# Patient Record
Sex: FEMALE | Race: WHITE | NOT HISPANIC OR LATINO | URBAN - METROPOLITAN AREA
[De-identification: names, ages, dates, MRNs, and addresses within clinical notes are randomized per-mention and may not be internally consistent; named-entity substitution may affect disease eponyms.]

---

## 2017-03-13 ENCOUNTER — OUTPATIENT (OUTPATIENT)
Dept: OUTPATIENT SERVICES | Facility: HOSPITAL | Age: 77
LOS: 1 days | Discharge: HOME | End: 2017-03-13

## 2017-03-13 ENCOUNTER — APPOINTMENT (OUTPATIENT)
Dept: HEMATOLOGY ONCOLOGY | Facility: CLINIC | Age: 77
End: 2017-03-13

## 2017-03-13 VITALS
DIASTOLIC BLOOD PRESSURE: 85 MMHG | HEIGHT: 68 IN | BODY MASS INDEX: 27.13 KG/M2 | HEART RATE: 70 BPM | TEMPERATURE: 98 F | SYSTOLIC BLOOD PRESSURE: 169 MMHG | WEIGHT: 179 LBS | RESPIRATION RATE: 14 BRPM

## 2017-07-06 ENCOUNTER — APPOINTMENT (OUTPATIENT)
Dept: BREAST CENTER | Facility: CLINIC | Age: 77
End: 2017-07-06

## 2017-07-06 VITALS
DIASTOLIC BLOOD PRESSURE: 80 MMHG | BODY MASS INDEX: 27.13 KG/M2 | SYSTOLIC BLOOD PRESSURE: 140 MMHG | HEIGHT: 68 IN | WEIGHT: 179 LBS

## 2017-07-07 ENCOUNTER — OUTPATIENT (OUTPATIENT)
Dept: OUTPATIENT SERVICES | Facility: HOSPITAL | Age: 77
LOS: 1 days | Discharge: HOME | End: 2017-07-07

## 2017-07-07 DIAGNOSIS — M19.90 UNSPECIFIED OSTEOARTHRITIS, UNSPECIFIED SITE: ICD-10-CM

## 2017-07-07 DIAGNOSIS — Z96.642 PRESENCE OF LEFT ARTIFICIAL HIP JOINT: ICD-10-CM

## 2017-08-07 ENCOUNTER — RX RENEWAL (OUTPATIENT)
Age: 77
End: 2017-08-07

## 2017-09-22 ENCOUNTER — OUTPATIENT (OUTPATIENT)
Dept: OUTPATIENT SERVICES | Facility: HOSPITAL | Age: 77
LOS: 1 days | Discharge: HOME | End: 2017-09-22

## 2017-09-22 DIAGNOSIS — Z01.818 ENCOUNTER FOR OTHER PREPROCEDURAL EXAMINATION: ICD-10-CM

## 2017-09-22 DIAGNOSIS — M17.12 UNILATERAL PRIMARY OSTEOARTHRITIS, LEFT KNEE: ICD-10-CM

## 2017-09-28 ENCOUNTER — OUTPATIENT (OUTPATIENT)
Dept: OUTPATIENT SERVICES | Facility: HOSPITAL | Age: 77
LOS: 1 days | Discharge: HOME | End: 2017-09-28

## 2017-09-28 ENCOUNTER — APPOINTMENT (OUTPATIENT)
Dept: HEMATOLOGY ONCOLOGY | Facility: CLINIC | Age: 77
End: 2017-09-28

## 2017-09-28 VITALS
BODY MASS INDEX: 26.37 KG/M2 | RESPIRATION RATE: 14 BRPM | HEIGHT: 68 IN | SYSTOLIC BLOOD PRESSURE: 136 MMHG | TEMPERATURE: 98.4 F | HEART RATE: 73 BPM | DIASTOLIC BLOOD PRESSURE: 66 MMHG | WEIGHT: 174 LBS

## 2017-09-28 DIAGNOSIS — Z86.39 PERSONAL HISTORY OF OTHER ENDOCRINE, NUTRITIONAL AND METABOLIC DISEASE: ICD-10-CM

## 2017-09-28 DIAGNOSIS — I10 ESSENTIAL (PRIMARY) HYPERTENSION: ICD-10-CM

## 2017-09-28 DIAGNOSIS — Z87.39 PERSONAL HISTORY OF OTHER DISEASES OF THE MUSCULOSKELETAL SYSTEM AND CONNECTIVE TISSUE: ICD-10-CM

## 2017-09-28 DIAGNOSIS — Z87.891 PERSONAL HISTORY OF NICOTINE DEPENDENCE: ICD-10-CM

## 2017-10-02 DIAGNOSIS — C50.411 MALIGNANT NEOPLASM OF UPPER-OUTER QUADRANT OF RIGHT FEMALE BREAST: ICD-10-CM

## 2017-10-03 ENCOUNTER — INPATIENT (INPATIENT)
Facility: HOSPITAL | Age: 77
LOS: 1 days | Discharge: NURSING, REHAB OR LONG-TERM CARE | End: 2017-10-05
Attending: ORTHOPAEDIC SURGERY

## 2017-10-05 ENCOUNTER — INPATIENT (INPATIENT)
Facility: HOSPITAL | Age: 77
LOS: 11 days | Discharge: HOME | End: 2017-10-17
Attending: PHYSICAL MEDICINE & REHABILITATION

## 2017-10-10 DIAGNOSIS — I10 ESSENTIAL (PRIMARY) HYPERTENSION: ICD-10-CM

## 2017-10-10 DIAGNOSIS — Z90.49 ACQUIRED ABSENCE OF OTHER SPECIFIED PARTS OF DIGESTIVE TRACT: ICD-10-CM

## 2017-10-10 DIAGNOSIS — Z92.3 PERSONAL HISTORY OF IRRADIATION: ICD-10-CM

## 2017-10-10 DIAGNOSIS — Z79.82 LONG TERM (CURRENT) USE OF ASPIRIN: ICD-10-CM

## 2017-10-10 DIAGNOSIS — Z96.643 PRESENCE OF ARTIFICIAL HIP JOINT, BILATERAL: ICD-10-CM

## 2017-10-10 DIAGNOSIS — Z51.89 ENCOUNTER FOR OTHER SPECIFIED AFTERCARE: ICD-10-CM

## 2017-10-10 DIAGNOSIS — Z90.710 ACQUIRED ABSENCE OF BOTH CERVIX AND UTERUS: ICD-10-CM

## 2017-10-10 DIAGNOSIS — E78.5 HYPERLIPIDEMIA, UNSPECIFIED: ICD-10-CM

## 2017-10-10 DIAGNOSIS — M17.12 UNILATERAL PRIMARY OSTEOARTHRITIS, LEFT KNEE: ICD-10-CM

## 2017-10-10 DIAGNOSIS — Z85.3 PERSONAL HISTORY OF MALIGNANT NEOPLASM OF BREAST: ICD-10-CM

## 2017-10-10 DIAGNOSIS — K21.9 GASTRO-ESOPHAGEAL REFLUX DISEASE WITHOUT ESOPHAGITIS: ICD-10-CM

## 2017-10-10 DIAGNOSIS — M06.9 RHEUMATOID ARTHRITIS, UNSPECIFIED: ICD-10-CM

## 2017-10-10 DIAGNOSIS — Z82.49 FAMILY HISTORY OF ISCHEMIC HEART DISEASE AND OTHER DISEASES OF THE CIRCULATORY SYSTEM: ICD-10-CM

## 2017-10-19 ENCOUNTER — OUTPATIENT (OUTPATIENT)
Dept: OUTPATIENT SERVICES | Facility: HOSPITAL | Age: 77
LOS: 1 days | Discharge: HOME | End: 2017-10-19

## 2017-10-19 DIAGNOSIS — Z79.01 LONG TERM (CURRENT) USE OF ANTICOAGULANTS: ICD-10-CM

## 2017-10-19 DIAGNOSIS — Z47.1 AFTERCARE FOLLOWING JOINT REPLACEMENT SURGERY: ICD-10-CM

## 2017-10-19 DIAGNOSIS — Z92.3 PERSONAL HISTORY OF IRRADIATION: ICD-10-CM

## 2017-10-19 DIAGNOSIS — E78.5 HYPERLIPIDEMIA, UNSPECIFIED: ICD-10-CM

## 2017-10-19 DIAGNOSIS — M19.90 UNSPECIFIED OSTEOARTHRITIS, UNSPECIFIED SITE: ICD-10-CM

## 2017-10-19 DIAGNOSIS — Z96.643 PRESENCE OF ARTIFICIAL HIP JOINT, BILATERAL: ICD-10-CM

## 2017-10-19 DIAGNOSIS — D64.9 ANEMIA, UNSPECIFIED: ICD-10-CM

## 2017-10-19 DIAGNOSIS — Z96.652 PRESENCE OF LEFT ARTIFICIAL KNEE JOINT: ICD-10-CM

## 2017-10-19 DIAGNOSIS — K21.9 GASTRO-ESOPHAGEAL REFLUX DISEASE WITHOUT ESOPHAGITIS: ICD-10-CM

## 2017-10-19 DIAGNOSIS — I10 ESSENTIAL (PRIMARY) HYPERTENSION: ICD-10-CM

## 2017-10-19 DIAGNOSIS — Z96.649 PRESENCE OF UNSPECIFIED ARTIFICIAL HIP JOINT: ICD-10-CM

## 2017-10-19 DIAGNOSIS — E78.00 PURE HYPERCHOLESTEROLEMIA, UNSPECIFIED: ICD-10-CM

## 2017-10-19 DIAGNOSIS — Z85.3 PERSONAL HISTORY OF MALIGNANT NEOPLASM OF BREAST: ICD-10-CM

## 2017-10-19 DIAGNOSIS — M06.9 RHEUMATOID ARTHRITIS, UNSPECIFIED: ICD-10-CM

## 2017-10-19 DIAGNOSIS — R25.1 TREMOR, UNSPECIFIED: ICD-10-CM

## 2017-10-19 DIAGNOSIS — M85.80 OTHER SPECIFIED DISORDERS OF BONE DENSITY AND STRUCTURE, UNSPECIFIED SITE: ICD-10-CM

## 2017-10-23 ENCOUNTER — OUTPATIENT (OUTPATIENT)
Dept: OUTPATIENT SERVICES | Facility: HOSPITAL | Age: 77
LOS: 1 days | Discharge: HOME | End: 2017-10-23

## 2017-10-23 DIAGNOSIS — Z96.649 PRESENCE OF UNSPECIFIED ARTIFICIAL HIP JOINT: ICD-10-CM

## 2017-10-23 DIAGNOSIS — Z79.01 LONG TERM (CURRENT) USE OF ANTICOAGULANTS: ICD-10-CM

## 2017-10-30 ENCOUNTER — OUTPATIENT (OUTPATIENT)
Dept: OUTPATIENT SERVICES | Facility: HOSPITAL | Age: 77
LOS: 1 days | Discharge: HOME | End: 2017-10-30

## 2017-10-30 DIAGNOSIS — Z96.649 PRESENCE OF UNSPECIFIED ARTIFICIAL HIP JOINT: ICD-10-CM

## 2017-10-30 DIAGNOSIS — Z79.01 LONG TERM (CURRENT) USE OF ANTICOAGULANTS: ICD-10-CM

## 2017-11-06 ENCOUNTER — OUTPATIENT (OUTPATIENT)
Dept: OUTPATIENT SERVICES | Facility: HOSPITAL | Age: 77
LOS: 1 days | Discharge: HOME | End: 2017-11-06

## 2017-11-06 DIAGNOSIS — Z79.01 LONG TERM (CURRENT) USE OF ANTICOAGULANTS: ICD-10-CM

## 2017-11-06 DIAGNOSIS — Z96.649 PRESENCE OF UNSPECIFIED ARTIFICIAL HIP JOINT: ICD-10-CM

## 2017-12-11 ENCOUNTER — OUTPATIENT (OUTPATIENT)
Dept: OUTPATIENT SERVICES | Facility: HOSPITAL | Age: 77
LOS: 1 days | Discharge: HOME | End: 2017-12-11

## 2017-12-11 DIAGNOSIS — R92.8 OTHER ABNORMAL AND INCONCLUSIVE FINDINGS ON DIAGNOSTIC IMAGING OF BREAST: ICD-10-CM

## 2017-12-13 ENCOUNTER — OUTPATIENT (OUTPATIENT)
Dept: OUTPATIENT SERVICES | Facility: HOSPITAL | Age: 77
LOS: 1 days | Discharge: HOME | End: 2017-12-13

## 2017-12-13 DIAGNOSIS — M19.90 UNSPECIFIED OSTEOARTHRITIS, UNSPECIFIED SITE: ICD-10-CM

## 2017-12-13 DIAGNOSIS — Z96.652 PRESENCE OF LEFT ARTIFICIAL KNEE JOINT: ICD-10-CM

## 2018-02-07 ENCOUNTER — RX RENEWAL (OUTPATIENT)
Age: 78
End: 2018-02-07

## 2018-02-27 ENCOUNTER — APPOINTMENT (OUTPATIENT)
Dept: BREAST CENTER | Facility: CLINIC | Age: 78
End: 2018-02-27
Payer: MEDICARE

## 2018-02-27 VITALS
OXYGEN SATURATION: 96 % | WEIGHT: 174 LBS | BODY MASS INDEX: 26.37 KG/M2 | HEART RATE: 79 BPM | HEIGHT: 68 IN | SYSTOLIC BLOOD PRESSURE: 138 MMHG | DIASTOLIC BLOOD PRESSURE: 84 MMHG

## 2018-02-27 PROCEDURE — 99213 OFFICE O/P EST LOW 20 MIN: CPT

## 2018-03-22 ENCOUNTER — APPOINTMENT (OUTPATIENT)
Dept: HEMATOLOGY ONCOLOGY | Facility: CLINIC | Age: 78
End: 2018-03-22

## 2018-04-09 ENCOUNTER — APPOINTMENT (OUTPATIENT)
Dept: HEMATOLOGY ONCOLOGY | Facility: CLINIC | Age: 78
End: 2018-04-09

## 2018-04-09 ENCOUNTER — OUTPATIENT (OUTPATIENT)
Dept: OUTPATIENT SERVICES | Facility: HOSPITAL | Age: 78
LOS: 1 days | Discharge: HOME | End: 2018-04-09

## 2018-04-09 VITALS
SYSTOLIC BLOOD PRESSURE: 160 MMHG | DIASTOLIC BLOOD PRESSURE: 70 MMHG | HEIGHT: 68 IN | HEART RATE: 68 BPM | WEIGHT: 178 LBS | BODY MASS INDEX: 26.98 KG/M2 | TEMPERATURE: 97.2 F | RESPIRATION RATE: 16 BRPM

## 2018-04-09 PROBLEM — Z87.39 HISTORY OF RHEUMATOID ARTHRITIS: Status: RESOLVED | Noted: 2018-04-09 | Resolved: 2018-04-09

## 2018-04-09 PROBLEM — Z86.39 HISTORY OF HYPERLIPIDEMIA: Status: RESOLVED | Noted: 2018-04-09 | Resolved: 2018-04-09

## 2018-04-09 RX ORDER — CHLORHEXIDINE GLUCONATE 4 %
325 (65 FE) LIQUID (ML) TOPICAL
Qty: 60 | Refills: 0 | Status: COMPLETED | COMMUNITY
Start: 2017-10-17

## 2018-04-09 RX ORDER — RANITIDINE 150 MG/1
150 TABLET ORAL
Qty: 180 | Refills: 0 | Status: COMPLETED | COMMUNITY
Start: 2017-09-18

## 2018-04-09 RX ORDER — OXYCODONE 5 MG/1
5 TABLET ORAL
Qty: 42 | Refills: 0 | Status: COMPLETED | COMMUNITY
Start: 2017-10-17

## 2018-04-09 RX ORDER — WARFARIN 1 MG/1
1 TABLET ORAL
Qty: 30 | Refills: 0 | Status: COMPLETED | COMMUNITY
Start: 2017-10-17

## 2018-04-09 RX ORDER — CHLORHEXIDINE GLUCONATE, 0.12% ORAL RINSE 1.2 MG/ML
0.12 SOLUTION DENTAL
Qty: 473 | Refills: 0 | Status: COMPLETED | COMMUNITY
Start: 2017-08-29

## 2018-04-09 RX ORDER — PREDNISONE 10 MG/1
10 TABLET ORAL
Qty: 30 | Refills: 0 | Status: COMPLETED | COMMUNITY
Start: 2017-11-07

## 2018-04-09 RX ORDER — WARFARIN 2.5 MG/1
2.5 TABLET ORAL
Qty: 30 | Refills: 0 | Status: COMPLETED | COMMUNITY
Start: 2017-10-17

## 2018-04-16 DIAGNOSIS — C50.411 MALIGNANT NEOPLASM OF UPPER-OUTER QUADRANT OF RIGHT FEMALE BREAST: ICD-10-CM

## 2018-06-11 ENCOUNTER — OUTPATIENT (OUTPATIENT)
Dept: OUTPATIENT SERVICES | Facility: HOSPITAL | Age: 78
LOS: 1 days | Discharge: HOME | End: 2018-06-11

## 2018-06-11 DIAGNOSIS — R92.8 OTHER ABNORMAL AND INCONCLUSIVE FINDINGS ON DIAGNOSTIC IMAGING OF BREAST: ICD-10-CM

## 2018-07-19 ENCOUNTER — APPOINTMENT (OUTPATIENT)
Dept: BREAST CENTER | Facility: CLINIC | Age: 78
End: 2018-07-19
Payer: MEDICARE

## 2018-07-19 VITALS
HEART RATE: 67 BPM | HEIGHT: 68 IN | OXYGEN SATURATION: 96 % | WEIGHT: 178 LBS | SYSTOLIC BLOOD PRESSURE: 122 MMHG | DIASTOLIC BLOOD PRESSURE: 78 MMHG | BODY MASS INDEX: 26.98 KG/M2

## 2018-07-19 PROCEDURE — 99213 OFFICE O/P EST LOW 20 MIN: CPT

## 2018-07-25 ENCOUNTER — OUTPATIENT (OUTPATIENT)
Dept: OUTPATIENT SERVICES | Facility: HOSPITAL | Age: 78
LOS: 1 days | Discharge: HOME | End: 2018-07-25

## 2018-07-26 DIAGNOSIS — M89.9 DISORDER OF BONE, UNSPECIFIED: ICD-10-CM

## 2018-07-26 DIAGNOSIS — Z13.820 ENCOUNTER FOR SCREENING FOR OSTEOPOROSIS: ICD-10-CM

## 2018-07-26 DIAGNOSIS — Z78.0 ASYMPTOMATIC MENOPAUSAL STATE: ICD-10-CM

## 2018-10-03 ENCOUNTER — OUTPATIENT (OUTPATIENT)
Dept: OUTPATIENT SERVICES | Facility: HOSPITAL | Age: 78
LOS: 1 days | Discharge: HOME | End: 2018-10-03

## 2018-10-03 DIAGNOSIS — R07.9 CHEST PAIN, UNSPECIFIED: ICD-10-CM

## 2018-10-08 ENCOUNTER — APPOINTMENT (OUTPATIENT)
Dept: HEMATOLOGY ONCOLOGY | Facility: CLINIC | Age: 78
End: 2018-10-08

## 2018-10-15 ENCOUNTER — RX RENEWAL (OUTPATIENT)
Age: 78
End: 2018-10-15

## 2018-10-29 ENCOUNTER — OUTPATIENT (OUTPATIENT)
Dept: OUTPATIENT SERVICES | Facility: HOSPITAL | Age: 78
LOS: 1 days | Discharge: HOME | End: 2018-10-29

## 2018-10-29 ENCOUNTER — APPOINTMENT (OUTPATIENT)
Dept: HEMATOLOGY ONCOLOGY | Facility: CLINIC | Age: 78
End: 2018-10-29

## 2018-10-29 VITALS
SYSTOLIC BLOOD PRESSURE: 181 MMHG | RESPIRATION RATE: 16 BRPM | TEMPERATURE: 97.4 F | WEIGHT: 178 LBS | HEART RATE: 74 BPM | DIASTOLIC BLOOD PRESSURE: 72 MMHG | HEIGHT: 68 IN | BODY MASS INDEX: 26.98 KG/M2

## 2018-10-30 DIAGNOSIS — C50.411 MALIGNANT NEOPLASM OF UPPER-OUTER QUADRANT OF RIGHT FEMALE BREAST: ICD-10-CM

## 2018-10-30 DIAGNOSIS — Z79.811 LONG TERM (CURRENT) USE OF AROMATASE INHIBITORS: ICD-10-CM

## 2018-12-10 ENCOUNTER — FORM ENCOUNTER (OUTPATIENT)
Age: 78
End: 2018-12-10

## 2018-12-11 ENCOUNTER — OUTPATIENT (OUTPATIENT)
Dept: OUTPATIENT SERVICES | Facility: HOSPITAL | Age: 78
LOS: 1 days | Discharge: HOME | End: 2018-12-11

## 2018-12-11 DIAGNOSIS — R92.8 OTHER ABNORMAL AND INCONCLUSIVE FINDINGS ON DIAGNOSTIC IMAGING OF BREAST: ICD-10-CM

## 2019-01-03 ENCOUNTER — RX RENEWAL (OUTPATIENT)
Age: 79
End: 2019-01-03

## 2019-01-17 ENCOUNTER — APPOINTMENT (OUTPATIENT)
Dept: BREAST CENTER | Facility: CLINIC | Age: 79
End: 2019-01-17
Payer: MEDICARE

## 2019-01-17 VITALS
WEIGHT: 178 LBS | DIASTOLIC BLOOD PRESSURE: 80 MMHG | SYSTOLIC BLOOD PRESSURE: 140 MMHG | HEART RATE: 72 BPM | HEIGHT: 68 IN | BODY MASS INDEX: 26.98 KG/M2 | OXYGEN SATURATION: 98 %

## 2019-01-17 PROCEDURE — 99212 OFFICE O/P EST SF 10 MIN: CPT

## 2019-01-17 NOTE — ASSESSMENT
[FreeTextEntry1] : Elenita has a h/o right breast lumpectomy for breast cancer in 2015. She has a benign CBE. There are no palpable findings, nipple discharge or inversion. I reviewed her recent imaging which appears benign.\par \par She will f/up in 6 months for a CBE.\par \par I spent a total of 10 minutes of face to face time with this patient, greater than 50% of which was spent in counseling and/or coordination of care.  All of her questions were appropriately answered.\par

## 2019-01-17 NOTE — HISTORY OF PRESENT ILLNESS
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  \par ER/[place] (+), HER2 (-).\par h/o Left breast benign Ca++ on NLOC (Ferzli/[employer]).\par No FHx breast/ovarian cancer.  Sister had Hodgkin's lymphoma.\par s/p Right NLOC/SNB/MP 12/18/15 - 0/1 (-); 18 mm well diff IDC with lobular features, non ext DCIS low grade; (+) margins.  No LVI or perineural invasion.  Widespread LCIS classical, ALH, papilloma, ADH.  \par s/p Right re-exc 2/1/16 - widespread LCIS classical, no invasion identified; (-) margins.  \par s/p SKIP insertion - 02/18/16. completed PBI on 2/26/16.\par / Autumn - Anastrozole. \par

## 2019-01-17 NOTE — PAST MEDICAL HISTORY
[Menarche Age ____] : age at menarche was [unfilled] [Menopause Age____] : age at menopause was [unfilled] [Total Preg ___] : G[unfilled] [Live Births ___] : P[unfilled]  [Age At Live Birth ___] : Age at live birth: [unfilled] [History of Hormone Replacement Treatment] : has no history of hormone replacement treatment [FreeTextEntry5] : TAYLOR [FreeTextEntry6] : none [FreeTextEntry7] : none [FreeTextEntry8] : none

## 2019-01-17 NOTE — DATA REVIEWED
[FreeTextEntry1] : B/L Dx Mammo & Right Sono - 12/11/18:\par Breast composition:There are scattered areas of fibroglandular density. \par \par The patient is status post a right lumpectomy with stable architectural \par distortion most consistent with postsurgical change. There is a large \par seroma at the lumpectomy site which does not appear significantly changed. \par See the sonogram report below. No suspicious masses or abnormal clusters of \par microcalcifications are seen. \par \par Whole Right Breast Sonogram: \par \par At the lumpectomy site which is at the 10:00 location 6 - 7cm from the \par nipple, there is a seroma measuring 3.2 cm x 2.9 cm x 1.9 cm which is \par without significant change. \par \par Impression: Status post a right lumpectomy with stable architectural \par distortion most consistent with postsurgical change. Stable associated \par seroma at the lumpectomy site as above. \par \par No evidence of malignancy. \par \par Recommendation: Unless otherwise indicated by clinical findings, annual \par screening mammography recommended. \par \par BI-RADS Category 2: Benign

## 2019-01-17 NOTE — PHYSICAL EXAM
[No Cervical Adenopathy] : no cervical adenopathy [Examined in the supine and seated position] : examined in the supine and seated position [No dominant masses] : no dominant masses in right breast  [No dominant masses] : no dominant masses left breast [No Nipple Retraction] : no left nipple retraction [No Nipple Discharge] : no left nipple discharge [No Axillary Lymphadenopathy] : no left axillary lymphadenopathy [No Edema] : no edema [No Swelling] : no swelling [Full ROM] : full range of motion [No Rashes] : no rashes [No Ulceration] : no ulceration

## 2019-01-17 NOTE — REVIEW OF SYSTEMS
[Arthralgias] : arthralgias [Joint Pain] : joint pain [Negative] : Constitutional [Breast Pain] : no breast pain [Breast Lump] : no breast lump [Nipple Discharge] : no nipple discharge [Nipple Inverted] : no inversion of the nipple

## 2019-01-17 NOTE — REASON FOR VISIT
[Follow-Up: _____] : a [unfilled] follow-up visit [FreeTextEntry1] : h/o right breast cancer; imaging review.

## 2019-01-30 ENCOUNTER — APPOINTMENT (OUTPATIENT)
Dept: ORTHOPEDIC SURGERY | Facility: CLINIC | Age: 79
End: 2019-01-30
Payer: MEDICARE

## 2019-01-30 DIAGNOSIS — Z80.7 FAMILY HISTORY OF OTHER MALIGNANT NEOPLASMS OF LYMPHOID, HEMATOPOIETIC AND RELATED TISSUES: ICD-10-CM

## 2019-01-30 PROCEDURE — 99214 OFFICE O/P EST MOD 30 MIN: CPT

## 2019-01-30 RX ORDER — AMOXICILLIN 500 MG/1
500 CAPSULE ORAL
Qty: 12 | Refills: 0 | Status: DISCONTINUED | COMMUNITY
Start: 2018-03-22 | End: 2019-01-30

## 2019-01-30 RX ORDER — ZOLPIDEM TARTRATE 5 MG/1
5 TABLET ORAL
Qty: 30 | Refills: 0 | Status: DISCONTINUED | COMMUNITY
Start: 2017-12-28 | End: 2019-01-30

## 2019-01-30 RX ORDER — SERTRALINE 25 MG/1
25 TABLET, FILM COATED ORAL
Qty: 60 | Refills: 0 | Status: DISCONTINUED | COMMUNITY
Start: 2017-09-18 | End: 2019-01-30

## 2019-01-30 NOTE — PHYSICAL EXAM
[Cane] : ambulates with cane [] : Motor: [Motor Strength Lower Extremities] : left (5/5) [2+] : left 2+ [Normal] : Alert and in no acute distress [de-identified] : Decreased ROM with flexion about the right knee, without pain. Full ROM with extension, without pain. [de-identified] : Sensation grossly intact about bilateral lower extremities.  [de-identified] : Incision well healed about the left knee. Tenderness to palpation about the right knee, swelling.

## 2019-01-30 NOTE — HISTORY OF PRESENT ILLNESS
[Pain Location] : pain [] : right knee [___ yrs] : [unfilled] year(s) ago [6] : a maximum pain level of 6/10 [Walking] : worsened by walking [Acetaminophen] : relieved by acetaminophen [Ice] : relieved by ice [NSAIDs] : relieved by nonsteroidal anti-inflammatory drugs [Rest] : relieved by rest [de-identified] : 78 year old female s/p left total knee replacement x1 year ago presents to the office today complaining of right knee pain. Pt reports difficulty with ambulation that she contributes to the knee pain. Pt states her pain is worse at night. Pt is looking to do elective right total knee replacement as she had great results with the left. Pt suffers from Rheumatoid Arthritis.

## 2019-01-30 NOTE — REASON FOR VISIT
[Follow-Up Visit] : a follow-up visit for [Artificial Knee Joint] : artificial knee joint [Knee Pain] : knee pain [FreeTextEntry2] : Right knee pain

## 2019-03-19 ENCOUNTER — OUTPATIENT (OUTPATIENT)
Dept: OUTPATIENT SERVICES | Facility: HOSPITAL | Age: 79
LOS: 1 days | Discharge: HOME | End: 2019-03-19
Payer: MEDICARE

## 2019-03-19 VITALS
SYSTOLIC BLOOD PRESSURE: 170 MMHG | TEMPERATURE: 98 F | HEART RATE: 57 BPM | RESPIRATION RATE: 18 BRPM | DIASTOLIC BLOOD PRESSURE: 70 MMHG | HEIGHT: 68 IN | OXYGEN SATURATION: 100 %

## 2019-03-19 DIAGNOSIS — M25.561 PAIN IN RIGHT KNEE: ICD-10-CM

## 2019-03-19 DIAGNOSIS — Z01.818 ENCOUNTER FOR OTHER PREPROCEDURAL EXAMINATION: ICD-10-CM

## 2019-03-19 DIAGNOSIS — Z96.652 PRESENCE OF LEFT ARTIFICIAL KNEE JOINT: Chronic | ICD-10-CM

## 2019-03-19 DIAGNOSIS — Z90.49 ACQUIRED ABSENCE OF OTHER SPECIFIED PARTS OF DIGESTIVE TRACT: Chronic | ICD-10-CM

## 2019-03-19 DIAGNOSIS — I10 ESSENTIAL (PRIMARY) HYPERTENSION: ICD-10-CM

## 2019-03-19 DIAGNOSIS — Z96.642 PRESENCE OF LEFT ARTIFICIAL HIP JOINT: Chronic | ICD-10-CM

## 2019-03-19 DIAGNOSIS — Z96.641 PRESENCE OF RIGHT ARTIFICIAL HIP JOINT: Chronic | ICD-10-CM

## 2019-03-19 DIAGNOSIS — Z90.710 ACQUIRED ABSENCE OF BOTH CERVIX AND UTERUS: Chronic | ICD-10-CM

## 2019-03-19 LAB
ALBUMIN SERPL ELPH-MCNC: 4.3 G/DL — SIGNIFICANT CHANGE UP (ref 3.5–5.2)
ALP SERPL-CCNC: 87 U/L — SIGNIFICANT CHANGE UP (ref 30–115)
ALT FLD-CCNC: 18 U/L — SIGNIFICANT CHANGE UP (ref 0–41)
ANION GAP SERPL CALC-SCNC: 14 MMOL/L — SIGNIFICANT CHANGE UP (ref 7–14)
APPEARANCE UR: ABNORMAL
APTT BLD: 26.4 SEC — LOW (ref 27–39.2)
AST SERPL-CCNC: 21 U/L — SIGNIFICANT CHANGE UP (ref 0–41)
BACTERIA # UR AUTO: ABNORMAL /HPF
BASOPHILS # BLD AUTO: 0.03 K/UL — SIGNIFICANT CHANGE UP (ref 0–0.2)
BASOPHILS NFR BLD AUTO: 0.6 % — SIGNIFICANT CHANGE UP (ref 0–1)
BILIRUB SERPL-MCNC: 0.3 MG/DL — SIGNIFICANT CHANGE UP (ref 0.2–1.2)
BILIRUB UR-MCNC: NEGATIVE — SIGNIFICANT CHANGE UP
BLD GP AB SCN SERPL QL: SIGNIFICANT CHANGE UP
BUN SERPL-MCNC: 21 MG/DL — HIGH (ref 10–20)
CALCIUM SERPL-MCNC: 10 MG/DL — SIGNIFICANT CHANGE UP (ref 8.5–10.1)
CHLORIDE SERPL-SCNC: 99 MMOL/L — SIGNIFICANT CHANGE UP (ref 98–110)
CO2 SERPL-SCNC: 28 MMOL/L — SIGNIFICANT CHANGE UP (ref 17–32)
COLOR SPEC: YELLOW — SIGNIFICANT CHANGE UP
CREAT SERPL-MCNC: 0.8 MG/DL — SIGNIFICANT CHANGE UP (ref 0.7–1.5)
DIFF PNL FLD: ABNORMAL
EOSINOPHIL # BLD AUTO: 0.07 K/UL — SIGNIFICANT CHANGE UP (ref 0–0.7)
EOSINOPHIL NFR BLD AUTO: 1.4 % — SIGNIFICANT CHANGE UP (ref 0–8)
GLUCOSE SERPL-MCNC: 80 MG/DL — SIGNIFICANT CHANGE UP (ref 70–99)
GLUCOSE UR QL: NEGATIVE MG/DL — SIGNIFICANT CHANGE UP
HCT VFR BLD CALC: 36.3 % — LOW (ref 37–47)
HGB BLD-MCNC: 12.4 G/DL — SIGNIFICANT CHANGE UP (ref 12–16)
IMM GRANULOCYTES NFR BLD AUTO: 0.2 % — SIGNIFICANT CHANGE UP (ref 0.1–0.3)
INR BLD: 0.95 RATIO — SIGNIFICANT CHANGE UP (ref 0.65–1.3)
KETONES UR-MCNC: NEGATIVE — SIGNIFICANT CHANGE UP
LEUKOCYTE ESTERASE UR-ACNC: ABNORMAL
LYMPHOCYTES # BLD AUTO: 1.23 K/UL — SIGNIFICANT CHANGE UP (ref 1.2–3.4)
LYMPHOCYTES # BLD AUTO: 25.4 % — SIGNIFICANT CHANGE UP (ref 20.5–51.1)
MCHC RBC-ENTMCNC: 30.8 PG — SIGNIFICANT CHANGE UP (ref 27–31)
MCHC RBC-ENTMCNC: 34.2 G/DL — SIGNIFICANT CHANGE UP (ref 32–37)
MCV RBC AUTO: 90.1 FL — SIGNIFICANT CHANGE UP (ref 81–99)
MONOCYTES # BLD AUTO: 0.54 K/UL — SIGNIFICANT CHANGE UP (ref 0.1–0.6)
MONOCYTES NFR BLD AUTO: 11.2 % — HIGH (ref 1.7–9.3)
MRSA PCR RESULT.: NEGATIVE — SIGNIFICANT CHANGE UP
NEUTROPHILS # BLD AUTO: 2.96 K/UL — SIGNIFICANT CHANGE UP (ref 1.4–6.5)
NEUTROPHILS NFR BLD AUTO: 61.2 % — SIGNIFICANT CHANGE UP (ref 42.2–75.2)
NITRITE UR-MCNC: POSITIVE
NRBC # BLD: 0 /100 WBCS — SIGNIFICANT CHANGE UP (ref 0–0)
PH UR: 7 — SIGNIFICANT CHANGE UP (ref 5–8)
PLATELET # BLD AUTO: 205 K/UL — SIGNIFICANT CHANGE UP (ref 130–400)
POTASSIUM SERPL-MCNC: 4 MMOL/L — SIGNIFICANT CHANGE UP (ref 3.5–5)
POTASSIUM SERPL-SCNC: 4 MMOL/L — SIGNIFICANT CHANGE UP (ref 3.5–5)
PROT SERPL-MCNC: 6.7 G/DL — SIGNIFICANT CHANGE UP (ref 6–8)
PROT UR-MCNC: NEGATIVE MG/DL — SIGNIFICANT CHANGE UP
PROTHROM AB SERPL-ACNC: 10.9 SEC — SIGNIFICANT CHANGE UP (ref 9.95–12.87)
RBC # BLD: 4.03 M/UL — LOW (ref 4.2–5.4)
RBC # FLD: 14.4 % — SIGNIFICANT CHANGE UP (ref 11.5–14.5)
SODIUM SERPL-SCNC: 141 MMOL/L — SIGNIFICANT CHANGE UP (ref 135–146)
SP GR SPEC: 1.01 — SIGNIFICANT CHANGE UP (ref 1.01–1.03)
TYPE + AB SCN PNL BLD: SIGNIFICANT CHANGE UP
UROBILINOGEN FLD QL: 0.2 MG/DL — SIGNIFICANT CHANGE UP (ref 0.2–0.2)
WBC # BLD: 4.84 K/UL — SIGNIFICANT CHANGE UP (ref 4.8–10.8)
WBC # FLD AUTO: 4.84 K/UL — SIGNIFICANT CHANGE UP (ref 4.8–10.8)
WBC UR QL: >50 /HPF

## 2019-03-19 PROCEDURE — 93010 ELECTROCARDIOGRAM REPORT: CPT

## 2019-03-19 NOTE — H&P PST ADULT - VISION (WITH CORRECTIVE LENSES IF THE PATIENT USUALLY WEARS THEM):
implants both eyes/Normal vision: sees adequately in most situations; can see medication labels, newsprint

## 2019-03-19 NOTE — H&P PST ADULT - REASON FOR ADMISSION
77 yo f presents to PAST for right total knee replacement under regional anesthesia on 04/9/2019 [facility]

## 2019-03-19 NOTE — H&P PST ADULT - NSICDXPASTSURGICALHX_GEN_ALL_CORE_FT
PAST SURGICAL HISTORY:  H/O vaginal hysterectomy     History of right hip replacement     S/P total knee replacement, left     Status post left hip replacement PAST SURGICAL HISTORY:  H/O vaginal hysterectomy     History of right hip replacement     S/P total knee replacement, left     Status post cholecystectomy     Status post left hip replacement

## 2019-03-19 NOTE — H&P PST ADULT - NSICDXPASTMEDICALHX_GEN_ALL_CORE_FT
PAST MEDICAL HISTORY:  Breast cancer     Chronic GERD     Depression     HTN (hypertension)     OA (osteoarthritis)     Rheumatoid arthritis PAST MEDICAL HISTORY:  Breast cancer last radiation 2015    Chronic GERD     Depression     HTN (hypertension)     OA (osteoarthritis)     Rheumatoid arthritis

## 2019-03-19 NOTE — H&P PST ADULT - NSANTHOSAYNRD_GEN_A_CORE
No. MACRINA screening performed.  STOP BANG Legend: 0-2 = LOW Risk; 3-4 = INTERMEDIATE Risk; 5-8 = HIGH Risk

## 2019-03-19 NOTE — H&P PST ADULT - HISTORY OF PRESENT ILLNESS
Patient c/o progressively worsening Right knee pain x 1 yr.   Denies any c/o cp, sob, palpitation, fever, cough or dysuria.   Ex tolerance very limited and ambulate with cane and able to walk 1 block with out SOB.   No MACRINA Patient c/o progressively worsening Right knee pain x 1 yr.   Denies any c/o cp, sob, palpitation, fever, cough or dysuria.   Ex tolerance very limited and ambulate with cane and able to walk 1 block with out SOB.   No MACRINA.  Case reviewed with Dr. Taylor

## 2019-03-20 ENCOUNTER — OUTPATIENT (OUTPATIENT)
Dept: OUTPATIENT SERVICES | Facility: HOSPITAL | Age: 79
LOS: 1 days | Discharge: HOME | End: 2019-03-20

## 2019-03-20 ENCOUNTER — APPOINTMENT (OUTPATIENT)
Dept: HEMATOLOGY ONCOLOGY | Facility: CLINIC | Age: 79
End: 2019-03-20

## 2019-03-20 VITALS
DIASTOLIC BLOOD PRESSURE: 81 MMHG | TEMPERATURE: 96.4 F | WEIGHT: 179 LBS | RESPIRATION RATE: 14 BRPM | HEART RATE: 60 BPM | BODY MASS INDEX: 27.13 KG/M2 | SYSTOLIC BLOOD PRESSURE: 181 MMHG | HEIGHT: 68 IN

## 2019-03-20 VITALS — DIASTOLIC BLOOD PRESSURE: 70 MMHG | SYSTOLIC BLOOD PRESSURE: 158 MMHG

## 2019-03-20 DIAGNOSIS — Z96.652 PRESENCE OF LEFT ARTIFICIAL KNEE JOINT: Chronic | ICD-10-CM

## 2019-03-20 DIAGNOSIS — Z90.710 ACQUIRED ABSENCE OF BOTH CERVIX AND UTERUS: Chronic | ICD-10-CM

## 2019-03-20 DIAGNOSIS — Z96.642 PRESENCE OF LEFT ARTIFICIAL HIP JOINT: Chronic | ICD-10-CM

## 2019-03-20 DIAGNOSIS — Z96.641 PRESENCE OF RIGHT ARTIFICIAL HIP JOINT: Chronic | ICD-10-CM

## 2019-03-20 DIAGNOSIS — Z90.49 ACQUIRED ABSENCE OF OTHER SPECIFIED PARTS OF DIGESTIVE TRACT: Chronic | ICD-10-CM

## 2019-03-20 PROBLEM — C50.919 MALIGNANT NEOPLASM OF UNSPECIFIED SITE OF UNSPECIFIED FEMALE BREAST: Chronic | Status: ACTIVE | Noted: 2019-03-19

## 2019-03-20 LAB
ESTIMATED AVERAGE GLUCOSE: 105 MG/DL — SIGNIFICANT CHANGE UP (ref 68–114)
HBA1C BLD-MCNC: 5.3 % — SIGNIFICANT CHANGE UP (ref 4–5.6)

## 2019-03-20 NOTE — CONSULT LETTER
[Dear  ___] : Dear  [unfilled], [Courtesy Letter:] : I had the pleasure of seeing your patient, [unfilled], in my office today. [Please see my note below.] : Please see my note below. [Sincerely,] : Sincerely, [FreeTextEntry3] : Rivka Leyva MD

## 2019-03-20 NOTE — ASSESSMENT
[FreeTextEntry1] : 1. 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  There is no evidence of disease.\par 2. Rheumatoid arthritis.\par \par RECOMMENDATION: \par -- Continue anastrozole, calcium and vitamin D supplements. \par -- Bilateral screening mammo in 12/2019.\par -- Followup with orthopedic for left knee replacement. Hold Anastrozole for 3 days prior to surgery and resume it after surgery. \par -- Followup with PCP for health maintenance. \par -- Followup with Dr. Wiley for rheumatoid arthritis.\par -- RTO for followup in 6 months.\par \par \par \par \par

## 2019-03-20 NOTE — HISTORY OF PRESENT ILLNESS
[de-identified] : In 6/2017, right breast dx mammo did not showed abnormal finding.\par \par On 5/4/2016, Bone  Density showed\par ----------------------------------------------------------------- \par Region                                      BMD        T-score    Z-score      Classification \par ----------------------------------------------------------------- \par AP  Spine  (L1,  L2,  L3)           1.059        0.4            2.8          Normal \par Femoral  Neck  (Left)                0.787      -0.6            1.6           Normal \par Total  Hip  (Left)                        0.740      -1.7            0.2          Osteopenia \par 1/3  Radius  (Left)                0.510      -3.1          -0.3          \par ----------------------------------------------------------------- \par \par She is scheduled for right knee replacement. She reports body aching. \par \par On 1029/18:\par The patient is here today for followup visit. She has been taking anastrozole daily and tolerated. She has stable chronic joint aching. In 7/2018, she had bone density which showed normal bone density in lumbar spine. Due to b/l hp replacement, bone density study was not able performed in the hip and femoral neck.\par She had right breast dx mammo and sonogram on 6/2018. There was no abnormal finding.\par \par 3/20/19:\par The patient is here for followup visit. She has been taking anastrozole daily and tolerated. She has stable chronic joint aching. In 7/2018, she had bone density which showed normal bone density in lumbar spine. Due to b/l hp replacement, bone density study was not able performed in the hip and femoral neck. In 12/2018, she had b/l dx mammo and there was no suspicious finding. She has left knee pain and will have replacement surgery in 4/2019. [de-identified] : This is a  77-year-old white female is here today for a followup visit.  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  After surgery, she received partial breast radiotherapy.  Since 03/2016, she has been on adjuvant endocrine therapy initially with anastrozole, and then switched to exemestane.  But she switched back to anastrozole.  She still has body and joint aching.  She also has RA and has been on MTX.  She is no longer on Prednisone. She follows with Dr. Wiley regularly.  Last visit was in March 2018.\par \par On 05/04/2016, she had a bone density which showed osteopenia in the hip.  Her bone density in the spine and femoral neck are normal.  Compared to the report from previous year, her bone density has been stable.  Her mammogram was done 10/24/16 and showed post surgical change including a 3.8 cm seroma in the right breast, stable.  There was no evidence of malignancy.  Latest mammogram was done 12/11/17 which showed no mammographic evidence of malignancy. Stable postsurgical changes  of the right breast. \par

## 2019-03-20 NOTE — PHYSICAL EXAM
[Restricted in physically strenuous activity but ambulatory and able to carry out work of a light or sedentary nature] : Status 1- Restricted in physically strenuous activity but ambulatory and able to carry out work of a light or sedentary nature, e.g., light house work, office work [Normal] : affect appropriate [de-identified] : Right side status post lumpectomy.  There is a lump of induration at the surgical scar- unchanged.  There is no palpable mass and no palpable right axillary lymphadenopathy.  Left breast and left axilla are normal.

## 2019-03-21 ENCOUNTER — RX RENEWAL (OUTPATIENT)
Age: 79
End: 2019-03-21

## 2019-03-21 PROBLEM — I10 ESSENTIAL (PRIMARY) HYPERTENSION: Chronic | Status: ACTIVE | Noted: 2019-03-19

## 2019-03-21 PROBLEM — M19.90 UNSPECIFIED OSTEOARTHRITIS, UNSPECIFIED SITE: Chronic | Status: ACTIVE | Noted: 2019-03-19

## 2019-03-21 PROBLEM — K21.9 GASTRO-ESOPHAGEAL REFLUX DISEASE WITHOUT ESOPHAGITIS: Chronic | Status: ACTIVE | Noted: 2019-03-19

## 2019-03-21 PROBLEM — M06.9 RHEUMATOID ARTHRITIS, UNSPECIFIED: Chronic | Status: ACTIVE | Noted: 2019-03-19

## 2019-03-21 PROBLEM — F32.9 MAJOR DEPRESSIVE DISORDER, SINGLE EPISODE, UNSPECIFIED: Chronic | Status: ACTIVE | Noted: 2019-03-19

## 2019-03-22 ENCOUNTER — RX RENEWAL (OUTPATIENT)
Age: 79
End: 2019-03-22

## 2019-04-01 DIAGNOSIS — C50.411 MALIGNANT NEOPLASM OF UPPER-OUTER QUADRANT OF RIGHT FEMALE BREAST: ICD-10-CM

## 2019-04-01 DIAGNOSIS — Z79.811 LONG TERM (CURRENT) USE OF AROMATASE INHIBITORS: ICD-10-CM

## 2019-04-09 ENCOUNTER — APPOINTMENT (OUTPATIENT)
Dept: ORTHOPEDIC SURGERY | Facility: HOSPITAL | Age: 79
End: 2019-04-09

## 2019-04-11 ENCOUNTER — OUTPATIENT (OUTPATIENT)
Dept: OUTPATIENT SERVICES | Facility: HOSPITAL | Age: 79
LOS: 1 days | Discharge: HOME | End: 2019-04-11
Payer: MEDICARE

## 2019-04-11 DIAGNOSIS — Z96.642 PRESENCE OF LEFT ARTIFICIAL HIP JOINT: Chronic | ICD-10-CM

## 2019-04-11 DIAGNOSIS — Z96.641 PRESENCE OF RIGHT ARTIFICIAL HIP JOINT: Chronic | ICD-10-CM

## 2019-04-11 DIAGNOSIS — Z90.49 ACQUIRED ABSENCE OF OTHER SPECIFIED PARTS OF DIGESTIVE TRACT: Chronic | ICD-10-CM

## 2019-04-11 DIAGNOSIS — R06.02 SHORTNESS OF BREATH: ICD-10-CM

## 2019-04-11 DIAGNOSIS — R53.83 OTHER FATIGUE: ICD-10-CM

## 2019-04-11 DIAGNOSIS — Z90.710 ACQUIRED ABSENCE OF BOTH CERVIX AND UTERUS: Chronic | ICD-10-CM

## 2019-04-11 DIAGNOSIS — Z96.652 PRESENCE OF LEFT ARTIFICIAL KNEE JOINT: Chronic | ICD-10-CM

## 2019-04-11 PROCEDURE — 78452 HT MUSCLE IMAGE SPECT MULT: CPT | Mod: 26

## 2019-04-29 ENCOUNTER — APPOINTMENT (OUTPATIENT)
Dept: HEMATOLOGY ONCOLOGY | Facility: CLINIC | Age: 79
End: 2019-04-29

## 2019-06-04 ENCOUNTER — OUTPATIENT (OUTPATIENT)
Dept: OUTPATIENT SERVICES | Facility: HOSPITAL | Age: 79
LOS: 1 days | Discharge: HOME | End: 2019-06-04
Payer: MEDICARE

## 2019-06-04 VITALS
HEART RATE: 77 BPM | RESPIRATION RATE: 17 BRPM | SYSTOLIC BLOOD PRESSURE: 136 MMHG | DIASTOLIC BLOOD PRESSURE: 78 MMHG | OXYGEN SATURATION: 99 % | TEMPERATURE: 98 F | WEIGHT: 178.79 LBS | HEIGHT: 68 IN

## 2019-06-04 DIAGNOSIS — M17.11 UNILATERAL PRIMARY OSTEOARTHRITIS, RIGHT KNEE: ICD-10-CM

## 2019-06-04 DIAGNOSIS — Z01.818 ENCOUNTER FOR OTHER PREPROCEDURAL EXAMINATION: ICD-10-CM

## 2019-06-04 DIAGNOSIS — Z96.652 PRESENCE OF LEFT ARTIFICIAL KNEE JOINT: Chronic | ICD-10-CM

## 2019-06-04 DIAGNOSIS — Z90.710 ACQUIRED ABSENCE OF BOTH CERVIX AND UTERUS: Chronic | ICD-10-CM

## 2019-06-04 DIAGNOSIS — Z96.642 PRESENCE OF LEFT ARTIFICIAL HIP JOINT: Chronic | ICD-10-CM

## 2019-06-04 DIAGNOSIS — Z96.641 PRESENCE OF RIGHT ARTIFICIAL HIP JOINT: Chronic | ICD-10-CM

## 2019-06-04 DIAGNOSIS — Z90.49 ACQUIRED ABSENCE OF OTHER SPECIFIED PARTS OF DIGESTIVE TRACT: Chronic | ICD-10-CM

## 2019-06-04 LAB
ALBUMIN SERPL ELPH-MCNC: 4.3 G/DL — SIGNIFICANT CHANGE UP (ref 3.5–5.2)
ALP SERPL-CCNC: 81 U/L — SIGNIFICANT CHANGE UP (ref 30–115)
ALT FLD-CCNC: 19 U/L — SIGNIFICANT CHANGE UP (ref 0–41)
ANION GAP SERPL CALC-SCNC: 12 MMOL/L — SIGNIFICANT CHANGE UP (ref 7–14)
APPEARANCE UR: ABNORMAL
APTT BLD: 28 SEC — SIGNIFICANT CHANGE UP (ref 27–39.2)
AST SERPL-CCNC: 22 U/L — SIGNIFICANT CHANGE UP (ref 0–41)
BACTERIA # UR AUTO: ABNORMAL /HPF
BASOPHILS # BLD AUTO: 0.03 K/UL — SIGNIFICANT CHANGE UP (ref 0–0.2)
BASOPHILS NFR BLD AUTO: 0.6 % — SIGNIFICANT CHANGE UP (ref 0–1)
BILIRUB SERPL-MCNC: 0.4 MG/DL — SIGNIFICANT CHANGE UP (ref 0.2–1.2)
BILIRUB UR-MCNC: NEGATIVE — SIGNIFICANT CHANGE UP
BLD GP AB SCN SERPL QL: SIGNIFICANT CHANGE UP
BUN SERPL-MCNC: 24 MG/DL — HIGH (ref 10–20)
CALCIUM SERPL-MCNC: 10.3 MG/DL — HIGH (ref 8.5–10.1)
CHLORIDE SERPL-SCNC: 99 MMOL/L — SIGNIFICANT CHANGE UP (ref 98–110)
CO2 SERPL-SCNC: 30 MMOL/L — SIGNIFICANT CHANGE UP (ref 17–32)
COLOR SPEC: YELLOW — SIGNIFICANT CHANGE UP
CREAT SERPL-MCNC: 0.8 MG/DL — SIGNIFICANT CHANGE UP (ref 0.7–1.5)
DIFF PNL FLD: ABNORMAL
EOSINOPHIL # BLD AUTO: 0.08 K/UL — SIGNIFICANT CHANGE UP (ref 0–0.7)
EOSINOPHIL NFR BLD AUTO: 1.6 % — SIGNIFICANT CHANGE UP (ref 0–8)
ESTIMATED AVERAGE GLUCOSE: 100 MG/DL — SIGNIFICANT CHANGE UP (ref 68–114)
GLUCOSE SERPL-MCNC: 79 MG/DL — SIGNIFICANT CHANGE UP (ref 70–99)
GLUCOSE UR QL: NEGATIVE MG/DL — SIGNIFICANT CHANGE UP
HBA1C BLD-MCNC: 5.1 % — SIGNIFICANT CHANGE UP (ref 4–5.6)
HCT VFR BLD CALC: 36.6 % — LOW (ref 37–47)
HGB BLD-MCNC: 12.4 G/DL — SIGNIFICANT CHANGE UP (ref 12–16)
IMM GRANULOCYTES NFR BLD AUTO: 0.4 % — HIGH (ref 0.1–0.3)
INR BLD: 0.96 RATIO — SIGNIFICANT CHANGE UP (ref 0.65–1.3)
KETONES UR-MCNC: NEGATIVE — SIGNIFICANT CHANGE UP
LEUKOCYTE ESTERASE UR-ACNC: ABNORMAL
LYMPHOCYTES # BLD AUTO: 1.64 K/UL — SIGNIFICANT CHANGE UP (ref 1.2–3.4)
LYMPHOCYTES # BLD AUTO: 32 % — SIGNIFICANT CHANGE UP (ref 20.5–51.1)
MCHC RBC-ENTMCNC: 31.2 PG — HIGH (ref 27–31)
MCHC RBC-ENTMCNC: 33.9 G/DL — SIGNIFICANT CHANGE UP (ref 32–37)
MCV RBC AUTO: 92 FL — SIGNIFICANT CHANGE UP (ref 81–99)
MONOCYTES # BLD AUTO: 0.57 K/UL — SIGNIFICANT CHANGE UP (ref 0.1–0.6)
MONOCYTES NFR BLD AUTO: 11.1 % — HIGH (ref 1.7–9.3)
MRSA PCR RESULT.: NEGATIVE — SIGNIFICANT CHANGE UP
NEUTROPHILS # BLD AUTO: 2.79 K/UL — SIGNIFICANT CHANGE UP (ref 1.4–6.5)
NEUTROPHILS NFR BLD AUTO: 54.3 % — SIGNIFICANT CHANGE UP (ref 42.2–75.2)
NITRITE UR-MCNC: NEGATIVE — SIGNIFICANT CHANGE UP
NRBC # BLD: 0 /100 WBCS — SIGNIFICANT CHANGE UP (ref 0–0)
PH UR: 6 — SIGNIFICANT CHANGE UP (ref 5–8)
PLATELET # BLD AUTO: 216 K/UL — SIGNIFICANT CHANGE UP (ref 130–400)
POTASSIUM SERPL-MCNC: 4.4 MMOL/L — SIGNIFICANT CHANGE UP (ref 3.5–5)
POTASSIUM SERPL-SCNC: 4.4 MMOL/L — SIGNIFICANT CHANGE UP (ref 3.5–5)
PROT SERPL-MCNC: 6.9 G/DL — SIGNIFICANT CHANGE UP (ref 6–8)
PROT UR-MCNC: ABNORMAL MG/DL
PROTHROM AB SERPL-ACNC: 11.1 SEC — SIGNIFICANT CHANGE UP (ref 9.95–12.87)
RBC # BLD: 3.98 M/UL — LOW (ref 4.2–5.4)
RBC # FLD: 13.7 % — SIGNIFICANT CHANGE UP (ref 11.5–14.5)
RBC CASTS # UR COMP ASSIST: ABNORMAL /HPF
SODIUM SERPL-SCNC: 141 MMOL/L — SIGNIFICANT CHANGE UP (ref 135–146)
SP GR SPEC: 1.02 — SIGNIFICANT CHANGE UP (ref 1.01–1.03)
UROBILINOGEN FLD QL: 0.2 MG/DL — SIGNIFICANT CHANGE UP (ref 0.2–0.2)
WBC # BLD: 5.13 K/UL — SIGNIFICANT CHANGE UP (ref 4.8–10.8)
WBC # FLD AUTO: 5.13 K/UL — SIGNIFICANT CHANGE UP (ref 4.8–10.8)
WBC UR QL: ABNORMAL /HPF

## 2019-06-04 PROCEDURE — 71046 X-RAY EXAM CHEST 2 VIEWS: CPT | Mod: 26

## 2019-06-04 PROCEDURE — 93010 ELECTROCARDIOGRAM REPORT: CPT

## 2019-06-04 NOTE — H&P PST ADULT - REASON FOR ADMISSION
PT PRESENTS TO PAST WITH NO SOB, CP, PALPITATIONS, DYSURIA, UTI OR URI AT PRESENT.   PT ABLE TO WALK UP 1--FLIGHTS OF STEPS WITH NO SOB. PT PRESENTS TO PAST USING A CANE.   AS PER THE PT, THIS IS HIS/HER COMPLETE MEDICAL AND SURGICAL HX, INCLUDING MEDICATIONS PRESCRIBED AND OVER THE COUNTER  PT PRESENTS TO PAST WITH H/O- RIGHT KNEE OA.

## 2019-06-04 NOTE — H&P PST ADULT - RS GEN PE MLT RESP DETAILS PC
no chest wall tenderness/respirations non-labored/airway patent/normal/breath sounds equal/clear to auscultation bilaterally/good air movement

## 2019-06-04 NOTE — H&P PST ADULT - NSICDXPASTMEDICALHX_GEN_ALL_CORE_FT
PAST MEDICAL HISTORY:  Breast cancer last radiation 2015    Chronic GERD     Depression     HTN (hypertension)     OA (osteoarthritis)     Rheumatoid arthritis     Rheumatoid arthritis

## 2019-06-04 NOTE — H&P PST ADULT - NSICDXPASTSURGICALHX_GEN_ALL_CORE_FT
PAST SURGICAL HISTORY:  H/O vaginal hysterectomy     History of right hip replacement     S/P total knee replacement, left     Status post cholecystectomy     Status post left hip replacement

## 2019-06-12 PROBLEM — M06.9 RHEUMATOID ARTHRITIS, UNSPECIFIED: Chronic | Status: ACTIVE | Noted: 2019-06-04

## 2019-06-17 NOTE — PROGRESS NOTE ADULT - SUBJECTIVE AND OBJECTIVE BOX
PAST documents reviewed - MED. DIR. PAST - ANESTHESIA - as of this review for patient scheduled for Right TKR under Spinal / Regional Anesthesia with  : I called the patient to discuss the Anesthesia Plan and Medications. She attended the Joint class and is aware of the ERAS / Gatorade protocol and her understanding was confirmed with our discussion . She knows that she is to receive Spinal / Regional Anesthesia which I explained to her preliminarily and informed her that the assigned Anesthesiologist would discuss it all in full pre op . She presently takes ASA 81mg qd up until today and is not taking it DOS . She , also , no longer takes Methotrexate . She was told she can take her antihypertensives in the AM , as usual with a sip of H2O  - but not too take her HCTZ as it is a diuretic . She does take Tylenol for pain PRN and told that if she needs it this evening pre op to take it before MN with a sip of H2O and inform the admitting RN of the dosage and time . She is presently on an Abx for UTI and I informed her to bring it with her in the AM so that the Surgeon could decide if he wished to have it dispensed here , or replace the dose with an IV Abx ( patient , on the OR schedule is on Contact Isolation ) . She takes no AC / Antiplatelets / Rx or OTC NSAID 's / nor ASA containing meds ( all explained in full ) . Consultations and lab work reviewed and appropriate pre op meds are ordered .

## 2019-06-18 ENCOUNTER — APPOINTMENT (OUTPATIENT)
Dept: ORTHOPEDIC SURGERY | Facility: HOSPITAL | Age: 79
End: 2019-06-18
Payer: MEDICARE

## 2019-06-18 ENCOUNTER — RESULT REVIEW (OUTPATIENT)
Age: 79
End: 2019-06-18

## 2019-06-18 ENCOUNTER — INPATIENT (INPATIENT)
Facility: HOSPITAL | Age: 79
LOS: 1 days | Discharge: HOME HEALTH | End: 2019-06-20
Attending: ORTHOPAEDIC SURGERY | Admitting: ORTHOPAEDIC SURGERY
Payer: MEDICARE

## 2019-06-18 VITALS
HEART RATE: 68 BPM | RESPIRATION RATE: 18 BRPM | SYSTOLIC BLOOD PRESSURE: 206 MMHG | HEIGHT: 68 IN | DIASTOLIC BLOOD PRESSURE: 93 MMHG | TEMPERATURE: 98 F | WEIGHT: 175.05 LBS

## 2019-06-18 DIAGNOSIS — M17.11 UNILATERAL PRIMARY OSTEOARTHRITIS, RIGHT KNEE: ICD-10-CM

## 2019-06-18 DIAGNOSIS — Z90.49 ACQUIRED ABSENCE OF OTHER SPECIFIED PARTS OF DIGESTIVE TRACT: Chronic | ICD-10-CM

## 2019-06-18 DIAGNOSIS — Z90.710 ACQUIRED ABSENCE OF BOTH CERVIX AND UTERUS: Chronic | ICD-10-CM

## 2019-06-18 DIAGNOSIS — Z96.652 PRESENCE OF LEFT ARTIFICIAL KNEE JOINT: Chronic | ICD-10-CM

## 2019-06-18 DIAGNOSIS — Z96.641 PRESENCE OF RIGHT ARTIFICIAL HIP JOINT: Chronic | ICD-10-CM

## 2019-06-18 DIAGNOSIS — Z96.642 PRESENCE OF LEFT ARTIFICIAL HIP JOINT: Chronic | ICD-10-CM

## 2019-06-18 LAB
ANION GAP SERPL CALC-SCNC: 15 MMOL/L — HIGH (ref 7–14)
BUN SERPL-MCNC: 15 MG/DL — SIGNIFICANT CHANGE UP (ref 10–20)
CALCIUM SERPL-MCNC: 8.9 MG/DL — SIGNIFICANT CHANGE UP (ref 8.5–10.1)
CHLORIDE SERPL-SCNC: 101 MMOL/L — SIGNIFICANT CHANGE UP (ref 98–110)
CO2 SERPL-SCNC: 24 MMOL/L — SIGNIFICANT CHANGE UP (ref 17–32)
CREAT SERPL-MCNC: 0.8 MG/DL — SIGNIFICANT CHANGE UP (ref 0.7–1.5)
GLUCOSE BLDC GLUCOMTR-MCNC: 118 MG/DL — HIGH (ref 70–99)
GLUCOSE BLDC GLUCOMTR-MCNC: 94 MG/DL — SIGNIFICANT CHANGE UP (ref 70–99)
GLUCOSE SERPL-MCNC: 116 MG/DL — HIGH (ref 70–99)
HCT VFR BLD CALC: 36.3 % — LOW (ref 37–47)
HGB BLD-MCNC: 12 G/DL — SIGNIFICANT CHANGE UP (ref 12–16)
MCHC RBC-ENTMCNC: 30.5 PG — SIGNIFICANT CHANGE UP (ref 27–31)
MCHC RBC-ENTMCNC: 33.1 G/DL — SIGNIFICANT CHANGE UP (ref 32–37)
MCV RBC AUTO: 92.1 FL — SIGNIFICANT CHANGE UP (ref 81–99)
NRBC # BLD: 0 /100 WBCS — SIGNIFICANT CHANGE UP (ref 0–0)
PLATELET # BLD AUTO: 180 K/UL — SIGNIFICANT CHANGE UP (ref 130–400)
POTASSIUM SERPL-MCNC: 3.5 MMOL/L — SIGNIFICANT CHANGE UP (ref 3.5–5)
POTASSIUM SERPL-SCNC: 3.5 MMOL/L — SIGNIFICANT CHANGE UP (ref 3.5–5)
RBC # BLD: 3.94 M/UL — LOW (ref 4.2–5.4)
RBC # FLD: 13.8 % — SIGNIFICANT CHANGE UP (ref 11.5–14.5)
SODIUM SERPL-SCNC: 140 MMOL/L — SIGNIFICANT CHANGE UP (ref 135–146)
WBC # BLD: 4.95 K/UL — SIGNIFICANT CHANGE UP (ref 4.8–10.8)
WBC # FLD AUTO: 4.95 K/UL — SIGNIFICANT CHANGE UP (ref 4.8–10.8)

## 2019-06-18 PROCEDURE — 88304 TISSUE EXAM BY PATHOLOGIST: CPT | Mod: 26

## 2019-06-18 PROCEDURE — 27447 TOTAL KNEE ARTHROPLASTY: CPT | Mod: RT

## 2019-06-18 PROCEDURE — 73560 X-RAY EXAM OF KNEE 1 OR 2: CPT | Mod: 26,RT

## 2019-06-18 PROCEDURE — 88311 DECALCIFY TISSUE: CPT | Mod: 26

## 2019-06-18 RX ORDER — SENNA PLUS 8.6 MG/1
2 TABLET ORAL AT BEDTIME
Refills: 0 | Status: DISCONTINUED | OUTPATIENT
Start: 2019-06-18 | End: 2019-06-20

## 2019-06-18 RX ORDER — DEXAMETHASONE 0.5 MG/5ML
2 ELIXIR ORAL ONCE
Refills: 0 | Status: COMPLETED | OUTPATIENT
Start: 2019-06-19 | End: 2019-06-19

## 2019-06-18 RX ORDER — CHLORHEXIDINE GLUCONATE 213 G/1000ML
1 SOLUTION TOPICAL DAILY
Refills: 0 | Status: DISCONTINUED | OUTPATIENT
Start: 2019-06-18 | End: 2019-06-20

## 2019-06-18 RX ORDER — POLYETHYLENE GLYCOL 3350 17 G/17G
17 POWDER, FOR SOLUTION ORAL DAILY
Refills: 0 | Status: DISCONTINUED | OUTPATIENT
Start: 2019-06-18 | End: 2019-06-20

## 2019-06-18 RX ORDER — ONDANSETRON 8 MG/1
4 TABLET, FILM COATED ORAL ONCE
Refills: 0 | Status: DISCONTINUED | OUTPATIENT
Start: 2019-06-18 | End: 2019-06-18

## 2019-06-18 RX ORDER — ONDANSETRON 8 MG/1
4 TABLET, FILM COATED ORAL EVERY 6 HOURS
Refills: 0 | Status: DISCONTINUED | OUTPATIENT
Start: 2019-06-18 | End: 2019-06-20

## 2019-06-18 RX ORDER — ACETAMINOPHEN 500 MG
650 TABLET ORAL EVERY 6 HOURS
Refills: 0 | Status: DISCONTINUED | OUTPATIENT
Start: 2019-06-18 | End: 2019-06-20

## 2019-06-18 RX ORDER — FERROUS SULFATE 325(65) MG
325 TABLET ORAL
Refills: 0 | Status: DISCONTINUED | OUTPATIENT
Start: 2019-06-18 | End: 2019-06-20

## 2019-06-18 RX ORDER — HYDROCHLOROTHIAZIDE 25 MG
50 TABLET ORAL DAILY
Refills: 0 | Status: DISCONTINUED | OUTPATIENT
Start: 2019-06-19 | End: 2019-06-20

## 2019-06-18 RX ORDER — SERTRALINE 25 MG/1
25 TABLET, FILM COATED ORAL DAILY
Refills: 0 | Status: DISCONTINUED | OUTPATIENT
Start: 2019-06-19 | End: 2019-06-20

## 2019-06-18 RX ORDER — CEFAZOLIN SODIUM 1 G
2000 VIAL (EA) INJECTION EVERY 8 HOURS
Refills: 0 | Status: DISCONTINUED | OUTPATIENT
Start: 2019-06-18 | End: 2019-06-18

## 2019-06-18 RX ORDER — MEPERIDINE HYDROCHLORIDE 50 MG/ML
12.5 INJECTION INTRAMUSCULAR; INTRAVENOUS; SUBCUTANEOUS
Refills: 0 | Status: DISCONTINUED | OUTPATIENT
Start: 2019-06-18 | End: 2019-06-18

## 2019-06-18 RX ORDER — GABAPENTIN 400 MG/1
100 CAPSULE ORAL EVERY 8 HOURS
Refills: 0 | Status: DISCONTINUED | OUTPATIENT
Start: 2019-06-18 | End: 2019-06-18

## 2019-06-18 RX ORDER — PANTOPRAZOLE SODIUM 20 MG/1
40 TABLET, DELAYED RELEASE ORAL
Refills: 0 | Status: DISCONTINUED | OUTPATIENT
Start: 2019-06-18 | End: 2019-06-20

## 2019-06-18 RX ORDER — SODIUM CHLORIDE 9 MG/ML
1000 INJECTION INTRAMUSCULAR; INTRAVENOUS; SUBCUTANEOUS
Refills: 0 | Status: COMPLETED | OUTPATIENT
Start: 2019-06-18 | End: 2019-06-18

## 2019-06-18 RX ORDER — APIXABAN 2.5 MG/1
2.5 TABLET, FILM COATED ORAL EVERY 12 HOURS
Refills: 0 | Status: DISCONTINUED | OUTPATIENT
Start: 2019-06-19 | End: 2019-06-20

## 2019-06-18 RX ORDER — MAGNESIUM HYDROXIDE 400 MG/1
30 TABLET, CHEWABLE ORAL DAILY
Refills: 0 | Status: DISCONTINUED | OUTPATIENT
Start: 2019-06-18 | End: 2019-06-20

## 2019-06-18 RX ORDER — MORPHINE SULFATE 50 MG/1
2 CAPSULE, EXTENDED RELEASE ORAL
Refills: 0 | Status: DISCONTINUED | OUTPATIENT
Start: 2019-06-18 | End: 2019-06-18

## 2019-06-18 RX ORDER — TRAMADOL HYDROCHLORIDE 50 MG/1
50 TABLET ORAL EVERY 6 HOURS
Refills: 0 | Status: DISCONTINUED | OUTPATIENT
Start: 2019-06-18 | End: 2019-06-20

## 2019-06-18 RX ORDER — ZOLPIDEM TARTRATE 10 MG/1
5 TABLET ORAL AT BEDTIME
Refills: 0 | Status: DISCONTINUED | OUTPATIENT
Start: 2019-06-18 | End: 2019-06-20

## 2019-06-18 RX ORDER — CEFAZOLIN SODIUM 1 G
2000 VIAL (EA) INJECTION EVERY 8 HOURS
Refills: 0 | Status: COMPLETED | OUTPATIENT
Start: 2019-06-18 | End: 2019-06-18

## 2019-06-18 RX ORDER — KETOROLAC TROMETHAMINE 30 MG/ML
15 SYRINGE (ML) INJECTION EVERY 6 HOURS
Refills: 0 | Status: DISCONTINUED | OUTPATIENT
Start: 2019-06-18 | End: 2019-06-19

## 2019-06-18 RX ORDER — CELECOXIB 200 MG/1
200 CAPSULE ORAL DAILY
Refills: 0 | Status: DISCONTINUED | OUTPATIENT
Start: 2019-06-20 | End: 2019-06-20

## 2019-06-18 RX ORDER — CEFPODOXIME PROXETIL 100 MG
200 TABLET ORAL EVERY 12 HOURS
Refills: 0 | Status: DISCONTINUED | OUTPATIENT
Start: 2019-06-18 | End: 2019-06-20

## 2019-06-18 RX ORDER — METOPROLOL TARTRATE 50 MG
25 TABLET ORAL DAILY
Refills: 0 | Status: DISCONTINUED | OUTPATIENT
Start: 2019-06-19 | End: 2019-06-20

## 2019-06-18 RX ORDER — FOLIC ACID 0.8 MG
1 TABLET ORAL DAILY
Refills: 0 | Status: DISCONTINUED | OUTPATIENT
Start: 2019-06-18 | End: 2019-06-20

## 2019-06-18 RX ORDER — CELECOXIB 200 MG/1
200 CAPSULE ORAL ONCE
Refills: 0 | Status: COMPLETED | OUTPATIENT
Start: 2019-06-18 | End: 2019-06-18

## 2019-06-18 RX ORDER — CHOLECALCIFEROL (VITAMIN D3) 125 MCG
1000 CAPSULE ORAL DAILY
Refills: 0 | Status: DISCONTINUED | OUTPATIENT
Start: 2019-06-19 | End: 2019-06-20

## 2019-06-18 RX ORDER — GABAPENTIN 400 MG/1
300 CAPSULE ORAL ONCE
Refills: 0 | Status: COMPLETED | OUTPATIENT
Start: 2019-06-18 | End: 2019-06-18

## 2019-06-18 RX ORDER — GABAPENTIN 400 MG/1
100 CAPSULE ORAL EVERY 8 HOURS
Refills: 0 | Status: DISCONTINUED | OUTPATIENT
Start: 2019-06-18 | End: 2019-06-20

## 2019-06-18 RX ORDER — OXYCODONE HYDROCHLORIDE 5 MG/1
5 TABLET ORAL EVERY 4 HOURS
Refills: 0 | Status: DISCONTINUED | OUTPATIENT
Start: 2019-06-18 | End: 2019-06-20

## 2019-06-18 RX ORDER — ANASTROZOLE 1 MG/1
1 TABLET ORAL DAILY
Refills: 0 | Status: DISCONTINUED | OUTPATIENT
Start: 2019-06-19 | End: 2019-06-20

## 2019-06-18 RX ORDER — DOCUSATE SODIUM 100 MG
100 CAPSULE ORAL THREE TIMES A DAY
Refills: 0 | Status: DISCONTINUED | OUTPATIENT
Start: 2019-06-18 | End: 2019-06-20

## 2019-06-18 RX ORDER — ACETAMINOPHEN 500 MG
1000 TABLET ORAL ONCE
Refills: 0 | Status: COMPLETED | OUTPATIENT
Start: 2019-06-18 | End: 2019-06-18

## 2019-06-18 RX ORDER — MORPHINE SULFATE 50 MG/1
1 CAPSULE, EXTENDED RELEASE ORAL
Refills: 0 | Status: DISCONTINUED | OUTPATIENT
Start: 2019-06-18 | End: 2019-06-18

## 2019-06-18 RX ORDER — SODIUM CHLORIDE 9 MG/ML
1000 INJECTION, SOLUTION INTRAVENOUS
Refills: 0 | Status: DISCONTINUED | OUTPATIENT
Start: 2019-06-18 | End: 2019-06-18

## 2019-06-18 RX ADMIN — POLYETHYLENE GLYCOL 3350 17 GRAM(S): 17 POWDER, FOR SOLUTION ORAL at 14:31

## 2019-06-18 RX ADMIN — CELECOXIB 200 MILLIGRAM(S): 200 CAPSULE ORAL at 06:18

## 2019-06-18 RX ADMIN — Medication 100 MILLIGRAM(S): at 21:17

## 2019-06-18 RX ADMIN — Medication 200 MILLIGRAM(S): at 17:12

## 2019-06-18 RX ADMIN — OXYCODONE HYDROCHLORIDE 5 MILLIGRAM(S): 5 TABLET ORAL at 14:32

## 2019-06-18 RX ADMIN — OXYCODONE HYDROCHLORIDE 5 MILLIGRAM(S): 5 TABLET ORAL at 21:16

## 2019-06-18 RX ADMIN — GABAPENTIN 100 MILLIGRAM(S): 400 CAPSULE ORAL at 14:29

## 2019-06-18 RX ADMIN — Medication 15 MILLIGRAM(S): at 12:55

## 2019-06-18 RX ADMIN — Medication 15 MILLIGRAM(S): at 18:35

## 2019-06-18 RX ADMIN — Medication 100 MILLIGRAM(S): at 14:30

## 2019-06-18 RX ADMIN — Medication 650 MILLIGRAM(S): at 17:12

## 2019-06-18 RX ADMIN — PANTOPRAZOLE SODIUM 40 MILLIGRAM(S): 20 TABLET, DELAYED RELEASE ORAL at 14:29

## 2019-06-18 RX ADMIN — Medication 325 MILLIGRAM(S): at 17:12

## 2019-06-18 RX ADMIN — GABAPENTIN 100 MILLIGRAM(S): 400 CAPSULE ORAL at 21:17

## 2019-06-18 RX ADMIN — Medication 15 MILLIGRAM(S): at 17:12

## 2019-06-18 RX ADMIN — SODIUM CHLORIDE 75 MILLILITER(S): 9 INJECTION INTRAMUSCULAR; INTRAVENOUS; SUBCUTANEOUS at 12:30

## 2019-06-18 RX ADMIN — Medication 15 MILLIGRAM(S): at 23:22

## 2019-06-18 RX ADMIN — Medication 100 MILLIGRAM(S): at 14:29

## 2019-06-18 RX ADMIN — Medication 650 MILLIGRAM(S): at 12:54

## 2019-06-18 RX ADMIN — Medication 650 MILLIGRAM(S): at 18:35

## 2019-06-18 RX ADMIN — SODIUM CHLORIDE 120 MILLILITER(S): 9 INJECTION, SOLUTION INTRAVENOUS at 10:38

## 2019-06-18 RX ADMIN — GABAPENTIN 300 MILLIGRAM(S): 400 CAPSULE ORAL at 06:18

## 2019-06-18 RX ADMIN — OXYCODONE HYDROCHLORIDE 5 MILLIGRAM(S): 5 TABLET ORAL at 20:46

## 2019-06-18 RX ADMIN — SODIUM CHLORIDE 120 MILLILITER(S): 9 INJECTION, SOLUTION INTRAVENOUS at 14:32

## 2019-06-18 RX ADMIN — Medication 1000 MILLIGRAM(S): at 06:17

## 2019-06-18 RX ADMIN — Medication 650 MILLIGRAM(S): at 23:21

## 2019-06-18 NOTE — CHART NOTE - NSCHARTNOTEFT_GEN_A_CORE
PACU ANESTHESIA ADMISSION NOTE      Procedure: Right total knee arthroplasty    Post op diagnosis:  Osteoarthritis of right knee      ____  Intubated  TV:______       Rate: ______      FiO2: ______    _x___  Patent Airway    _x___  Full return of protective reflexes    _x___  Full recovery from anesthesia / back to baseline status    Vitals:  T(F): 97.4   HR: 71  BP: 162/62  RR: 18  SpO2: 100%    Mental Status:  _x___ Awake   __x___ Alert   _____ Drowsy   _____ Sedated    Nausea/Vomiting:  _x___  NO       ______Yes,   See Post - Op Orders         Pain Scale (0-10):  __0___    Treatment: ____ None    x____ See Post - Op/PCA Orders    Post - Operative Fluids:   ____ Oral   __x__ See Post - Op Orders    Plan: Discharge:   ____Home       _x____Floor     _____Critical Care    _____  Other:_________________    Comments:  No anesthesia issues or complications noted.  Discharge when criteria met.

## 2019-06-18 NOTE — PHYSICAL THERAPY INITIAL EVALUATION ADULT - GAIT DISTANCE, PT EVAL
25 feet/@ room amb limited 2/2 paitient on contact precautions and  demo urinary incontinence
Stable

## 2019-06-18 NOTE — ASU PATIENT PROFILE, ADULT - PMH
Breast cancer  last radiation 2015  Chronic GERD    Depression    HTN (hypertension)    OA (osteoarthritis)    Rheumatoid arthritis    Rheumatoid arthritis

## 2019-06-18 NOTE — PHYSICAL THERAPY INITIAL EVALUATION ADULT - GENERAL OBSERVATIONS, REHAB EVAL
Patient encountered semi fraser in bed + IV lock, + R knee ace wrap, NAD + pedi puklses , NAD receptive to PT

## 2019-06-18 NOTE — PHYSICAL THERAPY INITIAL EVALUATION ADULT - RANGE OF MOTION EXAMINATION, REHAB EVAL
bilateral upper extremity ROM was WFL (within functional limits)/R knee ~ 5-80* arom in seated position/Left LE ROM was WFL (within functional limits)

## 2019-06-18 NOTE — PROGRESS NOTE ADULT - SUBJECTIVE AND OBJECTIVE BOX
ORTHOPEDIC POST OP NOTE    POST OPERATIVE DAY #:  0  STATUS POST: Right TKA                  SUBJECTIVE: Patient seen and examined at bedside. Pain controlled. Denies SOB/CP/F/C/N/V.    OBJECTIVE:     Vital Signs Last 24 Hrs  T(C): 35.7 (18 Jun 2019 18:09), Max: 36.9 (18 Jun 2019 06:10)  T(F): 96.3 (18 Jun 2019 18:09), Max: 98.4 (18 Jun 2019 06:10)  HR: 85 (18 Jun 2019 18:09) (61 - 85)  BP: 135/70 (18 Jun 2019 18:09) (135/70 - 206/93)  BP(mean): --  RR: 20 (18 Jun 2019 18:09) (13 - 21)  SpO2: 100% (18 Jun 2019 12:38) (99% - 100%)    NAD  Awake and Alert  Non labored resp on RA  RLE:          Dressing: clean/dry/intact          SILT SP/DP/T         Motor exam: EHL/FHL/TA/GS MOTORS INTACT          Foot warm well perfused; capillary refill <2 seconds     LABS:                        12.0   4.95  )-----------( 180      ( 18 Jun 2019 12:33 )             36.3     06-18    140  |  101  |  15  ----------------------------<  116<H>  3.5   |  24  |  0.8    Ca    8.9      18 Jun 2019 12:33      A/P : 78y F s/p Right TKA POD # 0. Doing well   -    Pain control  -    DVT ppx: Eliquis   -    Periop abx:  Ancef x24hrs  -    Follow AM labs  -    Weight bearing status: WBAT  -    Resume home meds  -    Physical Therapy/ Rehab  -    Dispo:   To be determined

## 2019-06-18 NOTE — ASU PATIENT PROFILE, ADULT - PSH
H/O vaginal hysterectomy    History of right hip replacement    S/P total knee replacement, left    Status post cholecystectomy    Status post left hip replacement

## 2019-06-19 ENCOUNTER — TRANSCRIPTION ENCOUNTER (OUTPATIENT)
Age: 79
End: 2019-06-19

## 2019-06-19 LAB
ANION GAP SERPL CALC-SCNC: 16 MMOL/L — HIGH (ref 7–14)
BUN SERPL-MCNC: 25 MG/DL — HIGH (ref 10–20)
CALCIUM SERPL-MCNC: 9.2 MG/DL — SIGNIFICANT CHANGE UP (ref 8.5–10.1)
CHLORIDE SERPL-SCNC: 97 MMOL/L — LOW (ref 98–110)
CO2 SERPL-SCNC: 24 MMOL/L — SIGNIFICANT CHANGE UP (ref 17–32)
CREAT SERPL-MCNC: 0.9 MG/DL — SIGNIFICANT CHANGE UP (ref 0.7–1.5)
GLUCOSE BLDC GLUCOMTR-MCNC: 90 MG/DL — SIGNIFICANT CHANGE UP (ref 70–99)
GLUCOSE SERPL-MCNC: 108 MG/DL — HIGH (ref 70–99)
HCT VFR BLD CALC: 33.4 % — LOW (ref 37–47)
HGB BLD-MCNC: 11.1 G/DL — LOW (ref 12–16)
MCHC RBC-ENTMCNC: 30.5 PG — SIGNIFICANT CHANGE UP (ref 27–31)
MCHC RBC-ENTMCNC: 33.2 G/DL — SIGNIFICANT CHANGE UP (ref 32–37)
MCV RBC AUTO: 91.8 FL — SIGNIFICANT CHANGE UP (ref 81–99)
NRBC # BLD: 0 /100 WBCS — SIGNIFICANT CHANGE UP (ref 0–0)
PLATELET # BLD AUTO: 175 K/UL — SIGNIFICANT CHANGE UP (ref 130–400)
POTASSIUM SERPL-MCNC: 4.2 MMOL/L — SIGNIFICANT CHANGE UP (ref 3.5–5)
POTASSIUM SERPL-SCNC: 4.2 MMOL/L — SIGNIFICANT CHANGE UP (ref 3.5–5)
RBC # BLD: 3.64 M/UL — LOW (ref 4.2–5.4)
RBC # FLD: 13.9 % — SIGNIFICANT CHANGE UP (ref 11.5–14.5)
SODIUM SERPL-SCNC: 137 MMOL/L — SIGNIFICANT CHANGE UP (ref 135–146)
WBC # BLD: 10.89 K/UL — HIGH (ref 4.8–10.8)
WBC # FLD AUTO: 10.89 K/UL — HIGH (ref 4.8–10.8)

## 2019-06-19 RX ORDER — APIXABAN 2.5 MG/1
1 TABLET, FILM COATED ORAL
Qty: 70 | Refills: 0
Start: 2019-06-19 | End: 2019-07-23

## 2019-06-19 RX ORDER — MORPHINE SULFATE 50 MG/1
2 CAPSULE, EXTENDED RELEASE ORAL
Refills: 0 | Status: DISCONTINUED | OUTPATIENT
Start: 2019-06-19 | End: 2019-06-20

## 2019-06-19 RX ADMIN — Medication 2 MILLIGRAM(S): at 09:10

## 2019-06-19 RX ADMIN — GABAPENTIN 100 MILLIGRAM(S): 400 CAPSULE ORAL at 05:27

## 2019-06-19 RX ADMIN — Medication 650 MILLIGRAM(S): at 05:27

## 2019-06-19 RX ADMIN — SERTRALINE 25 MILLIGRAM(S): 25 TABLET, FILM COATED ORAL at 13:12

## 2019-06-19 RX ADMIN — OXYCODONE HYDROCHLORIDE 5 MILLIGRAM(S): 5 TABLET ORAL at 09:08

## 2019-06-19 RX ADMIN — Medication 650 MILLIGRAM(S): at 17:35

## 2019-06-19 RX ADMIN — Medication 325 MILLIGRAM(S): at 13:13

## 2019-06-19 RX ADMIN — Medication 200 MILLIGRAM(S): at 17:36

## 2019-06-19 RX ADMIN — POLYETHYLENE GLYCOL 3350 17 GRAM(S): 17 POWDER, FOR SOLUTION ORAL at 13:12

## 2019-06-19 RX ADMIN — OXYCODONE HYDROCHLORIDE 5 MILLIGRAM(S): 5 TABLET ORAL at 16:23

## 2019-06-19 RX ADMIN — APIXABAN 2.5 MILLIGRAM(S): 2.5 TABLET, FILM COATED ORAL at 17:36

## 2019-06-19 RX ADMIN — Medication 1 MILLIGRAM(S): at 13:13

## 2019-06-19 RX ADMIN — Medication 325 MILLIGRAM(S): at 09:08

## 2019-06-19 RX ADMIN — OXYCODONE HYDROCHLORIDE 5 MILLIGRAM(S): 5 TABLET ORAL at 02:48

## 2019-06-19 RX ADMIN — Medication 100 MILLIGRAM(S): at 13:13

## 2019-06-19 RX ADMIN — APIXABAN 2.5 MILLIGRAM(S): 2.5 TABLET, FILM COATED ORAL at 05:27

## 2019-06-19 RX ADMIN — Medication 1000 UNIT(S): at 13:13

## 2019-06-19 RX ADMIN — Medication 200 MILLIGRAM(S): at 05:27

## 2019-06-19 RX ADMIN — Medication 25 MILLIGRAM(S): at 05:27

## 2019-06-19 RX ADMIN — OXYCODONE HYDROCHLORIDE 5 MILLIGRAM(S): 5 TABLET ORAL at 21:43

## 2019-06-19 RX ADMIN — OXYCODONE HYDROCHLORIDE 5 MILLIGRAM(S): 5 TABLET ORAL at 22:13

## 2019-06-19 RX ADMIN — MORPHINE SULFATE 2 MILLIGRAM(S): 50 CAPSULE, EXTENDED RELEASE ORAL at 22:49

## 2019-06-19 RX ADMIN — Medication 650 MILLIGRAM(S): at 13:16

## 2019-06-19 RX ADMIN — Medication 650 MILLIGRAM(S): at 17:36

## 2019-06-19 RX ADMIN — GABAPENTIN 100 MILLIGRAM(S): 400 CAPSULE ORAL at 13:13

## 2019-06-19 RX ADMIN — Medication 100 MILLIGRAM(S): at 21:42

## 2019-06-19 RX ADMIN — ANASTROZOLE 1 MILLIGRAM(S): 1 TABLET ORAL at 13:13

## 2019-06-19 RX ADMIN — PANTOPRAZOLE SODIUM 40 MILLIGRAM(S): 20 TABLET, DELAYED RELEASE ORAL at 05:27

## 2019-06-19 RX ADMIN — Medication 1 TABLET(S): at 13:12

## 2019-06-19 RX ADMIN — Medication 325 MILLIGRAM(S): at 17:36

## 2019-06-19 RX ADMIN — Medication 650 MILLIGRAM(S): at 13:13

## 2019-06-19 RX ADMIN — MORPHINE SULFATE 2 MILLIGRAM(S): 50 CAPSULE, EXTENDED RELEASE ORAL at 23:04

## 2019-06-19 RX ADMIN — Medication 100 MILLIGRAM(S): at 05:27

## 2019-06-19 RX ADMIN — OXYCODONE HYDROCHLORIDE 5 MILLIGRAM(S): 5 TABLET ORAL at 03:18

## 2019-06-19 RX ADMIN — Medication 15 MILLIGRAM(S): at 05:27

## 2019-06-19 RX ADMIN — GABAPENTIN 100 MILLIGRAM(S): 400 CAPSULE ORAL at 21:42

## 2019-06-19 RX ADMIN — OXYCODONE HYDROCHLORIDE 5 MILLIGRAM(S): 5 TABLET ORAL at 16:53

## 2019-06-19 RX ADMIN — Medication 50 MILLIGRAM(S): at 05:27

## 2019-06-19 NOTE — DISCHARGE NOTE NURSING/CASE MANAGEMENT/SOCIAL WORK - NSDCDPATPORTLINK_GEN_ALL_CORE
You can access the [facility] Patient Portal, offered by [facility], by registering with the following website: [email or web address]

## 2019-06-19 NOTE — PROGRESS NOTE ADULT - SUBJECTIVE AND OBJECTIVE BOX
POD#1S/P TKA  T(C): 35.6 (06-19-19 @ 07:30), Max: 37.1 (06-19-19 @ 00:00)  HR: 55 (06-19-19 @ 07:30) (55 - 85)  BP: 138/66 (06-19-19 @ 07:30) (130/61 - 169/67)  RR: 18 (06-19-19 @ 07:30) (13 - 21)  SpO2: 100% (06-18-19 @ 12:38) (99% - 100%)                        11.1   10.89 )-----------( 175      ( 19 Jun 2019 06:20 )             33.4     06-19    137  |  97<L>  |  25<H>  ----------------------------<  108<H>  4.2   |  24  |  0.9    Ca    9.2      19 Jun 2019 06:20        PTS PAIN IS CONTROLLED   WOUND IS CDI DRESSING CHANGED  N/V/I  ABX COMPLETE  DVT PROPHYLAXIS IN PLACE kyle  REHAB IN PROGRESS  D/C PLAN PENDING

## 2019-06-19 NOTE — OCCUPATIONAL THERAPY INITIAL EVALUATION ADULT - LIVES WITH, PROFILE
spouse/pt lives in  with spouse however, pt states spouse is disabled and unable to assist with functional tasks, + ~3 WARREN, +1 flight of stairs to bedroom and bathroom, + bathtub, + walk-in shower in basement level

## 2019-06-19 NOTE — DISCHARGE NOTE NURSING/CASE MANAGEMENT/SOCIAL WORK - NSDCPNPNATDISSUGG_GEN_ALL_CORE
Brief Postoperative Note  ______________________________________________________________    Patient: Lizbeth Mcintyre  YOB: 1983  MRN: [id number]  Date of Procedure: 11/19/2018    Pre-Op Diagnosis: MORBID OBESITY, DM-2, GERD     Post-Op Diagnosis: Same       Procedure(s):  XI ROBOTIC GASTRECTOMY SLEEVE LAPAROSCOPIC, EGD, HIATAL HERNIA REPAIR    Anesthesia: General    Surgeon(s):  Teo Kaur DO    Assistant: Surjit Russo    Estimated Blood Loss (mL): less than 50     Complications: None    Specimens:   ID Type Source Tests Collected by Time Destination   A : PORTION OF STOMACH Tissue Stomach SURGICAL PATHOLOGY Teo Kaur DO 11/19/2018 1102        Implants:  * No implants in log *      Drains:   Closed/Suction Drain Right Abdomen Bulb 19 Tamazight (Active)   Site Description Unable to view 11/19/2018  1:15 PM   Dressing Status Clean;Dry; Intact 11/19/2018  1:15 PM   Drainage Appearance Bloody 11/19/2018  1:15 PM   Status To bulb suction 11/19/2018  1:15 PM       [REMOVED] Urethral Catheter Double-lumen 16 fr (Removed)       Findings: hiatal hernia    Teo Kaur DO  Date: 11/19/2018  Time: 1:53 PM
No

## 2019-06-20 ENCOUNTER — TRANSCRIPTION ENCOUNTER (OUTPATIENT)
Age: 79
End: 2019-06-20

## 2019-06-20 VITALS
HEART RATE: 138 BPM | TEMPERATURE: 99 F | DIASTOLIC BLOOD PRESSURE: 73 MMHG | RESPIRATION RATE: 18 BRPM | SYSTOLIC BLOOD PRESSURE: 138 MMHG

## 2019-06-20 LAB
HCT VFR BLD CALC: 32.4 % — LOW (ref 37–47)
HGB BLD-MCNC: 10.8 G/DL — LOW (ref 12–16)
MCHC RBC-ENTMCNC: 30.3 PG — SIGNIFICANT CHANGE UP (ref 27–31)
MCHC RBC-ENTMCNC: 33.3 G/DL — SIGNIFICANT CHANGE UP (ref 32–37)
MCV RBC AUTO: 91 FL — SIGNIFICANT CHANGE UP (ref 81–99)
NRBC # BLD: 0 /100 WBCS — SIGNIFICANT CHANGE UP (ref 0–0)
PLATELET # BLD AUTO: 187 K/UL — SIGNIFICANT CHANGE UP (ref 130–400)
RBC # BLD: 3.56 M/UL — LOW (ref 4.2–5.4)
RBC # FLD: 14.1 % — SIGNIFICANT CHANGE UP (ref 11.5–14.5)
SURGICAL PATHOLOGY STUDY: SIGNIFICANT CHANGE UP
WBC # BLD: 8.79 K/UL — SIGNIFICANT CHANGE UP (ref 4.8–10.8)
WBC # FLD AUTO: 8.79 K/UL — SIGNIFICANT CHANGE UP (ref 4.8–10.8)

## 2019-06-20 RX ORDER — GABAPENTIN 400 MG/1
1 CAPSULE ORAL
Qty: 21 | Refills: 0
Start: 2019-06-20 | End: 2019-06-26

## 2019-06-20 RX ORDER — OXYCODONE HYDROCHLORIDE 5 MG/1
1 TABLET ORAL
Qty: 16 | Refills: 0
Start: 2019-06-20 | End: 2019-06-23

## 2019-06-20 RX ORDER — TRAMADOL HYDROCHLORIDE 50 MG/1
1 TABLET ORAL
Qty: 12 | Refills: 0
Start: 2019-06-20 | End: 2019-06-22

## 2019-06-20 RX ADMIN — Medication 325 MILLIGRAM(S): at 12:40

## 2019-06-20 RX ADMIN — Medication 325 MILLIGRAM(S): at 09:14

## 2019-06-20 RX ADMIN — Medication 200 MILLIGRAM(S): at 06:06

## 2019-06-20 RX ADMIN — TRAMADOL HYDROCHLORIDE 50 MILLIGRAM(S): 50 TABLET ORAL at 09:42

## 2019-06-20 RX ADMIN — Medication 650 MILLIGRAM(S): at 01:00

## 2019-06-20 RX ADMIN — TRAMADOL HYDROCHLORIDE 50 MILLIGRAM(S): 50 TABLET ORAL at 09:12

## 2019-06-20 RX ADMIN — Medication 650 MILLIGRAM(S): at 07:38

## 2019-06-20 RX ADMIN — PANTOPRAZOLE SODIUM 40 MILLIGRAM(S): 20 TABLET, DELAYED RELEASE ORAL at 06:06

## 2019-06-20 RX ADMIN — Medication 650 MILLIGRAM(S): at 12:39

## 2019-06-20 RX ADMIN — Medication 650 MILLIGRAM(S): at 00:29

## 2019-06-20 RX ADMIN — Medication 25 MILLIGRAM(S): at 06:05

## 2019-06-20 RX ADMIN — Medication 1 TABLET(S): at 12:40

## 2019-06-20 RX ADMIN — Medication 1 MILLIGRAM(S): at 12:40

## 2019-06-20 RX ADMIN — TRAMADOL HYDROCHLORIDE 50 MILLIGRAM(S): 50 TABLET ORAL at 01:00

## 2019-06-20 RX ADMIN — Medication 650 MILLIGRAM(S): at 06:05

## 2019-06-20 RX ADMIN — CELECOXIB 200 MILLIGRAM(S): 200 CAPSULE ORAL at 12:40

## 2019-06-20 RX ADMIN — CHLORHEXIDINE GLUCONATE 1 APPLICATION(S): 213 SOLUTION TOPICAL at 12:30

## 2019-06-20 RX ADMIN — Medication 650 MILLIGRAM(S): at 13:09

## 2019-06-20 RX ADMIN — OXYCODONE HYDROCHLORIDE 5 MILLIGRAM(S): 5 TABLET ORAL at 06:09

## 2019-06-20 RX ADMIN — Medication 1000 UNIT(S): at 12:40

## 2019-06-20 RX ADMIN — OXYCODONE HYDROCHLORIDE 5 MILLIGRAM(S): 5 TABLET ORAL at 06:30

## 2019-06-20 RX ADMIN — GABAPENTIN 100 MILLIGRAM(S): 400 CAPSULE ORAL at 06:06

## 2019-06-20 RX ADMIN — APIXABAN 2.5 MILLIGRAM(S): 2.5 TABLET, FILM COATED ORAL at 06:05

## 2019-06-20 RX ADMIN — SERTRALINE 25 MILLIGRAM(S): 25 TABLET, FILM COATED ORAL at 12:40

## 2019-06-20 RX ADMIN — Medication 100 MILLIGRAM(S): at 06:06

## 2019-06-20 RX ADMIN — TRAMADOL HYDROCHLORIDE 50 MILLIGRAM(S): 50 TABLET ORAL at 00:34

## 2019-06-20 RX ADMIN — Medication 50 MILLIGRAM(S): at 06:06

## 2019-06-20 RX ADMIN — ANASTROZOLE 1 MILLIGRAM(S): 1 TABLET ORAL at 12:40

## 2019-06-20 NOTE — DISCHARGE NOTE PROVIDER - NSDCCPCAREPLAN_GEN_ALL_CORE_FT
PRINCIPAL DISCHARGE DIAGNOSIS  Diagnosis: S/P total knee replacement, right  Assessment and Plan of Treatment:

## 2019-06-20 NOTE — DISCHARGE NOTE PROVIDER - CARE PROVIDER_API CALL
Tello Wilder)  Orthopaedic Surgery  [address] [address], [address]  [place] [address]  Phone: [phone]  Fax: [phone]  Follow Up Time:

## 2019-06-20 NOTE — DISCHARGE NOTE PROVIDER - NSDCCPGOAL_GEN_ALL_CORE_FT
To get better and follow your care plan as instructed, physical therapy, WBAT, continue eliquis for dvt ppx until robby with dr Wilder, hold methotrexate until robby with dr Wilder

## 2019-06-20 NOTE — PROGRESS NOTE ADULT - SUBJECTIVE AND OBJECTIVE BOX
78 F POD#2 S/P right TKA  pt s/e, no CP, SOB, N/V, pain is controlled    Vital Signs Last 24 Hrs  T(C): 37.2 (20 Jun 2019 07:30), Max: 37.2 (20 Jun 2019 07:30)  T(F): 98.9 (20 Jun 2019 07:30), Max: 98.9 (20 Jun 2019 07:30)  HR: 138 (20 Jun 2019 07:30) (61 - 138)  BP: 138/73 (20 Jun 2019 07:30) (138/73 - 189/81)  BP(mean): --  RR: 18 (20 Jun 2019 07:30) (18 - 18)  SpO2: --    PE:     RLE: wound d/c/i, dressing changed  compartments soft  motor function intact, SILT  foot wwp                          10.8   8.79  )-----------( 187      ( 20 Jun 2019 05:35 )             32.4     06-19    137  |  97<L>  |  25<H>  ----------------------------<  108<H>  4.2   |  24  |  0.9    Ca    9.2      19 Jun 2019 06:20          - PT, WBAT  -DVT ppx  - pain control  -d/c planning

## 2019-06-25 ENCOUNTER — APPOINTMENT (OUTPATIENT)
Age: 79
End: 2019-06-25
Payer: MEDICARE

## 2019-06-25 VITALS
RESPIRATION RATE: 16 BRPM | SYSTOLIC BLOOD PRESSURE: 130 MMHG | DIASTOLIC BLOOD PRESSURE: 70 MMHG | TEMPERATURE: 98 F | OXYGEN SATURATION: 98 % | HEART RATE: 70 BPM

## 2019-06-25 PROCEDURE — 99024 POSTOP FOLLOW-UP VISIT: CPT

## 2019-06-25 RX ORDER — HYDROXYCHLOROQUINE SULFATE 200 MG/1
200 TABLET, FILM COATED ORAL
Refills: 0 | Status: DISCONTINUED | COMMUNITY
Start: 2019-01-30 | End: 2019-06-25

## 2019-06-25 RX ORDER — POTASSIUM CHLORIDE 1500 MG/1
20 TABLET, FILM COATED, EXTENDED RELEASE ORAL
Qty: 30 | Refills: 0 | Status: DISCONTINUED | COMMUNITY
Start: 2017-10-17 | End: 2019-06-25

## 2019-06-25 RX ORDER — SULFAMETHOXAZOLE AND TRIMETHOPRIM 400; 80 MG/1; MG/1
400-80 TABLET ORAL TWICE DAILY
Qty: 10 | Refills: 0 | Status: DISCONTINUED | COMMUNITY
Start: 2019-03-22 | End: 2019-06-25

## 2019-06-25 RX ORDER — METOPROLOL TARTRATE 50 MG/1
50 TABLET, FILM COATED ORAL
Qty: 60 | Refills: 0 | Status: DISCONTINUED | COMMUNITY
Start: 2017-10-17 | End: 2019-06-25

## 2019-06-25 RX ORDER — METOPROLOL TARTRATE 25 MG/1
25 TABLET, FILM COATED ORAL
Qty: 60 | Refills: 0 | Status: DISCONTINUED | COMMUNITY
Start: 2018-01-17 | End: 2019-06-25

## 2019-06-25 RX ORDER — SULFASALAZINE 500 MG/1
500 TABLET ORAL
Refills: 0 | Status: DISCONTINUED | COMMUNITY
Start: 2019-01-30 | End: 2019-06-25

## 2019-06-25 RX ORDER — PREDNISONE 10 MG/1
10 TABLET ORAL
Refills: 0 | Status: DISCONTINUED | COMMUNITY
End: 2019-06-25

## 2019-06-25 NOTE — COUNSELING
[de-identified] : Pt was informed about CN’s role and overview of recovery period. In addition, yellow contact card was provided. Pt educated on using incentive spirometer [id number] while awake, advised to continue using ice therapy. Pt educated on topics of high protein diet, adequate hydration, safety ambulation with assistive device, taking pain meds prior to PT and bedtime. Post-op teaching given; daily shower/sponge bath, cleansing wound with soap and water, no rubbing, no bathing for 4 wks. No lotion/cream/powder over incision, no driving for 4-6wks or otherwise instructed by surgeon. Pt encouraged calling CN with any issues, concerns or questions, also educated to notify CN if experiencing CP, SOB, cough, fever chills, dizziness, lightheadedness, n/v/d/c, redness, warmth, pain at the incision and/or any signs of bleeding as reviewed. Reassurance provided. Will continue to monitor.\par \par

## 2019-06-25 NOTE — PLAN
[FreeTextEntry1] : s/p Right TKA- c/w current medication regimen, Incentive Spirometry , ice and elevation  follow up with Dr Wilder 7/10

## 2019-06-25 NOTE — PHYSICAL EXAM
[No Acute Distress] : no acute distress [Well Developed] : well developed [Normal Sclera/Conjunctiva] : normal sclera/conjunctiva [No Respiratory Distress] : no respiratory distress  [Clear to Auscultation] : lungs were clear to auscultation bilaterally [No Accessory Muscle Use] : no accessory muscle use [Normal Rate] : normal rate  [Regular Rhythm] : with a regular rhythm [Normal S1, S2] : normal S1 and S2 [Pedal Pulses Present] : the pedal pulses are present [No Extremity Clubbing/Cyanosis] : no extremity clubbing/cyanosis [No Murmur] : no murmur heard [Soft] : abdomen soft [Non-distended] : non-distended [Non Tender] : non-tender [Normal Affect] : the affect was normal [No Focal Deficits] : no focal deficits [No Rash] : no rash [de-identified] : 1+R LLE  [Normal Insight/Judgement] : insight and judgment were intact [Alert and Oriented x3] : oriented to person, place, and time [de-identified] : use walker [de-identified] : surgical site: CDI

## 2019-06-25 NOTE — HISTORY OF PRESENT ILLNESS
[FreeTextEntry1] : Patient seen at home s/p right TKA , temporarily homebound r/t surgical procedure. [de-identified] : Patient is a 68 y/o female s/p a Right TKA with an uncomplicated post op course , found stable for dc by the orthopedic team with NWS. On arrival patient is aox3 denies , c/p, SOB, cough , fever , calf pain, NVDC , last BM this am. States pain is well controlled with current medication regimen.

## 2019-06-27 ENCOUNTER — RX RENEWAL (OUTPATIENT)
Age: 79
End: 2019-06-27

## 2019-07-09 ENCOUNTER — APPOINTMENT (OUTPATIENT)
Dept: ORTHOPEDIC SURGERY | Facility: CLINIC | Age: 79
End: 2019-07-09
Payer: MEDICARE

## 2019-07-09 PROCEDURE — 99024 POSTOP FOLLOW-UP VISIT: CPT

## 2019-07-09 RX ORDER — GABAPENTIN 100 MG/1
100 CAPSULE ORAL 3 TIMES DAILY
Refills: 0 | Status: DISCONTINUED | COMMUNITY
End: 2019-07-09

## 2019-07-09 RX ORDER — TRAMADOL HYDROCHLORIDE 50 MG/1
50 TABLET, COATED ORAL 4 TIMES DAILY
Refills: 0 | Status: DISCONTINUED | COMMUNITY
End: 2019-07-09

## 2019-07-09 RX ORDER — METHOTREXATE 2.5 MG/1
2.5 TABLET ORAL
Refills: 0 | Status: DISCONTINUED | COMMUNITY
End: 2019-07-09

## 2019-07-09 RX ORDER — TRAMADOL HYDROCHLORIDE 50 MG/1
50 TABLET, COATED ORAL
Qty: 56 | Refills: 0 | Status: DISCONTINUED | COMMUNITY
Start: 2019-06-27 | End: 2019-07-09

## 2019-07-09 RX ORDER — OXYCODONE 5 MG/1
5 TABLET ORAL
Refills: 0 | Status: DISCONTINUED | COMMUNITY
End: 2019-07-09

## 2019-07-09 RX ORDER — GABAPENTIN 100 MG/1
100 CAPSULE ORAL 3 TIMES DAILY
Qty: 42 | Refills: 0 | Status: DISCONTINUED | COMMUNITY
Start: 2019-06-27 | End: 2019-07-09

## 2019-07-09 NOTE — ASSESSMENT
[FreeTextEntry1] : 78 year old femlae approximately two weeks status post right total knee replacement. Staples were removed from the incision site and steri-strips were applied. Pt was instructed he may shower, allowing the soap and water to run over the incision site, but not to directly scrub over the wound. Pt is to continue Eliquis for DVT prophylaxis. Pt is to follow up in four weeks for reassessment. In the interim, if she develops any fevers, erythema, drainage from the incision site, or any concerning symptoms, we will see her prior to her scheduled appointment.\par

## 2019-07-09 NOTE — PHYSICAL EXAM
[Cane] : ambulates with cane [2+] : right 2+ [Normal] : Alert and in no acute distress [de-identified] : Right Knee\par Inspection: no effusion\par Wounds: Incision is clean and dry, staples are intact\par Alignment: normal.\par Palpation: no specific tenderness on palpation.\par Muscle Test: good quad strength.\par Leg examination: calf is soft and non-tender. [de-identified] : Sensation grossly intact about bilateral lower extremities.\par

## 2019-07-22 ENCOUNTER — RX RENEWAL (OUTPATIENT)
Age: 79
End: 2019-07-22

## 2019-07-31 ENCOUNTER — APPOINTMENT (OUTPATIENT)
Dept: ORTHOPEDIC SURGERY | Facility: CLINIC | Age: 79
End: 2019-07-31
Payer: MEDICARE

## 2019-07-31 PROCEDURE — 73562 X-RAY EXAM OF KNEE 3: CPT | Mod: RT

## 2019-07-31 PROCEDURE — 99024 POSTOP FOLLOW-UP VISIT: CPT

## 2019-07-31 PROCEDURE — 73502 X-RAY EXAM HIP UNI 2-3 VIEWS: CPT | Mod: RT

## 2019-07-31 RX ORDER — APIXABAN 2.5 MG/1
2.5 TABLET, FILM COATED ORAL
Qty: 180 | Refills: 3 | Status: DISCONTINUED | COMMUNITY
End: 2019-07-31

## 2019-07-31 NOTE — PHYSICAL EXAM
[Cane] : ambulates with cane [2+] : right 2+ [Normal] : Alert and in no acute distress [de-identified] : Sensation grossly intact about bilateral lower extremities.\par  [de-identified] : Right Knee\par Inspection: no effusion\par Wounds: healed midline incision\par Alignment: normal.\par Palpation: no specific tenderness on palpation.\par ROM: Full active and passive ROM, 0-110 degrees\par Muscle Test: good quad strength.\par Leg examination: calf is soft and non-tender. [de-identified] : Radiographs on 7/31/19 AP lateral and skyline of the right knee and AP pelvis lateral of the right hip. Knee radiographs shows a well aligned cemented PS TKA. The right hip shows a well aligned cemented hip replacement, no interval change from previous films.

## 2019-07-31 NOTE — REVIEW OF SYSTEMS
[Negative] : Heme/Lymph [Joint Pain] : joint pain [Joint Stiffness] : no joint stiffness [Joint Swelling] : no joint swelling

## 2019-07-31 NOTE — HISTORY OF PRESENT ILLNESS
[de-identified] : 78 year old female with bilateral total hip replacements and a left knee replacement is s/p right total knee replacement approximately 6 weeks, presents to the office today for a follow up. She is ambulating with a cane today, but denies using it in the house at all. She continues to do physical therapy 3x per week with good mobility. Pt reports a stabbing pain that occurs daily or the last 2-3 weeks above the knee. She states the pain lasts for 1-2 hours. When she does not have that pain, patient states she notices much improvement. Overall, patient reports doing well, but is concerned about this pain that radiates from her knee to her right hip.

## 2019-07-31 NOTE — ASSESSMENT
[FreeTextEntry1] : S/p RTK, 6 weeks,  no complaints of knee pain, but has pain over lateral aspect proximal right thigh. Pain coincides with location of the tourniquet used during surgery. We will have this treated while she is in PT for the knee. Tramadol for pain at night and f/u in 6-8 weeks.

## 2019-08-07 ENCOUNTER — RX RENEWAL (OUTPATIENT)
Age: 79
End: 2019-08-07

## 2019-08-08 ENCOUNTER — APPOINTMENT (OUTPATIENT)
Dept: BREAST CENTER | Facility: CLINIC | Age: 79
End: 2019-08-08
Payer: MEDICARE

## 2019-08-08 VITALS
BODY MASS INDEX: 26.52 KG/M2 | WEIGHT: 175 LBS | TEMPERATURE: 98.3 F | DIASTOLIC BLOOD PRESSURE: 72 MMHG | SYSTOLIC BLOOD PRESSURE: 130 MMHG | HEIGHT: 68 IN

## 2019-08-08 PROCEDURE — 99213 OFFICE O/P EST LOW 20 MIN: CPT

## 2019-08-08 NOTE — PHYSICAL EXAM
[Normocephalic] : normocephalic [No dominant masses] : no dominant masses left breast [Atraumatic] : atraumatic [No Nipple Discharge] : no right nipple discharge [No Axillary Lymphadenopathy] : no right axillary lymphadenopathy [No Ulceration] : no ulceration [No Rashes] : no rashes [Breast Nipple Inversion] : nipples not inverted [Breast Nipple Retraction] : nipples not retracted [de-identified] : well healed surgical scars.  [de-identified] : palpable dense scar tissue / seroma.

## 2019-08-08 NOTE — REVIEW OF SYSTEMS
[Negative] : Constitutional [Joint Pain] : joint pain [Breast Pain] : no breast pain [Breast Lump] : no breast lump [Nipple Discharge] : no nipple discharge [Nipple Inverted] : no inversion of the nipple [FreeTextEntry9] : s/p recent knee surgery

## 2019-08-08 NOTE — HISTORY OF PRESENT ILLNESS
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  \par ER/MO (+), HER2 (-).\par h/o Left breast benign Ca++ on NLOC (Ferzli/[facility]).\par No FHx breast/ovarian cancer.  Sister had Hodgkin's lymphoma.\par s/p Right NLOC/SNB/MP 12/18/15 - 0/1 (-); 18 mm well diff IDC with lobular features, non ext DCIS low grade; (+) margins.  No LVI or perineural invasion.  Widespread LCIS classical, ALH, papilloma, ADH.  \par s/p Right re-exc 2/1/16 - widespread LCIS classical, no invasion identified; (-) margins.  \par s/p SKIP insertion - 02/18/16. completed PBI on 2/26/16.\par / Autumn - Anastrozole. \par \par GIAN MELISSA is a 78 year old female patient who presents today in follow up for history of right breast cancer.\par Since her last visit, she has no new breast related complaints. \par Imaging was done in December 2018, which revealed no evidence of malignancy.\par She is approximately seven weeks status post right total knee replacement.\par \par She presents today for evaluation.

## 2019-08-08 NOTE — ASSESSMENT
[FreeTextEntry1] : GIAN is a sue 78 year old patient who presented today in follow up for a history of right breast cancer.  She has been doing well with no new breast related complaints. \par She is status post recent right total knee replacement.\par Imaging was done in December 2018 which revealed evidence of malignancy, as detailed above. \par Physical exam was unrevealing today.\par \par Imaging with a bilateral screening mammogram will be due in December 2019, and that will be scheduled today. \par All questions answered. She knows to call with any concerns. \par She will return for follow-up and clinical breast exam in six months. \par She will continue follow-up with medical oncology as well.

## 2019-09-09 ENCOUNTER — OUTPATIENT (OUTPATIENT)
Dept: OUTPATIENT SERVICES | Facility: HOSPITAL | Age: 79
LOS: 1 days | Discharge: HOME | End: 2019-09-09

## 2019-09-09 DIAGNOSIS — Z96.652 PRESENCE OF LEFT ARTIFICIAL KNEE JOINT: Chronic | ICD-10-CM

## 2019-09-09 DIAGNOSIS — Z96.642 PRESENCE OF LEFT ARTIFICIAL HIP JOINT: Chronic | ICD-10-CM

## 2019-09-09 DIAGNOSIS — Z90.49 ACQUIRED ABSENCE OF OTHER SPECIFIED PARTS OF DIGESTIVE TRACT: Chronic | ICD-10-CM

## 2019-09-09 DIAGNOSIS — Z96.651 PRESENCE OF RIGHT ARTIFICIAL KNEE JOINT: ICD-10-CM

## 2019-09-09 DIAGNOSIS — Z90.710 ACQUIRED ABSENCE OF BOTH CERVIX AND UTERUS: Chronic | ICD-10-CM

## 2019-09-09 DIAGNOSIS — Z96.641 PRESENCE OF RIGHT ARTIFICIAL HIP JOINT: Chronic | ICD-10-CM

## 2019-09-18 ENCOUNTER — APPOINTMENT (OUTPATIENT)
Dept: HEMATOLOGY ONCOLOGY | Facility: CLINIC | Age: 79
End: 2019-09-18
Payer: MEDICARE

## 2019-09-18 ENCOUNTER — OUTPATIENT (OUTPATIENT)
Dept: OUTPATIENT SERVICES | Facility: HOSPITAL | Age: 79
LOS: 1 days | Discharge: HOME | End: 2019-09-18

## 2019-09-18 VITALS
DIASTOLIC BLOOD PRESSURE: 62 MMHG | HEIGHT: 68 IN | WEIGHT: 180 LBS | RESPIRATION RATE: 16 BRPM | BODY MASS INDEX: 27.28 KG/M2 | HEART RATE: 61 BPM | TEMPERATURE: 97.9 F | SYSTOLIC BLOOD PRESSURE: 127 MMHG

## 2019-09-18 DIAGNOSIS — Z96.652 PRESENCE OF LEFT ARTIFICIAL KNEE JOINT: Chronic | ICD-10-CM

## 2019-09-18 DIAGNOSIS — Z96.641 PRESENCE OF RIGHT ARTIFICIAL HIP JOINT: Chronic | ICD-10-CM

## 2019-09-18 DIAGNOSIS — Z90.49 ACQUIRED ABSENCE OF OTHER SPECIFIED PARTS OF DIGESTIVE TRACT: Chronic | ICD-10-CM

## 2019-09-18 DIAGNOSIS — Z96.642 PRESENCE OF LEFT ARTIFICIAL HIP JOINT: Chronic | ICD-10-CM

## 2019-09-18 DIAGNOSIS — Z90.710 ACQUIRED ABSENCE OF BOTH CERVIX AND UTERUS: Chronic | ICD-10-CM

## 2019-09-18 PROCEDURE — 99214 OFFICE O/P EST MOD 30 MIN: CPT

## 2019-09-18 NOTE — ASSESSMENT
[FreeTextEntry1] : 1. 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  There is no evidence of disease.\par 2. Rheumatoid arthritis.\par \par RECOMMENDATION: \par -- Continue anastrozole, calcium and vitamin D supplements. \par -- Bilateral screening mammo in 12/2019.\par -- Followup with PCP for health maintenance. \par -- Followup with Dr. Wiley for rheumatoid arthritis.\par -- RTO for followup in 6 months.\par \par \par \par \par

## 2019-09-18 NOTE — PHYSICAL EXAM
[Restricted in physically strenuous activity but ambulatory and able to carry out work of a light or sedentary nature] : Status 1- Restricted in physically strenuous activity but ambulatory and able to carry out work of a light or sedentary nature, e.g., light house work, office work [Normal] : affect appropriate [de-identified] : Right side status post lumpectomy.  There is a lump of induration at the surgical scar- unchanged.  There is no palpable mass and no palpable right axillary lymphadenopathy.  Left breast and left axilla are normal.

## 2019-09-18 NOTE — HISTORY OF PRESENT ILLNESS
[de-identified] : This is a  77-year-old white female is here today for a followup visit.  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  After surgery, she received partial breast radiotherapy.  Since 03/2016, she has been on adjuvant endocrine therapy initially with anastrozole, and then switched to exemestane.  But she switched back to anastrozole.  She still has body and joint aching.  She also has RA and has been on MTX.  She is no longer on Prednisone. She follows with Dr. Wiley regularly.  Last visit was in March 2018.\par \par On 05/04/2016, she had a bone density which showed osteopenia in the hip.  Her bone density in the spine and femoral neck are normal.  Compared to the report from previous year, her bone density has been stable.  Her mammogram was done 10/24/16 and showed post surgical change including a 3.8 cm seroma in the right breast, stable.  There was no evidence of malignancy.  Latest mammogram was done 12/11/17 which showed no mammographic evidence of malignancy. Stable postsurgical changes  of the right breast. \par  [de-identified] : In 6/2017, right breast dx mammo did not showed abnormal finding.\par \par On 5/4/2016, Bone  Density showed\par ----------------------------------------------------------------- \par Region                                      BMD        T-score    Z-score      Classification \par ----------------------------------------------------------------- \par AP  Spine  (L1,  L2,  L3)           1.059        0.4            2.8          Normal \par Femoral  Neck  (Left)                0.787      -0.6            1.6           Normal \par Total  Hip  (Left)                        0.740      -1.7            0.2          Osteopenia \par 1/3  Radius  (Left)                0.510      -3.1          -0.3          \par ----------------------------------------------------------------- \par \par She is scheduled for right knee replacement. She reports body aching. \par \par On 1029/18:\par The patient is here today for followup visit. She has been taking anastrozole daily and tolerated. She has stable chronic joint aching. In 7/2018, she had bone density which showed normal bone density in lumbar spine. Due to b/l hp replacement, bone density study was not able performed in the hip and femoral neck.\par She had right breast dx mammo and sonogram on 6/2018. There was no abnormal finding.\par \par 3/20/19:\par The patient is here for followup visit. She has been taking anastrozole daily and tolerated. She has stable chronic joint aching. In 7/2018, she had bone density which showed normal bone density in lumbar spine. Due to b/l hp replacement, bone density study was not able performed in the hip and femoral neck. In 12/2018, she had b/l dx mammo and there was no suspicious finding. She has left knee pain and will have replacement surgery in 4/2019.\par \par 9/18/19:\par The patient is here for followup visit. She has been taking anastrozole daily and tolerated. She has stable chronic joint aching. In 7/2018, she had bone density which showed normal bone density in lumbar spine. Due to b/l hp replacement, bone density study was not able performed in the hip and femoral neck. In 12/2018, she had b/l dx mammo and there was no suspicious finding. She had left knee replacement surgery in 4/2019.\par

## 2019-09-23 DIAGNOSIS — Z79.811 LONG TERM (CURRENT) USE OF AROMATASE INHIBITORS: ICD-10-CM

## 2019-09-23 DIAGNOSIS — C50.411 MALIGNANT NEOPLASM OF UPPER-OUTER QUADRANT OF RIGHT FEMALE BREAST: ICD-10-CM

## 2019-09-25 ENCOUNTER — APPOINTMENT (OUTPATIENT)
Dept: ORTHOPEDIC SURGERY | Facility: CLINIC | Age: 79
End: 2019-09-25
Payer: MEDICARE

## 2019-09-25 PROCEDURE — 73562 X-RAY EXAM OF KNEE 3: CPT | Mod: RT

## 2019-09-25 PROCEDURE — 99213 OFFICE O/P EST LOW 20 MIN: CPT

## 2019-09-25 RX ORDER — TRAMADOL HYDROCHLORIDE 50 MG/1
50 TABLET, COATED ORAL
Qty: 21 | Refills: 0 | Status: DISCONTINUED | COMMUNITY
Start: 2019-07-31 | End: 2019-09-25

## 2019-09-25 RX ORDER — GABAPENTIN 100 MG/1
100 CAPSULE ORAL 3 TIMES DAILY
Qty: 21 | Refills: 0 | Status: DISCONTINUED | COMMUNITY
Start: 2019-08-07 | End: 2019-09-25

## 2019-09-25 NOTE — PHYSICAL EXAM
[de-identified] : Right Knee\par Inspection: no effusion\par Wounds: healed midline incision\par Alignment: normal.\par Palpation: no specific tenderness on palpation.\par ROM: 0-115 degrees\par Muscle Test: good quad strength.\par Leg examination: calf is soft and non-tender. [de-identified] : Radiographs done today AP lateral and skyline of the right knee shows well aligned cemented PS TKA

## 2019-09-25 NOTE — HISTORY OF PRESENT ILLNESS
[de-identified] : 79 year old female s/p right total knee replacement presents to the office today for her 3 month follow up. She is ambulating without a walker, cane, or crutch. She states occasionally she uses the cane but that is mostly for extra support. Pt has completed physical therapy at this time with good mobility. Pt does report some swelling, but denies any locking, clicking, or loss of motion. She is able to ambulate about two blocks on her own. Pt also reports continuing to work on negotiating stairs but does report noticing improvement. Overall, patient reports doing very well.

## 2019-10-19 ENCOUNTER — TRANSCRIPTION ENCOUNTER (OUTPATIENT)
Age: 79
End: 2019-10-19

## 2019-11-03 ENCOUNTER — OUTPATIENT (OUTPATIENT)
Dept: OUTPATIENT SERVICES | Facility: HOSPITAL | Age: 79
LOS: 1 days | Discharge: HOME | End: 2019-11-03
Payer: MEDICARE

## 2019-11-03 DIAGNOSIS — Z96.652 PRESENCE OF LEFT ARTIFICIAL KNEE JOINT: Chronic | ICD-10-CM

## 2019-11-03 DIAGNOSIS — Z96.641 PRESENCE OF RIGHT ARTIFICIAL HIP JOINT: Chronic | ICD-10-CM

## 2019-11-03 DIAGNOSIS — R51 HEADACHE: ICD-10-CM

## 2019-11-03 DIAGNOSIS — Z90.710 ACQUIRED ABSENCE OF BOTH CERVIX AND UTERUS: Chronic | ICD-10-CM

## 2019-11-03 DIAGNOSIS — Z90.49 ACQUIRED ABSENCE OF OTHER SPECIFIED PARTS OF DIGESTIVE TRACT: Chronic | ICD-10-CM

## 2019-11-03 DIAGNOSIS — Z96.642 PRESENCE OF LEFT ARTIFICIAL HIP JOINT: Chronic | ICD-10-CM

## 2019-11-03 PROCEDURE — 70450 CT HEAD/BRAIN W/O DYE: CPT | Mod: 26

## 2019-12-08 ENCOUNTER — FORM ENCOUNTER (OUTPATIENT)
Age: 79
End: 2019-12-08

## 2019-12-09 ENCOUNTER — OUTPATIENT (OUTPATIENT)
Dept: OUTPATIENT SERVICES | Facility: HOSPITAL | Age: 79
LOS: 1 days | Discharge: HOME | End: 2019-12-09
Payer: MEDICARE

## 2019-12-09 DIAGNOSIS — Z90.710 ACQUIRED ABSENCE OF BOTH CERVIX AND UTERUS: Chronic | ICD-10-CM

## 2019-12-09 DIAGNOSIS — Z96.652 PRESENCE OF LEFT ARTIFICIAL KNEE JOINT: Chronic | ICD-10-CM

## 2019-12-09 DIAGNOSIS — Z90.49 ACQUIRED ABSENCE OF OTHER SPECIFIED PARTS OF DIGESTIVE TRACT: Chronic | ICD-10-CM

## 2019-12-09 DIAGNOSIS — Z12.31 ENCOUNTER FOR SCREENING MAMMOGRAM FOR MALIGNANT NEOPLASM OF BREAST: ICD-10-CM

## 2019-12-09 DIAGNOSIS — Z96.641 PRESENCE OF RIGHT ARTIFICIAL HIP JOINT: Chronic | ICD-10-CM

## 2019-12-09 DIAGNOSIS — Z96.642 PRESENCE OF LEFT ARTIFICIAL HIP JOINT: Chronic | ICD-10-CM

## 2019-12-09 PROCEDURE — 77063 BREAST TOMOSYNTHESIS BI: CPT | Mod: 26

## 2019-12-09 PROCEDURE — 77067 SCR MAMMO BI INCL CAD: CPT | Mod: 26

## 2020-02-04 ENCOUNTER — APPOINTMENT (OUTPATIENT)
Dept: BREAST CENTER | Facility: CLINIC | Age: 80
End: 2020-02-04
Payer: MEDICARE

## 2020-02-04 VITALS
TEMPERATURE: 98.6 F | SYSTOLIC BLOOD PRESSURE: 132 MMHG | HEIGHT: 68 IN | DIASTOLIC BLOOD PRESSURE: 86 MMHG | BODY MASS INDEX: 27.28 KG/M2 | WEIGHT: 180 LBS

## 2020-02-04 PROCEDURE — 99213 OFFICE O/P EST LOW 20 MIN: CPT

## 2020-02-04 NOTE — HISTORY OF PRESENT ILLNESS
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  \par ER/NH (+), HER2 (-).\par h/o Left breast benign Ca++ on NLOC (Ferzli/[facility]).\par No FHx breast/ovarian cancer.  Sister had Hodgkin's lymphoma.\par s/p Right NLOC/SNB/MP 12/18/15 - 0/1 (-); 18 mm well diff IDC with lobular features, non ext DCIS low grade; (+) margins.  No LVI or perineural invasion.  Widespread LCIS classical, ALH, papilloma, ADH.  \par s/p Right re-exc 2/1/16 - widespread LCIS classical, no invasion identified; (-) margins.  \par s/p SKIP insertion - 02/18/16. completed PBI on 2/26/16.\par / Autumn - Anastrozole. \par \par GIAN MELISSA is a 79 year old female patient who presents today in follow up for history of right breast cancer.\par Since her last visit, she has no new breast related complaints. \par Imaging was done on 12/09/19, which revealed no mammographic evidence of malignancy.\par \par She presents today for evaluation and imaging review.

## 2020-02-04 NOTE — ASSESSMENT
[FreeTextEntry1] : GIAN is a sue 79 year old patient who presented today in follow up for a history of right breast cancer.  She has been doing well with no new breast related complaints. \par Imaging was done recently which revealed no mammographic evidence of malignancy, as detailed above. \par Physical exam was unrevealing today.\par \par Imaging with a bilateral screening mammogram will be due in December 2020, and that will be scheduled today.  \par She will return for follow-up and clinical breast exam in December 2020. \par She will continue follow-up with medical oncology as well. \par \par I spent a total of 15 minutes of face to face time with this patient, greater than 50% of which was spent in counseling and/or coordination of care.\par All of her questions were appropriately answered.\par She knows to call with any concerns.

## 2020-02-04 NOTE — PHYSICAL EXAM
[Atraumatic] : atraumatic [Normocephalic] : normocephalic [No Supraclavicular Adenopathy] : no supraclavicular adenopathy [No Cervical Adenopathy] : no cervical adenopathy [No dominant masses] : no dominant masses left breast [No Nipple Discharge] : no left nipple discharge [No Rashes] : no rashes [No Ulceration] : no ulceration [Breast Nipple Inversion] : nipples not inverted [Breast Nipple Retraction] : nipples not retracted [de-identified] : well healed surgical scars.  [de-identified] : palpable dense scar tissue / seroma.  [de-identified] : No axillary lymphadenopathy appreciated. [de-identified] : No axillary lymphadenopathy appreciated.

## 2020-02-04 NOTE — PAST MEDICAL HISTORY
[Menopause Age____] : age at menopause was [unfilled] [Menarche Age ____] : age at menarche was [unfilled] [Live Births ___] : P[unfilled]  [Total Preg ___] : G[unfilled] [Age At Live Birth ___] : Age at live birth: [unfilled] [History of Hormone Replacement Treatment] : has no history of hormone replacement treatment [FreeTextEntry5] : TAYLOR [FreeTextEntry6] : none [FreeTextEntry7] : none [FreeTextEntry8] : none

## 2020-02-04 NOTE — REVIEW OF SYSTEMS
[Joint Pain] : joint pain [Negative] : Constitutional [Breast Pain] : no breast pain [Breast Lump] : no breast lump [Nipple Discharge] : no nipple discharge [Nipple Inverted] : no inversion of the nipple

## 2020-02-04 NOTE — DATA REVIEWED
[FreeTextEntry1] : B/L Screening Mammo - 12/10/19:\par MAMMOGRAM FINDINGS: \par Mammography was performed including the following views: bilateral craniocaudal with tomosynthesis, bilateral mediolateral oblique with tomosynthesis, and bilateral craniocaudal exaggerated laterally with tomosynthesis. The examination includes digital synthetic 2D and digital tomosynthesis 3D images. Additional imaging analysis was performed using CAD (computer-aided detection) software. \par There are scattered areas of fibroglandular density. \par There is an area of architectural distortion at the site of lumpectomy seen in the right breast. \par No suspicious mass, grouping of calcifications, or other abnormality is identified. \par IMPRESSION: \par There is no mammographic evidence of malignancy. \par RECOMMENDATION: \par Unless otherwise indicated by clinical findings, annual screening mammography recommended. \par ASSESSMENT: \par BI-RADS Category 2: Benign

## 2020-06-21 ENCOUNTER — TRANSCRIPTION ENCOUNTER (OUTPATIENT)
Age: 80
End: 2020-06-21

## 2020-06-29 ENCOUNTER — APPOINTMENT (OUTPATIENT)
Dept: HEMATOLOGY ONCOLOGY | Facility: CLINIC | Age: 80
End: 2020-06-29
Payer: MEDICARE

## 2020-06-29 ENCOUNTER — OUTPATIENT (OUTPATIENT)
Dept: OUTPATIENT SERVICES | Facility: HOSPITAL | Age: 80
LOS: 1 days | Discharge: HOME | End: 2020-06-29

## 2020-06-29 VITALS
TEMPERATURE: 98.6 F | SYSTOLIC BLOOD PRESSURE: 149 MMHG | HEIGHT: 68 IN | WEIGHT: 185 LBS | HEART RATE: 79 BPM | DIASTOLIC BLOOD PRESSURE: 69 MMHG | BODY MASS INDEX: 28.04 KG/M2

## 2020-06-29 DIAGNOSIS — Z90.49 ACQUIRED ABSENCE OF OTHER SPECIFIED PARTS OF DIGESTIVE TRACT: Chronic | ICD-10-CM

## 2020-06-29 DIAGNOSIS — Z96.642 PRESENCE OF LEFT ARTIFICIAL HIP JOINT: Chronic | ICD-10-CM

## 2020-06-29 DIAGNOSIS — Z90.710 ACQUIRED ABSENCE OF BOTH CERVIX AND UTERUS: Chronic | ICD-10-CM

## 2020-06-29 DIAGNOSIS — Z96.652 PRESENCE OF LEFT ARTIFICIAL KNEE JOINT: Chronic | ICD-10-CM

## 2020-06-29 DIAGNOSIS — Z96.641 PRESENCE OF RIGHT ARTIFICIAL HIP JOINT: Chronic | ICD-10-CM

## 2020-06-29 PROCEDURE — 99213 OFFICE O/P EST LOW 20 MIN: CPT

## 2020-07-05 NOTE — PHYSICAL EXAM
[Restricted in physically strenuous activity but ambulatory and able to carry out work of a light or sedentary nature] : Status 1- Restricted in physically strenuous activity but ambulatory and able to carry out work of a light or sedentary nature, e.g., light house work, office work [Normal] : affect appropriate [de-identified] : Right side status post lumpectomy.  There is a lump of induration at the surgical scar- unchanged.  There is no palpable mass and no palpable right axillary lymphadenopathy.  Left breast and left axilla are normal.

## 2020-07-05 NOTE — ASSESSMENT
[FreeTextEntry1] : 1. 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  There is no evidence of disease.\par 2. Rheumatoid arthritis.\par \par RECOMMENDATION: \par -- Continue anastrozole, calcium and vitamin D supplements. Anastrozole was e-refilled.\par -- Bilateral screening mammo in 12/2020.\par -- Followup with PCP for health maintenance. \par -- Followup with Dr. Wiley for rheumatoid arthritis.\par -- RTO for followup in 6 months.\par \par Case was seen and discussed with Dr. Leyva who agreed with the assessment and plan.\par \par \par \par

## 2020-07-05 NOTE — CONSULT LETTER
[Dear  ___] : Dear  [unfilled], [Sincerely,] : Sincerely, [Please see my note below.] : Please see my note below. [Courtesy Letter:] : I had the pleasure of seeing your patient, [unfilled], in my office today. [FreeTextEntry3] : Rivka Leyva MD

## 2020-07-07 DIAGNOSIS — Z79.811 LONG TERM (CURRENT) USE OF AROMATASE INHIBITORS: ICD-10-CM

## 2020-07-07 DIAGNOSIS — C50.411 MALIGNANT NEOPLASM OF UPPER-OUTER QUADRANT OF RIGHT FEMALE BREAST: ICD-10-CM

## 2020-08-24 ENCOUNTER — APPOINTMENT (OUTPATIENT)
Dept: ORTHOPEDIC SURGERY | Facility: CLINIC | Age: 80
End: 2020-08-24
Payer: MEDICARE

## 2020-08-24 PROCEDURE — 73502 X-RAY EXAM HIP UNI 2-3 VIEWS: CPT | Mod: RT

## 2020-08-24 PROCEDURE — 99214 OFFICE O/P EST MOD 30 MIN: CPT

## 2020-08-26 ENCOUNTER — TRANSCRIPTION ENCOUNTER (OUTPATIENT)
Age: 80
End: 2020-08-26

## 2020-08-26 NOTE — HISTORY OF PRESENT ILLNESS
[de-identified] : 79 year old female s/p right hip replacement and bilateral knee replacements presents to the office today complaining of some right hip pain. Patient reports pain about the anterior lateral thigh. Patient reports having this pain for about one month. She states that the pain is excruciating when it comes on. Patient denies any pain with ambulation. She reports having taken Tylenol for pain with some relief along with ice. Patient is here today to discuss her options for pain relief.

## 2020-08-26 NOTE — ASSESSMENT
[FreeTextEntry1] : 79 year old female s/p right hip replacement with right hip pain. Radiographs are unremarkable, given her excruciating pain, we will start a work up by obtaining a 3 phase bone scan, ESR,  and CRP. Patient will follow up once these are completed.

## 2020-08-26 NOTE — PHYSICAL EXAM
[de-identified] : Right Hip\par Inspection: No effusion\par Wounds: Healed incision about the left hip, no drainage or erythema\par Alignment: Normal\par Stability: No instability\par Palpation: Tender to palpation at the mid thigh \par ROM: Flexion to 75 degrees, 20 degrees of external rotation, 30 degrees of abduction\par Muscle Test: 5/5 All motor groups\par Leg examination: Calf is soft and non-tender.\par  [de-identified] : Radiographs done today AP pelvis and lateral of the right hip shows a right total hip replacement with a Bassam restoration femoral component and a press fit cup. The cup and stem appear to be well fixed with no evidence of loosening or wear. There is no evidence of fractures.

## 2020-08-31 ENCOUNTER — OUTPATIENT (OUTPATIENT)
Dept: OUTPATIENT SERVICES | Facility: HOSPITAL | Age: 80
LOS: 1 days | Discharge: HOME | End: 2020-08-31
Payer: MEDICARE

## 2020-08-31 DIAGNOSIS — Z96.652 PRESENCE OF LEFT ARTIFICIAL KNEE JOINT: Chronic | ICD-10-CM

## 2020-08-31 DIAGNOSIS — Z90.710 ACQUIRED ABSENCE OF BOTH CERVIX AND UTERUS: Chronic | ICD-10-CM

## 2020-08-31 DIAGNOSIS — Z90.49 ACQUIRED ABSENCE OF OTHER SPECIFIED PARTS OF DIGESTIVE TRACT: Chronic | ICD-10-CM

## 2020-08-31 DIAGNOSIS — Z96.641 PRESENCE OF RIGHT ARTIFICIAL HIP JOINT: Chronic | ICD-10-CM

## 2020-08-31 DIAGNOSIS — Z96.642 PRESENCE OF LEFT ARTIFICIAL HIP JOINT: Chronic | ICD-10-CM

## 2020-08-31 DIAGNOSIS — R35.0 FREQUENCY OF MICTURITION: ICD-10-CM

## 2020-08-31 PROCEDURE — 76857 US EXAM PELVIC LIMITED: CPT | Mod: 26

## 2020-09-01 ENCOUNTER — RESULT REVIEW (OUTPATIENT)
Age: 80
End: 2020-09-01

## 2020-09-01 ENCOUNTER — OUTPATIENT (OUTPATIENT)
Dept: OUTPATIENT SERVICES | Facility: HOSPITAL | Age: 80
LOS: 1 days | Discharge: HOME | End: 2020-09-01

## 2020-09-01 DIAGNOSIS — Z90.49 ACQUIRED ABSENCE OF OTHER SPECIFIED PARTS OF DIGESTIVE TRACT: Chronic | ICD-10-CM

## 2020-09-01 DIAGNOSIS — Z96.641 PRESENCE OF RIGHT ARTIFICIAL HIP JOINT: Chronic | ICD-10-CM

## 2020-09-01 DIAGNOSIS — Z96.652 PRESENCE OF LEFT ARTIFICIAL KNEE JOINT: Chronic | ICD-10-CM

## 2020-09-01 DIAGNOSIS — Z90.710 ACQUIRED ABSENCE OF BOTH CERVIX AND UTERUS: Chronic | ICD-10-CM

## 2020-09-01 DIAGNOSIS — Z96.642 PRESENCE OF LEFT ARTIFICIAL HIP JOINT: Chronic | ICD-10-CM

## 2020-09-11 DIAGNOSIS — Z13.820 ENCOUNTER FOR SCREENING FOR OSTEOPOROSIS: ICD-10-CM

## 2020-09-11 DIAGNOSIS — M89.9 DISORDER OF BONE, UNSPECIFIED: ICD-10-CM

## 2020-09-11 DIAGNOSIS — Z78.0 ASYMPTOMATIC MENOPAUSAL STATE: ICD-10-CM

## 2020-09-14 ENCOUNTER — RESULT REVIEW (OUTPATIENT)
Age: 80
End: 2020-09-14

## 2020-09-14 ENCOUNTER — OUTPATIENT (OUTPATIENT)
Dept: OUTPATIENT SERVICES | Facility: HOSPITAL | Age: 80
LOS: 1 days | Discharge: HOME | End: 2020-09-14
Payer: MEDICARE

## 2020-09-14 DIAGNOSIS — Z90.49 ACQUIRED ABSENCE OF OTHER SPECIFIED PARTS OF DIGESTIVE TRACT: Chronic | ICD-10-CM

## 2020-09-14 DIAGNOSIS — Z96.641 PRESENCE OF RIGHT ARTIFICIAL HIP JOINT: ICD-10-CM

## 2020-09-14 DIAGNOSIS — Z90.710 ACQUIRED ABSENCE OF BOTH CERVIX AND UTERUS: Chronic | ICD-10-CM

## 2020-09-14 DIAGNOSIS — Z96.641 PRESENCE OF RIGHT ARTIFICIAL HIP JOINT: Chronic | ICD-10-CM

## 2020-09-14 DIAGNOSIS — M79.659 PAIN IN UNSPECIFIED THIGH: ICD-10-CM

## 2020-09-14 DIAGNOSIS — Z96.642 PRESENCE OF LEFT ARTIFICIAL HIP JOINT: Chronic | ICD-10-CM

## 2020-09-14 DIAGNOSIS — Z96.652 PRESENCE OF LEFT ARTIFICIAL KNEE JOINT: Chronic | ICD-10-CM

## 2020-09-14 PROCEDURE — 78306 BONE IMAGING WHOLE BODY: CPT | Mod: 26

## 2020-09-14 PROCEDURE — 78803 RP LOCLZJ TUM SPECT 1 AREA: CPT | Mod: 26

## 2020-09-30 ENCOUNTER — APPOINTMENT (OUTPATIENT)
Dept: ORTHOPEDIC SURGERY | Facility: CLINIC | Age: 80
End: 2020-09-30
Payer: MEDICARE

## 2020-09-30 VITALS — TEMPERATURE: 98.5 F

## 2020-09-30 DIAGNOSIS — T84.84XA PAIN DUE TO INTERNAL ORTHOPEDIC PROSTHETIC DEVICES, IMPLANTS AND GRAFTS, INITIAL ENCOUNTER: ICD-10-CM

## 2020-09-30 DIAGNOSIS — M70.71 OTHER BURSITIS OF HIP, RIGHT HIP: ICD-10-CM

## 2020-09-30 DIAGNOSIS — T56.891A TOXIC EFFECT OF OTHER METALS, ACCIDENTAL (UNINTENTIONAL), INITIAL ENCOUNTER: ICD-10-CM

## 2020-09-30 DIAGNOSIS — T56.2X1A TOXIC EFFECT OF CHROMIUM AND ITS COMPOUNDS, ACCIDENTAL (UNINTENTIONAL), INITIAL ENCOUNTER: ICD-10-CM

## 2020-09-30 PROCEDURE — 99214 OFFICE O/P EST MOD 30 MIN: CPT

## 2020-09-30 NOTE — ASSESSMENT
[FreeTextEntry1] : Pt is here to follow up after having a bone scan and ESR CRP. Everything is unremarkable. We will order a cobalt and chromium, if those are elevated we will consider doing an MRI. If they are not we will treat this as a soft tissue problem, bursitis of the hip. We will send her for PT. And prescribe Voltaren gel.

## 2020-09-30 NOTE — HISTORY OF PRESENT ILLNESS
[de-identified] : 8/24/2020 - 79 year old female s/p right hip replacement and bilateral knee replacements presents to the office today complaining of some right hip pain. Patient reports pain about the anterior lateral thigh. Patient reports having this pain for about one month. She states that the pain is excruciating when it comes on. Patient denies any pain with ambulation. She reports having taken Tylenol for pain with some relief along with ice. Patient is here today to discuss her options for pain relief. \par \par 9/30/2020 - 79 year old female presents to the office today to review her lab results and bone scan.

## 2020-09-30 NOTE — PHYSICAL EXAM
[de-identified] : 8/24/20- Radiographs done today AP pelvis and lateral of the right hip shows a right total hip replacement with a Bassam restoration femoral component and a press fit cup. The cup and stem appear to be well fixed with no evidence of loosening or wear. There is no evidence of fractures.  [de-identified] : Right Hip\par Inspection: No effusion\par Wounds: Healed incision about the left hip, no drainage or erythema\par Alignment: Normal\par Stability: No instability\par Palpation: Tender over the greater trochanter \par ROM: Flexion to 75 degrees, 20 degrees of external rotation, 30 degrees of abduction\par Muscle Test: 5/5 All motor groups\par Leg examination: Calf is soft and non-tender.\par

## 2020-10-20 LAB
COBALT: <1 UG/L
CR BLD-MCNC: 1.1 UG/L

## 2020-12-09 ENCOUNTER — APPOINTMENT (OUTPATIENT)
Dept: UROGYNECOLOGY | Facility: CLINIC | Age: 80
End: 2020-12-09
Payer: MEDICARE

## 2020-12-09 ENCOUNTER — OUTPATIENT (OUTPATIENT)
Dept: OUTPATIENT SERVICES | Facility: HOSPITAL | Age: 80
LOS: 1 days | Discharge: HOME | End: 2020-12-09

## 2020-12-09 VITALS — BODY MASS INDEX: 27.52 KG/M2 | SYSTOLIC BLOOD PRESSURE: 120 MMHG | WEIGHT: 181 LBS | DIASTOLIC BLOOD PRESSURE: 70 MMHG

## 2020-12-09 DIAGNOSIS — Z96.652 PRESENCE OF LEFT ARTIFICIAL KNEE JOINT: Chronic | ICD-10-CM

## 2020-12-09 DIAGNOSIS — M25.561 PAIN IN RIGHT KNEE: ICD-10-CM

## 2020-12-09 DIAGNOSIS — M25.551 PAIN IN RIGHT HIP: ICD-10-CM

## 2020-12-09 DIAGNOSIS — M79.659 PAIN IN UNSPECIFIED THIGH: ICD-10-CM

## 2020-12-09 DIAGNOSIS — Z86.000 PERSONAL HISTORY OF IN-SITU NEOPLASM OF BREAST: ICD-10-CM

## 2020-12-09 DIAGNOSIS — Z96.641 PRESENCE OF RIGHT ARTIFICIAL HIP JOINT: ICD-10-CM

## 2020-12-09 DIAGNOSIS — Z92.89 PERSONAL HISTORY OF OTHER MEDICAL TREATMENT: ICD-10-CM

## 2020-12-09 DIAGNOSIS — Z85.3 PERSONAL HISTORY OF MALIGNANT NEOPLASM OF BREAST: ICD-10-CM

## 2020-12-09 DIAGNOSIS — Z90.49 ACQUIRED ABSENCE OF OTHER SPECIFIED PARTS OF DIGESTIVE TRACT: Chronic | ICD-10-CM

## 2020-12-09 DIAGNOSIS — Z90.710 ACQUIRED ABSENCE OF BOTH CERVIX AND UTERUS: Chronic | ICD-10-CM

## 2020-12-09 DIAGNOSIS — Z87.440 PERSONAL HISTORY OF URINARY (TRACT) INFECTIONS: ICD-10-CM

## 2020-12-09 DIAGNOSIS — Z96.651 PRESENCE OF RIGHT ARTIFICIAL KNEE JOINT: ICD-10-CM

## 2020-12-09 DIAGNOSIS — R35.1 NOCTURIA: ICD-10-CM

## 2020-12-09 DIAGNOSIS — Z96.642 PRESENCE OF LEFT ARTIFICIAL HIP JOINT: Chronic | ICD-10-CM

## 2020-12-09 DIAGNOSIS — Z96.641 PRESENCE OF RIGHT ARTIFICIAL HIP JOINT: Chronic | ICD-10-CM

## 2020-12-09 DIAGNOSIS — Z96.652 PRESENCE OF LEFT ARTIFICIAL KNEE JOINT: ICD-10-CM

## 2020-12-09 PROCEDURE — 51701 INSERT BLADDER CATHETER: CPT

## 2020-12-09 PROCEDURE — 99204 OFFICE O/P NEW MOD 45 MIN: CPT

## 2020-12-09 RX ORDER — SERTRALINE HYDROCHLORIDE 25 MG/1
TABLET, FILM COATED ORAL DAILY
Refills: 0 | Status: ACTIVE | COMMUNITY

## 2020-12-09 RX ORDER — METOPROLOL SUCCINATE 25 MG/1
25 TABLET, EXTENDED RELEASE ORAL
Refills: 1 | Status: ACTIVE | COMMUNITY

## 2020-12-09 RX ORDER — GLUCOSAMINE/CHONDR SU A SOD 750-600 MG
600-200 TABLET ORAL DAILY
Refills: 0 | Status: ACTIVE | COMMUNITY

## 2020-12-09 RX ORDER — ACETAMINOPHEN 500 MG/1
TABLET, COATED ORAL
Refills: 0 | Status: ACTIVE | COMMUNITY

## 2020-12-09 RX ORDER — RANITIDINE HYDROCHLORIDE 150 MG/1
150 CAPSULE ORAL
Refills: 0 | Status: ACTIVE | COMMUNITY

## 2020-12-09 RX ORDER — CELECOXIB 50 MG/1
CAPSULE ORAL TWICE DAILY
Refills: 0 | Status: ACTIVE | COMMUNITY

## 2020-12-09 RX ORDER — POTASSIUM CHLORIDE 750 MG/1
10 CAPSULE, EXTENDED RELEASE ORAL
Refills: 0 | Status: ACTIVE | COMMUNITY

## 2020-12-09 RX ORDER — CEFPODOXIME PROXETIL 200 MG/1
200 TABLET, FILM COATED ORAL
Refills: 0 | Status: DISCONTINUED | COMMUNITY
End: 2020-12-09

## 2020-12-09 RX ORDER — FOLIC ACID 1 MG/1
1 TABLET ORAL
Refills: 0 | Status: ACTIVE | COMMUNITY

## 2020-12-09 RX ORDER — ZOLPIDEM TARTRATE 5 MG/1
5 TABLET, FILM COATED ORAL
Refills: 0 | Status: ACTIVE | COMMUNITY

## 2020-12-09 RX ORDER — DICLOFENAC SODIUM 10 MG/G
1 GEL TOPICAL DAILY
Qty: 1 | Refills: 0 | Status: DISCONTINUED | COMMUNITY
Start: 2020-09-30 | End: 2020-12-09

## 2020-12-09 RX ORDER — HYDROCHLOROTHIAZIDE 50 MG/1
50 TABLET ORAL
Qty: 30 | Refills: 0 | Status: ACTIVE | COMMUNITY
Start: 2017-08-07

## 2020-12-09 NOTE — DISCUSSION/SUMMARY
[FreeTextEntry1] : \par Nocturia-\par The pathophysiology of the above condition was discussed with the patient. The patient was given information and education on pelvic floor muscle exercises/rehabilitation, avoidance of dietary bladder irritants, and other strategies to improve bladder control, such as scheduled voiding. She was counseled regarding further management strategies for overactive bladder and urge incontinence including pelvic floor physical therapy, medications or surgical management. The patient voiced understanding and agrees with diet changes and behavioral modification for nocturia. We will follow up on her urine tests.\par \par

## 2020-12-09 NOTE — HISTORY OF PRESENT ILLNESS
[FreeTextEntry1] : \par Pt with pelvic floor dysfunction here for urogynecologic evaluation. She describes: \par \par Chief PFD: nocturia\par \par Pelvic organ prolapse: s/p lydia, no bulge, denies splinting \par Stress urinary incontinence: denies\par Overactive bladder syndrome: voids 3 times per day (no sensation to void at other times), 6 voids per night, no urge incontinence, no eneuresis, for a year, not worsening, no prior treatment, no glaucoma\par Voiding dysfunction: no Incomplete bladder emptying, no hesitancy \par Lower urinary tract/vaginal symptoms: no recurrent UTIs per year, no hematuria, no dysuria, no bladder pain \par Fecal incontinence: denies\par Defecatory dysfunction: sausage\par Sexual dysfunction: not active\par Pelvic pain: denies\par Vaginal dryness: hx of breast cancer, denies dryness\par \par Her pelvic floor symptoms are significantly bothersome and negatively impacting her quality of life. \par \par

## 2020-12-09 NOTE — PHYSICAL EXAM
[Chaperone Present] : A chaperone was present in the examining room during all aspects of the physical examination [FreeTextEntry1] : Void: 20 cc\par PVR: 5 cc\par Urethra was prepped in sterile fashion and then a sterile catheter was used by me to drain the bladder.\par \par  Aa: -1 Ba: -1\par  \par Well healed incision: vertical\par normal perineal sensation\par normal perineal reflexes\par negative cough stress test\par positive atrophy\par positive urethral caruncle\par positive urethral hypermobility\par bilateral levator ani spasm, positive tenderness\par no urethral tenderness\par no bladder tenderness\par no cervical tenderness\par no uterine tenderness\par 0/5 Kegel\par

## 2020-12-09 NOTE — COUNSELING
[FreeTextEntry1] : \par We will notify you of the urine results if they are abnormal\par \par Please increase your water intake to at least 3 bottles of water per day\par \par For 4-5 days, cut one item from the list out of your diet.\par \par After 4-5 days, it it makes a difference, you have to decide if it is worth keeping it out of your diet to help with the urinary issues.\par \par If it does not make a difference, you should add it back into your diet and remove another item for another 4-5 days.\par \par Avoid liquid intake 2 hours before bedtime. It is recommended that you take sips of water to take your medications before bedtime. \par \par Elevate your legs throughout the day and 2 hours before bedtime. \par \par We recommend wearing compression stockings during the day to help decrease the night time urination.\par \par Please call the office if you decide you would like to try a bladder medication\par \par Follow up as needed

## 2020-12-13 LAB — BACTERIA UR CULT: NORMAL

## 2020-12-14 ENCOUNTER — RESULT REVIEW (OUTPATIENT)
Age: 80
End: 2020-12-14

## 2020-12-14 ENCOUNTER — OUTPATIENT (OUTPATIENT)
Dept: OUTPATIENT SERVICES | Facility: HOSPITAL | Age: 80
LOS: 1 days | Discharge: HOME | End: 2020-12-14
Payer: MEDICARE

## 2020-12-14 DIAGNOSIS — Z90.49 ACQUIRED ABSENCE OF OTHER SPECIFIED PARTS OF DIGESTIVE TRACT: Chronic | ICD-10-CM

## 2020-12-14 DIAGNOSIS — Z96.642 PRESENCE OF LEFT ARTIFICIAL HIP JOINT: Chronic | ICD-10-CM

## 2020-12-14 DIAGNOSIS — Z90.710 ACQUIRED ABSENCE OF BOTH CERVIX AND UTERUS: Chronic | ICD-10-CM

## 2020-12-14 DIAGNOSIS — Z96.652 PRESENCE OF LEFT ARTIFICIAL KNEE JOINT: Chronic | ICD-10-CM

## 2020-12-14 DIAGNOSIS — Z12.31 ENCOUNTER FOR SCREENING MAMMOGRAM FOR MALIGNANT NEOPLASM OF BREAST: ICD-10-CM

## 2020-12-14 DIAGNOSIS — Z96.641 PRESENCE OF RIGHT ARTIFICIAL HIP JOINT: Chronic | ICD-10-CM

## 2020-12-14 PROCEDURE — 77063 BREAST TOMOSYNTHESIS BI: CPT | Mod: 26

## 2020-12-14 PROCEDURE — 77067 SCR MAMMO BI INCL CAD: CPT | Mod: 26

## 2020-12-16 ENCOUNTER — APPOINTMENT (OUTPATIENT)
Dept: BREAST CENTER | Facility: CLINIC | Age: 80
End: 2020-12-16
Payer: MEDICARE

## 2020-12-16 VITALS
TEMPERATURE: 98.4 F | WEIGHT: 181 LBS | HEIGHT: 68 IN | BODY MASS INDEX: 27.43 KG/M2 | SYSTOLIC BLOOD PRESSURE: 130 MMHG | DIASTOLIC BLOOD PRESSURE: 74 MMHG

## 2020-12-16 PROCEDURE — 99213 OFFICE O/P EST LOW 20 MIN: CPT

## 2020-12-16 NOTE — HISTORY OF PRESENT ILLNESS
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  \par ER/AK (+), HER2 (-).\par h/o Left breast benign Ca++ on NLOC (Ferzli/[facility]).\par No FHx breast/ovarian cancer.  Sister had Hodgkin's lymphoma.\par s/p Right NLOC/SNB/MP 12/18/15 - 0/1 (-); 18 mm well diff IDC with lobular features, non ext DCIS low grade; (+) margins.  No LVI or perineural invasion.  Widespread LCIS classical, ALH, papilloma, ADH.  \par s/p Right re-exc 2/1/16 - widespread LCIS classical, no invasion identified; (-) margins.  \par s/p SIKP insertion - 02/18/16. completed PBI on 2/26/16.\par / Autumn - Anastrozole. \par \par GIAN MELISSA is a 80 year old female patient who presents today in follow up for history of right breast cancer.\par Since her last visit, she has no new breast related complaints. \par Imaging was done on 12/14/2020, which revealed no mammographic evidence of malignancy.\par \par She presents today for evaluation and imaging review.

## 2020-12-16 NOTE — ASSESSMENT
[FreeTextEntry1] : GIAN is a sue 80 year old patient who presented today in follow up for a history of right breast cancer.  She has been doing well with no new breast related complaints. \par Imaging was done recently which revealed no mammographic evidence of malignancy, as detailed above. \par Physical exam was unrevealing today.\par \par Imaging with a bilateral screening mammogram will be due in December 2021, and that will be scheduled today.  \par She will return for follow-up and clinical breast exam in December 2021. \par She will continue follow-up with medical oncology as well. \par \par I spent a total of 15 minutes of face to face time with this patient, greater than 50% of which was spent in counseling and/or coordination of care.\par All of her questions were appropriately answered.\par She knows to call with any concerns.

## 2020-12-16 NOTE — DATA REVIEWED
[FreeTextEntry1] : B/L Screening Mammo - 12/14/2020:\par MAMMOGRAM FINDINGS:\par Mammography was performed including the following views: bilateral craniocaudal with tomosynthesis, bilateral mediolateral oblique with tomosynthesis.  The examination includes digital synthetic 2D and digital tomosynthesis 3D images. Additional imaging analysis was performed using CAD (computer-aided detection) software.\par \par There are scattered areas of fibroglandular density.\par \par Finding 1:  There is an area of benign architectural distortion corresponding to the site of surgery seen in the right breast.  There is an associated stable seroma.\par \par Finding 2:  There is a stable area of benign architectural distortion corresponding to the site of surgery seen in the left breast.\par \par No suspicious mass, grouping of calcifications, or other abnormality is identified.\par \par There has been no significant interval change from prior studies.\par \par IMPRESSION:\par There is no mammographic evidence of malignancy.\par \par RECOMMENDATION:\par Unless otherwise indicated by clinical findings, annual screening mammography recommended.\par \par ASSESSMENT:\par BI-RADS Category 2:  Benign\par

## 2020-12-16 NOTE — PHYSICAL EXAM
[Normocephalic] : normocephalic [Atraumatic] : atraumatic [No Supraclavicular Adenopathy] : no supraclavicular adenopathy [No dominant masses] : no dominant masses left breast [No Nipple Discharge] : no left nipple discharge [No Rashes] : no rashes [No Ulceration] : no ulceration [Breast Nipple Inversion] : nipples not inverted [Breast Nipple Retraction] : nipples not retracted [de-identified] : well healed surgical scars.  [de-identified] : palpable dense scar tissue / seroma.  [de-identified] : No axillary lymphadenopathy appreciated. [de-identified] : No axillary lymphadenopathy appreciated.

## 2020-12-28 ENCOUNTER — APPOINTMENT (OUTPATIENT)
Dept: HEMATOLOGY ONCOLOGY | Facility: CLINIC | Age: 80
End: 2020-12-28
Payer: MEDICARE

## 2020-12-28 ENCOUNTER — OUTPATIENT (OUTPATIENT)
Dept: OUTPATIENT SERVICES | Facility: HOSPITAL | Age: 80
LOS: 1 days | Discharge: HOME | End: 2020-12-28

## 2020-12-28 VITALS
WEIGHT: 186 LBS | TEMPERATURE: 98.7 F | HEIGHT: 68 IN | BODY MASS INDEX: 28.19 KG/M2 | DIASTOLIC BLOOD PRESSURE: 83 MMHG | HEART RATE: 62 BPM | SYSTOLIC BLOOD PRESSURE: 148 MMHG

## 2020-12-28 DIAGNOSIS — Z96.642 PRESENCE OF LEFT ARTIFICIAL HIP JOINT: Chronic | ICD-10-CM

## 2020-12-28 DIAGNOSIS — Z96.652 PRESENCE OF LEFT ARTIFICIAL KNEE JOINT: Chronic | ICD-10-CM

## 2020-12-28 DIAGNOSIS — Z90.49 ACQUIRED ABSENCE OF OTHER SPECIFIED PARTS OF DIGESTIVE TRACT: Chronic | ICD-10-CM

## 2020-12-28 DIAGNOSIS — Z90.710 ACQUIRED ABSENCE OF BOTH CERVIX AND UTERUS: Chronic | ICD-10-CM

## 2020-12-28 DIAGNOSIS — Z96.641 PRESENCE OF RIGHT ARTIFICIAL HIP JOINT: Chronic | ICD-10-CM

## 2020-12-28 PROCEDURE — 99214 OFFICE O/P EST MOD 30 MIN: CPT

## 2021-01-02 NOTE — PHYSICAL EXAM
[Restricted in physically strenuous activity but ambulatory and able to carry out work of a light or sedentary nature] : Status 1- Restricted in physically strenuous activity but ambulatory and able to carry out work of a light or sedentary nature, e.g., light house work, office work [Normal] : affect appropriate [de-identified] : Right side status post lumpectomy.  There is a lump of induration at the surgical scar- unchanged.  There is no palpable mass and no palpable right axillary lymphadenopathy.  Left breast and left axilla are normal.

## 2021-01-02 NOTE — HISTORY OF PRESENT ILLNESS
[de-identified] : This is a  77-year-old white female is here today for a followup visit.  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  After surgery, she received partial breast radiotherapy.  Since 03/2016, she has been on adjuvant endocrine therapy initially with anastrozole, and then switched to exemestane.  But she switched back to anastrozole.  She still has body and joint aching.  She also has RA and has been on MTX.  She is no longer on Prednisone. She follows with Dr. Wiley regularly.  Last visit was in March 2018.\par \par On 05/04/2016, she had a bone density which showed osteopenia in the hip.  Her bone density in the spine and femoral neck are normal.  Compared to the report from previous year, her bone density has been stable.  Her mammogram was done 10/24/16 and showed post surgical change including a 3.8 cm seroma in the right breast, stable.  There was no evidence of malignancy.  Latest mammogram was done 12/11/17 which showed no mammographic evidence of malignancy. Stable postsurgical changes  of the right breast. \par  [de-identified] : In 6/2017, right breast dx mammo did not showed abnormal finding.\par \par On 5/4/2016, Bone  Density showed\par ----------------------------------------------------------------- \par Region                                      BMD        T-score    Z-score      Classification \par ----------------------------------------------------------------- \par AP  Spine  (L1,  L2,  L3)           1.059        0.4            2.8          Normal \par Femoral  Neck  (Left)                0.787      -0.6            1.6           Normal \par Total  Hip  (Left)                        0.740      -1.7            0.2          Osteopenia \par 1/3  Radius  (Left)                0.510      -3.1          -0.3          \par ----------------------------------------------------------------- \par \par She is scheduled for right knee replacement. She reports body aching. \par \par On 1029/18:\par The patient is here today for followup visit. She has been taking anastrozole daily and tolerated. She has stable chronic joint aching. In 7/2018, she had bone density which showed normal bone density in lumbar spine. Due to b/l hp replacement, bone density study was not able performed in the hip and femoral neck.\par She had right breast dx mammo and sonogram on 6/2018. There was no abnormal finding.\par \par 3/20/19:\par The patient is here for followup visit. She has been taking anastrozole daily and tolerated. She has stable chronic joint aching. In 7/2018, she had bone density which showed normal bone density in lumbar spine. Due to b/l hp replacement, bone density study was not able performed in the hip and femoral neck. In 12/2018, she had b/l dx mammo and there was no suspicious finding. She has left knee pain and will have replacement surgery in 4/2019.\par \par 9/18/19:\par The patient is here for followup visit. She has been taking anastrozole daily and tolerated. She has stable chronic joint aching. In 7/2018, she had bone density which showed normal bone density in lumbar spine. Due to b/l hp replacement, bone density study was not able performed in the hip and femoral neck. In 12/2018, she had b/l dx mammo and there was no suspicious finding. 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  She offers no new complaints. She has chronic joint pains.  She takes Anastrozole daily since 3/3016.  Last mammogram was done on 12/2019 which showed no evidence of malignancy.  Last bone density was normal on 7/2018.\par

## 2021-01-02 NOTE — ASSESSMENT
[FreeTextEntry1] : 1. 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  There is no evidence of disease.\par 2. Rheumatoid arthritis.\par \par RECOMMENDATION: \par -- Continue anastrozole, calcium and vitamin D supplements. Anastrozole was e-refilled.\par -- Bilateral screening mammo in 12/2020.\par -- Followup with PCP for health maintenance. \par -- Followup with Dr. Wiley for rheumatoid arthritis.\par -- RTO for followup in 6 months.\par \par Case was seen and discussed with Dr. Leyva who agreed with the assessment and plan.\par \par \par \par

## 2021-01-02 NOTE — HISTORY OF PRESENT ILLNESS
[de-identified] : This is a  77-year-old white female is here today for a followup visit.  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  After surgery, she received partial breast radiotherapy.  Since 03/2016, she has been on adjuvant endocrine therapy initially with anastrozole, and then switched to exemestane.  But she switched back to anastrozole.  She still has body and joint aching.  She also has RA and has been on MTX.  She is no longer on Prednisone. She follows with Dr. Wiley regularly.  Last visit was in March 2018.\par \par On 05/04/2016, she had a bone density which showed osteopenia in the hip.  Her bone density in the spine and femoral neck are normal.  Compared to the report from previous year, her bone density has been stable.  Her mammogram was done 10/24/16 and showed post surgical change including a 3.8 cm seroma in the right breast, stable.  There was no evidence of malignancy.  Latest mammogram was done 12/11/17 which showed no mammographic evidence of malignancy. Stable postsurgical changes  of the right breast. \par  [de-identified] : In 6/2017, right breast dx mammo did not showed abnormal finding.\par \par On 5/4/2016, Bone  Density showed\par ----------------------------------------------------------------- \par Region                                      BMD        T-score    Z-score      Classification \par ----------------------------------------------------------------- \par AP  Spine  (L1,  L2,  L3)           1.059        0.4            2.8          Normal \par Femoral  Neck  (Left)                0.787      -0.6            1.6           Normal \par Total  Hip  (Left)                        0.740      -1.7            0.2          Osteopenia \par 1/3  Radius  (Left)                0.510      -3.1          -0.3          \par ----------------------------------------------------------------- \par \par She is scheduled for right knee replacement. She reports body aching. \par \par On 1029/18:\par The patient is here today for followup visit. She has been taking anastrozole daily and tolerated. She has stable chronic joint aching. In 7/2018, she had bone density which showed normal bone density in lumbar spine. Due to b/l hp replacement, bone density study was not able performed in the hip and femoral neck.\par She had right breast dx mammo and sonogram on 6/2018. There was no abnormal finding.\par \par 3/20/19:\par The patient is here for followup visit. She has been taking anastrozole daily and tolerated. She has stable chronic joint aching. In 7/2018, she had bone density which showed normal bone density in lumbar spine. Due to b/l hp replacement, bone density study was not able performed in the hip and femoral neck. In 12/2018, she had b/l dx mammo and there was no suspicious finding. She has left knee pain and will have replacement surgery in 4/2019.\par \par 9/18/19:\par The patient is here for followup visit. She has been taking anastrozole daily and tolerated. She has stable chronic joint aching. In 7/2018, she had bone density which showed normal bone density in lumbar spine. Due to b/l hp replacement, bone density study was not able performed in the hip and femoral neck. In 12/2018, she had b/l dx mammo and there was no suspicious finding. 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  She offers no new complaints. She has chronic joint pains.  She takes Anastrozole daily since 3/3016.  Last mammogram was done on 12/2019 which showed no evidence of malignancy.  Last bone density was normal on 7/2018.\par

## 2021-01-04 DIAGNOSIS — C50.411 MALIGNANT NEOPLASM OF UPPER-OUTER QUADRANT OF RIGHT FEMALE BREAST: ICD-10-CM

## 2021-01-04 DIAGNOSIS — Z79.811 LONG TERM (CURRENT) USE OF AROMATASE INHIBITORS: ICD-10-CM

## 2021-11-01 ENCOUNTER — TRANSCRIPTION ENCOUNTER (OUTPATIENT)
Age: 81
End: 2021-11-01

## 2021-12-15 ENCOUNTER — RESULT REVIEW (OUTPATIENT)
Age: 81
End: 2021-12-15

## 2021-12-15 ENCOUNTER — OUTPATIENT (OUTPATIENT)
Dept: OUTPATIENT SERVICES | Facility: HOSPITAL | Age: 81
LOS: 1 days | Discharge: HOME | End: 2021-12-15
Payer: MEDICARE

## 2021-12-15 DIAGNOSIS — Z90.710 ACQUIRED ABSENCE OF BOTH CERVIX AND UTERUS: Chronic | ICD-10-CM

## 2021-12-15 DIAGNOSIS — Z12.31 ENCOUNTER FOR SCREENING MAMMOGRAM FOR MALIGNANT NEOPLASM OF BREAST: ICD-10-CM

## 2021-12-15 DIAGNOSIS — Z90.49 ACQUIRED ABSENCE OF OTHER SPECIFIED PARTS OF DIGESTIVE TRACT: Chronic | ICD-10-CM

## 2021-12-15 DIAGNOSIS — Z96.652 PRESENCE OF LEFT ARTIFICIAL KNEE JOINT: Chronic | ICD-10-CM

## 2021-12-15 DIAGNOSIS — Z96.641 PRESENCE OF RIGHT ARTIFICIAL HIP JOINT: Chronic | ICD-10-CM

## 2021-12-15 DIAGNOSIS — Z96.642 PRESENCE OF LEFT ARTIFICIAL HIP JOINT: Chronic | ICD-10-CM

## 2021-12-15 PROCEDURE — 77063 BREAST TOMOSYNTHESIS BI: CPT | Mod: 26

## 2021-12-15 PROCEDURE — 77067 SCR MAMMO BI INCL CAD: CPT | Mod: 26

## 2021-12-21 ENCOUNTER — APPOINTMENT (OUTPATIENT)
Dept: BREAST CENTER | Facility: CLINIC | Age: 81
End: 2021-12-21
Payer: MEDICARE

## 2021-12-21 VITALS
SYSTOLIC BLOOD PRESSURE: 165 MMHG | DIASTOLIC BLOOD PRESSURE: 73 MMHG | BODY MASS INDEX: 26.52 KG/M2 | TEMPERATURE: 97.5 F | HEIGHT: 68 IN | WEIGHT: 175 LBS

## 2021-12-21 PROCEDURE — 99213 OFFICE O/P EST LOW 20 MIN: CPT

## 2021-12-21 NOTE — DATA REVIEWED
[FreeTextEntry1] : EXAM:  MG MAMMO SCREEN W DOUGLAS BI#\par \par \par PROCEDURE DATE:  12/15/2021\par \par \par \par INTERPRETATION:  HISTORY:\par Bilateral MG MAMMO SCREEN W DOUGLAS BI# was performed. Patient is 81 years old and is seen for screening.  The patient has a history of right lumpectomy in December, 2015 - malignant and left excisional biopsy more than 10 years ago - benign. The patient has no family history of breast cancer.\par \par RISK ASSESSMENT:\par Tyrer-Leonardozick Lifetime Risk: 0.4\par \par CLINICAL BREAST EXAM:\par The patient reports her last clinical breast exam was performed over one year ago.\par \par COMPARISON STUDIES:\par The present examination has been compared to prior imaging studies performed at [facility] on 12/11/2018, 12/09/2019 and 12/14/2020.\par \par MAMMOGRAM FINDINGS:\par Mammography was performed including the following views: bilateral craniocaudal with tomosynthesis, bilateral mediolateral oblique with tomosynthesis, and bilateral craniocaudal exaggerated laterally.  The examination includes digital synthetic 2D and digital tomosynthesis 3D images. Additional imaging analysis was performed using CAD (computer-aided detection) software.\par \par There are scattered areas of fibroglandular density.\par \par Finding 1:  There is an area of architectural distortion at the site of lumpectomy seen in the right breast.\par \par Finding 2:  There is an area of benign architectural distortion corresponding to the site of surgery seen in the left breast.\par \par No suspicious mass, grouping of calcifications, or other abnormality is identified.\par \par IMPRESSION:\par There is no mammographic evidence of malignancy.\par \par RECOMMENDATION:\par Unless otherwise indicated by clinical findings, annual screening mammography recommended.\par \par ASSESSMENT:\par BI-RADS Category 2:  Benign\par \par

## 2021-12-21 NOTE — REASON FOR VISIT
[Follow-Up: _____] : a [unfilled] follow-up visit [FreeTextEntry1] : h/o Stage IA right breast ca; imaging review.

## 2021-12-21 NOTE — HISTORY OF PRESENT ILLNESS
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  \par ER/TN (+), HER2 (-).\par h/o Left breast benign Ca++ on NLOC (Ferzli/[facility]).\par No FHx breast/ovarian cancer.  Sister had Hodgkin's lymphoma.\par s/p Right NLOC/SNB/MP 12/18/15 - 0/1 (-); 18 mm well diff IDC with lobular features, non ext DCIS low grade; (+) margins.  No LVI or perineural invasion.  Widespread LCIS classical, ALH, papilloma, ADH.  \par s/p Right re-exc 2/1/16 - widespread LCIS classical, no invasion identified; (-) margins.  \par s/p SKIP insertion - 02/18/16. completed PBI on 2/26/16.\par / Autumn - Anastrozole - completed March 2021.\par \par GIAN MELISSA is a 81 year old female patient who presents today in follow up for stage IA right breast cancer.\par Since her last visit, she has no new breast related complaints. \par Imaging with a bilateral screening mammogram was done on 12/15/2021, which revealed there are scattered areas of fibroglandular density. There is an area of architectural distortion at the site of lumpectomy seen in the right breast. There is an area of benign architectural distortion corresponding to the site of surgery seen in the left breast. There is no mammographic evidence of malignancy.\par BI-RADS Category 2:  Benign\par \par She presents today for evaluation and imaging review.\par \par

## 2021-12-21 NOTE — ASSESSMENT
[FreeTextEntry1] : GIAN is a sue 81 year old patient who presented today in follow up for a history of Stage IA right breast cancer.  \par She has been doing well with no new breast related complaints. \par Imaging with a bilateral screening mammogram was done on 12/15/2021, which revealed there are scattered areas of fibroglandular density. There is an area of architectural distortion at the site of lumpectomy seen in the right breast. There is an area of benign architectural distortion corresponding to the site of surgery seen in the left breast. There is no mammographic evidence of malignancy.\par BI-RADS Category 2:  Benign, as detailed above. \par Physical exam was unrevealing today.\par \par Imaging with a bilateral screening mammogram will be due in December 2022, and that will be scheduled today.  \par She will return for follow-up and clinical breast exam in December 2022. \par She is appropriate for the survivorship program. \par She will continue follow-up with medical oncology as well. \par \par I spent a total of 20 minutes of face to face time with this patient, greater than 50% of which was spent in counseling and/or coordination of care.\par All of her questions were appropriately answered.\par She knows to call with any concerns.

## 2021-12-21 NOTE — PHYSICAL EXAM
[Normocephalic] : normocephalic [Atraumatic] : atraumatic [No Supraclavicular Adenopathy] : no supraclavicular adenopathy [No dominant masses] : no dominant masses left breast [No Nipple Discharge] : no left nipple discharge [No Rashes] : no rashes [No Ulceration] : no ulceration [Breast Nipple Inversion] : nipples not inverted [Breast Nipple Retraction] : nipples not retracted [de-identified] : well healed surgical scars.  [de-identified] : palpable dense scar tissue / seroma.  [de-identified] : No axillary lymphadenopathy appreciated. [de-identified] : No axillary lymphadenopathy appreciated.

## 2021-12-30 ENCOUNTER — APPOINTMENT (OUTPATIENT)
Dept: HEMATOLOGY ONCOLOGY | Facility: CLINIC | Age: 81
End: 2021-12-30
Payer: MEDICARE

## 2021-12-30 ENCOUNTER — OUTPATIENT (OUTPATIENT)
Dept: OUTPATIENT SERVICES | Facility: HOSPITAL | Age: 81
LOS: 1 days | Discharge: HOME | End: 2021-12-30

## 2021-12-30 VITALS
HEIGHT: 68 IN | TEMPERATURE: 98.6 F | DIASTOLIC BLOOD PRESSURE: 78 MMHG | RESPIRATION RATE: 16 BRPM | WEIGHT: 175 LBS | SYSTOLIC BLOOD PRESSURE: 152 MMHG | BODY MASS INDEX: 26.52 KG/M2 | HEART RATE: 70 BPM

## 2021-12-30 DIAGNOSIS — Z96.642 PRESENCE OF LEFT ARTIFICIAL HIP JOINT: Chronic | ICD-10-CM

## 2021-12-30 DIAGNOSIS — Z96.641 PRESENCE OF RIGHT ARTIFICIAL HIP JOINT: Chronic | ICD-10-CM

## 2021-12-30 DIAGNOSIS — Z90.710 ACQUIRED ABSENCE OF BOTH CERVIX AND UTERUS: Chronic | ICD-10-CM

## 2021-12-30 DIAGNOSIS — Z96.652 PRESENCE OF LEFT ARTIFICIAL KNEE JOINT: Chronic | ICD-10-CM

## 2021-12-30 DIAGNOSIS — Z90.49 ACQUIRED ABSENCE OF OTHER SPECIFIED PARTS OF DIGESTIVE TRACT: Chronic | ICD-10-CM

## 2021-12-30 PROCEDURE — 99213 OFFICE O/P EST LOW 20 MIN: CPT

## 2021-12-30 RX ORDER — AMOXICILLIN AND CLAVULANATE POTASSIUM 500; 125 MG/1; MG/1
500-125 TABLET, FILM COATED ORAL
Refills: 0 | Status: DISCONTINUED | COMMUNITY
End: 2021-12-30

## 2021-12-30 RX ORDER — ANASTROZOLE TABLETS 1 MG/1
1 TABLET ORAL
Qty: 90 | Refills: 1 | Status: DISCONTINUED | COMMUNITY
Start: 2017-03-13 | End: 2021-12-30

## 2021-12-31 NOTE — ASSESSMENT
[FreeTextEntry1] : 1. 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 There is no evidence of disease.\par 2. Rheumatoid arthritis.\par \par RECOMMENDATION: \par -- Breast exam today did not reveal palpable abnormality.  Bilateral screening mammo in 12/2021 reviewed, next due 12/2022, script provided.\par -- Continue calcium and vitamin D supplements. Bone density due 9/2022, script provided. \par -- Followup with PCP for health maintenance. \par -- Followup with Dr. Wiley for rheumatoid arthritis.\par -- RTO for followup in 1 year\par \par Case was seen and discussed with Dr. Leyva who agreed with the assessment and plan.\par \par \par \par

## 2021-12-31 NOTE — HISTORY OF PRESENT ILLNESS
[de-identified] : This is a  77-year-old white female is here today for a followup visit.  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  After surgery, she received partial breast radiotherapy.  Since 03/2016, she has been on adjuvant endocrine therapy initially with anastrozole, and then switched to exemestane.  But she switched back to anastrozole.  She still has body and joint aching.  She also has RA and has been on MTX.  She is no longer on Prednisone. She follows with Dr. Wiley regularly.  Last visit was in March 2018.\par \par On 05/04/2016, she had a bone density which showed osteopenia in the hip.  Her bone density in the spine and femoral neck are normal.  Compared to the report from previous year, her bone density has been stable.  Her mammogram was done 10/24/16 and showed post surgical change including a 3.8 cm seroma in the right breast, stable.  There was no evidence of malignancy.  Latest mammogram was done 12/11/17 which showed no mammographic evidence of malignancy. Stable postsurgical changes  of the right breast. \par  [de-identified] : In 6/2017, right breast dx mammo did not showed abnormal finding.\par \par On 5/4/2016, Bone  Density showed\par ----------------------------------------------------------------- \par Region                                      BMD        T-score    Z-score      Classification \par ----------------------------------------------------------------- \par AP  Spine  (L1,  L2,  L3)           1.059        0.4            2.8          Normal \par Femoral  Neck  (Left)                0.787      -0.6            1.6           Normal \par Total  Hip  (Left)                        0.740      -1.7            0.2          Osteopenia \par 1/3  Radius  (Left)                0.510      -3.1          -0.3          \par ----------------------------------------------------------------- \par \par She is scheduled for right knee replacement. She reports body aching. \par \par On 1029/18:\par The patient is here today for followup visit. She has been taking anastrozole daily and tolerated. She has stable chronic joint aching. In 7/2018, she had bone density which showed normal bone density in lumbar spine. Due to b/l hp replacement, bone density study was not able performed in the hip and femoral neck.\par She had right breast dx mammo and sonogram on 6/2018. There was no abnormal finding.\par \par 3/20/19:\par The patient is here for followup visit. She has been taking anastrozole daily and tolerated. She has stable chronic joint aching. In 7/2018, she had bone density which showed normal bone density in lumbar spine. Due to b/l hp replacement, bone density study was not able performed in the hip and femoral neck. In 12/2018, she had b/l dx mammo and there was no suspicious finding. She has left knee pain and will have replacement surgery in 4/2019.\par \par 9/18/19:\par The patient is here for followup visit. She has been taking anastrozole daily and tolerated. She has stable chronic joint aching. In 7/2018, she had bone density which showed normal bone density in lumbar spine. Due to b/l hp replacement, bone density study was not able performed in the hip and femoral neck. In 12/2018, she had b/l dx mammo and there was no suspicious finding. 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  She offers no new complaints. She has chronic joint pains.  She takes Anastrozole daily since 3/3016.  Last mammogram was done on 12/2019 which showed no evidence of malignancy.  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  Patient denies any new palpable breast lumps or pain, denies skin changes, denies nipple discharge. She offers no complaints.  She completed 5 years of  Anastrozole in 3/2021.  Last mammogram was done on 12/2021 which showed no evidence of malignancy.  Last bone density was normal on 9/2022.\par \par \par \par

## 2021-12-31 NOTE — PHYSICAL EXAM
[Restricted in physically strenuous activity but ambulatory and able to carry out work of a light or sedentary nature] : Status 1- Restricted in physically strenuous activity but ambulatory and able to carry out work of a light or sedentary nature, e.g., light house work, office work [Normal] : affect appropriate [de-identified] : Right side status post lumpectomy.  There is a lump of induration at the surgical scar- unchanged.  There is no palpable mass and no palpable right axillary lymphadenopathy.  Left breast and left axilla are normal.

## 2022-01-11 NOTE — REVIEW OF SYSTEMS
[Arthralgia] : arthralgia [Joint Pain] : joint pain [Joint Stiffness] : joint stiffness [Negative] : Heme/Lymph [FreeTextEntry9] : Right knee Infliximab Pregnancy And Lactation Text: This medication is Pregnancy Category B and is considered safe during pregnancy. It is unknown if this medication is excreted in breast milk.

## 2022-02-28 DIAGNOSIS — C50.411 MALIGNANT NEOPLASM OF UPPER-OUTER QUADRANT OF RIGHT FEMALE BREAST: ICD-10-CM

## 2022-05-10 NOTE — OCCUPATIONAL THERAPY INITIAL EVALUATION ADULT - DIAGNOSIS, OT EVAL
Head, normocephalic, atraumatic, Face, Face within normal limits, Ears, External ears within normal limits, Nose/Nasopharynx, External nose  normal appearance, nares patent, no nasal discharge, Mouth and Throat, Oral cavity appearance normal, Breath odor normal, Lips, Appearance normal
COLTON FARMER

## 2022-06-23 ENCOUNTER — OUTPATIENT (OUTPATIENT)
Dept: OUTPATIENT SERVICES | Facility: HOSPITAL | Age: 82
LOS: 1 days | Discharge: HOME | End: 2022-06-23

## 2022-06-23 ENCOUNTER — APPOINTMENT (OUTPATIENT)
Dept: PODIATRY | Facility: CLINIC | Age: 82
End: 2022-06-23
Payer: MEDICARE

## 2022-06-23 VITALS
HEART RATE: 71 BPM | WEIGHT: 175 LBS | HEIGHT: 68 IN | DIASTOLIC BLOOD PRESSURE: 80 MMHG | BODY MASS INDEX: 26.52 KG/M2 | SYSTOLIC BLOOD PRESSURE: 173 MMHG

## 2022-06-23 DIAGNOSIS — Z96.642 PRESENCE OF LEFT ARTIFICIAL HIP JOINT: Chronic | ICD-10-CM

## 2022-06-23 DIAGNOSIS — M77.51 OTHER ENTHESOPATHY OF RT FOOT AND ANKLE: ICD-10-CM

## 2022-06-23 DIAGNOSIS — Z96.641 PRESENCE OF RIGHT ARTIFICIAL HIP JOINT: Chronic | ICD-10-CM

## 2022-06-23 DIAGNOSIS — L84 CORNS AND CALLOSITIES: ICD-10-CM

## 2022-06-23 DIAGNOSIS — G89.29 PAIN IN RIGHT FOOT: ICD-10-CM

## 2022-06-23 DIAGNOSIS — Z90.710 ACQUIRED ABSENCE OF BOTH CERVIX AND UTERUS: Chronic | ICD-10-CM

## 2022-06-23 DIAGNOSIS — M77.41 METATARSALGIA, RIGHT FOOT: ICD-10-CM

## 2022-06-23 DIAGNOSIS — Z90.49 ACQUIRED ABSENCE OF OTHER SPECIFIED PARTS OF DIGESTIVE TRACT: Chronic | ICD-10-CM

## 2022-06-23 DIAGNOSIS — M79.671 PAIN IN RIGHT FOOT: ICD-10-CM

## 2022-06-23 DIAGNOSIS — Z96.652 PRESENCE OF LEFT ARTIFICIAL KNEE JOINT: Chronic | ICD-10-CM

## 2022-06-23 PROCEDURE — 99203 OFFICE O/P NEW LOW 30 MIN: CPT

## 2022-06-24 PROBLEM — M79.671 CHRONIC FOOT PAIN, RIGHT: Status: ACTIVE | Noted: 2022-06-24

## 2022-06-24 PROBLEM — M77.51 CAPSULITIS OF METATARSOPHALANGEAL (MTP) JOINT OF RIGHT FOOT: Status: ACTIVE | Noted: 2022-06-24

## 2022-06-24 PROBLEM — L84 CALLUS: Status: ACTIVE | Noted: 2022-06-24

## 2022-06-24 PROBLEM — M77.41 METATARSALGIA OF RIGHT FOOT: Status: ACTIVE | Noted: 2022-06-24

## 2022-06-24 NOTE — ASSESSMENT
[FreeTextEntry1] : -recommended rocker bottom shoes and metatarsal pads\par -recommended OTC urea 40%\par -extensive/eduction discussion with the use of foot model regarding etiology of presenting problem and surgical options. \par -pt wishes to hold off on surgery for now. Wishes to hold off on xrays\par -will call for appointment if she decided to have surgery

## 2022-06-24 NOTE — HISTORY OF PRESENT ILLNESS
[FreeTextEntry1] : 80 y/o female with significant h/o of RA here today for evaluation of painful callus with underlying wound on the plantar forefoot. Patient has gone to other podiatrist who have been treating her with debridement, however callus reforms

## 2022-06-24 NOTE — PHYSICAL EXAM
[General Appearance - Alert] : alert [General Appearance - In No Acute Distress] : in no acute distress [de-identified] : Right 2nd hammer toe deformity with extension contraction deformity at the 2nd MPJ hammer toe deformity on the remaining lesser digits, along with a right bunion  [FreeTextEntry1] : superficial ulceration submetatarsal head 2, left foot w/ gross hyperkeratotic boarders. no acute signs of infection\par \par

## 2022-06-29 DIAGNOSIS — M77.51 OTHER ENTHESOPATHY OF RIGHT FOOT AND ANKLE: ICD-10-CM

## 2022-06-29 DIAGNOSIS — L84 CORNS AND CALLOSITIES: ICD-10-CM

## 2022-06-29 DIAGNOSIS — M77.41 METATARSALGIA, RIGHT FOOT: ICD-10-CM

## 2022-07-27 ENCOUNTER — OUTPATIENT (OUTPATIENT)
Dept: OUTPATIENT SERVICES | Facility: HOSPITAL | Age: 82
LOS: 1 days | Discharge: HOME | End: 2022-07-27

## 2022-07-27 VITALS
TEMPERATURE: 98 F | OXYGEN SATURATION: 97 % | SYSTOLIC BLOOD PRESSURE: 110 MMHG | HEIGHT: 68 IN | HEART RATE: 68 BPM | RESPIRATION RATE: 16 BRPM | DIASTOLIC BLOOD PRESSURE: 70 MMHG | WEIGHT: 181 LBS

## 2022-07-27 DIAGNOSIS — M20.41 OTHER HAMMER TOE(S) (ACQUIRED), RIGHT FOOT: ICD-10-CM

## 2022-07-27 DIAGNOSIS — Z98.890 OTHER SPECIFIED POSTPROCEDURAL STATES: Chronic | ICD-10-CM

## 2022-07-27 DIAGNOSIS — Z96.642 PRESENCE OF LEFT ARTIFICIAL HIP JOINT: Chronic | ICD-10-CM

## 2022-07-27 DIAGNOSIS — Z96.652 PRESENCE OF LEFT ARTIFICIAL KNEE JOINT: Chronic | ICD-10-CM

## 2022-07-27 DIAGNOSIS — Z90.710 ACQUIRED ABSENCE OF BOTH CERVIX AND UTERUS: Chronic | ICD-10-CM

## 2022-07-27 DIAGNOSIS — Z01.818 ENCOUNTER FOR OTHER PREPROCEDURAL EXAMINATION: ICD-10-CM

## 2022-07-27 DIAGNOSIS — Z96.641 PRESENCE OF RIGHT ARTIFICIAL HIP JOINT: Chronic | ICD-10-CM

## 2022-07-27 DIAGNOSIS — Z90.49 ACQUIRED ABSENCE OF OTHER SPECIFIED PARTS OF DIGESTIVE TRACT: Chronic | ICD-10-CM

## 2022-07-27 LAB
ALBUMIN SERPL ELPH-MCNC: 4.4 G/DL — SIGNIFICANT CHANGE UP (ref 3.5–5.2)
ALP SERPL-CCNC: 88 U/L — SIGNIFICANT CHANGE UP (ref 30–115)
ALT FLD-CCNC: 34 U/L — SIGNIFICANT CHANGE UP (ref 0–41)
ANION GAP SERPL CALC-SCNC: 12 MMOL/L — SIGNIFICANT CHANGE UP (ref 7–14)
APTT BLD: 27.9 SEC — SIGNIFICANT CHANGE UP (ref 27–39.2)
AST SERPL-CCNC: 41 U/L — SIGNIFICANT CHANGE UP (ref 0–41)
BASOPHILS # BLD AUTO: 0.03 K/UL — SIGNIFICANT CHANGE UP (ref 0–0.2)
BASOPHILS NFR BLD AUTO: 0.6 % — SIGNIFICANT CHANGE UP (ref 0–1)
BILIRUB SERPL-MCNC: 0.6 MG/DL — SIGNIFICANT CHANGE UP (ref 0.2–1.2)
BUN SERPL-MCNC: 25 MG/DL — HIGH (ref 10–20)
CALCIUM SERPL-MCNC: 9.6 MG/DL — SIGNIFICANT CHANGE UP (ref 8.5–10.1)
CHLORIDE SERPL-SCNC: 100 MMOL/L — SIGNIFICANT CHANGE UP (ref 98–110)
CO2 SERPL-SCNC: 28 MMOL/L — SIGNIFICANT CHANGE UP (ref 17–32)
CREAT SERPL-MCNC: 0.9 MG/DL — SIGNIFICANT CHANGE UP (ref 0.7–1.5)
EGFR: 64 ML/MIN/1.73M2 — SIGNIFICANT CHANGE UP
EOSINOPHIL # BLD AUTO: 0.16 K/UL — SIGNIFICANT CHANGE UP (ref 0–0.7)
EOSINOPHIL NFR BLD AUTO: 3.2 % — SIGNIFICANT CHANGE UP (ref 0–8)
GLUCOSE SERPL-MCNC: 108 MG/DL — HIGH (ref 70–99)
HCT VFR BLD CALC: 38 % — SIGNIFICANT CHANGE UP (ref 37–47)
HGB BLD-MCNC: 12.7 G/DL — SIGNIFICANT CHANGE UP (ref 12–16)
IMM GRANULOCYTES NFR BLD AUTO: 0.2 % — SIGNIFICANT CHANGE UP (ref 0.1–0.3)
INR BLD: 1.04 RATIO — SIGNIFICANT CHANGE UP (ref 0.65–1.3)
LYMPHOCYTES # BLD AUTO: 1.41 K/UL — SIGNIFICANT CHANGE UP (ref 1.2–3.4)
LYMPHOCYTES # BLD AUTO: 28.1 % — SIGNIFICANT CHANGE UP (ref 20.5–51.1)
MCHC RBC-ENTMCNC: 31 PG — SIGNIFICANT CHANGE UP (ref 27–31)
MCHC RBC-ENTMCNC: 33.4 G/DL — SIGNIFICANT CHANGE UP (ref 32–37)
MCV RBC AUTO: 92.7 FL — SIGNIFICANT CHANGE UP (ref 81–99)
MONOCYTES # BLD AUTO: 0.59 K/UL — SIGNIFICANT CHANGE UP (ref 0.1–0.6)
MONOCYTES NFR BLD AUTO: 11.8 % — HIGH (ref 1.7–9.3)
NEUTROPHILS # BLD AUTO: 2.82 K/UL — SIGNIFICANT CHANGE UP (ref 1.4–6.5)
NEUTROPHILS NFR BLD AUTO: 56.1 % — SIGNIFICANT CHANGE UP (ref 42.2–75.2)
NRBC # BLD: 0 /100 WBCS — SIGNIFICANT CHANGE UP (ref 0–0)
PLATELET # BLD AUTO: 196 K/UL — SIGNIFICANT CHANGE UP (ref 130–400)
POTASSIUM SERPL-MCNC: 3.7 MMOL/L — SIGNIFICANT CHANGE UP (ref 3.5–5)
POTASSIUM SERPL-SCNC: 3.7 MMOL/L — SIGNIFICANT CHANGE UP (ref 3.5–5)
PROT SERPL-MCNC: 6.8 G/DL — SIGNIFICANT CHANGE UP (ref 6–8)
PROTHROM AB SERPL-ACNC: 12 SEC — SIGNIFICANT CHANGE UP (ref 9.95–12.87)
RBC # BLD: 4.1 M/UL — LOW (ref 4.2–5.4)
RBC # FLD: 14.7 % — HIGH (ref 11.5–14.5)
SODIUM SERPL-SCNC: 140 MMOL/L — SIGNIFICANT CHANGE UP (ref 135–146)
WBC # BLD: 5.02 K/UL — SIGNIFICANT CHANGE UP (ref 4.8–10.8)
WBC # FLD AUTO: 5.02 K/UL — SIGNIFICANT CHANGE UP (ref 4.8–10.8)

## 2022-07-27 PROCEDURE — 93010 ELECTROCARDIOGRAM REPORT: CPT

## 2022-07-27 RX ORDER — SERTRALINE 25 MG/1
1 TABLET, FILM COATED ORAL
Qty: 0 | Refills: 0 | DISCHARGE

## 2022-07-27 RX ORDER — METOPROLOL TARTRATE 50 MG
1 TABLET ORAL
Qty: 0 | Refills: 0 | DISCHARGE

## 2022-07-27 RX ORDER — CELECOXIB 200 MG/1
1 CAPSULE ORAL
Qty: 0 | Refills: 0 | DISCHARGE

## 2022-07-27 RX ORDER — RANITIDINE HYDROCHLORIDE 150 MG/1
1 TABLET, FILM COATED ORAL
Qty: 0 | Refills: 0 | DISCHARGE

## 2022-07-27 RX ORDER — CEFPODOXIME PROXETIL 100 MG
1 TABLET ORAL
Qty: 0 | Refills: 0 | DISCHARGE

## 2022-07-27 NOTE — H&P PST ADULT - NSICDXPASTSURGICALHX_GEN_ALL_CORE_FT
PAST SURGICAL HISTORY:  H/O vaginal hysterectomy     History of right hip replacement     S/P total knee replacement, left     Status post cholecystectomy     Status post left hip replacement      PAST SURGICAL HISTORY:  H/O vaginal hysterectomy     History of right hip replacement     S/P lumpectomy of breast 2015    S/P total knee replacement, left     Status post cholecystectomy     Status post left hip replacement

## 2022-07-27 NOTE — H&P PST ADULT - HISTORY OF PRESENT ILLNESS
82 Y/O FEMALE PRESENTS  TO PAST WITH C/O HAMMER TOES 2ND AND 3RT DIGITS RIGHT FOOT. PT C/O PAIN, SHARP AND DEFORMITY FOR 2 YRS, INCREASING IN SEVERITY.               PT NOW FOR SCHEDULED PROCEDURE ( ARTHROPLASTY 2ND AND 3RD DIGITS, RIGHT FOOT) . PT DENIES ANY CP SOB PALP COUGH DYSURIA FEVER URI. PT ABLE TO ANU 1 BLOCK W/O SOB, LIMITED SECONDARY TO PAIN  pt denies any covid s/s, or tested positive in the past  pt advised self quarantine till day of procedure  Anesthesia Alert  NO--Difficult Airway  NO--History of neck surgery or radiation  NO--Limited ROM of neck  NO--History of Malignant hyperthermia  NO--Personal or family history of Pseudocholinesterase deficiency.  NO--Prior Anesthesia Complication  NO--Latex Allergy  NO--Loose teeth  NO--History of Rheumatoid Arthritis  NO--MACRINA  NO--Bleeding risk  NO--Other_____     82 Y/O FEMALE PRESENTS  TO PAST WITH C/O HAMMER TOES 2ND AND 3RT DIGITS RIGHT FOOT. PT C/O PAIN, SHARP AND DEFORMITY FOR 2 YRS, INCREASING IN SEVERITY.               PT NOW FOR SCHEDULED PROCEDURE ( ARTHROPLASTY 2ND AND 3RD DIGITS, RIGHT FOOT) . PT DENIES ANY CP SOB PALP COUGH DYSURIA FEVER URI. PT ABLE TO ANU 1-2 BLOCKS, 1-2 FOS W/O SOB, LIMITED SECONDARY TO PAIN  pt denies any covid s/s, or tested positive in the past  pt advised self quarantine till day of procedure  Anesthesia Alert  + RA- FULL ROM NECK  NO--Difficult Airway  NO--History of neck surgery or radiation  NO--Limited ROM of neck  NO--History of Malignant hyperthermia  NO--Personal or family history of Pseudocholinesterase deficiency.  NO--Prior Anesthesia Complication  NO--Latex Allergy  NO--Loose teeth  NO--MACRINA  NO--Bleeding risk  NO--Other_____

## 2022-07-27 NOTE — H&P PST ADULT - GASTROINTESTINAL
Date of Service: 01/06/2022    PREOPERATIVE DIAGNOSIS:  14 mm right ureteropelvic junction calculus.    POSTOPERATIVE DIAGNOSIS:  14 mm right ureteropelvic junction calculus.    PROCEDURE PERFORMED:  Cystoscopy with right ureteral stent placement.    SURGEON:  Phillip Kimbrough MD.    ASSISTANT(S):   None.    COMPLICATIONS:  None.    SPECIMENS:  None.    DRAINS:  Right ureteral stent with no pull string.    INTRAVENOUS FLUIDS:  Crystalloid.    ANESTHESIA:  MAC.    CONDITION OF PATIENT FOLLOWING PROCEDURE:  Awake and in no distress on transfer to the PACU.    ESTIMATED BLOOD LOSS:  Minimal.    PROCEDURE NOTE:  After obtaining informed consent, the patient was brought to the operative suite, where he was prepped and draped in the usual sterile fashion in the dorsal lithotomy position on the operating table after successful induction of anesthesia and periprocedural antibiotics.    The 22-[place] rigid cystoscope was inserted into the patient's bladder atraumatically under direct vision.  The bladder was entered and systematically, circumferentially surveyed with no evidence of urothelial lesion or stone.  The right ureteral orifice was identified and cannulated with a 0.035 flexible tip wire, which was advanced proximally under fluoroscopic guidance and delivered into the right renal collecting system.  The stone could be identified clearly on fluoroscopy in the proximal ureter.  With the wire in place, a 6x26 ureteral stent was advanced in a coaxial fashion over the wire through the cystoscope and into the right ureteral orifice.  The pusher was used to drive the stent proximally and when the lower portion of the stent was visible at the bladder neck, the wire was pulled, and the lower curl snapped into position within the bladder.  The upper curl was confirmed in the right renal collecting system.  The patient's bladder was drained through the cystoscope and the cystoscope was removed to conclude the procedure.  The  patient was then allowed to awaken from anesthesia, transferred to a cart, and transferred to the PACU in no acute or obvious distress.  He tolerated the entire procedure as described above with no obvious intraprocedure or postprocedure complications.      The patient received periprocedural antibiotics.        Dictated By: Phillip Kimbrough MD  Signing Provider: Phillip Kimbrough MD MM/armen ([id number])  DD: 01/06/2022 17:44:00 TD: 01/06/2022 17:52:33    Copy Sent To:    normal/soft/nontender/nondistended/normal active bowel sounds

## 2022-08-10 NOTE — ASU PATIENT PROFILE, ADULT - FALL HARM RISK - UNIVERSAL INTERVENTIONS
Bed in lowest position, wheels locked, appropriate side rails in place/Call bell, personal items and telephone in reach/Instruct patient to call for assistance before getting out of bed or chair/Non-slip footwear when patient is out of bed/[place] to call system/Physically safe environment - no spills, clutter or unnecessary equipment/Purposeful Proactive Rounding/Room/bathroom lighting operational, light cord in reach

## 2022-08-10 NOTE — ASU PATIENT PROFILE, ADULT - NSICDXPASTSURGICALHX_GEN_ALL_CORE_FT
PAST SURGICAL HISTORY:  H/O vaginal hysterectomy     History of right hip replacement     S/P lumpectomy of breast 2015    S/P total knee replacement, left     Status post cholecystectomy     Status post left hip replacement

## 2022-08-11 ENCOUNTER — OUTPATIENT (OUTPATIENT)
Dept: OUTPATIENT SERVICES | Facility: HOSPITAL | Age: 82
LOS: 1 days | Discharge: HOME | End: 2022-08-11

## 2022-08-11 ENCOUNTER — TRANSCRIPTION ENCOUNTER (OUTPATIENT)
Age: 82
End: 2022-08-11

## 2022-08-11 ENCOUNTER — RESULT REVIEW (OUTPATIENT)
Age: 82
End: 2022-08-11

## 2022-08-11 VITALS
HEIGHT: 68 IN | TEMPERATURE: 99 F | SYSTOLIC BLOOD PRESSURE: 179 MMHG | HEART RATE: 72 BPM | DIASTOLIC BLOOD PRESSURE: 78 MMHG | WEIGHT: 181 LBS | OXYGEN SATURATION: 98 % | RESPIRATION RATE: 18 BRPM

## 2022-08-11 VITALS
RESPIRATION RATE: 18 BRPM | SYSTOLIC BLOOD PRESSURE: 165 MMHG | DIASTOLIC BLOOD PRESSURE: 75 MMHG | OXYGEN SATURATION: 98 % | HEART RATE: 64 BPM

## 2022-08-11 DIAGNOSIS — Z96.652 PRESENCE OF LEFT ARTIFICIAL KNEE JOINT: Chronic | ICD-10-CM

## 2022-08-11 DIAGNOSIS — Z96.642 PRESENCE OF LEFT ARTIFICIAL HIP JOINT: Chronic | ICD-10-CM

## 2022-08-11 DIAGNOSIS — Z96.641 PRESENCE OF RIGHT ARTIFICIAL HIP JOINT: Chronic | ICD-10-CM

## 2022-08-11 DIAGNOSIS — Z98.890 OTHER SPECIFIED POSTPROCEDURAL STATES: Chronic | ICD-10-CM

## 2022-08-11 DIAGNOSIS — Z90.49 ACQUIRED ABSENCE OF OTHER SPECIFIED PARTS OF DIGESTIVE TRACT: Chronic | ICD-10-CM

## 2022-08-11 DIAGNOSIS — Z90.710 ACQUIRED ABSENCE OF BOTH CERVIX AND UTERUS: Chronic | ICD-10-CM

## 2022-08-11 PROCEDURE — 88304 TISSUE EXAM BY PATHOLOGIST: CPT | Mod: 26

## 2022-08-11 PROCEDURE — 88311 DECALCIFY TISSUE: CPT | Mod: 26

## 2022-08-11 RX ORDER — ONDANSETRON 8 MG/1
4 TABLET, FILM COATED ORAL ONCE
Refills: 0 | Status: DISCONTINUED | OUTPATIENT
Start: 2022-08-11 | End: 2022-08-25

## 2022-08-11 RX ORDER — SODIUM CHLORIDE 9 MG/ML
1000 INJECTION, SOLUTION INTRAVENOUS
Refills: 0 | Status: DISCONTINUED | OUTPATIENT
Start: 2022-08-11 | End: 2022-08-25

## 2022-08-11 RX ORDER — HYDROMORPHONE HYDROCHLORIDE 2 MG/ML
0.5 INJECTION INTRAMUSCULAR; INTRAVENOUS; SUBCUTANEOUS
Refills: 0 | Status: DISCONTINUED | OUTPATIENT
Start: 2022-08-11 | End: 2022-08-11

## 2022-08-11 RX ADMIN — SODIUM CHLORIDE 100 MILLILITER(S): 9 INJECTION, SOLUTION INTRAVENOUS at 15:07

## 2022-08-11 NOTE — ASU PREOP CHECKLIST - TEMPERATURE IN FAHRENHEIT (DEGREES F)
HPI:    67yo male pt with PMHx of HLD, Kidney stonex2 (last one was 7years ago) presents to ED with right flank pain and diagnosed right mid ureter stone (5mm) at an outside hospital 2 days ago.  He was sent home on Percocet and Ibuprofen but his pain was not controlled. He spoked to his urologist Dr. Hoenig and recommended to go to ED. He noticed radiating pain to lower abdomen, nausea and chills with severe pain, pain was 5/10 on arrival to the ER and he feels it is worsening.  Denies fever, cough, congestion or sore throat. Denies CP/SOB. Denies hematuria, dysuria, urgency or frequency.          PAST MEDICAL & SURGICAL HISTORY:  Renal stone    Back pain    Dyslipidemia    S/P foot surgery    S/P spinal fusion      MEDICATIONS  (STANDING):  atorvastatin 40 milliGRAM(s) Oral at bedtime  ketorolac   Injectable 15 milliGRAM(s) IV Push every 8 hours  ondansetron Injectable 4 milliGRAM(s) IV Push once  polyethylene glycol 3350 17 Gram(s) Oral daily  senna 2 Tablet(s) Oral at bedtime  sodium chloride 0.9% lock flush 3 milliLiter(s) IV Push every 8 hours  sodium chloride 0.9%. 1000 milliLiter(s) (125 mL/Hr) IV Continuous <Continuous>  tamsulosin 0.4 milliGRAM(s) Oral at bedtime    MEDICATIONS  (PRN):  acetaminophen   Tablet .. 975 milliGRAM(s) Oral every 6 hours PRN Mild Pain (1 - 3)  HYDROmorphone  Injectable 1 milliGRAM(s) IV Push every 8 hours PRN break through pain  morphine  - Injectable 4 milliGRAM(s) IV Push every 6 hours PRN Severe Pain (7 - 10)  ondansetron Injectable 4 milliGRAM(s) IV Push every 6 hours PRN Nausea and/or Vomiting  oxyCODONE    IR 5 milliGRAM(s) Oral every 6 hours PRN Moderate Pain (4 - 6)    FAMILY HISTORY: non contributory     Allergies    penicillin (Unknown)    Intolerances      SOCIAL HISTORY:   Tobacco hx: None     REVIEW OF SYSTEMS: Pertinent positives and negatives as stated in HPI, otherwise negative    Vital signs  T(C): 37.3 (02-26-21 @ 11:49), Max: 37.3 (02-26-21 @ 11:49)  HR: 82 (02-26-21 @ 13:04)  BP: 156/80 (02-26-21 @ 13:04)  SpO2: 99% (02-26-21 @ 13:04)  Wt(kg): --      Physical Exam  Gen: NAD  Pulm: No respiratory distress, no subcostal retractions  CV: RRR, no JVD  Abd: Soft, NT, ND  Back: No CVAT   MSK: No edema present    LABS:          02-26 @ 13:32    WBC 9.98  / Hct 43.2  / SCr 1.26     02-26    140  |  103  |  20  ----------------------------<  108<H>  4.3   |  25  |  1.26    Ca    9.0      26 Feb 2021 13:32    TPro  7.0  /  Alb  4.2  /  TBili  1.0  /  DBili  x   /  AST  24  /  ALT  27  /  AlkPhos  37<L>  02-26          Urine Cx: p  Blood Cx:    RADIOLOGY:  Outside CT: R hydro due to a 5mm mid ureteral stone    98.7

## 2022-08-11 NOTE — PRE-ANESTHESIA EVALUATION ADULT - NSATTENDATTESTRD_GEN_ALL_CORE
Fall with Harm Risk
The patient has been re-examined and I agree with the above assessment or I updated with my findings.

## 2022-08-11 NOTE — ASU DISCHARGE PLAN (ADULT/PEDIATRIC) - CALL YOUR DOCTOR IF YOU HAVE ANY OF THE FOLLOWING:
Bleeding that does not stop/Swelling that gets worse/Wound/Surgical Site with redness, or foul smelling discharge or pus/Nausea and vomiting that does not stop/Unable to urinate/Excessive diarrhea/Inability to tolerate liquids or foods

## 2022-08-11 NOTE — ASU DISCHARGE PLAN (ADULT/PEDIATRIC) - NS MD DC FALL RISK RISK
For information on Fall & Injury Prevention, visit: [email or web address] OR  [email or web address] OR  https://www.cdc.gov/steadi/patient.html

## 2022-08-11 NOTE — CHART NOTE - NSCHARTNOTEFT_GEN_A_CORE
PACU ANESTHESIA ADMISSION NOTE      Procedure: Right foot 2nd and 3rd digit arthoplasty and 2nd digit osteotomy  Post op diagnosis:      ____  Intubated  TV:______       Rate: ______      FiO2: ______    _X___  Patent Airway    __X__  Full return of protective reflexes    ____  Full recovery from anesthesia / back to baseline status    Vitals:  T: 97.8F  HR: 74  BP: 179/79  RR: 19  SpO2: 98%    Mental Status:  __X__ Awake   _____ Alert   _____ Drowsy   _____ Sedated    Nausea/Vomiting:  __X__ NO  ______Yes,   See Post - Op Orders          Pain Scale (0-10):  _____    Treatment: ____ None    _X___ See Post - Op/PCA Orders    Post - Operative Fluids:   ____ Oral   __X__ See Post - Op Orders    Plan: Discharge:   ___X_Home       _____Floor     _____Critical Care    _____  Other:_________________    Comments: NO anesthetic related complications noted. pt. transported to PACU, report endorsed to RN

## 2022-08-15 LAB — SURGICAL PATHOLOGY STUDY: SIGNIFICANT CHANGE UP

## 2022-08-16 DIAGNOSIS — K21.9 GASTRO-ESOPHAGEAL REFLUX DISEASE WITHOUT ESOPHAGITIS: ICD-10-CM

## 2022-08-16 DIAGNOSIS — Z90.49 ACQUIRED ABSENCE OF OTHER SPECIFIED PARTS OF DIGESTIVE TRACT: ICD-10-CM

## 2022-08-16 DIAGNOSIS — M19.90 UNSPECIFIED OSTEOARTHRITIS, UNSPECIFIED SITE: ICD-10-CM

## 2022-08-16 DIAGNOSIS — Z96.643 PRESENCE OF ARTIFICIAL HIP JOINT, BILATERAL: ICD-10-CM

## 2022-08-16 DIAGNOSIS — I10 ESSENTIAL (PRIMARY) HYPERTENSION: ICD-10-CM

## 2022-08-16 DIAGNOSIS — M06.9 RHEUMATOID ARTHRITIS, UNSPECIFIED: ICD-10-CM

## 2022-08-16 DIAGNOSIS — F32.A DEPRESSION, UNSPECIFIED: ICD-10-CM

## 2022-08-16 DIAGNOSIS — Z96.652 PRESENCE OF LEFT ARTIFICIAL KNEE JOINT: ICD-10-CM

## 2022-08-16 DIAGNOSIS — Z90.710 ACQUIRED ABSENCE OF BOTH CERVIX AND UTERUS: ICD-10-CM

## 2022-08-16 DIAGNOSIS — M20.41 OTHER HAMMER TOE(S) (ACQUIRED), RIGHT FOOT: ICD-10-CM

## 2022-08-16 DIAGNOSIS — Z92.3 PERSONAL HISTORY OF IRRADIATION: ICD-10-CM

## 2022-08-16 DIAGNOSIS — Z85.3 PERSONAL HISTORY OF MALIGNANT NEOPLASM OF BREAST: ICD-10-CM

## 2022-08-26 NOTE — BRIEF OPERATIVE NOTE - NSICDXBRIEFPOSTOP_GEN_ALL_CORE_FT
POST-OP DIAGNOSIS:  Osteoarthritis of right knee 18-Jun-2019 07:12:28  Mark Wheeler
Unknown if ever smoked

## 2022-10-09 ENCOUNTER — NON-APPOINTMENT (OUTPATIENT)
Age: 82
End: 2022-10-09

## 2022-12-04 NOTE — ASU DISCHARGE PLAN (ADULT/PEDIATRIC) - HAVE YOU HAD A SECOND COVID-19 BOOSTER?
Gastroenterology Progress Note    12/4/2022    Admit Date: 11/29/2022    Subjective: Follow up for: Anemia, vomiting, esophagitis        Seen sleeping diagonally on the bed with legs dangling off the bed. He denies GI symptoms.   EGD done 12.2.22 by Dr Robert Coulter 12/1/22 11:43 12/2/22 04:16 12/3/22 03:26 12/4/22 03:41   HGB 12.1 - 17.0 g/dL 8.9 (L) 8.2 (L) 7.9 (L) 7.8 (L)   HCT 36.6 - 50.3 % 26.6 (L) 24.7 (L) 24.4 (L) 24.5 (L)   (L): Data is abnormally low    Current Facility-Administered Medications   Medication Dose Route Frequency    losartan (COZAAR) tablet 50 mg  50 mg Oral DAILY    cloNIDine HCL (CATAPRES) tablet 0.2 mg  0.2 mg Oral QID PRN    sodium chloride (OCEAN) 0.65 % nasal squeeze bottle 2 Spray  2 Spray Both Nostrils Q2H PRN    heparin (porcine) 1,000 unit/mL injection 1,800 Units  1,800 Units Hemodialysis DIALYSIS PRN    heparin (porcine) 1,000 unit/mL injection 1,800 Units  1,800 Units Hemodialysis DIALYSIS PRN    oxyCODONE IR (ROXICODONE) tablet 5 mg  5 mg Oral Q6H PRN    fluconazole (DIFLUCAN) tablet 100 mg  100 mg Oral DAILY    0.9% sodium chloride infusion 250 mL  250 mL IntraVENous PRN    sucralfate (CARAFATE) tablet 1 g  1 g Oral AC&HS    labetaloL (NORMODYNE;TRANDATE) injection 20 mg  20 mg IntraVENous Q4H PRN    furosemide (LASIX) tablet 80 mg  80 mg Oral DAILY    epoetin rell-epbx (RETACRIT) injection 20,000 Units  20,000 Units SubCUTAneous Q MON, WED & FRI    insulin glargine (LANTUS) injection 8 Units  8 Units SubCUTAneous DAILY    insulin lispro (HUMALOG) injection   SubCUTAneous AC&HS    glucose chewable tablet 16 g  4 Tablet Oral PRN    glucagon (GLUCAGEN) injection 1 mg  1 mg IntraMUSCular PRN    dextrose 10 % infusion 0-250 mL  0-250 mL IntraVENous PRN    pantoprazole (PROTONIX) 40 mg in 0.9% sodium chloride 10 mL injection  40 mg IntraVENous Q12H    tamsulosin (FLOMAX) capsule 0.4 mg  0.4 mg Oral DAILY    carvediloL (COREG) tablet 12.5 mg  12.5 mg Oral BID WITH MEALS    amLODIPine (NORVASC) tablet 10 mg  10 mg Oral DAILY    finasteride (PROSCAR) tablet 5 mg  5 mg Oral DAILY    DULoxetine (CYMBALTA) capsule 60 mg  60 mg Oral DAILY    [Held by provider] aspirin chewable tablet 81 mg  81 mg Oral DAILY    hydrALAZINE (APRESOLINE) tablet 100 mg  100 mg Oral TID    pregabalin (LYRICA) capsule 75 mg  75 mg Oral QHS    ondansetron (ZOFRAN) injection 4 mg  4 mg IntraVENous Q4H PRN        Objective:     Blood pressure (!) 160/80, pulse 81, temperature 97.6 °F (36.4 °C), resp. rate 20, height 5' 8\" (1.727 m), weight 83.9 kg (185 lb), SpO2 99 %. No intake/output data recorded.     12/02 1901 - 12/04 0700  In: -   Out: 1005 [Urine:1005]        Physical Examination:       General:AAO x 3,     Data Review    Recent Results (from the past 24 hour(s))   MAGNESIUM    Collection Time: 12/03/22  4:04 PM   Result Value Ref Range    Magnesium 1.7 1.6 - 2.4 mg/dL   PROCALCITONIN    Collection Time: 12/03/22  4:04 PM   Result Value Ref Range    Procalcitonin 3.44 ng/mL   GLUCOSE, POC    Collection Time: 12/03/22  4:48 PM   Result Value Ref Range    Glucose (POC) 195 (H) 65 - 117 mg/dL    Performed by Cecily Peña PCT    GLUCOSE, POC    Collection Time: 12/03/22  9:03 PM   Result Value Ref Range    Glucose (POC) 172 (H) 65 - 117 mg/dL    Performed by Prakash Marquis    CBC WITH AUTOMATED DIFF    Collection Time: 12/04/22  3:41 AM   Result Value Ref Range    WBC 9.5 4.1 - 11.1 K/uL    RBC 2.96 (L) 4.10 - 5.70 M/uL    HGB 7.8 (L) 12.1 - 17.0 g/dL    HCT 24.5 (L) 36.6 - 50.3 %    MCV 82.8 80.0 - 99.0 FL    MCH 26.4 26.0 - 34.0 PG    MCHC 31.8 30.0 - 36.5 g/dL    RDW 19.1 (H) 11.5 - 14.5 %    PLATELET 341 985 - 537 K/uL    MPV 9.8 8.9 - 12.9 FL    NRBC 0.2 (H) 0  WBC    ABSOLUTE NRBC 0.02 (H) 0.00 - 0.01 K/uL    NEUTROPHILS 82 (H) 32 - 75 %    LYMPHOCYTES 10 (L) 12 - 49 %    MONOCYTES 5 5 - 13 %    EOSINOPHILS 3 0 - 7 %    BASOPHILS 0 0 - 1 %    IMMATURE GRANULOCYTES 0 0.0 - 0.5 %    ABS. NEUTROPHILS 7.7 1.8 - 8.0 K/UL    ABS. LYMPHOCYTES 1.0 0.8 - 3.5 K/UL    ABS. MONOCYTES 0.5 0.0 - 1.0 K/UL    ABS. EOSINOPHILS 0.3 0.0 - 0.4 K/UL    ABS. BASOPHILS 0.0 0.0 - 0.1 K/UL    ABS. IMM. GRANS. 0.0 0.00 - 0.04 K/UL    DF MANUAL      RBC COMMENTS ANISOCYTOSIS  1+        RBC COMMENTS HYPOCHROMIA  1+       METABOLIC PANEL, BASIC    Collection Time: 12/04/22  3:41 AM   Result Value Ref Range    Sodium 130 (L) 136 - 145 mmol/L    Potassium 4.1 3.5 - 5.1 mmol/L    Chloride 96 (L) 97 - 108 mmol/L    CO2 27 21 - 32 mmol/L    Anion gap 7 5 - 15 mmol/L    Glucose 174 (H) 65 - 100 mg/dL    BUN 24 (H) 6 - 20 MG/DL    Creatinine 3.73 (H) 0.70 - 1.30 MG/DL    BUN/Creatinine ratio 6 (L) 12 - 20      eGFR 20 (L) >60 ml/min/1.73m2    Calcium 7.9 (L) 8.5 - 10.1 MG/DL   MAGNESIUM    Collection Time: 12/04/22  3:41 AM   Result Value Ref Range    Magnesium 1.8 1.6 - 2.4 mg/dL   SAMPLES BEING HELD    Collection Time: 12/04/22  3:41 AM   Result Value Ref Range    SAMPLES BEING HELD PST     COMMENT        Add-on orders for these samples will be processed based on acceptable specimen integrity and analyte stability, which may vary by analyte.    GLUCOSE, POC    Collection Time: 12/04/22  6:58 AM   Result Value Ref Range    Glucose (POC) 198 (H) 65 - 117 mg/dL    Performed by Monroe Jones    GLUCOSE, POC    Collection Time: 12/04/22 10:48 AM   Result Value Ref Range    Glucose (POC) 242 (H) 65 - 117 mg/dL    Performed by Elicia Easley PCT      Recent Labs     12/04/22  0341 12/03/22  0326   WBC 9.5 8.9   HGB 7.8* 7.9*   HCT 24.5* 24.4*    329       Recent Labs     12/04/22  0341 12/03/22  1604 12/03/22  0326 12/02/22  0416   *  --  132* 137   K 4.1  --  3.8 3.6   CL 96*  --  97 105   CO2 27  --  31 24   BUN 24*  --  17 29*   CREA 3.73*  --  3.01* 3.45*   *  --  220* 136*   CA 7.9*  --  7.5* 6.3*   MG 1.8 1.7 1.8 1.6       Recent Labs     12/02/22  0416   ALB 1.2*       No results for input(s): INR, PTP, APTT, INREXT, INREXT in the last 72 hours. No results for input(s): FE, TIBC, PSAT, FERR in the last 72 hours. No results found for: FOL, RBCF   No results for input(s): PH, PCO2, PO2 in the last 72 hours. No results for input(s): CPK, CKNDX, TROIQ in the last 72 hours. No lab exists for component: CPKMB  Lab Results   Component Value Date/Time    Cholesterol, total 192 11/15/2021 12:01 PM    HDL Cholesterol 61 11/15/2021 12:01 PM    LDL, calculated 89.4 11/15/2021 12:01 PM    Triglyceride 208 (H) 11/15/2021 12:01 PM    CHOL/HDL Ratio 3.1 11/15/2021 12:01 PM     No components found for: [place]  Lab Results   Component Value Date/Time    Color YELLOW/STRAW 11/29/2022 07:56 PM    Appearance CLEAR 11/29/2022 07:56 PM    Specific gravity 1.022 11/29/2022 07:56 PM    Specific gravity 1.020 05/08/2022 09:06 PM    pH (UA) 6.5 11/29/2022 07:56 PM    Protein 300 (A) 11/29/2022 07:56 PM    Glucose >1,000 (A) 11/29/2022 07:56 PM    Ketone 15 (A) 11/29/2022 07:56 PM    Bilirubin Negative 11/29/2022 07:56 PM    Urobilinogen 0.2 11/29/2022 07:56 PM    Nitrites Negative 11/29/2022 07:56 PM    Leukocyte Esterase Negative 11/29/2022 07:56 PM    Epithelial cells FEW 11/29/2022 07:56 PM    Bacteria Negative 11/29/2022 07:56 PM    WBC 0-4 11/29/2022 07:56 PM    RBC 0-5 11/29/2022 07:56 PM        ROS: -CP, SOB, Dysuria, palpitations, cough. Assessment:  Esophagitis  Anemia  DM  Vomiting     Plan/Discussion:     His hemoglobin is stable and he is on  pantoprazole 40mg twice daily  EGD 12/2/22/[facility]  with severe LA grade D erosive esophagitis with candida plaques from  26 cm to EGJ seen  at 41cm. We await bx. He is on fluconazole for total of 14 days and on PO sucralfate. We would repeat his EGD in ~8 weeks as OP. I will arrange it. This was confirmed with the pt who agrees with plan. Signed By: Andrew Pablo MD    12/4/2022  11 40 am No

## 2022-12-08 NOTE — ASU PREOP CHECKLIST - RESPIRATORY RATE (BREATHS/MIN)
18 Scc Desmoplastic Subtype Histology Text: There were aggregates of squamous cells in a desk-plastic pattern.

## 2022-12-11 ENCOUNTER — NON-APPOINTMENT (OUTPATIENT)
Age: 82
End: 2022-12-11

## 2022-12-15 ENCOUNTER — NON-APPOINTMENT (OUTPATIENT)
Age: 82
End: 2022-12-15

## 2023-02-23 ENCOUNTER — EMERGENCY (EMERGENCY)
Facility: HOSPITAL | Age: 83
LOS: 0 days | Discharge: HOME | End: 2023-02-24
Attending: EMERGENCY MEDICINE
Payer: MEDICARE

## 2023-02-23 VITALS
HEIGHT: 68 IN | RESPIRATION RATE: 18 BRPM | HEART RATE: 80 BPM | OXYGEN SATURATION: 98 % | DIASTOLIC BLOOD PRESSURE: 70 MMHG | TEMPERATURE: 100 F | SYSTOLIC BLOOD PRESSURE: 144 MMHG | WEIGHT: 179.9 LBS

## 2023-02-23 DIAGNOSIS — Z20.822 CONTACT WITH AND (SUSPECTED) EXPOSURE TO COVID-19: ICD-10-CM

## 2023-02-23 DIAGNOSIS — R07.81 PLEURODYNIA: ICD-10-CM

## 2023-02-23 DIAGNOSIS — Z96.641 PRESENCE OF RIGHT ARTIFICIAL HIP JOINT: Chronic | ICD-10-CM

## 2023-02-23 DIAGNOSIS — Z96.642 PRESENCE OF LEFT ARTIFICIAL HIP JOINT: Chronic | ICD-10-CM

## 2023-02-23 DIAGNOSIS — Z96.652 PRESENCE OF LEFT ARTIFICIAL KNEE JOINT: Chronic | ICD-10-CM

## 2023-02-23 DIAGNOSIS — I10 ESSENTIAL (PRIMARY) HYPERTENSION: ICD-10-CM

## 2023-02-23 DIAGNOSIS — E78.5 HYPERLIPIDEMIA, UNSPECIFIED: ICD-10-CM

## 2023-02-23 DIAGNOSIS — Z98.890 OTHER SPECIFIED POSTPROCEDURAL STATES: Chronic | ICD-10-CM

## 2023-02-23 DIAGNOSIS — Z90.49 ACQUIRED ABSENCE OF OTHER SPECIFIED PARTS OF DIGESTIVE TRACT: Chronic | ICD-10-CM

## 2023-02-23 DIAGNOSIS — Z85.3 PERSONAL HISTORY OF MALIGNANT NEOPLASM OF BREAST: ICD-10-CM

## 2023-02-23 DIAGNOSIS — R79.89 OTHER SPECIFIED ABNORMAL FINDINGS OF BLOOD CHEMISTRY: ICD-10-CM

## 2023-02-23 DIAGNOSIS — Z90.710 ACQUIRED ABSENCE OF BOTH CERVIX AND UTERUS: Chronic | ICD-10-CM

## 2023-02-23 PROCEDURE — 99285 EMERGENCY DEPT VISIT HI MDM: CPT | Mod: 25

## 2023-02-23 PROCEDURE — 96374 THER/PROPH/DIAG INJ IV PUSH: CPT

## 2023-02-23 PROCEDURE — 87635 SARS-COV-2 COVID-19 AMP PRB: CPT

## 2023-02-23 PROCEDURE — 96375 TX/PRO/DX INJ NEW DRUG ADDON: CPT

## 2023-02-23 PROCEDURE — 85730 THROMBOPLASTIN TIME PARTIAL: CPT

## 2023-02-23 PROCEDURE — 84484 ASSAY OF TROPONIN QUANT: CPT

## 2023-02-23 PROCEDURE — 99285 EMERGENCY DEPT VISIT HI MDM: CPT | Mod: FS,CS

## 2023-02-23 PROCEDURE — 85379 FIBRIN DEGRADATION QUANT: CPT

## 2023-02-23 PROCEDURE — 83880 ASSAY OF NATRIURETIC PEPTIDE: CPT

## 2023-02-23 PROCEDURE — 85025 COMPLETE CBC W/AUTO DIFF WBC: CPT

## 2023-02-23 PROCEDURE — 71045 X-RAY EXAM CHEST 1 VIEW: CPT

## 2023-02-23 PROCEDURE — 85610 PROTHROMBIN TIME: CPT

## 2023-02-23 PROCEDURE — 71275 CT ANGIOGRAPHY CHEST: CPT | Mod: MA

## 2023-02-23 PROCEDURE — 93005 ELECTROCARDIOGRAM TRACING: CPT

## 2023-02-23 PROCEDURE — 36415 COLL VENOUS BLD VENIPUNCTURE: CPT

## 2023-02-23 PROCEDURE — 80053 COMPREHEN METABOLIC PANEL: CPT

## 2023-02-24 LAB
ALBUMIN SERPL ELPH-MCNC: 3.9 G/DL — SIGNIFICANT CHANGE UP (ref 3.5–5.2)
ALP SERPL-CCNC: 102 U/L — SIGNIFICANT CHANGE UP (ref 30–115)
ALT FLD-CCNC: 26 U/L — SIGNIFICANT CHANGE UP (ref 0–41)
ANION GAP SERPL CALC-SCNC: 14 MMOL/L — SIGNIFICANT CHANGE UP (ref 7–14)
APTT BLD: 36.3 SEC — SIGNIFICANT CHANGE UP (ref 27–39.2)
AST SERPL-CCNC: 43 U/L — HIGH (ref 0–41)
BASOPHILS # BLD AUTO: 0.01 K/UL — SIGNIFICANT CHANGE UP (ref 0–0.2)
BASOPHILS NFR BLD AUTO: 0.2 % — SIGNIFICANT CHANGE UP (ref 0–1)
BILIRUB SERPL-MCNC: 0.7 MG/DL — SIGNIFICANT CHANGE UP (ref 0.2–1.2)
BUN SERPL-MCNC: 28 MG/DL — HIGH (ref 10–20)
CALCIUM SERPL-MCNC: 9.6 MG/DL — SIGNIFICANT CHANGE UP (ref 8.4–10.5)
CHLORIDE SERPL-SCNC: 95 MMOL/L — LOW (ref 98–110)
CO2 SERPL-SCNC: 23 MMOL/L — SIGNIFICANT CHANGE UP (ref 17–32)
CREAT SERPL-MCNC: 0.9 MG/DL — SIGNIFICANT CHANGE UP (ref 0.7–1.5)
D DIMER BLD IA.RAPID-MCNC: 545 NG/ML DDU — HIGH
EGFR: 63 ML/MIN/1.73M2 — SIGNIFICANT CHANGE UP
EOSINOPHIL # BLD AUTO: 0.08 K/UL — SIGNIFICANT CHANGE UP (ref 0–0.7)
EOSINOPHIL NFR BLD AUTO: 1.4 % — SIGNIFICANT CHANGE UP (ref 0–8)
GLUCOSE SERPL-MCNC: 100 MG/DL — HIGH (ref 70–99)
HCT VFR BLD CALC: 37.9 % — SIGNIFICANT CHANGE UP (ref 37–47)
HGB BLD-MCNC: 12.7 G/DL — SIGNIFICANT CHANGE UP (ref 12–16)
IMM GRANULOCYTES NFR BLD AUTO: 0.2 % — SIGNIFICANT CHANGE UP (ref 0.1–0.3)
INR BLD: 1 RATIO — SIGNIFICANT CHANGE UP (ref 0.65–1.3)
LYMPHOCYTES # BLD AUTO: 2.07 K/UL — SIGNIFICANT CHANGE UP (ref 1.2–3.4)
LYMPHOCYTES # BLD AUTO: 35.6 % — SIGNIFICANT CHANGE UP (ref 20.5–51.1)
MCHC RBC-ENTMCNC: 30.5 PG — SIGNIFICANT CHANGE UP (ref 27–31)
MCHC RBC-ENTMCNC: 33.5 G/DL — SIGNIFICANT CHANGE UP (ref 32–37)
MCV RBC AUTO: 91.1 FL — SIGNIFICANT CHANGE UP (ref 81–99)
MONOCYTES # BLD AUTO: 0.43 K/UL — SIGNIFICANT CHANGE UP (ref 0.1–0.6)
MONOCYTES NFR BLD AUTO: 7.4 % — SIGNIFICANT CHANGE UP (ref 1.7–9.3)
NEUTROPHILS # BLD AUTO: 3.21 K/UL — SIGNIFICANT CHANGE UP (ref 1.4–6.5)
NEUTROPHILS NFR BLD AUTO: 55.2 % — SIGNIFICANT CHANGE UP (ref 42.2–75.2)
NRBC # BLD: 0 /100 WBCS — SIGNIFICANT CHANGE UP (ref 0–0)
NT-PROBNP SERPL-SCNC: 376 PG/ML — HIGH (ref 0–300)
PLATELET # BLD AUTO: 191 K/UL — SIGNIFICANT CHANGE UP (ref 130–400)
POTASSIUM SERPL-MCNC: 4.3 MMOL/L — SIGNIFICANT CHANGE UP (ref 3.5–5)
POTASSIUM SERPL-SCNC: 4.3 MMOL/L — SIGNIFICANT CHANGE UP (ref 3.5–5)
PROT SERPL-MCNC: 7.4 G/DL — SIGNIFICANT CHANGE UP (ref 6–8)
PROTHROM AB SERPL-ACNC: 11.4 SEC — SIGNIFICANT CHANGE UP (ref 9.95–12.87)
RBC # BLD: 4.16 M/UL — LOW (ref 4.2–5.4)
RBC # FLD: 14.6 % — HIGH (ref 11.5–14.5)
SARS-COV-2 RNA SPEC QL NAA+PROBE: SIGNIFICANT CHANGE UP
SODIUM SERPL-SCNC: 132 MMOL/L — LOW (ref 135–146)
TROPONIN T SERPL-MCNC: <0.01 NG/ML — SIGNIFICANT CHANGE UP
WBC # BLD: 5.81 K/UL — SIGNIFICANT CHANGE UP (ref 4.8–10.8)
WBC # FLD AUTO: 5.81 K/UL — SIGNIFICANT CHANGE UP (ref 4.8–10.8)

## 2023-02-24 PROCEDURE — 93010 ELECTROCARDIOGRAM REPORT: CPT

## 2023-02-24 PROCEDURE — 71045 X-RAY EXAM CHEST 1 VIEW: CPT | Mod: 26

## 2023-02-24 PROCEDURE — 71275 CT ANGIOGRAPHY CHEST: CPT | Mod: 26,MA

## 2023-02-24 RX ORDER — MAGNESIUM SULFATE 500 MG/ML
1 VIAL (ML) INJECTION ONCE
Refills: 0 | Status: COMPLETED | OUTPATIENT
Start: 2023-02-24 | End: 2023-02-24

## 2023-02-24 RX ORDER — KETOROLAC TROMETHAMINE 30 MG/ML
15 SYRINGE (ML) INJECTION ONCE
Refills: 0 | Status: DISCONTINUED | OUTPATIENT
Start: 2023-02-24 | End: 2023-02-24

## 2023-02-24 RX ADMIN — Medication 15 MILLIGRAM(S): at 00:00

## 2023-02-24 RX ADMIN — Medication 15 MILLIGRAM(S): at 00:15

## 2023-02-24 RX ADMIN — Medication 100 GRAM(S): at 04:00

## 2023-02-24 NOTE — ED ADULT NURSE NOTE - CHIEF COMPLAINT QUOTE
pt was seen at the urgent care this morning and had blood work done and was told her D dimer is high and she needs to come to the ER for a CT scan

## 2023-02-24 NOTE — ED PROVIDER NOTE - PHYSICAL EXAMINATION
CONSTITUTIONAL: non-toxic appearing female, no acute distress   SKIN: skin exam is warm and dry  ENT: MMM   CARD: S1, S2 normal, no murmur  RESP: Good air movement bilaterally  ABD: soft; non-distended; non-tender   EXT: Normal ROM. +L sided rib TTP, no skin tenting, no skin changes. no midline tenderness   NEURO: awake, alert, following commands, oriented, grossly unremarkable. No Focal deficits. GCS 15.   PSYCH: Cooperative, appropriate.

## 2023-02-24 NOTE — ED PROVIDER NOTE - OBJECTIVE STATEMENT
82 year old female, past medical history htn, hld, breast ca in remission, RA, who presents for eval. patient with recent travel from [facility] to [place] yesterday, now with L sided rib pain. patient with pain exacerbated with ROM and inspiration. patient evaluated at urgent care for sx s/p lab work, contacted this evening for elevated dimer, sent to ED for eval. denies f/c, shortness of breath, urinary symptoms, bowel changes, skin changes. no falls/trauma. non-smoker. patient follows with Dr Pool.

## 2023-02-24 NOTE — ED PROVIDER NOTE - PATIENT PORTAL LINK FT
with patient
You can access the FollowMyHealth Patient Portal offered by [facility] by registering at the following website: [email or web address]. By joining [facility]’s FollowMyHealth portal, you will also be able to view your health information using other applications (apps) compatible with our system.

## 2023-02-24 NOTE — ED PROVIDER NOTE - CLINICAL SUMMARY MEDICAL DECISION MAKING FREE TEXT BOX
82-year-old female presented to the emergency department for elevated D-dimer.  Patient D-dimer in the emergency department which was 545 and had subsequently a CT PE to rule out PE.  CT scan not demonstrate PE Labs otherwise unremarkable.  Daughter and strict return precautions.

## 2023-02-24 NOTE — ED PROVIDER NOTE - CARE PROVIDER_API CALL
Thor Cruz (MD)  Hematology; Internal Medicine; Medical Oncology  [address] [place] [address]  Phone: [phone]  Fax: [phone]  Follow Up Time: 1-3 Days

## 2023-02-24 NOTE — ED ADULT NURSE NOTE - OBJECTIVE STATEMENT
Pt was seen at the urgent care this morning and had blood work done and was told her D dimer is high and she needs to come to the ER for a CT scan.

## 2023-02-28 ENCOUNTER — APPOINTMENT (OUTPATIENT)
Dept: BREAST CENTER | Facility: CLINIC | Age: 83
End: 2023-02-28

## 2023-03-29 ENCOUNTER — RESULT REVIEW (OUTPATIENT)
Age: 83
End: 2023-03-29

## 2023-03-29 ENCOUNTER — OUTPATIENT (OUTPATIENT)
Dept: OUTPATIENT SERVICES | Facility: HOSPITAL | Age: 83
LOS: 1 days | End: 2023-03-29
Payer: MEDICARE

## 2023-03-29 DIAGNOSIS — Z90.49 ACQUIRED ABSENCE OF OTHER SPECIFIED PARTS OF DIGESTIVE TRACT: Chronic | ICD-10-CM

## 2023-03-29 DIAGNOSIS — Z96.641 PRESENCE OF RIGHT ARTIFICIAL HIP JOINT: Chronic | ICD-10-CM

## 2023-03-29 DIAGNOSIS — Z98.890 OTHER SPECIFIED POSTPROCEDURAL STATES: Chronic | ICD-10-CM

## 2023-03-29 DIAGNOSIS — Z96.652 PRESENCE OF LEFT ARTIFICIAL KNEE JOINT: Chronic | ICD-10-CM

## 2023-03-29 DIAGNOSIS — Z12.31 ENCOUNTER FOR SCREENING MAMMOGRAM FOR MALIGNANT NEOPLASM OF BREAST: ICD-10-CM

## 2023-03-29 DIAGNOSIS — Z96.642 PRESENCE OF LEFT ARTIFICIAL HIP JOINT: Chronic | ICD-10-CM

## 2023-03-29 DIAGNOSIS — Z90.710 ACQUIRED ABSENCE OF BOTH CERVIX AND UTERUS: Chronic | ICD-10-CM

## 2023-03-29 PROCEDURE — 77063 BREAST TOMOSYNTHESIS BI: CPT | Mod: 26

## 2023-03-29 PROCEDURE — 77067 SCR MAMMO BI INCL CAD: CPT

## 2023-03-29 PROCEDURE — 77067 SCR MAMMO BI INCL CAD: CPT | Mod: 26

## 2023-03-29 PROCEDURE — 77063 BREAST TOMOSYNTHESIS BI: CPT

## 2023-03-30 DIAGNOSIS — Z12.31 ENCOUNTER FOR SCREENING MAMMOGRAM FOR MALIGNANT NEOPLASM OF BREAST: ICD-10-CM

## 2023-04-06 ENCOUNTER — APPOINTMENT (OUTPATIENT)
Dept: PAIN MANAGEMENT | Facility: CLINIC | Age: 83
End: 2023-04-06
Payer: MEDICARE

## 2023-04-06 VITALS
WEIGHT: 180 LBS | HEIGHT: 68 IN | HEART RATE: 81 BPM | BODY MASS INDEX: 27.28 KG/M2 | DIASTOLIC BLOOD PRESSURE: 93 MMHG | SYSTOLIC BLOOD PRESSURE: 180 MMHG

## 2023-04-06 DIAGNOSIS — M54.2 CERVICALGIA: ICD-10-CM

## 2023-04-06 DIAGNOSIS — M54.12 RADICULOPATHY, CERVICAL REGION: ICD-10-CM

## 2023-04-06 PROCEDURE — 99204 OFFICE O/P NEW MOD 45 MIN: CPT

## 2023-04-06 RX ORDER — TIZANIDINE HYDROCHLORIDE 2 MG/1
2 CAPSULE ORAL
Qty: 90 | Refills: 1 | Status: ACTIVE | COMMUNITY
Start: 2023-04-06 | End: 1900-01-01

## 2023-04-06 NOTE — DISCUSSION/SUMMARY
[de-identified] : Patient is an 83 y/o woman presenting for a NPV for a history of chronic neck pain with radicular features. \par \par Plan:\par 1) Initiate physical therapy\par 2) CT cervical spine w/o contrast (patient cannot get an MRI due to severe claustrophobia)\par 3) Trial tizanidine 2-4mg prn\par 4) Continue acetaminophen prn\par 5) Continue prednisone 20mg per rheumatologist; avoid NSAIDs\par 6) RTC 4 weeks

## 2023-04-06 NOTE — HISTORY OF PRESENT ILLNESS
[FreeTextEntry1] : Patient is an 83 y/o woman presenting for a NPV for a history of chronic neck pain with radicular features. The patient states that she started to have low level pain in the posterior neck approximately 6 months ago but that it has gotten progressively worse and has been very severe over the past month. No history of trauma or focal injury. She states that she has had difficulty laying down and sleeping because the pain has been so severe. She takes chronic prednisone 20mg daily for her rheumatoid arthritis and has been taking acetaminophen prn for her pain. At this point, the patient endorses an aching, stabbing pain in the mid to low cervical spine with radiation to the right scapula and the right posterior arm and dorsal forearm into the hand. She has intermittent numbness over this distribution but no focal weakness. No bowel or bladder incontinence, saddle anesthesia, or ataxia.

## 2023-04-06 NOTE — PHYSICAL EXAM
[de-identified] : Gen: NAD\par Neck: very limited extension, rotation, and side flexion bilaterally; +spurling's on the right, +TTP over the mid to low cervical facet region on the right, +TTP over the trapezius and rhomboid muscles on the right\par Head: NC/AT\par Eyes: no glasses, no scleral icterus\par ENT: patient wearing a mask\par CV: RRR, S1 S2, no mrg\par Lungs: CTAB, nonlabored breathing\par Abd: soft, NT/ND\par Ext: full ROM in all extremities, no peripheral edema\par Neuro: CN intact\par UEs\par +5 L +5 R shoulder abduction\par +5 L +5 R arm abduction\par +5 L +5 R forearm flexion\par +5 L +5 R forearm extension\par +5 L +5 R finger flexion\par +5 L +5 R  strength\par LEs\par +5 L +5 R hip flexion\par +5 L +5 R leg extension\par +5 L +5 R leg flexion\par +5 L +5 R foot dorsiflexion\par +5 L +5 R foot plantarflexion\par +5 L +5 R EHL extension\par Psych: normal affect\par Skin: no visible lesions\par

## 2023-04-06 NOTE — REVIEW OF SYSTEMS
[Arthralgia] : arthralgia [Joint Pain] : joint pain [Joint Stiffness] : joint stiffness [Joint Swelling] : joint swelling [Sleep Disturbances] : ~T sleep disturbances [Negative] : Endocrine

## 2023-04-25 ENCOUNTER — NON-APPOINTMENT (OUTPATIENT)
Age: 83
End: 2023-04-25

## 2023-05-04 ENCOUNTER — APPOINTMENT (OUTPATIENT)
Dept: PAIN MANAGEMENT | Facility: CLINIC | Age: 83
End: 2023-05-04

## 2023-05-05 ENCOUNTER — NON-APPOINTMENT (OUTPATIENT)
Age: 83
End: 2023-05-05

## 2023-05-22 ENCOUNTER — OUTPATIENT (OUTPATIENT)
Dept: OUTPATIENT SERVICES | Facility: HOSPITAL | Age: 83
LOS: 1 days | End: 2023-05-22
Payer: MEDICARE

## 2023-05-22 ENCOUNTER — APPOINTMENT (OUTPATIENT)
Dept: HEMATOLOGY ONCOLOGY | Facility: CLINIC | Age: 83
End: 2023-05-22
Payer: MEDICARE

## 2023-05-22 ENCOUNTER — APPOINTMENT (OUTPATIENT)
Dept: HEMATOLOGY ONCOLOGY | Facility: CLINIC | Age: 83
End: 2023-05-22

## 2023-05-22 VITALS
HEIGHT: 68 IN | WEIGHT: 180 LBS | RESPIRATION RATE: 18 BRPM | TEMPERATURE: 98.6 F | DIASTOLIC BLOOD PRESSURE: 94 MMHG | OXYGEN SATURATION: 100 % | SYSTOLIC BLOOD PRESSURE: 182 MMHG | HEART RATE: 64 BPM | BODY MASS INDEX: 27.28 KG/M2

## 2023-05-22 DIAGNOSIS — Z90.49 ACQUIRED ABSENCE OF OTHER SPECIFIED PARTS OF DIGESTIVE TRACT: Chronic | ICD-10-CM

## 2023-05-22 DIAGNOSIS — Z96.642 PRESENCE OF LEFT ARTIFICIAL HIP JOINT: Chronic | ICD-10-CM

## 2023-05-22 DIAGNOSIS — Z96.641 PRESENCE OF RIGHT ARTIFICIAL HIP JOINT: Chronic | ICD-10-CM

## 2023-05-22 DIAGNOSIS — Z90.710 ACQUIRED ABSENCE OF BOTH CERVIX AND UTERUS: Chronic | ICD-10-CM

## 2023-05-22 DIAGNOSIS — Z96.652 PRESENCE OF LEFT ARTIFICIAL KNEE JOINT: Chronic | ICD-10-CM

## 2023-05-22 DIAGNOSIS — C50.411 MALIGNANT NEOPLASM OF UPPER-OUTER QUADRANT OF RIGHT FEMALE BREAST: ICD-10-CM

## 2023-05-22 DIAGNOSIS — Z79.811 LONG TERM (CURRENT) USE OF AROMATASE INHIBITORS: ICD-10-CM

## 2023-05-22 DIAGNOSIS — Z98.890 OTHER SPECIFIED POSTPROCEDURAL STATES: Chronic | ICD-10-CM

## 2023-05-22 PROCEDURE — 99213 OFFICE O/P EST LOW 20 MIN: CPT

## 2023-05-23 DIAGNOSIS — C50.411 MALIGNANT NEOPLASM OF UPPER-OUTER QUADRANT OF RIGHT FEMALE BREAST: ICD-10-CM

## 2023-05-23 NOTE — ASSESSMENT
[FreeTextEntry1] : 1. 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 There is no evidence of disease.\par 2. Rheumatoid arthritis.\par \par RECOMMENDATION: \par -- Breast exam today did not reveal palpable abnormality.  Bilateral screening mammo in 3/29/23 reviewed, There is no mammographic evidence of malignancy.\par \par  next due 03/2024 script provided.\par  She is Past due for DEXA Scan, RX provided on 5/22/23.\par -- Continue calcium and vitamin D supplements. \par -- Followup with PCP for health maintenance. \par -- Followup with Dr. Wiley for rheumatoid arthritis.\par -- RTO for followup in 1 year\par \par \par \par

## 2023-05-23 NOTE — PHYSICAL EXAM
[Restricted in physically strenuous activity but ambulatory and able to carry out work of a light or sedentary nature] : Status 1- Restricted in physically strenuous activity but ambulatory and able to carry out work of a light or sedentary nature, e.g., light house work, office work [Normal] : affect appropriate [de-identified] : Right side status post lumpectomy.  There is a lump of induration at the surgical scar- unchanged.  There is no palpable mass and no palpable right axillary l ymphadenopathy.  Left breast and left axilla are normal.

## 2023-05-23 NOTE — HISTORY OF PRESENT ILLNESS
[de-identified] : This is a  77-year-old white female is here today for a followup visit.  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  After surgery, she received partial breast radiotherapy.  Since 03/2016, she has been on adjuvant endocrine therapy initially with anastrozole, and then switched to exemestane.  But she switched back to anastrozole.  She still has body and joint aching.  She also has RA and has been on MTX.  She is no longer on Prednisone. She follows with Dr. Wiley regularly.  Last visit was in March 2018.\par \par On 05/04/2016, she had a bone density which showed osteopenia in the hip.  Her bone density in the spine and femoral neck are normal.  Compared to the report from previous year, her bone density has been stable.  Her mammogram was done 10/24/16 and showed post surgical change including a 3.8 cm seroma in the right breast, stable.  There was no evidence of malignancy.  Latest mammogram was done 12/11/17 which showed no mammographic evidence of malignancy. Stable postsurgical changes  of the right breast. \par  [de-identified] : In 6/2017, right breast dx mammo did not showed abnormal finding.\par \par On 5/4/2016, Bone  Density showed\par ----------------------------------------------------------------- \par Region                                      BMD        T-score    Z-score      Classification \par ----------------------------------------------------------------- \par AP  Spine  (L1,  L2,  L3)           1.059        0.4            2.8          Normal \par Femoral  Neck  (Left)                0.787      -0.6            1.6           Normal \par Total  Hip  (Left)                        0.740      -1.7            0.2          Osteopenia \par 1/3  Radius  (Left)                0.510      -3.1          -0.3          \par ----------------------------------------------------------------- \par \par She is scheduled for right knee replacement. She reports body aching. \par \par On 1029/18:\par The patient is here today for followup visit. She has been taking anastrozole daily and tolerated. She has stable chronic joint aching. In 7/2018, she had bone density which showed normal bone density in lumbar spine. Due to b/l hp replacement, bone density study was not able performed in the hip and femoral neck.\par She had right breast dx mammo and sonogram on 6/2018. There was no abnormal finding.\par \par 3/20/19:\par The patient is here for followup visit. She has been taking anastrozole daily and tolerated. She has stable chronic joint aching. In 7/2018, she had bone density which showed normal bone density in lumbar spine. Due to b/l hp replacement, bone density study was not able performed in the hip and femoral neck. In 12/2018, she had b/l dx mammo and there was no suspicious finding. She has left knee pain and will have replacement surgery in 4/2019.\par \par 9/18/19:\par The patient is here for followup visit. She has been taking anastrozole daily and tolerated. She has stable chronic joint aching. In 7/2018, she had bone density which showed normal bone density in lumbar spine. Due to b/l hp replacement, bone density study was not able performed in the hip and femoral neck. In 12/2018, she had b/l dx mammo and there was no suspicious finding. 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  She offers no new complaints. She has chronic joint pains.  She takes Anastrozole daily since 3/3016.  Last mammogram was done on 12/2019 which showed no evidence of malignancy.  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  Patient denies any new palpable breast lumps or pain, denies skin changes, denies nipple discharge. She offers no complaints.  She completed 5 years of  Anastrozole in 3/2021.  Last mammogram was done on 12/2021 which showed no evidence of malignancy.  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  Patient denies any new palpable breast lumps or pain, denies skin changes, denies nipple discharge. She offers no complaints.  She completed 5 years of  Anastrozole in 3/2021.  Last mammogram was done on 03/31/23  which showed There is no mammographic evidence of malignancy.\par   Last bone density was normal on 9/2022.\par

## 2023-07-16 NOTE — PHYSICAL EXAM
[Restricted in physically strenuous activity but ambulatory and able to carry out work of a light or sedentary nature] : Status 1- Restricted in physically strenuous activity but ambulatory and able to carry out work of a light or sedentary nature, e.g., light house work, office work [Normal] : affect appropriate [de-identified] : Right side status post lumpectomy.  There is a lump of induration at the surgical scar- unchanged.  There is no palpable mass and no palpable right axillary lymphadenopathy.  Left breast and left axilla are normal.   DISCHARGE

## 2023-08-03 ENCOUNTER — OUTPATIENT (OUTPATIENT)
Dept: OUTPATIENT SERVICES | Facility: HOSPITAL | Age: 83
LOS: 1 days | End: 2023-08-03
Payer: MEDICARE

## 2023-08-03 ENCOUNTER — RESULT REVIEW (OUTPATIENT)
Age: 83
End: 2023-08-03

## 2023-08-03 DIAGNOSIS — Z96.641 PRESENCE OF RIGHT ARTIFICIAL HIP JOINT: Chronic | ICD-10-CM

## 2023-08-03 DIAGNOSIS — Z90.49 ACQUIRED ABSENCE OF OTHER SPECIFIED PARTS OF DIGESTIVE TRACT: Chronic | ICD-10-CM

## 2023-08-03 DIAGNOSIS — Z96.642 PRESENCE OF LEFT ARTIFICIAL HIP JOINT: Chronic | ICD-10-CM

## 2023-08-03 DIAGNOSIS — Z98.890 OTHER SPECIFIED POSTPROCEDURAL STATES: Chronic | ICD-10-CM

## 2023-08-03 DIAGNOSIS — Z96.652 PRESENCE OF LEFT ARTIFICIAL KNEE JOINT: Chronic | ICD-10-CM

## 2023-08-03 DIAGNOSIS — Z90.710 ACQUIRED ABSENCE OF BOTH CERVIX AND UTERUS: Chronic | ICD-10-CM

## 2023-08-03 DIAGNOSIS — Z13.820 ENCOUNTER FOR SCREENING FOR OSTEOPOROSIS: ICD-10-CM

## 2023-08-03 DIAGNOSIS — Z00.8 ENCOUNTER FOR OTHER GENERAL EXAMINATION: ICD-10-CM

## 2023-08-03 PROCEDURE — 77080 DXA BONE DENSITY AXIAL: CPT

## 2023-08-04 DIAGNOSIS — Z13.820 ENCOUNTER FOR SCREENING FOR OSTEOPOROSIS: ICD-10-CM

## 2023-09-18 ENCOUNTER — OUTPATIENT (OUTPATIENT)
Dept: OUTPATIENT SERVICES | Facility: HOSPITAL | Age: 83
LOS: 1 days | End: 2023-09-18
Payer: MEDICARE

## 2023-09-18 DIAGNOSIS — Z96.641 PRESENCE OF RIGHT ARTIFICIAL HIP JOINT: Chronic | ICD-10-CM

## 2023-09-18 DIAGNOSIS — Z98.890 OTHER SPECIFIED POSTPROCEDURAL STATES: Chronic | ICD-10-CM

## 2023-09-18 DIAGNOSIS — Z96.642 PRESENCE OF LEFT ARTIFICIAL HIP JOINT: Chronic | ICD-10-CM

## 2023-09-18 DIAGNOSIS — Z00.8 ENCOUNTER FOR OTHER GENERAL EXAMINATION: ICD-10-CM

## 2023-09-18 DIAGNOSIS — Z90.49 ACQUIRED ABSENCE OF OTHER SPECIFIED PARTS OF DIGESTIVE TRACT: Chronic | ICD-10-CM

## 2023-09-18 DIAGNOSIS — Z90.710 ACQUIRED ABSENCE OF BOTH CERVIX AND UTERUS: Chronic | ICD-10-CM

## 2023-09-18 DIAGNOSIS — Z96.652 PRESENCE OF LEFT ARTIFICIAL KNEE JOINT: Chronic | ICD-10-CM

## 2023-09-18 DIAGNOSIS — M54.50 LOW BACK PAIN, UNSPECIFIED: ICD-10-CM

## 2023-09-18 PROCEDURE — 72131 CT LUMBAR SPINE W/O DYE: CPT | Mod: 26,MH

## 2023-09-18 PROCEDURE — 72131 CT LUMBAR SPINE W/O DYE: CPT

## 2023-09-19 DIAGNOSIS — M54.50 LOW BACK PAIN, UNSPECIFIED: ICD-10-CM

## 2023-12-12 ENCOUNTER — INPATIENT (INPATIENT)
Facility: HOSPITAL | Age: 83
LOS: 1 days | Discharge: HOME HEALTH | DRG: 603 | End: 2023-12-14
Attending: INTERNAL MEDICINE | Admitting: INTERNAL MEDICINE
Payer: MEDICARE

## 2023-12-12 VITALS
OXYGEN SATURATION: 95 % | DIASTOLIC BLOOD PRESSURE: 81 MMHG | TEMPERATURE: 98 F | HEART RATE: 100 BPM | SYSTOLIC BLOOD PRESSURE: 187 MMHG | RESPIRATION RATE: 18 BRPM | HEIGHT: 68 IN | WEIGHT: 177.91 LBS

## 2023-12-12 DIAGNOSIS — Z96.642 PRESENCE OF LEFT ARTIFICIAL HIP JOINT: Chronic | ICD-10-CM

## 2023-12-12 DIAGNOSIS — Z96.641 PRESENCE OF RIGHT ARTIFICIAL HIP JOINT: Chronic | ICD-10-CM

## 2023-12-12 DIAGNOSIS — Z90.49 ACQUIRED ABSENCE OF OTHER SPECIFIED PARTS OF DIGESTIVE TRACT: Chronic | ICD-10-CM

## 2023-12-12 DIAGNOSIS — L08.9 LOCAL INFECTION OF THE SKIN AND SUBCUTANEOUS TISSUE, UNSPECIFIED: ICD-10-CM

## 2023-12-12 DIAGNOSIS — Z90.710 ACQUIRED ABSENCE OF BOTH CERVIX AND UTERUS: Chronic | ICD-10-CM

## 2023-12-12 DIAGNOSIS — Z98.890 OTHER SPECIFIED POSTPROCEDURAL STATES: Chronic | ICD-10-CM

## 2023-12-12 DIAGNOSIS — Z96.652 PRESENCE OF LEFT ARTIFICIAL KNEE JOINT: Chronic | ICD-10-CM

## 2023-12-12 LAB
ALBUMIN SERPL ELPH-MCNC: 4 G/DL — SIGNIFICANT CHANGE UP (ref 3.5–5.2)
ALBUMIN SERPL ELPH-MCNC: 4 G/DL — SIGNIFICANT CHANGE UP (ref 3.5–5.2)
ALP SERPL-CCNC: 92 U/L — SIGNIFICANT CHANGE UP (ref 30–115)
ALP SERPL-CCNC: 92 U/L — SIGNIFICANT CHANGE UP (ref 30–115)
ALT FLD-CCNC: 28 U/L — SIGNIFICANT CHANGE UP (ref 0–41)
ALT FLD-CCNC: 28 U/L — SIGNIFICANT CHANGE UP (ref 0–41)
ANION GAP SERPL CALC-SCNC: 9 MMOL/L — SIGNIFICANT CHANGE UP (ref 7–14)
ANION GAP SERPL CALC-SCNC: 9 MMOL/L — SIGNIFICANT CHANGE UP (ref 7–14)
APTT BLD: 30.9 SEC — SIGNIFICANT CHANGE UP (ref 27–39.2)
APTT BLD: 30.9 SEC — SIGNIFICANT CHANGE UP (ref 27–39.2)
AST SERPL-CCNC: 39 U/L — SIGNIFICANT CHANGE UP (ref 0–41)
AST SERPL-CCNC: 39 U/L — SIGNIFICANT CHANGE UP (ref 0–41)
BASOPHILS # BLD AUTO: 0.03 K/UL — SIGNIFICANT CHANGE UP (ref 0–0.2)
BASOPHILS # BLD AUTO: 0.03 K/UL — SIGNIFICANT CHANGE UP (ref 0–0.2)
BASOPHILS NFR BLD AUTO: 0.5 % — SIGNIFICANT CHANGE UP (ref 0–1)
BASOPHILS NFR BLD AUTO: 0.5 % — SIGNIFICANT CHANGE UP (ref 0–1)
BILIRUB SERPL-MCNC: 0.3 MG/DL — SIGNIFICANT CHANGE UP (ref 0.2–1.2)
BILIRUB SERPL-MCNC: 0.3 MG/DL — SIGNIFICANT CHANGE UP (ref 0.2–1.2)
BUN SERPL-MCNC: 24 MG/DL — HIGH (ref 10–20)
BUN SERPL-MCNC: 24 MG/DL — HIGH (ref 10–20)
CALCIUM SERPL-MCNC: 9.5 MG/DL — SIGNIFICANT CHANGE UP (ref 8.4–10.4)
CALCIUM SERPL-MCNC: 9.5 MG/DL — SIGNIFICANT CHANGE UP (ref 8.4–10.4)
CHLORIDE SERPL-SCNC: 101 MMOL/L — SIGNIFICANT CHANGE UP (ref 98–110)
CHLORIDE SERPL-SCNC: 101 MMOL/L — SIGNIFICANT CHANGE UP (ref 98–110)
CO2 SERPL-SCNC: 27 MMOL/L — SIGNIFICANT CHANGE UP (ref 17–32)
CO2 SERPL-SCNC: 27 MMOL/L — SIGNIFICANT CHANGE UP (ref 17–32)
CREAT SERPL-MCNC: 1 MG/DL — SIGNIFICANT CHANGE UP (ref 0.7–1.5)
CREAT SERPL-MCNC: 1 MG/DL — SIGNIFICANT CHANGE UP (ref 0.7–1.5)
CRP SERPL-MCNC: <3 MG/L — SIGNIFICANT CHANGE UP
CRP SERPL-MCNC: <3 MG/L — SIGNIFICANT CHANGE UP
EGFR: 56 ML/MIN/1.73M2 — LOW
EGFR: 56 ML/MIN/1.73M2 — LOW
EOSINOPHIL # BLD AUTO: 0.09 K/UL — SIGNIFICANT CHANGE UP (ref 0–0.7)
EOSINOPHIL # BLD AUTO: 0.09 K/UL — SIGNIFICANT CHANGE UP (ref 0–0.7)
EOSINOPHIL NFR BLD AUTO: 1.5 % — SIGNIFICANT CHANGE UP (ref 0–8)
EOSINOPHIL NFR BLD AUTO: 1.5 % — SIGNIFICANT CHANGE UP (ref 0–8)
ERYTHROCYTE [SEDIMENTATION RATE] IN BLOOD: 40 MM/HR — HIGH (ref 0–20)
ERYTHROCYTE [SEDIMENTATION RATE] IN BLOOD: 40 MM/HR — HIGH (ref 0–20)
GLUCOSE SERPL-MCNC: 106 MG/DL — HIGH (ref 70–99)
GLUCOSE SERPL-MCNC: 106 MG/DL — HIGH (ref 70–99)
HCT VFR BLD CALC: 34.9 % — LOW (ref 37–47)
HCT VFR BLD CALC: 34.9 % — LOW (ref 37–47)
HGB BLD-MCNC: 11.7 G/DL — LOW (ref 12–16)
HGB BLD-MCNC: 11.7 G/DL — LOW (ref 12–16)
IMM GRANULOCYTES NFR BLD AUTO: 0.2 % — SIGNIFICANT CHANGE UP (ref 0.1–0.3)
IMM GRANULOCYTES NFR BLD AUTO: 0.2 % — SIGNIFICANT CHANGE UP (ref 0.1–0.3)
INR BLD: 1.03 RATIO — SIGNIFICANT CHANGE UP (ref 0.65–1.3)
INR BLD: 1.03 RATIO — SIGNIFICANT CHANGE UP (ref 0.65–1.3)
LACTATE SERPL-SCNC: 1.1 MMOL/L — SIGNIFICANT CHANGE UP (ref 0.7–2)
LACTATE SERPL-SCNC: 1.1 MMOL/L — SIGNIFICANT CHANGE UP (ref 0.7–2)
LYMPHOCYTES # BLD AUTO: 1.71 K/UL — SIGNIFICANT CHANGE UP (ref 1.2–3.4)
LYMPHOCYTES # BLD AUTO: 1.71 K/UL — SIGNIFICANT CHANGE UP (ref 1.2–3.4)
LYMPHOCYTES # BLD AUTO: 27.9 % — SIGNIFICANT CHANGE UP (ref 20.5–51.1)
LYMPHOCYTES # BLD AUTO: 27.9 % — SIGNIFICANT CHANGE UP (ref 20.5–51.1)
MCHC RBC-ENTMCNC: 31.3 PG — HIGH (ref 27–31)
MCHC RBC-ENTMCNC: 31.3 PG — HIGH (ref 27–31)
MCHC RBC-ENTMCNC: 33.5 G/DL — SIGNIFICANT CHANGE UP (ref 32–37)
MCHC RBC-ENTMCNC: 33.5 G/DL — SIGNIFICANT CHANGE UP (ref 32–37)
MCV RBC AUTO: 93.3 FL — SIGNIFICANT CHANGE UP (ref 81–99)
MCV RBC AUTO: 93.3 FL — SIGNIFICANT CHANGE UP (ref 81–99)
MONOCYTES # BLD AUTO: 0.48 K/UL — SIGNIFICANT CHANGE UP (ref 0.1–0.6)
MONOCYTES # BLD AUTO: 0.48 K/UL — SIGNIFICANT CHANGE UP (ref 0.1–0.6)
MONOCYTES NFR BLD AUTO: 7.8 % — SIGNIFICANT CHANGE UP (ref 1.7–9.3)
MONOCYTES NFR BLD AUTO: 7.8 % — SIGNIFICANT CHANGE UP (ref 1.7–9.3)
NEUTROPHILS # BLD AUTO: 3.82 K/UL — SIGNIFICANT CHANGE UP (ref 1.4–6.5)
NEUTROPHILS # BLD AUTO: 3.82 K/UL — SIGNIFICANT CHANGE UP (ref 1.4–6.5)
NEUTROPHILS NFR BLD AUTO: 62.1 % — SIGNIFICANT CHANGE UP (ref 42.2–75.2)
NEUTROPHILS NFR BLD AUTO: 62.1 % — SIGNIFICANT CHANGE UP (ref 42.2–75.2)
NRBC # BLD: 0 /100 WBCS — SIGNIFICANT CHANGE UP (ref 0–0)
NRBC # BLD: 0 /100 WBCS — SIGNIFICANT CHANGE UP (ref 0–0)
PLATELET # BLD AUTO: 226 K/UL — SIGNIFICANT CHANGE UP (ref 130–400)
PLATELET # BLD AUTO: 226 K/UL — SIGNIFICANT CHANGE UP (ref 130–400)
PMV BLD: 9.6 FL — SIGNIFICANT CHANGE UP (ref 7.4–10.4)
PMV BLD: 9.6 FL — SIGNIFICANT CHANGE UP (ref 7.4–10.4)
POTASSIUM SERPL-MCNC: 3.7 MMOL/L — SIGNIFICANT CHANGE UP (ref 3.5–5)
POTASSIUM SERPL-MCNC: 3.7 MMOL/L — SIGNIFICANT CHANGE UP (ref 3.5–5)
POTASSIUM SERPL-SCNC: 3.7 MMOL/L — SIGNIFICANT CHANGE UP (ref 3.5–5)
POTASSIUM SERPL-SCNC: 3.7 MMOL/L — SIGNIFICANT CHANGE UP (ref 3.5–5)
PROT SERPL-MCNC: 7.1 G/DL — SIGNIFICANT CHANGE UP (ref 6–8)
PROT SERPL-MCNC: 7.1 G/DL — SIGNIFICANT CHANGE UP (ref 6–8)
PROTHROM AB SERPL-ACNC: 11.7 SEC — SIGNIFICANT CHANGE UP (ref 9.95–12.87)
PROTHROM AB SERPL-ACNC: 11.7 SEC — SIGNIFICANT CHANGE UP (ref 9.95–12.87)
RBC # BLD: 3.74 M/UL — LOW (ref 4.2–5.4)
RBC # BLD: 3.74 M/UL — LOW (ref 4.2–5.4)
RBC # FLD: 15.4 % — HIGH (ref 11.5–14.5)
RBC # FLD: 15.4 % — HIGH (ref 11.5–14.5)
SODIUM SERPL-SCNC: 137 MMOL/L — SIGNIFICANT CHANGE UP (ref 135–146)
SODIUM SERPL-SCNC: 137 MMOL/L — SIGNIFICANT CHANGE UP (ref 135–146)
WBC # BLD: 6.14 K/UL — SIGNIFICANT CHANGE UP (ref 4.8–10.8)
WBC # BLD: 6.14 K/UL — SIGNIFICANT CHANGE UP (ref 4.8–10.8)
WBC # FLD AUTO: 6.14 K/UL — SIGNIFICANT CHANGE UP (ref 4.8–10.8)
WBC # FLD AUTO: 6.14 K/UL — SIGNIFICANT CHANGE UP (ref 4.8–10.8)

## 2023-12-12 PROCEDURE — 93926 LOWER EXTREMITY STUDY: CPT | Mod: 26,LT

## 2023-12-12 PROCEDURE — 83735 ASSAY OF MAGNESIUM: CPT

## 2023-12-12 PROCEDURE — 86901 BLOOD TYPING SEROLOGIC RH(D): CPT

## 2023-12-12 PROCEDURE — 86900 BLOOD TYPING SEROLOGIC ABO: CPT

## 2023-12-12 PROCEDURE — 73630 X-RAY EXAM OF FOOT: CPT | Mod: 26,LT

## 2023-12-12 PROCEDURE — 85610 PROTHROMBIN TIME: CPT

## 2023-12-12 PROCEDURE — 36415 COLL VENOUS BLD VENIPUNCTURE: CPT

## 2023-12-12 PROCEDURE — 85025 COMPLETE CBC W/AUTO DIFF WBC: CPT

## 2023-12-12 PROCEDURE — 80053 COMPREHEN METABOLIC PANEL: CPT

## 2023-12-12 PROCEDURE — 86850 RBC ANTIBODY SCREEN: CPT

## 2023-12-12 PROCEDURE — 99285 EMERGENCY DEPT VISIT HI MDM: CPT

## 2023-12-12 PROCEDURE — 93010 ELECTROCARDIOGRAM REPORT: CPT

## 2023-12-12 PROCEDURE — 93971 EXTREMITY STUDY: CPT | Mod: 26,LT

## 2023-12-12 PROCEDURE — 85730 THROMBOPLASTIN TIME PARTIAL: CPT

## 2023-12-12 RX ORDER — VANCOMYCIN HCL 1 G
VIAL (EA) INTRAVENOUS
Refills: 0 | Status: DISCONTINUED | OUTPATIENT
Start: 2023-12-12 | End: 2023-12-12

## 2023-12-12 RX ORDER — INFLIXIMAB-DYYB 120 MG/ML
0 INJECTION SUBCUTANEOUS
Qty: 0 | Refills: 0 | DISCHARGE

## 2023-12-12 RX ORDER — METHOTREXATE 2.5 MG/1
15 TABLET ORAL
Refills: 0 | Status: CANCELLED | OUTPATIENT
Start: 2023-12-17 | End: 2023-12-14

## 2023-12-12 RX ORDER — ATORVASTATIN CALCIUM 80 MG/1
40 TABLET, FILM COATED ORAL AT BEDTIME
Refills: 0 | Status: DISCONTINUED | OUTPATIENT
Start: 2023-12-12 | End: 2023-12-14

## 2023-12-12 RX ORDER — ASCORBIC ACID 60 MG
1000 TABLET,CHEWABLE ORAL
Qty: 0 | Refills: 0 | DISCHARGE

## 2023-12-12 RX ORDER — CHOLECALCIFEROL (VITAMIN D3) 125 MCG
2 CAPSULE ORAL
Refills: 0 | DISCHARGE

## 2023-12-12 RX ORDER — CHOLECALCIFEROL (VITAMIN D3) 125 MCG
2000 CAPSULE ORAL DAILY
Refills: 0 | Status: DISCONTINUED | OUTPATIENT
Start: 2023-12-12 | End: 2023-12-14

## 2023-12-12 RX ORDER — HYDROCHLOROTHIAZIDE 25 MG
1 TABLET ORAL
Qty: 0 | Refills: 0 | DISCHARGE

## 2023-12-12 RX ORDER — ASPIRIN/CALCIUM CARB/MAGNESIUM 324 MG
81 TABLET ORAL DAILY
Refills: 0 | Status: DISCONTINUED | OUTPATIENT
Start: 2023-12-12 | End: 2023-12-14

## 2023-12-12 RX ORDER — ROSUVASTATIN CALCIUM 5 MG/1
1 TABLET ORAL
Refills: 0 | DISCHARGE

## 2023-12-12 RX ORDER — ACETAMINOPHEN 500 MG
975 TABLET ORAL ONCE
Refills: 0 | Status: COMPLETED | OUTPATIENT
Start: 2023-12-12 | End: 2023-12-12

## 2023-12-12 RX ORDER — ANASTROZOLE 1 MG/1
1 TABLET ORAL
Qty: 0 | Refills: 0 | DISCHARGE

## 2023-12-12 RX ORDER — VANCOMYCIN HCL 1 G
1250 VIAL (EA) INTRAVENOUS EVERY 12 HOURS
Refills: 0 | Status: DISCONTINUED | OUTPATIENT
Start: 2023-12-12 | End: 2023-12-13

## 2023-12-12 RX ORDER — METOPROLOL TARTRATE 50 MG
25 TABLET ORAL DAILY
Refills: 0 | Status: DISCONTINUED | OUTPATIENT
Start: 2023-12-12 | End: 2023-12-14

## 2023-12-12 RX ORDER — MULTIVIT-MIN/FERROUS GLUCONATE 9 MG/15 ML
1 LIQUID (ML) ORAL
Qty: 0 | Refills: 0 | DISCHARGE

## 2023-12-12 RX ORDER — LANOLIN ALCOHOL/MO/W.PET/CERES
5 CREAM (GRAM) TOPICAL AT BEDTIME
Refills: 0 | Status: DISCONTINUED | OUTPATIENT
Start: 2023-12-12 | End: 2023-12-14

## 2023-12-12 RX ORDER — CELECOXIB 200 MG/1
1 CAPSULE ORAL
Qty: 0 | Refills: 0 | DISCHARGE

## 2023-12-12 RX ORDER — ZOLPIDEM TARTRATE 10 MG/1
1 TABLET ORAL
Qty: 0 | Refills: 0 | DISCHARGE

## 2023-12-12 RX ORDER — FOLIC ACID 0.8 MG
1 TABLET ORAL
Refills: 0 | DISCHARGE

## 2023-12-12 RX ORDER — ENOXAPARIN SODIUM 100 MG/ML
40 INJECTION SUBCUTANEOUS EVERY 24 HOURS
Refills: 0 | Status: DISCONTINUED | OUTPATIENT
Start: 2023-12-12 | End: 2023-12-14

## 2023-12-12 RX ORDER — METOPROLOL TARTRATE 50 MG
1 TABLET ORAL
Qty: 0 | Refills: 0 | DISCHARGE

## 2023-12-12 RX ORDER — SERTRALINE 25 MG/1
0 TABLET, FILM COATED ORAL
Qty: 0 | Refills: 0 | DISCHARGE

## 2023-12-12 RX ORDER — ASPIRIN/CALCIUM CARB/MAGNESIUM 324 MG
1 TABLET ORAL
Qty: 0 | Refills: 0 | DISCHARGE

## 2023-12-12 RX ORDER — PIPERACILLIN AND TAZOBACTAM 4; .5 G/20ML; G/20ML
3.38 INJECTION, POWDER, LYOPHILIZED, FOR SOLUTION INTRAVENOUS ONCE
Refills: 0 | Status: COMPLETED | OUTPATIENT
Start: 2023-12-12 | End: 2023-12-12

## 2023-12-12 RX ORDER — NIFEDIPINE 30 MG
30 TABLET, EXTENDED RELEASE 24 HR ORAL ONCE
Refills: 0 | Status: COMPLETED | OUTPATIENT
Start: 2023-12-12 | End: 2023-12-12

## 2023-12-12 RX ORDER — FOLIC ACID 0.8 MG
1 TABLET ORAL
Refills: 0 | Status: DISCONTINUED | OUTPATIENT
Start: 2023-12-12 | End: 2023-12-14

## 2023-12-12 RX ORDER — VANCOMYCIN HCL 1 G
1250 VIAL (EA) INTRAVENOUS ONCE
Refills: 0 | Status: DISCONTINUED | OUTPATIENT
Start: 2023-12-12 | End: 2023-12-12

## 2023-12-12 RX ORDER — PIPERACILLIN AND TAZOBACTAM 4; .5 G/20ML; G/20ML
3.38 INJECTION, POWDER, LYOPHILIZED, FOR SOLUTION INTRAVENOUS ONCE
Refills: 0 | Status: COMPLETED | OUTPATIENT
Start: 2023-12-12 | End: 2023-12-13

## 2023-12-12 RX ORDER — SERTRALINE 25 MG/1
50 TABLET, FILM COATED ORAL DAILY
Refills: 0 | Status: DISCONTINUED | OUTPATIENT
Start: 2023-12-12 | End: 2023-12-14

## 2023-12-12 RX ORDER — FAMOTIDINE 10 MG/ML
1 INJECTION INTRAVENOUS
Qty: 0 | Refills: 0 | DISCHARGE

## 2023-12-12 RX ADMIN — Medication 975 MILLIGRAM(S): at 15:09

## 2023-12-12 RX ADMIN — Medication 30 MILLIGRAM(S): at 18:08

## 2023-12-12 RX ADMIN — PIPERACILLIN AND TAZOBACTAM 200 GRAM(S): 4; .5 INJECTION, POWDER, LYOPHILIZED, FOR SOLUTION INTRAVENOUS at 16:06

## 2023-12-12 NOTE — H&P ADULT - ASSESSMENT
83y F w/ hx of RA on methotrexate, Depression, GERD, HTN, HLD breast ca s/p lumpectomy in 2015 is sent in by Podiatrist, Dr. Stahl, for persistent left foot infection.     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 Plantar arch is intact. No acute fracture or malalignment. If concern with osteomyelitis, consider correlation with MR scanning.    #Elevated BP   #HTN  - BP remained 180s-190s despite taking home doses of HCTZ and metoprolol   - no headache / chest pain / sob  - give one dose of nifedipine now, monitor BP   - continue HCTZ and metoprolol     #RA  - cont MTX on sunday + folic acid     #HDL  - continue rosuvastatin     DIET: DASH  DVT prophylaxis: lovenox   CODE: full  DISPO: acute

## 2023-12-12 NOTE — ED PROVIDER NOTE - CLINICAL SUMMARY MEDICAL DECISION MAKING FREE TEXT BOX
Patient with cellulitis and infected ulcer to left foot.  Seen by podiatry.  Recommend admission to medicine for IV antibiotics.  Venous duplex negative.  Arterial duplex read pending.  Does have good perfusion on my exam.  Hypertensive but no chest pain, shortness of breath or headache and asymptomatic from that standpoint so clinically not concerned about hypertensive emergency.  Admitted to medicine.  Discussed with PMD.

## 2023-12-12 NOTE — ED PROVIDER NOTE - PHYSICAL EXAMINATION
CONSTITUTIONAL: well-appearing, well nourished, non-toxic, NAD  SKIN: Warm dry, normal skin turgor  HEAD: NCAT  EYES: EOMI, no scleral icterus  NECK: Supple; Full ROM.   CARD: RRR, no murmurs, rubs or gallops  RESP: clear to ausculation b/l.  No rales, rhonchi, or wheezing.  EXT: Full ROM, no bony tenderness, mild pretibial edema, left foot open wound with exposed subcutaneous tissue and surrounding erythema overlying bunion.  NEURO: normal motor. normal sensory.   PSYCH: Cooperative, appropriate. CONSTITUTIONAL: well-appearing, well nourished, non-toxic, NAD  SKIN: Warm dry, normal skin turgor  HEAD: NCAT  EYES: EOMI, no scleral icterus  NECK: Supple; Full ROM.   CARD: RRR, no murmurs, rubs or gallops  RESP: clear to ausculation b/l.  No rales, rhonchi, or wheezing.  EXT: Full ROM, no bony tenderness, mild pretibial edema, left foot open wound with exposed subcutaneous tissue and surrounding erythema overlying bunion. Right foot erythematous bunion  NEURO: normal motor. normal sensory.   PSYCH: Cooperative, appropriate.

## 2023-12-12 NOTE — H&P ADULT - NSHPPHYSICALEXAM_GEN_ALL_CORE
VITALS:   T(C): 35.8 (12-12-23 @ 16:43), Max: 36.8 (12-12-23 @ 13:34)  HR: 77 (12-12-23 @ 16:43) (77 - 100)  BP: 164/76 (12-12-23 @ 16:43) (164/76 - 187/81)  RR: 18 (12-12-23 @ 16:43) (18 - 18)  SpO2: 98% (12-12-23 @ 16:43) (95% - 98%)    GENERAL: NAD, lying comfortably in bed  LUNG: Clear to auscultation bilaterally, No  rhonchi, No wheezing  HEART: Regular rate and rhythm, No murmurs  ABDOMEN: Soft, nontender, nondistended  EXTREMITIES:  slight pitting edema, left foot open wound with exposed subcutaneous tissue and surrounding erythema overlying bunion. Right foot erythematous bunion  NERVOUS SYSTEM:  A&Ox3  SKIN: No rashes or lesions

## 2023-12-12 NOTE — ED PROVIDER NOTE - OBJECTIVE STATEMENT
83y Female is sent in by Podiatrist, Dr. Stahl, for persistent left foot infection. Pt took Augmentin for 2 weeks with no improvement. 83y F w/ hx of RA on methotrexate, Depression, GERD, HTN, breast ca s/p lumpectomy in 2015 is sent in by Podiatrist, Dr. Stahl, for persistent left foot infection. Pt took Augmentin for 2 weeks with no improvement. Pt denies discharge from wound, fever, chills, sweats, shortness of breath, URI symptoms, urinary symptoms or other rash.

## 2023-12-12 NOTE — ED ADULT NURSE NOTE - NSFALLUNIVINTERV_ED_ALL_ED
Bed/Stretcher in lowest position, wheels locked, appropriate side rails in place/Call bell, personal items and telephone in reach/Instruct patient to call for assistance before getting out of bed/chair/stretcher/Non-slip footwear applied when patient is off stretcher/[place] to call system/Physically safe environment - no spills, clutter or unnecessary equipment/Purposeful proactive rounding/Room/bathroom lighting operational, light cord in reach Bed/Stretcher in lowest position, wheels locked, appropriate side rails in place/Call bell, personal items and telephone in reach/Instruct patient to call for assistance before getting out of bed/chair/stretcher/Non-slip footwear applied when patient is off stretcher/[place] to call system/Physically safe environment - no spills, clutter or unnecessary equipment/Purposeful proactive rounding/Room/bathroom lighting operational, light cord in reach

## 2023-12-12 NOTE — PATIENT PROFILE ADULT - FALL HARM RISK - RISK INTERVENTIONS
Monitor for mental status changes/Monitor gait and stability/Reorient to person, place and time as needed/Review medications for side effects contributing to fall risk/Sit up slowly, dangle for a short time, stand at bedside before walking/Toileting schedule using arm’s reach rule for commode and bathroom/Use of alarms - bed, chair and/or voice tab/Bed in lowest position, wheels locked, appropriate side rails in place/Call bell, personal items and telephone in reach/Instruct patient to call for assistance before getting out of bed or chair/Non-slip footwear when patient is out of bed/[place] to call system/Physically safe environment - no spills, clutter or unnecessary equipment/Purposeful Proactive Rounding/Room/bathroom lighting operational, light cord in reach Monitor for mental status changes/Monitor gait and stability/Reorient to person, place and time as needed/Review medications for side effects contributing to fall risk/Sit up slowly, dangle for a short time, stand at bedside before walking/Toileting schedule using arm’s reach rule for commode and bathroom/Use of alarms - bed, chair and/or voice tab/Bed in lowest position, wheels locked, appropriate side rails in place/Call bell, personal items and telephone in reach/Instruct patient to call for assistance before getting out of bed or chair/Non-slip footwear when patient is out of bed/[place] to call system/Physically safe environment - no spills, clutter or unnecessary equipment/Purposeful Proactive Rounding/Room/bathroom lighting operational, light cord in reach

## 2023-12-12 NOTE — ED PROVIDER NOTE - NS ED MD TWO NIGHTS YN
Labs ordered per last AVS.  
Patient is scheduled for yearly follow up this Wed 1/6.   Per last ov note, patient to have CBC with diff, CMP, and Immunoelectrophoresis 5 days prior.   Per the note below, request to have labs ordered as there were no orders in system, still no pending orders.   Text message sent to patient to remind him to have labs drawn today.   
Patient returning call stating he is not comfortable leaving his home to have labs drawn and does not wish to have an in person visit at this time  Offered to change visit to a video visit but patient states he prefers to postpone appointment and reassess at that time  Patient rescheduled for 1/6/21  FYI to MR pool  
Patient scheduled for yearly follow up with MR [facility]ays Wed 10/28.  Per last ov note, patient to have labs drawn 5 days prior.   No pending labs in system.   Please order. Text message sent to patient to remind him to have drawn now.   
noted  
Yes

## 2023-12-12 NOTE — H&P ADULT - HISTORY OF PRESENT ILLNESS
83y F w/ hx of RA on methotrexate, Depression, GERD, HTN, HLD breast ca s/p lumpectomy in 2015 is sent in by Podiatrist, Dr. Stahl, for persistent left foot infection. She says that she's had arthritis for many years and developed bunions on her feet because of that. Recently, the bunions are being infected due to the shoes that she's wearing. She took Amoxicillin fo 2 weeks without improvement. She went to see her podiatrist today who told her to go to the hospital for IV antibiotics. No fever / chills. No chest pain / sob. ROS otherwise negative.     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  Admitted to medicine for IV antibiotics  83y F w/ hx of RA on methotrexate, Depression, GERD, HTN, HLD breast ca s/p lumpectomy in 2015 is sent in by Podiatrist, Dr. Stahl, for persistent left foot infection. She says that she's had arthritis for many years and developed bunions on her feet because of that. Recently, the bunions are being infected due to the shoes that she's wearing. She took Amoxicillin fo 2 weeks without improvement. She went to see her podiatrist today who told her to go to the hospital for IV antibiotics, after min response to the Antibiotics. No fever / chills. No chest pain / sob. ROS otherwise negative.     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 Plantar arch is intact. No acute fracture or malalignment. If concern with osteomyelitis, consider correlation with MR scanning.  Admitted to medicine for IV antibiotics

## 2023-12-12 NOTE — H&P ADULT - ATTENDING COMMENTS
Chart reviewed, patient examined. Pertinent results reviewed.  Case discussed with HO and specialists- ID, POD  HD#2    See progress note.   Agree w A&P.   ID suggests Prednisone as anti-inflammatory Rx; and no further Dx scans at this time.   Will follow ID recommendations, change meds, and anticipate for DC tomorrow.

## 2023-12-12 NOTE — ED PROVIDER NOTE - ATTENDING CONTRIBUTION TO CARE
83-year-old female with a past medical history of breast cancer intervention, rheumatoid arthritis, hypertension, hyperlipidemia presents with left foot infection. Had a "lesion "per the patient on her foot that got irritated and infected. Has been on Augmentin for 2 weeks and is getting worse and more red and having purulent discharge. Seen by podiatry today and told to come in for evaluation. On exam nontoxic, vital signs noted, left foot with dorsal and medial side 1 x 1 cm ulceration with granulation tissue in the [place], no active drainage, surrounding erythema with tenderness to palpation and warmth but no crepitus. Does not streak up the leg. 2+ DP pulse and full range of motion of all joints and capillary refill less than 2 seconds. Suspect cellulitis, consider osteomyelitis. Will consult podiatry, get labs, give IV antibiotics, anticipate admission.

## 2023-12-13 LAB
ALBUMIN SERPL ELPH-MCNC: 3.5 G/DL — SIGNIFICANT CHANGE UP (ref 3.5–5.2)
ALBUMIN SERPL ELPH-MCNC: 3.5 G/DL — SIGNIFICANT CHANGE UP (ref 3.5–5.2)
ALP SERPL-CCNC: 91 U/L — SIGNIFICANT CHANGE UP (ref 30–115)
ALP SERPL-CCNC: 91 U/L — SIGNIFICANT CHANGE UP (ref 30–115)
ALT FLD-CCNC: 25 U/L — SIGNIFICANT CHANGE UP (ref 0–41)
ALT FLD-CCNC: 25 U/L — SIGNIFICANT CHANGE UP (ref 0–41)
ANION GAP SERPL CALC-SCNC: 11 MMOL/L — SIGNIFICANT CHANGE UP (ref 7–14)
ANION GAP SERPL CALC-SCNC: 11 MMOL/L — SIGNIFICANT CHANGE UP (ref 7–14)
APTT BLD: 29 SEC — SIGNIFICANT CHANGE UP (ref 27–39.2)
APTT BLD: 29 SEC — SIGNIFICANT CHANGE UP (ref 27–39.2)
AST SERPL-CCNC: 33 U/L — SIGNIFICANT CHANGE UP (ref 0–41)
AST SERPL-CCNC: 33 U/L — SIGNIFICANT CHANGE UP (ref 0–41)
BASOPHILS # BLD AUTO: 0.03 K/UL — SIGNIFICANT CHANGE UP (ref 0–0.2)
BASOPHILS # BLD AUTO: 0.03 K/UL — SIGNIFICANT CHANGE UP (ref 0–0.2)
BASOPHILS NFR BLD AUTO: 0.6 % — SIGNIFICANT CHANGE UP (ref 0–1)
BASOPHILS NFR BLD AUTO: 0.6 % — SIGNIFICANT CHANGE UP (ref 0–1)
BILIRUB SERPL-MCNC: 0.4 MG/DL — SIGNIFICANT CHANGE UP (ref 0.2–1.2)
BILIRUB SERPL-MCNC: 0.4 MG/DL — SIGNIFICANT CHANGE UP (ref 0.2–1.2)
BLD GP AB SCN SERPL QL: SIGNIFICANT CHANGE UP
BLD GP AB SCN SERPL QL: SIGNIFICANT CHANGE UP
BUN SERPL-MCNC: 21 MG/DL — HIGH (ref 10–20)
BUN SERPL-MCNC: 21 MG/DL — HIGH (ref 10–20)
CALCIUM SERPL-MCNC: 9.3 MG/DL — SIGNIFICANT CHANGE UP (ref 8.4–10.5)
CALCIUM SERPL-MCNC: 9.3 MG/DL — SIGNIFICANT CHANGE UP (ref 8.4–10.5)
CHLORIDE SERPL-SCNC: 101 MMOL/L — SIGNIFICANT CHANGE UP (ref 98–110)
CHLORIDE SERPL-SCNC: 101 MMOL/L — SIGNIFICANT CHANGE UP (ref 98–110)
CO2 SERPL-SCNC: 26 MMOL/L — SIGNIFICANT CHANGE UP (ref 17–32)
CO2 SERPL-SCNC: 26 MMOL/L — SIGNIFICANT CHANGE UP (ref 17–32)
CREAT SERPL-MCNC: 1 MG/DL — SIGNIFICANT CHANGE UP (ref 0.7–1.5)
CREAT SERPL-MCNC: 1 MG/DL — SIGNIFICANT CHANGE UP (ref 0.7–1.5)
EGFR: 56 ML/MIN/1.73M2 — LOW
EGFR: 56 ML/MIN/1.73M2 — LOW
EOSINOPHIL # BLD AUTO: 0.16 K/UL — SIGNIFICANT CHANGE UP (ref 0–0.7)
EOSINOPHIL # BLD AUTO: 0.16 K/UL — SIGNIFICANT CHANGE UP (ref 0–0.7)
EOSINOPHIL NFR BLD AUTO: 3.1 % — SIGNIFICANT CHANGE UP (ref 0–8)
EOSINOPHIL NFR BLD AUTO: 3.1 % — SIGNIFICANT CHANGE UP (ref 0–8)
GLUCOSE SERPL-MCNC: 94 MG/DL — SIGNIFICANT CHANGE UP (ref 70–99)
GLUCOSE SERPL-MCNC: 94 MG/DL — SIGNIFICANT CHANGE UP (ref 70–99)
GRAM STN FLD: ABNORMAL
GRAM STN FLD: ABNORMAL
GRAM STN FLD: SIGNIFICANT CHANGE UP
HCT VFR BLD CALC: 34.8 % — LOW (ref 37–47)
HCT VFR BLD CALC: 34.8 % — LOW (ref 37–47)
HGB BLD-MCNC: 11.7 G/DL — LOW (ref 12–16)
HGB BLD-MCNC: 11.7 G/DL — LOW (ref 12–16)
IMM GRANULOCYTES NFR BLD AUTO: 0.4 % — HIGH (ref 0.1–0.3)
IMM GRANULOCYTES NFR BLD AUTO: 0.4 % — HIGH (ref 0.1–0.3)
INR BLD: 1.02 RATIO — SIGNIFICANT CHANGE UP (ref 0.65–1.3)
INR BLD: 1.02 RATIO — SIGNIFICANT CHANGE UP (ref 0.65–1.3)
LYMPHOCYTES # BLD AUTO: 1.65 K/UL — SIGNIFICANT CHANGE UP (ref 1.2–3.4)
LYMPHOCYTES # BLD AUTO: 1.65 K/UL — SIGNIFICANT CHANGE UP (ref 1.2–3.4)
LYMPHOCYTES # BLD AUTO: 31.8 % — SIGNIFICANT CHANGE UP (ref 20.5–51.1)
LYMPHOCYTES # BLD AUTO: 31.8 % — SIGNIFICANT CHANGE UP (ref 20.5–51.1)
MAGNESIUM SERPL-MCNC: 1.7 MG/DL — LOW (ref 1.8–2.4)
MAGNESIUM SERPL-MCNC: 1.7 MG/DL — LOW (ref 1.8–2.4)
MCHC RBC-ENTMCNC: 31.7 PG — HIGH (ref 27–31)
MCHC RBC-ENTMCNC: 31.7 PG — HIGH (ref 27–31)
MCHC RBC-ENTMCNC: 33.6 G/DL — SIGNIFICANT CHANGE UP (ref 32–37)
MCHC RBC-ENTMCNC: 33.6 G/DL — SIGNIFICANT CHANGE UP (ref 32–37)
MCV RBC AUTO: 94.3 FL — SIGNIFICANT CHANGE UP (ref 81–99)
MCV RBC AUTO: 94.3 FL — SIGNIFICANT CHANGE UP (ref 81–99)
MONOCYTES # BLD AUTO: 0.62 K/UL — HIGH (ref 0.1–0.6)
MONOCYTES # BLD AUTO: 0.62 K/UL — HIGH (ref 0.1–0.6)
MONOCYTES NFR BLD AUTO: 11.9 % — HIGH (ref 1.7–9.3)
MONOCYTES NFR BLD AUTO: 11.9 % — HIGH (ref 1.7–9.3)
NEUTROPHILS # BLD AUTO: 2.71 K/UL — SIGNIFICANT CHANGE UP (ref 1.4–6.5)
NEUTROPHILS # BLD AUTO: 2.71 K/UL — SIGNIFICANT CHANGE UP (ref 1.4–6.5)
NEUTROPHILS NFR BLD AUTO: 52.2 % — SIGNIFICANT CHANGE UP (ref 42.2–75.2)
NEUTROPHILS NFR BLD AUTO: 52.2 % — SIGNIFICANT CHANGE UP (ref 42.2–75.2)
NRBC # BLD: 0 /100 WBCS — SIGNIFICANT CHANGE UP (ref 0–0)
NRBC # BLD: 0 /100 WBCS — SIGNIFICANT CHANGE UP (ref 0–0)
PLATELET # BLD AUTO: 239 K/UL — SIGNIFICANT CHANGE UP (ref 130–400)
PLATELET # BLD AUTO: 239 K/UL — SIGNIFICANT CHANGE UP (ref 130–400)
PMV BLD: 9.9 FL — SIGNIFICANT CHANGE UP (ref 7.4–10.4)
PMV BLD: 9.9 FL — SIGNIFICANT CHANGE UP (ref 7.4–10.4)
POTASSIUM SERPL-MCNC: 3.8 MMOL/L — SIGNIFICANT CHANGE UP (ref 3.5–5)
POTASSIUM SERPL-MCNC: 3.8 MMOL/L — SIGNIFICANT CHANGE UP (ref 3.5–5)
POTASSIUM SERPL-SCNC: 3.8 MMOL/L — SIGNIFICANT CHANGE UP (ref 3.5–5)
POTASSIUM SERPL-SCNC: 3.8 MMOL/L — SIGNIFICANT CHANGE UP (ref 3.5–5)
PROT SERPL-MCNC: 7 G/DL — SIGNIFICANT CHANGE UP (ref 6–8)
PROT SERPL-MCNC: 7 G/DL — SIGNIFICANT CHANGE UP (ref 6–8)
PROTHROM AB SERPL-ACNC: 11.6 SEC — SIGNIFICANT CHANGE UP (ref 9.95–12.87)
PROTHROM AB SERPL-ACNC: 11.6 SEC — SIGNIFICANT CHANGE UP (ref 9.95–12.87)
RBC # BLD: 3.69 M/UL — LOW (ref 4.2–5.4)
RBC # BLD: 3.69 M/UL — LOW (ref 4.2–5.4)
RBC # FLD: 15.3 % — HIGH (ref 11.5–14.5)
RBC # FLD: 15.3 % — HIGH (ref 11.5–14.5)
SODIUM SERPL-SCNC: 138 MMOL/L — SIGNIFICANT CHANGE UP (ref 135–146)
SODIUM SERPL-SCNC: 138 MMOL/L — SIGNIFICANT CHANGE UP (ref 135–146)
SPECIMEN SOURCE: SIGNIFICANT CHANGE UP
WBC # BLD: 5.19 K/UL — SIGNIFICANT CHANGE UP (ref 4.8–10.8)
WBC # BLD: 5.19 K/UL — SIGNIFICANT CHANGE UP (ref 4.8–10.8)
WBC # FLD AUTO: 5.19 K/UL — SIGNIFICANT CHANGE UP (ref 4.8–10.8)
WBC # FLD AUTO: 5.19 K/UL — SIGNIFICANT CHANGE UP (ref 4.8–10.8)

## 2023-12-13 RX ORDER — ACETAMINOPHEN 500 MG
650 TABLET ORAL ONCE
Refills: 0 | Status: COMPLETED | OUTPATIENT
Start: 2023-12-13 | End: 2023-12-13

## 2023-12-13 RX ORDER — INFLUENZA VIRUS VACCINE 15; 15; 15; 15 UG/.5ML; UG/.5ML; UG/.5ML; UG/.5ML
0.7 SUSPENSION INTRAMUSCULAR ONCE
Refills: 0 | Status: DISCONTINUED | OUTPATIENT
Start: 2023-12-13 | End: 2023-12-14

## 2023-12-13 RX ORDER — MAGNESIUM SULFATE 500 MG/ML
2 VIAL (ML) INJECTION ONCE
Refills: 0 | Status: COMPLETED | OUTPATIENT
Start: 2023-12-13 | End: 2023-12-13

## 2023-12-13 RX ADMIN — PIPERACILLIN AND TAZOBACTAM 25 GRAM(S): 4; .5 INJECTION, POWDER, LYOPHILIZED, FOR SOLUTION INTRAVENOUS at 00:05

## 2023-12-13 RX ADMIN — Medication 166.67 MILLIGRAM(S): at 00:03

## 2023-12-13 RX ADMIN — Medication 81 MILLIGRAM(S): at 11:58

## 2023-12-13 RX ADMIN — ATORVASTATIN CALCIUM 40 MILLIGRAM(S): 80 TABLET, FILM COATED ORAL at 00:06

## 2023-12-13 RX ADMIN — Medication 5 MILLIGRAM(S): at 21:35

## 2023-12-13 RX ADMIN — Medication 25 GRAM(S): at 15:36

## 2023-12-13 RX ADMIN — SERTRALINE 50 MILLIGRAM(S): 25 TABLET, FILM COATED ORAL at 11:58

## 2023-12-13 RX ADMIN — Medication 5 MILLIGRAM(S): at 00:05

## 2023-12-13 RX ADMIN — ATORVASTATIN CALCIUM 40 MILLIGRAM(S): 80 TABLET, FILM COATED ORAL at 21:33

## 2023-12-13 RX ADMIN — Medication 30 MILLIGRAM(S): at 00:01

## 2023-12-13 RX ADMIN — Medication 2000 UNIT(S): at 11:58

## 2023-12-13 RX ADMIN — Medication 25 MILLIGRAM(S): at 06:36

## 2023-12-13 RX ADMIN — Medication 100 MILLIGRAM(S): at 17:20

## 2023-12-13 RX ADMIN — Medication 650 MILLIGRAM(S): at 19:01

## 2023-12-13 RX ADMIN — Medication 1 MILLIGRAM(S): at 06:36

## 2023-12-13 RX ADMIN — Medication 1 MILLIGRAM(S): at 17:20

## 2023-12-13 NOTE — CONSULT NOTE ADULT - ASSESSMENT
83y F w/ hx of RA on methotrexate, Depression, GERD, HTN, HLD breast ca s/p lumpectomy in 2015 is sent in by Podiatrist, Dr. Stahl, for persistent left foot infection. She says that she's had arthritis for many years and developed bunions on her feet because of that. Recently, the bunions are being infected due to the shoes that she's wearing. She took Amoxicillin fo 2 weeks without improvement.    IMPRESSION/RECOMMENDATIONS  Pressure ( shoe/sandals ) related ulcer left foot dorsum with cellulitis. No abscess  Mostly inflammation  12/12 local cultures : no PMNs, no org  -po Prednisone 40 mg q24h for 3-4 days if no contraindication  -Doxycycline 100 mg q12h iv

## 2023-12-13 NOTE — CONSULT NOTE ADULT - SUBJECTIVE AND OBJECTIVE BOX
Podiatry Consult Note    Subjective:  GIAN MELISSA is 83 yr/o female who was seen bedside in the ED for complaint of worsening redness, pain and drainage from the dorsal wound. Patient's wound care has been treated by podiatrist Dr. Stahl who saw her in clinic today and advised the patient to go to ED for admission for IV antibiotics.   Patient has been doing daily dressing with bacitracin and lesvia.   Finished course of antibiotics with no improvement.       PAST MEDICAL & SURGICAL HISTORY:  Rheumatoid arthritis  HTN (hypertension)  Breast cancer  last radiation 2015  Depression  Chronic GERD  OA (osteoarthritis)  Rheumatoid arthritis  History of right hip replacement  Status post left hip replacement  S/P total knee replacement, left  H/O vaginal hysterectomy  Status post cholecystectomy  S/P lumpectomy of breast  2015          Review of Systems:  [X] Ten point review of systems is otherwise negative except as noted     Objective:  Vital Signs Last 24 Hrs  T(C): 36.8 (12 Dec 2023 13:34), Max: 36.8 (12 Dec 2023 13:34)  T(F): 98.3 (12 Dec 2023 13:34), Max: 98.3 (12 Dec 2023 13:34)  HR: 100 (12 Dec 2023 13:34) (100 - 100)  BP: 187/81 (12 Dec 2023 13:34) (187/81 - 187/81)  BP(mean): --  RR: 18 (12 Dec 2023 13:34) (18 - 18)  SpO2: 95% (12 Dec 2023 13:34) (95% - 95%)    Parameters below as of 12 Dec 2023 13:34  Patient On (Oxygen Delivery Method): room air             Physical Exam - Lower Extremity Focused: Left Foot  Derm: mikal enlargement with dorsal wound on the dorsal aspect of the medial midfoot with mainly fibrotic wound base and surrounding erythema extending up to the forefoot and ankle joint. No maldodor. No active drainage.   Vascular: DP and PT Pulses Diminished; Foot is Warm to Warm to the touch; Capillary Refill Time < 3 Seconds;    Neuro: Protective Sensation Intact  MSK: Pain On Palpation at Wound Site     Assessment:  Left Dorsal Ulcer   Cellulitis     Plan:  Chart reviewed and Patient evaluated. All Questions and Concerns Addressed and Answered  XR Imaging  Foot; Pending Results.  Arterial and Venous Duplexes; Pending   Wound Culture Obtained; Sent to Microbiology; Pending Results   Local Wound Care; Wound Flushed w/ NS; Wound Packed w/ Betadine, Adaptic, Gauze and lesvia secured with tape.   Weight Bearing Status; WBAT in surgical shoe   Admit to medicine for IV antibiotics  Request ID Consult;  / Follow Up w/ Wound Culture  Will round on patient tomorrow for further evaluation.   Discussed Plan w/ Dr. Stahl     Podiatry 
  GIAN MELISSA  83y, Female  Allergy: No Known Allergies      All historical available data reviewed.    HPI:  83y F w/ hx of RA on methotrexate, Depression, GERD, HTN, HLD breast ca s/p lumpectomy in 2015 is sent in by Podiatrist, Dr. Stahl, for persistent left foot infection. She says that she's had arthritis for many years and developed bunions on her feet because of that. Recently, the bunions are being infected due to the shoes that she's wearing. She took Amoxicillin fo 2 weeks without improvement. She went to see her podiatrist today who told her to go to the hospital for IV antibiotics. No fever / chills. No chest pain / sob. ROS otherwise negative.     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  Admitted to medicine for IV antibiotics  (12 Dec 2023 21:50)    FAMILY HISTORY:  FH: cancer  Nonhodgkins : sister      PAST MEDICAL & SURGICAL HISTORY:  Rheumatoid arthritis      HTN (hypertension)      Breast cancer  last radiation 2015      Depression      Chronic GERD      OA (osteoarthritis)      Rheumatoid arthritis      History of right hip replacement      Status post left hip replacement      S/P total knee replacement, left      H/O vaginal hysterectomy      Status post cholecystectomy      S/P lumpectomy of breast  2015            VITALS:  T(F): 97.8, Max: 98.3 (12-12-23 @ 13:34)  HR: 80  BP: 136/63  RR: 18Vital Signs Last 24 Hrs  T(C): 36.6 (13 Dec 2023 05:08), Max: 36.8 (12 Dec 2023 13:34)  T(F): 97.8 (13 Dec 2023 05:08), Max: 98.3 (12 Dec 2023 13:34)  HR: 80 (13 Dec 2023 05:08) (77 - 100)  BP: 136/63 (13 Dec 2023 05:08) (136/63 - 190/89)  BP(mean): --  RR: 18 (13 Dec 2023 05:08) (18 - 18)  SpO2: 99% (13 Dec 2023 05:08) (95% - 100%)    Parameters below as of 12 Dec 2023 20:30  Patient On (Oxygen Delivery Method): room air        TESTS & MEASUREMENTS:                        11.7   5.19  )-----------( 239      ( 13 Dec 2023 06:22 )             34.8     12-13    138  |  101  |  21<H>  ----------------------------<  94  3.8   |  26  |  1.0    Ca    9.3      13 Dec 2023 06:22  Mg     1.7     12-13    TPro  7.0  /  Alb  3.5  /  TBili  0.4  /  DBili  x   /  AST  33  /  ALT  25  /  AlkPhos  91  12-13    LIVER FUNCTIONS - ( 13 Dec 2023 06:22 )  Alb: 3.5 g/dL / Pro: 7.0 g/dL / ALK PHOS: 91 U/L / ALT: 25 U/L / AST: 33 U/L / GGT: x             Culture - Abscess with Gram Stain (collected 12-12-23 @ 15:08)  Source: .Abscess Left Foot  Gram Stain (12-13-23 @ 05:57):    No polymorphonuclear cells seen per low power field    No organisms seen per oil power field      Urinalysis Basic - ( 13 Dec 2023 06:22 )    Color: x / Appearance: x / SG: x / pH: x  Gluc: 94 mg/dL / Ketone: x  / Bili: x / Urobili: x   Blood: x / Protein: x / Nitrite: x   Leuk Esterase: x / RBC: x / WBC x   Sq Epi: x / Non Sq Epi: x / Bacteria: x          RADIOLOGY & ADDITIONAL TESTS:  Personal review of radiological diagnostics performed  Echo and EKG results noted when applicable.     MEDICATIONS:  aspirin enteric coated 81 milliGRAM(s) Oral daily  atorvastatin 40 milliGRAM(s) Oral at bedtime  cholecalciferol 2000 Unit(s) Oral daily  enoxaparin Injectable 40 milliGRAM(s) SubCutaneous every 24 hours  folic acid 1 milliGRAM(s) Oral <User Schedule>  hydrochlorothiazide 50 milliGRAM(s) Oral daily  influenza  Vaccine (HIGH DOSE) 0.7 milliLiter(s) IntraMuscular once  melatonin 5 milliGRAM(s) Oral at bedtime  metoprolol succinate ER 25 milliGRAM(s) Oral daily  sertraline 50 milliGRAM(s) Oral daily  vancomycin  IVPB 1250 milliGRAM(s) IV Intermittent every 12 hours      ANTIBIOTICS:  vancomycin  IVPB 1250 milliGRAM(s) IV Intermittent every 12 hours

## 2023-12-13 NOTE — PROGRESS NOTE ADULT - ASSESSMENT
83y F w/ hx of RA on methotrexate, Depression, GERD, HTN, HLD breast ca s/p lumpectomy in 2015 is sent in by Podiatrist, Dr. Stahl, for persistent left foot infection.     #Left foot ulcer w cellulitis  - s/p Vanc & Zosyn in ED  - ID following - dc vanc  - f/u cultures taken from wound. No organisms on gram stain  - podiatry recs appreciated  - wound care local  - Xray foot: Significant midfoot arthrosis is recognized with proliferative change. Plantar arch is intact. No acute fracture or malalignment. If concern with osteomyelitis, consider correlation with MR scanning.  --po Prednisone 40 mg q24h for 3-4 days if no contraindication  -Doxycycline 100 mg q12h iv    #Elevated BP   #HTN  - BP remained 180s-190s despite taking home doses of HCTZ and metoprolol   - no headache / chest pain / sob  - give one dose of nifedipine now, monitor BP   - continue HCTZ and metoprolol     #RA  - cont MTX on sunday + folic acid     #HDL  - continue rosuvastatin     DIET: DASH  DVT prophylaxis: lovenox   CODE: full  DISPO: acute

## 2023-12-13 NOTE — PROGRESS NOTE ADULT - SUBJECTIVE AND OBJECTIVE BOX
Chart reviewed, patient examined. Pertinent results reviewed.  Case discussed with HO; specialist f/u reviewed  HD#2; patient well known to me for many years.   GIAN MELISSA    Allergy: No Known Allergies    CC: progresssive l foot infection  All historical available data reviewed.    HPI:  83y F w/ hx of RA on methotrexate, Depression, GERD, HTN, HLD breast ca s/p lumpectomy in 2015 is sent in by Podiatrist, Dr. Stahl, for persistent left foot infection. She says that she's had arthritis for many years and developed bunions on her feet because of that. Recently, the bunions are being infected due to the shoes that she's wearing. She took Amoxicillin fo 2 weeks without improvement. She went to see her podiatrist today who told her to go to the hospital for IV antibiotics. No fever / chills. No chest pain / sob. ROS otherwise negative.     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 Plantar arch is intact. No acute fracture or malalignment. If concern with osteomyelitis, consider correlation with MR scanning.  Admitted to medicine for IV antibiotics  (12 Dec 2023 21:50)    FAMILY HISTORY:  FH: cancer  Nonhodgkins : sister    SH: retired [employer];  x a       PAST MEDICAL & SURGICAL HISTORY:  Rheumatoid arthritis      HTN (hypertension)      Breast cancer  last radiation 2015      Depression      Chronic GERD      OA (osteoarthritis)      Rheumatoid arthritis      History of right hip replacement      Status post left hip replacement      S/P total knee replacement, left      H/O vaginal hysterectomy      Status post cholecystectomy      S/P lumpectomy of breast  2015            VITALS:  T(F): 97.8, Max: 98.3 (12-12-23 @ 13:34)  HR: 80  BP: 136/63  RR: 18Vital Signs Last 24 Hrs  T(C): 36.6 (13 Dec 2023 05:08), Max: 36.8 (12 Dec 2023 13:34)  T(F): 97.8 (13 Dec 2023 05:08), Max: 98.3 (12 Dec 2023 13:34)  HR: 80 (13 Dec 2023 05:08) (77 - 100)  BP: 136/63 (13 Dec 2023 05:08) (136/63 - 190/89)  BP(mean): --  RR: 18 (13 Dec 2023 05:08) (18 - 18)  SpO2: 99% (13 Dec 2023 05:08) (95% - 100%)    Parameters below as of 12 Dec 2023 20:30  Patient On (Oxygen Delivery Method): room air\    GENERAL: NAD, lying comfortably in bed  LUNG: Clear bilaterally, No  rhonchi, No wheezing  HEART: RRR, No MRG  ABDOMEN: Soft, nontender, nondistended; + obese; no HSM, M    EXTREMITIES:  slight pitting edema, left foot open wound with exposed subcutaneous tissue and surrounding erythema overlying bunion.-  / about 1.4cmx0.8 cm . Right foot erythematous bunion            B/L hip scars w DEC ROM.  NERVOUS SYSTEM:  A&Ox4  SKIN: No rashes or lesions                  TESTS & MEASUREMENTS:                        11.7   5.19  )-----------( 239      ( 13 Dec 2023 06:22 )             34.8     12-13    138  |  101  |  21<H>  ----------------------------<  94  3.8   |  26  |  1.0    Ca    9.3      13 Dec 2023 06:22  Mg     1.7     12-13    TPro  7.0  /  Alb  3.5  /  TBili  0.4  /  DBili  x   /  AST  33  /  ALT  25  /  AlkPhos  91  12-13    LIVER FUNCTIONS - ( 13 Dec 2023 06:22 )  Alb: 3.5 g/dL / Pro: 7.0 g/dL / ALK PHOS: 91 U/L / ALT: 25 U/L / AST: 33 U/L / GGT: x             Culture - Abscess with Gram Stain (collected 12-12-23 @ 15:08)  Source: .Abscess Left Foot  Gram Stain (12-13-23 @ 05:57):    No polymorphonuclear cells seen per low power field    No organisms seen per oil power field      Urinalysis Basic - ( 13 Dec 2023 06:22 )    Color: x / Appearance: x / SG: x / pH: x  Gluc: 94 mg/dL / Ketone: x  / Bili: x / Urobili: x   Blood: x / Protein: x / Nitrite: x   Leuk Esterase: x / RBC: x / WBC x   Sq Epi: x / Non Sq Epi: x / Bacteria: x          RADIOLOGY & ADDITIONAL TESTS:  Personal review of radiological diagnostics performed  Echo and EKG results noted when applicable.     MEDICATIONS:  aspirin enteric coated 81 milliGRAM(s) Oral daily  atorvastatin 40 milliGRAM(s) Oral at bedtime  cholecalciferol 2000 Unit(s) Oral daily  enoxaparin Injectable 40 milliGRAM(s) SubCutaneous every 24 hours  folic acid 1 milliGRAM(s) Oral <User Schedule>  hydrochlorothiazide 50 milliGRAM(s) Oral daily  influenza  Vaccine (HIGH DOSE) 0.7 milliLiter(s) IntraMuscular once  melatonin 5 milliGRAM(s) Oral at bedtime  metoprolol succinate ER 25 milliGRAM(s) Oral daily  sertraline 50 milliGRAM(s) Oral daily  vancomycin  IVPB 1250 milliGRAM(s) IV Intermittent every 12 hours      ANTIBIOTICS:  vancomycin  IVPB 1250 milliGRAM(s) IV Intermittent every 12 hours     Chart reviewed, patient examined. Pertinent results reviewed.  Case discussed with HO; specialist f/u reviewed  HD#2; patient well known to me for many years.   GIAN MELISSA    Allergy: No Known Allergies    CC: progressive l foot infection    HPI:  83y F w/ hx of RA on methotrexate, Depression, GERD, HTN, HLD breast ca s/p lumpectomy in 2015 is sent in by Podiatrist, Dr. Stahl, for persistent left foot infection. She says that she's had arthritis for many years and developed bunions on her feet because of that. Recently, the bunions are being infected due to the shoes that she's wearing. She took Amoxicillin fo 2 weeks without improvement. She went to see her podiatrist today who told her to go to the hospital for IV antibiotics. No fever / chills. No chest pain / sob. ROS otherwise negative.     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 Plantar arch is intact. No acute fracture or malalignment. If concern with osteomyelitis, consider correlation with MR scanning.  Admitted to medicine for IV antibiotics  (12 Dec 2023 21:50)    FAMILY HISTORY:  FH: cancer  Nonhodgkins : sister    SH: retired [employer];  x a       PAST MEDICAL & SURGICAL HISTORY:  Rheumatoid arthritis      HTN (hypertension)      Breast cancer  last radiation 2015      Depression      Chronic GERD      OA (osteoarthritis)    MEDICATIONS  (STANDING):  aspirin enteric coated 81 milliGRAM(s) Oral daily  atorvastatin 40 milliGRAM(s) Oral at bedtime  cholecalciferol 2000 Unit(s) Oral daily  doxycycline IVPB 100 milliGRAM(s) IV Intermittent every 12 hours  enoxaparin Injectable 40 milliGRAM(s) SubCutaneous every 24 hours  folic acid 1 milliGRAM(s) Oral <User Schedule>  hydrochlorothiazide 50 milliGRAM(s) Oral daily  influenza  Vaccine (HIGH DOSE) 0.7 milliLiter(s) IntraMuscular once  melatonin 5 milliGRAM(s) Oral at bedtime  metoprolol succinate ER 25 milliGRAM(s) Oral daily  predniSONE   Tablet 40 milliGRAM(s) Oral daily  sertraline 50 milliGRAM(s) Oral daily    MEDICATIONS  (PRN):    Rheumatoid arthritis      History of right hip replacement      Status post left hip replacement      S/P total knee replacement, left      H/O vaginal hysterectomy      Status post cholecystectomy      S/P lumpectomy of breast  2015    MEDICATIONS  (STANDING):  aspirin enteric coated 81 milliGRAM(s) Oral daily  atorvastatin 40 milliGRAM(s) Oral at bedtime  cholecalciferol 2000 Unit(s) Oral daily  doxycycline IVPB 100 milliGRAM(s) IV Intermittent every 12 hours  enoxaparin Injectable 40 milliGRAM(s) SubCutaneous every 24 hours  folic acid 1 milliGRAM(s) Oral <User Schedule>  hydrochlorothiazide 50 milliGRAM(s) Oral daily  influenza  Vaccine (HIGH DOSE) 0.7 milliLiter(s) IntraMuscular once  melatonin 5 milliGRAM(s) Oral at bedtime  metoprolol succinate ER 25 milliGRAM(s) Oral daily  predniSONE   Tablet 40 milliGRAM(s) Oral daily  sertraline 50 milliGRAM(s) Oral daily    MEDICATIONS  (PRN):    VITALS:  T(F): 97.8, Max: 98.3 (12-12-23 @ 13:34)  HR: 80  BP: 136/63  RR: 18Vital Signs Last 24 Hrs  T(C): 36.6 (13 Dec 2023 05:08), Max: 36.8 (12 Dec 2023 13:34)  T(F): 97.8 (13 Dec 2023 05:08), Max: 98.3 (12 Dec 2023 13:34)  HR: 80 (13 Dec 2023 05:08) (77 - 100)  BP: 136/63 (13 Dec 2023 05:08) (136/63 - 190/89)  BP(mean): --  RR: 18 (13 Dec 2023 05:08) (18 - 18)  SpO2: 99% (13 Dec 2023 05:08) (95% - 100%)    Parameters below as of 12 Dec 2023 20:30  Patient On (Oxygen Delivery Method): room air\    GENERAL: NAD, lying comfortably in bed  LUNG: Clear bilaterally, No  rhonchi, No wheezing  HEART: RRR, No MRG  ABDOMEN: Soft, nontender, nondistended; + obese; no HSM, M    EXTREMITIES:  slight pitting edema, left foot open wound with exposed subcutaneous tissue and surrounding erythema overlying bunion.-  / about 1.4cmx0.8 cm . Right foot erythematous bunion            B/L hip scars w DEC ROM.  NERVOUS SYSTEM:  A&Ox4  SKIN: No rashes or lesions                  TESTS & MEASUREMENTS:                        11.7   5.19  )-----------( 239      ( 13 Dec 2023 06:22 )             34.8     12-13    138  |  101  |  21<H>  ----------------------------<  94  3.8   |  26  |  1.0    Ca    9.3      13 Dec 2023 06:22  Mg     1.7     12-13    TPro  7.0  /  Alb  3.5  /  TBili  0.4  /  DBili  x   /  AST  33  /  ALT  25  /  AlkPhos  91  12-13    LIVER FUNCTIONS - ( 13 Dec 2023 06:22 )  Alb: 3.5 g/dL / Pro: 7.0 g/dL / ALK PHOS: 91 U/L / ALT: 25 U/L / AST: 33 U/L / GGT: x             Culture - Abscess with Gram Stain (collected 12-12-23 @ 15:08)  Source: .Abscess Left Foot  Gram Stain (12-13-23 @ 05:57):    No polymorphonuclear cells seen per low power field    No organisms seen per oil power field      Urinalysis Basic - ( 13 Dec 2023 06:22 )    Color: x / Appearance: x / SG: x / pH: x  Gluc: 94 mg/dL / Ketone: x  / Bili: x / Urobili: x   Blood: x / Protein: x / Nitrite: x   Leuk Esterase: x / RBC: x / WBC x   Sq Epi: x / Non Sq Epi: x / Bacteria: x          RADIOLOGY & ADDITIONAL TESTS:  Personal review of radiological diagnostics performed  Echo and EKG results noted when applicable.     MEDICATIONS:  aspirin enteric coated 81 milliGRAM(s) Oral daily  atorvastatin 40 milliGRAM(s) Oral at bedtime  cholecalciferol 2000 Unit(s) Oral daily  enoxaparin Injectable 40 milliGRAM(s) SubCutaneous every 24 hours  folic acid 1 milliGRAM(s) Oral <User Schedule>  hydrochlorothiazide 50 milliGRAM(s) Oral daily  influenza  Vaccine (HIGH DOSE) 0.7 milliLiter(s) IntraMuscular once  melatonin 5 milliGRAM(s) Oral at bedtime  metoprolol succinate ER 25 milliGRAM(s) Oral daily  sertraline 50 milliGRAM(s) Oral daily  vancomycin  IVPB 1250 milliGRAM(s) IV Intermittent every 12 hours      ANTIBIOTICS:  vancomycin  IVPB 1250 milliGRAM(s) IV Intermittent every 12 hours

## 2023-12-13 NOTE — PROGRESS NOTE ADULT - SUBJECTIVE AND OBJECTIVE BOX
Podiatry Progress Note    Subjective:  GIAN MELISSA is a  83y Female who was seen bedside this morning for left infection (13 Dec 2023 10:53).   Patient notes improvement of pain and redness to the area.         PAST MEDICAL & SURGICAL HISTORY:  Rheumatoid arthritis  HTN (hypertension)  Breast cancer  last radiation 2015  Depression  Chronic GERD  OA (osteoarthritis)  Rheumatoid arthritis  History of right hip replacement  Status post left hip replacement  S/P total knee replacement, left  H/O vaginal hysterectomy  Status post cholecystectomy  S/P lumpectomy of breast  2015           Objective:  Vital Signs Last 24 Hrs  T(C): 36.9 (13 Dec 2023 12:59), Max: 36.9 (13 Dec 2023 12:59)  T(F): 98.4 (13 Dec 2023 12:59), Max: 98.4 (13 Dec 2023 12:59)  HR: 86 (13 Dec 2023 12:14) (77 - 86)  BP: 135/61 (13 Dec 2023 12:14) (135/61 - 190/89)  BP(mean): --  RR: 18 (13 Dec 2023 12:14) (18 - 18)  SpO2: 96% (13 Dec 2023 12:14) (96% - 100%)    Parameters below as of 12 Dec 2023 20:30  Patient On (Oxygen Delivery Method): room air                            11.7   5.19  )-----------( 239      ( 13 Dec 2023 06:22 )             34.8                 12-13    138  |  101  |  21<H>  ----------------------------<  94  3.8   |  26  |  1.0    Ca    9.3      13 Dec 2023 06:22  Mg     1.7     12-13    TPro  7.0  /  Alb  3.5  /  TBili  0.4  /  DBili  x   /  AST  33  /  ALT  25  /  AlkPhos  91  12-13      Physical Exam - Lower Extremity Focused: Left Foot  Derm: mikal enlargement with dorsal wound on the dorsal aspect of the medial midfoot with mainly fibrotic wound base and surrounding erythema extending up to the forefoot and ankle joint - improved  No maldodor. No active drainage.   Vascular: DP and PT Pulses Diminished; Foot is Warm to Warm to the touch; Capillary Refill Time < 3 Seconds;    Neuro: Protective Sensation Intact  MSK: Pain On Palpation at Wound Site - improved    Assessment:  Left Dorsal Ulcer - improving   Cellulitis/Inflammation - improving    Plan:  Chart reviewed and Patient evaluated. All Questions and Concerns Addressed and Answered  XR Imaging  Foot; Reviewed,   Arterial and Venous Duplexes; Reviewed.  Wound Culture; Reviewed - negative  Local Wound Care; Wound Flushed w/ NS; Wound Packed w/ xeroform, gauze, lesvia secured with tape.   Weight Bearing Status; WBAT in surgical shoe   Agree with ID plan; Doxy and Prednisone   Attending to round on patient in the morning 12/14  Discussed Plan w/ Dr. Stahl

## 2023-12-13 NOTE — PROGRESS NOTE ADULT - SUBJECTIVE AND OBJECTIVE BOX
24H events:    Patient is a 83y old Female who presents with a chief complaint of left foot infection (13 Dec 2023 09:41)    Primary diagnosis of Left foot infection      Day 1:  Day 2:  Day 3:     Today is hospital day 1d. This morning patient was seen and examined at bedside, resting comfortably in bed.    No acute or major events overnight.    Code Status:    Family communication:  Contact date:  Name of person contacted:  Relationship to patient:  Communication details:  What matters most:    PAST MEDICAL & SURGICAL HISTORY  Rheumatoid arthritis    HTN (hypertension)    Breast cancer  last radiation 2015    Depression    Chronic GERD    OA (osteoarthritis)    Rheumatoid arthritis    History of right hip replacement    Status post left hip replacement    S/P total knee replacement, left    H/O vaginal hysterectomy    Status post cholecystectomy    S/P lumpectomy of breast  2015      SOCIAL HISTORY:  Social History:      ALLERGIES:  No Known Allergies    MEDICATIONS:  STANDING MEDICATIONS  aspirin enteric coated 81 milliGRAM(s) Oral daily  atorvastatin 40 milliGRAM(s) Oral at bedtime  cholecalciferol 2000 Unit(s) Oral daily  doxycycline IVPB 100 milliGRAM(s) IV Intermittent every 12 hours  enoxaparin Injectable 40 milliGRAM(s) SubCutaneous every 24 hours  folic acid 1 milliGRAM(s) Oral <User Schedule>  hydrochlorothiazide 50 milliGRAM(s) Oral daily  influenza  Vaccine (HIGH DOSE) 0.7 milliLiter(s) IntraMuscular once  melatonin 5 milliGRAM(s) Oral at bedtime  metoprolol succinate ER 25 milliGRAM(s) Oral daily  sertraline 50 milliGRAM(s) Oral daily    PRN MEDICATIONS    VITALS:   T(F): 97.8  HR: 80  BP: 136/63  RR: 18  SpO2: 99%    PHYSICAL EXAM:  GENERAL: NAD, lying comfortably in bed  LUNG: Clear to auscultation bilaterally, No  rhonchi, No wheezing  HEART: Regular rate and rhythm, No murmurs  ABDOMEN: Soft, nontender, nondistended  EXTREMITIES:  slight pitting edema, left foot open wound with exposed subcutaneous tissue and surrounding erythema overlying bunion. Right foot erythematous bunion  NERVOUS SYSTEM:  A&Ox3  SKIN: No rashes or lesions    LABS:                        11.7   5.19  )-----------( 239      ( 13 Dec 2023 06:22 )             34.8     12-13    138  |  101  |  21<H>  ----------------------------<  94  3.8   |  26  |  1.0    Ca    9.3      13 Dec 2023 06:22  Mg     1.7     12-13    TPro  7.0  /  Alb  3.5  /  TBili  0.4  /  DBili  x   /  AST  33  /  ALT  25  /  AlkPhos  91  12-13    PT/INR - ( 13 Dec 2023 06:22 )   PT: 11.60 sec;   INR: 1.02 ratio         PTT - ( 13 Dec 2023 06:22 )  PTT:29.0 sec  Urinalysis Basic - ( 13 Dec 2023 06:22 )    Color: x / Appearance: x / SG: x / pH: x  Gluc: 94 mg/dL / Ketone: x  / Bili: x / Urobili: x   Blood: x / Protein: x / Nitrite: x   Leuk Esterase: x / RBC: x / WBC x   Sq Epi: x / Non Sq Epi: x / Bacteria: x        Sedimentation Rate, Erythrocyte: 40 mm/Hr *H* (12-12-23 @ 16:16)  Lactate, Blood: 1.1 mmol/L (12-12-23 @ 15:30)      Culture - Abscess with Gram Stain (collected 12 Dec 2023 15:08)  Source: .Abscess Left Foot  Gram Stain (13 Dec 2023 05:57):    No polymorphonuclear cells seen per low power field    No organisms seen per oil power field          RADIOLOGY:

## 2023-12-13 NOTE — PROGRESS NOTE ADULT - ASSESSMENT
83y F w/ hx of RA on methotrexate, Depression, GERD, HTN, HLD breast ca s/p lumpectomy in 2015 is sent in by Podiatrist, Dr. Stahl, for persistent left foot infection. She says that she's had arthritis for many years and developed bunions on her feet because of that. Recently, the bunions are being infected due to the shoes that she's wearing. She took Amoxicillin fo 2 weeks without improvement.    IMPRESSION/RECOMMENDATIONS  PER IDL:  Pressure ( shoe/sandals ) related ulcer left foot dorsum with cellulitis. No abscess  Mostly inflammation  12/12 local cultures : no PMNs, no org  -po Prednisone 40 mg q24h for 3-4 days if no contraindication  -Doxycycline 100 mg q12h iv      #Elevated BP   #HTN  - BP remained 180s-190s despite taking home doses of HCTZ and metoprolol   - no headache / chest pain / sob  - give one dose of nifedipine now, monitor BP   - continue HCTZ and metoprolol     #RA  - cont MTX on sunday + folic acid     #HDL  - continue rosuvastatin     DIET: DASH  DVT prophylaxis: lovenox   CODE: full  DISPO: acute   83y F w/ hx of RA on methotrexate, Depression, GERD, HTN, HLD breast ca s/p lumpectomy in 2015 is sent in by Podiatrist, Dr. Stahl, for persistent left foot infection. She says that she's had arthritis for many years and developed bunions on her feet because of that. Recently, the bunions are being infected due to the shoes that she's wearing. She took Amoxicillin fo 2 weeks without improvement.    IMPRESSION/RECOMMENDATIONS  PER IDL:  Pressure ( shoe/sandals ) related ulcer left foot dorsum with cellulitis. No abscess  Mostly inflammation  12/12 local cultures : no PMNs, no org  -po Prednisone 40 mg q24h for 3-4 days if no contraindication  -Doxycycline 100 mg q12h iv      #Elevated BP   #HTN  - BP was 180s-190s despite taking home doses of HCTZ and metoprolol          better today.   - no headache / chest pain / sob  - give one dose of nifedipine now, monitor BP   - continue HCTZ and metoprolol   - had rxn to some anti-hypertensives in the past- will check OP record.    #RA  - cont MTX on sunday + folic acid   - OK per ID    #HDL  - continue rosuvastatin     DIET: DASH  DVT prophylaxis: lovenox   CODE: full  DISPO: acute    possible DC in AM or 12/15.

## 2023-12-14 ENCOUNTER — TRANSCRIPTION ENCOUNTER (OUTPATIENT)
Age: 83
End: 2023-12-14

## 2023-12-14 VITALS
TEMPERATURE: 97 F | SYSTOLIC BLOOD PRESSURE: 139 MMHG | OXYGEN SATURATION: 98 % | HEART RATE: 95 BPM | DIASTOLIC BLOOD PRESSURE: 77 MMHG

## 2023-12-14 LAB
-  AMOXICILLIN/CLAVULANIC ACID: SIGNIFICANT CHANGE UP
-  AMPICILLIN/SULBACTAM: SIGNIFICANT CHANGE UP
-  AMPICILLIN: SIGNIFICANT CHANGE UP
-  AZTREONAM: SIGNIFICANT CHANGE UP
-  CEFAZOLIN: SIGNIFICANT CHANGE UP
-  CEFEPIME: SIGNIFICANT CHANGE UP
-  CEFOXITIN: SIGNIFICANT CHANGE UP
-  CEFTRIAXONE: SIGNIFICANT CHANGE UP
-  CIPROFLOXACIN: SIGNIFICANT CHANGE UP
-  ERTAPENEM: SIGNIFICANT CHANGE UP
-  GENTAMICIN: SIGNIFICANT CHANGE UP
-  LEVOFLOXACIN: SIGNIFICANT CHANGE UP
-  MEROPENEM: SIGNIFICANT CHANGE UP
-  PIPERACILLIN/TAZOBACTAM: SIGNIFICANT CHANGE UP
-  TOBRAMYCIN: SIGNIFICANT CHANGE UP
-  TRIMETHOPRIM/SULFAMETHOXAZOLE: SIGNIFICANT CHANGE UP
ALBUMIN SERPL ELPH-MCNC: 3.9 G/DL — SIGNIFICANT CHANGE UP (ref 3.5–5.2)
ALBUMIN SERPL ELPH-MCNC: 3.9 G/DL — SIGNIFICANT CHANGE UP (ref 3.5–5.2)
ALP SERPL-CCNC: 100 U/L — SIGNIFICANT CHANGE UP (ref 30–115)
ALP SERPL-CCNC: 100 U/L — SIGNIFICANT CHANGE UP (ref 30–115)
ALT FLD-CCNC: 24 U/L — SIGNIFICANT CHANGE UP (ref 0–41)
ALT FLD-CCNC: 24 U/L — SIGNIFICANT CHANGE UP (ref 0–41)
ANION GAP SERPL CALC-SCNC: 14 MMOL/L — SIGNIFICANT CHANGE UP (ref 7–14)
ANION GAP SERPL CALC-SCNC: 14 MMOL/L — SIGNIFICANT CHANGE UP (ref 7–14)
AST SERPL-CCNC: 29 U/L — SIGNIFICANT CHANGE UP (ref 0–41)
AST SERPL-CCNC: 29 U/L — SIGNIFICANT CHANGE UP (ref 0–41)
BASOPHILS # BLD AUTO: 0.03 K/UL — SIGNIFICANT CHANGE UP (ref 0–0.2)
BASOPHILS # BLD AUTO: 0.03 K/UL — SIGNIFICANT CHANGE UP (ref 0–0.2)
BASOPHILS NFR BLD AUTO: 0.5 % — SIGNIFICANT CHANGE UP (ref 0–1)
BASOPHILS NFR BLD AUTO: 0.5 % — SIGNIFICANT CHANGE UP (ref 0–1)
BILIRUB SERPL-MCNC: 0.4 MG/DL — SIGNIFICANT CHANGE UP (ref 0.2–1.2)
BILIRUB SERPL-MCNC: 0.4 MG/DL — SIGNIFICANT CHANGE UP (ref 0.2–1.2)
BUN SERPL-MCNC: 19 MG/DL — SIGNIFICANT CHANGE UP (ref 10–20)
BUN SERPL-MCNC: 19 MG/DL — SIGNIFICANT CHANGE UP (ref 10–20)
CALCIUM SERPL-MCNC: 9.6 MG/DL — SIGNIFICANT CHANGE UP (ref 8.4–10.5)
CALCIUM SERPL-MCNC: 9.6 MG/DL — SIGNIFICANT CHANGE UP (ref 8.4–10.5)
CHLORIDE SERPL-SCNC: 99 MMOL/L — SIGNIFICANT CHANGE UP (ref 98–110)
CHLORIDE SERPL-SCNC: 99 MMOL/L — SIGNIFICANT CHANGE UP (ref 98–110)
CO2 SERPL-SCNC: 23 MMOL/L — SIGNIFICANT CHANGE UP (ref 17–32)
CO2 SERPL-SCNC: 23 MMOL/L — SIGNIFICANT CHANGE UP (ref 17–32)
CREAT SERPL-MCNC: 0.8 MG/DL — SIGNIFICANT CHANGE UP (ref 0.7–1.5)
CREAT SERPL-MCNC: 0.8 MG/DL — SIGNIFICANT CHANGE UP (ref 0.7–1.5)
EGFR: 73 ML/MIN/1.73M2 — SIGNIFICANT CHANGE UP
EGFR: 73 ML/MIN/1.73M2 — SIGNIFICANT CHANGE UP
EOSINOPHIL # BLD AUTO: 0.24 K/UL — SIGNIFICANT CHANGE UP (ref 0–0.7)
EOSINOPHIL # BLD AUTO: 0.24 K/UL — SIGNIFICANT CHANGE UP (ref 0–0.7)
EOSINOPHIL NFR BLD AUTO: 4 % — SIGNIFICANT CHANGE UP (ref 0–8)
EOSINOPHIL NFR BLD AUTO: 4 % — SIGNIFICANT CHANGE UP (ref 0–8)
GLUCOSE SERPL-MCNC: 105 MG/DL — HIGH (ref 70–99)
GLUCOSE SERPL-MCNC: 105 MG/DL — HIGH (ref 70–99)
HCT VFR BLD CALC: 38.1 % — SIGNIFICANT CHANGE UP (ref 37–47)
HCT VFR BLD CALC: 38.1 % — SIGNIFICANT CHANGE UP (ref 37–47)
HGB BLD-MCNC: 12.4 G/DL — SIGNIFICANT CHANGE UP (ref 12–16)
HGB BLD-MCNC: 12.4 G/DL — SIGNIFICANT CHANGE UP (ref 12–16)
IMM GRANULOCYTES NFR BLD AUTO: 0.3 % — SIGNIFICANT CHANGE UP (ref 0.1–0.3)
IMM GRANULOCYTES NFR BLD AUTO: 0.3 % — SIGNIFICANT CHANGE UP (ref 0.1–0.3)
LYMPHOCYTES # BLD AUTO: 1.53 K/UL — SIGNIFICANT CHANGE UP (ref 1.2–3.4)
LYMPHOCYTES # BLD AUTO: 1.53 K/UL — SIGNIFICANT CHANGE UP (ref 1.2–3.4)
LYMPHOCYTES # BLD AUTO: 25.2 % — SIGNIFICANT CHANGE UP (ref 20.5–51.1)
LYMPHOCYTES # BLD AUTO: 25.2 % — SIGNIFICANT CHANGE UP (ref 20.5–51.1)
MAGNESIUM SERPL-MCNC: 1.9 MG/DL — SIGNIFICANT CHANGE UP (ref 1.8–2.4)
MAGNESIUM SERPL-MCNC: 1.9 MG/DL — SIGNIFICANT CHANGE UP (ref 1.8–2.4)
MCHC RBC-ENTMCNC: 30.7 PG — SIGNIFICANT CHANGE UP (ref 27–31)
MCHC RBC-ENTMCNC: 30.7 PG — SIGNIFICANT CHANGE UP (ref 27–31)
MCHC RBC-ENTMCNC: 32.5 G/DL — SIGNIFICANT CHANGE UP (ref 32–37)
MCHC RBC-ENTMCNC: 32.5 G/DL — SIGNIFICANT CHANGE UP (ref 32–37)
MCV RBC AUTO: 94.3 FL — SIGNIFICANT CHANGE UP (ref 81–99)
MCV RBC AUTO: 94.3 FL — SIGNIFICANT CHANGE UP (ref 81–99)
METHOD TYPE: SIGNIFICANT CHANGE UP
MONOCYTES # BLD AUTO: 0.41 K/UL — SIGNIFICANT CHANGE UP (ref 0.1–0.6)
MONOCYTES # BLD AUTO: 0.41 K/UL — SIGNIFICANT CHANGE UP (ref 0.1–0.6)
MONOCYTES NFR BLD AUTO: 6.8 % — SIGNIFICANT CHANGE UP (ref 1.7–9.3)
MONOCYTES NFR BLD AUTO: 6.8 % — SIGNIFICANT CHANGE UP (ref 1.7–9.3)
NEUTROPHILS # BLD AUTO: 3.84 K/UL — SIGNIFICANT CHANGE UP (ref 1.4–6.5)
NEUTROPHILS # BLD AUTO: 3.84 K/UL — SIGNIFICANT CHANGE UP (ref 1.4–6.5)
NEUTROPHILS NFR BLD AUTO: 63.2 % — SIGNIFICANT CHANGE UP (ref 42.2–75.2)
NEUTROPHILS NFR BLD AUTO: 63.2 % — SIGNIFICANT CHANGE UP (ref 42.2–75.2)
NRBC # BLD: 0 /100 WBCS — SIGNIFICANT CHANGE UP (ref 0–0)
NRBC # BLD: 0 /100 WBCS — SIGNIFICANT CHANGE UP (ref 0–0)
PLATELET # BLD AUTO: 233 K/UL — SIGNIFICANT CHANGE UP (ref 130–400)
PLATELET # BLD AUTO: 233 K/UL — SIGNIFICANT CHANGE UP (ref 130–400)
PMV BLD: 11.2 FL — HIGH (ref 7.4–10.4)
PMV BLD: 11.2 FL — HIGH (ref 7.4–10.4)
POTASSIUM SERPL-MCNC: 3.6 MMOL/L — SIGNIFICANT CHANGE UP (ref 3.5–5)
POTASSIUM SERPL-MCNC: 3.6 MMOL/L — SIGNIFICANT CHANGE UP (ref 3.5–5)
POTASSIUM SERPL-SCNC: 3.6 MMOL/L — SIGNIFICANT CHANGE UP (ref 3.5–5)
POTASSIUM SERPL-SCNC: 3.6 MMOL/L — SIGNIFICANT CHANGE UP (ref 3.5–5)
PROT SERPL-MCNC: 7.3 G/DL — SIGNIFICANT CHANGE UP (ref 6–8)
PROT SERPL-MCNC: 7.3 G/DL — SIGNIFICANT CHANGE UP (ref 6–8)
RBC # BLD: 4.04 M/UL — LOW (ref 4.2–5.4)
RBC # BLD: 4.04 M/UL — LOW (ref 4.2–5.4)
RBC # FLD: 15.5 % — HIGH (ref 11.5–14.5)
RBC # FLD: 15.5 % — HIGH (ref 11.5–14.5)
SODIUM SERPL-SCNC: 136 MMOL/L — SIGNIFICANT CHANGE UP (ref 135–146)
SODIUM SERPL-SCNC: 136 MMOL/L — SIGNIFICANT CHANGE UP (ref 135–146)
WBC # BLD: 6.07 K/UL — SIGNIFICANT CHANGE UP (ref 4.8–10.8)
WBC # BLD: 6.07 K/UL — SIGNIFICANT CHANGE UP (ref 4.8–10.8)
WBC # FLD AUTO: 6.07 K/UL — SIGNIFICANT CHANGE UP (ref 4.8–10.8)
WBC # FLD AUTO: 6.07 K/UL — SIGNIFICANT CHANGE UP (ref 4.8–10.8)

## 2023-12-14 RX ORDER — ACETAMINOPHEN 500 MG
650 TABLET ORAL ONCE
Refills: 0 | Status: COMPLETED | OUTPATIENT
Start: 2023-12-14 | End: 2023-12-14

## 2023-12-14 RX ADMIN — Medication 100 MILLIGRAM(S): at 11:16

## 2023-12-14 RX ADMIN — ENOXAPARIN SODIUM 40 MILLIGRAM(S): 100 INJECTION SUBCUTANEOUS at 06:15

## 2023-12-14 RX ADMIN — Medication 81 MILLIGRAM(S): at 11:17

## 2023-12-14 RX ADMIN — SERTRALINE 50 MILLIGRAM(S): 25 TABLET, FILM COATED ORAL at 11:15

## 2023-12-14 RX ADMIN — Medication 650 MILLIGRAM(S): at 16:01

## 2023-12-14 RX ADMIN — Medication 40 MILLIGRAM(S): at 06:11

## 2023-12-14 RX ADMIN — Medication 25 MILLIGRAM(S): at 06:12

## 2023-12-14 RX ADMIN — Medication 650 MILLIGRAM(S): at 17:21

## 2023-12-14 RX ADMIN — Medication 1 MILLIGRAM(S): at 06:12

## 2023-12-14 RX ADMIN — Medication 2000 UNIT(S): at 11:16

## 2023-12-14 NOTE — PROGRESS NOTE ADULT - SUBJECTIVE AND OBJECTIVE BOX
GIAN MELISSA  83y, Female    All available historical data reviewed    OVERNIGHT EVENTS:  feels well and has no new complaints  No fevers     ROS:  General: Denies rigors, nightsweats  HEENT: Denies headache, rhinorrhea, sore throat, eye pain  CV: Denies CP, palpitations  PULM: Denies wheezing, hemoptysis  GI: Denies hematemesis, hematochezia, melena  : Denies discharge, hematuria  MSK: Denies arthralgias, myalgias  SKIN: Denies rash, lesions  NEURO: Denies paresthesias, weakness  PSYCH: Denies depression, anxiety    VITALS:  T(F): 97.1, Max: 98.4 (12-13-23 @ 12:59)  HR: 81  BP: 160/76  RR: 18Vital Signs Last 24 Hrs  T(C): 36.2 (14 Dec 2023 05:00), Max: 36.9 (13 Dec 2023 12:59)  T(F): 97.1 (14 Dec 2023 05:00), Max: 98.4 (13 Dec 2023 12:59)  HR: 81 (14 Dec 2023 05:00) (79 - 86)  BP: 160/76 (14 Dec 2023 05:00) (135/61 - 160/76)  BP(mean): --  RR: 18 (14 Dec 2023 05:00) (18 - 18)  SpO2: 97% (14 Dec 2023 05:00) (96% - 97%)    Parameters below as of 13 Dec 2023 21:00  Patient On (Oxygen Delivery Method): room air        TESTS & MEASUREMENTS:                        12.4   6.07  )-----------( 233      ( 14 Dec 2023 07:45 )             38.1     12-14    136  |  99  |  19  ----------------------------<  105<H>  3.6   |  23  |  0.8    Ca    9.6      14 Dec 2023 07:45  Mg     1.9     12-14    TPro  7.3  /  Alb  3.9  /  TBili  0.4  /  DBili  x   /  AST  29  /  ALT  24  /  AlkPhos  100  12-14    LIVER FUNCTIONS - ( 14 Dec 2023 07:45 )  Alb: 3.9 g/dL / Pro: 7.3 g/dL / ALK PHOS: 100 U/L / ALT: 24 U/L / AST: 29 U/L / GGT: x             Culture - Blood (collected 12-12-23 @ 15:30)  Source: .Blood Blood-Peripheral  Preliminary Report (12-14-23 @ 01:02):    No growth at 24 hours    Culture - Blood (collected 12-12-23 @ 15:30)  Source: .Blood Blood-Peripheral  Preliminary Report (12-14-23 @ 01:02):    No growth at 24 hours    Culture - Abscess with Gram Stain (collected 12-12-23 @ 15:08)  Source: .Abscess Left Foot  Gram Stain (12-13-23 @ 21:34):    No polymorphonuclear cells seen per low power field    No organisms seen per oil power field  Preliminary Report (12-13-23 @ 21:34):    Rare Morganella morganii    Rare Proteus mirabilis      Urinalysis Basic - ( 14 Dec 2023 07:45 )    Color: x / Appearance: x / SG: x / pH: x  Gluc: 105 mg/dL / Ketone: x  / Bili: x / Urobili: x   Blood: x / Protein: x / Nitrite: x   Leuk Esterase: x / RBC: x / WBC x   Sq Epi: x / Non Sq Epi: x / Bacteria: x          RADIOLOGY & ADDITIONAL TESTS:  Personal review of radiological diagnostics performed  Echo and EKG results noted when applicable.     MEDICATIONS:  aspirin enteric coated 81 milliGRAM(s) Oral daily  atorvastatin 40 milliGRAM(s) Oral at bedtime  cholecalciferol 2000 Unit(s) Oral daily  doxycycline IVPB 100 milliGRAM(s) IV Intermittent every 12 hours  doxycycline monohydrate Capsule 100 milliGRAM(s) Oral once  enoxaparin Injectable 40 milliGRAM(s) SubCutaneous every 24 hours  folic acid 1 milliGRAM(s) Oral <User Schedule>  hydrochlorothiazide 50 milliGRAM(s) Oral daily  influenza  Vaccine (HIGH DOSE) 0.7 milliLiter(s) IntraMuscular once  melatonin 5 milliGRAM(s) Oral at bedtime  metoprolol succinate ER 25 milliGRAM(s) Oral daily  predniSONE   Tablet 40 milliGRAM(s) Oral daily  sertraline 50 milliGRAM(s) Oral daily      ANTIBIOTICS:  doxycycline IVPB 100 milliGRAM(s) IV Intermittent every 12 hours  doxycycline monohydrate Capsule 100 milliGRAM(s) Oral once

## 2023-12-14 NOTE — DISCHARGE NOTE NURSING/CASE MANAGEMENT/SOCIAL WORK - PATIENT PORTAL LINK FT
You can access the FollowMyHealth Patient Portal offered by [facility] by registering at the following website: [email or web address]. By joining [facility]’s FollowMyHealth portal, you will also be able to view your health information using other applications (apps) compatible with our system. You can access the FollowMyHealth Patient Portal offered by [facility] by registering at the following website: [email or web address]. By joining [facility]’s FollowMyHealth portal, you will also be able to view your health information using other applications (apps) compatible with our system.

## 2023-12-14 NOTE — PROGRESS NOTE ADULT - REASON FOR ADMISSION
left foot infection

## 2023-12-14 NOTE — DISCHARGE NOTE PROVIDER - CARE PROVIDERS DIRECT ADDRESSES
[email or web address]

## 2023-12-14 NOTE — DISCHARGE NOTE NURSING/CASE MANAGEMENT/SOCIAL WORK - NSDCPEFALRISK_GEN_ALL_CORE
For information on Fall & Injury Prevention, visit: [email or web address] OR  [email or web address] OR  https://www.cdc.gov/steadi/patient.html For information on Fall & Injury Prevention, visit: [email or web address] OR  [email or web address] OR  https://www.cdc.gov/steadi/patient.html

## 2023-12-14 NOTE — DISCHARGE NOTE PROVIDER - CARE PROVIDER_API CALL
Thor Cruz  Internal Medicine  [address] [place] [address]  Phone: [phone]  Fax: [phone]  Follow Up Time: 1-3 days   Thor Cruz  Internal Medicine  [address] [place] [address]  Phone: [phone]  Fax: [phone]  Follow Up Time: 1-3 days

## 2023-12-14 NOTE — DISCHARGE NOTE PROVIDER - NSDCMRMEDTOKEN_GEN_ALL_CORE_FT
Calcium 600+D 600 mg-200 intl units oral tablet: 1 tab(s) orally 2 times a day  doxycycline monohydrate 50 mg oral capsule: 2 cap(s) orally 2 times a day take until prescription finished starting 12/14/23 PM  folic acid 1 mg oral tablet: 1 tab(s) orally 2 times a day  hydroCHLOROthiazide 50 mg oral tablet: 1 tab(s) orally once a day  Low Dose ASA 81 mg oral tablet: 1 tab(s) orally once a day  methotrexate 2.5 mg oral tablet: 6 tab(s) orally once a week  metoprolol succinate 25 mg oral tablet, extended release: 1 tab(s) orally once a day (at bedtime)  predniSONE 20 mg oral tablet: 2 tab(s) orally once a day START 12/15/23, LAST DOSES 12/17/23  rosuvastatin 10 mg oral tablet: 1 tab(s) orally once a day  Vitamin C: 1000 milligram(s) orally once a day  Vitamin D3 1000 intl units oral tablet: 2 tab(s) orally once a day  Zoloft 50 mg oral tablet: orally once a day (at bedtime)

## 2023-12-14 NOTE — DISCHARGE NOTE PROVIDER - PROVIDER TOKENS
PROVIDER:[TOKEN:[[id number]],FOLLOWUP:[1-3 days]] PROVIDER:[TOKEN:[[id number]],FOLLOWUP:[1-3 days]]

## 2023-12-14 NOTE — DISCHARGE NOTE PROVIDER - HOSPITAL COURSE
83y F w/ hx of RA on methotrexate, Depression, GERD, HTN, HLD breast ca s/p lumpectomy in 2015 is sent in by Podiatrist, Dr. Stahl, for persistent left foot infection. She says that she's had arthritis for many years and developed bunions on her feet because of that. Recently, the bunions are being infected due to the shoes that she's wearing. She took Amoxicillin fo 2 weeks without improvement. She went to see her podiatrist today who told her to go to the hospital for IV antibiotics, after min response to the Antibiotics. No fever / chills. No chest pain / sob. ROS otherwise negative.     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 Plantar arch is intact. No acute fracture or malalignment. If concern with osteomyelitis, consider correlation with MR scanning.  Admitted to medicine for IV antibiotics     Hospital Course:    Pt evaluated in house for LE ulceration with cellulitis. Pt evaluated by podiatry without inpatient surgical plan. Pt evaluated by infectious disease with recommendation for doxycycline as well as prednisone for inflammatory changes.

## 2023-12-14 NOTE — PROGRESS NOTE ADULT - ASSESSMENT
83y F w/ hx of RA on methotrexate, Depression, GERD, HTN, HLD breast ca s/p lumpectomy in 2015 is sent in by Podiatrist, Dr. Stahl, for persistent left foot infection. She says that she's had arthritis for many years and developed bunions on her feet because of that. Recently, the bunions are being infected due to the shoes that she's wearing. She took Amoxicillin fo 2 weeks without improvement.    IMPRESSION/RECOMMENDATIONS  Pressure ( shoe/sandals ) related ulcer left foot dorsum with resolving cellulitis. No abscess  12/12 BCx NG  12/12 Local cultures: Morganella, P mirabilis> colonizers and do not need coverage  Mostly inflammation  12/12 local cultures : no PMNs, no org  -po Prednisone 40 mg q24h till 12/17  -Doxycycline 100 mg q12h po till 12/20    Please do not hesitate to recall ID if any questions arise either through [employer] or through [employer]  83y F w/ hx of RA on methotrexate, Depression, GERD, HTN, HLD breast ca s/p lumpectomy in 2015 is sent in by Podiatrist, Dr. Stahl, for persistent left foot infection. She says that she's had arthritis for many years and developed bunions on her feet because of that. Recently, the bunions are being infected due to the shoes that she's wearing. She took Amoxicillin fo 2 weeks without improvement.    IMPRESSION/RECOMMENDATIONS  Pressure ( shoe/sandals ) related ulcer left foot dorsum with resolving cellulitis. No abscess  12/12 BCx NG  12/12 Local cultures: Morganella, P mirabilis> colonizers and do not need coverage  Mostly inflammation  12/12 local cultures : no PMNs, no org  -po Prednisone 40 mg q24h till 12/17  -Doxycycline 100 mg q12h po till 12/20    Please do not hesitate to recall ID if any questions arise either through [employer] or through [employer]

## 2023-12-14 NOTE — DISCHARGE NOTE PROVIDER - NSDCCPCAREPLAN_GEN_ALL_CORE_FT
PRINCIPAL DISCHARGE DIAGNOSIS  Diagnosis: Left foot infection  Assessment and Plan of Treatment: -please continue with your antibiotics that should finish with the 12/20/23 PM dose  -Please continue with the prednisone for 3 more days to help reduce the inflammation of the area  -Please return to the emergency department if your feel fevers/chills, worsening pain, redness, or swelling

## 2023-12-14 NOTE — PROGRESS NOTE ADULT - SUBJECTIVE AND OBJECTIVE BOX
Podiatry Progress Note    Subjective:  GIAN MELISSA is a  83y Female who was seen bedside this morning for left infection.   Patient notes improvement of pain and redness to the area. (14 Dec 2023 10:50)      PAST MEDICAL & SURGICAL HISTORY:  Rheumatoid arthritis  HTN (hypertension)  Breast cancer  last radiation 2015  Depression  Chronic GERD  OA (osteoarthritis)  Rheumatoid arthritis  History of right hip replacement  Status post left hip replacement  S/P total knee replacement, left  H/O vaginal hysterectomy  Status post cholecystectomy  S/P lumpectomy of breast  2015           Objective:  Vital Signs Last 24 Hrs  T(C): 36.2 (14 Dec 2023 05:00), Max: 36.9 (13 Dec 2023 12:59)  T(F): 97.1 (14 Dec 2023 05:00), Max: 98.4 (13 Dec 2023 12:59)  HR: 81 (14 Dec 2023 05:00) (79 - 86)  BP: 160/76 (14 Dec 2023 05:00) (135/61 - 160/76)  BP(mean): --  RR: 18 (14 Dec 2023 05:00) (18 - 18)  SpO2: 97% (14 Dec 2023 05:00) (96% - 97%)    Parameters below as of 13 Dec 2023 21:00  Patient On (Oxygen Delivery Method): room air                            12.4   6.07  )-----------( 233      ( 14 Dec 2023 07:45 )             38.1                 12-14    136  |  99  |  19  ----------------------------<  105<H>  3.6   |  23  |  0.8    Ca    9.6      14 Dec 2023 07:45  Mg     1.9     12-14    TPro  7.3  /  Alb  3.9  /  TBili  0.4  /  DBili  x   /  AST  29  /  ALT  24  /  AlkPhos  100  12-14      Physical Exam - Lower Extremity Focused: Left Foot  Derm: mikal enlargement with dorsal wound on the dorsal aspect of the medial midfoot with mainly fibrotic wound base and surrounding erythema extending up to the forefoot and ankle joint - improved  No maldodor. No active drainage.   Vascular: DP and PT Pulses Diminished; Foot is Warm to Warm to the touch; Capillary Refill Time < 3 Seconds;    Neuro: Protective Sensation Intact  MSK: Pain On Palpation at Wound Site - improved    Assessment:  Left Dorsal Ulcer - improving   Cellulitis/Inflammation - improving    Plan:  Chart reviewed and Patient evaluated. All Questions and Concerns Addressed and Answered  XR Imaging  Foot; Reviewed,   Arterial and Venous Duplexes; Reviewed.  Wound Culture; Reviewed - negative  Local Wound Care; Wound Flushed w/ NS; Wound Packed w/ xeroform, gauze, lesvia secured with tape.   Weight Bearing Status; WBAT in surgical shoe   Agree with ID plan; Doxy and Prednisone   Patient stable from podiatry standpoint. Patient to follow up in Dr. Stahl outpatient clinic.   Discussed Plan w/ Dr. Stahl

## 2023-12-14 NOTE — PROGRESS NOTE ADULT - ASSESSMENT
83y F w/ hx of RA on methotrexate, Depression, GERD, HTN, HLD breast ca s/p lumpectomy in 2015 is sent in by Podiatrist, Dr. Stahl, for persistent left foot infection. She says that she's had arthritis for many years and developed bunions on her feet because of that. Recently, the bunions are being infected due to the shoes that she's wearing. She took Amoxicillin fo 2 weeks without improvement.      Left Foot Ulcer  Mostly inflammation  - Continue PO Prednisone 40 mg q24h for 3-4 days if no contraindication  - Doxycycline 100 mg PO BID for 7 more days      HTN  - continue HCTZ and Metoprolol    RA  - continue MTX on Sunday + folic acid     Hyperlipidemia  - continue Rosuvastatin     DIET: DASH  DVT prophylaxis: Lovenox   CODE: full  DISPO:  D/C home today, Home Care ordered for wound care. Case discussed with

## 2023-12-17 LAB
CULTURE RESULTS: ABNORMAL
CULTURE RESULTS: ABNORMAL
ORGANISM # SPEC MICROSCOPIC CNT: ABNORMAL
ORGANISM # SPEC MICROSCOPIC CNT: SIGNIFICANT CHANGE UP
ORGANISM # SPEC MICROSCOPIC CNT: SIGNIFICANT CHANGE UP
SPECIMEN SOURCE: SIGNIFICANT CHANGE UP
SPECIMEN SOURCE: SIGNIFICANT CHANGE UP

## 2023-12-18 LAB
CULTURE RESULTS: SIGNIFICANT CHANGE UP
SPECIMEN SOURCE: SIGNIFICANT CHANGE UP

## 2023-12-19 DIAGNOSIS — L03.116 CELLULITIS OF LEFT LOWER LIMB: ICD-10-CM

## 2023-12-19 DIAGNOSIS — K21.9 GASTRO-ESOPHAGEAL REFLUX DISEASE WITHOUT ESOPHAGITIS: ICD-10-CM

## 2023-12-19 DIAGNOSIS — Z90.710 ACQUIRED ABSENCE OF BOTH CERVIX AND UTERUS: ICD-10-CM

## 2023-12-19 DIAGNOSIS — M21.612 BUNION OF LEFT FOOT: ICD-10-CM

## 2023-12-19 DIAGNOSIS — Z98.890 OTHER SPECIFIED POSTPROCEDURAL STATES: ICD-10-CM

## 2023-12-19 DIAGNOSIS — Z92.3 PERSONAL HISTORY OF IRRADIATION: ICD-10-CM

## 2023-12-19 DIAGNOSIS — Z90.49 ACQUIRED ABSENCE OF OTHER SPECIFIED PARTS OF DIGESTIVE TRACT: ICD-10-CM

## 2023-12-19 DIAGNOSIS — I10 ESSENTIAL (PRIMARY) HYPERTENSION: ICD-10-CM

## 2023-12-19 DIAGNOSIS — Z85.3 PERSONAL HISTORY OF MALIGNANT NEOPLASM OF BREAST: ICD-10-CM

## 2023-12-19 DIAGNOSIS — E78.5 HYPERLIPIDEMIA, UNSPECIFIED: ICD-10-CM

## 2023-12-19 DIAGNOSIS — Z96.643 PRESENCE OF ARTIFICIAL HIP JOINT, BILATERAL: ICD-10-CM

## 2023-12-19 DIAGNOSIS — I44.7 LEFT BUNDLE-BRANCH BLOCK, UNSPECIFIED: ICD-10-CM

## 2023-12-19 DIAGNOSIS — Z96.652 PRESENCE OF LEFT ARTIFICIAL KNEE JOINT: ICD-10-CM

## 2023-12-19 DIAGNOSIS — L89.899 PRESSURE ULCER OF OTHER SITE, UNSPECIFIED STAGE: ICD-10-CM

## 2023-12-19 DIAGNOSIS — M06.9 RHEUMATOID ARTHRITIS, UNSPECIFIED: ICD-10-CM

## 2023-12-19 DIAGNOSIS — M19.072 PRIMARY OSTEOARTHRITIS, LEFT ANKLE AND FOOT: ICD-10-CM

## 2023-12-22 ENCOUNTER — INPATIENT (INPATIENT)
Facility: HOSPITAL | Age: 83
LOS: 3 days | Discharge: HOME | DRG: 602 | End: 2023-12-26
Attending: PLASTIC SURGERY | Admitting: PLASTIC SURGERY
Payer: MEDICARE

## 2023-12-22 VITALS
HEIGHT: 68 IN | DIASTOLIC BLOOD PRESSURE: 77 MMHG | HEART RATE: 92 BPM | OXYGEN SATURATION: 95 % | WEIGHT: 182.98 LBS | SYSTOLIC BLOOD PRESSURE: 125 MMHG | RESPIRATION RATE: 18 BRPM | TEMPERATURE: 98 F

## 2023-12-22 DIAGNOSIS — Z96.652 PRESENCE OF LEFT ARTIFICIAL KNEE JOINT: Chronic | ICD-10-CM

## 2023-12-22 DIAGNOSIS — Z96.642 PRESENCE OF LEFT ARTIFICIAL HIP JOINT: Chronic | ICD-10-CM

## 2023-12-22 DIAGNOSIS — L03.90 CELLULITIS, UNSPECIFIED: ICD-10-CM

## 2023-12-22 DIAGNOSIS — Z96.641 PRESENCE OF RIGHT ARTIFICIAL HIP JOINT: Chronic | ICD-10-CM

## 2023-12-22 DIAGNOSIS — Z90.49 ACQUIRED ABSENCE OF OTHER SPECIFIED PARTS OF DIGESTIVE TRACT: Chronic | ICD-10-CM

## 2023-12-22 DIAGNOSIS — Z90.710 ACQUIRED ABSENCE OF BOTH CERVIX AND UTERUS: Chronic | ICD-10-CM

## 2023-12-22 DIAGNOSIS — Z98.890 OTHER SPECIFIED POSTPROCEDURAL STATES: Chronic | ICD-10-CM

## 2023-12-22 LAB
ALBUMIN SERPL ELPH-MCNC: 3.9 G/DL — SIGNIFICANT CHANGE UP (ref 3.5–5.2)
ALBUMIN SERPL ELPH-MCNC: 3.9 G/DL — SIGNIFICANT CHANGE UP (ref 3.5–5.2)
ALP SERPL-CCNC: 89 U/L — SIGNIFICANT CHANGE UP (ref 30–115)
ALP SERPL-CCNC: 89 U/L — SIGNIFICANT CHANGE UP (ref 30–115)
ALT FLD-CCNC: 35 U/L — SIGNIFICANT CHANGE UP (ref 0–41)
ALT FLD-CCNC: 35 U/L — SIGNIFICANT CHANGE UP (ref 0–41)
ANION GAP SERPL CALC-SCNC: 14 MMOL/L — SIGNIFICANT CHANGE UP (ref 7–14)
ANION GAP SERPL CALC-SCNC: 14 MMOL/L — SIGNIFICANT CHANGE UP (ref 7–14)
AST SERPL-CCNC: 49 U/L — HIGH (ref 0–41)
AST SERPL-CCNC: 49 U/L — HIGH (ref 0–41)
BASOPHILS # BLD AUTO: 0.02 K/UL — SIGNIFICANT CHANGE UP (ref 0–0.2)
BASOPHILS # BLD AUTO: 0.02 K/UL — SIGNIFICANT CHANGE UP (ref 0–0.2)
BASOPHILS NFR BLD AUTO: 0.3 % — SIGNIFICANT CHANGE UP (ref 0–1)
BASOPHILS NFR BLD AUTO: 0.3 % — SIGNIFICANT CHANGE UP (ref 0–1)
BILIRUB SERPL-MCNC: 0.4 MG/DL — SIGNIFICANT CHANGE UP (ref 0.2–1.2)
BILIRUB SERPL-MCNC: 0.4 MG/DL — SIGNIFICANT CHANGE UP (ref 0.2–1.2)
BUN SERPL-MCNC: 35 MG/DL — HIGH (ref 10–20)
BUN SERPL-MCNC: 35 MG/DL — HIGH (ref 10–20)
CALCIUM SERPL-MCNC: 9.2 MG/DL — SIGNIFICANT CHANGE UP (ref 8.4–10.5)
CALCIUM SERPL-MCNC: 9.2 MG/DL — SIGNIFICANT CHANGE UP (ref 8.4–10.5)
CHLORIDE SERPL-SCNC: 91 MMOL/L — LOW (ref 98–110)
CHLORIDE SERPL-SCNC: 91 MMOL/L — LOW (ref 98–110)
CO2 SERPL-SCNC: 29 MMOL/L — SIGNIFICANT CHANGE UP (ref 17–32)
CO2 SERPL-SCNC: 29 MMOL/L — SIGNIFICANT CHANGE UP (ref 17–32)
CREAT SERPL-MCNC: 1.1 MG/DL — SIGNIFICANT CHANGE UP (ref 0.7–1.5)
CREAT SERPL-MCNC: 1.1 MG/DL — SIGNIFICANT CHANGE UP (ref 0.7–1.5)
EGFR: 50 ML/MIN/1.73M2 — LOW
EGFR: 50 ML/MIN/1.73M2 — LOW
EOSINOPHIL # BLD AUTO: 0.02 K/UL — SIGNIFICANT CHANGE UP (ref 0–0.7)
EOSINOPHIL # BLD AUTO: 0.02 K/UL — SIGNIFICANT CHANGE UP (ref 0–0.7)
EOSINOPHIL NFR BLD AUTO: 0.3 % — SIGNIFICANT CHANGE UP (ref 0–8)
EOSINOPHIL NFR BLD AUTO: 0.3 % — SIGNIFICANT CHANGE UP (ref 0–8)
GLUCOSE SERPL-MCNC: 87 MG/DL — SIGNIFICANT CHANGE UP (ref 70–99)
GLUCOSE SERPL-MCNC: 87 MG/DL — SIGNIFICANT CHANGE UP (ref 70–99)
HCT VFR BLD CALC: 41.1 % — SIGNIFICANT CHANGE UP (ref 37–47)
HCT VFR BLD CALC: 41.1 % — SIGNIFICANT CHANGE UP (ref 37–47)
HGB BLD-MCNC: 13.9 G/DL — SIGNIFICANT CHANGE UP (ref 12–16)
HGB BLD-MCNC: 13.9 G/DL — SIGNIFICANT CHANGE UP (ref 12–16)
IMM GRANULOCYTES NFR BLD AUTO: 0.8 % — HIGH (ref 0.1–0.3)
IMM GRANULOCYTES NFR BLD AUTO: 0.8 % — HIGH (ref 0.1–0.3)
LYMPHOCYTES # BLD AUTO: 1.72 K/UL — SIGNIFICANT CHANGE UP (ref 1.2–3.4)
LYMPHOCYTES # BLD AUTO: 1.72 K/UL — SIGNIFICANT CHANGE UP (ref 1.2–3.4)
LYMPHOCYTES # BLD AUTO: 26.2 % — SIGNIFICANT CHANGE UP (ref 20.5–51.1)
LYMPHOCYTES # BLD AUTO: 26.2 % — SIGNIFICANT CHANGE UP (ref 20.5–51.1)
MCHC RBC-ENTMCNC: 31.5 PG — HIGH (ref 27–31)
MCHC RBC-ENTMCNC: 31.5 PG — HIGH (ref 27–31)
MCHC RBC-ENTMCNC: 33.8 G/DL — SIGNIFICANT CHANGE UP (ref 32–37)
MCHC RBC-ENTMCNC: 33.8 G/DL — SIGNIFICANT CHANGE UP (ref 32–37)
MCV RBC AUTO: 93.2 FL — SIGNIFICANT CHANGE UP (ref 81–99)
MCV RBC AUTO: 93.2 FL — SIGNIFICANT CHANGE UP (ref 81–99)
MONOCYTES # BLD AUTO: 0.79 K/UL — HIGH (ref 0.1–0.6)
MONOCYTES # BLD AUTO: 0.79 K/UL — HIGH (ref 0.1–0.6)
MONOCYTES NFR BLD AUTO: 12 % — HIGH (ref 1.7–9.3)
MONOCYTES NFR BLD AUTO: 12 % — HIGH (ref 1.7–9.3)
NEUTROPHILS # BLD AUTO: 3.96 K/UL — SIGNIFICANT CHANGE UP (ref 1.4–6.5)
NEUTROPHILS # BLD AUTO: 3.96 K/UL — SIGNIFICANT CHANGE UP (ref 1.4–6.5)
NEUTROPHILS NFR BLD AUTO: 60.4 % — SIGNIFICANT CHANGE UP (ref 42.2–75.2)
NEUTROPHILS NFR BLD AUTO: 60.4 % — SIGNIFICANT CHANGE UP (ref 42.2–75.2)
NRBC # BLD: 0 /100 WBCS — SIGNIFICANT CHANGE UP (ref 0–0)
NRBC # BLD: 0 /100 WBCS — SIGNIFICANT CHANGE UP (ref 0–0)
PLATELET # BLD AUTO: 227 K/UL — SIGNIFICANT CHANGE UP (ref 130–400)
PLATELET # BLD AUTO: 227 K/UL — SIGNIFICANT CHANGE UP (ref 130–400)
PMV BLD: 9.9 FL — SIGNIFICANT CHANGE UP (ref 7.4–10.4)
PMV BLD: 9.9 FL — SIGNIFICANT CHANGE UP (ref 7.4–10.4)
POTASSIUM SERPL-MCNC: 3.3 MMOL/L — LOW (ref 3.5–5)
POTASSIUM SERPL-MCNC: 3.3 MMOL/L — LOW (ref 3.5–5)
POTASSIUM SERPL-SCNC: 3.3 MMOL/L — LOW (ref 3.5–5)
POTASSIUM SERPL-SCNC: 3.3 MMOL/L — LOW (ref 3.5–5)
PROT SERPL-MCNC: 7.4 G/DL — SIGNIFICANT CHANGE UP (ref 6–8)
PROT SERPL-MCNC: 7.4 G/DL — SIGNIFICANT CHANGE UP (ref 6–8)
RBC # BLD: 4.41 M/UL — SIGNIFICANT CHANGE UP (ref 4.2–5.4)
RBC # BLD: 4.41 M/UL — SIGNIFICANT CHANGE UP (ref 4.2–5.4)
RBC # FLD: 15.5 % — HIGH (ref 11.5–14.5)
RBC # FLD: 15.5 % — HIGH (ref 11.5–14.5)
SODIUM SERPL-SCNC: 134 MMOL/L — LOW (ref 135–146)
SODIUM SERPL-SCNC: 134 MMOL/L — LOW (ref 135–146)
WBC # BLD: 6.56 K/UL — SIGNIFICANT CHANGE UP (ref 4.8–10.8)
WBC # BLD: 6.56 K/UL — SIGNIFICANT CHANGE UP (ref 4.8–10.8)
WBC # FLD AUTO: 6.56 K/UL — SIGNIFICANT CHANGE UP (ref 4.8–10.8)
WBC # FLD AUTO: 6.56 K/UL — SIGNIFICANT CHANGE UP (ref 4.8–10.8)

## 2023-12-22 PROCEDURE — 85025 COMPLETE CBC W/AUTO DIFF WBC: CPT

## 2023-12-22 PROCEDURE — 73630 X-RAY EXAM OF FOOT: CPT | Mod: 26,LT

## 2023-12-22 PROCEDURE — 80048 BASIC METABOLIC PNL TOTAL CA: CPT

## 2023-12-22 PROCEDURE — 99285 EMERGENCY DEPT VISIT HI MDM: CPT | Mod: FS

## 2023-12-22 PROCEDURE — 85027 COMPLETE CBC AUTOMATED: CPT

## 2023-12-22 PROCEDURE — 84100 ASSAY OF PHOSPHORUS: CPT

## 2023-12-22 PROCEDURE — 83735 ASSAY OF MAGNESIUM: CPT

## 2023-12-22 PROCEDURE — 36415 COLL VENOUS BLD VENIPUNCTURE: CPT

## 2023-12-22 PROCEDURE — 87635 SARS-COV-2 COVID-19 AMP PRB: CPT

## 2023-12-22 PROCEDURE — 99222 1ST HOSP IP/OBS MODERATE 55: CPT | Mod: FS

## 2023-12-22 RX ORDER — METHOTREXATE 2.5 MG/1
15 TABLET ORAL
Refills: 0 | Status: DISCONTINUED | OUTPATIENT
Start: 2023-12-22 | End: 2023-12-26

## 2023-12-22 RX ORDER — CHOLECALCIFEROL (VITAMIN D3) 125 MCG
2000 CAPSULE ORAL DAILY
Refills: 0 | Status: DISCONTINUED | OUTPATIENT
Start: 2023-12-22 | End: 2023-12-26

## 2023-12-22 RX ORDER — SACCHAROMYCES BOULARDII 250 MG
250 POWDER IN PACKET (EA) ORAL
Refills: 0 | Status: DISCONTINUED | OUTPATIENT
Start: 2023-12-22 | End: 2023-12-26

## 2023-12-22 RX ORDER — CHLORHEXIDINE GLUCONATE 213 G/1000ML
1 SOLUTION TOPICAL
Refills: 0 | Status: DISCONTINUED | OUTPATIENT
Start: 2023-12-22 | End: 2023-12-26

## 2023-12-22 RX ORDER — CIPROFLOXACIN LACTATE 400MG/40ML
400 VIAL (ML) INTRAVENOUS EVERY 12 HOURS
Refills: 0 | Status: DISCONTINUED | OUTPATIENT
Start: 2023-12-22 | End: 2023-12-23

## 2023-12-22 RX ORDER — ATORVASTATIN CALCIUM 80 MG/1
40 TABLET, FILM COATED ORAL AT BEDTIME
Refills: 0 | Status: DISCONTINUED | OUTPATIENT
Start: 2023-12-22 | End: 2023-12-26

## 2023-12-22 RX ORDER — POLYETHYLENE GLYCOL 3350 17 G/17G
17 POWDER, FOR SOLUTION ORAL DAILY
Refills: 0 | Status: DISCONTINUED | OUTPATIENT
Start: 2023-12-22 | End: 2023-12-26

## 2023-12-22 RX ORDER — SERTRALINE 25 MG/1
50 TABLET, FILM COATED ORAL AT BEDTIME
Refills: 0 | Status: DISCONTINUED | OUTPATIENT
Start: 2023-12-22 | End: 2023-12-26

## 2023-12-22 RX ORDER — POTASSIUM CHLORIDE 20 MEQ
40 PACKET (EA) ORAL ONCE
Refills: 0 | Status: DISCONTINUED | OUTPATIENT
Start: 2023-12-22 | End: 2023-12-23

## 2023-12-22 RX ORDER — HEPARIN SODIUM 5000 [USP'U]/ML
5000 INJECTION INTRAVENOUS; SUBCUTANEOUS EVERY 8 HOURS
Refills: 0 | Status: DISCONTINUED | OUTPATIENT
Start: 2023-12-22 | End: 2023-12-26

## 2023-12-22 RX ORDER — METOPROLOL TARTRATE 50 MG
25 TABLET ORAL AT BEDTIME
Refills: 0 | Status: DISCONTINUED | OUTPATIENT
Start: 2023-12-22 | End: 2023-12-26

## 2023-12-22 RX ORDER — ACETAMINOPHEN 500 MG
650 TABLET ORAL EVERY 6 HOURS
Refills: 0 | Status: DISCONTINUED | OUTPATIENT
Start: 2023-12-22 | End: 2023-12-26

## 2023-12-22 RX ORDER — ASPIRIN/CALCIUM CARB/MAGNESIUM 324 MG
81 TABLET ORAL DAILY
Refills: 0 | Status: DISCONTINUED | OUTPATIENT
Start: 2023-12-22 | End: 2023-12-26

## 2023-12-22 RX ORDER — ZINC SULFATE TAB 220 MG (50 MG ZINC EQUIVALENT) 220 (50 ZN) MG
220 TAB ORAL DAILY
Refills: 0 | Status: DISCONTINUED | OUTPATIENT
Start: 2023-12-22 | End: 2023-12-26

## 2023-12-22 RX ORDER — ASCORBIC ACID 60 MG
500 TABLET,CHEWABLE ORAL DAILY
Refills: 0 | Status: DISCONTINUED | OUTPATIENT
Start: 2023-12-22 | End: 2023-12-26

## 2023-12-22 RX ORDER — FAMOTIDINE 10 MG/ML
20 INJECTION INTRAVENOUS DAILY
Refills: 0 | Status: DISCONTINUED | OUTPATIENT
Start: 2023-12-22 | End: 2023-12-26

## 2023-12-22 RX ORDER — FOLIC ACID 0.8 MG
1 TABLET ORAL DAILY
Refills: 0 | Status: DISCONTINUED | OUTPATIENT
Start: 2023-12-22 | End: 2023-12-26

## 2023-12-22 RX ORDER — SENNA PLUS 8.6 MG/1
2 TABLET ORAL AT BEDTIME
Refills: 0 | Status: DISCONTINUED | OUTPATIENT
Start: 2023-12-22 | End: 2023-12-26

## 2023-12-22 RX ORDER — SODIUM CHLORIDE 9 MG/ML
1000 INJECTION INTRAMUSCULAR; INTRAVENOUS; SUBCUTANEOUS ONCE
Refills: 0 | Status: COMPLETED | OUTPATIENT
Start: 2023-12-22 | End: 2023-12-22

## 2023-12-22 RX ADMIN — Medication 200 MILLIGRAM(S): at 20:15

## 2023-12-22 RX ADMIN — SODIUM CHLORIDE 2000 MILLILITER(S): 9 INJECTION INTRAMUSCULAR; INTRAVENOUS; SUBCUTANEOUS at 13:16

## 2023-12-22 RX ADMIN — Medication 650 MILLIGRAM(S): at 16:42

## 2023-12-22 NOTE — ED PROVIDER NOTE - CONSIDERATION OF ADMISSION OBSERVATION
Patient with severe ulcer of foot requiring IV abx and admission Consideration of Admission/Observation

## 2023-12-22 NOTE — ED PROVIDER NOTE - PHYSICAL EXAMINATION
VITAL SIGNS: I have reviewed nursing notes and confirm.  CONSTITUTIONAL: in no acute distress.  SKIN: Skin exam is warm and dry, Quarter sized ulceration to left great toe region.  No  active discharge but surrounding erythema and swelling.  No visible erythema or swelling to lower leg  HEAD: Normocephalic; atraumatic.  EYES:  EOM intact; conjunctiva and sclera clear.  ENT: No nasal discharge; airway clear.   NECK: Supple; non tender.  CARD: S1, S2 normal; no murmurs, gallops, or rubs. Regular rate and rhythm.  RESP: No wheezes, rales or rhonchi. Speaking in full sentences.   ABD: soft; non-distended; non-tender; No rebound or guarding.   EXT: Normal ROM. No clubbing, cyanosis or edema.  NEURO: Alert, oriented. Grossly unremarkable. No focal deficits.

## 2023-12-22 NOTE — H&P ADULT - NSHPPHYSICALEXAM_GEN_ALL_CORE
General: Pt sitting in stretcher, no acute distress  Neuro: A&O x4, calm and cooperative  Chest: on room air, speaking full sentences, no acute cardiopulmonary distress  Abdomen: soft, nontender, nondistended  Skin: L foot- approx 4x2 cm wound to foot dorsum with some devitalized tissue at base, + surrounding erythema and edema, no active bleeding, purulence or drainage noted

## 2023-12-22 NOTE — CONSULT NOTE ADULT - SUBJECTIVE AND OBJECTIVE BOX
Podiatry Consult Note    Subjective:  GIAN MELISSA  Seen Bedside 83y Female  .   Patient is a 83y old  Female who presents with a chief complaint of L foot wound (22 Dec 2023 12:52)    HPI:  83y F w/ hx of RA on methotrexate, Depression, GERD, HTN, HLD breast ca s/p lumpectomy in 2015 is presents to ED for persistent left foot infection. She says that she's had arthritis for many years and developed bunions on her feet because of that. Recently, the bunions are being infected due to the shoes that she's wearing. She took Amoxicillin fo 2 weeks without improvement. She was recently admitted for IV antibiotics from 12/12-12/14, discharged on PO antibiotics and returns today as there as been minimal improvement. Of note, patient reports to feeling weak on Monday 12/18 and tested positive for COVID. Denies any other symptoms, no fever/chills. No cough/congestion, chest pain/sob. ROS otherwise negative.  (22 Dec 2023 12:52)      Past Medical History and Surgical History  PAST MEDICAL & SURGICAL HISTORY:  Rheumatoid arthritis      HTN (hypertension)      Breast cancer  last radiation 2015      Depression      Chronic GERD      OA (osteoarthritis)      Rheumatoid arthritis      History of right hip replacement      Status post left hip replacement      S/P total knee replacement, left      H/O vaginal hysterectomy      Status post cholecystectomy      S/P lumpectomy of breast  2015           Review of Systems:  [X] Ten point review of systems is otherwise negative except as noted     Objective:  Vital Signs Last 24 Hrs  T(C): 36.8 (22 Dec 2023 10:38), Max: 36.8 (22 Dec 2023 10:38)  T(F): 98.3 (22 Dec 2023 10:38), Max: 98.3 (22 Dec 2023 10:38)  HR: 92 (22 Dec 2023 10:38) (92 - 92)  BP: 125/77 (22 Dec 2023 10:38) (125/77 - 125/77)  BP(mean): --  RR: 18 (22 Dec 2023 10:38) (18 - 18)  SpO2: 95% (22 Dec 2023 10:38) (95% - 95%)    Parameters below as of 22 Dec 2023 10:38  Patient On (Oxygen Delivery Method): room air                            13.9   6.56  )-----------( 227      ( 22 Dec 2023 12:52 )             41.1                 12-22    134<L>  |  91<L>  |  35<H>  ----------------------------<  87  3.3<L>   |  29  |  1.1    Ca    9.2      22 Dec 2023 12:52    TPro  7.4  /  Alb  3.9  /  TBili  0.4  /  DBili  x   /  AST  49<H>  /  ALT  35  /  AlkPhos  89  12-22        Physical Exam - Lower Extremity Focused:   Derm: Left foot dorsum shows a partial thickness wound with fibrotic wound base. surrounding skin is red and erythematous with edema and the brandon wound skin is raised with fluctuance and increased soft tissue mass underlying the wound.   Vascular: DP and PT Pulses palpable; Foot is Warm to Warm to the touch; Capillary Refill Time < 3 Seconds;    Neuro: Protective Sensation intact   MSK: Pain On Palpation at Wound Site     Assessment:  Left foot infected wound with surrounding cellulitis    Plan:  Chart reviewed and Patient evaluated. All Questions and Concerns Addressed and Answered  XR Imaging  Foot; Pending Results  Weight Bearing Status; WBAT w/ Surgical Shoe;   Request ID Consult;  request PICC line for outpatient iv abx  No surgical intervention indicated at this time; patient would benefit from picc line and iv abx as she failed previous oral abx and as per her own admission was forgetting to take her pills for over 1 week.   The left foot wound is something she says has developed over time possibly due to tight shoe gear and she has been dealing with it since; states it is very painful.   Discussed Plan w/ Dr Stahl    Podiatry  Podiatry Consult Note    Subjective:  GIAN MELISSA  Seen Bedside 83y Female  .   Patient is a 83y old  Female who presents with a chief complaint of L foot wound (22 Dec 2023 12:52)    HPI:  83y F w/ hx of RA on methotrexate, Depression, GERD, HTN, HLD breast ca s/p lumpectomy in 2015 is presents to ED for persistent left foot infection. She says that she's had arthritis for many years and developed bunions on her feet because of that. Recently, the bunions are being infected due to the shoes that she's wearing. She took Amoxicillin fo 2 weeks without improvement. She was recently admitted for IV antibiotics from 12/12-12/14, discharged on PO antibiotics and returns today as there as been minimal improvement. Of note, patient reports to feeling weak on Monday 12/18 and tested positive for COVID. Denies any other symptoms, no fever/chills. No cough/congestion, chest pain/sob. ROS otherwise negative.  (22 Dec 2023 12:52)      Past Medical History and Surgical History  PAST MEDICAL & SURGICAL HISTORY:  Rheumatoid arthritis      HTN (hypertension)      Breast cancer  last radiation 2015      Depression      Chronic GERD      OA (osteoarthritis)      Rheumatoid arthritis      History of right hip replacement      Status post left hip replacement      S/P total knee replacement, left      H/O vaginal hysterectomy      Status post cholecystectomy      S/P lumpectomy of breast  2015           Review of Systems:  [X] Ten point review of systems is otherwise negative except as noted     Objective:  Vital Signs Last 24 Hrs  T(C): 36.8 (22 Dec 2023 10:38), Max: 36.8 (22 Dec 2023 10:38)  T(F): 98.3 (22 Dec 2023 10:38), Max: 98.3 (22 Dec 2023 10:38)  HR: 92 (22 Dec 2023 10:38) (92 - 92)  BP: 125/77 (22 Dec 2023 10:38) (125/77 - 125/77)  BP(mean): --  RR: 18 (22 Dec 2023 10:38) (18 - 18)  SpO2: 95% (22 Dec 2023 10:38) (95% - 95%)    Parameters below as of 22 Dec 2023 10:38  Patient On (Oxygen Delivery Method): room air                            13.9   6.56  )-----------( 227      ( 22 Dec 2023 12:52 )             41.1                 12-22    134<L>  |  91<L>  |  35<H>  ----------------------------<  87  3.3<L>   |  29  |  1.1    Ca    9.2      22 Dec 2023 12:52    TPro  7.4  /  Alb  3.9  /  TBili  0.4  /  DBili  x   /  AST  49<H>  /  ALT  35  /  AlkPhos  89  12-22        Physical Exam - Lower Extremity Focused:   Derm: Left foot dorsum shows a partial thickness wound with fibrotic wound base. surrounding skin is red and erythematous with edema and the brandon wound skin is raised with fluctuance and increased soft tissue mass underlying the wound.   Vascular: DP and PT Pulses palpable; Foot is Warm to Warm to the touch; Capillary Refill Time < 3 Seconds;    Neuro: Protective Sensation intact   MSK: Pain On Palpation at Wound Site     Assessment:  Left foot infected wound with surrounding cellulitis    Plan:  Chart reviewed and Patient evaluated. All Questions and Concerns Addressed and Answered  XR Imaging  Foot; Pending Results  Weight Bearing Status; WBAT w/ Surgical Shoe;   Request ID Consult;  request PICC line for outpatient iv abx  No surgical intervention indicated at this time; patient would benefit from picc line and iv abx as she failed previous oral abx and as per her own admission was forgetting to take her pills for over 1 week.   The left foot wound is something she says has developed over time possibly due to tight shoe gear and she has been dealing with it since; states it is very painful.   Discussed Plan w/ Dr Stahl    Podiatry [phone]

## 2023-12-22 NOTE — ED ADULT TRIAGE NOTE - CHIEF COMPLAINT QUOTE
Pt was sent in by Dr. Azul for IV abx for a left foot infection. Was dc last week from hospital for same issue. Pt also Covid +

## 2023-12-22 NOTE — ED PROVIDER NOTE - CLINICAL SUMMARY MEDICAL DECISION MAKING FREE TEXT BOX
Patient presented for refractory diabetic foot ulcer as documented, sent in by podiatry for likely admission.  On arrival, patient otherwise hemodynamically stable, neurovascularly intact, blood ulceration to the foot grossly infected with concern for possible osteo-.  Obtained labs which were grossly unremarkable including no significant leukocytosis, anemia, signs of dehydration/ADINA, transaminitis or significant electrolyte abnormalities.  No concern for DKA at this time based on labs.  X-ray without definitive evidence of osteomyelitis.  Consulted podiatry who evaluated the patient in the ED, but recommended that burn also evaluated the patient.  Burn evaluated patient in the ED and recommending admission to their service for further management.  Patient agreeable with plan.  Hemodynamically stable at time of admission.

## 2023-12-22 NOTE — H&P ADULT - NSHPLABSRESULTS_GEN_ALL_CORE
ED  Vital Signs Last 24 Hrs  T(C): 36.8 (22 Dec 2023 10:38), Max: 36.8 (22 Dec 2023 10:38)  T(F): 98.3 (22 Dec 2023 10:38), Max: 98.3 (22 Dec 2023 10:38)  HR: 92 (22 Dec 2023 10:38) (92 - 92)  BP: 125/77 (22 Dec 2023 10:38) (125/77 - 125/77)  RR: 18 (22 Dec 2023 10:38) (18 - 18)  SpO2: 95% (22 Dec 2023 10:38) (95% - 95%)    O2 Parameters below as of 22 Dec 2023 10:38  Patient On (Oxygen Delivery Method): room air    labs pending

## 2023-12-22 NOTE — H&P ADULT - ASSESSMENT
83y F w/ hx of RA on methotrexate, Depression, GERD, HTN, HLD breast ca s/p lumpectomy in 2015 is presents to ED for persistent left foot infection.     #L foot infection  - admit to Burn service (Dr. Barone) for IV antibiotics, patient may need IV antibiotics on discharge  - ID consult placed 12/22  - local wound care- wash with soap and water, apply silvadene, adaptic, wrap with kerlix and ACE twice daily  - ensure adequate nutrition, DASH/TLC diet  - vitamin C/zinc for wound healing  - activity, ambulate as tolerated  - DVT/GI prophylaxis    #HTN  - vitals per routine  - continue home metoprolol  - DASH diet    #HLD  - on rosuvastatin at home, switched to atorvastatin on admission    #RA  - continue weekly methotrexate    #GERD   - on ranitidine at home, switched to famotidine on admission

## 2023-12-22 NOTE — ED PROVIDER NOTE - OBJECTIVE STATEMENT
83 female history of HTN, HLD presenting to ED for evaluation of left foot ulcer for the last week.  Patient states that she went to see the podiatrist today Dr. Azul who evaluated ulceration states patient needs to come in for IV antibiotics.  Denies fever, chills, N, V, leg pain, CP, SOB, HA, blurry vision. Patient currently COVID-positive states she was on antibiotics but due to recent infection and stopped taking them for the last few days

## 2023-12-22 NOTE — ED PROVIDER NOTE - DIFFERENTIAL DIAGNOSIS
Differential Diagnosis Foot ulcer r/o osteomyelitis vs concurrent DKA vs other metabolic derangement

## 2023-12-22 NOTE — H&P ADULT - HISTORY OF PRESENT ILLNESS
83y F w/ hx of RA on methotrexate, Depression, GERD, HTN, HLD breast ca s/p lumpectomy in 2015 is presents to ED for persistent left foot infection. She says that she's had arthritis for many years and developed bunions on her feet because of that. Recently, the bunions are being infected due to the shoes that she's wearing. She took Amoxicillin fo 2 weeks without improvement. She was recently admitted for IV antibiotics from 12/12-12/14, discharged on PO antibiotics and returns today as there as been minimal improvement. Of note, patient reports to feeling weak on Monday 12/18 and tested positive for COVID. Denies any other symptoms, no fever/chills. No cough/congestion, chest pain/sob. ROS otherwise negative.

## 2023-12-23 RX ORDER — POTASSIUM CHLORIDE 20 MEQ
40 PACKET (EA) ORAL ONCE
Refills: 0 | Status: COMPLETED | OUTPATIENT
Start: 2023-12-23 | End: 2023-12-23

## 2023-12-23 RX ORDER — INFLUENZA VIRUS VACCINE 15; 15; 15; 15 UG/.5ML; UG/.5ML; UG/.5ML; UG/.5ML
0.7 SUSPENSION INTRAMUSCULAR ONCE
Refills: 0 | Status: DISCONTINUED | OUTPATIENT
Start: 2023-12-23 | End: 2023-12-26

## 2023-12-23 RX ORDER — CEFTRIAXONE 500 MG/1
1000 INJECTION, POWDER, FOR SOLUTION INTRAMUSCULAR; INTRAVENOUS EVERY 24 HOURS
Refills: 0 | Status: DISCONTINUED | OUTPATIENT
Start: 2023-12-23 | End: 2023-12-26

## 2023-12-23 RX ADMIN — Medication 2000 UNIT(S): at 13:01

## 2023-12-23 RX ADMIN — ATORVASTATIN CALCIUM 40 MILLIGRAM(S): 80 TABLET, FILM COATED ORAL at 22:53

## 2023-12-23 RX ADMIN — Medication 1 MILLIGRAM(S): at 13:03

## 2023-12-23 RX ADMIN — Medication 500 MILLIGRAM(S): at 13:03

## 2023-12-23 RX ADMIN — Medication 1 TABLET(S): at 13:02

## 2023-12-23 RX ADMIN — SERTRALINE 50 MILLIGRAM(S): 25 TABLET, FILM COATED ORAL at 22:52

## 2023-12-23 RX ADMIN — METHOTREXATE 15 MILLIGRAM(S): 2.5 TABLET ORAL at 16:26

## 2023-12-23 RX ADMIN — ATORVASTATIN CALCIUM 40 MILLIGRAM(S): 80 TABLET, FILM COATED ORAL at 01:20

## 2023-12-23 RX ADMIN — HEPARIN SODIUM 5000 UNIT(S): 5000 INJECTION INTRAVENOUS; SUBCUTANEOUS at 01:20

## 2023-12-23 RX ADMIN — HEPARIN SODIUM 5000 UNIT(S): 5000 INJECTION INTRAVENOUS; SUBCUTANEOUS at 22:53

## 2023-12-23 RX ADMIN — Medication 200 MILLIGRAM(S): at 06:28

## 2023-12-23 RX ADMIN — Medication 250 MILLIGRAM(S): at 18:00

## 2023-12-23 RX ADMIN — FAMOTIDINE 20 MILLIGRAM(S): 10 INJECTION INTRAVENOUS at 13:03

## 2023-12-23 RX ADMIN — Medication 250 MILLIGRAM(S): at 06:29

## 2023-12-23 RX ADMIN — SENNA PLUS 2 TABLET(S): 8.6 TABLET ORAL at 22:52

## 2023-12-23 RX ADMIN — SENNA PLUS 2 TABLET(S): 8.6 TABLET ORAL at 01:20

## 2023-12-23 RX ADMIN — HEPARIN SODIUM 5000 UNIT(S): 5000 INJECTION INTRAVENOUS; SUBCUTANEOUS at 13:00

## 2023-12-23 RX ADMIN — Medication 25 MILLIGRAM(S): at 22:52

## 2023-12-23 RX ADMIN — ZINC SULFATE TAB 220 MG (50 MG ZINC EQUIVALENT) 220 MILLIGRAM(S): 220 (50 ZN) TAB at 13:02

## 2023-12-23 RX ADMIN — CEFTRIAXONE 100 MILLIGRAM(S): 500 INJECTION, POWDER, FOR SOLUTION INTRAMUSCULAR; INTRAVENOUS at 18:00

## 2023-12-23 RX ADMIN — SERTRALINE 50 MILLIGRAM(S): 25 TABLET, FILM COATED ORAL at 01:21

## 2023-12-23 RX ADMIN — HEPARIN SODIUM 5000 UNIT(S): 5000 INJECTION INTRAVENOUS; SUBCUTANEOUS at 06:29

## 2023-12-23 RX ADMIN — Medication 81 MILLIGRAM(S): at 13:02

## 2023-12-23 RX ADMIN — Medication 25 MILLIGRAM(S): at 01:20

## 2023-12-23 RX ADMIN — Medication 40 MILLIEQUIVALENT(S): at 01:20

## 2023-12-23 NOTE — CONSULT NOTE ADULT - TIME BILLING
I have personally seen and examined this patient.    I have reviewed all pertinent clinical information and reviewed all relevant imaging and diagnostic studies personally.   I discussed recommendations with the primary team.

## 2023-12-23 NOTE — CONSULT NOTE ADULT - ASSESSMENT
83y F w/ hx of RA on methotrexate, Depression, GERD, HTN, HLD breast ca s/p lumpectomy in 2015 is presents to ED for persistent left foot infection.     IMPRESSION:  Left foot ulcer  Mild cellulitis  Mild COVID    RECOMMENDATIONS:  - Mild COVID, Not warrant for Remdesivir at this time  - Continue IV ceftriaxone 1g q24hrs  - Follow up repeat wound Cx  - If the isolates are susceptible to Bactrim, can switch to PO Bactrim 1DS q12hrs to finish a course of 10 days  - Routine wound care is important for wound healing and to prevent reinfection  - Podiatry follow up  - Offloading and frequent position changes, aspiration precaution  - Trend WBC, fever curve, transaminases, creatinine daily      nEriqueta Bell D.O.  Attending Physician  Division of Infectious Diseases  [facility]  Please contact me via [employer] Teams   83y F w/ hx of RA on methotrexate, Depression, GERD, HTN, HLD breast ca s/p lumpectomy in 2015 is presents to ED for persistent left foot infection.     IMPRESSION:  Left foot ulcer  Mild cellulitis  Mild COVID    RECOMMENDATIONS:  - Mild COVID, Not warrant for Remdesivir at this time  - Continue IV ceftriaxone 1g q24hrs  - Follow up repeat wound Cx  - If the isolates are susceptible to Bactrim, can switch to PO Bactrim 1DS q12hrs to finish a course of 10 days  - Routine wound care is important for wound healing and to prevent reinfection  - Podiatry follow up  - Offloading and frequent position changes, aspiration precaution  - Trend WBC, fever curve, transaminases, creatinine daily      Enriqueta Bell D.O.  Attending Physician  Division of Infectious Diseases  [facility]  Please contact me via [employer] Teams

## 2023-12-23 NOTE — PATIENT PROFILE ADULT - CHOOSE INDICATION TO IMMUNIZE (AN ORDER WILL BE GENERATED WHEN THIS NOTE IS SAVED):
----- Message from Selena Hamilton sent at 8/15/2020  6:47 PM CDT -----  Regarding: Lab Test or Test Related Question  Contact: [phone]  I have a question about CBC NO DIFFERENTIAL (PERFORMABLE ONLY) resulted on 8/14/20, 11:22 PM.     What does this mean ? Did I fail or is something wrong ?   Patient is not pregnant (male or female)

## 2023-12-23 NOTE — PATIENT PROFILE ADULT - FUNCTIONAL ASSESSMENT - BASIC MOBILITY 6.
4-calculated by average/Not able to assess (calculate score using [facility] averaging method) 4-calculated by average/Not able to assess (calculate score using [facility] averaging method)

## 2023-12-23 NOTE — CONSULT NOTE ADULT - SUBJECTIVE AND OBJECTIVE BOX
INFECTIOUS DISEASE CONSULT NOTE    Patient is a 83y old  Female who presents with a chief complaint of L foot wound (22 Dec 2023 15:33)    HPI:  83y F w/ hx of RA on methotrexate, Depression, GERD, HTN, HLD breast ca s/p lumpectomy in 2015 is presents to ED for persistent left foot infection. She says that she's had arthritis for many years and developed bunions on her feet because of that. Recently, the bunions are being infected due to the shoes that she's wearing. She took Amoxicillin fo 2 weeks without improvement. She was recently admitted for IV antibiotics from 12/12-12/14, discharged on PO antibiotics and returns today as there as been minimal improvement. Of note, patient reports to feeling weak on Monday 12/18 and tested positive for COVID. Denies any other symptoms, no fever/chills. No cough/congestion, chest pain/sob. ROS otherwise negative.  (22 Dec 2023 12:52)         Prior hospital charts reviewed [Yes]  Primary team notes reviewed [Yes]  Other consultant notes reviewed [Yes]    REVIEW OF SYSTEMS:      PAST MEDICAL & SURGICAL HISTORY:  Rheumatoid arthritis      HTN (hypertension)      Breast cancer  last radiation 2015      Depression      Chronic GERD      OA (osteoarthritis)      Rheumatoid arthritis      History of right hip replacement      Status post left hip replacement      S/P total knee replacement, left      H/O vaginal hysterectomy      Status post cholecystectomy      S/P lumpectomy of breast  2015          SOCIAL HISTORY:  - Born in _____, migrated [place] in 19XX  - Currently working as / Retired  - Lives with _____; no pets  - No recent travel  - Denies tobacco use  - Denies alcohol use  - Denies illicit drug use  - Currently sexually active, has one male/female sexual partner    FAMILY HISTORY:  FH: cancer  Nonhodgkins : sister        Allergies:  No Known Allergies      ANTIMICROBIALS:  ciprofloxacin   IVPB 400 every 12 hours      ANTIMICROBIALS (past 90 days):  MEDICATIONS  (STANDING):  ciprofloxacin   IVPB   200 mL/Hr IV Intermittent (12-23-23 @ 06:28)   200 mL/Hr IV Intermittent (12-22-23 @ 20:15)        OTHER MEDS:   MEDICATIONS  (STANDING):  acetaminophen     Tablet .. 650 every 6 hours PRN  aspirin  chewable 81 daily  atorvastatin 40 at bedtime  famotidine    Tablet 20 daily  heparin   Injectable 5000 every 8 hours  hydrochlorothiazide 50 daily  methotrexate 15 every week  metoprolol succinate ER 25 at bedtime  polyethylene glycol 3350 17 daily PRN  senna 2 at bedtime  sertraline 50 at bedtime      VITALS:  Vital Signs Last 24 Hrs  T(F): 98 (12-23-23 @ 08:30), Max: 98.3 (12-22-23 @ 10:38)    Vital Signs Last 24 Hrs  HR: 70 (12-23-23 @ 08:30) (68 - 92)  BP: 168/77 (12-23-23 @ 08:30) (125/77 - 184/85)  RR: 17 (12-23-23 @ 08:30)  SpO2: 97% (12-23-23 @ 08:30) (95% - 100%)  Wt(kg): --    EXAM:    Labs:                        13.9   6.56  )-----------( 227      ( 22 Dec 2023 12:52 )             41.1     12-22    134<L>  |  91<L>  |  35<H>  ----------------------------<  87  3.3<L>   |  29  |  1.1    Ca    9.2      22 Dec 2023 12:52    TPro  7.4  /  Alb  3.9  /  TBili  0.4  /  DBili  x   /  AST  49<H>  /  ALT  35  /  AlkPhos  89  12-22      WBC Trend:  WBC Count: 6.56 (12-22-23 @ 12:52)      Auto Neutrophil #: 3.96 K/uL (12-22-23 @ 12:52)  Auto Neutrophil #: 3.84 K/uL (12-14-23 @ 07:45)  Auto Neutrophil #: 2.71 K/uL (12-13-23 @ 06:22)  Auto Neutrophil #: 3.82 K/uL (12-12-23 @ 15:30)  Auto Neutrophil #: 3.21 K/uL (02-24-23 @ 01:00)      Creatine Trend:  Creatinine: 1.1 (12-22)      Liver Biochemical Testing Trend:  Alanine Aminotransferase (ALT/SGPT): 35 (12-22)  Alanine Aminotransferase (ALT/SGPT): 24 (12-14)  Alanine Aminotransferase (ALT/SGPT): 25 (12-13)  Alanine Aminotransferase (ALT/SGPT): 28 (12-12)  Alanine Aminotransferase (ALT/SGPT): 26 (02-24)  Aspartate Aminotransferase (AST/SGOT): 49 (12-22-23 @ 12:52)  Aspartate Aminotransferase (AST/SGOT): 29 (12-14-23 @ 07:45)  Aspartate Aminotransferase (AST/SGOT): 33 (12-13-23 @ 06:22)  Aspartate Aminotransferase (AST/SGOT): 39 (12-12-23 @ 15:30)  Aspartate Aminotransferase (AST/SGOT): 43 (02-24-23 @ 01:00)  Bilirubin Total: 0.4 (12-22)  Bilirubin Total: 0.4 (12-14)  Bilirubin Total: 0.4 (12-13)  Bilirubin Total: 0.3 (12-12)  Bilirubin Total, Serum: 0.7 (02-24)      Trend LDH      Auto Eosinophil %: 0.3 % (12-22-23 @ 12:52)      Urinalysis Basic - ( 22 Dec 2023 12:52 )    Color: x / Appearance: x / SG: x / pH: x  Gluc: 87 mg/dL / Ketone: x  / Bili: x / Urobili: x   Blood: x / Protein: x / Nitrite: x   Leuk Esterase: x / RBC: x / WBC x   Sq Epi: x / Non Sq Epi: x / Bacteria: x        MICROBIOLOGY:      RADIOLOGY:  imaging below personally reviewed     INFECTIOUS DISEASE CONSULT NOTE    Patient is a 83y old  Female who presents with a chief complaint of L foot wound (22 Dec 2023 15:33)    HPI:  83y F w/ hx of RA on methotrexate, Depression, GERD, HTN, HLD breast ca s/p lumpectomy in 2015 is presents to ED for persistent left foot infection. She says that she's had arthritis for many years and developed bunions on her feet because of that. Recently, the bunions are being infected due to the shoes that she's wearing. She took Amoxicillin fo 2 weeks without improvement. She was recently admitted for IV antibiotics from 12/12-12/14, discharged on PO antibiotics and returns today as there as been minimal improvement. Of note, patient reports to feeling weak on Monday 12/18 and tested positive for COVID. Denies any other symptoms, no fever/chills. No cough/congestion, chest pain/sob. ROS otherwise negative.  (22 Dec 2023 12:52)         Prior hospital charts reviewed [Yes]  Primary team notes reviewed [Yes]  Other consultant notes reviewed [Yes]    REVIEW OF SYSTEMS:  CONSTITUTIONAL: No fever or chills  HEAD: No lesion on scalp  EYES: No visual disturbance  ENT: No sore throat  RESPIRATORY: + cough, no shortness of breath  CARDIOVASCULAR: No chest pain or palpitations  GASTROINTESTINAL: No abdominal or epigastric pain  GENITOURINARY: No dysuria  NEUROLOGICAL: No headache/dizziness  MUSCULOSKELETAL: No joint pain, erythema, or swelling; no back pain  SKIN: Left foot wound  All other ROS negative except noted above      PAST MEDICAL & SURGICAL HISTORY:  Rheumatoid arthritis      HTN (hypertension)      Breast cancer  last radiation 2015      Depression      Chronic GERD      OA (osteoarthritis)      Rheumatoid arthritis      History of right hip replacement      Status post left hip replacement      S/P total knee replacement, left      H/O vaginal hysterectomy      Status post cholecystectomy      S/P lumpectomy of breast  2015          SOCIAL HISTORY:  - No recent travel  - Denies tobacco use  - Denies alcohol use  - Denies illicit drug use    FAMILY HISTORY:  FH: cancer  Nonhodgkins : sister        Allergies:  No Known Allergies      ANTIMICROBIALS:  ciprofloxacin   IVPB 400 every 12 hours      ANTIMICROBIALS (past 90 days):  MEDICATIONS  (STANDING):  ciprofloxacin   IVPB   200 mL/Hr IV Intermittent (12-23-23 @ 06:28)   200 mL/Hr IV Intermittent (12-22-23 @ 20:15)        OTHER MEDS:   MEDICATIONS  (STANDING):  acetaminophen     Tablet .. 650 every 6 hours PRN  aspirin  chewable 81 daily  atorvastatin 40 at bedtime  famotidine    Tablet 20 daily  heparin   Injectable 5000 every 8 hours  hydrochlorothiazide 50 daily  methotrexate 15 every week  metoprolol succinate ER 25 at bedtime  polyethylene glycol 3350 17 daily PRN  senna 2 at bedtime  sertraline 50 at bedtime      VITALS:  Vital Signs Last 24 Hrs  T(F): 98 (12-23-23 @ 08:30), Max: 98.3 (12-22-23 @ 10:38)    Vital Signs Last 24 Hrs  HR: 70 (12-23-23 @ 08:30) (68 - 92)  BP: 168/77 (12-23-23 @ 08:30) (125/77 - 184/85)  RR: 17 (12-23-23 @ 08:30)  SpO2: 97% (12-23-23 @ 08:30) (95% - 100%)  Wt(kg): --    EXAM:  GENERAL: NAD, lying in bed  HEAD: No head lesions  EYES: Conjunctiva pink and cornea white  EAR, NOSE, MOUTH, THROAT: Normal external ears and nose, no discharges; moist mucous membranes  NECK: Supple, nontender to palpation; no JVD  RESPIRATORY: Clear to auscultation bilaterally  CARDIOVASCULAR: S1 S2  GASTROINTESTINAL: Soft, nontender, nondistended; normoactive bowel sounds  EXTREMITIES: No clubbing, cyanosis, or petal edema  NERVOUS SYSTEM: Alert and oriented to person, time, place and situation, speech clear. No focal deficits   MUSCULOSKELETAL: No joint erythema, swelling or pain  SKIN: Open wound on dorsal left foot with surrounding erythema, no purulence, no superficial thrombophlebitis  PSYCH: Normal affect      Labs:                        13.9   6.56  )-----------( 227      ( 22 Dec 2023 12:52 )             41.1     12-22    134<L>  |  91<L>  |  35<H>  ----------------------------<  87  3.3<L>   |  29  |  1.1    Ca    9.2      22 Dec 2023 12:52    TPro  7.4  /  Alb  3.9  /  TBili  0.4  /  DBili  x   /  AST  49<H>  /  ALT  35  /  AlkPhos  89  12-22      WBC Trend:  WBC Count: 6.56 (12-22-23 @ 12:52)      Auto Neutrophil #: 3.96 K/uL (12-22-23 @ 12:52)  Auto Neutrophil #: 3.84 K/uL (12-14-23 @ 07:45)  Auto Neutrophil #: 2.71 K/uL (12-13-23 @ 06:22)  Auto Neutrophil #: 3.82 K/uL (12-12-23 @ 15:30)  Auto Neutrophil #: 3.21 K/uL (02-24-23 @ 01:00)      Creatine Trend:  Creatinine: 1.1 (12-22)      Liver Biochemical Testing Trend:  Alanine Aminotransferase (ALT/SGPT): 35 (12-22)  Alanine Aminotransferase (ALT/SGPT): 24 (12-14)  Alanine Aminotransferase (ALT/SGPT): 25 (12-13)  Alanine Aminotransferase (ALT/SGPT): 28 (12-12)  Alanine Aminotransferase (ALT/SGPT): 26 (02-24)  Aspartate Aminotransferase (AST/SGOT): 49 (12-22-23 @ 12:52)  Aspartate Aminotransferase (AST/SGOT): 29 (12-14-23 @ 07:45)  Aspartate Aminotransferase (AST/SGOT): 33 (12-13-23 @ 06:22)  Aspartate Aminotransferase (AST/SGOT): 39 (12-12-23 @ 15:30)  Aspartate Aminotransferase (AST/SGOT): 43 (02-24-23 @ 01:00)  Bilirubin Total: 0.4 (12-22)  Bilirubin Total: 0.4 (12-14)  Bilirubin Total: 0.4 (12-13)  Bilirubin Total: 0.3 (12-12)  Bilirubin Total, Serum: 0.7 (02-24)      Trend LDH      Auto Eosinophil %: 0.3 % (12-22-23 @ 12:52)      Urinalysis Basic - ( 22 Dec 2023 12:52 )    Color: x / Appearance: x / SG: x / pH: x  Gluc: 87 mg/dL / Ketone: x  / Bili: x / Urobili: x   Blood: x / Protein: x / Nitrite: x   Leuk Esterase: x / RBC: x / WBC x   Sq Epi: x / Non Sq Epi: x / Bacteria: x    MICROBIOLOGY:      RADIOLOGY:  imaging below personally reviewed      < from: Xray Foot AP + Lateral + Oblique, Left (12.22.23 @ 13:06) >    FINDINGS/  IMPRESSION:    Since 12/12/2023;    Unchanged prominent midfoot  arthrosis with productive changes. No   definite new erosion. If there is continued clinical concern for   osteomyelitis consider follow-up MR of the foot.    No new fracture or dislocation.    Stable calcaneal spurring, degenerative changes of the forefoot,   hindfoot, ankle.      < end of copied text >

## 2023-12-23 NOTE — PATIENT PROFILE ADULT - FALL HARM RISK - RISK INTERVENTIONS
Monitor for mental status changes/Monitor gait and stability/Reorient to person, place and time as needed/Review medications for side effects contributing to fall risk/Sit up slowly, dangle for a short time, stand at bedside before walking/Toileting schedule using arm’s reach rule for commode and bathroom/Use of alarms - bed, chair and/or voice tab/Bed in lowest position, wheels locked, appropriate side rails in place/Call bell, personal items and telephone in reach/Instruct patient to call for assistance before getting out of bed or chair/Non-slip footwear when patient is out of bed/[place] to call system/Physically safe environment - no spills, clutter or unnecessary equipment/Purposeful Proactive Rounding/Room/bathroom lighting operational, light cord in reach Monitor for mental status changes/Monitor gait and stability/Reorient to person, place and time as needed/Review medications for side effects contributing to fall risk/Sit up slowly, dangle for a short time, stand at bedside before walking/Toileting schedule using arm’s reach rule for commode and bathroom/Use of alarms - bed, chair and/or voice tab/Bed in lowest position, wheels locked, appropriate side rails in place/Call bell, personal items and telephone in reach/Instruct patient to call for assistance before getting out of bed or chair/Non-slip footwear when patient is out of bed/[place] to call system/Physically safe environment - no spills, clutter or unnecessary equipment/Purposeful Proactive Rounding/Room/bathroom lighting operational, light cord in reach

## 2023-12-23 NOTE — ED ADULT NURSE NOTE - OBJECTIVE STATEMENT
Pt w/ recent (+) result to Covid-19, c/o non-healing ulcer to left dorsal foot x 2 weeks, worsened in the past 3-4 days. Denies fever/chills.

## 2023-12-24 LAB
-  AMOXICILLIN/CLAVULANIC ACID: SIGNIFICANT CHANGE UP
-  AMOXICILLIN/CLAVULANIC ACID: SIGNIFICANT CHANGE UP
-  AMPICILLIN/SULBACTAM: SIGNIFICANT CHANGE UP
-  AMPICILLIN/SULBACTAM: SIGNIFICANT CHANGE UP
-  AMPICILLIN: SIGNIFICANT CHANGE UP
-  AMPICILLIN: SIGNIFICANT CHANGE UP
-  AZTREONAM: SIGNIFICANT CHANGE UP
-  AZTREONAM: SIGNIFICANT CHANGE UP
-  CEFAZOLIN: SIGNIFICANT CHANGE UP
-  CEFAZOLIN: SIGNIFICANT CHANGE UP
-  CEFEPIME: SIGNIFICANT CHANGE UP
-  CEFEPIME: SIGNIFICANT CHANGE UP
-  CEFOXITIN: SIGNIFICANT CHANGE UP
-  CEFOXITIN: SIGNIFICANT CHANGE UP
-  CEFTRIAXONE: SIGNIFICANT CHANGE UP
-  CEFTRIAXONE: SIGNIFICANT CHANGE UP
-  CIPROFLOXACIN: SIGNIFICANT CHANGE UP
-  CIPROFLOXACIN: SIGNIFICANT CHANGE UP
-  ERTAPENEM: SIGNIFICANT CHANGE UP
-  ERTAPENEM: SIGNIFICANT CHANGE UP
-  GENTAMICIN: SIGNIFICANT CHANGE UP
-  GENTAMICIN: SIGNIFICANT CHANGE UP
-  LEVOFLOXACIN: SIGNIFICANT CHANGE UP
-  LEVOFLOXACIN: SIGNIFICANT CHANGE UP
-  MEROPENEM: SIGNIFICANT CHANGE UP
-  MEROPENEM: SIGNIFICANT CHANGE UP
-  PIPERACILLIN/TAZOBACTAM: SIGNIFICANT CHANGE UP
-  PIPERACILLIN/TAZOBACTAM: SIGNIFICANT CHANGE UP
-  TOBRAMYCIN: SIGNIFICANT CHANGE UP
-  TOBRAMYCIN: SIGNIFICANT CHANGE UP
-  TRIMETHOPRIM/SULFAMETHOXAZOLE: SIGNIFICANT CHANGE UP
-  TRIMETHOPRIM/SULFAMETHOXAZOLE: SIGNIFICANT CHANGE UP
ANION GAP SERPL CALC-SCNC: 11 MMOL/L — SIGNIFICANT CHANGE UP (ref 7–14)
ANION GAP SERPL CALC-SCNC: 11 MMOL/L — SIGNIFICANT CHANGE UP (ref 7–14)
ANION GAP SERPL CALC-SCNC: 9 MMOL/L — SIGNIFICANT CHANGE UP (ref 7–14)
ANION GAP SERPL CALC-SCNC: 9 MMOL/L — SIGNIFICANT CHANGE UP (ref 7–14)
BASOPHILS # BLD AUTO: 0.01 K/UL — SIGNIFICANT CHANGE UP (ref 0–0.2)
BASOPHILS # BLD AUTO: 0.01 K/UL — SIGNIFICANT CHANGE UP (ref 0–0.2)
BASOPHILS NFR BLD AUTO: 0.2 % — SIGNIFICANT CHANGE UP (ref 0–1)
BASOPHILS NFR BLD AUTO: 0.2 % — SIGNIFICANT CHANGE UP (ref 0–1)
BUN SERPL-MCNC: 23 MG/DL — HIGH (ref 10–20)
BUN SERPL-MCNC: 23 MG/DL — HIGH (ref 10–20)
BUN SERPL-MCNC: 28 MG/DL — HIGH (ref 10–20)
BUN SERPL-MCNC: 28 MG/DL — HIGH (ref 10–20)
CALCIUM SERPL-MCNC: 9 MG/DL — SIGNIFICANT CHANGE UP (ref 8.4–10.5)
CALCIUM SERPL-MCNC: 9 MG/DL — SIGNIFICANT CHANGE UP (ref 8.4–10.5)
CALCIUM SERPL-MCNC: 9.2 MG/DL — SIGNIFICANT CHANGE UP (ref 8.4–10.5)
CALCIUM SERPL-MCNC: 9.2 MG/DL — SIGNIFICANT CHANGE UP (ref 8.4–10.5)
CHLORIDE SERPL-SCNC: 95 MMOL/L — LOW (ref 98–110)
CHLORIDE SERPL-SCNC: 95 MMOL/L — LOW (ref 98–110)
CHLORIDE SERPL-SCNC: 98 MMOL/L — SIGNIFICANT CHANGE UP (ref 98–110)
CHLORIDE SERPL-SCNC: 98 MMOL/L — SIGNIFICANT CHANGE UP (ref 98–110)
CO2 SERPL-SCNC: 27 MMOL/L — SIGNIFICANT CHANGE UP (ref 17–32)
CO2 SERPL-SCNC: 27 MMOL/L — SIGNIFICANT CHANGE UP (ref 17–32)
CO2 SERPL-SCNC: 29 MMOL/L — SIGNIFICANT CHANGE UP (ref 17–32)
CO2 SERPL-SCNC: 29 MMOL/L — SIGNIFICANT CHANGE UP (ref 17–32)
CREAT SERPL-MCNC: 0.8 MG/DL — SIGNIFICANT CHANGE UP (ref 0.7–1.5)
CREAT SERPL-MCNC: 0.8 MG/DL — SIGNIFICANT CHANGE UP (ref 0.7–1.5)
CREAT SERPL-MCNC: 1.1 MG/DL — SIGNIFICANT CHANGE UP (ref 0.7–1.5)
CREAT SERPL-MCNC: 1.1 MG/DL — SIGNIFICANT CHANGE UP (ref 0.7–1.5)
CULTURE RESULTS: ABNORMAL
CULTURE RESULTS: ABNORMAL
EGFR: 50 ML/MIN/1.73M2 — LOW
EGFR: 50 ML/MIN/1.73M2 — LOW
EGFR: 73 ML/MIN/1.73M2 — SIGNIFICANT CHANGE UP
EGFR: 73 ML/MIN/1.73M2 — SIGNIFICANT CHANGE UP
EOSINOPHIL # BLD AUTO: 0.03 K/UL — SIGNIFICANT CHANGE UP (ref 0–0.7)
EOSINOPHIL # BLD AUTO: 0.03 K/UL — SIGNIFICANT CHANGE UP (ref 0–0.7)
EOSINOPHIL NFR BLD AUTO: 0.7 % — SIGNIFICANT CHANGE UP (ref 0–8)
EOSINOPHIL NFR BLD AUTO: 0.7 % — SIGNIFICANT CHANGE UP (ref 0–8)
GLUCOSE SERPL-MCNC: 126 MG/DL — HIGH (ref 70–99)
GLUCOSE SERPL-MCNC: 126 MG/DL — HIGH (ref 70–99)
GLUCOSE SERPL-MCNC: 91 MG/DL — SIGNIFICANT CHANGE UP (ref 70–99)
GLUCOSE SERPL-MCNC: 91 MG/DL — SIGNIFICANT CHANGE UP (ref 70–99)
HCT VFR BLD CALC: 35.2 % — LOW (ref 37–47)
HCT VFR BLD CALC: 35.2 % — LOW (ref 37–47)
HCT VFR BLD CALC: 35.9 % — LOW (ref 37–47)
HCT VFR BLD CALC: 35.9 % — LOW (ref 37–47)
HGB BLD-MCNC: 11.6 G/DL — LOW (ref 12–16)
HGB BLD-MCNC: 11.6 G/DL — LOW (ref 12–16)
HGB BLD-MCNC: 12 G/DL — SIGNIFICANT CHANGE UP (ref 12–16)
HGB BLD-MCNC: 12 G/DL — SIGNIFICANT CHANGE UP (ref 12–16)
IMM GRANULOCYTES NFR BLD AUTO: 1.1 % — HIGH (ref 0.1–0.3)
IMM GRANULOCYTES NFR BLD AUTO: 1.1 % — HIGH (ref 0.1–0.3)
LYMPHOCYTES # BLD AUTO: 1.65 K/UL — SIGNIFICANT CHANGE UP (ref 1.2–3.4)
LYMPHOCYTES # BLD AUTO: 1.65 K/UL — SIGNIFICANT CHANGE UP (ref 1.2–3.4)
LYMPHOCYTES # BLD AUTO: 35.9 % — SIGNIFICANT CHANGE UP (ref 20.5–51.1)
LYMPHOCYTES # BLD AUTO: 35.9 % — SIGNIFICANT CHANGE UP (ref 20.5–51.1)
MAGNESIUM SERPL-MCNC: 1.4 MG/DL — LOW (ref 1.8–2.4)
MAGNESIUM SERPL-MCNC: 1.4 MG/DL — LOW (ref 1.8–2.4)
MAGNESIUM SERPL-MCNC: 2.2 MG/DL — SIGNIFICANT CHANGE UP (ref 1.8–2.4)
MAGNESIUM SERPL-MCNC: 2.2 MG/DL — SIGNIFICANT CHANGE UP (ref 1.8–2.4)
MCHC RBC-ENTMCNC: 31 PG — SIGNIFICANT CHANGE UP (ref 27–31)
MCHC RBC-ENTMCNC: 31 PG — SIGNIFICANT CHANGE UP (ref 27–31)
MCHC RBC-ENTMCNC: 31.3 PG — HIGH (ref 27–31)
MCHC RBC-ENTMCNC: 31.3 PG — HIGH (ref 27–31)
MCHC RBC-ENTMCNC: 33 G/DL — SIGNIFICANT CHANGE UP (ref 32–37)
MCHC RBC-ENTMCNC: 33 G/DL — SIGNIFICANT CHANGE UP (ref 32–37)
MCHC RBC-ENTMCNC: 33.4 G/DL — SIGNIFICANT CHANGE UP (ref 32–37)
MCHC RBC-ENTMCNC: 33.4 G/DL — SIGNIFICANT CHANGE UP (ref 32–37)
MCV RBC AUTO: 93.5 FL — SIGNIFICANT CHANGE UP (ref 81–99)
MCV RBC AUTO: 93.5 FL — SIGNIFICANT CHANGE UP (ref 81–99)
MCV RBC AUTO: 94.1 FL — SIGNIFICANT CHANGE UP (ref 81–99)
MCV RBC AUTO: 94.1 FL — SIGNIFICANT CHANGE UP (ref 81–99)
METHOD TYPE: SIGNIFICANT CHANGE UP
METHOD TYPE: SIGNIFICANT CHANGE UP
MONOCYTES # BLD AUTO: 0.6 K/UL — SIGNIFICANT CHANGE UP (ref 0.1–0.6)
MONOCYTES # BLD AUTO: 0.6 K/UL — SIGNIFICANT CHANGE UP (ref 0.1–0.6)
MONOCYTES NFR BLD AUTO: 13.1 % — HIGH (ref 1.7–9.3)
MONOCYTES NFR BLD AUTO: 13.1 % — HIGH (ref 1.7–9.3)
NEUTROPHILS # BLD AUTO: 2.25 K/UL — SIGNIFICANT CHANGE UP (ref 1.4–6.5)
NEUTROPHILS # BLD AUTO: 2.25 K/UL — SIGNIFICANT CHANGE UP (ref 1.4–6.5)
NEUTROPHILS NFR BLD AUTO: 49 % — SIGNIFICANT CHANGE UP (ref 42.2–75.2)
NEUTROPHILS NFR BLD AUTO: 49 % — SIGNIFICANT CHANGE UP (ref 42.2–75.2)
NRBC # BLD: 0 /100 WBCS — SIGNIFICANT CHANGE UP (ref 0–0)
ORGANISM # SPEC MICROSCOPIC CNT: ABNORMAL
ORGANISM # SPEC MICROSCOPIC CNT: ABNORMAL
ORGANISM # SPEC MICROSCOPIC CNT: SIGNIFICANT CHANGE UP
ORGANISM # SPEC MICROSCOPIC CNT: SIGNIFICANT CHANGE UP
PHOSPHATE SERPL-MCNC: 2.7 MG/DL — SIGNIFICANT CHANGE UP (ref 2.1–4.9)
PHOSPHATE SERPL-MCNC: 2.7 MG/DL — SIGNIFICANT CHANGE UP (ref 2.1–4.9)
PHOSPHATE SERPL-MCNC: 3.1 MG/DL — SIGNIFICANT CHANGE UP (ref 2.1–4.9)
PHOSPHATE SERPL-MCNC: 3.1 MG/DL — SIGNIFICANT CHANGE UP (ref 2.1–4.9)
PLATELET # BLD AUTO: 162 K/UL — SIGNIFICANT CHANGE UP (ref 130–400)
PLATELET # BLD AUTO: 162 K/UL — SIGNIFICANT CHANGE UP (ref 130–400)
PLATELET # BLD AUTO: 177 K/UL — SIGNIFICANT CHANGE UP (ref 130–400)
PLATELET # BLD AUTO: 177 K/UL — SIGNIFICANT CHANGE UP (ref 130–400)
PMV BLD: 10.1 FL — SIGNIFICANT CHANGE UP (ref 7.4–10.4)
PMV BLD: 10.1 FL — SIGNIFICANT CHANGE UP (ref 7.4–10.4)
PMV BLD: 9.8 FL — SIGNIFICANT CHANGE UP (ref 7.4–10.4)
PMV BLD: 9.8 FL — SIGNIFICANT CHANGE UP (ref 7.4–10.4)
POTASSIUM SERPL-MCNC: 3.5 MMOL/L — SIGNIFICANT CHANGE UP (ref 3.5–5)
POTASSIUM SERPL-MCNC: 3.5 MMOL/L — SIGNIFICANT CHANGE UP (ref 3.5–5)
POTASSIUM SERPL-MCNC: 3.6 MMOL/L — SIGNIFICANT CHANGE UP (ref 3.5–5)
POTASSIUM SERPL-MCNC: 3.6 MMOL/L — SIGNIFICANT CHANGE UP (ref 3.5–5)
POTASSIUM SERPL-SCNC: 3.5 MMOL/L — SIGNIFICANT CHANGE UP (ref 3.5–5)
POTASSIUM SERPL-SCNC: 3.5 MMOL/L — SIGNIFICANT CHANGE UP (ref 3.5–5)
POTASSIUM SERPL-SCNC: 3.6 MMOL/L — SIGNIFICANT CHANGE UP (ref 3.5–5)
POTASSIUM SERPL-SCNC: 3.6 MMOL/L — SIGNIFICANT CHANGE UP (ref 3.5–5)
RBC # BLD: 3.74 M/UL — LOW (ref 4.2–5.4)
RBC # BLD: 3.74 M/UL — LOW (ref 4.2–5.4)
RBC # BLD: 3.84 M/UL — LOW (ref 4.2–5.4)
RBC # BLD: 3.84 M/UL — LOW (ref 4.2–5.4)
RBC # FLD: 15.6 % — HIGH (ref 11.5–14.5)
RBC # FLD: 15.6 % — HIGH (ref 11.5–14.5)
RBC # FLD: 15.7 % — HIGH (ref 11.5–14.5)
RBC # FLD: 15.7 % — HIGH (ref 11.5–14.5)
SODIUM SERPL-SCNC: 134 MMOL/L — LOW (ref 135–146)
SODIUM SERPL-SCNC: 134 MMOL/L — LOW (ref 135–146)
SODIUM SERPL-SCNC: 135 MMOL/L — SIGNIFICANT CHANGE UP (ref 135–146)
SODIUM SERPL-SCNC: 135 MMOL/L — SIGNIFICANT CHANGE UP (ref 135–146)
SPECIMEN SOURCE: SIGNIFICANT CHANGE UP
SPECIMEN SOURCE: SIGNIFICANT CHANGE UP
WBC # BLD: 4.59 K/UL — LOW (ref 4.8–10.8)
WBC # BLD: 4.59 K/UL — LOW (ref 4.8–10.8)
WBC # BLD: 5.77 K/UL — SIGNIFICANT CHANGE UP (ref 4.8–10.8)
WBC # BLD: 5.77 K/UL — SIGNIFICANT CHANGE UP (ref 4.8–10.8)
WBC # FLD AUTO: 4.59 K/UL — LOW (ref 4.8–10.8)
WBC # FLD AUTO: 4.59 K/UL — LOW (ref 4.8–10.8)
WBC # FLD AUTO: 5.77 K/UL — SIGNIFICANT CHANGE UP (ref 4.8–10.8)
WBC # FLD AUTO: 5.77 K/UL — SIGNIFICANT CHANGE UP (ref 4.8–10.8)

## 2023-12-24 PROCEDURE — 99231 SBSQ HOSP IP/OBS SF/LOW 25: CPT

## 2023-12-24 RX ORDER — NIFEDIPINE 30 MG
30 TABLET, EXTENDED RELEASE 24 HR ORAL ONCE
Refills: 0 | Status: COMPLETED | OUTPATIENT
Start: 2023-12-24 | End: 2023-12-24

## 2023-12-24 RX ORDER — POTASSIUM CHLORIDE 20 MEQ
40 PACKET (EA) ORAL ONCE
Refills: 0 | Status: COMPLETED | OUTPATIENT
Start: 2023-12-24 | End: 2023-12-24

## 2023-12-24 RX ORDER — POTASSIUM CHLORIDE 20 MEQ
40 PACKET (EA) ORAL ONCE
Refills: 0 | Status: COMPLETED | OUTPATIENT
Start: 2023-12-24 | End: 2023-12-25

## 2023-12-24 RX ORDER — MAGNESIUM SULFATE 500 MG/ML
2 VIAL (ML) INJECTION
Refills: 0 | Status: COMPLETED | OUTPATIENT
Start: 2023-12-24 | End: 2023-12-24

## 2023-12-24 RX ORDER — LANOLIN ALCOHOL/MO/W.PET/CERES
5 CREAM (GRAM) TOPICAL AT BEDTIME
Refills: 0 | Status: DISCONTINUED | OUTPATIENT
Start: 2023-12-24 | End: 2023-12-26

## 2023-12-24 RX ADMIN — HEPARIN SODIUM 5000 UNIT(S): 5000 INJECTION INTRAVENOUS; SUBCUTANEOUS at 06:33

## 2023-12-24 RX ADMIN — Medication 25 MILLIGRAM(S): at 21:13

## 2023-12-24 RX ADMIN — Medication 30 MILLIGRAM(S): at 06:33

## 2023-12-24 RX ADMIN — Medication 40 MILLIEQUIVALENT(S): at 15:14

## 2023-12-24 RX ADMIN — Medication 250 MILLIGRAM(S): at 18:17

## 2023-12-24 RX ADMIN — SERTRALINE 50 MILLIGRAM(S): 25 TABLET, FILM COATED ORAL at 21:13

## 2023-12-24 RX ADMIN — FAMOTIDINE 20 MILLIGRAM(S): 10 INJECTION INTRAVENOUS at 13:47

## 2023-12-24 RX ADMIN — ATORVASTATIN CALCIUM 40 MILLIGRAM(S): 80 TABLET, FILM COATED ORAL at 21:12

## 2023-12-24 RX ADMIN — Medication 2000 UNIT(S): at 14:47

## 2023-12-24 RX ADMIN — Medication 1 TABLET(S): at 13:47

## 2023-12-24 RX ADMIN — HEPARIN SODIUM 5000 UNIT(S): 5000 INJECTION INTRAVENOUS; SUBCUTANEOUS at 13:47

## 2023-12-24 RX ADMIN — Medication 81 MILLIGRAM(S): at 13:47

## 2023-12-24 RX ADMIN — SENNA PLUS 2 TABLET(S): 8.6 TABLET ORAL at 21:13

## 2023-12-24 RX ADMIN — Medication 250 MILLIGRAM(S): at 06:33

## 2023-12-24 RX ADMIN — HEPARIN SODIUM 5000 UNIT(S): 5000 INJECTION INTRAVENOUS; SUBCUTANEOUS at 21:14

## 2023-12-24 RX ADMIN — Medication 500 MILLIGRAM(S): at 13:48

## 2023-12-24 RX ADMIN — CHLORHEXIDINE GLUCONATE 1 APPLICATION(S): 213 SOLUTION TOPICAL at 06:34

## 2023-12-24 RX ADMIN — Medication 1 MILLIGRAM(S): at 13:48

## 2023-12-24 RX ADMIN — Medication 25 GRAM(S): at 06:34

## 2023-12-24 RX ADMIN — ZINC SULFATE TAB 220 MG (50 MG ZINC EQUIVALENT) 220 MILLIGRAM(S): 220 (50 ZN) TAB at 14:48

## 2023-12-24 RX ADMIN — CEFTRIAXONE 100 MILLIGRAM(S): 500 INJECTION, POWDER, FOR SOLUTION INTRAMUSCULAR; INTRAVENOUS at 18:16

## 2023-12-24 RX ADMIN — Medication 25 GRAM(S): at 04:46

## 2023-12-25 ENCOUNTER — TRANSCRIPTION ENCOUNTER (OUTPATIENT)
Age: 83
End: 2023-12-25

## 2023-12-25 LAB
ANION GAP SERPL CALC-SCNC: 15 MMOL/L — HIGH (ref 7–14)
ANION GAP SERPL CALC-SCNC: 15 MMOL/L — HIGH (ref 7–14)
BUN SERPL-MCNC: 23 MG/DL — HIGH (ref 10–20)
BUN SERPL-MCNC: 23 MG/DL — HIGH (ref 10–20)
CALCIUM SERPL-MCNC: 9.1 MG/DL — SIGNIFICANT CHANGE UP (ref 8.4–10.5)
CALCIUM SERPL-MCNC: 9.1 MG/DL — SIGNIFICANT CHANGE UP (ref 8.4–10.5)
CHLORIDE SERPL-SCNC: 97 MMOL/L — LOW (ref 98–110)
CHLORIDE SERPL-SCNC: 97 MMOL/L — LOW (ref 98–110)
CO2 SERPL-SCNC: 23 MMOL/L — SIGNIFICANT CHANGE UP (ref 17–32)
CO2 SERPL-SCNC: 23 MMOL/L — SIGNIFICANT CHANGE UP (ref 17–32)
CREAT SERPL-MCNC: 0.8 MG/DL — SIGNIFICANT CHANGE UP (ref 0.7–1.5)
CREAT SERPL-MCNC: 0.8 MG/DL — SIGNIFICANT CHANGE UP (ref 0.7–1.5)
EGFR: 73 ML/MIN/1.73M2 — SIGNIFICANT CHANGE UP
EGFR: 73 ML/MIN/1.73M2 — SIGNIFICANT CHANGE UP
GLUCOSE SERPL-MCNC: 99 MG/DL — SIGNIFICANT CHANGE UP (ref 70–99)
GLUCOSE SERPL-MCNC: 99 MG/DL — SIGNIFICANT CHANGE UP (ref 70–99)
HCT VFR BLD CALC: 38.3 % — SIGNIFICANT CHANGE UP (ref 37–47)
HCT VFR BLD CALC: 38.3 % — SIGNIFICANT CHANGE UP (ref 37–47)
HGB BLD-MCNC: 12.8 G/DL — SIGNIFICANT CHANGE UP (ref 12–16)
HGB BLD-MCNC: 12.8 G/DL — SIGNIFICANT CHANGE UP (ref 12–16)
MAGNESIUM SERPL-MCNC: 1.6 MG/DL — LOW (ref 1.8–2.4)
MAGNESIUM SERPL-MCNC: 1.6 MG/DL — LOW (ref 1.8–2.4)
MCHC RBC-ENTMCNC: 31.1 PG — HIGH (ref 27–31)
MCHC RBC-ENTMCNC: 31.1 PG — HIGH (ref 27–31)
MCHC RBC-ENTMCNC: 33.4 G/DL — SIGNIFICANT CHANGE UP (ref 32–37)
MCHC RBC-ENTMCNC: 33.4 G/DL — SIGNIFICANT CHANGE UP (ref 32–37)
MCV RBC AUTO: 93 FL — SIGNIFICANT CHANGE UP (ref 81–99)
MCV RBC AUTO: 93 FL — SIGNIFICANT CHANGE UP (ref 81–99)
NRBC # BLD: 0 /100 WBCS — SIGNIFICANT CHANGE UP (ref 0–0)
NRBC # BLD: 0 /100 WBCS — SIGNIFICANT CHANGE UP (ref 0–0)
PHOSPHATE SERPL-MCNC: 3.1 MG/DL — SIGNIFICANT CHANGE UP (ref 2.1–4.9)
PHOSPHATE SERPL-MCNC: 3.1 MG/DL — SIGNIFICANT CHANGE UP (ref 2.1–4.9)
PLATELET # BLD AUTO: 195 K/UL — SIGNIFICANT CHANGE UP (ref 130–400)
PLATELET # BLD AUTO: 195 K/UL — SIGNIFICANT CHANGE UP (ref 130–400)
PMV BLD: 9.9 FL — SIGNIFICANT CHANGE UP (ref 7.4–10.4)
PMV BLD: 9.9 FL — SIGNIFICANT CHANGE UP (ref 7.4–10.4)
POTASSIUM SERPL-MCNC: 3.6 MMOL/L — SIGNIFICANT CHANGE UP (ref 3.5–5)
POTASSIUM SERPL-MCNC: 3.6 MMOL/L — SIGNIFICANT CHANGE UP (ref 3.5–5)
POTASSIUM SERPL-SCNC: 3.6 MMOL/L — SIGNIFICANT CHANGE UP (ref 3.5–5)
POTASSIUM SERPL-SCNC: 3.6 MMOL/L — SIGNIFICANT CHANGE UP (ref 3.5–5)
RBC # BLD: 4.12 M/UL — LOW (ref 4.2–5.4)
RBC # BLD: 4.12 M/UL — LOW (ref 4.2–5.4)
RBC # FLD: 15.8 % — HIGH (ref 11.5–14.5)
RBC # FLD: 15.8 % — HIGH (ref 11.5–14.5)
SODIUM SERPL-SCNC: 135 MMOL/L — SIGNIFICANT CHANGE UP (ref 135–146)
SODIUM SERPL-SCNC: 135 MMOL/L — SIGNIFICANT CHANGE UP (ref 135–146)
WBC # BLD: 10.99 K/UL — HIGH (ref 4.8–10.8)
WBC # BLD: 10.99 K/UL — HIGH (ref 4.8–10.8)
WBC # FLD AUTO: 10.99 K/UL — HIGH (ref 4.8–10.8)
WBC # FLD AUTO: 10.99 K/UL — HIGH (ref 4.8–10.8)

## 2023-12-25 RX ORDER — POTASSIUM CHLORIDE 20 MEQ
40 PACKET (EA) ORAL ONCE
Refills: 0 | Status: DISCONTINUED | OUTPATIENT
Start: 2023-12-25 | End: 2023-12-26

## 2023-12-25 RX ORDER — MAGNESIUM SULFATE 500 MG/ML
2 VIAL (ML) INJECTION
Refills: 0 | Status: COMPLETED | OUTPATIENT
Start: 2023-12-25 | End: 2023-12-26

## 2023-12-25 RX ORDER — MAGNESIUM SULFATE 500 MG/ML
2 VIAL (ML) INJECTION
Refills: 0 | Status: COMPLETED | OUTPATIENT
Start: 2023-12-25 | End: 2023-12-25

## 2023-12-25 RX ADMIN — CEFTRIAXONE 100 MILLIGRAM(S): 500 INJECTION, POWDER, FOR SOLUTION INTRAMUSCULAR; INTRAVENOUS at 17:04

## 2023-12-25 RX ADMIN — Medication 650 MILLIGRAM(S): at 11:46

## 2023-12-25 RX ADMIN — Medication 650 MILLIGRAM(S): at 17:58

## 2023-12-25 RX ADMIN — Medication 40 MILLIEQUIVALENT(S): at 17:04

## 2023-12-25 RX ADMIN — Medication 25 MILLIGRAM(S): at 21:45

## 2023-12-25 RX ADMIN — Medication 250 MILLIGRAM(S): at 17:05

## 2023-12-25 RX ADMIN — SENNA PLUS 2 TABLET(S): 8.6 TABLET ORAL at 21:45

## 2023-12-25 RX ADMIN — Medication 1 TABLET(S): at 11:17

## 2023-12-25 RX ADMIN — Medication 1 MILLIGRAM(S): at 11:26

## 2023-12-25 RX ADMIN — HEPARIN SODIUM 5000 UNIT(S): 5000 INJECTION INTRAVENOUS; SUBCUTANEOUS at 05:14

## 2023-12-25 RX ADMIN — Medication 81 MILLIGRAM(S): at 11:17

## 2023-12-25 RX ADMIN — Medication 25 GRAM(S): at 17:59

## 2023-12-25 RX ADMIN — Medication 650 MILLIGRAM(S): at 11:16

## 2023-12-25 RX ADMIN — Medication 500 MILLIGRAM(S): at 11:25

## 2023-12-25 RX ADMIN — SERTRALINE 50 MILLIGRAM(S): 25 TABLET, FILM COATED ORAL at 21:45

## 2023-12-25 RX ADMIN — HEPARIN SODIUM 5000 UNIT(S): 5000 INJECTION INTRAVENOUS; SUBCUTANEOUS at 21:45

## 2023-12-25 RX ADMIN — CHLORHEXIDINE GLUCONATE 1 APPLICATION(S): 213 SOLUTION TOPICAL at 05:14

## 2023-12-25 RX ADMIN — ATORVASTATIN CALCIUM 40 MILLIGRAM(S): 80 TABLET, FILM COATED ORAL at 21:45

## 2023-12-25 RX ADMIN — Medication 650 MILLIGRAM(S): at 18:28

## 2023-12-25 RX ADMIN — HEPARIN SODIUM 5000 UNIT(S): 5000 INJECTION INTRAVENOUS; SUBCUTANEOUS at 13:29

## 2023-12-25 RX ADMIN — FAMOTIDINE 20 MILLIGRAM(S): 10 INJECTION INTRAVENOUS at 11:16

## 2023-12-25 RX ADMIN — Medication 25 GRAM(S): at 22:41

## 2023-12-25 RX ADMIN — Medication 2000 UNIT(S): at 11:16

## 2023-12-25 RX ADMIN — Medication 25 GRAM(S): at 20:08

## 2023-12-25 RX ADMIN — ZINC SULFATE TAB 220 MG (50 MG ZINC EQUIVALENT) 220 MILLIGRAM(S): 220 (50 ZN) TAB at 11:16

## 2023-12-25 RX ADMIN — Medication 250 MILLIGRAM(S): at 05:13

## 2023-12-25 NOTE — DISCHARGE NOTE NURSING/CASE MANAGEMENT/SOCIAL WORK - PATIENT PORTAL LINK FT
You can access the FollowMyHealth Patient Portal offered by [facility] by registering at the following website: [email or web address]. By joining [employer]’s FollowMyHealth portal, you will also be able to view your health information using other applications (apps) compatible with our system. You can access the FollowMyHealth Patient Portal offered by [facility] by registering at the following website: [email or web address]. By joining [employer]’s FollowMyHealth portal, you will also be able to view your health information using other applications (apps) compatible with our system.

## 2023-12-25 NOTE — DISCHARGE NOTE NURSING/CASE MANAGEMENT/SOCIAL WORK - NSDCPEFALRISK_GEN_ALL_CORE
For information on Fall & Injury Prevention, visit: [email or web address] OR  [email or web address] OR  https://www.cdc.gov/steadi/patient.html For information on Fall & Injury Prevention, visit: [email or web address] OR  [email or web address] OR  https://www.cdc.gov/steadi/patient.html

## 2023-12-26 ENCOUNTER — TRANSCRIPTION ENCOUNTER (OUTPATIENT)
Age: 83
End: 2023-12-26

## 2023-12-26 ENCOUNTER — NON-APPOINTMENT (OUTPATIENT)
Age: 83
End: 2023-12-26

## 2023-12-26 VITALS
TEMPERATURE: 97 F | HEART RATE: 71 BPM | DIASTOLIC BLOOD PRESSURE: 69 MMHG | SYSTOLIC BLOOD PRESSURE: 155 MMHG | RESPIRATION RATE: 18 BRPM

## 2023-12-26 LAB
ANION GAP SERPL CALC-SCNC: 10 MMOL/L — SIGNIFICANT CHANGE UP (ref 7–14)
ANION GAP SERPL CALC-SCNC: 10 MMOL/L — SIGNIFICANT CHANGE UP (ref 7–14)
BUN SERPL-MCNC: 23 MG/DL — HIGH (ref 10–20)
BUN SERPL-MCNC: 23 MG/DL — HIGH (ref 10–20)
CALCIUM SERPL-MCNC: 9 MG/DL — SIGNIFICANT CHANGE UP (ref 8.4–10.5)
CALCIUM SERPL-MCNC: 9 MG/DL — SIGNIFICANT CHANGE UP (ref 8.4–10.5)
CHLORIDE SERPL-SCNC: 99 MMOL/L — SIGNIFICANT CHANGE UP (ref 98–110)
CHLORIDE SERPL-SCNC: 99 MMOL/L — SIGNIFICANT CHANGE UP (ref 98–110)
CO2 SERPL-SCNC: 28 MMOL/L — SIGNIFICANT CHANGE UP (ref 17–32)
CO2 SERPL-SCNC: 28 MMOL/L — SIGNIFICANT CHANGE UP (ref 17–32)
CREAT SERPL-MCNC: 0.8 MG/DL — SIGNIFICANT CHANGE UP (ref 0.7–1.5)
CREAT SERPL-MCNC: 0.8 MG/DL — SIGNIFICANT CHANGE UP (ref 0.7–1.5)
EGFR: 73 ML/MIN/1.73M2 — SIGNIFICANT CHANGE UP
EGFR: 73 ML/MIN/1.73M2 — SIGNIFICANT CHANGE UP
GLUCOSE SERPL-MCNC: 94 MG/DL — SIGNIFICANT CHANGE UP (ref 70–99)
GLUCOSE SERPL-MCNC: 94 MG/DL — SIGNIFICANT CHANGE UP (ref 70–99)
HCT VFR BLD CALC: 37 % — SIGNIFICANT CHANGE UP (ref 37–47)
HCT VFR BLD CALC: 37 % — SIGNIFICANT CHANGE UP (ref 37–47)
HGB BLD-MCNC: 12.1 G/DL — SIGNIFICANT CHANGE UP (ref 12–16)
HGB BLD-MCNC: 12.1 G/DL — SIGNIFICANT CHANGE UP (ref 12–16)
MAGNESIUM SERPL-MCNC: 2.5 MG/DL — HIGH (ref 1.8–2.4)
MAGNESIUM SERPL-MCNC: 2.5 MG/DL — HIGH (ref 1.8–2.4)
MCHC RBC-ENTMCNC: 30.9 PG — SIGNIFICANT CHANGE UP (ref 27–31)
MCHC RBC-ENTMCNC: 30.9 PG — SIGNIFICANT CHANGE UP (ref 27–31)
MCHC RBC-ENTMCNC: 32.7 G/DL — SIGNIFICANT CHANGE UP (ref 32–37)
MCHC RBC-ENTMCNC: 32.7 G/DL — SIGNIFICANT CHANGE UP (ref 32–37)
MCV RBC AUTO: 94.4 FL — SIGNIFICANT CHANGE UP (ref 81–99)
MCV RBC AUTO: 94.4 FL — SIGNIFICANT CHANGE UP (ref 81–99)
NRBC # BLD: 0 /100 WBCS — SIGNIFICANT CHANGE UP (ref 0–0)
NRBC # BLD: 0 /100 WBCS — SIGNIFICANT CHANGE UP (ref 0–0)
PHOSPHATE SERPL-MCNC: 3.2 MG/DL — SIGNIFICANT CHANGE UP (ref 2.1–4.9)
PHOSPHATE SERPL-MCNC: 3.2 MG/DL — SIGNIFICANT CHANGE UP (ref 2.1–4.9)
PLATELET # BLD AUTO: 208 K/UL — SIGNIFICANT CHANGE UP (ref 130–400)
PLATELET # BLD AUTO: 208 K/UL — SIGNIFICANT CHANGE UP (ref 130–400)
PMV BLD: 10.1 FL — SIGNIFICANT CHANGE UP (ref 7.4–10.4)
PMV BLD: 10.1 FL — SIGNIFICANT CHANGE UP (ref 7.4–10.4)
POTASSIUM SERPL-MCNC: 3.9 MMOL/L — SIGNIFICANT CHANGE UP (ref 3.5–5)
POTASSIUM SERPL-MCNC: 3.9 MMOL/L — SIGNIFICANT CHANGE UP (ref 3.5–5)
POTASSIUM SERPL-SCNC: 3.9 MMOL/L — SIGNIFICANT CHANGE UP (ref 3.5–5)
POTASSIUM SERPL-SCNC: 3.9 MMOL/L — SIGNIFICANT CHANGE UP (ref 3.5–5)
RBC # BLD: 3.92 M/UL — LOW (ref 4.2–5.4)
RBC # BLD: 3.92 M/UL — LOW (ref 4.2–5.4)
RBC # FLD: 15.6 % — HIGH (ref 11.5–14.5)
RBC # FLD: 15.6 % — HIGH (ref 11.5–14.5)
SARS-COV-2 RNA SPEC QL NAA+PROBE: DETECTED
SARS-COV-2 RNA SPEC QL NAA+PROBE: DETECTED
SODIUM SERPL-SCNC: 137 MMOL/L — SIGNIFICANT CHANGE UP (ref 135–146)
SODIUM SERPL-SCNC: 137 MMOL/L — SIGNIFICANT CHANGE UP (ref 135–146)
WBC # BLD: 7.26 K/UL — SIGNIFICANT CHANGE UP (ref 4.8–10.8)
WBC # BLD: 7.26 K/UL — SIGNIFICANT CHANGE UP (ref 4.8–10.8)
WBC # FLD AUTO: 7.26 K/UL — SIGNIFICANT CHANGE UP (ref 4.8–10.8)
WBC # FLD AUTO: 7.26 K/UL — SIGNIFICANT CHANGE UP (ref 4.8–10.8)

## 2023-12-26 PROCEDURE — 99238 HOSP IP/OBS DSCHRG MGMT 30/<: CPT

## 2023-12-26 RX ADMIN — Medication 500 MILLIGRAM(S): at 11:59

## 2023-12-26 RX ADMIN — Medication 25 GRAM(S): at 00:53

## 2023-12-26 RX ADMIN — ZINC SULFATE TAB 220 MG (50 MG ZINC EQUIVALENT) 220 MILLIGRAM(S): 220 (50 ZN) TAB at 11:59

## 2023-12-26 RX ADMIN — FAMOTIDINE 20 MILLIGRAM(S): 10 INJECTION INTRAVENOUS at 11:59

## 2023-12-26 RX ADMIN — Medication 2000 UNIT(S): at 12:00

## 2023-12-26 RX ADMIN — Medication 1 TABLET(S): at 11:59

## 2023-12-26 RX ADMIN — Medication 1 MILLIGRAM(S): at 11:59

## 2023-12-26 RX ADMIN — Medication 81 MILLIGRAM(S): at 11:59

## 2023-12-26 RX ADMIN — HEPARIN SODIUM 5000 UNIT(S): 5000 INJECTION INTRAVENOUS; SUBCUTANEOUS at 05:29

## 2023-12-26 RX ADMIN — Medication 250 MILLIGRAM(S): at 05:29

## 2023-12-26 RX ADMIN — CHLORHEXIDINE GLUCONATE 1 APPLICATION(S): 213 SOLUTION TOPICAL at 05:30

## 2023-12-26 NOTE — DISCHARGE NOTE PROVIDER - NSDCFUADDAPPT_GEN_ALL_CORE_FT
Please call [phone] to make a follow up appointment within 1 week with Dr. Barone or Dr. Danielson. Clinic is located at [address] [place] [address] on Tuesdays (2-4pm) or Thursdays (9am-1pm).  Please call [phone] to make a follow up appointment within 1 week with Dr. Barone or Dr. Danielson. Clinic is located at [address] [place] [address] on Tuesdays (2-4pm) or Thursdays (9am-1pm).  Please call [phone] to make a follow up appointment within 1 week with Dr. Barone or Dr. Danielson. Clinic is located at [address] [place] [address] on Tuesdays (2-4pm) or Thursdays (9am-1pm).     Follow up with podiatrist Dr Stahl in 1-2 weeks. Please call to make an appointment [phone].   Please call [phone] to make a follow up appointment within 1 week with Dr. Barone or Dr. Danielson. Clinic is located at [address] [place] [address] on Tuesdays (2-4pm) or Thursdays (9am-1pm).     Follow up with podiatrist Dr Stahl in 1-2 weeks. Please call to make an appointment [phone].

## 2023-12-26 NOTE — DISCHARGE NOTE PROVIDER - NSDCMRMEDTOKEN_GEN_ALL_CORE_FT
Calcium 600+D 600 mg-200 intl units oral tablet: 1 tab(s) orally 2 times a day  doxycycline monohydrate 50 mg oral capsule: 2 cap(s) orally 2 times a day take until prescription finished starting 12/14/23 PM  folic acid 1 mg oral tablet: 1 tab(s) orally 2 times a day  hydroCHLOROthiazide 50 mg oral tablet: 1 tab(s) orally once a day  Low Dose ASA 81 mg oral tablet: 1 tab(s) orally once a day  methotrexate 2.5 mg oral tablet: 6 tab(s) orally once a week  metoprolol succinate 25 mg oral tablet, extended release: 1 tab(s) orally once a day (at bedtime)  predniSONE 20 mg oral tablet: 2 tab(s) orally once a day START 12/15/23, LAST DOSES 12/17/23  rosuvastatin 10 mg oral tablet: 1 tab(s) orally once a day  Vitamin C: 1000 milligram(s) orally once a day  Vitamin D3 1000 intl units oral tablet: 2 tab(s) orally once a day  Zoloft 50 mg oral tablet: orally once a day (at bedtime)   Bactrim  mg-160 mg oral tablet: 1 tab(s) orally every 12 hours  Calcium 600+D 600 mg-200 intl units oral tablet: 1 tab(s) orally 2 times a day  folic acid 1 mg oral tablet: 1 tab(s) orally 2 times a day  hydroCHLOROthiazide 50 mg oral tablet: 1 tab(s) orally once a day  Low Dose ASA 81 mg oral tablet: 1 tab(s) orally once a day  methotrexate 2.5 mg oral tablet: 6 tab(s) orally once a week  metoprolol succinate 25 mg oral tablet, extended release: 1 tab(s) orally once a day (at bedtime)  predniSONE 20 mg oral tablet: 2 tab(s) orally once a day START 12/15/23, LAST DOSES 12/17/23  rosuvastatin 10 mg oral tablet: 1 tab(s) orally once a day  Vitamin C: 1000 milligram(s) orally once a day  Vitamin D3 1000 intl units oral tablet: 2 tab(s) orally once a day  Zoloft 50 mg oral tablet: orally once a day (at bedtime)

## 2023-12-26 NOTE — DISCHARGE NOTE PROVIDER - PROVIDER TOKENS
PROVIDER:[TOKEN:[[id number]],FOLLOWUP:[1 week]],PROVIDER:[TOKEN:[[id number]],FOLLOWUP:[1 week]] PROVIDER:[TOKEN:[[id number]],FOLLOWUP:[1 week]],PROVIDER:[TOKEN:[[id number]],FOLLOWUP:[1 week]] PROVIDER:[TOKEN:[[id number]],FOLLOWUP:[1 week]],PROVIDER:[TOKEN:[[id number]],FOLLOWUP:[1 week]],PROVIDER:[TOKEN:[[id number]],FOLLOWUP:[1 week]] PROVIDER:[TOKEN:[[id number]],FOLLOWUP:[1 week]],PROVIDER:[TOKEN:[[id number]],FOLLOWUP:[1 week]],PROVIDER:[TOKEN:[[id number]],FOLLOWUP:[1 week]]

## 2023-12-26 NOTE — PROGRESS NOTE ADULT - NS ATTEND AMEND GEN_ALL_CORE FT
Pt stable overnight   Dressing change by Podiatry   Plan discharge on po Bactrim today per ID   and f/u with Podiatrist outpt
pt in no distress   dressing change done by Podiatry service   Abx being discussed with ID

## 2023-12-26 NOTE — DISCHARGE NOTE PROVIDER - HOSPITAL COURSE
While inpatient, patient was treated with IVF, IV antibitotics, GI/dvt ppx, pain management and wound care daily. Wcx should Moderate Proteus mirabilis susceptible to bactrim. ID and podiatry consulted - agreed on PO Bactrim 1DS every 12 hours for 10 days. Patient will need visiting nurse for wound care. Patient stable and medically cleared for discharge. Follow up in clinic in one week. Take at home antibiotics Bactrim for 10 days.     #L foot infection  - Per podiatry foot wound has not improved despite PO antibiotics , was sent by Dr Stahl for IV antibiotics   - ID consult - Continue IV ceftriaxone 1g q24hrs while inpatient. If the isolates are susceptible to Bactrim, can switch to PO Bactrim 1DS q12hrs to finish a course of 10 days  - Podiatry following for wound care rec's - 12/24 Dr. Stahl -Weight Bearing Status; WBAT w/ Surgical Shoe; Continue IV abx No surgical intervention indicated at this time; patient would benefit from picc line and iv abx.   - 12/25 Wcx sensitive to Bactrim - ID still recommending PO bactrim on DC , per Dr Bell : Dr Stahl is okay with PO abx on DC but to continue IV abx while inpatient   - DC planning when cleared by podiatry   - Local wound care per podiatry -->  xeroform, gauze, lesvia and ace bandage. Attempted Precious removed due to burning.   - ensure adequate nutrition, DASH/TLC diet  - vitamin C/zinc for wound healing  - activity, ambulate as tolerated  - DVT/GI prophylaxis    #HTN  - vitals per routine  - continue home metoprolol  - DASH diet    #HLD  - on rosuvastatin at home, switched to atorvastatin on admission    #RA  - continue weekly methotrexate    #GERD   - on ranitidine at home, switched to famotidine on admission     #ID  - Mild COVID, Not warrant for Remdesivir at this time  - Continue IV ceftriaxone 1g q24hrs  - WCx susceptible to Bactrim, can switch to PO Bactrim 1DS q12hrs to finish a course of 10 days  - Routine wound care is important for wound healing and to prevent reinfection  - Podiatry follow up  - Offloading and frequent position changes, aspiration precaution  - Trend WBC, fever curve, transaminases, creatinine daily

## 2023-12-26 NOTE — DISCHARGE NOTE PROVIDER - CARE PROVIDERS DIRECT ADDRESSES
,[email or web address]'s [email or web address] of [email or web address] ,[email or web address] ,[email or web address],[email or web address] ,[email or web address],DirectAddress_Unknown

## 2023-12-26 NOTE — DISCHARGE NOTE PROVIDER - NPI NUMBER (FOR SYSADMIN USE ONLY) :
[[id number]],[[id number] [[id number]],[[id number] [[id number]],[[id number]],[[id number]] [[id number]],[[id number]],[[id number]]

## 2023-12-26 NOTE — DISCHARGE NOTE PROVIDER - NSFOLLOWUPCLINICS_GEN_ALL_ED_FT
[facility]  Burn  [address], [address]  [place] [address]  Phone: [phone]  Fax:   Follow Up Time: 1 week     [facility]  Burn  [address], [address]  [place] [address]  Phone: [phone]  Fax:   Follow Up Time: 1 week

## 2023-12-26 NOTE — DISCHARGE NOTE PROVIDER - NSDCCPCAREPLAN_GEN_ALL_CORE_FT
PRINCIPAL DISCHARGE DIAGNOSIS  Diagnosis: Cellulitis of foot  Assessment and Plan of Treatment: You were admitted to the hospital for IV fluids, IV antibiotics, and wound care. Upon discharge please continue wound care daily by washing with soap and water, apply xeroform, kerlix and ACE wrap daily.   Antibiotics Bactrim were sent to your pharmacy, please take them until completed. Please call [phone] to make a follow up appointment within 1 week with Dr. Barone or Dr. Danielson. Watch for signs of infection including fevers, worsening pain, swelling, redness, bleeding or puruletn drainage.   Follow up with podiatrist Dr Stahl in 1-2 weeks. Please call to make an appointment [phone].  Continue to weight bear as tolerated with surgical shoe.      SECONDARY DISCHARGE DIAGNOSES  Diagnosis: HTN (hypertension)  Assessment and Plan of Treatment: Continue with your medications and follow up out patient with your PCP in 1-2 weeks after discharge.    Diagnosis: Rheumatoid arthritis  Assessment and Plan of Treatment: Continue with your medications and follow up out patient with your PCP in 1-2 weeks after discharge.     PRINCIPAL DISCHARGE DIAGNOSIS  Diagnosis: Cellulitis of foot  Assessment and Plan of Treatment: You were admitted to the hospital for IV fluids, IV antibiotics, and wound care. Upon discharge please continue wound care daily by washing with soap and water, apply xeroform, kerlix and ACE wrap daily.   Antibiotics Bactrim were sent to your pharmacy, please take them until completed. Please call [phone] to make a follow up appointment within 1 week with Dr. Barone or Dr. Danielson. Watch for signs of infection including fevers, worsening pain, swelling, redness, bleeding or puruletn drainage.   Follow up with podiatrist Dr Stahl in 1-2 weeks. Please call to make an appointment [phone].  Continue to weight bear as tolerated with surgical shoe.      SECONDARY DISCHARGE DIAGNOSES  Diagnosis: HTN (hypertension)  Assessment and Plan of Treatment: Continue with your medications and follow up out patient with your PCP in 1-2 weeks after discharge.    Diagnosis: Rheumatoid arthritis  Assessment and Plan of Treatment: Continue with your medications and follow up out patient with your PCP in 1-2 weeks after discharge.

## 2023-12-26 NOTE — PROGRESS NOTE ADULT - ASSESSMENT
83y F w/ hx of RA on methotrexate, Depression, GERD, HTN, HLD breast ca s/p lumpectomy in 2015 is presents to ED for persistent left foot infection.     #L foot infection  - Per podiatry foot wound has not improved despite PO antibiotics , was sent by Dr Stahl for IV antibiotics   - ID consult - Continue IV ceftriaxone 1g q24hrs while inpatient. If the isolates are susceptible to Bactrim, can switch to PO Bactrim 1DS q12hrs to finish a course of 10 days  - Podiatry following for wound care rec's - 12/24 Dr. Stahl -Weight Bearing Status; WBAT w/ Surgical Shoe; Continue IV abx No surgical intervention indicated at this time; patient would benefit from picc line and iv abx.   - 12/25 Wcx sensitive to Bactrim - ID still recommending PO bactrim on DC , per Dr Bell : Dr Stahl is okay with PO abx on DC but to continue IV abx while inpatient   - DC planning when cleared by podiatry   - Local wound care per podiatry -->  xeroform, gauze, lesvia and ace bandage. Attempted Precious removed due to burning.   - ensure adequate nutrition, DASH/TLC diet  - vitamin C/zinc for wound healing  - activity, ambulate as tolerated  - DVT/GI prophylaxis    #HTN  - vitals per routine  - continue home metoprolol  - DASH diet    #HLD  - on rosuvastatin at home, switched to atorvastatin on admission    #RA  - continue weekly methotrexate    #GERD   - on ranitidine at home, switched to famotidine on admission     #ID  - Mild COVID, Not warrant for Remdesivir at this time  - Continue IV ceftriaxone 1g q24hrs  - WCx susceptible to Bactrim, can switch to PO Bactrim 1DS q12hrs to finish a course of 10 days  - Routine wound care is important for wound healing and to prevent reinfection  - Podiatry follow up  - Offloading and frequent position changes, aspiration precaution  - Trend WBC, fever curve, transaminases, creatinine daily  
83y F w/ hx of RA on methotrexate, Depression, GERD, HTN, HLD breast ca s/p lumpectomy in 2015 is presents to ED for persistent left foot infection.     #L foot infection  - Per podiatry foot wound has not improved despite PO antibiotics , was sent by Dr Stahl for IV antibiotics   - ID consult - Continue IV ceftriaxone 1g q24hrs while inpatient. If the isolates are susceptible to Bactrim, can switch to PO Bactrim 1DS q12hrs to finish a course of 10 days  - Podiatry following for wound care rec's - 12/24 Dr. Stahl -Weight Bearing Status; WBAT w/ Surgical Shoe; Continue IV abx No surgical intervention indicated at this time; patient would benefit from picc line and iv abx.   - 12/25 Wcx sensitive to Bactrim - ID still recommending PO bactrim on DC , per Dr Bell : Dr Stahl is okay with PO abx on DC but to continue IV abx while inpatient   - DC planning when cleared by podiatry   - Local wound care per podiatry --> possible roque placement today    - ensure adequate nutrition, DASH/TLC diet  - vitamin C/zinc for wound healing  - activity, ambulate as tolerated  - DVT/GI prophylaxis    #HTN  - vitals per routine  - continue home metoprolol  - DASH diet    #HLD  - on rosuvastatin at home, switched to atorvastatin on admission    #RA  - continue weekly methotrexate    #GERD   - on ranitidine at home, switched to famotidine on admission     #ID  - Mild COVID, Not warrant for Remdesivir at this time  - Continue IV ceftriaxone 1g q24hrs  - If the isolates are susceptible to Bactrim, can switch to PO Bactrim 1DS q12hrs to finish a course of 10 days  - Routine wound care is important for wound healing and to prevent reinfection  - Podiatry follow up  - Offloading and frequent position changes, aspiration precaution  - Trend WBC, fever curve, transaminases, creatinine daily    
A/P: Full thickness wound to Rt Foot dorsum  cont wound care  Cont IV antibx  DVT GI Prophylaxis  Pain control  OT/PT      
A/P: Full thickness wound to Rt Foot dorsum  cont wound care  Cont IV antibx  DVT GI Prophylaxis  Pain control  OT/PT

## 2023-12-26 NOTE — DISCHARGE NOTE PROVIDER - CARE PROVIDER_API CALL
Moe Barone  Plastic Surgery  [address] [place] [address]  Phone: [phone]  Fax: [phone]  Follow Up Time: 1 week    Chichi Danielson)  Surgery  [address] [place] [address]  Phone: [phone]  Fax: [phone]  Follow Up Time: 1 week   Moe Barone  Plastic Surgery  [address] [place] [address]  Phone: [phone]  Fax: [phone]  Follow Up Time: 1 week    Chichi Danielson)  Surgery  [address] [place] [address]  Phone: [phone]  Fax: [phone]  Follow Up Time: 1 week   Moe Barone  Plastic Surgery  [address] [place] [address]  Phone: [phone]  Fax: [phone]  Follow Up Time: 1 week    Chichi Danielson)  Surgery  [address] [place] [address]  Phone: [phone]  Fax: [phone]  Follow Up Time: 1 week    Lonnie Stahl  Podiatric Medicine and Surgery  [address] [place] [address]  Phone: [phone]  Fax: [phone]  Follow Up Time: 1 week   Moe Barone  Plastic Surgery  [address] [place] [address]  Phone: [phone]  Fax: [phone]  Follow Up Time: 1 week    Chichi Danielson)  Surgery  [address] [place] [address]  Phone: [phone]  Fax: [phone]  Follow Up Time: 1 week    Lonnie Stahl  Podiatric Medicine and Surgery  [address] [place] [address]  Phone: [phone]  Fax: [phone]  Follow Up Time: 1 week

## 2023-12-26 NOTE — PROGRESS NOTE ADULT - SUBJECTIVE AND OBJECTIVE BOX
Patient is a 83y old  Female who presents with a chief complaint of R foot wound (25 Dec 2023 14:53)    INTERVAL HPI/OVERNIGHT EVENTS:  - No acute events overnight  - Afebrile no complaints     Vital Signs Last 24 Hrs  T(C): 36.1 (26 Dec 2023 04:54), Max: 36.5 (25 Dec 2023 19:16)  T(F): 96.9 (26 Dec 2023 04:54), Max: 97.7 (25 Dec 2023 19:16)  HR: 71 (26 Dec 2023 04:54) (71 - 82)  BP: 155/69 (26 Dec 2023 04:54) (124/60 - 155/69)  RR: 18 (26 Dec 2023 04:54) (18 - 18)  SpO2: 97% (25 Dec 2023 19:16) (97% - 97%)    LABS:                     12.8   10.99 )-----------( 195      ( 25 Dec 2023 10:55 )             38.3     12-25    135  |  97<L>  |  23<H>  ----------------------------<  99  3.6   |  23  |  0.8    Ca    9.1      25 Dec 2023 10:55  Phos  3.1     12-25  Mg     1.6     12-25    MEDICATIONS  (STANDING):  ascorbic acid 500 milliGRAM(s) Oral daily  aspirin  chewable 81 milliGRAM(s) Oral daily  atorvastatin 40 milliGRAM(s) Oral at bedtime  calcium carbonate 1250 mG  + Vitamin D (OsCal 500 + D) 1 Tablet(s) Oral daily  cefTRIAXone   IVPB 1000 milliGRAM(s) IV Intermittent every 24 hours  chlorhexidine 2% Cloths 1 Application(s) Topical <User Schedule>  cholecalciferol 2000 Unit(s) Oral daily  famotidine    Tablet 20 milliGRAM(s) Oral daily  folic acid 1 milliGRAM(s) Oral daily  heparin   Injectable 5000 Unit(s) SubCutaneous every 8 hours  hydrochlorothiazide 50 milliGRAM(s) Oral daily  influenza  Vaccine (HIGH DOSE) 0.7 milliLiter(s) IntraMuscular once  methotrexate 15 milliGRAM(s) Oral every week  metoprolol succinate ER 25 milliGRAM(s) Oral at bedtime  potassium chloride    Tablet ER 40 milliEquivalent(s) Oral once  saccharomyces boulardii 250 milliGRAM(s) Oral two times a day  senna 2 Tablet(s) Oral at bedtime  sertraline 50 milliGRAM(s) Oral at bedtime  zinc sulfate 220 milliGRAM(s) Oral daily    MEDICATIONS  (PRN):  acetaminophen     Tablet .. 650 milliGRAM(s) Oral every 6 hours PRN Mild Pain (1 - 3)  melatonin 5 milliGRAM(s) Oral at bedtime PRN Insomnia  polyethylene glycol 3350 17 Gram(s) Oral daily PRN Constipation  silver sulfADIAZINE 1% Cream 1 Application(s) Topical two times a day PRN Wound Care    Culture - Other (12.22.23 @ 14:46)    -  Levofloxacin: S <=0.5   -  Meropenem: S <=1   -  Piperacillin/Tazobactam: S <=8   -  Tobramycin: S <=2   -  Trimethoprim/Sulfamethoxazole: S <=0.5/9.5   -  Ampicillin: S <=8 These ampicillin results predict results for amoxicillin   -  Amoxicillin/Clavulanic Acid: S <=8/4   -  Ampicillin/Sulbactam: S <=4/2   -  Aztreonam: S <=4   -  Cefazolin: S <=2   -  Cefepime: S <=2   -  Cefoxitin: S <=8   -  Ceftriaxone: S <=1   -  Ertapenem: S <=0.5   -  Ciprofloxacin: S <=0.25   -  Gentamicin: S <=2   Specimen Source: Wound Wound   Culture Results:   Moderate Proteus mirabilis   Organism Identification: Proteus mirabilis   Organism: Proteus mirabilis   Method Type: GILBERT    PHYSICAL EXAM:  GENERAL: Patient lying in bed in well built, well nourished  HEAD:  Atraumatic, Normocephalic  EYES: EOMI, PERRLA  NERVOUS SYSTEM:  Alert & Oriented X3  CHEST/LUNG: Breathing comfortably on RA, b/l chest rise appreciated  HEART: In no cardiopulmonary distress  Wound:   LLE: Dressing in place clean, dry and intact. Dressing done by podiatry.
  Patient is a 83y old  Female who presents with a chief complaint of R foot wound (23 Dec 2023 10:09)    No acute events overnight    AVSS      Meds:  MEDICATIONS  (STANDING):  ascorbic acid 500 milliGRAM(s) Oral daily  aspirin  chewable 81 milliGRAM(s) Oral daily  atorvastatin 40 milliGRAM(s) Oral at bedtime  calcium carbonate 1250 mG  + Vitamin D (OsCal 500 + D) 1 Tablet(s) Oral daily  cefTRIAXone   IVPB 1000 milliGRAM(s) IV Intermittent every 24 hours  chlorhexidine 2% Cloths 1 Application(s) Topical <User Schedule>  cholecalciferol 2000 Unit(s) Oral daily  famotidine    Tablet 20 milliGRAM(s) Oral daily  folic acid 1 milliGRAM(s) Oral daily  heparin   Injectable 5000 Unit(s) SubCutaneous every 8 hours  hydrochlorothiazide 50 milliGRAM(s) Oral daily  influenza  Vaccine (HIGH DOSE) 0.7 milliLiter(s) IntraMuscular once  methotrexate 15 milliGRAM(s) Oral every week  metoprolol succinate ER 25 milliGRAM(s) Oral at bedtime  potassium chloride   Powder 40 milliEquivalent(s) Oral once  saccharomyces boulardii 250 milliGRAM(s) Oral two times a day  senna 2 Tablet(s) Oral at bedtime  sertraline 50 milliGRAM(s) Oral at bedtime  zinc sulfate 220 milliGRAM(s) Oral daily    MEDICATIONS  (PRN):  acetaminophen     Tablet .. 650 milliGRAM(s) Oral every 6 hours PRN Mild Pain (1 - 3)  polyethylene glycol 3350 17 Gram(s) Oral daily PRN Constipation  silver sulfADIAZINE 1% Cream 1 Application(s) Topical two times a day PRN Wound Care        Culture - Other (collected 22 Dec 2023 14:46)  Source: Wound Wound  Preliminary Report (23 Dec 2023 23:27):    Moderate Proteus mirabilis        PE: AAO x 3    Full thickness wound to Rt foot dorsum with granulation tissue, mild erythema+ and edema+          
Podiatry Progress Note    Subjective:  GIAN MELISSA is a  83y Female.   Seen bedside.   Patient is a 83y old  Female who presents with a chief complaint of R foot wound (26 Dec 2023 11:00)      Past Medical History and Surgical History  PAST MEDICAL & SURGICAL HISTORY:  Rheumatoid arthritis      HTN (hypertension)      Breast cancer  last radiation 2015      Depression      Chronic GERD      OA (osteoarthritis)      Rheumatoid arthritis      History of right hip replacement      Status post left hip replacement      S/P total knee replacement, left      H/O vaginal hysterectomy      Status post cholecystectomy      S/P lumpectomy of breast  2015           Objective:  Vital Signs Last 24 Hrs  T(C): 36.1 (26 Dec 2023 04:54), Max: 36.5 (25 Dec 2023 19:16)  T(F): 96.9 (26 Dec 2023 04:54), Max: 97.7 (25 Dec 2023 19:16)  HR: 71 (26 Dec 2023 04:54) (71 - 82)  BP: 155/69 (26 Dec 2023 04:54) (124/60 - 155/69)  BP(mean): --  RR: 18 (26 Dec 2023 04:54) (18 - 18)  SpO2: 97% (25 Dec 2023 19:16) (97% - 97%)                            12.1   7.26  )-----------( 208      ( 26 Dec 2023 11:21 )             37.0                 12-26    137  |  99  |  23<H>  ----------------------------<  94  3.9   |  28  |  0.8    Ca    9.0      26 Dec 2023 11:21  Phos  3.2     12-26  Mg     2.5     12-26          Physical Exam - Lower Extremity Focused:   Derm: Full thickness ulceration with mainly fibrotic wound base, bone exposed on the most distal aspect. Periwound erythema continuing to subside.  Vascular: DP and PT Pulses palpable; Foot is Warm to Warm to the touch; Capillary Refill Time < 3 Seconds;    Neuro: Protective Sensation intact   MSK: No pain On Palpation at Wound Site     Assessment:  Full Thickness Ulcer - Left Foot      Plan:  Chart reviewed and Patient evaluated. All Questions and Concerns Addressed and Answered  XR Imaging  Foot; Reviewed  Weight Bearing Status; WBAT w/ Surgical Shoe;   Wound dressed with xeroform, gauze, lesvia and ace bandage.  Continue IV abx while inpatient; PO abx for discharge- Bactrim BID  No surgical intervention indicated at this time;   Patient to follow up in 1 week in clinic w/Dr. Stahl post discharge  Discussed Plan w/ Dr Stahl          
Patient is a 83y old  Female who presents with a chief complaint of R foot wound (24 Dec 2023 13:04)      AM rounds     Pt: no complaints  No acute events o/n  Podiatry following patient     Vital Signs Last 24 Hrs  T(C): 36.7 (24 Dec 2023 20:00), Max: 36.7 (24 Dec 2023 20:00)  T(F): 98 (24 Dec 2023 20:00), Max: 98 (24 Dec 2023 20:00)  HR: 79 (25 Dec 2023 05:07) (79 - 82)  BP: 138/60 (25 Dec 2023 05:07) (128/60 - 138/60)  RR: 18 (24 Dec 2023 20:00) (18 - 18)  SpO2: 97% (24 Dec 2023 20:00) (97% - 97%)        12-24    135  |  95<L>  |  23<H>  ----------------------------<  91  3.5   |  29  |  0.8    Ca    9.2      24 Dec 2023 11:19  Phos  2.7     12-24  Mg     2.2     12-24                            12.0   5.77  )-----------( 177      ( 24 Dec 2023 11:19 )             35.9         Culture - Other (12.22.23 @ 14:46)    -  Levofloxacin: S <=0.5   -  Meropenem: S <=1   -  Piperacillin/Tazobactam: S <=8   -  Tobramycin: S <=2   -  Trimethoprim/Sulfamethoxazole: S <=0.5/9.5   -  Amoxicillin/Clavulanic Acid: S <=8/4   -  Ampicillin: S <=8 These ampicillin results predict results for amoxicillin   -  Ampicillin/Sulbactam: S <=4/2   -  Aztreonam: S <=4   -  Cefazolin: S <=2   -  Cefepime: S <=2   -  Cefoxitin: S <=8   -  Ceftriaxone: S <=1   -  Ciprofloxacin: S <=0.25   -  Ertapenem: S <=0.5   -  Gentamicin: S <=2   Specimen Source: Wound Wound   Culture Results:   Moderate Proteus mirabilis   Organism Identification: Proteus mirabilis   Organism: Proteus mirabilis   Method Type: GILBERT        EXAM:   Gen: NAD, A&O , on RA breathing comfortably  Wound :  R foot dressing clean and intact - dressing changed by podiatry       
Podiatry Progress Note    Subjective:  GIAN MELISSA is a  83y Female who was seen bedside today for R foot wound and erythema  (24 Dec 2023 13:04).  Patient notes improvement of the erythema.   No other pedal complaints at this time.   Denies any F/N/v/C/SOB.      Past Medical History and Surgical History  PAST MEDICAL & SURGICAL HISTORY:  Rheumatoid arthritis      HTN (hypertension)      Breast cancer  last radiation 2015      Depression      Chronic GERD      OA (osteoarthritis)      Rheumatoid arthritis      History of right hip replacement      Status post left hip replacement      S/P total knee replacement, left      H/O vaginal hysterectomy      Status post cholecystectomy      S/P lumpectomy of breast  2015           Objective:  Vital Signs Last 24 Hrs  T(C): 36.3 (24 Dec 2023 13:00), Max: 37 (24 Dec 2023 04:56)  T(F): 97.3 (24 Dec 2023 13:00), Max: 98.6 (24 Dec 2023 04:56)  HR: 82 (24 Dec 2023 13:00) (75 - 82)  BP: 128/60 (24 Dec 2023 13:00) (128/60 - 175/74)  BP(mean): --  RR: 18 (24 Dec 2023 13:00) (17 - 18)  SpO2: 98% (24 Dec 2023 04:56) (98% - 98%)    Parameters below as of 24 Dec 2023 04:56  Patient On (Oxygen Delivery Method): room air                            12.0   5.77  )-----------( 177      ( 24 Dec 2023 11:19 )             35.9                 12-24    135  |  95<L>  |  23<H>  ----------------------------<  91  3.5   |  29  |  0.8    Ca    9.2      24 Dec 2023 11:19  Phos  2.7     12-24  Mg     2.2     12-24          Physical Exam - Lower Extremity Focused:   Derm: Full thickness ulceration with mainly fibrotic wound base, bone exposed on the most distal aspect. Periwound erythema subsiding.   Vascular: DP and PT Pulses palpable; Foot is Warm to Warm to the touch; Capillary Refill Time < 3 Seconds;    Neuro: Protective Sensation intact   MSK: No pain On Palpation at Wound Site     Assessment:  Full Thickness Ulcer - Left Foot      Plan:  Chart reviewed and Patient evaluated. All Questions and Concerns Addressed and Answered  XR Imaging  Foot; Reviewed  Weight Bearing Status; WBAT w/ Surgical Shoe;   Continue IV abx   No surgical intervention indicated at this time; patient would benefit from picc line and iv abx.   Discussed Plan w/ Dr Stahl    Podiatry       
Podiatry Progress Note    Subjective:  GIAN MELISSA is a  83y Female.   Seen bedside.   Patient is a 83y old  Female who presents with a chief complaint of R foot wound (25 Dec 2023 08:45)      Past Medical History and Surgical History  PAST MEDICAL & SURGICAL HISTORY:  Rheumatoid arthritis      HTN (hypertension)      Breast cancer  last radiation 2015      Depression      Chronic GERD      OA (osteoarthritis)      Rheumatoid arthritis      History of right hip replacement      Status post left hip replacement      S/P total knee replacement, left      H/O vaginal hysterectomy      Status post cholecystectomy      S/P lumpectomy of breast  2015           Objective:  Vital Signs Last 24 Hrs  T(C): 36.4 (25 Dec 2023 14:00), Max: 36.7 (24 Dec 2023 20:00)  T(F): 97.5 (25 Dec 2023 14:00), Max: 98 (24 Dec 2023 20:00)  HR: 80 (25 Dec 2023 14:00) (79 - 80)  BP: 132/76 (25 Dec 2023 14:00) (132/76 - 138/60)  BP(mean): --  RR: 18 (25 Dec 2023 14:00) (18 - 18)  SpO2: 97% (24 Dec 2023 20:00) (97% - 97%)                            12.8   10.99 )-----------( 195      ( 25 Dec 2023 10:55 )             38.3                 12-25    135  |  97<L>  |  23<H>  ----------------------------<  99  3.6   |  23  |  0.8    Ca    9.1      25 Dec 2023 10:55  Phos  3.1     12-25  Mg     1.6     12-25        Physical Exam - Lower Extremity Focused:   Derm: Full thickness ulceration with mainly fibrotic wound base, bone exposed on the most distal aspect. Periwound erythema continuing to subside.  Vascular: DP and PT Pulses palpable; Foot is Warm to Warm to the touch; Capillary Refill Time < 3 Seconds;    Neuro: Protective Sensation intact   MSK: No pain On Palpation at Wound Site     Assessment:  Full Thickness Ulcer - Left Foot      Plan:  Chart reviewed and Patient evaluated. All Questions and Concerns Addressed and Answered  XR Imaging  Foot; Reviewed  Weight Bearing Status; WBAT w/ Surgical Shoe;   Wound dressed with xeroform, gauze, lesvia and ace bandage. Attempted Precious but patient states she experienced burning pain like "hydrogen peroxide" thus, removed and applied normal dressing.   Continue IV abx while inpatient.   No surgical intervention indicated at this time; Discussed treatment plan with ID and Burn team about IV abx while inpatient and transitioning to PO abx outpatient.   Dr. Stahl will be rounding in AM Tues 12/26.  Discussed Plan w/ Dr Stahl    
  Patient is a 83y old  Female who presents with a chief complaint of R foot wound (23 Dec 2023 10:09)    No acute events overnight    Vital Signs Last 24 Hrs  T(C): 37 (24 Dec 2023 04:56), Max: 37 (24 Dec 2023 04:56)  T(F): 98.6 (24 Dec 2023 04:56), Max: 98.6 (24 Dec 2023 04:56)  HR: 75 (24 Dec 2023 04:56) (75 - 75)  BP: 175/74 (24 Dec 2023 04:56) (175/74 - 175/74)  BP(mean): --  RR: 17 (24 Dec 2023 04:56) (17 - 17)  SpO2: 98% (24 Dec 2023 04:56) (98% - 98%)    Parameters below as of 24 Dec 2023 04:56  Patient On (Oxygen Delivery Method): room air      Meds:  MEDICATIONS  (STANDING):  ascorbic acid 500 milliGRAM(s) Oral daily  aspirin  chewable 81 milliGRAM(s) Oral daily  atorvastatin 40 milliGRAM(s) Oral at bedtime  calcium carbonate 1250 mG  + Vitamin D (OsCal 500 + D) 1 Tablet(s) Oral daily  cefTRIAXone   IVPB 1000 milliGRAM(s) IV Intermittent every 24 hours  chlorhexidine 2% Cloths 1 Application(s) Topical <User Schedule>  cholecalciferol 2000 Unit(s) Oral daily  famotidine    Tablet 20 milliGRAM(s) Oral daily  folic acid 1 milliGRAM(s) Oral daily  heparin   Injectable 5000 Unit(s) SubCutaneous every 8 hours  hydrochlorothiazide 50 milliGRAM(s) Oral daily  influenza  Vaccine (HIGH DOSE) 0.7 milliLiter(s) IntraMuscular once  methotrexate 15 milliGRAM(s) Oral every week  metoprolol succinate ER 25 milliGRAM(s) Oral at bedtime  potassium chloride   Powder 40 milliEquivalent(s) Oral once  saccharomyces boulardii 250 milliGRAM(s) Oral two times a day  senna 2 Tablet(s) Oral at bedtime  sertraline 50 milliGRAM(s) Oral at bedtime  zinc sulfate 220 milliGRAM(s) Oral daily    MEDICATIONS  (PRN):  acetaminophen     Tablet .. 650 milliGRAM(s) Oral every 6 hours PRN Mild Pain (1 - 3)  polyethylene glycol 3350 17 Gram(s) Oral daily PRN Constipation  silver sulfADIAZINE 1% Cream 1 Application(s) Topical two times a day PRN Wound Care    LABS:                        12.0   5.77  )-----------( 177      ( 24 Dec 2023 11:19 )             35.9     24 Dec 2023 01:09    134    |  98     |  28     ----------------------------<  126    3.6     |  27     |  1.1      Ca    9.0        24 Dec 2023 01:09  Phos  3.1       24 Dec 2023 01:09  Mg     1.4       24 Dec 2023 01:09      Culture - Other (collected 22 Dec 2023 14:46)  Source: Wound Wound  Preliminary Report (23 Dec 2023 23:27):    Moderate Proteus mirabilis        PE: AAO x 3    Full thickness wound to Rt foot dorsum with granulation tissue, mild erythema+ and edema+

## 2023-12-29 DIAGNOSIS — L03.116 CELLULITIS OF LEFT LOWER LIMB: ICD-10-CM

## 2023-12-29 DIAGNOSIS — K21.9 GASTRO-ESOPHAGEAL REFLUX DISEASE WITHOUT ESOPHAGITIS: ICD-10-CM

## 2023-12-29 DIAGNOSIS — Z96.643 PRESENCE OF ARTIFICIAL HIP JOINT, BILATERAL: ICD-10-CM

## 2023-12-29 DIAGNOSIS — E78.5 HYPERLIPIDEMIA, UNSPECIFIED: ICD-10-CM

## 2023-12-29 DIAGNOSIS — E83.42 HYPOMAGNESEMIA: ICD-10-CM

## 2023-12-29 DIAGNOSIS — M19.90 UNSPECIFIED OSTEOARTHRITIS, UNSPECIFIED SITE: ICD-10-CM

## 2023-12-29 DIAGNOSIS — Z85.3 PERSONAL HISTORY OF MALIGNANT NEOPLASM OF BREAST: ICD-10-CM

## 2023-12-29 DIAGNOSIS — Z96.652 PRESENCE OF LEFT ARTIFICIAL KNEE JOINT: ICD-10-CM

## 2023-12-29 DIAGNOSIS — U07.1 COVID-19: ICD-10-CM

## 2023-12-29 DIAGNOSIS — I10 ESSENTIAL (PRIMARY) HYPERTENSION: ICD-10-CM

## 2024-01-02 ENCOUNTER — OUTPATIENT (OUTPATIENT)
Dept: OUTPATIENT SERVICES | Facility: HOSPITAL | Age: 84
LOS: 1 days | End: 2024-01-02
Payer: MEDICARE

## 2024-01-02 ENCOUNTER — APPOINTMENT (OUTPATIENT)
Dept: BURN CARE | Facility: CLINIC | Age: 84
End: 2024-01-02
Payer: MEDICARE

## 2024-01-02 VITALS — TEMPERATURE: 97.8 F | DIASTOLIC BLOOD PRESSURE: 85 MMHG | HEART RATE: 86 BPM | SYSTOLIC BLOOD PRESSURE: 171 MMHG

## 2024-01-02 DIAGNOSIS — Z90.49 ACQUIRED ABSENCE OF OTHER SPECIFIED PARTS OF DIGESTIVE TRACT: Chronic | ICD-10-CM

## 2024-01-02 DIAGNOSIS — Z09 ENCOUNTER FOR FOLLOW-UP EXAMINATION AFTER COMPLETED TREATMENT FOR CONDITIONS OTHER THAN MALIGNANT NEOPLASM: ICD-10-CM

## 2024-01-02 DIAGNOSIS — Z90.710 ACQUIRED ABSENCE OF BOTH CERVIX AND UTERUS: Chronic | ICD-10-CM

## 2024-01-02 DIAGNOSIS — Z96.641 PRESENCE OF RIGHT ARTIFICIAL HIP JOINT: Chronic | ICD-10-CM

## 2024-01-02 DIAGNOSIS — Z96.652 PRESENCE OF LEFT ARTIFICIAL KNEE JOINT: Chronic | ICD-10-CM

## 2024-01-02 DIAGNOSIS — Z87.2 PERSONAL HISTORY OF DISEASES OF THE SKIN AND SUBCUTANEOUS TISSUE: ICD-10-CM

## 2024-01-02 DIAGNOSIS — Z00.8 ENCOUNTER FOR OTHER GENERAL EXAMINATION: ICD-10-CM

## 2024-01-02 DIAGNOSIS — S91.302A UNSPECIFIED OPEN WOUND, LEFT FOOT, INITIAL ENCOUNTER: ICD-10-CM

## 2024-01-02 DIAGNOSIS — Z96.642 PRESENCE OF LEFT ARTIFICIAL HIP JOINT: Chronic | ICD-10-CM

## 2024-01-02 DIAGNOSIS — Z98.890 OTHER SPECIFIED POSTPROCEDURAL STATES: Chronic | ICD-10-CM

## 2024-01-02 PROCEDURE — 99212 OFFICE O/P EST SF 10 MIN: CPT

## 2024-01-02 PROCEDURE — 87070 CULTURE OTHR SPECIMN AEROBIC: CPT

## 2024-01-04 LAB — BACTERIA SPEC CULT: NORMAL

## 2024-01-12 NOTE — ASU PATIENT PROFILE, ADULT - NS PRO AD PATIENT TYPE ON CHART
Patient is here for recheck of her ears.  She has undergone bilateral T-tube placement for retracted tympanic membranes.  I saw her last month and she had evidence of an inflammatory polyp in the right middle ear space up against the base of the T-tube.  She was having quite a bit of plugged ear symptoms.  I did treat her with Ciprodex drops which we had to provide for her as we could not get good coverage.  The patient reports initial improvement but now in the last week she is noticed some plugged feeling in the right ear and some squeaking noises when she blows her nose.  She use the drops for a total of 7 days.      Procedure note:  The patient was examined under the operative microscope.  The right side she does not show any drainage in the ear canal the T-tube was in place without any drainage around the tube or any buildup around the tube.  I was able to then tilt the tube to look down the lumen and I can see evidence of an inflammatory polyp in the middle ear space.  I did place a dose of Ciprodex drops and use tragal pumping to work him into the middle ear space.      Assessment and plan   Bilateral Eustachian tube dysfunction with previous T-tube placement   Persistent right middle ear inflammatory polyp     Patient was still shows signs of inflammatory polyp in the right middle ear space.  I would like to treat her with a 10 day taper of prednisone.  After she finishes the 10 days I recommended she use 4 days of the Ciprodex drops with tragal pumping 5 drops twice daily.  I would then like to see her back in 3 weeks to check and see if the middle ear space has cleared and settled down will look at the left T-tube at that visit as well.   Health Care Proxy (HCP)

## 2024-01-24 ENCOUNTER — APPOINTMENT (OUTPATIENT)
Dept: BREAST CENTER | Facility: CLINIC | Age: 84
End: 2024-01-24
Payer: MEDICARE

## 2024-01-24 VITALS
BODY MASS INDEX: 26.52 KG/M2 | SYSTOLIC BLOOD PRESSURE: 145 MMHG | WEIGHT: 175 LBS | HEIGHT: 68 IN | DIASTOLIC BLOOD PRESSURE: 80 MMHG

## 2024-01-24 DIAGNOSIS — C50.411 MALIGNANT NEOPLASM OF UPPER-OUTER QUADRANT OF RIGHT FEMALE BREAST: ICD-10-CM

## 2024-01-24 DIAGNOSIS — Z17.0 MALIGNANT NEOPLASM OF UPPER-OUTER QUADRANT OF RIGHT FEMALE BREAST: ICD-10-CM

## 2024-01-24 PROCEDURE — 99214 OFFICE O/P EST MOD 30 MIN: CPT

## 2024-01-24 NOTE — PHYSICAL EXAM
[Normocephalic] : normocephalic [Atraumatic] : atraumatic [No Supraclavicular Adenopathy] : no supraclavicular adenopathy [No dominant masses] : no dominant masses left breast [No Nipple Discharge] : no left nipple discharge [No Rashes] : no rashes [No Ulceration] : no ulceration [Breast Nipple Inversion] : nipples not inverted [Breast Nipple Retraction] : nipples not retracted [de-identified] : well healed surgical scars.  [de-identified] : palpable dense scar tissue / seroma.  [de-identified] : No axillary lymphadenopathy appreciated. [de-identified] : No axillary lymphadenopathy appreciated.

## 2024-01-24 NOTE — DATA REVIEWED
[FreeTextEntry1] : B/L Screening Mammo - 03/29/2023: MAMMOGRAM FINDINGS: Mammography was performed including the following views: bilateral craniocaudal with tomosynthesis, bilateral mediolateral oblique with tomosynthesis.  The examination includes digital synthetic 2D and digital tomosynthesis 3D images. Additional imaging analysis was performed using CAD (computer-aided detection) software.  There are scattered areas of fibroglandular density.  Finding 1:  There is an area of architectural distortion at the site of lumpectomy seen in the right breast.  Finding 2:  There is an area of benign architectural distortion corresponding to the site of surgery seen in the left breast.  No suspicious mass, grouping of calcifications, or other abnormality is identified.  IMPRESSION: There is no mammographic evidence of malignancy.  RECOMMENDATION: Unless otherwise indicated by clinical findings, annual screening mammography recommended.  ASSESSMENT: BI-RADS Category 2:  Benign

## 2024-01-24 NOTE — ASSESSMENT
[FreeTextEntry1] : GIAN is a sue 83 year old patient who presented today in follow up for a history of Stage IA right breast cancer.  She has been doing well with no new breast related complaints.  Most recent imaging: B/L Screening Mammo - 03/29/2023: -There are scattered areas of fibroglandular density. -There is an area of architectural distortion at the site of lumpectomy seen in the right breast. -There is an area of benign architectural distortion corresponding to the site of surgery seen in the left breast. -There is no mammographic evidence of malignancy. BI-RADS Category 2:  Benign, as detailed above.  Physical exam was unrevealing today; right breast - palpable dense scar tissue / seroma.     Imaging with a bilateral screening mammogram will be due at the end of March 2024, and that will be scheduled today. We will plan to discuss these results via telephone when they are available. If benign, she will return for follow-up and clinical breast exam in April 2025 following her mammographic imaging. She will continue follow-up with medical oncology as scheduled.    I spent a total of 30 minutes of face to face time with this patient, greater than 50% of which was spent in counseling and/or coordination of care. All of her questions were appropriately answered. She knows to call with any concerns.

## 2024-01-24 NOTE — HISTORY OF PRESENT ILLNESS
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   ER/KS (+), HER2 (-). h/o Left breast benign Ca++ on NLOC ([facility]). No FHx breast/ovarian cancer.  Sister had Hodgkin's lymphoma. s/p Right NLOC/SNB/MP 12/18/15 - 0/1 (-); 18 mm well diff IDC with lobular features, non ext DCIS low grade; (+) margins.  No LVI or perineural invasion.  Widespread LCIS classical, ALH, papilloma, ADH.   s/p Right re-exc 2/1/16 - widespread LCIS classical, no invasion identified; (-) margins.   s/p SKIP insertion - 02/18/16. completed PBI on 2/26/16. Ramiro Leyva - Anastrozole - completed March 2021.   GIAN MELISSA is a 83 year old female patient who presents today in follow up for stage IA right breast cancer. Since her last visit, she has no new breast related complaints.   Most recent imaging: B/L Screening Mammo - 03/29/2023: -There are scattered areas of fibroglandular density. -There is an area of architectural distortion at the site of lumpectomy seen in the right breast. -There is an area of benign architectural distortion corresponding to the site of surgery seen in the left breast. -There is no mammographic evidence of malignancy. BI-RADS Category 2:  Benign  She presents today for evaluation and imaging review.

## 2024-01-30 ENCOUNTER — APPOINTMENT (OUTPATIENT)
Dept: BURN CARE | Facility: CLINIC | Age: 84
End: 2024-01-30

## 2024-01-30 NOTE — PHYSICAL EXAM
[Closed] : closed [Size%: ______] : Size: [unfilled]% [Infected?] : Infected: No [3] : 3 out of 10 [Normal] : normal [Small] : small  [] : no [de-identified] : The left foot wound measures 1x1cm and is healed .  The wound is pink and dry.   The patient was instructed to clean  the wound with soap and water. Continue local wound care with moisturizer and sunscreen. Follow up prn.  [TWNoteComboBox1] : WEST

## 2024-01-30 NOTE — ASSESSMENT
[FreeTextEntry1] : The left foot wound measures 1x1cm and is healed .  The wound is pink and dry.   The patient was instructed to clean  the wound with soap and water. Continue local wound care with moisturizer and sunscreen. Follow up prn.  [Wound Care] : wound care

## 2024-01-30 NOTE — HISTORY OF PRESENT ILLNESS
[Did this injury occur on the job?] : Did this injury occur on the job? No [de-identified] : left foot wound dorsum [de-identified] : healed

## 2024-02-06 ENCOUNTER — APPOINTMENT (OUTPATIENT)
Dept: BURN CARE | Facility: CLINIC | Age: 84
End: 2024-02-06
Payer: MEDICARE

## 2024-02-06 ENCOUNTER — LABORATORY RESULT (OUTPATIENT)
Age: 84
End: 2024-02-06

## 2024-02-06 ENCOUNTER — OUTPATIENT (OUTPATIENT)
Dept: OUTPATIENT SERVICES | Facility: HOSPITAL | Age: 84
LOS: 1 days | End: 2024-02-06
Payer: MEDICARE

## 2024-02-06 VITALS — TEMPERATURE: 98.6 F | DIASTOLIC BLOOD PRESSURE: 84 MMHG | SYSTOLIC BLOOD PRESSURE: 135 MMHG | HEART RATE: 90 BPM

## 2024-02-06 DIAGNOSIS — Z90.49 ACQUIRED ABSENCE OF OTHER SPECIFIED PARTS OF DIGESTIVE TRACT: Chronic | ICD-10-CM

## 2024-02-06 DIAGNOSIS — Z00.8 ENCOUNTER FOR OTHER GENERAL EXAMINATION: ICD-10-CM

## 2024-02-06 DIAGNOSIS — Z96.642 PRESENCE OF LEFT ARTIFICIAL HIP JOINT: Chronic | ICD-10-CM

## 2024-02-06 DIAGNOSIS — Z90.710 ACQUIRED ABSENCE OF BOTH CERVIX AND UTERUS: Chronic | ICD-10-CM

## 2024-02-06 DIAGNOSIS — Z98.890 OTHER SPECIFIED POSTPROCEDURAL STATES: Chronic | ICD-10-CM

## 2024-02-06 DIAGNOSIS — Z96.641 PRESENCE OF RIGHT ARTIFICIAL HIP JOINT: Chronic | ICD-10-CM

## 2024-02-06 DIAGNOSIS — Z96.652 PRESENCE OF LEFT ARTIFICIAL KNEE JOINT: Chronic | ICD-10-CM

## 2024-02-06 PROCEDURE — 87077 CULTURE AEROBIC IDENTIFY: CPT

## 2024-02-06 PROCEDURE — 87070 CULTURE OTHR SPECIMN AEROBIC: CPT

## 2024-02-06 PROCEDURE — 87186 SC STD MICRODIL/AGAR DIL: CPT

## 2024-02-06 PROCEDURE — 99213 OFFICE O/P EST LOW 20 MIN: CPT

## 2024-02-06 RX ORDER — HYDROCORTISONE 25 MG/G
2.5 OINTMENT TOPICAL TWICE DAILY
Qty: 1 | Refills: 1 | Status: ACTIVE | COMMUNITY
Start: 2024-02-06 | End: 1900-01-01

## 2024-02-06 NOTE — ASSESSMENT
[FreeTextEntry1] : The left foot wound measures 1x1cm and is healing slowly,  A wound culture was obtained.   The wound is pink and moist.   The patient was instructed to clean  the wound with soap and water. Continue local wound care. Will debride and close.  Follow up 1 week.  [Wound Care] : wound care

## 2024-02-06 NOTE — PHYSICAL EXAM
[Healing] : healing [Size%: ______] : Size: [unfilled]% [Infected?] : Infected: No [3] : 3 out of 10 [Normal] : normal [Small] : small  [] : no [de-identified] : The left foot wound measures 1x1cm and is healing slowly,  A wound culture was obtained.   The wound is pink and moist.   The patient was instructed to clean  the wound with soap and water. Continue local wound care. Will debride and close.  Follow up 1 week.  [TWNoteComboBox1] : WEST

## 2024-02-06 NOTE — HISTORY OF PRESENT ILLNESS
[Did this injury occur on the job?] : Did this injury occur on the job? No [de-identified] : left foot wound dorsum [de-identified] : healing and granulating

## 2024-02-07 ENCOUNTER — INPATIENT (INPATIENT)
Facility: HOSPITAL | Age: 84
LOS: 11 days | Discharge: HOME | DRG: 477 | End: 2024-02-19
Attending: PLASTIC SURGERY | Admitting: HOSPITALIST
Payer: MEDICARE

## 2024-02-07 VITALS
HEART RATE: 82 BPM | SYSTOLIC BLOOD PRESSURE: 146 MMHG | OXYGEN SATURATION: 99 % | DIASTOLIC BLOOD PRESSURE: 86 MMHG | TEMPERATURE: 98 F | RESPIRATION RATE: 19 BRPM | HEIGHT: 68 IN

## 2024-02-07 DIAGNOSIS — Z98.890 OTHER SPECIFIED POSTPROCEDURAL STATES: Chronic | ICD-10-CM

## 2024-02-07 DIAGNOSIS — L98.8 OTHER SPECIFIED DISORDERS OF THE SKIN AND SUBCUTANEOUS TISSUE: ICD-10-CM

## 2024-02-07 DIAGNOSIS — Z90.49 ACQUIRED ABSENCE OF OTHER SPECIFIED PARTS OF DIGESTIVE TRACT: Chronic | ICD-10-CM

## 2024-02-07 DIAGNOSIS — L03.90 CELLULITIS, UNSPECIFIED: ICD-10-CM

## 2024-02-07 DIAGNOSIS — Z96.642 PRESENCE OF LEFT ARTIFICIAL HIP JOINT: Chronic | ICD-10-CM

## 2024-02-07 DIAGNOSIS — Z90.710 ACQUIRED ABSENCE OF BOTH CERVIX AND UTERUS: Chronic | ICD-10-CM

## 2024-02-07 DIAGNOSIS — Z96.652 PRESENCE OF LEFT ARTIFICIAL KNEE JOINT: Chronic | ICD-10-CM

## 2024-02-07 DIAGNOSIS — Z96.641 PRESENCE OF RIGHT ARTIFICIAL HIP JOINT: Chronic | ICD-10-CM

## 2024-02-07 LAB
ALBUMIN SERPL ELPH-MCNC: 4.2 G/DL — SIGNIFICANT CHANGE UP (ref 3.5–5.2)
ALP SERPL-CCNC: 103 U/L — SIGNIFICANT CHANGE UP (ref 30–115)
ALT FLD-CCNC: 24 U/L — SIGNIFICANT CHANGE UP (ref 0–41)
ANION GAP SERPL CALC-SCNC: 13 MMOL/L — SIGNIFICANT CHANGE UP (ref 7–14)
AST SERPL-CCNC: 34 U/L — SIGNIFICANT CHANGE UP (ref 0–41)
BASOPHILS # BLD AUTO: 0.01 K/UL — SIGNIFICANT CHANGE UP (ref 0–0.2)
BASOPHILS # BLD AUTO: 0.03 K/UL — SIGNIFICANT CHANGE UP (ref 0–0.2)
BASOPHILS NFR BLD AUTO: 0.1 % — SIGNIFICANT CHANGE UP (ref 0–1)
BASOPHILS NFR BLD AUTO: 0.2 % — SIGNIFICANT CHANGE UP (ref 0–1)
BILIRUB SERPL-MCNC: 0.7 MG/DL — SIGNIFICANT CHANGE UP (ref 0.2–1.2)
BUN SERPL-MCNC: 20 MG/DL — SIGNIFICANT CHANGE UP (ref 10–20)
CALCIUM SERPL-MCNC: 9.6 MG/DL — SIGNIFICANT CHANGE UP (ref 8.4–10.4)
CHLORIDE SERPL-SCNC: 95 MMOL/L — LOW (ref 98–110)
CO2 SERPL-SCNC: 25 MMOL/L — SIGNIFICANT CHANGE UP (ref 17–32)
CREAT SERPL-MCNC: 0.9 MG/DL — SIGNIFICANT CHANGE UP (ref 0.7–1.5)
CRP SERPL-MCNC: 50.1 MG/L — HIGH
EGFR: 63 ML/MIN/1.73M2 — SIGNIFICANT CHANGE UP
EOSINOPHIL # BLD AUTO: 0 K/UL — SIGNIFICANT CHANGE UP (ref 0–0.7)
EOSINOPHIL # BLD AUTO: 0.01 K/UL — SIGNIFICANT CHANGE UP (ref 0–0.7)
EOSINOPHIL NFR BLD AUTO: 0 % — SIGNIFICANT CHANGE UP (ref 0–8)
EOSINOPHIL NFR BLD AUTO: 0.1 % — SIGNIFICANT CHANGE UP (ref 0–8)
ERYTHROCYTE [SEDIMENTATION RATE] IN BLOOD: 55 MM/HR — HIGH (ref 0–20)
GAS PNL BLDV: SIGNIFICANT CHANGE UP
GLUCOSE SERPL-MCNC: 146 MG/DL — HIGH (ref 70–99)
HCT VFR BLD CALC: 31.4 % — LOW (ref 37–47)
HCT VFR BLD CALC: 36.6 % — LOW (ref 37–47)
HGB BLD-MCNC: 10.7 G/DL — LOW (ref 12–16)
HGB BLD-MCNC: 12.2 G/DL — SIGNIFICANT CHANGE UP (ref 12–16)
IMM GRANULOCYTES NFR BLD AUTO: 0.5 % — HIGH (ref 0.1–0.3)
IMM GRANULOCYTES NFR BLD AUTO: 0.6 % — HIGH (ref 0.1–0.3)
LACTATE SERPL-SCNC: 1.8 MMOL/L — SIGNIFICANT CHANGE UP (ref 0.7–2)
LYMPHOCYTES # BLD AUTO: 0.62 K/UL — LOW (ref 1.2–3.4)
LYMPHOCYTES # BLD AUTO: 0.8 K/UL — LOW (ref 1.2–3.4)
LYMPHOCYTES # BLD AUTO: 4.5 % — LOW (ref 20.5–51.1)
LYMPHOCYTES # BLD AUTO: 5.1 % — LOW (ref 20.5–51.1)
MAGNESIUM SERPL-MCNC: 1.2 MG/DL — LOW (ref 1.8–2.4)
MCHC RBC-ENTMCNC: 31.3 PG — HIGH (ref 27–31)
MCHC RBC-ENTMCNC: 31.4 PG — HIGH (ref 27–31)
MCHC RBC-ENTMCNC: 33.3 G/DL — SIGNIFICANT CHANGE UP (ref 32–37)
MCHC RBC-ENTMCNC: 34.1 G/DL — SIGNIFICANT CHANGE UP (ref 32–37)
MCV RBC AUTO: 91.8 FL — SIGNIFICANT CHANGE UP (ref 81–99)
MCV RBC AUTO: 94.3 FL — SIGNIFICANT CHANGE UP (ref 81–99)
MONOCYTES # BLD AUTO: 0.41 K/UL — SIGNIFICANT CHANGE UP (ref 0.1–0.6)
MONOCYTES # BLD AUTO: 1.13 K/UL — HIGH (ref 0.1–0.6)
MONOCYTES NFR BLD AUTO: 3.4 % — SIGNIFICANT CHANGE UP (ref 1.7–9.3)
MONOCYTES NFR BLD AUTO: 6.4 % — SIGNIFICANT CHANGE UP (ref 1.7–9.3)
NEUTROPHILS # BLD AUTO: 10.96 K/UL — HIGH (ref 1.4–6.5)
NEUTROPHILS # BLD AUTO: 15.57 K/UL — HIGH (ref 1.4–6.5)
NEUTROPHILS NFR BLD AUTO: 88.3 % — HIGH (ref 42.2–75.2)
NEUTROPHILS NFR BLD AUTO: 90.8 % — HIGH (ref 42.2–75.2)
NRBC # BLD: 0 /100 WBCS — SIGNIFICANT CHANGE UP (ref 0–0)
NRBC # BLD: 0 /100 WBCS — SIGNIFICANT CHANGE UP (ref 0–0)
PHOSPHATE SERPL-MCNC: 3.8 MG/DL — SIGNIFICANT CHANGE UP (ref 2.1–4.9)
PLATELET # BLD AUTO: 199 K/UL — SIGNIFICANT CHANGE UP (ref 130–400)
PLATELET # BLD AUTO: 203 K/UL — SIGNIFICANT CHANGE UP (ref 130–400)
PMV BLD: 9.4 FL — SIGNIFICANT CHANGE UP (ref 7.4–10.4)
PMV BLD: 9.5 FL — SIGNIFICANT CHANGE UP (ref 7.4–10.4)
POTASSIUM SERPL-MCNC: 4.2 MMOL/L — SIGNIFICANT CHANGE UP (ref 3.5–5)
POTASSIUM SERPL-SCNC: 4.2 MMOL/L — SIGNIFICANT CHANGE UP (ref 3.5–5)
PROT SERPL-MCNC: 7.3 G/DL — SIGNIFICANT CHANGE UP (ref 6–8)
RBC # BLD: 3.42 M/UL — LOW (ref 4.2–5.4)
RBC # BLD: 3.88 M/UL — LOW (ref 4.2–5.4)
RBC # FLD: 14.7 % — HIGH (ref 11.5–14.5)
RBC # FLD: 14.7 % — HIGH (ref 11.5–14.5)
SODIUM SERPL-SCNC: 133 MMOL/L — LOW (ref 135–146)
WBC # BLD: 12.07 K/UL — HIGH (ref 4.8–10.8)
WBC # BLD: 17.63 K/UL — HIGH (ref 4.8–10.8)
WBC # FLD AUTO: 12.07 K/UL — HIGH (ref 4.8–10.8)
WBC # FLD AUTO: 17.63 K/UL — HIGH (ref 4.8–10.8)

## 2024-02-07 PROCEDURE — 93306 TTE W/DOPPLER COMPLETE: CPT

## 2024-02-07 PROCEDURE — 36573 INSJ PICC RS&I 5 YR+: CPT

## 2024-02-07 PROCEDURE — 97162 PT EVAL MOD COMPLEX 30 MIN: CPT | Mod: GP

## 2024-02-07 PROCEDURE — 87150 DNA/RNA AMPLIFIED PROBE: CPT

## 2024-02-07 PROCEDURE — 81001 URINALYSIS AUTO W/SCOPE: CPT

## 2024-02-07 PROCEDURE — 87206 SMEAR FLUORESCENT/ACID STAI: CPT

## 2024-02-07 PROCEDURE — 82550 ASSAY OF CK (CPK): CPT

## 2024-02-07 PROCEDURE — 99285 EMERGENCY DEPT VISIT HI MDM: CPT | Mod: FS

## 2024-02-07 PROCEDURE — 97530 THERAPEUTIC ACTIVITIES: CPT | Mod: GP

## 2024-02-07 PROCEDURE — 82330 ASSAY OF CALCIUM: CPT

## 2024-02-07 PROCEDURE — 85014 HEMATOCRIT: CPT

## 2024-02-07 PROCEDURE — 86140 C-REACTIVE PROTEIN: CPT

## 2024-02-07 PROCEDURE — 83605 ASSAY OF LACTIC ACID: CPT

## 2024-02-07 PROCEDURE — 87015 SPECIMEN INFECT AGNT CONCNTJ: CPT

## 2024-02-07 PROCEDURE — 87077 CULTURE AEROBIC IDENTIFY: CPT

## 2024-02-07 PROCEDURE — 83735 ASSAY OF MAGNESIUM: CPT

## 2024-02-07 PROCEDURE — 87116 MYCOBACTERIA CULTURE: CPT

## 2024-02-07 PROCEDURE — 97535 SELF CARE MNGMENT TRAINING: CPT | Mod: GO

## 2024-02-07 PROCEDURE — 86900 BLOOD TYPING SEROLOGIC ABO: CPT

## 2024-02-07 PROCEDURE — 85018 HEMOGLOBIN: CPT

## 2024-02-07 PROCEDURE — 86850 RBC ANTIBODY SCREEN: CPT

## 2024-02-07 PROCEDURE — 80202 ASSAY OF VANCOMYCIN: CPT

## 2024-02-07 PROCEDURE — 97116 GAIT TRAINING THERAPY: CPT | Mod: GP

## 2024-02-07 PROCEDURE — C1751: CPT

## 2024-02-07 PROCEDURE — 87102 FUNGUS ISOLATION CULTURE: CPT

## 2024-02-07 PROCEDURE — 93005 ELECTROCARDIOGRAM TRACING: CPT

## 2024-02-07 PROCEDURE — 88311 DECALCIFY TISSUE: CPT

## 2024-02-07 PROCEDURE — 97166 OT EVAL MOD COMPLEX 45 MIN: CPT | Mod: GO

## 2024-02-07 PROCEDURE — 84484 ASSAY OF TROPONIN QUANT: CPT

## 2024-02-07 PROCEDURE — 87070 CULTURE OTHR SPECIMN AEROBIC: CPT

## 2024-02-07 PROCEDURE — 83880 ASSAY OF NATRIURETIC PEPTIDE: CPT

## 2024-02-07 PROCEDURE — 71045 X-RAY EXAM CHEST 1 VIEW: CPT

## 2024-02-07 PROCEDURE — 88304 TISSUE EXAM BY PATHOLOGIST: CPT

## 2024-02-07 PROCEDURE — 87186 SC STD MICRODIL/AGAR DIL: CPT

## 2024-02-07 PROCEDURE — 73120 X-RAY EXAM OF HAND: CPT | Mod: 50

## 2024-02-07 PROCEDURE — 80048 BASIC METABOLIC PNL TOTAL CA: CPT

## 2024-02-07 PROCEDURE — 71045 X-RAY EXAM CHEST 1 VIEW: CPT | Mod: 26

## 2024-02-07 PROCEDURE — 87040 BLOOD CULTURE FOR BACTERIA: CPT

## 2024-02-07 PROCEDURE — 93010 ELECTROCARDIOGRAM REPORT: CPT

## 2024-02-07 PROCEDURE — 99221 1ST HOSP IP/OBS SF/LOW 40: CPT | Mod: FS

## 2024-02-07 PROCEDURE — 73620 X-RAY EXAM OF FOOT: CPT | Mod: RT

## 2024-02-07 PROCEDURE — 84132 ASSAY OF SERUM POTASSIUM: CPT

## 2024-02-07 PROCEDURE — 73630 X-RAY EXAM OF FOOT: CPT | Mod: 26,LT

## 2024-02-07 PROCEDURE — 87086 URINE CULTURE/COLONY COUNT: CPT

## 2024-02-07 PROCEDURE — 88307 TISSUE EXAM BY PATHOLOGIST: CPT

## 2024-02-07 PROCEDURE — 36415 COLL VENOUS BLD VENIPUNCTURE: CPT

## 2024-02-07 PROCEDURE — 82803 BLOOD GASES ANY COMBINATION: CPT

## 2024-02-07 PROCEDURE — 85652 RBC SED RATE AUTOMATED: CPT

## 2024-02-07 PROCEDURE — 97110 THERAPEUTIC EXERCISES: CPT | Mod: GO

## 2024-02-07 PROCEDURE — 73701 CT LOWER EXTREMITY W/DYE: CPT | Mod: LT

## 2024-02-07 PROCEDURE — 86901 BLOOD TYPING SEROLOGIC RH(D): CPT

## 2024-02-07 PROCEDURE — 84295 ASSAY OF SERUM SODIUM: CPT

## 2024-02-07 PROCEDURE — 85025 COMPLETE CBC W/AUTO DIFF WBC: CPT

## 2024-02-07 PROCEDURE — 84100 ASSAY OF PHOSPHORUS: CPT

## 2024-02-07 PROCEDURE — 87075 CULTR BACTERIA EXCEPT BLOOD: CPT

## 2024-02-07 RX ORDER — CHLORHEXIDINE GLUCONATE 213 G/1000ML
1 SOLUTION TOPICAL
Refills: 0 | Status: DISCONTINUED | OUTPATIENT
Start: 2024-02-07 | End: 2024-02-08

## 2024-02-07 RX ORDER — SACCHAROMYCES BOULARDII 250 MG
250 POWDER IN PACKET (EA) ORAL
Refills: 0 | Status: DISCONTINUED | OUTPATIENT
Start: 2024-02-07 | End: 2024-02-08

## 2024-02-07 RX ORDER — CHOLECALCIFEROL (VITAMIN D3) 125 MCG
2000 CAPSULE ORAL DAILY
Refills: 0 | Status: DISCONTINUED | OUTPATIENT
Start: 2024-02-07 | End: 2024-02-08

## 2024-02-07 RX ORDER — ONDANSETRON 8 MG/1
4 TABLET, FILM COATED ORAL ONCE
Refills: 0 | Status: COMPLETED | OUTPATIENT
Start: 2024-02-07 | End: 2024-02-07

## 2024-02-07 RX ORDER — MAGNESIUM SULFATE 500 MG/ML
2 VIAL (ML) INJECTION ONCE
Refills: 0 | Status: COMPLETED | OUTPATIENT
Start: 2024-02-07 | End: 2024-02-07

## 2024-02-07 RX ORDER — METHOTREXATE 2.5 MG/1
15 TABLET ORAL
Refills: 0 | Status: DISCONTINUED | OUTPATIENT
Start: 2024-02-07 | End: 2024-02-07

## 2024-02-07 RX ORDER — FAMOTIDINE 10 MG/ML
1 INJECTION INTRAVENOUS
Refills: 0 | DISCHARGE

## 2024-02-07 RX ORDER — FOLIC ACID 0.8 MG
1 TABLET ORAL DAILY
Refills: 0 | Status: DISCONTINUED | OUTPATIENT
Start: 2024-02-07 | End: 2024-02-08

## 2024-02-07 RX ORDER — SERTRALINE 25 MG/1
50 TABLET, FILM COATED ORAL DAILY
Refills: 0 | Status: DISCONTINUED | OUTPATIENT
Start: 2024-02-07 | End: 2024-02-08

## 2024-02-07 RX ORDER — ASPIRIN/CALCIUM CARB/MAGNESIUM 324 MG
81 TABLET ORAL DAILY
Refills: 0 | Status: DISCONTINUED | OUTPATIENT
Start: 2024-02-07 | End: 2024-02-08

## 2024-02-07 RX ORDER — POLYETHYLENE GLYCOL 3350 17 G/17G
17 POWDER, FOR SOLUTION ORAL ONCE
Refills: 0 | Status: DISCONTINUED | OUTPATIENT
Start: 2024-02-07 | End: 2024-02-08

## 2024-02-07 RX ORDER — MAGNESIUM SULFATE 500 MG/ML
2 VIAL (ML) INJECTION ONCE
Refills: 0 | Status: COMPLETED | OUTPATIENT
Start: 2024-02-07 | End: 2024-02-08

## 2024-02-07 RX ORDER — METOPROLOL TARTRATE 50 MG
25 TABLET ORAL DAILY
Refills: 0 | Status: DISCONTINUED | OUTPATIENT
Start: 2024-02-07 | End: 2024-02-08

## 2024-02-07 RX ORDER — ENOXAPARIN SODIUM 100 MG/ML
40 INJECTION SUBCUTANEOUS EVERY 24 HOURS
Refills: 0 | Status: DISCONTINUED | OUTPATIENT
Start: 2024-02-07 | End: 2024-02-08

## 2024-02-07 RX ORDER — CEFEPIME 1 G/1
2000 INJECTION, POWDER, FOR SOLUTION INTRAMUSCULAR; INTRAVENOUS ONCE
Refills: 0 | Status: COMPLETED | OUTPATIENT
Start: 2024-02-07 | End: 2024-02-07

## 2024-02-07 RX ORDER — AZITHROMYCIN 500 MG/1
500 TABLET, FILM COATED ORAL ONCE
Refills: 0 | Status: COMPLETED | OUTPATIENT
Start: 2024-02-07 | End: 2024-02-07

## 2024-02-07 RX ORDER — SODIUM CHLORIDE 9 MG/ML
1000 INJECTION, SOLUTION INTRAVENOUS
Refills: 0 | Status: DISCONTINUED | OUTPATIENT
Start: 2024-02-07 | End: 2024-02-08

## 2024-02-07 RX ORDER — MORPHINE SULFATE 50 MG/1
2 CAPSULE, EXTENDED RELEASE ORAL ONCE
Refills: 0 | Status: DISCONTINUED | OUTPATIENT
Start: 2024-02-07 | End: 2024-02-07

## 2024-02-07 RX ORDER — ZOLPIDEM TARTRATE 10 MG/1
5 TABLET ORAL
Refills: 0 | DISCHARGE

## 2024-02-07 RX ORDER — FAMOTIDINE 10 MG/ML
20 INJECTION INTRAVENOUS
Refills: 0 | Status: DISCONTINUED | OUTPATIENT
Start: 2024-02-07 | End: 2024-02-08

## 2024-02-07 RX ORDER — SENNA PLUS 8.6 MG/1
2 TABLET ORAL AT BEDTIME
Refills: 0 | Status: DISCONTINUED | OUTPATIENT
Start: 2024-02-07 | End: 2024-02-08

## 2024-02-07 RX ORDER — ACETAMINOPHEN 500 MG
650 TABLET ORAL ONCE
Refills: 0 | Status: COMPLETED | OUTPATIENT
Start: 2024-02-07 | End: 2024-02-07

## 2024-02-07 RX ORDER — MORPHINE SULFATE 50 MG/1
2 CAPSULE, EXTENDED RELEASE ORAL EVERY 6 HOURS
Refills: 0 | Status: DISCONTINUED | OUTPATIENT
Start: 2024-02-07 | End: 2024-02-08

## 2024-02-07 RX ORDER — ACETAMINOPHEN 500 MG
650 TABLET ORAL EVERY 6 HOURS
Refills: 0 | Status: DISCONTINUED | OUTPATIENT
Start: 2024-02-07 | End: 2024-02-08

## 2024-02-07 RX ORDER — AZITHROMYCIN 500 MG/1
250 TABLET, FILM COATED ORAL DAILY
Refills: 0 | Status: DISCONTINUED | OUTPATIENT
Start: 2024-02-08 | End: 2024-02-08

## 2024-02-07 RX ORDER — MORPHINE SULFATE 50 MG/1
2 CAPSULE, EXTENDED RELEASE ORAL
Refills: 0 | Status: DISCONTINUED | OUTPATIENT
Start: 2024-02-07 | End: 2024-02-08

## 2024-02-07 RX ORDER — ATORVASTATIN CALCIUM 80 MG/1
40 TABLET, FILM COATED ORAL AT BEDTIME
Refills: 0 | Status: DISCONTINUED | OUTPATIENT
Start: 2024-02-07 | End: 2024-02-08

## 2024-02-07 RX ORDER — CEFEPIME 1 G/1
2000 INJECTION, POWDER, FOR SOLUTION INTRAMUSCULAR; INTRAVENOUS EVERY 8 HOURS
Refills: 0 | Status: DISCONTINUED | OUTPATIENT
Start: 2024-02-07 | End: 2024-02-08

## 2024-02-07 RX ADMIN — SENNA PLUS 2 TABLET(S): 8.6 TABLET ORAL at 21:46

## 2024-02-07 RX ADMIN — MORPHINE SULFATE 2 MILLIGRAM(S): 50 CAPSULE, EXTENDED RELEASE ORAL at 21:15

## 2024-02-07 RX ADMIN — Medication 650 MILLIGRAM(S): at 21:30

## 2024-02-07 RX ADMIN — ATORVASTATIN CALCIUM 40 MILLIGRAM(S): 80 TABLET, FILM COATED ORAL at 21:46

## 2024-02-07 RX ADMIN — AZITHROMYCIN 255 MILLIGRAM(S): 500 TABLET, FILM COATED ORAL at 15:54

## 2024-02-07 RX ADMIN — CEFEPIME 100 MILLIGRAM(S): 1 INJECTION, POWDER, FOR SOLUTION INTRAMUSCULAR; INTRAVENOUS at 15:53

## 2024-02-07 RX ADMIN — SODIUM CHLORIDE 75 MILLILITER(S): 9 INJECTION, SOLUTION INTRAVENOUS at 20:55

## 2024-02-07 RX ADMIN — Medication 650 MILLIGRAM(S): at 20:55

## 2024-02-07 RX ADMIN — MORPHINE SULFATE 2 MILLIGRAM(S): 50 CAPSULE, EXTENDED RELEASE ORAL at 21:00

## 2024-02-07 RX ADMIN — MORPHINE SULFATE 2 MILLIGRAM(S): 50 CAPSULE, EXTENDED RELEASE ORAL at 18:41

## 2024-02-07 RX ADMIN — CEFEPIME 100 MILLIGRAM(S): 1 INJECTION, POWDER, FOR SOLUTION INTRAMUSCULAR; INTRAVENOUS at 22:45

## 2024-02-07 RX ADMIN — Medication 650 MILLIGRAM(S): at 18:41

## 2024-02-07 NOTE — ED PROVIDER NOTE - ED STEMI HIDDEN
Hospitalist Progress Note    Patient: Mayuri West MRN: [id number]  CSN: [id number]    YOB: 1956  Age: 58 y.o. Sex: female    DOA: 5/19/2019 LOS:  LOS: 2 days          Chief Complaint:    Chest pain    Assessment/Plan     1. NSTEMI with chest pain  2. Headache  3. Hypertension  4. Uncontrolled DM2  5. Hypercholesterolemia    1. Patient is status post PCI with stent to LAD. Per cardiology, has RCA disease which will have planned intervention on 5/22. Patient is on brilinta and aspirin. No complaints of chest pain this morning. 2. Resolved, no complaints of headache at time of exam.   3. Blood pressure has improved, this morning 134/94mmHg. Continue Catapres, Cardizem, and Cozaar. Monitor with routine vitals. 4. Blood glucose levels are reasonably well controlled after procedure. Will continue sliding scale insulin and cardiac diet. Continue to monitor blood glucose levels with accuchecks. 5. Continue Lipitor 40mg daily. DVT prophylaxis with subcutaneous heparin. Disposition: Await further planned cardiac intervention on 5/22 for stenting of RCA per cardiology. Patient Active Problem List   Diagnosis Code    DM (diabetes mellitus) ([facility] [address] E11.9    HTN (hypertension) I10    Elevated cholesterol E78.00    History of angina Z86.79    Cervical spondylosis M47.812    Cervical spinal stenosis M48.02    Rotator cuff impingement syndrome of left shoulder M75.42    Diabetes ([facility] [address] E11.9    Acute chest pain R07.9    NSTEM[facility] (non-ST elevated [facility] infarction) [facility] I21.4       Subjective:    Doing well, no complaints this morning.     Review of systems:    Constitutional: denies fevers, chills, myalgias  Respiratory: denies SOB, cough  Cardiovascular: denies chest pain, palpitations  Gastrointestinal: denies nausea, vomiting, diarrhea      Vital signs/Intake and Output:  Visit Vitals  BP (!) 134/94   Pulse 82   Temp 98.5 °F (36.9 °C)   Resp 16   Ht 5' 4\" (1.626 m)   Wt 77.2 kg (170 lb 3.2 oz)   SpO2 100%   Breastfeeding? No   BMI 29.21 kg/m²     Current Shift:  No intake/output data recorded.   Last three shifts:  05/19 1901 - 05/21 0700  In: 240 [P.O.:240]  Out: -     Exam:    General: Elderly AA female, alert, NAD, OX3  Head/Neck: NCAT, supple, No masses, No lymphadenopathy  CVS:Regular rate and rhythm, no M/R/G, S1/S2 heard, no thrill  Lungs:Clear to auscultation bilaterally, no wheezes, rhonchi, or rales  Abdomen: Soft, Nontender, No distention, Normal Bowel sounds, No hepatomegaly  Extremities: No C/C/E, pulses palpable 2+  Skin:normal texture and turgor, no rashes, no lesions  Neuro:grossly normal , follows commands  Psych:appropriate                Labs: Results:       Chemistry Recent Labs     05/21/19  0423 05/20/19  0810 05/19/19  0208   GLU 94 119* 159*    143 143   K 3.5 3.5 3.4*    109* 109*   CO2 27 27 23   BUN 6* 10 12   CREA 0.76 0.74 1.02   CA 9.1 9.4 9.7   AGAP 9 7 11   BUCR 8* 14 12   AP  --  118* 128*   TP  --  6.6 7.0   ALB  --  3.3* 3.5   GLOB  --  3.3 3.5   AGRAT  --  1.0 1.0      CBC w/Diff Recent Labs     05/20/19  2150 05/20/19  0847 05/20/19  0810 05/19/19  0208   WBC 16.9*  --  11.0 15.0*   RBC 4.89  --  4.57 4.93   HGB 11.0* 9.8* 10.0* 11.0*   HCT 34.9* 32.3* 33.0* 35.4   *  --  429* 471*   GRANS  --   --  53 61   LYMPH  --   --  37 28   EOS  --   --  3 4      Cardiac Enzymes Recent Labs     05/19/19  1934 05/19/19  1351   CPK 68 66   CKND1 CALCULATION NOT PERFORMED WHEN RESULT IS BELOW LINEAR LIMIT CALCULATION NOT PERFORMED WHEN RESULT IS BELOW LINEAR LIMIT      Coagulation Recent Labs     05/20/19  0810   PTP 13.1   INR 1.0       Lipid Panel Lab Results   Component Value Date/Time    Cholesterol, total 199 05/21/2019 04:23 AM    HDL Cholesterol 35 (L) 05/21/2019 04:23 AM    LDL, calculated 142.8 (H) 05/21/2019 04:23 AM    VLDL, calculated 21.2 05/21/2019 04:23 AM    Triglyceride 106 05/21/2019 04:23 AM    CHOL/HDL Ratio 5.7 (H) 05/21/2019 04:23 AM BNP No results for input(s): BNPP in the last 72 hours.    Liver Enzymes Recent Labs     05/20/19  0810   TP 6.6   ALB 3.3*   *   SGOT 10*      Thyroid Studies No results found for: T4, T3U, TSH, TSHEXT     Procedures/imaging: see electronic medical records for all procedures/Xrays and details which were not copied into this note but were reviewed prior to creation of [address] Cand[address] Baltazar, PA-C hide

## 2024-02-07 NOTE — ED ADULT NURSE NOTE - NSFALLUNIVINTERV_ED_ALL_ED
Assistance OOB with selected safe patient handling equipment if applicable/Bed/Stretcher in lowest position, wheels locked, appropriate side rails in place/Call bell, personal items and telephone in reach/Instruct patient to call for assistance before getting out of bed/chair/stretcher/Non-slip footwear applied when patient is off stretcher/[place] to call system/Physically safe environment - no spills, clutter or unnecessary equipment/Purposeful proactive rounding/Room/bathroom lighting operational, light cord in reach

## 2024-02-07 NOTE — ED PROVIDER NOTE - ATTENDING APP SHARED VISIT CONTRIBUTION OF CARE
84 yo f with pmh of htn, breast ca 2015, gerd, arthritis, presents with L food wound.  [facility] sent her in because wound looked infected today.  no fever or chills.  pt is not forthcoming.  exam:  R foot with erythematous wound, mild drainage imp: pt with R foot infection, cellulitis, abx and admission

## 2024-02-07 NOTE — ED PROVIDER NOTE - OBJECTIVE STATEMENT
pt with pmhx Breast cancer 2015, GERD, HTN, arthritis presents to ED per [facility] for L foot wound looks infected today. Denies fever/chill/HA/dizziness/chest pain/palpitation/sob/abd pain/n/v/d/ black stool/bloody stool/urinary sxs

## 2024-02-07 NOTE — CONSULT NOTE ADULT - ASSESSMENT
Patient is a 83y old  Female who presents with a chief complaint of LLE wound.    Cellulitic LLE wound  - Patient c/o "being sick" - appears lethargic, with non productive cough and nausea. Further medical workup recommended   - Please place PIV and administer IV antibiotics   - Plan for OR friday for debridement of LLE  - LWC: Wash with soap and water. Apply xeroform, kerlix, ACE wrap.

## 2024-02-07 NOTE — H&P ADULT - NSHPPHYSICALEXAM_GEN_ALL_CORE
GENERAL: Lying in bed   HEAD:  Atraumatic, Normocephalic  CHEST/LUNG: Dry productive cough, b/l chest rise appreciated  HEART: In no cardiopulmonary distress  PSYCH: Lethargic - delayed responses   SKIN:   LLE: Dorsal aspect of left foot with ~2cm x 2cm wound, swelling noted with erythema. Warm to touch. No active bleeding, purulence or drainage noted.

## 2024-02-07 NOTE — ED PROVIDER NOTE - CLINICAL SUMMARY MEDICAL DECISION MAKING FREE TEXT BOX
Pt with infected R food wound, with cellulitis, started on abx and admit to med.  Any ordered labs and EKG were reviewed, Dr. Lady Cheney, attending physician.  Any imaging was ordered and reviewed by me, Dr. Lady Cheney, attending physician.  Appropriate medications for patient's presenting complaints were ordered and effects were reassessed.  Patient's records, if available,  (prior hospital, ED visit, and/or nursing home notes if available) were reviewed.  Additional history was obtained from EMS, family, and/or PCP (when available).  Escalation to admission/observation was considered.  Patient requires inpatient hospitalization - monitored setting.

## 2024-02-07 NOTE — H&P ADULT - NSHPLABSRESULTS_GEN_ALL_CORE
12.2   12.07 )-----------( 203      ( 07 Feb 2024 13:17 )             36.6     CBC Full  -  ( 07 Feb 2024 13:17 )  WBC Count : 12.07 K/uL  RBC Count : 3.88 M/uL  Hemoglobin : 12.2 g/dL  Hematocrit : 36.6 %  Platelet Count - Automated : 203 K/uL  Mean Cell Volume : 94.3 fL  Mean Cell Hemoglobin : 31.4 pg  Mean Cell Hemoglobin Concentration : 33.3 g/dL  Auto Neutrophil # : 10.96 K/uL  Auto Lymphocyte # : 0.62 K/uL  Auto Monocyte # : 0.41 K/uL  Auto Eosinophil # : 0.01 K/uL  Auto Basophil # : 0.01 K/uL  Auto Neutrophil % : 90.8 %  Auto Lymphocyte % : 5.1 %  Auto Monocyte % : 3.4 %  Auto Eosinophil % : 0.1 %  Auto Basophil % : 0.1 %

## 2024-02-07 NOTE — H&P ADULT - ASSESSMENT
Patient is a 83y old  Female who presents with a chief complaint of LLE wound.    Cellulitic LLE wound  - Admit to Burn  - GLENYS and IVF  - Pain management   - NPO for OR tomorrow    - LWC: Wash with soap and water. Apply xeroform, kerlix, ACE wrap.   - wcx taken    Cards:   - Continue with home medications  - Monitor bps  - Dash diet    RA  - Continue weekly methotrexate    GI: Hx of GERD   - Continue with home medication    Micellaneous  - Ambulate as tolerated  - DVT/gi ppx  - PT c/s       Patient is a 83y old  Female who presents with a chief complaint of LLE wound.    Cellulitic LLE wound  - Admit to Burn  - GLENYS - cefepime & azithromycin  - IVF: LR @ 75  - Pain management   - NPO for OR tomorrow    - LWC: Wash with soap and water. Apply xeroform, kerlix, ACE wrap.   - wcx taken  - ID c/s - f/u     Cards:   - Continue with home medications  - Monitor bps  - Dash diet    RA  - Methotrexate - holding for infection    GI: Hx of GERD   - Continue with home medication    Micellaneous  - Ambulate as tolerated  - DVT/gi ppx  - PT c/s

## 2024-02-07 NOTE — H&P ADULT - HISTORY OF PRESENT ILLNESS
Patient is a 83y F w/ hx of RA on methotrexate, Depression, GERD, HTN, HLD breast ca s/p lumpectomy in 2015 is presents to ED for left foot cellulitis. patient's daughter at beside states that visiting nurse saw her mother today and reported that wound of left foot looked cellulitic. Patient endorses chills, feeling feverish, and nausea. Patient had 1 episode of emesis in the ED. Denies chest pain, sob, numbness, tingling.

## 2024-02-07 NOTE — PATIENT PROFILE ADULT - FALL HARM RISK - HARM RISK INTERVENTIONS
Assistance with ambulation/Assistance OOB with selected safe patient handling equipment/Communicate Risk of Fall with Harm to all staff/Discuss with provider need for PT consult/Monitor gait and stability/Reinforce activity limits and safety measures with patient and family/Tailored Fall Risk Interventions/Visual Cue: Yellow wristband and red socks/Bed in lowest position, wheels locked, appropriate side rails in place/Call bell, personal items and telephone in reach/Instruct patient to call for assistance before getting out of bed or chair/Non-slip footwear when patient is out of bed/[place] to call system/Physically safe environment - no spills, clutter or unnecessary equipment/Purposeful Proactive Rounding/Room/bathroom lighting operational, light cord in reach

## 2024-02-07 NOTE — ED ADULT TRIAGE NOTE - CHIEF COMPLAINT QUOTE
Patient BIBEMS from home for infection to Left foot seen yesterday by Dr Barone advised to come to ER from home health aide due to Pt having difficulty ambulating.

## 2024-02-07 NOTE — ED PROVIDER NOTE - PHYSICAL EXAMINATION
CONSTITUTIONAL: Well-appearing; well-nourished; in no apparent distress. feels warm on exam  EYES: PERRL; EOM intact.   ENT: normal nose; no rhinorrhea; normal pharynx with no tonsillar hypertrophy.   NECK: Supple; non-tender; no cervical lymphadenopathy  CARDIOVASCULAR: Normal S1, S2; no murmurs, rubs, or gallops.   RESPIRATORY: coarse BSl; Normal chest excursion with respiration  GI/: Non-distended; non-tender; no palpable organomegaly.   MS: see below, otherwise no evidence of trauma or deformity. Normal ROM in all four extremities; non-tender to palpation; distal pulses are normal.   SKIN: R foot wound with some drainage, area is warm ttp and erythematous; otherwise warm; dry; good turgor  NEURO/PSYCH: A & O x 4; grossly unremarkable. mood and manner are appropriate. Grooming and personal hygiene are appropriate. No apparent thoughts of harm to self or others.

## 2024-02-07 NOTE — ED ADULT NURSE NOTE - NS ED NURSE RECORD ANOTHER VITAL SIGN
Patient: Cecy Mcknight    Procedure: Procedure(s):  ESOPHAGOGASTRODUODENOSCOPY WITH BIOPSIES and DILATION       Anesthesia Type:  MAC    Note:     Postop Pain Control: Uneventful            Sign Out: Well controlled pain   PONV: No   Neuro/Psych: Uneventful            Sign Out: Acceptable/Baseline neuro status   Airway/Respiratory: Uneventful            Sign Out: Acceptable/Baseline resp. status   CV/Hemodynamics: Uneventful            Sign Out: Acceptable CV status   Other NRE: NONE   DID A NON-ROUTINE EVENT OCCUR? No           Last vitals:  Vitals Value Taken Time   /73 08/11/22 0930   Temp 36  C (96.8  F) 08/11/22 0924   Pulse 63 08/11/22 0935   Resp 18 08/11/22 0930   SpO2 97 % 08/11/22 0935   Vitals shown include unvalidated device data.    Electronically Signed By: Aleksandr Bocanegra MD  August 11, 2022  11:21 AM  
Yes

## 2024-02-07 NOTE — ED ADULT NURSE NOTE - CAS TRG GEN SKIN CONDITION
Nutrition Care Plan    Nutrition Diagnosis:   Increased nutrient needs  related to bilateral leg cellulitis, underweight (BMI 17.8 currently), severe sepsis  as evidenced by TPN required to meet nutritional needs due to abd distention,  acute liver failure, poor intakes prior to adm & during adm,estimated nutrition needs to promote wound healing and wt gain      Intervention:  Parenteral formula/solution: MD desires custom, minimum volume TPN due to  diuresis, ascites (requires paracentesis today),   goal rate 83 ml/hr with 250 ml lipids daily  Parenteral Nutrition Order: plan to start TPN today  Access Site: CVC  Calories Provided by Parenteral Nutrition: 1420 dmitry @ goal rate  Protein Provided by Parenteral Nutrition: 100 gm protein @ goal rate  Dextrose Provided by Parenteral Nutrition: 300 gm @ goal rate  Other Provided by Parenteral Nutrition: add lipids daily to provide 500 additional calories/d  Currently no IV fluids running  TPN with lipids will provide 100% of estimated calorie needs & 100% of estimated pro needs    Other:  estimated nutrition needs:  9635-9645 calories/day,  grams protein/day    Monitoring and Evaluation:  Parenteral access:   goal to graciela TPN & provide >/=90% of estimated nutreint needs daily    Weight:   maintain/gain wt, admission wt 54 kg  Skin:   show physical signs of wound healing, wound care continues     Warm

## 2024-02-08 ENCOUNTER — RESULT REVIEW (OUTPATIENT)
Age: 84
End: 2024-02-08

## 2024-02-08 LAB
ANION GAP SERPL CALC-SCNC: 13 MMOL/L — SIGNIFICANT CHANGE UP (ref 7–14)
BASOPHILS # BLD AUTO: 0.03 K/UL — SIGNIFICANT CHANGE UP (ref 0–0.2)
BASOPHILS NFR BLD AUTO: 0.2 % — SIGNIFICANT CHANGE UP (ref 0–1)
BUN SERPL-MCNC: 22 MG/DL — HIGH (ref 10–20)
CALCIUM SERPL-MCNC: 9.1 MG/DL — SIGNIFICANT CHANGE UP (ref 8.4–10.5)
CHLORIDE SERPL-SCNC: 92 MMOL/L — LOW (ref 98–110)
CO2 SERPL-SCNC: 27 MMOL/L — SIGNIFICANT CHANGE UP (ref 17–32)
CREAT SERPL-MCNC: 0.8 MG/DL — SIGNIFICANT CHANGE UP (ref 0.7–1.5)
EGFR: 73 ML/MIN/1.73M2 — SIGNIFICANT CHANGE UP
EOSINOPHIL # BLD AUTO: 0.01 K/UL — SIGNIFICANT CHANGE UP (ref 0–0.7)
EOSINOPHIL NFR BLD AUTO: 0.1 % — SIGNIFICANT CHANGE UP (ref 0–8)
FLUAV AG NPH QL: SIGNIFICANT CHANGE UP
FLUBV AG NPH QL: SIGNIFICANT CHANGE UP
GLUCOSE SERPL-MCNC: 109 MG/DL — HIGH (ref 70–99)
GRAM STN FLD: ABNORMAL
HCT VFR BLD CALC: 35.1 % — LOW (ref 37–47)
HGB BLD-MCNC: 11.8 G/DL — LOW (ref 12–16)
IMM GRANULOCYTES NFR BLD AUTO: 0.6 % — HIGH (ref 0.1–0.3)
LYMPHOCYTES # BLD AUTO: 0.83 K/UL — LOW (ref 1.2–3.4)
LYMPHOCYTES # BLD AUTO: 6.6 % — LOW (ref 20.5–51.1)
MAGNESIUM SERPL-MCNC: 2.3 MG/DL — SIGNIFICANT CHANGE UP (ref 1.8–2.4)
MAGNESIUM SERPL-MCNC: 2.5 MG/DL — HIGH (ref 1.8–2.4)
MCHC RBC-ENTMCNC: 31.9 PG — HIGH (ref 27–31)
MCHC RBC-ENTMCNC: 33.6 G/DL — SIGNIFICANT CHANGE UP (ref 32–37)
MCV RBC AUTO: 94.9 FL — SIGNIFICANT CHANGE UP (ref 81–99)
METHOD TYPE: SIGNIFICANT CHANGE UP
MONOCYTES # BLD AUTO: 0.93 K/UL — HIGH (ref 0.1–0.6)
MONOCYTES NFR BLD AUTO: 7.4 % — SIGNIFICANT CHANGE UP (ref 1.7–9.3)
MRSA SPEC QL CULT: SIGNIFICANT CHANGE UP
NEUTROPHILS # BLD AUTO: 10.7 K/UL — HIGH (ref 1.4–6.5)
NEUTROPHILS NFR BLD AUTO: 85.1 % — HIGH (ref 42.2–75.2)
NRBC # BLD: 0 /100 WBCS — SIGNIFICANT CHANGE UP (ref 0–0)
PHOSPHATE SERPL-MCNC: 2.4 MG/DL — SIGNIFICANT CHANGE UP (ref 2.1–4.9)
PLATELET # BLD AUTO: 167 K/UL — SIGNIFICANT CHANGE UP (ref 130–400)
PMV BLD: 9.1 FL — SIGNIFICANT CHANGE UP (ref 7.4–10.4)
POTASSIUM SERPL-MCNC: 3.6 MMOL/L — SIGNIFICANT CHANGE UP (ref 3.5–5)
POTASSIUM SERPL-SCNC: 3.6 MMOL/L — SIGNIFICANT CHANGE UP (ref 3.5–5)
RBC # BLD: 3.7 M/UL — LOW (ref 4.2–5.4)
RBC # FLD: 15.1 % — HIGH (ref 11.5–14.5)
RSV RNA NPH QL NAA+NON-PROBE: SIGNIFICANT CHANGE UP
SARS-COV-2 RNA SPEC QL NAA+PROBE: DETECTED
SODIUM SERPL-SCNC: 132 MMOL/L — LOW (ref 135–146)
SPECIMEN SOURCE: SIGNIFICANT CHANGE UP
SPECIMEN SOURCE: SIGNIFICANT CHANGE UP
WBC # BLD: 12.58 K/UL — HIGH (ref 4.8–10.8)
WBC # FLD AUTO: 12.58 K/UL — HIGH (ref 4.8–10.8)

## 2024-02-08 PROCEDURE — 99232 SBSQ HOSP IP/OBS MODERATE 35: CPT | Mod: FS,25

## 2024-02-08 PROCEDURE — 11043 DBRDMT MUSC&/FSCA 1ST 20/<: CPT

## 2024-02-08 PROCEDURE — 71045 X-RAY EXAM CHEST 1 VIEW: CPT | Mod: 26

## 2024-02-08 PROCEDURE — 88307 TISSUE EXAM BY PATHOLOGIST: CPT | Mod: 26

## 2024-02-08 PROCEDURE — 88311 DECALCIFY TISSUE: CPT | Mod: 26

## 2024-02-08 RX ORDER — FOLIC ACID 0.8 MG
1 TABLET ORAL DAILY
Refills: 0 | Status: DISCONTINUED | OUTPATIENT
Start: 2024-02-08 | End: 2024-02-14

## 2024-02-08 RX ORDER — VANCOMYCIN HCL 1 G
1250 VIAL (EA) INTRAVENOUS EVERY 24 HOURS
Refills: 0 | Status: DISCONTINUED | OUTPATIENT
Start: 2024-02-08 | End: 2024-02-08

## 2024-02-08 RX ORDER — ATORVASTATIN CALCIUM 80 MG/1
40 TABLET, FILM COATED ORAL AT BEDTIME
Refills: 0 | Status: DISCONTINUED | OUTPATIENT
Start: 2024-02-08 | End: 2024-02-14

## 2024-02-08 RX ORDER — SODIUM CHLORIDE 9 MG/ML
1000 INJECTION INTRAMUSCULAR; INTRAVENOUS; SUBCUTANEOUS
Refills: 0 | Status: DISCONTINUED | OUTPATIENT
Start: 2024-02-08 | End: 2024-02-08

## 2024-02-08 RX ORDER — VANCOMYCIN HCL 1 G
1250 VIAL (EA) INTRAVENOUS EVERY 24 HOURS
Refills: 0 | Status: DISCONTINUED | OUTPATIENT
Start: 2024-02-08 | End: 2024-02-10

## 2024-02-08 RX ORDER — METOPROLOL TARTRATE 50 MG
25 TABLET ORAL DAILY
Refills: 0 | Status: DISCONTINUED | OUTPATIENT
Start: 2024-02-08 | End: 2024-02-14

## 2024-02-08 RX ORDER — SACCHAROMYCES BOULARDII 250 MG
250 POWDER IN PACKET (EA) ORAL
Refills: 0 | Status: DISCONTINUED | OUTPATIENT
Start: 2024-02-08 | End: 2024-02-14

## 2024-02-08 RX ORDER — CEFEPIME 1 G/1
1000 INJECTION, POWDER, FOR SOLUTION INTRAMUSCULAR; INTRAVENOUS EVERY 12 HOURS
Refills: 0 | Status: DISCONTINUED | OUTPATIENT
Start: 2024-02-08 | End: 2024-02-09

## 2024-02-08 RX ORDER — CEFEPIME 1 G/1
1000 INJECTION, POWDER, FOR SOLUTION INTRAMUSCULAR; INTRAVENOUS EVERY 12 HOURS
Refills: 0 | Status: DISCONTINUED | OUTPATIENT
Start: 2024-02-08 | End: 2024-02-08

## 2024-02-08 RX ORDER — VANCOMYCIN HCL 1 G
VIAL (EA) INTRAVENOUS
Refills: 0 | Status: DISCONTINUED | OUTPATIENT
Start: 2024-02-08 | End: 2024-02-08

## 2024-02-08 RX ORDER — POLYETHYLENE GLYCOL 3350 17 G/17G
17 POWDER, FOR SOLUTION ORAL ONCE
Refills: 0 | Status: COMPLETED | OUTPATIENT
Start: 2024-02-08 | End: 2024-02-10

## 2024-02-08 RX ORDER — CHLORHEXIDINE GLUCONATE 213 G/1000ML
1 SOLUTION TOPICAL
Refills: 0 | Status: DISCONTINUED | OUTPATIENT
Start: 2024-02-08 | End: 2024-02-14

## 2024-02-08 RX ORDER — ASPIRIN/CALCIUM CARB/MAGNESIUM 324 MG
81 TABLET ORAL DAILY
Refills: 0 | Status: DISCONTINUED | OUTPATIENT
Start: 2024-02-08 | End: 2024-02-14

## 2024-02-08 RX ORDER — SENNA PLUS 8.6 MG/1
2 TABLET ORAL AT BEDTIME
Refills: 0 | Status: DISCONTINUED | OUTPATIENT
Start: 2024-02-08 | End: 2024-02-14

## 2024-02-08 RX ORDER — MORPHINE SULFATE 50 MG/1
2 CAPSULE, EXTENDED RELEASE ORAL EVERY 6 HOURS
Refills: 0 | Status: DISCONTINUED | OUTPATIENT
Start: 2024-02-08 | End: 2024-02-12

## 2024-02-08 RX ORDER — VANCOMYCIN HCL 1 G
1000 VIAL (EA) INTRAVENOUS ONCE
Refills: 0 | Status: DISCONTINUED | OUTPATIENT
Start: 2024-02-08 | End: 2024-02-08

## 2024-02-08 RX ORDER — CHOLECALCIFEROL (VITAMIN D3) 125 MCG
2000 CAPSULE ORAL DAILY
Refills: 0 | Status: DISCONTINUED | OUTPATIENT
Start: 2024-02-08 | End: 2024-02-14

## 2024-02-08 RX ORDER — ACETAMINOPHEN 500 MG
650 TABLET ORAL EVERY 6 HOURS
Refills: 0 | Status: DISCONTINUED | OUTPATIENT
Start: 2024-02-08 | End: 2024-02-14

## 2024-02-08 RX ORDER — MORPHINE SULFATE 50 MG/1
2 CAPSULE, EXTENDED RELEASE ORAL
Refills: 0 | Status: DISCONTINUED | OUTPATIENT
Start: 2024-02-08 | End: 2024-02-12

## 2024-02-08 RX ORDER — ENOXAPARIN SODIUM 100 MG/ML
40 INJECTION SUBCUTANEOUS EVERY 24 HOURS
Refills: 0 | Status: DISCONTINUED | OUTPATIENT
Start: 2024-02-08 | End: 2024-02-14

## 2024-02-08 RX ORDER — FAMOTIDINE 10 MG/ML
20 INJECTION INTRAVENOUS
Refills: 0 | Status: DISCONTINUED | OUTPATIENT
Start: 2024-02-08 | End: 2024-02-14

## 2024-02-08 RX ORDER — SERTRALINE 25 MG/1
50 TABLET, FILM COATED ORAL DAILY
Refills: 0 | Status: DISCONTINUED | OUTPATIENT
Start: 2024-02-08 | End: 2024-02-14

## 2024-02-08 RX ADMIN — ENOXAPARIN SODIUM 40 MILLIGRAM(S): 100 INJECTION SUBCUTANEOUS at 05:05

## 2024-02-08 RX ADMIN — ATORVASTATIN CALCIUM 40 MILLIGRAM(S): 80 TABLET, FILM COATED ORAL at 21:22

## 2024-02-08 RX ADMIN — FAMOTIDINE 20 MILLIGRAM(S): 10 INJECTION INTRAVENOUS at 17:14

## 2024-02-08 RX ADMIN — Medication 650 MILLIGRAM(S): at 20:01

## 2024-02-08 RX ADMIN — CEFEPIME 100 MILLIGRAM(S): 1 INJECTION, POWDER, FOR SOLUTION INTRAMUSCULAR; INTRAVENOUS at 17:14

## 2024-02-08 RX ADMIN — CHLORHEXIDINE GLUCONATE 1 APPLICATION(S): 213 SOLUTION TOPICAL at 17:15

## 2024-02-08 RX ADMIN — AZITHROMYCIN 250 MILLIGRAM(S): 500 TABLET, FILM COATED ORAL at 12:33

## 2024-02-08 RX ADMIN — FAMOTIDINE 20 MILLIGRAM(S): 10 INJECTION INTRAVENOUS at 05:03

## 2024-02-08 RX ADMIN — CHLORHEXIDINE GLUCONATE 1 APPLICATION(S): 213 SOLUTION TOPICAL at 05:02

## 2024-02-08 RX ADMIN — MORPHINE SULFATE 2 MILLIGRAM(S): 50 CAPSULE, EXTENDED RELEASE ORAL at 08:35

## 2024-02-08 RX ADMIN — SENNA PLUS 2 TABLET(S): 8.6 TABLET ORAL at 21:22

## 2024-02-08 RX ADMIN — Medication 250 MILLIGRAM(S): at 17:14

## 2024-02-08 RX ADMIN — Medication 25 GRAM(S): at 02:01

## 2024-02-08 RX ADMIN — Medication 25 MILLIGRAM(S): at 05:03

## 2024-02-08 RX ADMIN — SERTRALINE 50 MILLIGRAM(S): 25 TABLET, FILM COATED ORAL at 12:33

## 2024-02-08 RX ADMIN — SODIUM CHLORIDE 75 MILLILITER(S): 9 INJECTION INTRAMUSCULAR; INTRAVENOUS; SUBCUTANEOUS at 18:05

## 2024-02-08 RX ADMIN — Medication 250 MILLIGRAM(S): at 05:03

## 2024-02-08 RX ADMIN — Medication 81 MILLIGRAM(S): at 12:32

## 2024-02-08 RX ADMIN — CEFEPIME 100 MILLIGRAM(S): 1 INJECTION, POWDER, FOR SOLUTION INTRAMUSCULAR; INTRAVENOUS at 05:03

## 2024-02-08 RX ADMIN — MORPHINE SULFATE 2 MILLIGRAM(S): 50 CAPSULE, EXTENDED RELEASE ORAL at 08:15

## 2024-02-08 RX ADMIN — Medication 25 GRAM(S): at 00:19

## 2024-02-08 RX ADMIN — Medication 166.67 MILLIGRAM(S): at 17:49

## 2024-02-08 NOTE — PROGRESS NOTE ADULT - SUBJECTIVE AND OBJECTIVE BOX
Patient is a 83y old  Female who presents with a chief complaint of left foot wound.     AM rounds  Pt seen at bedside  No acute events overnight  Pt febrile  Plan for surgical debridement today      INTERVAL HPI/OVERNIGHT EVENTS:  ICU Vital Signs Last 24 Hrs  T(C): 38.3 (08 Feb 2024 14:45), Max: 39.2 (07 Feb 2024 17:21)  T(F): 101 (08 Feb 2024 14:45), Max: 102.5 (07 Feb 2024 17:21)  HR: 90 (08 Feb 2024 14:45) (75 - 101)  BP: 127/69 (08 Feb 2024 14:45) (116/55 - 162/75)  BP(mean): 96 (08 Feb 2024 14:42) (79 - 113)  RR: 16 (08 Feb 2024 14:45) (16 - 19)  SpO2: 93% (08 Feb 2024 14:45) (93% - 99%)    O2 Parameters below as of 08 Feb 2024 14:45  Patient On (Oxygen Delivery Method): room air        LABS:                        11.8   12.58 )-----------( 167      ( 08 Feb 2024 13:23 )             35.1     02-08    132<L>  |  92<L>  |  22<H>  ----------------------------<  109<H>  3.6   |  27  |  0.8    Ca    9.1      08 Feb 2024 13:23  Phos  2.4     02-08  Mg     2.3     02-08    TPro  7.3  /  Alb  4.2  /  TBili  0.7  /  DBili  x   /  AST  34  /  ALT  24  /  AlkPhos  103  02-07    Culture - Blood (02.07.24 @ 18:39)    -  Methicillin resistant Staphylococcus aureus (MRSA): Detec   Gram Stain:   Growth in aerobic bottle: Gram Positive Cocci in Clusters  Growth in anaerobic bottle: Gram Positive Cocci in Clusters   Specimen Source: .Blood Blood-Peripheral   Organism: Blood Culture PCR   Culture Results:   Growth in aerobic bottle: Gram Positive Cocci in Clusters  Growth in anaerobic bottle: Gram Positive Cocci in Clusters      Flu With COVID-19 By TYLER (02.07.24 @ 16:21)    SARS-CoV-2 Result: Detected   Influenza A Result: NotDetec   Influenza B Result: NotDetec   Resp Syn Virus Result: NotDetec: The [employer] m Resp-4-Plex assay is a multiplex real-time reverse  transcription (RT) polymerase chain reaction (PCR) test intended for the  simultaneous qualitative detection and differentiation of RNA from  SARS-CoV-2, influenza A virus (flu A), influenza B virus (flu B), and/or  Respiratory Syncytial Virus (RSV) in anterior nasal or nasopharyngeal  swab specimens collected by a healthcare provider (HCP), or in anterior  nasal swab specimens that are self-collected at a healthcare location,  from individuals suspected by their HCP of respiratory viral infection  consistent with COVID-19.        RADIOLOGY & ADDITIONAL TESTS:    Chest x-ray 2/8  Impression:    No radiographic evidence of acute cardiopulmonary disease.      Consultant(s) Notes Reviewed:  [x ] YES  [ ] NO    MEDICATIONS  (STANDING):    MEDICATIONS  (PRN):      PHYSICAL EXAM:  GENERAL: well built, well nourished  HEAD:  Atraumatic, Normocephalic  EYES: EOMI, PERRLA, conjunctiva and sclera clear  ENT: No tonsillar erythema, exudates, or enlargement; Moist mucous membranes, Good dentition, No lesions  NECK: Supple, No JVD, Normal thyroid, no enlarged nodes  NERVOUS SYSTEM:  Alert & Oriented X3, Good concentration; Motor Strength 5/5 B/L upper and lower extremities; DTRs 2+ intact and symmetric, sensory intact  CHEST/LUNG: B/L good air entry; No rales, rhonchi, or wheezing  HEART: S1S2 normal, no S3, Regular rate and rhythm; No murmurs, rubs, or gallops  ABDOMEN: Soft, Nontender, Nondistended; Bowel sounds present  EXTREMITIES:  2+ Peripheral Pulses, No clubbing, cyanosis, or edema  LYMPH: No lymphadenopathy noted  SKIN: No rashes or lesions  Wound:     Care Discussed with Consultants/Other Providers [ x] YES  [ ] NO Patient is a 83y old  Female who presents with a chief complaint of left foot wound.     AM rounds  Pt seen at bedside  No acute events overnight  Pt febrile, blood cultures positive  Covid positive  Plan for surgical debridement today      INTERVAL HPI/OVERNIGHT EVENTS:  ICU Vital Signs Last 24 Hrs  T(C): 38.3 (08 Feb 2024 14:45), Max: 39.2 (07 Feb 2024 17:21)  T(F): 101 (08 Feb 2024 14:45), Max: 102.5 (07 Feb 2024 17:21)  HR: 90 (08 Feb 2024 14:45) (75 - 101)  BP: 127/69 (08 Feb 2024 14:45) (116/55 - 162/75)  BP(mean): 96 (08 Feb 2024 14:42) (79 - 113)  RR: 16 (08 Feb 2024 14:45) (16 - 19)  SpO2: 93% (08 Feb 2024 14:45) (93% - 99%)    O2 Parameters below as of 08 Feb 2024 14:45  Patient On (Oxygen Delivery Method): room air        LABS:                        11.8   12.58 )-----------( 167      ( 08 Feb 2024 13:23 )             35.1     02-08    132<L>  |  92<L>  |  22<H>  ----------------------------<  109<H>  3.6   |  27  |  0.8    Ca    9.1      08 Feb 2024 13:23  Phos  2.4     02-08  Mg     2.3     02-08    TPro  7.3  /  Alb  4.2  /  TBili  0.7  /  DBili  x   /  AST  34  /  ALT  24  /  AlkPhos  103  02-07    Culture - Blood (02.07.24 @ 18:39)    -  Methicillin resistant Staphylococcus aureus (MRSA): Detec   Gram Stain:   Growth in aerobic bottle: Gram Positive Cocci in Clusters  Growth in anaerobic bottle: Gram Positive Cocci in Clusters   Specimen Source: .Blood Blood-Peripheral   Organism: Blood Culture PCR   Culture Results:   Growth in aerobic bottle: Gram Positive Cocci in Clusters  Growth in anaerobic bottle: Gram Positive Cocci in Clusters      Flu With COVID-19 By TYLER (02.07.24 @ 16:21)    SARS-CoV-2 Result: Detected   Influenza A Result: NotDete   Influenza B Result: NotCritical access hospital   Resp Syn Virus Result: NotDete: The Stefan m Resp-4-Plex assay is a multiplex real-time reverse  transcription (RT) polymerase chain reaction (PCR) test intended for the  simultaneous qualitative detection and differentiation of RNA from  SARS-CoV-2, influenza A virus (flu A), influenza B virus (flu B), and/or  Respiratory Syncytial Virus (RSV) in anterior nasal or nasopharyngeal  swab specimens collected by a healthcare provider (HCP), or in anterior  nasal swab specimens that are self-collected at a healthcare location,  from individuals suspected by their HCP of respiratory viral infection  consistent with COVID-19.        RADIOLOGY & ADDITIONAL TESTS:    Chest x-ray 2/8  Impression:    No radiographic evidence of acute cardiopulmonary disease.      Consultant(s) Notes Reviewed:  [x ] YES  [ ] NO    MEDICATIONS  (STANDING):  MEDICATIONS  (STANDING):  aspirin  chewable 81 milliGRAM(s) Oral daily  atorvastatin 40 milliGRAM(s) Oral at bedtime  cefepime   IVPB 1000 milliGRAM(s) IV Intermittent every 12 hours  chlorhexidine 4% Liquid 1 Application(s) Topical two times a day  cholecalciferol 2000 Unit(s) Oral daily  enoxaparin Injectable 40 milliGRAM(s) SubCutaneous every 24 hours  famotidine    Tablet 20 milliGRAM(s) Oral two times a day  folic acid 1 milliGRAM(s) Oral daily  hydrochlorothiazide 50 milliGRAM(s) Oral daily  metoprolol succinate ER 25 milliGRAM(s) Oral daily  saccharomyces boulardii 250 milliGRAM(s) Oral two times a day  senna 2 Tablet(s) Oral at bedtime  sertraline 50 milliGRAM(s) Oral daily  vancomycin  IVPB 1250 milliGRAM(s) IV Intermittent every 24 hours      MEDICATIONS  (PRN):  MEDICATIONS  (PRN):  acetaminophen     Tablet .. 650 milliGRAM(s) Oral every 6 hours PRN Temp greater or equal to 38C (100.4F), Mild Pain (1 - 3)  morphine  - Injectable 2 milliGRAM(s) IV Push every 6 hours PRN Severe Pain (7 - 10)  morphine  - Injectable 2 milliGRAM(s) IV Push two times a day PRN wound care  oxycodone    5 mG/acetaminophen 325 mG 1 Tablet(s) Oral every 6 hours PRN Moderate Pain (4 - 6)  polyethylene glycol 3350 17 Gram(s) Oral once PRN Constipation        PHYSICAL EXAM:  GENERAL: pt lying in bed in NAD  CHEST/LUNG: Dry productive cough, b/l chest rise appreciated  HEART: In no cardiopulmonary distress  SKIN:   LLE: Dorsal aspect of left foot with ~2cm x 2cm wound, swelling noted with erythema. Warm to touch. No active bleeding, no pus, no malodor    Care Discussed with Consultants/Other Providers [ x] YES  [ ] NO

## 2024-02-08 NOTE — PROGRESS NOTE ADULT - ASSESSMENT
Patient is a 83y old  Female who presents with a chief complaint of LLE infected wound.    Full thickness wound of left foot  - IVF: LR @ 75  - Pain management   - LWC: Wash with soap and water. Apply xeroform, kerlix, ACE wrap.   - wcx taken  - OR today    Cards:   - Continue with home medications  - Monitor vital signs  - Dash diet    Endo: RA  - Methotrexate - holding for infection    GI: Hx of GERD   - Continue with home medication    ID:   -Blood cultures positive for MRSA  -Wound cultures 2/7 pending  -IV antibiotics: cefepim IV 1g q12h, vancomycin IV 1250mg q24h    Micellaneous  - Ambulate as tolerated  - DVT/gi ppx  - PT c/s  Patient is a 83y old  Female who presents with a chief complaint of LLE infected wound.    Full thickness wound of left foot  - IVF: LR @ 75  - Pain management   - LWC: vashe wet to dry twice a day  - wcx taken  - Plan for OR Friday 2/9    Cards:   - Continue with home medications: HCTZ, metoprolol, rosuvastatin-atorvastatin inpatient   - Monitor vital signs  - Dash/TLC diet    Endo: RA  - Rheumatology consult placed 2/8  - Methotrexate - holding for infection    GI: Hx of GERD   - Continue home medication: famotidine     ID:   -Blood cultures positive for MRSA  -Wound cultures 2/7 pending  -As per verbal conversation with ID: cefepim IV 1g q12h, vancomycin IV 1250mg q24h    Psych:  Hx of depression  -Continue Zolof    Micellaneous  - Ambulate as tolerated  - DVT/GI ppx  - PT c/s   Pt agrees with plan

## 2024-02-08 NOTE — OCCUPATIONAL THERAPY INITIAL EVALUATION ADULT - SPECIFY REASON(S)
pt scheduled for OR; attempted to see pt for OT assessment, pt is sleeping; OT to f/u when appropriate

## 2024-02-09 LAB
ANION GAP SERPL CALC-SCNC: 11 MMOL/L — SIGNIFICANT CHANGE UP (ref 7–14)
ANION GAP SERPL CALC-SCNC: 12 MMOL/L — SIGNIFICANT CHANGE UP (ref 7–14)
APPEARANCE UR: CLEAR — SIGNIFICANT CHANGE UP
BACTERIA # UR AUTO: NEGATIVE /HPF — SIGNIFICANT CHANGE UP
BASOPHILS # BLD AUTO: 0.02 K/UL — SIGNIFICANT CHANGE UP (ref 0–0.2)
BASOPHILS NFR BLD AUTO: 0.2 % — SIGNIFICANT CHANGE UP (ref 0–1)
BILIRUB UR-MCNC: NEGATIVE — SIGNIFICANT CHANGE UP
BUN SERPL-MCNC: 25 MG/DL — HIGH (ref 10–20)
BUN SERPL-MCNC: 26 MG/DL — HIGH (ref 10–20)
CALCIUM SERPL-MCNC: 8.5 MG/DL — SIGNIFICANT CHANGE UP (ref 8.4–10.5)
CALCIUM SERPL-MCNC: 8.9 MG/DL — SIGNIFICANT CHANGE UP (ref 8.4–10.4)
CAST: 3 /LPF — SIGNIFICANT CHANGE UP (ref 0–4)
CHLORIDE SERPL-SCNC: 90 MMOL/L — LOW (ref 98–110)
CHLORIDE SERPL-SCNC: 91 MMOL/L — LOW (ref 98–110)
CO2 SERPL-SCNC: 27 MMOL/L — SIGNIFICANT CHANGE UP (ref 17–32)
CO2 SERPL-SCNC: 27 MMOL/L — SIGNIFICANT CHANGE UP (ref 17–32)
COLOR SPEC: YELLOW — SIGNIFICANT CHANGE UP
CREAT SERPL-MCNC: 0.7 MG/DL — SIGNIFICANT CHANGE UP (ref 0.7–1.5)
CREAT SERPL-MCNC: 0.8 MG/DL — SIGNIFICANT CHANGE UP (ref 0.7–1.5)
DIFF PNL FLD: NEGATIVE — SIGNIFICANT CHANGE UP
EGFR: 73 ML/MIN/1.73M2 — SIGNIFICANT CHANGE UP
EGFR: 85 ML/MIN/1.73M2 — SIGNIFICANT CHANGE UP
EOSINOPHIL # BLD AUTO: 0.01 K/UL — SIGNIFICANT CHANGE UP (ref 0–0.7)
EOSINOPHIL NFR BLD AUTO: 0.1 % — SIGNIFICANT CHANGE UP (ref 0–8)
GLUCOSE SERPL-MCNC: 129 MG/DL — HIGH (ref 70–99)
GLUCOSE SERPL-MCNC: 134 MG/DL — HIGH (ref 70–99)
GLUCOSE UR QL: NEGATIVE MG/DL — SIGNIFICANT CHANGE UP
GRAM STN FLD: SIGNIFICANT CHANGE UP
HCT VFR BLD CALC: 32.1 % — LOW (ref 37–47)
HGB BLD-MCNC: 10.6 G/DL — LOW (ref 12–16)
IMM GRANULOCYTES NFR BLD AUTO: 0.6 % — HIGH (ref 0.1–0.3)
KETONES UR-MCNC: NEGATIVE MG/DL — SIGNIFICANT CHANGE UP
LEUKOCYTE ESTERASE UR-ACNC: ABNORMAL
LYMPHOCYTES # BLD AUTO: 0.7 K/UL — LOW (ref 1.2–3.4)
LYMPHOCYTES # BLD AUTO: 7.9 % — LOW (ref 20.5–51.1)
MAGNESIUM SERPL-MCNC: 1.7 MG/DL — LOW (ref 1.8–2.4)
MAGNESIUM SERPL-MCNC: 1.8 MG/DL — SIGNIFICANT CHANGE UP (ref 1.8–2.4)
MCHC RBC-ENTMCNC: 30.9 PG — SIGNIFICANT CHANGE UP (ref 27–31)
MCHC RBC-ENTMCNC: 33 G/DL — SIGNIFICANT CHANGE UP (ref 32–37)
MCV RBC AUTO: 93.6 FL — SIGNIFICANT CHANGE UP (ref 81–99)
MONOCYTES # BLD AUTO: 0.73 K/UL — HIGH (ref 0.1–0.6)
MONOCYTES NFR BLD AUTO: 8.3 % — SIGNIFICANT CHANGE UP (ref 1.7–9.3)
NEUTROPHILS # BLD AUTO: 7.32 K/UL — HIGH (ref 1.4–6.5)
NEUTROPHILS NFR BLD AUTO: 82.9 % — HIGH (ref 42.2–75.2)
NIGHT BLUE STAIN TISS: SIGNIFICANT CHANGE UP
NITRITE UR-MCNC: NEGATIVE — SIGNIFICANT CHANGE UP
NRBC # BLD: 0 /100 WBCS — SIGNIFICANT CHANGE UP (ref 0–0)
PH UR: 6 — SIGNIFICANT CHANGE UP (ref 5–8)
PHOSPHATE SERPL-MCNC: 2.1 MG/DL — SIGNIFICANT CHANGE UP (ref 2.1–4.9)
PLATELET # BLD AUTO: 185 K/UL — SIGNIFICANT CHANGE UP (ref 130–400)
PMV BLD: 10.1 FL — SIGNIFICANT CHANGE UP (ref 7.4–10.4)
POTASSIUM SERPL-MCNC: 2.8 MMOL/L — LOW (ref 3.5–5)
POTASSIUM SERPL-MCNC: 3.5 MMOL/L — SIGNIFICANT CHANGE UP (ref 3.5–5)
POTASSIUM SERPL-SCNC: 2.8 MMOL/L — LOW (ref 3.5–5)
POTASSIUM SERPL-SCNC: 3.5 MMOL/L — SIGNIFICANT CHANGE UP (ref 3.5–5)
PROT UR-MCNC: 30 MG/DL
RBC # BLD: 3.43 M/UL — LOW (ref 4.2–5.4)
RBC # FLD: 15 % — HIGH (ref 11.5–14.5)
RBC CASTS # UR COMP ASSIST: 2 /HPF — SIGNIFICANT CHANGE UP (ref 0–4)
SODIUM SERPL-SCNC: 128 MMOL/L — LOW (ref 135–146)
SODIUM SERPL-SCNC: 130 MMOL/L — LOW (ref 135–146)
SP GR SPEC: 1.02 — SIGNIFICANT CHANGE UP (ref 1–1.03)
SPECIMEN SOURCE: SIGNIFICANT CHANGE UP
SQUAMOUS # UR AUTO: 2 /HPF — SIGNIFICANT CHANGE UP (ref 0–5)
UROBILINOGEN FLD QL: 0.2 MG/DL — SIGNIFICANT CHANGE UP (ref 0.2–1)
WBC # BLD: 8.83 K/UL — SIGNIFICANT CHANGE UP (ref 4.8–10.8)
WBC # FLD AUTO: 8.83 K/UL — SIGNIFICANT CHANGE UP (ref 4.8–10.8)
WBC UR QL: 10 /HPF — HIGH (ref 0–5)

## 2024-02-09 PROCEDURE — 99232 SBSQ HOSP IP/OBS MODERATE 35: CPT | Mod: FS

## 2024-02-09 PROCEDURE — 73620 X-RAY EXAM OF FOOT: CPT | Mod: 26,RT

## 2024-02-09 PROCEDURE — 99222 1ST HOSP IP/OBS MODERATE 55: CPT

## 2024-02-09 PROCEDURE — 73120 X-RAY EXAM OF HAND: CPT | Mod: 26,50

## 2024-02-09 RX ORDER — METRONIDAZOLE 500 MG
500 TABLET ORAL ONCE
Refills: 0 | Status: COMPLETED | OUTPATIENT
Start: 2024-02-09 | End: 2024-02-09

## 2024-02-09 RX ORDER — POTASSIUM CHLORIDE 20 MEQ
20 PACKET (EA) ORAL ONCE
Refills: 0 | Status: DISCONTINUED | OUTPATIENT
Start: 2024-02-09 | End: 2024-02-09

## 2024-02-09 RX ORDER — CEFTRIAXONE 500 MG/1
1000 INJECTION, POWDER, FOR SOLUTION INTRAMUSCULAR; INTRAVENOUS EVERY 24 HOURS
Refills: 0 | Status: DISCONTINUED | OUTPATIENT
Start: 2024-02-10 | End: 2024-02-14

## 2024-02-09 RX ORDER — MAGNESIUM SULFATE 500 MG/ML
2 VIAL (ML) INJECTION ONCE
Refills: 0 | Status: COMPLETED | OUTPATIENT
Start: 2024-02-09 | End: 2024-02-09

## 2024-02-09 RX ORDER — POTASSIUM CHLORIDE 20 MEQ
40 PACKET (EA) ORAL ONCE
Refills: 0 | Status: COMPLETED | OUTPATIENT
Start: 2024-02-09 | End: 2024-02-09

## 2024-02-09 RX ORDER — CEFTRIAXONE 500 MG/1
1000 INJECTION, POWDER, FOR SOLUTION INTRAMUSCULAR; INTRAVENOUS ONCE
Refills: 0 | Status: COMPLETED | OUTPATIENT
Start: 2024-02-09 | End: 2024-02-09

## 2024-02-09 RX ORDER — REMDESIVIR 5 MG/ML
200 INJECTION INTRAVENOUS EVERY 24 HOURS
Refills: 0 | Status: COMPLETED | OUTPATIENT
Start: 2024-02-09 | End: 2024-02-10

## 2024-02-09 RX ORDER — METRONIDAZOLE 500 MG
TABLET ORAL
Refills: 0 | Status: DISCONTINUED | OUTPATIENT
Start: 2024-02-09 | End: 2024-02-14

## 2024-02-09 RX ORDER — METRONIDAZOLE 500 MG
500 TABLET ORAL EVERY 12 HOURS
Refills: 0 | Status: DISCONTINUED | OUTPATIENT
Start: 2024-02-10 | End: 2024-02-14

## 2024-02-09 RX ORDER — CEFTRIAXONE 500 MG/1
INJECTION, POWDER, FOR SOLUTION INTRAMUSCULAR; INTRAVENOUS
Refills: 0 | Status: DISCONTINUED | OUTPATIENT
Start: 2024-02-09 | End: 2024-02-14

## 2024-02-09 RX ORDER — POTASSIUM CHLORIDE 20 MEQ
20 PACKET (EA) ORAL ONCE
Refills: 0 | Status: COMPLETED | OUTPATIENT
Start: 2024-02-09 | End: 2024-02-09

## 2024-02-09 RX ORDER — SODIUM CHLORIDE 9 MG/ML
1 INJECTION INTRAMUSCULAR; INTRAVENOUS; SUBCUTANEOUS THREE TIMES A DAY
Refills: 0 | Status: COMPLETED | OUTPATIENT
Start: 2024-02-09 | End: 2024-02-12

## 2024-02-09 RX ORDER — REMDESIVIR 5 MG/ML
INJECTION INTRAVENOUS
Refills: 0 | Status: COMPLETED | OUTPATIENT
Start: 2024-02-09 | End: 2024-02-12

## 2024-02-09 RX ADMIN — SENNA PLUS 2 TABLET(S): 8.6 TABLET ORAL at 21:12

## 2024-02-09 RX ADMIN — Medication 2000 UNIT(S): at 12:29

## 2024-02-09 RX ADMIN — ATORVASTATIN CALCIUM 40 MILLIGRAM(S): 80 TABLET, FILM COATED ORAL at 21:12

## 2024-02-09 RX ADMIN — CHLORHEXIDINE GLUCONATE 1 APPLICATION(S): 213 SOLUTION TOPICAL at 05:06

## 2024-02-09 RX ADMIN — Medication 100 MILLIGRAM(S): at 20:50

## 2024-02-09 RX ADMIN — Medication 40 MILLIEQUIVALENT(S): at 19:03

## 2024-02-09 RX ADMIN — Medication 250 MILLIGRAM(S): at 05:06

## 2024-02-09 RX ADMIN — ENOXAPARIN SODIUM 40 MILLIGRAM(S): 100 INJECTION SUBCUTANEOUS at 05:06

## 2024-02-09 RX ADMIN — FAMOTIDINE 20 MILLIGRAM(S): 10 INJECTION INTRAVENOUS at 05:06

## 2024-02-09 RX ADMIN — Medication 250 MILLIGRAM(S): at 18:18

## 2024-02-09 RX ADMIN — Medication 25 GRAM(S): at 23:41

## 2024-02-09 RX ADMIN — FAMOTIDINE 20 MILLIGRAM(S): 10 INJECTION INTRAVENOUS at 18:18

## 2024-02-09 RX ADMIN — CEFEPIME 100 MILLIGRAM(S): 1 INJECTION, POWDER, FOR SOLUTION INTRAMUSCULAR; INTRAVENOUS at 05:05

## 2024-02-09 RX ADMIN — Medication 81 MILLIGRAM(S): at 12:29

## 2024-02-09 RX ADMIN — Medication 40 MILLIEQUIVALENT(S): at 15:31

## 2024-02-09 RX ADMIN — Medication 166.67 MILLIGRAM(S): at 21:57

## 2024-02-09 RX ADMIN — Medication 650 MILLIGRAM(S): at 15:24

## 2024-02-09 RX ADMIN — Medication 1 MILLIGRAM(S): at 12:28

## 2024-02-09 RX ADMIN — Medication 25 MILLIGRAM(S): at 05:06

## 2024-02-09 RX ADMIN — SERTRALINE 50 MILLIGRAM(S): 25 TABLET, FILM COATED ORAL at 12:28

## 2024-02-09 RX ADMIN — CEFTRIAXONE 1000 MILLIGRAM(S): 500 INJECTION, POWDER, FOR SOLUTION INTRAMUSCULAR; INTRAVENOUS at 20:50

## 2024-02-09 RX ADMIN — Medication 50 MILLIEQUIVALENT(S): at 20:56

## 2024-02-09 RX ADMIN — CHLORHEXIDINE GLUCONATE 1 APPLICATION(S): 213 SOLUTION TOPICAL at 18:27

## 2024-02-09 RX ADMIN — Medication 300 UNIT(S): at 18:32

## 2024-02-09 NOTE — PROGRESS NOTE ADULT - SUBJECTIVE AND OBJECTIVE BOX
Patient is a 83y old  Female who presents with a chief complaint of Left foot wound (09 Feb 2024 05:32)    INTERVAL HPI/OVERNIGHT EVENTS:  -   Vital Signs Last 24 Hrs  T(C): 37.3 (09 Feb 2024 08:20), Max: 38.6 (08 Feb 2024 20:00)  T(F): 99.2 (09 Feb 2024 08:20), Max: 101.5 (08 Feb 2024 20:00)  HR: 74 (09 Feb 2024 08:20) (67 - 93)  BP: 147/67 (09 Feb 2024 08:20) (127/69 - 177/76)  BP(mean): 98 (09 Feb 2024 05:01) (90 - 109)  RR: 18 (09 Feb 2024 08:20) (16 - 19)  SpO2: 98% (09 Feb 2024 05:01) (93% - 98%)    O2 Parameters below as of 09 Feb 2024 05:01  Patient On (Oxygen Delivery Method): nasal cannula  O2 Flow (L/min): 2    I&O's Summary  08 Feb 2024 07:01  -  09 Feb 2024 07:00  --------------------------------------------------------  IN: 1125 mL / OUT: 720 mL / NET: 405 mL    LABS:                   11.8   12.58 )-----------( 167      ( 08 Feb 2024 13:23 )             35.1     02-08    132<L>  |  92<L>  |  22<H>  ----------------------------<  109<H>  3.6   |  27  |  0.8    Ca    9.1      08 Feb 2024 13:23  Phos  2.4     02-08  Mg     2.3     02-08    TPro  7.3  /  Alb  4.2  /  TBili  0.7  /  DBili  x   /  AST  34  /  ALT  24  /  AlkPhos  103  02-07    MEDICATIONS  (STANDING):  aspirin  chewable 81 milliGRAM(s) Oral daily  atorvastatin 40 milliGRAM(s) Oral at bedtime  cefepime   IVPB 1000 milliGRAM(s) IV Intermittent every 12 hours  chlorhexidine 4% Liquid 1 Application(s) Topical two times a day  cholecalciferol 2000 Unit(s) Oral daily  enoxaparin Injectable 40 milliGRAM(s) SubCutaneous every 24 hours  famotidine    Tablet 20 milliGRAM(s) Oral two times a day  folic acid 1 milliGRAM(s) Oral daily  hydrochlorothiazide 50 milliGRAM(s) Oral daily  metoprolol succinate ER 25 milliGRAM(s) Oral daily  saccharomyces boulardii 250 milliGRAM(s) Oral two times a day  senna 2 Tablet(s) Oral at bedtime  sertraline 50 milliGRAM(s) Oral daily  vancomycin  IVPB 1250 milliGRAM(s) IV Intermittent every 24 hours    MEDICATIONS  (PRN):  acetaminophen     Tablet .. 650 milliGRAM(s) Oral every 6 hours PRN Temp greater or equal to 38C (100.4F), Mild Pain (1 - 3)  morphine  - Injectable 2 milliGRAM(s) IV Push every 6 hours PRN Severe Pain (7 - 10)  morphine  - Injectable 2 milliGRAM(s) IV Push two times a day PRN wound care  oxycodone    5 mG/acetaminophen 325 mG 1 Tablet(s) Oral every 6 hours PRN Moderate Pain (4 - 6)  polyethylene glycol 3350 17 Gram(s) Oral once PRN Constipation    PHYSICAL EXAM:  GENERAL: well built, well nourished  HEAD:  Atraumatic, Normocephalic  EYES: EOMI, PERRLA, conjunctiva and sclera clear  ENT: No tonsillar erythema, exudates, or enlargement; Moist mucous membranes, Good dentition, No lesions  NECK: Supple, No JVD, Normal thyroid, no enlarged nodes  NERVOUS SYSTEM:  Alert & Oriented X3, Good concentration; Motor Strength 5/5 B/L upper and lower extremities; DTRs 2+ intact and symmetric, sensory intact  CHEST/LUNG: B/L good air entry; No rales, rhonchi, or wheezing  HEART: S1S2 normal, no S3, Regular rate and rhythm; No murmurs, rubs, or gallops  ABDOMEN: Soft, Nontender, Nondistended; Bowel sounds present  EXTREMITIES:  2+ Peripheral Pulses, No clubbing, cyanosis, or edema  LYMPH: No lymphadenopathy noted  SKIN: No rashes or lesions  Wound:  Patient is a 83y old  Female who presents with a chief complaint of Left foot wound    INTERVAL HPI/OVERNIGHT EVENTS:  - 2/8: s/p debridement of left lower extremity  -     Vital Signs Last 24 Hrs  T(C): 37.3 (09 Feb 2024 08:20), Max: 38.6 (08 Feb 2024 20:00)  T(F): 99.2 (09 Feb 2024 08:20), Max: 101.5 (08 Feb 2024 20:00)  HR: 74 (09 Feb 2024 08:20) (67 - 93)  BP: 147/67 (09 Feb 2024 08:20) (127/69 - 177/76)  BP(mean): 98 (09 Feb 2024 05:01) (90 - 109)  RR: 18 (09 Feb 2024 08:20) (16 - 19)  SpO2: 98% (09 Feb 2024 05:01) (93% - 98%)    O2 Parameters below as of 09 Feb 2024 05:01  Patient On (Oxygen Delivery Method): nasal cannula  O2 Flow (L/min): 2    I&O's Summary  08 Feb 2024 07:01  -  09 Feb 2024 07:00  --------------------------------------------------------  IN: 1125 mL / OUT: 720 mL / NET: 405 mL    LABS:                   11.8   12.58 )-----------( 167      ( 08 Feb 2024 13:23 )             35.1     02-08    132<L>  |  92<L>  |  22<H>  ----------------------------<  109<H>  3.6   |  27  |  0.8    Ca    9.1      08 Feb 2024 13:23  Phos  2.4     02-08  Mg     2.3     02-08    TPro  7.3  /  Alb  4.2  /  TBili  0.7  /  DBili  x   /  AST  34  /  ALT  24  /  AlkPhos  103  02-07    MEDICATIONS  (STANDING):  aspirin  chewable 81 milliGRAM(s) Oral daily  atorvastatin 40 milliGRAM(s) Oral at bedtime  cefepime   IVPB 1000 milliGRAM(s) IV Intermittent every 12 hours  chlorhexidine 4% Liquid 1 Application(s) Topical two times a day  cholecalciferol 2000 Unit(s) Oral daily  enoxaparin Injectable 40 milliGRAM(s) SubCutaneous every 24 hours  famotidine    Tablet 20 milliGRAM(s) Oral two times a day  folic acid 1 milliGRAM(s) Oral daily  hydrochlorothiazide 50 milliGRAM(s) Oral daily  metoprolol succinate ER 25 milliGRAM(s) Oral daily  saccharomyces boulardii 250 milliGRAM(s) Oral two times a day  senna 2 Tablet(s) Oral at bedtime  sertraline 50 milliGRAM(s) Oral daily  vancomycin  IVPB 1250 milliGRAM(s) IV Intermittent every 24 hours    MEDICATIONS  (PRN):  acetaminophen     Tablet .. 650 milliGRAM(s) Oral every 6 hours PRN Temp greater or equal to 38C (100.4F), Mild Pain (1 - 3)  morphine  - Injectable 2 milliGRAM(s) IV Push every 6 hours PRN Severe Pain (7 - 10)  morphine  - Injectable 2 milliGRAM(s) IV Push two times a day PRN wound care  oxycodone    5 mG/acetaminophen 325 mG 1 Tablet(s) Oral every 6 hours PRN Moderate Pain (4 - 6)  polyethylene glycol 3350 17 Gram(s) Oral once PRN Constipation    PHYSICAL EXAM:  GENERAL: Lying in bed in NAD.  HEAD:  Atraumatic, Normocephalic  EYES: EOMI, PERRLA  NERVOUS SYSTEM:  Alert & Oriented X3  CHEST/LUNG: Breathing comfortably on RA, b/l chest rise appreciated  HEART: In no cardiopulmonary    Wound:  Patient is a 83y old  Female who presents with a chief complaint of Left foot wound    INTERVAL HPI/OVERNIGHT EVENTS:  - POD 1 s/p debridement of left lower extremity  - T max   101.5     Vital Signs Last 24 Hrs  T(C): 37.3 (09 Feb 2024 08:20), Max: 38.6 (08 Feb 2024 20:00)  T(F): 99.2 (09 Feb 2024 08:20), Max: 101.5 (08 Feb 2024 20:00)  HR: 74 (09 Feb 2024 08:20) (67 - 93)  BP: 147/67 (09 Feb 2024 08:20) (127/69 - 177/76)  BP(mean): 98 (09 Feb 2024 05:01) (90 - 109)  RR: 18 (09 Feb 2024 08:20) (16 - 19)  SpO2: 98% (09 Feb 2024 05:01) (93% - 98%)    O2 Parameters below as of 09 Feb 2024 05:01  Patient On (Oxygen Delivery Method): nasal cannula  O2 Flow (L/min): 2    I&O's Summary  08 Feb 2024 07:01  -  09 Feb 2024 07:00  --------------------------------------------------------  IN: 1125 mL / OUT: 720 mL / NET: 405 mL    LABS:                   11.8   12.58 )-----------( 167      ( 08 Feb 2024 13:23 )             35.1     02-08    132<L>  |  92<L>  |  22<H>  ----------------------------<  109<H>  3.6   |  27  |  0.8    Ca    9.1      08 Feb 2024 13:23  Phos  2.4     02-08  Mg     2.3     02-08    TPro  7.3  /  Alb  4.2  /  TBili  0.7  /  DBili  x   /  AST  34  /  ALT  24  /  AlkPhos  103  02-07    MEDICATIONS  (STANDING):  aspirin  chewable 81 milliGRAM(s) Oral daily  atorvastatin 40 milliGRAM(s) Oral at bedtime  cefepime   IVPB 1000 milliGRAM(s) IV Intermittent every 12 hours  chlorhexidine 4% Liquid 1 Application(s) Topical two times a day  cholecalciferol 2000 Unit(s) Oral daily  enoxaparin Injectable 40 milliGRAM(s) SubCutaneous every 24 hours  famotidine    Tablet 20 milliGRAM(s) Oral two times a day  folic acid 1 milliGRAM(s) Oral daily  hydrochlorothiazide 50 milliGRAM(s) Oral daily  metoprolol succinate ER 25 milliGRAM(s) Oral daily  saccharomyces boulardii 250 milliGRAM(s) Oral two times a day  senna 2 Tablet(s) Oral at bedtime  sertraline 50 milliGRAM(s) Oral daily  vancomycin  IVPB 1250 milliGRAM(s) IV Intermittent every 24 hours    MEDICATIONS  (PRN):  acetaminophen     Tablet .. 650 milliGRAM(s) Oral every 6 hours PRN Temp greater or equal to 38C (100.4F), Mild Pain (1 - 3)  morphine  - Injectable 2 milliGRAM(s) IV Push every 6 hours PRN Severe Pain (7 - 10)  morphine  - Injectable 2 milliGRAM(s) IV Push two times a day PRN wound care  oxycodone    5 mG/acetaminophen 325 mG 1 Tablet(s) Oral every 6 hours PRN Moderate Pain (4 - 6)  polyethylene glycol 3350 17 Gram(s) Oral once PRN Constipation    PHYSICAL EXAM:  GENERAL: Lying in bed in NAD.  HEAD:  Atraumatic, Normocephalic  EYES: EOMI, PERRLA  NERVOUS SYSTEM:  Alert & Oriented X3  CHEST/LUNG: Breathing comfortably on RA, b/l chest rise appreciated  HEART: In no cardiopulmonary    Wound: Left foot dorsal aspect with full thickness wound s/p debridement, yellow necrotic tissue appreciated with yellow drainage. Mild surround erythema. No active bleeding or malodor noted.

## 2024-02-09 NOTE — CONSULT NOTE ADULT - ASSESSMENT
Patient is a 83y F w/ hx of RA on methotrexate, Depression, GERD, HTN, HLD breast ca s/p lumpectomy in 2015 is presents to ED for left foot cellulitis s/p debridement with burn. rheumatololgy consulted for possible gout since during debridement there where whitish discharge.    #Full thickness wound of left foot  - 2/8: s/p debridement of left lower extremity  - fu pathology to r/o presence of tophi.  - no tophi in other areas  - recommend xrays for right foot and bilateral hands    #RA  - on methotrexate once a week and Remicade infusion once every 6 weeks   - hold meds for now until active infection has resolved and wound has healed.    Discussed with attending

## 2024-02-09 NOTE — CONSULT NOTE ADULT - ASSESSMENT
83y F w/ hx of RA on methotrexate, Depression, GERD, HTN, HLD breast ca s/p lumpectomy in 2015 is presents to ED for left foot cellulitis. patient's daughter at beside states that visiting nurse saw her mother today and reported that wound of left foot looked cellulitic. Patient endorses chills, feeling feverish, and nausea. Patient had 1 episode of emesis in the ED. Denies chest pain, sob, numbness, tingling.  83y F w/ hx of RA on methotrexate, Depression, GERD, HTN, HLD breast ca s/p lumpectomy in 2015 is presents to ED for left foot cellulitis. patient's daughter at beside states that visiting nurse saw her mother today and reported that wound of left foot looked cellulitic. Patient endorses chills, feeling feverish, and nausea. Patient had 1 episode of emesis in the ED. Denies chest pain, sob, numbness, tingling.     IMPRESSION/RECOMMENDATIONS  Left foot with abscess flexor aspect with surrounding cellulitis  S/p debridement  12/22 WCx P Mirabilis  -f/u WCx  -further debridement as per Burn with deep tissue cultures  -Vancomycin 1 gm iv q12h  -Vancomycin dose  based on AUC/GILBERT as per Pharm ID recommendations.  -Rocephin 1 gm iv q24h  -Flagyl 500 mg iv q12h  -d/c cefepime   -Off loading to prevent pressure sores and preventive measures to avoid aspiration     COVID-19 with moderate-severe illness  Pt is in the early viral replicative phase based on the timeline/onset of symptoms.  S/p vaccination and one booster.  CXR no GGO  - mg iv on Day 1, then 100 mg iv D2 and D3.   -Hold off on steroids

## 2024-02-09 NOTE — PROGRESS NOTE ADULT - ASSESSMENT
Patient is a 83y old  Female who presents with a chief complaint of LLE infected wound.    Full thickness wound of left foot  - 2/8: s/p debridement of left lower extremity  - IVF: IVL  - ABx: Cefepime 1g q 12 hrs and vanco 1250mg q 24  - Pain management   - LWC: vashe wet to dry twice a day  - wcx 2/7: pending, 2/9: pending     Cards:   - Continue with home medications: HCTZ, metoprolol, rosuvastatin-atorvastatin inpatient   - Monitor vital signs  - Dash/TLC diet    Endo: RA  - Rheumatology consult placed 2/8 for RA vs possible gout   - Methotrexate - holding for infection    GI: Hx of GERD   - Continue home medication: famotidine     ID:   - Blood cultures positive for MRSA - on contact isolation  - Blood cultures 2/7: Methicillin resistant Staphylococcus aureus (MRSA), 2/8: pending, 2/9: ordered   - As per verbal conversation with ID: cefepime IV 1g q12h, vancomycin IV 1250mg q24h    Psych: Hx of depression  -Continue Zoloft    Miscellaneous  - Ambulate as tolerated  - DVT/GI ppx  - PT c/s

## 2024-02-10 LAB
-  AMPICILLIN/SULBACTAM: SIGNIFICANT CHANGE UP
-  CEFAZOLIN: SIGNIFICANT CHANGE UP
-  CLINDAMYCIN: SIGNIFICANT CHANGE UP
-  DAPTOMYCIN: SIGNIFICANT CHANGE UP
-  ERYTHROMYCIN: SIGNIFICANT CHANGE UP
-  GENTAMICIN: SIGNIFICANT CHANGE UP
-  LINEZOLID: SIGNIFICANT CHANGE UP
-  OXACILLIN: SIGNIFICANT CHANGE UP
-  PENICILLIN: SIGNIFICANT CHANGE UP
-  RIFAMPIN: SIGNIFICANT CHANGE UP
-  TETRACYCLINE: SIGNIFICANT CHANGE UP
-  TRIMETHOPRIM/SULFAMETHOXAZOLE: SIGNIFICANT CHANGE UP
-  VANCOMYCIN: SIGNIFICANT CHANGE UP
ANION GAP SERPL CALC-SCNC: 11 MMOL/L — SIGNIFICANT CHANGE UP (ref 7–14)
BASOPHILS # BLD AUTO: 0.02 K/UL — SIGNIFICANT CHANGE UP (ref 0–0.2)
BASOPHILS NFR BLD AUTO: 0.2 % — SIGNIFICANT CHANGE UP (ref 0–1)
BUN SERPL-MCNC: 20 MG/DL — SIGNIFICANT CHANGE UP (ref 10–20)
CALCIUM SERPL-MCNC: 8.7 MG/DL — SIGNIFICANT CHANGE UP (ref 8.4–10.4)
CHLORIDE SERPL-SCNC: 92 MMOL/L — LOW (ref 98–110)
CO2 SERPL-SCNC: 27 MMOL/L — SIGNIFICANT CHANGE UP (ref 17–32)
CREAT SERPL-MCNC: 0.6 MG/DL — LOW (ref 0.7–1.5)
CULTURE RESULTS: ABNORMAL
CULTURE RESULTS: NO GROWTH — SIGNIFICANT CHANGE UP
EGFR: 88 ML/MIN/1.73M2 — SIGNIFICANT CHANGE UP
EOSINOPHIL # BLD AUTO: 0.08 K/UL — SIGNIFICANT CHANGE UP (ref 0–0.7)
EOSINOPHIL NFR BLD AUTO: 1 % — SIGNIFICANT CHANGE UP (ref 0–8)
GLUCOSE SERPL-MCNC: 118 MG/DL — HIGH (ref 70–99)
GRAM STN FLD: ABNORMAL
GRAM STN FLD: ABNORMAL
HCT VFR BLD CALC: 33.5 % — LOW (ref 37–47)
HGB BLD-MCNC: 11.3 G/DL — LOW (ref 12–16)
IMM GRANULOCYTES NFR BLD AUTO: 0.9 % — HIGH (ref 0.1–0.3)
LYMPHOCYTES # BLD AUTO: 1.01 K/UL — LOW (ref 1.2–3.4)
LYMPHOCYTES # BLD AUTO: 12.3 % — LOW (ref 20.5–51.1)
MAGNESIUM SERPL-MCNC: 1.7 MG/DL — LOW (ref 1.8–2.4)
MCHC RBC-ENTMCNC: 31 PG — SIGNIFICANT CHANGE UP (ref 27–31)
MCHC RBC-ENTMCNC: 33.7 G/DL — SIGNIFICANT CHANGE UP (ref 32–37)
MCV RBC AUTO: 91.8 FL — SIGNIFICANT CHANGE UP (ref 81–99)
METHOD TYPE: SIGNIFICANT CHANGE UP
MONOCYTES # BLD AUTO: 0.94 K/UL — HIGH (ref 0.1–0.6)
MONOCYTES NFR BLD AUTO: 11.5 % — HIGH (ref 1.7–9.3)
NEUTROPHILS # BLD AUTO: 6.06 K/UL — SIGNIFICANT CHANGE UP (ref 1.4–6.5)
NEUTROPHILS NFR BLD AUTO: 74.1 % — SIGNIFICANT CHANGE UP (ref 42.2–75.2)
NRBC # BLD: 0 /100 WBCS — SIGNIFICANT CHANGE UP (ref 0–0)
ORGANISM # SPEC MICROSCOPIC CNT: ABNORMAL
ORGANISM # SPEC MICROSCOPIC CNT: SIGNIFICANT CHANGE UP
ORGANISM # SPEC MICROSCOPIC CNT: SIGNIFICANT CHANGE UP
PHOSPHATE SERPL-MCNC: 1.7 MG/DL — LOW (ref 2.1–4.9)
PLATELET # BLD AUTO: 186 K/UL — SIGNIFICANT CHANGE UP (ref 130–400)
PMV BLD: 10 FL — SIGNIFICANT CHANGE UP (ref 7.4–10.4)
POTASSIUM SERPL-MCNC: 3.1 MMOL/L — LOW (ref 3.5–5)
POTASSIUM SERPL-SCNC: 3.1 MMOL/L — LOW (ref 3.5–5)
RBC # BLD: 3.65 M/UL — LOW (ref 4.2–5.4)
RBC # FLD: 14.6 % — HIGH (ref 11.5–14.5)
SODIUM SERPL-SCNC: 130 MMOL/L — LOW (ref 135–146)
SPECIMEN SOURCE: SIGNIFICANT CHANGE UP
VANCOMYCIN TROUGH SERPL-MCNC: 8.1 UG/ML — SIGNIFICANT CHANGE UP (ref 5–10)
WBC # BLD: 8.18 K/UL — SIGNIFICANT CHANGE UP (ref 4.8–10.8)
WBC # FLD AUTO: 8.18 K/UL — SIGNIFICANT CHANGE UP (ref 4.8–10.8)

## 2024-02-10 PROCEDURE — 36000 PLACE NEEDLE IN VEIN: CPT

## 2024-02-10 PROCEDURE — 73701 CT LOWER EXTREMITY W/DYE: CPT | Mod: 26,LT

## 2024-02-10 PROCEDURE — 76937 US GUIDE VASCULAR ACCESS: CPT | Mod: 26,59

## 2024-02-10 RX ORDER — REMDESIVIR 5 MG/ML
100 INJECTION INTRAVENOUS EVERY 24 HOURS
Refills: 0 | Status: COMPLETED | OUTPATIENT
Start: 2024-02-11 | End: 2024-02-12

## 2024-02-10 RX ORDER — VANCOMYCIN HCL 1 G
750 VIAL (EA) INTRAVENOUS EVERY 12 HOURS
Refills: 0 | Status: DISCONTINUED | OUTPATIENT
Start: 2024-02-11 | End: 2024-02-13

## 2024-02-10 RX ORDER — VANCOMYCIN HCL 1 G
750 VIAL (EA) INTRAVENOUS ONCE
Refills: 0 | Status: COMPLETED | OUTPATIENT
Start: 2024-02-10 | End: 2024-02-10

## 2024-02-10 RX ORDER — HYDRALAZINE HCL 50 MG
10 TABLET ORAL ONCE
Refills: 0 | Status: COMPLETED | OUTPATIENT
Start: 2024-02-10 | End: 2024-02-10

## 2024-02-10 RX ORDER — POTASSIUM PHOSPHATE, MONOBASIC POTASSIUM PHOSPHATE, DIBASIC 236; 224 MG/ML; MG/ML
30 INJECTION, SOLUTION INTRAVENOUS ONCE
Refills: 0 | Status: COMPLETED | OUTPATIENT
Start: 2024-02-10 | End: 2024-02-11

## 2024-02-10 RX ORDER — MAGNESIUM SULFATE 500 MG/ML
2 VIAL (ML) INJECTION
Refills: 0 | Status: COMPLETED | OUTPATIENT
Start: 2024-02-10 | End: 2024-02-10

## 2024-02-10 RX ADMIN — SENNA PLUS 2 TABLET(S): 8.6 TABLET ORAL at 21:07

## 2024-02-10 RX ADMIN — SODIUM CHLORIDE 1 GRAM(S): 9 INJECTION INTRAMUSCULAR; INTRAVENOUS; SUBCUTANEOUS at 13:03

## 2024-02-10 RX ADMIN — Medication 650 MILLIGRAM(S): at 23:45

## 2024-02-10 RX ADMIN — Medication 100 MILLIGRAM(S): at 05:29

## 2024-02-10 RX ADMIN — SODIUM CHLORIDE 1 GRAM(S): 9 INJECTION INTRAMUSCULAR; INTRAVENOUS; SUBCUTANEOUS at 05:27

## 2024-02-10 RX ADMIN — CHLORHEXIDINE GLUCONATE 1 APPLICATION(S): 213 SOLUTION TOPICAL at 05:29

## 2024-02-10 RX ADMIN — Medication 100 MILLIGRAM(S): at 20:45

## 2024-02-10 RX ADMIN — Medication 81 MILLIGRAM(S): at 12:13

## 2024-02-10 RX ADMIN — ATORVASTATIN CALCIUM 40 MILLIGRAM(S): 80 TABLET, FILM COATED ORAL at 21:06

## 2024-02-10 RX ADMIN — SERTRALINE 50 MILLIGRAM(S): 25 TABLET, FILM COATED ORAL at 12:12

## 2024-02-10 RX ADMIN — CEFTRIAXONE 100 MILLIGRAM(S): 500 INJECTION, POWDER, FOR SOLUTION INTRAMUSCULAR; INTRAVENOUS at 21:07

## 2024-02-10 RX ADMIN — CHLORHEXIDINE GLUCONATE 1 APPLICATION(S): 213 SOLUTION TOPICAL at 09:27

## 2024-02-10 RX ADMIN — Medication 25 GRAM(S): at 23:00

## 2024-02-10 RX ADMIN — CHLORHEXIDINE GLUCONATE 1 APPLICATION(S): 213 SOLUTION TOPICAL at 22:40

## 2024-02-10 RX ADMIN — Medication 1 MILLIGRAM(S): at 12:11

## 2024-02-10 RX ADMIN — Medication 650 MILLIGRAM(S): at 22:38

## 2024-02-10 RX ADMIN — FAMOTIDINE 20 MILLIGRAM(S): 10 INJECTION INTRAVENOUS at 05:28

## 2024-02-10 RX ADMIN — REMDESIVIR 200 MILLIGRAM(S): 5 INJECTION INTRAVENOUS at 03:16

## 2024-02-10 RX ADMIN — ENOXAPARIN SODIUM 40 MILLIGRAM(S): 100 INJECTION SUBCUTANEOUS at 05:29

## 2024-02-10 RX ADMIN — Medication 10 MILLIGRAM(S): at 17:25

## 2024-02-10 RX ADMIN — MORPHINE SULFATE 2 MILLIGRAM(S): 50 CAPSULE, EXTENDED RELEASE ORAL at 10:00

## 2024-02-10 RX ADMIN — SODIUM CHLORIDE 1 GRAM(S): 9 INJECTION INTRAMUSCULAR; INTRAVENOUS; SUBCUTANEOUS at 21:04

## 2024-02-10 RX ADMIN — Medication 25 MILLIGRAM(S): at 05:28

## 2024-02-10 RX ADMIN — POLYETHYLENE GLYCOL 3350 17 GRAM(S): 17 POWDER, FOR SOLUTION ORAL at 12:11

## 2024-02-10 RX ADMIN — Medication 25 GRAM(S): at 20:46

## 2024-02-10 RX ADMIN — Medication 250 MILLIGRAM(S): at 05:28

## 2024-02-10 RX ADMIN — Medication 2000 UNIT(S): at 12:12

## 2024-02-10 RX ADMIN — MORPHINE SULFATE 2 MILLIGRAM(S): 50 CAPSULE, EXTENDED RELEASE ORAL at 09:45

## 2024-02-10 RX ADMIN — Medication 250 MILLIGRAM(S): at 20:46

## 2024-02-10 NOTE — PROGRESS NOTE ADULT - ASSESSMENT
Patient is a 83y old  Female who presents with a chief complaint of LLE infected wound.    Full thickness wound of left foot  - 2/8: s/p debridement of left lower extremity  - IVF: IVL  - ABx: Cefepime 1g q 12 hrs and vanco 1250mg q 24  - Pain management   - LWC: vashe wet to dry twice a day  - wcx 2/8: num staph aureus, 2/9: pending     Cards:   - Continue with home medications: HCTZ, metoprolol, rosuvastatin-atorvastatin inpatient   - Monitor vital signs  - Dash/TLC diet    Endo: RA  - Rheumatology c/s 2/9: - fu pathology to r/o presence of tophi.  no tophi in other areas - recommend xrays for right foot and bilateral hands (completed, f/u result)  - on methotrexate once a week and Remicade infusion once every 6 weeks   - hold meds for now until active infection has resolved and wound has healed.  - Methotrexate - holding for infection    GI: Hx of GERD   - Continue home medication: famotidine     ID:   - Blood cultures positive for MRSA - on contact isolation  - Blood cultures 2/7: Methicillin resistant Staphylococcus aureus (MRSA), 2/9: pending, 2/10: ordered   - As per verbal conversation with ID: cefepime IV 1g q12h, vancomycin IV 1250mg q24h    Psych: Hx of depression  -Continue Zoloft    Miscellaneous  - Ambulate as tolerated  - DVT/GI ppx  - PT c/s   - replete  Patient is a 83y old  Female who presents with a chief complaint of LLE infected wound.    Full thickness wound of left foot  - 2/8: s/p debridement of left lower extremity  - IVF: IVL  - ABx: Cefepime 1g q 12 hrs and vanco 1250mg q 24  - Pain management   - LWC: vashe wet to dry twice a day  - wcx 2/8: num staph aureus, 2/9: pending   - CT left foot with IV contrast - (ordered) to r/o joint involvement    Cards:   - Continue with home medications: HCTZ, metoprolol, rosuvastatin-atorvastatin inpatient   - Monitor vital signs  - Dash/TLC diet    Endo: RA  - Rheumatology c/s 2/9: - fu pathology to r/o presence of tophi.  no tophi in other areas - recommend xrays for right foot and bilateral hands (completed, f/u result)  - on methotrexate once a week and Remicade infusion once every 6 weeks   - hold meds for now until active infection has resolved and wound has healed.  - Methotrexate - holding for infection    GI: Hx of GERD   - Continue home medication: famotidine     ID:   - Blood cultures positive for MRSA - on contact isolation  - Blood cultures 2/7: Methicillin resistant Staphylococcus aureus (MRSA), 2/9: pending, 2/10: pending   - As per verbal conversation with ID: cefepime IV 1g q12h, vancomycin IV 1250mg q24h  - c/s 2/9 -Vancomycin 1 gm iv q12h, -Vancomycin dose  based on AUC/GILBERT as per Pharm ID recommendations, Rocephin 1 gm iv q24h, Flagyl 500 mg iv q12h, d/c cefepime. - mg iv on Day 1, then 100 mg iv D2 and D3.  - next vanco trough 2/10 4pm    Psych: Hx of depression  -Continue Zoloft    Miscellaneous  - Ambulate as tolerated  - DVT/GI ppx  - PT c/s   - replete

## 2024-02-10 NOTE — PROGRESS NOTE ADULT - SUBJECTIVE AND OBJECTIVE BOX
Patient is a 83y old  Female who presents with a chief complaint of Left foot wound    INTERVAL HPI/OVERNIGHT EVENTS:  - POD# s/p debridement of left lower extremity on   - afebrile  - hypomagnesemia/hyponaturemia repleted overnight  - appreciate rheum eval    Events past 24 hrs***  Vital Signs Last 24 Hrs  T(C): 37.3 (10 Feb 2024 08:44), Max: 37.3 (10 Feb 2024 08:44)  T(F): 99.1 (10 Feb 2024 08:44), Max: 99.1 (10 Feb 2024 08:44)  HR: 68 (10 Feb 2024 08:44) (68 - 80)  BP: 169/77 (10 Feb 2024 08:44) (165/74 - 186/86)  BP(mean): 124 (2024 23:10) (106 - 124)  RR: 18 (10 Feb 2024 08:44) (18 - 18)  SpO2: 99% (2024 23:10) (99% - 99%)    MEDICATIONS  (STANDING):  aspirin  chewable 81 milliGRAM(s) Oral daily  atorvastatin 40 milliGRAM(s) Oral at bedtime  cefTRIAXone   IVPB 1000 milliGRAM(s) IV Intermittent every 24 hours  cefTRIAXone   IVPB      chlorhexidine 4% Liquid 1 Application(s) Topical two times a day  cholecalciferol 2000 Unit(s) Oral daily  enoxaparin Injectable 40 milliGRAM(s) SubCutaneous every 24 hours  famotidine    Tablet 20 milliGRAM(s) Oral two times a day  folic acid 1 milliGRAM(s) Oral daily  hydrochlorothiazide 50 milliGRAM(s) Oral daily  metoprolol succinate ER 25 milliGRAM(s) Oral daily  metroNIDAZOLE  IVPB      metroNIDAZOLE  IVPB 500 milliGRAM(s) IV Intermittent every 12 hours  remdesivir  IVPB   IV Intermittent   saccharomyces boulardii 250 milliGRAM(s) Oral two times a day  senna 2 Tablet(s) Oral at bedtime  sertraline 50 milliGRAM(s) Oral daily  sodium chloride 1 Gram(s) Oral three times a day  vancomycin  IVPB 1250 milliGRAM(s) IV Intermittent every 24 hours    MEDICATIONS  (PRN):  acetaminophen     Tablet .. 650 milliGRAM(s) Oral every 6 hours PRN Temp greater or equal to 38C (100.4F), Mild Pain (1 - 3)  morphine  - Injectable 2 milliGRAM(s) IV Push two times a day PRN wound care  morphine  - Injectable 2 milliGRAM(s) IV Push every 6 hours PRN Severe Pain (7 - 10)  oxycodone    5 mG/acetaminophen 325 mG 1 Tablet(s) Oral every 6 hours PRN Moderate Pain (4 - 6)  polyethylene glycol 3350 17 Gram(s) Oral once PRN Constipation    Allergies  No Known Allergies  Intolerances    Lab Results:                        10.6   8.83  )-----------( 185      ( 2024 11:10 )             32.1     02    128<L>  |  90<L>  |  26<H>  ----------------------------<  129<H>  3.5   |  27  |  0.8    Ca    8.9      2024 20:08  Phos  2.1       Mg     1.7         Urinalysis Basic - ( 2024 20:08 )    Color: Yellow / Appearance: Clear / S.016 / pH: x  Gluc: x / Ketone: Negative mg/dL  / Bili: Negative / Urobili: 0.2 mg/dL   Blood: x / Protein: 30 mg/dL / Nitrite: Negative   Leuk Esterase: Small / RBC: 2 /HPF / WBC 10 /HPF   Sq Epi: x / Non Sq Epi: 2 /HPF / Bacteria: Negative /HPF    Culture - Blood (24 @ 01:16)    Specimen Source: .Blood None   Culture Results:   No growth at 24 hours    Culture - Other (24 @ 13:29)    Specimen Source: .Other left foot wound   Culture Results:   Numerous Staphylococcus aureus    Culture - Blood (24 @ 18:39)    -  Methicillin resistant Staphylococcus aureus (MRSA): Detec   Gram Stain:   Growth in aerobic bottle: Gram Positive Cocci in Clusters  Growth in anaerobic bottle: Gram Positive Cocci in Clusters   -  Ampicillin/Sulbactam: R <=8/4   -  Trimethoprim/Sulfamethoxazole: S <=0.5/9.5   -  Vancomycin: S 1   -  Linezolid: S 1   -  Oxacillin: R >2   -  Penicillin: R >8   -  Rifampin: S <=1 Should not be used as monotherapy   -  Tetracycline: S <=1   -  Cefazolin: R <=4   -  Erythromycin: R >4   -  Gentamicin: S <=1 Should not be used as monotherapy   -  Clindamycin: R >4   -  Daptomycin: S <=0.25   Specimen Source: .Blood Blood-Peripheral   Organism: Blood Culture PCR   Organism: Methicillin resistant Staphylococcus aureus   Culture Results:   Growth in aerobic and anaerobic bottles: Methicillin Resistant  Staphylococcus aureus  Direct identification is available within approximately 3-5  hours either by Blood Panel Multiplexed PCR or Direct  MALDI-TOF. Details: [email or web address]   Organism Identification: Blood Culture PCR  Methicillin resistant Staphylococcus aureus   Method Type: PCR   Method Type: GILBERT    PHYSICAL EXAM:  GENERAL: Lying in bed in NAD.  HEAD:  Atraumatic, Normocephalic  EYES: EOMI, PERRLA  NERVOUS SYSTEM:  Alert & Oriented X3  CHEST/LUNG: Breathing comfortably on RA, b/l chest rise appreciated  HEART: In no cardiopulmonary    Wound: Left foot dorsal aspect with full thickness wound s/p debridement, yellow necrotic tissue appreciated with serous/yellow drainage. Mild surround erythema. No active bleeding or malodor noted.    dressing changed

## 2024-02-11 LAB
-  AMPICILLIN/SULBACTAM: SIGNIFICANT CHANGE UP
-  CEFAZOLIN: SIGNIFICANT CHANGE UP
-  CLINDAMYCIN: SIGNIFICANT CHANGE UP
-  DAPTOMYCIN: SIGNIFICANT CHANGE UP
-  ERYTHROMYCIN: SIGNIFICANT CHANGE UP
-  GENTAMICIN: SIGNIFICANT CHANGE UP
-  LINEZOLID: SIGNIFICANT CHANGE UP
-  OXACILLIN: SIGNIFICANT CHANGE UP
-  PENICILLIN: SIGNIFICANT CHANGE UP
-  RIFAMPIN: SIGNIFICANT CHANGE UP
-  TETRACYCLINE: SIGNIFICANT CHANGE UP
-  TRIMETHOPRIM/SULFAMETHOXAZOLE: SIGNIFICANT CHANGE UP
-  VANCOMYCIN: SIGNIFICANT CHANGE UP
ANION GAP SERPL CALC-SCNC: 16 MMOL/L — HIGH (ref 7–14)
BASOPHILS # BLD AUTO: 0.03 K/UL — SIGNIFICANT CHANGE UP (ref 0–0.2)
BASOPHILS NFR BLD AUTO: 0.3 % — SIGNIFICANT CHANGE UP (ref 0–1)
BUN SERPL-MCNC: 20 MG/DL — SIGNIFICANT CHANGE UP (ref 10–20)
CALCIUM SERPL-MCNC: 8.8 MG/DL — SIGNIFICANT CHANGE UP (ref 8.4–10.5)
CHLORIDE SERPL-SCNC: 92 MMOL/L — LOW (ref 98–110)
CO2 SERPL-SCNC: 25 MMOL/L — SIGNIFICANT CHANGE UP (ref 17–32)
CREAT SERPL-MCNC: 0.7 MG/DL — SIGNIFICANT CHANGE UP (ref 0.7–1.5)
CULTURE RESULTS: ABNORMAL
CULTURE RESULTS: ABNORMAL
EGFR: 85 ML/MIN/1.73M2 — SIGNIFICANT CHANGE UP
EOSINOPHIL # BLD AUTO: 0.11 K/UL — SIGNIFICANT CHANGE UP (ref 0–0.7)
EOSINOPHIL NFR BLD AUTO: 1.2 % — SIGNIFICANT CHANGE UP (ref 0–8)
GLUCOSE SERPL-MCNC: 120 MG/DL — HIGH (ref 70–99)
HCT VFR BLD CALC: 34.5 % — LOW (ref 37–47)
HGB BLD-MCNC: 11.5 G/DL — LOW (ref 12–16)
IMM GRANULOCYTES NFR BLD AUTO: 1.1 % — HIGH (ref 0.1–0.3)
LYMPHOCYTES # BLD AUTO: 1.25 K/UL — SIGNIFICANT CHANGE UP (ref 1.2–3.4)
LYMPHOCYTES # BLD AUTO: 13.5 % — LOW (ref 20.5–51.1)
MAGNESIUM SERPL-MCNC: 2.2 MG/DL — SIGNIFICANT CHANGE UP (ref 1.8–2.4)
MCHC RBC-ENTMCNC: 31.5 PG — HIGH (ref 27–31)
MCHC RBC-ENTMCNC: 33.3 G/DL — SIGNIFICANT CHANGE UP (ref 32–37)
MCV RBC AUTO: 94.5 FL — SIGNIFICANT CHANGE UP (ref 81–99)
METHOD TYPE: SIGNIFICANT CHANGE UP
MONOCYTES # BLD AUTO: 1.43 K/UL — HIGH (ref 0.1–0.6)
MONOCYTES NFR BLD AUTO: 15.5 % — HIGH (ref 1.7–9.3)
NEUTROPHILS # BLD AUTO: 6.32 K/UL — SIGNIFICANT CHANGE UP (ref 1.4–6.5)
NEUTROPHILS NFR BLD AUTO: 68.4 % — SIGNIFICANT CHANGE UP (ref 42.2–75.2)
NRBC # BLD: 0 /100 WBCS — SIGNIFICANT CHANGE UP (ref 0–0)
ORGANISM # SPEC MICROSCOPIC CNT: ABNORMAL
ORGANISM # SPEC MICROSCOPIC CNT: SIGNIFICANT CHANGE UP
PHOSPHATE SERPL-MCNC: 2.7 MG/DL — SIGNIFICANT CHANGE UP (ref 2.1–4.9)
PLATELET # BLD AUTO: 220 K/UL — SIGNIFICANT CHANGE UP (ref 130–400)
PMV BLD: 10.1 FL — SIGNIFICANT CHANGE UP (ref 7.4–10.4)
POTASSIUM SERPL-MCNC: 3.6 MMOL/L — SIGNIFICANT CHANGE UP (ref 3.5–5)
POTASSIUM SERPL-SCNC: 3.6 MMOL/L — SIGNIFICANT CHANGE UP (ref 3.5–5)
RBC # BLD: 3.65 M/UL — LOW (ref 4.2–5.4)
RBC # FLD: 14.9 % — HIGH (ref 11.5–14.5)
SODIUM SERPL-SCNC: 133 MMOL/L — LOW (ref 135–146)
SPECIMEN SOURCE: SIGNIFICANT CHANGE UP
SPECIMEN SOURCE: SIGNIFICANT CHANGE UP
WBC # BLD: 9.24 K/UL — SIGNIFICANT CHANGE UP (ref 4.8–10.8)
WBC # FLD AUTO: 9.24 K/UL — SIGNIFICANT CHANGE UP (ref 4.8–10.8)

## 2024-02-11 PROCEDURE — 99232 SBSQ HOSP IP/OBS MODERATE 35: CPT | Mod: FS

## 2024-02-11 RX ORDER — POTASSIUM CHLORIDE 20 MEQ
40 PACKET (EA) ORAL ONCE
Refills: 0 | Status: COMPLETED | OUTPATIENT
Start: 2024-02-11 | End: 2024-02-11

## 2024-02-11 RX ORDER — LACTULOSE 10 G/15ML
30 SOLUTION ORAL ONCE
Refills: 0 | Status: COMPLETED | OUTPATIENT
Start: 2024-02-11 | End: 2024-02-11

## 2024-02-11 RX ADMIN — SODIUM CHLORIDE 1 GRAM(S): 9 INJECTION INTRAMUSCULAR; INTRAVENOUS; SUBCUTANEOUS at 22:00

## 2024-02-11 RX ADMIN — Medication 250 MILLIGRAM(S): at 17:03

## 2024-02-11 RX ADMIN — SERTRALINE 50 MILLIGRAM(S): 25 TABLET, FILM COATED ORAL at 12:05

## 2024-02-11 RX ADMIN — Medication 250 MILLIGRAM(S): at 06:24

## 2024-02-11 RX ADMIN — CHLORHEXIDINE GLUCONATE 1 APPLICATION(S): 213 SOLUTION TOPICAL at 09:47

## 2024-02-11 RX ADMIN — Medication 1 MILLIGRAM(S): at 12:03

## 2024-02-11 RX ADMIN — Medication 100 MILLIGRAM(S): at 06:24

## 2024-02-11 RX ADMIN — FAMOTIDINE 20 MILLIGRAM(S): 10 INJECTION INTRAVENOUS at 06:23

## 2024-02-11 RX ADMIN — POTASSIUM PHOSPHATE, MONOBASIC POTASSIUM PHOSPHATE, DIBASIC 83.33 MILLIMOLE(S): 236; 224 INJECTION, SOLUTION INTRAVENOUS at 00:00

## 2024-02-11 RX ADMIN — SODIUM CHLORIDE 1 GRAM(S): 9 INJECTION INTRAMUSCULAR; INTRAVENOUS; SUBCUTANEOUS at 06:23

## 2024-02-11 RX ADMIN — CEFTRIAXONE 100 MILLIGRAM(S): 500 INJECTION, POWDER, FOR SOLUTION INTRAMUSCULAR; INTRAVENOUS at 22:00

## 2024-02-11 RX ADMIN — SODIUM CHLORIDE 1 GRAM(S): 9 INJECTION INTRAMUSCULAR; INTRAVENOUS; SUBCUTANEOUS at 13:00

## 2024-02-11 RX ADMIN — Medication 81 MILLIGRAM(S): at 12:02

## 2024-02-11 RX ADMIN — ENOXAPARIN SODIUM 40 MILLIGRAM(S): 100 INJECTION SUBCUTANEOUS at 06:26

## 2024-02-11 RX ADMIN — Medication 250 MILLIGRAM(S): at 06:23

## 2024-02-11 RX ADMIN — CHLORHEXIDINE GLUCONATE 1 APPLICATION(S): 213 SOLUTION TOPICAL at 22:00

## 2024-02-11 RX ADMIN — LACTULOSE 30 GRAM(S): 10 SOLUTION ORAL at 08:11

## 2024-02-11 RX ADMIN — Medication 100 MILLIGRAM(S): at 17:03

## 2024-02-11 RX ADMIN — ATORVASTATIN CALCIUM 40 MILLIGRAM(S): 80 TABLET, FILM COATED ORAL at 22:00

## 2024-02-11 RX ADMIN — Medication 40 MILLIEQUIVALENT(S): at 08:11

## 2024-02-11 RX ADMIN — Medication 2000 UNIT(S): at 11:59

## 2024-02-11 RX ADMIN — FAMOTIDINE 20 MILLIGRAM(S): 10 INJECTION INTRAVENOUS at 17:03

## 2024-02-11 RX ADMIN — Medication 25 MILLIGRAM(S): at 06:26

## 2024-02-11 RX ADMIN — REMDESIVIR 200 MILLIGRAM(S): 5 INJECTION INTRAVENOUS at 06:26

## 2024-02-11 NOTE — PROGRESS NOTE ADULT - ASSESSMENT
Patient is a 83y old  Female who presents with a chief complaint of LLE infected wound; found to have MRSA bacteremia .     Full thickness wound of left foot; + MRSA   - 2/8: s/p debridement of left lower extremity  - IVF: IVL  - ABx per ID : cefTRIAXone 1000mg q24h, flagyl 500 q12h and vanco 750mg q12h (RDV for COVID treatment)   - Pain management   - LWC: vashe wet to dry twice a day  - wcx 2/8: MRSA, 2/9: prelim few s.aureus (f/u final)   - CT left foot with IV contrast - (pending read) to r/o joint involvement  - xray left foot 2/9 pending read   - no plan for OR right now - likely DC w/ [facility] - f/u Dr Barone    Cards:   - Continue with home medications: HCTZ, metoprolol, rosuvastatin (atorvastatin inpatient)  - Monitor vital signs  - Dash/TLC diet    Endo: RA  - Rheumatology c/s 2/9: - fu pathology to r/o presence of tophi.  no tophi in other areas - recommend xrays for right foot and bilateral hands (completed, f/u result)  - on methotrexate once a week and Remicade infusion once every 6 weeks  - hold meds for now until active infection has resolved and wound has healed.  - Methotrexate - holding for infection  - Xray hands 2/9 - pending read - f/u  - xray left foot 2/9 pending read - f/u     GI: Hx of GERD   - Continue home medication: famotidine     ID:   #Left foot with abscess flexor aspect with surrounding cellulitis  #COVID-19 with moderate-severe illness  #MRSA bacteremia   - Blood cultures positive for MRSA - on contact isolation  - Blood cultures 2/7: Methicillin resistant Staphylococcus aureus (MRSA),   - Bcx 2/9 x2: #1 prelim gram + cocci clusters #2prelim NG --> f/u final  - Bcx 2/10x2 : pending   - ID c/s Dr. Kaur 2/9 -Vancomycin 1 gm iv q12h, -Vancomycin dose  based on AUC/GILBERT as per Pharm ID recommendations, Rocephin 1 gm iv q24h, Flagyl 500 mg iv q12h, d/c cefepime. - mg iv on Day 1, then 100 mg iv D2 and D3.  - Vanco trough 2/10 4pm 8.1 --> per Pharm ID Vanco lowered to 750mg q12h  - Next vanco trough 2/12 AM - f/u result & pharm ID for further dosing rec's     GI   - bowel regimen ; miralax daily, lactuose x 1   - f/u BMs    Renal/  - hypomagnesemia, hypophosphatemia, hypokalemia - repleted overnight  - monitor electrolytes; replete prn    - cr at baseline 0.6     Psych: Hx of depression  -Continue Zoloft    Miscellaneous  - Ambulate as tolerated  - DVT/GI ppx  - PT / OT

## 2024-02-11 NOTE — PROGRESS NOTE ADULT - SUBJECTIVE AND OBJECTIVE BOX
Patient is a 83y old  Female who presents with a chief complaint of Left foot wound (10 Feb 2024 09:44)      AM rounds     Pt: no complaints  No acute events o/n  Phos, K & Mg repleted overnight     Vital Signs Last 24 Hrs  T(C): 37.2 (10 Feb 2024 23:35), Max: 37.4 (10 Feb 2024 16:20)  T(F): 99 (10 Feb 2024 23:35), Max: 99.3 (10 Feb 2024 16:20)  HR: 95 (10 Feb 2024 23:35) (68 - 95)  BP: 111/71 (10 Feb 2024 23:35) (111/71 - 182/81)  BP(mean): 82 (10 Feb 2024 23:35) (82 - 116)  RR: 18 (10 Feb 2024 23:35) (18 - 18)  SpO2: 95% (10 Feb 2024 23:35) (94% - 95%)    Parameters below as of 10 Feb 2024 23:35  Patient On (Oxygen Delivery Method): room air            I&O's Summary    10 Feb 2024 07:01  -  11 Feb 2024 07:00  --------------------------------------------------------  IN: 0 mL / OUT: 1250 mL / NET: -1250 mL        02-10    130<L>  |  92<L>  |  20  ----------------------------<  118<H>  3.1<L>   |  27  |  0.6<L>    Ca    8.7      10 Feb 2024 16:45  Phos  1.7     02-10  Mg     1.7     02-10                            11.3   8.18  )-----------( 186      ( 10 Feb 2024 16:45 )             33.5             Culture - Other (collected 09 Feb 2024 20:08)  Source: Wound Wound  Preliminary Report (10 Feb 2024 22:48):    Few Staphylococcus aureus    Culture - Urine (collected 09 Feb 2024 20:08)  Source: Clean Catch Clean Catch (Midstream)  Final Report (10 Feb 2024 23:42):    No growth    Culture - Blood (collected 09 Feb 2024 07:30)  Source: .Blood None  Preliminary Report (10 Feb 2024 14:02):    No growth at 24 hours    Culture - Blood (collected 09 Feb 2024 01:16)  Source: .Blood None  Gram Stain (10 Feb 2024 13:35):    Growth in anaerobic bottle: Gram Positive Cocci in Clusters  Preliminary Report (10 Feb 2024 13:35):    Growth in anaerobic bottle: Gram Positive Cocci in Clusters    Culture - Surgical Swab (collected 08 Feb 2024 15:15)  Source: .Surgical Swab None  Preliminary Report (11 Feb 2024 08:18):    Few Methicillin Resistant Staphylococcus aureus  Organism: Methicillin resistant Staphylococcus aureus (11 Feb 2024 08:17)  Organism: Methicillin resistant Staphylococcus aureus (11 Feb 2024 08:17)    Culture - Fungal, Other (collected 08 Feb 2024 15:15)  Source: .Other None  Preliminary Report (10 Feb 2024 15:04):    Culture is being performed. Fungal cultures are held for 4 weeks.    Culture - Acid Fast - Other w/Smear (collected 08 Feb 2024 15:15)  Source: .Other None  Preliminary Report (10 Feb 2024 15:10):    Culture is being performed.    Culture - Fungal, Other (collected 08 Feb 2024 15:15)  Source: .Other None  Preliminary Report (10 Feb 2024 15:04):    Culture is being performed. Fungal cultures are held for 4 weeks.    Culture - Acid Fast - Other w/Smear (collected 08 Feb 2024 15:15)  Source: .Other None  Preliminary Report (10 Feb 2024 15:10):    Culture is being performed.    Culture - Surgical Swab (collected 08 Feb 2024 15:15)  Source: .Surgical Swab None  Preliminary Report (10 Feb 2024 13:30):    Numerous Staphylococcus aureus  Organism: Methicillin resistant Staphylococcus aureus (11 Feb 2024 08:16)  Organism: Methicillin resistant Staphylococcus aureus (11 Feb 2024 08:16)    Culture - Fungal, Tissue (collected 08 Feb 2024 15:15)  Source: .Tissue None  Preliminary Report (10 Feb 2024 15:04):    Culture is being performed. Fungal cultures are held for 4 weeks.    Culture - Acid Fast - Tissue w/Smear (collected 08 Feb 2024 15:15)  Source: .Tissue None  Preliminary Report (10 Feb 2024 15:10):    Culture is being performed.    Culture - Tissue with Gram Stain (collected 08 Feb 2024 15:15)  Source: .Tissue None  Gram Stain (10 Feb 2024 16:01):    No polymorphonuclear cells seen per low power field    No organisms seen per oil power field  Preliminary Report (10 Feb 2024 16:01):    Rare Methicillin Resistant Staphylococcus aureus  Organism: Methicillin resistant Staphylococcus aureus (10 Feb 2024 16:01)  Organism: Methicillin resistant Staphylococcus aureus (10 Feb 2024 16:00)    Culture - Other (collected 08 Feb 2024 13:29)  Source: .Other left foot wound  Final Report (10 Feb 2024 14:47):    Numerous Methicillin Resistant Staphylococcus aureus  Organism: Methicillin resistant Staphylococcus aureus (10 Feb 2024 14:47)  Organism: Methicillin resistant Staphylococcus aureus (10 Feb 2024 14:47)    MEDICATIONS  (STANDING):  aspirin  chewable 81 milliGRAM(s) Oral daily  atorvastatin 40 milliGRAM(s) Oral at bedtime  cefTRIAXone   IVPB      cefTRIAXone   IVPB 1000 milliGRAM(s) IV Intermittent every 24 hours  chlorhexidine 4% Liquid 1 Application(s) Topical two times a day  cholecalciferol 2000 Unit(s) Oral daily  enoxaparin Injectable 40 milliGRAM(s) SubCutaneous every 24 hours  famotidine    Tablet 20 milliGRAM(s) Oral two times a day  folic acid 1 milliGRAM(s) Oral daily  hydrochlorothiazide 50 milliGRAM(s) Oral daily  metoprolol succinate ER 25 milliGRAM(s) Oral daily  metroNIDAZOLE  IVPB      metroNIDAZOLE  IVPB 500 milliGRAM(s) IV Intermittent every 12 hours  remdesivir  IVPB   IV Intermittent   remdesivir  IVPB 100 milliGRAM(s) IV Intermittent every 24 hours  saccharomyces boulardii 250 milliGRAM(s) Oral two times a day  senna 2 Tablet(s) Oral at bedtime  sertraline 50 milliGRAM(s) Oral daily  sodium chloride 1 Gram(s) Oral three times a day  vancomycin  IVPB 750 milliGRAM(s) IV Intermittent every 12 hours    MEDICATIONS  (PRN):  acetaminophen     Tablet .. 650 milliGRAM(s) Oral every 6 hours PRN Temp greater or equal to 38C (100.4F), Mild Pain (1 - 3)  morphine  - Injectable 2 milliGRAM(s) IV Push two times a day PRN wound care  morphine  - Injectable 2 milliGRAM(s) IV Push every 6 hours PRN Severe Pain (7 - 10)  oxycodone    5 mG/acetaminophen 325 mG 1 Tablet(s) Oral every 6 hours PRN Moderate Pain (4 - 6)        EXAM:   GENERAL: Lying in bed in NAD.  HEAD:  Atraumatic, Normocephalic  EYES: EOMI, PERRLA  NERVOUS SYSTEM:  Alert & Oriented X3  CHEST/LUNG: Breathing comfortably on RA, b/l chest rise appreciated  HEART: In no cardiopulmonary    Wound: Left foot dorsal aspect with full thickness wound s/p debridement, yellow necrotic tissue appreciated with serous/yellow drainage. Mild surround erythema. No active bleeding or malodor noted.    dressing changed       Patient is a 83y old  Female who presents with a chief complaint of Left foot wound (10 Feb 2024 09:44)      AM rounds     Pt: no complaints  No acute events o/n  Phos, K & Mg repleted overnight     Vital Signs Last 24 Hrs  T(C): 37.2 (10 Feb 2024 23:35), Max: 37.4 (10 Feb 2024 16:20)  T(F): 99 (10 Feb 2024 23:35), Max: 99.3 (10 Feb 2024 16:20)  HR: 95 (10 Feb 2024 23:35) (68 - 95)  BP: 111/71 (10 Feb 2024 23:35) (111/71 - 182/81)  BP(mean): 82 (10 Feb 2024 23:35) (82 - 116)  RR: 18 (10 Feb 2024 23:35) (18 - 18)  SpO2: 95% (10 Feb 2024 23:35) (94% - 95%)    Parameters below as of 10 Feb 2024 23:35  Patient On (Oxygen Delivery Method): room air            I&O's Summary    10 Feb 2024 07:01  -  11 Feb 2024 07:00  --------------------------------------------------------  IN: 0 mL / OUT: 1250 mL / NET: -1250 mL        02-10    130<L>  |  92<L>  |  20  ----------------------------<  118<H>  3.1<L>   |  27  |  0.6<L>    Ca    8.7      10 Feb 2024 16:45  Phos  1.7     02-10  Mg     1.7     02-10                            11.3   8.18  )-----------( 186      ( 10 Feb 2024 16:45 )             33.5             Culture - Other (collected 09 Feb 2024 20:08)  Source: Wound Wound  Preliminary Report (10 Feb 2024 22:48):    Few Staphylococcus aureus    Culture - Urine (collected 09 Feb 2024 20:08)  Source: Clean Catch Clean Catch (Midstream)  Final Report (10 Feb 2024 23:42):    No growth    Culture - Blood (collected 09 Feb 2024 07:30)  Source: .Blood None  Preliminary Report (10 Feb 2024 14:02):    No growth at 24 hours    Culture - Blood (collected 09 Feb 2024 01:16)  Source: .Blood None  Gram Stain (10 Feb 2024 13:35):    Growth in anaerobic bottle: Gram Positive Cocci in Clusters  Preliminary Report (10 Feb 2024 13:35):    Growth in anaerobic bottle: Gram Positive Cocci in Clusters    Culture - Surgical Swab (collected 08 Feb 2024 15:15)  Source: .Surgical Swab None  Preliminary Report (11 Feb 2024 08:18):    Few Methicillin Resistant Staphylococcus aureus  Organism: Methicillin resistant Staphylococcus aureus (11 Feb 2024 08:17)  Organism: Methicillin resistant Staphylococcus aureus (11 Feb 2024 08:17)    Culture - Fungal, Other (collected 08 Feb 2024 15:15)  Source: .Other None  Preliminary Report (10 Feb 2024 15:04):    Culture is being performed. Fungal cultures are held for 4 weeks.    Culture - Acid Fast - Other w/Smear (collected 08 Feb 2024 15:15)  Source: .Other None  Preliminary Report (10 Feb 2024 15:10):    Culture is being performed.    Culture - Fungal, Other (collected 08 Feb 2024 15:15)  Source: .Other None  Preliminary Report (10 Feb 2024 15:04):    Culture is being performed. Fungal cultures are held for 4 weeks.    Culture - Acid Fast - Other w/Smear (collected 08 Feb 2024 15:15)  Source: .Other None  Preliminary Report (10 Feb 2024 15:10):    Culture is being performed.    Culture - Surgical Swab (collected 08 Feb 2024 15:15)  Source: .Surgical Swab None  Preliminary Report (10 Feb 2024 13:30):    Numerous Staphylococcus aureus  Organism: Methicillin resistant Staphylococcus aureus (11 Feb 2024 08:16)  Organism: Methicillin resistant Staphylococcus aureus (11 Feb 2024 08:16)    Culture - Fungal, Tissue (collected 08 Feb 2024 15:15)  Source: .Tissue None  Preliminary Report (10 Feb 2024 15:04):    Culture is being performed. Fungal cultures are held for 4 weeks.    Culture - Acid Fast - Tissue w/Smear (collected 08 Feb 2024 15:15)  Source: .Tissue None  Preliminary Report (10 Feb 2024 15:10):    Culture is being performed.    Culture - Tissue with Gram Stain (collected 08 Feb 2024 15:15)  Source: .Tissue None  Gram Stain (10 Feb 2024 16:01):    No polymorphonuclear cells seen per low power field    No organisms seen per oil power field  Preliminary Report (10 Feb 2024 16:01):    Rare Methicillin Resistant Staphylococcus aureus  Organism: Methicillin resistant Staphylococcus aureus (10 Feb 2024 16:01)  Organism: Methicillin resistant Staphylococcus aureus (10 Feb 2024 16:00)    Culture - Other (collected 08 Feb 2024 13:29)  Source: .Other left foot wound  Final Report (10 Feb 2024 14:47):    Numerous Methicillin Resistant Staphylococcus aureus  Organism: Methicillin resistant Staphylococcus aureus (10 Feb 2024 14:47)  Organism: Methicillin resistant Staphylococcus aureus (10 Feb 2024 14:47)    MEDICATIONS  (STANDING):  aspirin  chewable 81 milliGRAM(s) Oral daily  atorvastatin 40 milliGRAM(s) Oral at bedtime  cefTRIAXone   IVPB      cefTRIAXone   IVPB 1000 milliGRAM(s) IV Intermittent every 24 hours  chlorhexidine 4% Liquid 1 Application(s) Topical two times a day  cholecalciferol 2000 Unit(s) Oral daily  enoxaparin Injectable 40 milliGRAM(s) SubCutaneous every 24 hours  famotidine    Tablet 20 milliGRAM(s) Oral two times a day  folic acid 1 milliGRAM(s) Oral daily  hydrochlorothiazide 50 milliGRAM(s) Oral daily  metoprolol succinate ER 25 milliGRAM(s) Oral daily  metroNIDAZOLE  IVPB      metroNIDAZOLE  IVPB 500 milliGRAM(s) IV Intermittent every 12 hours  remdesivir  IVPB   IV Intermittent   remdesivir  IVPB 100 milliGRAM(s) IV Intermittent every 24 hours  saccharomyces boulardii 250 milliGRAM(s) Oral two times a day  senna 2 Tablet(s) Oral at bedtime  sertraline 50 milliGRAM(s) Oral daily  sodium chloride 1 Gram(s) Oral three times a day  vancomycin  IVPB 750 milliGRAM(s) IV Intermittent every 12 hours    MEDICATIONS  (PRN):  acetaminophen     Tablet .. 650 milliGRAM(s) Oral every 6 hours PRN Temp greater or equal to 38C (100.4F), Mild Pain (1 - 3)  morphine  - Injectable 2 milliGRAM(s) IV Push two times a day PRN wound care  morphine  - Injectable 2 milliGRAM(s) IV Push every 6 hours PRN Severe Pain (7 - 10)  oxycodone    5 mG/acetaminophen 325 mG 1 Tablet(s) Oral every 6 hours PRN Moderate Pain (4 - 6)      EXAM:   GENERAL: Lying in bed in NAD.  HEAD:  Atraumatic, Normocephalic  EYES: EOMI, PERRLA  NERVOUS SYSTEM:  Alert & Oriented X3  CHEST/LUNG: Breathing comfortably on RA, b/l chest rise appreciated  HEART: In no cardiopulmonary    Wound: Left foot dorsal aspect with full thickness wound s/p debridement, yellow necrotic tissue appreciated with serous/yellow drainage. Mild surround erythema. No active bleeding or malodor noted.    dressing changed

## 2024-02-12 LAB
ANION GAP SERPL CALC-SCNC: 12 MMOL/L — SIGNIFICANT CHANGE UP (ref 7–14)
ANION GAP SERPL CALC-SCNC: 18 MMOL/L — HIGH (ref 7–14)
BASOPHILS # BLD AUTO: 0.03 K/UL — SIGNIFICANT CHANGE UP (ref 0–0.2)
BASOPHILS NFR BLD AUTO: 0.4 % — SIGNIFICANT CHANGE UP (ref 0–1)
BUN SERPL-MCNC: 17 MG/DL — SIGNIFICANT CHANGE UP (ref 10–20)
BUN SERPL-MCNC: 19 MG/DL — SIGNIFICANT CHANGE UP (ref 10–20)
CALCIUM SERPL-MCNC: 8.6 MG/DL — SIGNIFICANT CHANGE UP (ref 8.4–10.4)
CALCIUM SERPL-MCNC: 8.6 MG/DL — SIGNIFICANT CHANGE UP (ref 8.4–10.5)
CHLORIDE SERPL-SCNC: 94 MMOL/L — LOW (ref 98–110)
CHLORIDE SERPL-SCNC: 95 MMOL/L — LOW (ref 98–110)
CK SERPL-CCNC: 15 U/L — SIGNIFICANT CHANGE UP (ref 0–225)
CO2 SERPL-SCNC: 18 MMOL/L — SIGNIFICANT CHANGE UP (ref 17–32)
CO2 SERPL-SCNC: 26 MMOL/L — SIGNIFICANT CHANGE UP (ref 17–32)
CREAT SERPL-MCNC: 0.6 MG/DL — LOW (ref 0.7–1.5)
CREAT SERPL-MCNC: 0.7 MG/DL — SIGNIFICANT CHANGE UP (ref 0.7–1.5)
EGFR: 85 ML/MIN/1.73M2 — SIGNIFICANT CHANGE UP
EGFR: 88 ML/MIN/1.73M2 — SIGNIFICANT CHANGE UP
EOSINOPHIL # BLD AUTO: 0.21 K/UL — SIGNIFICANT CHANGE UP (ref 0–0.7)
EOSINOPHIL NFR BLD AUTO: 2.6 % — SIGNIFICANT CHANGE UP (ref 0–8)
GLUCOSE SERPL-MCNC: 100 MG/DL — HIGH (ref 70–99)
GLUCOSE SERPL-MCNC: 135 MG/DL — HIGH (ref 70–99)
HCT VFR BLD CALC: 33.4 % — LOW (ref 37–47)
HGB BLD-MCNC: 11.2 G/DL — LOW (ref 12–16)
IMM GRANULOCYTES NFR BLD AUTO: 2.2 % — HIGH (ref 0.1–0.3)
LYMPHOCYTES # BLD AUTO: 1.24 K/UL — SIGNIFICANT CHANGE UP (ref 1.2–3.4)
LYMPHOCYTES # BLD AUTO: 15.4 % — LOW (ref 20.5–51.1)
MAGNESIUM SERPL-MCNC: 1.7 MG/DL — LOW (ref 1.8–2.4)
MCHC RBC-ENTMCNC: 31.1 PG — HIGH (ref 27–31)
MCHC RBC-ENTMCNC: 33.5 G/DL — SIGNIFICANT CHANGE UP (ref 32–37)
MCV RBC AUTO: 92.8 FL — SIGNIFICANT CHANGE UP (ref 81–99)
MONOCYTES # BLD AUTO: 0.86 K/UL — HIGH (ref 0.1–0.6)
MONOCYTES NFR BLD AUTO: 10.7 % — HIGH (ref 1.7–9.3)
NEUTROPHILS # BLD AUTO: 5.52 K/UL — SIGNIFICANT CHANGE UP (ref 1.4–6.5)
NEUTROPHILS NFR BLD AUTO: 68.7 % — SIGNIFICANT CHANGE UP (ref 42.2–75.2)
NRBC # BLD: 0 /100 WBCS — SIGNIFICANT CHANGE UP (ref 0–0)
PHOSPHATE SERPL-MCNC: 2.5 MG/DL — SIGNIFICANT CHANGE UP (ref 2.1–4.9)
PLATELET # BLD AUTO: 252 K/UL — SIGNIFICANT CHANGE UP (ref 130–400)
PMV BLD: 10.1 FL — SIGNIFICANT CHANGE UP (ref 7.4–10.4)
POTASSIUM SERPL-MCNC: 3.4 MMOL/L — LOW (ref 3.5–5)
POTASSIUM SERPL-MCNC: 4 MMOL/L — SIGNIFICANT CHANGE UP (ref 3.5–5)
POTASSIUM SERPL-SCNC: 3.4 MMOL/L — LOW (ref 3.5–5)
POTASSIUM SERPL-SCNC: 4 MMOL/L — SIGNIFICANT CHANGE UP (ref 3.5–5)
RBC # BLD: 3.6 M/UL — LOW (ref 4.2–5.4)
RBC # FLD: 14.7 % — HIGH (ref 11.5–14.5)
SODIUM SERPL-SCNC: 131 MMOL/L — LOW (ref 135–146)
SODIUM SERPL-SCNC: 132 MMOL/L — LOW (ref 135–146)
SURGICAL PATHOLOGY STUDY: SIGNIFICANT CHANGE UP
TROPONIN T, HIGH SENSITIVITY RESULT: 46 NG/L — HIGH (ref 6–13)
TROPONIN T, HIGH SENSITIVITY RESULT: 53 NG/L — CRITICAL HIGH (ref 6–13)
TROPONIN T, HIGH SENSITIVITY RESULT: 57 NG/L — CRITICAL HIGH (ref 6–13)
VANCOMYCIN TROUGH SERPL-MCNC: 10.1 UG/ML — HIGH (ref 5–10)
WBC # BLD: 8.04 K/UL — SIGNIFICANT CHANGE UP (ref 4.8–10.8)
WBC # FLD AUTO: 8.04 K/UL — SIGNIFICANT CHANGE UP (ref 4.8–10.8)

## 2024-02-12 PROCEDURE — 93010 ELECTROCARDIOGRAM REPORT: CPT

## 2024-02-12 PROCEDURE — 71045 X-RAY EXAM CHEST 1 VIEW: CPT | Mod: 26

## 2024-02-12 PROCEDURE — 99232 SBSQ HOSP IP/OBS MODERATE 35: CPT | Mod: FS

## 2024-02-12 RX ORDER — KETOROLAC TROMETHAMINE 30 MG/ML
30 SYRINGE (ML) INJECTION ONCE
Refills: 0 | Status: DISCONTINUED | OUTPATIENT
Start: 2024-02-12 | End: 2024-02-12

## 2024-02-12 RX ORDER — POTASSIUM CHLORIDE 20 MEQ
20 PACKET (EA) ORAL ONCE
Refills: 0 | Status: COMPLETED | OUTPATIENT
Start: 2024-02-12 | End: 2024-02-12

## 2024-02-12 RX ORDER — METOPROLOL TARTRATE 50 MG
25 TABLET ORAL ONCE
Refills: 0 | Status: COMPLETED | OUTPATIENT
Start: 2024-02-12 | End: 2024-02-12

## 2024-02-12 RX ORDER — MAGNESIUM SULFATE 500 MG/ML
2 VIAL (ML) INJECTION ONCE
Refills: 0 | Status: COMPLETED | OUTPATIENT
Start: 2024-02-12 | End: 2024-02-12

## 2024-02-12 RX ADMIN — FAMOTIDINE 20 MILLIGRAM(S): 10 INJECTION INTRAVENOUS at 05:39

## 2024-02-12 RX ADMIN — Medication 30 MILLILITER(S): at 17:25

## 2024-02-12 RX ADMIN — CEFTRIAXONE 100 MILLIGRAM(S): 500 INJECTION, POWDER, FOR SOLUTION INTRAMUSCULAR; INTRAVENOUS at 21:16

## 2024-02-12 RX ADMIN — Medication 250 MILLIGRAM(S): at 05:38

## 2024-02-12 RX ADMIN — Medication 30 MILLIGRAM(S): at 17:45

## 2024-02-12 RX ADMIN — Medication 2000 UNIT(S): at 13:03

## 2024-02-12 RX ADMIN — CHLORHEXIDINE GLUCONATE 1 APPLICATION(S): 213 SOLUTION TOPICAL at 10:39

## 2024-02-12 RX ADMIN — REMDESIVIR 200 MILLIGRAM(S): 5 INJECTION INTRAVENOUS at 03:14

## 2024-02-12 RX ADMIN — Medication 100 MILLIGRAM(S): at 05:38

## 2024-02-12 RX ADMIN — ENOXAPARIN SODIUM 40 MILLIGRAM(S): 100 INJECTION SUBCUTANEOUS at 05:39

## 2024-02-12 RX ADMIN — MORPHINE SULFATE 2 MILLIGRAM(S): 50 CAPSULE, EXTENDED RELEASE ORAL at 10:17

## 2024-02-12 RX ADMIN — Medication 25 GRAM(S): at 16:18

## 2024-02-12 RX ADMIN — Medication 650 MILLIGRAM(S): at 07:57

## 2024-02-12 RX ADMIN — SODIUM CHLORIDE 1 GRAM(S): 9 INJECTION INTRAMUSCULAR; INTRAVENOUS; SUBCUTANEOUS at 21:15

## 2024-02-12 RX ADMIN — Medication 25 MILLIGRAM(S): at 05:39

## 2024-02-12 RX ADMIN — Medication 1 MILLIGRAM(S): at 13:06

## 2024-02-12 RX ADMIN — MORPHINE SULFATE 2 MILLIGRAM(S): 50 CAPSULE, EXTENDED RELEASE ORAL at 08:32

## 2024-02-12 RX ADMIN — Medication 25 MILLIGRAM(S): at 17:25

## 2024-02-12 RX ADMIN — FAMOTIDINE 20 MILLIGRAM(S): 10 INJECTION INTRAVENOUS at 17:25

## 2024-02-12 RX ADMIN — Medication 250 MILLIGRAM(S): at 17:26

## 2024-02-12 RX ADMIN — Medication 650 MILLIGRAM(S): at 10:16

## 2024-02-12 RX ADMIN — Medication 250 MILLIGRAM(S): at 05:39

## 2024-02-12 RX ADMIN — MORPHINE SULFATE 2 MILLIGRAM(S): 50 CAPSULE, EXTENDED RELEASE ORAL at 10:39

## 2024-02-12 RX ADMIN — Medication 250 MILLIGRAM(S): at 17:28

## 2024-02-12 RX ADMIN — SODIUM CHLORIDE 1 GRAM(S): 9 INJECTION INTRAMUSCULAR; INTRAVENOUS; SUBCUTANEOUS at 13:05

## 2024-02-12 RX ADMIN — ATORVASTATIN CALCIUM 40 MILLIGRAM(S): 80 TABLET, FILM COATED ORAL at 21:16

## 2024-02-12 RX ADMIN — SODIUM CHLORIDE 1 GRAM(S): 9 INJECTION INTRAMUSCULAR; INTRAVENOUS; SUBCUTANEOUS at 05:38

## 2024-02-12 RX ADMIN — Medication 100 MILLIGRAM(S): at 17:29

## 2024-02-12 RX ADMIN — SENNA PLUS 2 TABLET(S): 8.6 TABLET ORAL at 21:16

## 2024-02-12 RX ADMIN — CHLORHEXIDINE GLUCONATE 1 APPLICATION(S): 213 SOLUTION TOPICAL at 21:17

## 2024-02-12 RX ADMIN — Medication 81 MILLIGRAM(S): at 13:04

## 2024-02-12 RX ADMIN — MORPHINE SULFATE 2 MILLIGRAM(S): 50 CAPSULE, EXTENDED RELEASE ORAL at 15:19

## 2024-02-12 RX ADMIN — Medication 50 MILLIEQUIVALENT(S): at 07:24

## 2024-02-12 RX ADMIN — SERTRALINE 50 MILLIGRAM(S): 25 TABLET, FILM COATED ORAL at 13:06

## 2024-02-12 NOTE — DIETITIAN INITIAL EVALUATION ADULT - COLLABORATION WITH OTHER PROVIDERS
Interventions: coordination of care  Monitoring/Evaluation: energy intake, weight, labs, skin status, NFPE

## 2024-02-12 NOTE — OCCUPATIONAL THERAPY INITIAL EVALUATION ADULT - GENERAL OBSERVATIONS, REHAB EVAL
pt seen bedside in NAD @ 7:35-7:55, 11:40-11:55; pt stated earlier that felt pressure under left breast and flank; IE was interrupted and RN w/[facility] Burn team was notified

## 2024-02-12 NOTE — DIETITIAN INITIAL EVALUATION ADULT - PERTINENT MEDS FT
MEDICATIONS  (STANDING):  aspirin  chewable 81 milliGRAM(s) Oral daily  atorvastatin 40 milliGRAM(s) Oral at bedtime  cefTRIAXone   IVPB 1000 milliGRAM(s) IV Intermittent every 24 hours  cefTRIAXone   IVPB      chlorhexidine 4% Liquid 1 Application(s) Topical two times a day  cholecalciferol 2000 Unit(s) Oral daily  enoxaparin Injectable 40 milliGRAM(s) SubCutaneous every 24 hours  famotidine    Tablet 20 milliGRAM(s) Oral two times a day  folic acid 1 milliGRAM(s) Oral daily  hydrochlorothiazide 50 milliGRAM(s) Oral daily  metoprolol succinate ER 25 milliGRAM(s) Oral daily  metroNIDAZOLE  IVPB      metroNIDAZOLE  IVPB 500 milliGRAM(s) IV Intermittent every 12 hours  saccharomyces boulardii 250 milliGRAM(s) Oral two times a day  senna 2 Tablet(s) Oral at bedtime  sertraline 50 milliGRAM(s) Oral daily  sodium chloride 1 Gram(s) Oral three times a day  vancomycin  IVPB 750 milliGRAM(s) IV Intermittent every 12 hours    MEDICATIONS  (PRN):  acetaminophen     Tablet .. 650 milliGRAM(s) Oral every 6 hours PRN Temp greater or equal to 38C (100.4F), Mild Pain (1 - 3)  morphine  - Injectable 2 milliGRAM(s) IV Push two times a day PRN wound care  morphine  - Injectable 2 milliGRAM(s) IV Push every 6 hours PRN Severe Pain (7 - 10)  oxycodone    5 mG/acetaminophen 325 mG 1 Tablet(s) Oral every 6 hours PRN Moderate Pain (4 - 6)

## 2024-02-12 NOTE — DIETITIAN INITIAL EVALUATION ADULT - NAME AND PHONE
Brandy Morgan, RD [phone] or via Teams    Patient is at low nutrition risk, RD to f/u in 7-10 days or PRN

## 2024-02-12 NOTE — DIETITIAN INITIAL EVALUATION ADULT - ORAL INTAKE PTA/DIET HISTORY
Patient reports she was eating 3 meals/day - but was eating small portions. She was taking vitamin C for supplementation. UBW: 180 lbs. NKFA, no food intolerances.     She reports PO intake of raisin bran cereal - 100% consumed

## 2024-02-12 NOTE — OCCUPATIONAL THERAPY INITIAL EVALUATION ADULT - LIVES WITH, PROFILE
pt has two daughters, one employed @ this hospital in [place] and another lives outside of [place]; pt lives by herself/alone

## 2024-02-12 NOTE — OCCUPATIONAL THERAPY INITIAL EVALUATION ADULT - ASSISTIVE DEVICE, REHAB EVAL
bed rails Tazorac Counseling:  Patient advised that medication is irritating and drying.  Patient may need to apply sparingly and wash off after an hour before eventually leaving it on overnight.  The patient verbalized understanding of the proper use and possible adverse effects of tazorac.  All of the patient's questions and concerns were addressed.

## 2024-02-12 NOTE — DIETITIAN INITIAL EVALUATION ADULT - PERTINENT LABORATORY DATA
02-12    132<L>  |  94<L>  |  19  ----------------------------<  100<H>  3.4<L>   |  26  |  0.7    Ca    8.6      12 Feb 2024 05:40  Phos  2.7     02-11  Mg     2.2     02-11

## 2024-02-12 NOTE — OCCUPATIONAL THERAPY INITIAL EVALUATION ADULT - LEVEL OF INDEPENDENCE: BED TO CHAIR, REHAB EVAL
pt stated she has chest pain under L breast; RN and PA notified; w/ rest pt stated that pain dissipated, IE was interrupted; later today pt was seen by PT; prior to lunch pt was seen bedside as per attending Dr. Danielson pt medically stable to perform mobility and ADL; when attempted to perform sit to stand NP stated that team is waiting on more study results and pt Ok to continue w/o OOB activities/unable to perform

## 2024-02-12 NOTE — DIETITIAN INITIAL EVALUATION ADULT - OTHER INFO
Patient is a 83y old  Female who presents with a chief complaint of LLE infected wound; found to have MRSA bacteremia.  Full thickness wound of left foot; +MRSA - 2/8 s/p debridement of left lower extremity; Hx of GERD; Left foot with abscess flexor aspect with surrounding cellulitis; COVID-19 with moderate-severe illness; MRSA bacteremia; Hypokalemia - repleted;

## 2024-02-12 NOTE — DIETITIAN INITIAL EVALUATION ADULT - OTHER CALCULATIONS
Energy: 1548 - 1678 kcal/day (MSJ x 1.2 - 1.3 SF)   -estimated needs with consideration for age, acuity of illness, critical care setting

## 2024-02-12 NOTE — PROGRESS NOTE ADULT - ASSESSMENT
Patient is a 83y old  Female who presents with a chief complaint of LLE infected wound; found to have MRSA bacteremia .     Full thickness wound of left foot; + MRSA   - 2/8: s/p debridement of left lower extremity  - IVF: IVL  - ABx per ID : cefTRIAXone 1000mg q24h, flagyl 500 q12h and vanco 750mg q12h (RDV for COVID treatment). Monitor vanco troughs.   - Pain management   - LWC: vashe wet to dry twice a day  - wcx 2/8: MRSA, 2/9: few MRSA  - CT left foot with IV contrast 2/11- (pending read) to r/o joint involvement  - xray left foot 2/9 pending read   - no further plan for OR right now     Cards:   - Continue with home medications: HCTZ, metoprolol, rosuvastatin (atorvastatin inpatient)  - Monitor vital signs  - Dash/TLC diet  -2/12 patient c/o left sided CP- CXR, EKG, CE ordered- elevated trop noted- cards called rec to repeat trop in 2 hours- if uptrend to recall cards    Endo: RA  - Rheumatology c/s 2/9: - fu pathology to r/o presence of tophi.  no tophi in other areas - recommend xrays for right foot and bilateral hands (completed, f/u result)  - on methotrexate once a week and Remicade infusion once every 6 weeks  - hold meds for now until active infection has resolved and wound has healed.  - Methotrexate - holding for infection  - Xray hands 2/9 - pending read - f/u  - xray left foot 2/9 pending read - f/u     GI: Hx of GERD   - Continue home medication: famotidine     ID:   #Left foot with abscess flexor aspect with surrounding cellulitis  #COVID-19 with moderate-severe illness  #MRSA bacteremia   - Blood cultures positive for MRSA - on contact isolation  - Blood cultures 2/7: Methicillin resistant Staphylococcus aureus (MRSA),   - Bcx 2/9 x2: #1 prelim gram + cocci clusters #2prelim NG --> f/u final  - Bcx 2/10x2 : pending   - ID c/s Dr. Kaur 2/9 -Vancomycin 1 gm iv q12h, -Vancomycin dose  based on AUC/GILBERT as per Pharm ID recommendations, Rocephin 1 gm iv q24h, Flagyl 500 mg iv q12h, d/c cefepime. - mg iv on Day 1, then 100 mg iv D2 and D3.  - Vanco trough 2/10 4pm 8.1 --> per Pharm ID Vanco lowered to 750mg q12h  - Next vanco trough 2/12 AM - f/u result & pharm ID for further dosing rec's     GI   - bowel regimen ; miralax daily, lactuose x 1   - f/u BMs    Renal/  - hypomagnesemia, hypophosphatemia, hypokalemia - repleted overnight  - monitor electrolytes; replete prn    - cr at baseline 0.6     Psych: Hx of depression  -Continue Zoloft    Miscellaneous  - Ambulate as tolerated  - DVT/GI ppx  - PT / OT   Patient is a 83y old  Female who presents with a chief complaint of LLE infected wound; found to have MRSA bacteremia .     Full thickness wound of left foot; + MRSA   - 2/8: s/p debridement of left lower extremity  - IVF: IVL  - ABx per ID : cefTRIAXone 1000mg q24h, flagyl 500 q12h and vanco 750mg q12h (RDV for COVID treatment). Monitor vanco troughs.   - Pain management   - LWC: vashe wet to dry twice a day  - wcx 2/8: MRSA, 2/9: few MRSA  - CT left foot with IV contrast 2/11- (pending read) to r/o joint involvement  - xray left foot 2/9 pending read   - no further plan for OR right now     Cards:   - Continue with home medications: HCTZ, metoprolol, rosuvastatin (atorvastatin inpatient)  - Monitor vital signs  - Dash/TLC diet  -2/12 patient c/o left sided CP- CXR, EKG, CE ordered- elevated trop noted- cards called rec to repeat trop in 2 hours- if uptrend to recall cards    Endo: RA  - Rheumatology c/s 2/9: - fu pathology to r/o presence of tophi.  no tophi in other areas - recommend xrays for right foot and bilateral hands (completed, f/u result)  - on methotrexate once a week and Remicade infusion once every 6 weeks  - hold meds for now until active infection has resolved and wound has healed.  - Methotrexate - holding for infection  - Xray hands 2/9 - pending read - f/u  - xray left foot 2/9 pending read - f/u     GI: Hx of GERD   - Continue home medication: famotidine     ID:   #Left foot with abscess flexor aspect with surrounding cellulitis  #COVID-19 with moderate-severe illness  #MRSA bacteremia   - Blood cultures positive for MRSA - on contact isolation  - Blood cultures 2/7: Methicillin resistant Staphylococcus aureus (MRSA),   - Bcx 2/9 x2: #1 prelim gram + cocci clusters #2prelim NG --> f/u final  - Bcx 2/10x2 : NGTD  - ID c/s Dr. Kaur 2/9 -Vancomycin 1 gm iv q12h, -Vancomycin dose  based on AUC/GILBERT as per Pharm ID recommendations, Rocephin 1 gm iv q24h, Flagyl 500 mg iv q12h, d/c cefepime. - mg iv on Day 1, then 100 mg iv D2 and D3.  - Vanco trough 2/10 4pm 8.1 --> per Pharm ID Vanco lowered to 750mg q12h  -vanco troughs- f/u result & pharm ID for further dosing rec's     GI   - bowel regimen ; miralax daily, lactuose x 1   - f/u BMs  -PPI px    Renal/  - hypokalemia - repleted overnight  - monitor electrolytes; replete prn    - cr at baseline 0.6     Psych: Hx of depression  -Continue Zoloft    Miscellaneous  - Ambulate as tolerated  - DVT/GI ppx  - PT / OT

## 2024-02-12 NOTE — PROGRESS NOTE ADULT - SUBJECTIVE AND OBJECTIVE BOX
Patient is a 83y old  Female who presents with a chief complaint of Left foot wound     AM Rounds   INTERVAL HISTORY:  No acute events overnight. Afebrile   Patient seen at bedside. Dressing change performed. Patient tolerated well.   Patient c/o CP this morning, CXR, EKG and CE ordered- f/u results    Vital Signs Last 24 Hrs  T(C): 36.2 (12 Feb 2024 07:00), Max: 36.5 (11 Feb 2024 15:35)  T(F): 97.1 (12 Feb 2024 07:00), Max: 97.7 (11 Feb 2024 15:35)  HR: 69 (12 Feb 2024 08:30) (67 - 88)  BP: 153/70 (12 Feb 2024 08:30) (150/67 - 182/86)  BP(mean): 101 (12 Feb 2024 08:30) (97 - 124)  RR: 20 (12 Feb 2024 07:00) (20 - 20)  SpO2: 94% (12 Feb 2024 08:30) (94% - 98%)    Parameters below as of 12 Feb 2024 07:00  Patient On (Oxygen Delivery Method): room air      I&O's Detail    11 Feb 2024 07:01  -  12 Feb 2024 07:00  --------------------------------------------------------  IN:  Total IN: 0 mL    OUT:    Voided (mL): 800 mL  Total OUT: 800 mL    Total NET: -800 mL            MEDICATIONS  (STANDING):  aspirin  chewable 81 milliGRAM(s) Oral daily  atorvastatin 40 milliGRAM(s) Oral at bedtime  cefTRIAXone   IVPB 1000 milliGRAM(s) IV Intermittent every 24 hours  cefTRIAXone   IVPB      chlorhexidine 4% Liquid 1 Application(s) Topical two times a day  cholecalciferol 2000 Unit(s) Oral daily  enoxaparin Injectable 40 milliGRAM(s) SubCutaneous every 24 hours  famotidine    Tablet 20 milliGRAM(s) Oral two times a day  folic acid 1 milliGRAM(s) Oral daily  hydrochlorothiazide 50 milliGRAM(s) Oral daily  metoprolol succinate ER 25 milliGRAM(s) Oral daily  metroNIDAZOLE  IVPB      metroNIDAZOLE  IVPB 500 milliGRAM(s) IV Intermittent every 12 hours  saccharomyces boulardii 250 milliGRAM(s) Oral two times a day  senna 2 Tablet(s) Oral at bedtime  sertraline 50 milliGRAM(s) Oral daily  sodium chloride 1 Gram(s) Oral three times a day  vancomycin  IVPB 750 milliGRAM(s) IV Intermittent every 12 hours    MEDICATIONS  (PRN):  acetaminophen     Tablet .. 650 milliGRAM(s) Oral every 6 hours PRN Temp greater or equal to 38C (100.4F), Mild Pain (1 - 3)  morphine  - Injectable 2 milliGRAM(s) IV Push two times a day PRN wound care  morphine  - Injectable 2 milliGRAM(s) IV Push every 6 hours PRN Severe Pain (7 - 10)  oxycodone    5 mG/acetaminophen 325 mG 1 Tablet(s) Oral every 6 hours PRN Moderate Pain (4 - 6)    Allergies    No Known Allergies    Intolerances        Lab Results:                        11.5   9.24  )-----------( 220      ( 11 Feb 2024 11:45 )             34.5     02-12    132<L>  |  94<L>  |  19  ----------------------------<  100<H>  3.4<L>   |  26  |  0.7    Ca    8.6      12 Feb 2024 05:40  Phos  2.7     02-11  Mg     2.2     02-11        Urinalysis Basic - ( 12 Feb 2024 05:40 )    Color: x / Appearance: x / SG: x / pH: x  Gluc: 100 mg/dL / Ketone: x  / Bili: x / Urobili: x   Blood: x / Protein: x / Nitrite: x   Leuk Esterase: x / RBC: x / WBC x   Sq Epi: x / Non Sq Epi: x / Bacteria: x    IMAGING STUDIES:   < from: Xray Chest 1 View- PORTABLE-Urgent (Xray Chest 1 View- PORTABLE-Urgent .) (02.08.24 @ 08:45) >  Impression:    No radiographic evidence of acute cardiopulmonary disease.        --- End of Report ---      < end of copied text >    EXAM:  GENERAL: Lying in bed in NAD.  HEAD:  Atraumatic, Normocephalic  EYES: EOMI, PERRLA  NERVOUS SYSTEM:  Alert & Oriented X3  CHEST/LUNG: Breathing comfortably on RA, b/l chest rise appreciated  HEART: In no cardiopulmonary    Wound: Left foot dorsal aspect with full thickness wound s/p debridement, yellow necrotic tissue with serous/yellow drainage noted. Mild surrounding erythema. No active bleeding or malodor noted.    dressing changed

## 2024-02-13 LAB
ANION GAP SERPL CALC-SCNC: 12 MMOL/L — SIGNIFICANT CHANGE UP (ref 7–14)
BASOPHILS # BLD AUTO: 0.04 K/UL — SIGNIFICANT CHANGE UP (ref 0–0.2)
BASOPHILS NFR BLD AUTO: 0.4 % — SIGNIFICANT CHANGE UP (ref 0–1)
BUN SERPL-MCNC: 18 MG/DL — SIGNIFICANT CHANGE UP (ref 10–20)
CALCIUM SERPL-MCNC: 8.5 MG/DL — SIGNIFICANT CHANGE UP (ref 8.4–10.5)
CHLORIDE SERPL-SCNC: 92 MMOL/L — LOW (ref 98–110)
CO2 SERPL-SCNC: 26 MMOL/L — SIGNIFICANT CHANGE UP (ref 17–32)
CREAT SERPL-MCNC: 0.7 MG/DL — SIGNIFICANT CHANGE UP (ref 0.7–1.5)
CULTURE RESULTS: ABNORMAL
EGFR: 85 ML/MIN/1.73M2 — SIGNIFICANT CHANGE UP
EOSINOPHIL # BLD AUTO: 0.18 K/UL — SIGNIFICANT CHANGE UP (ref 0–0.7)
EOSINOPHIL NFR BLD AUTO: 1.6 % — SIGNIFICANT CHANGE UP (ref 0–8)
GLUCOSE SERPL-MCNC: 119 MG/DL — HIGH (ref 70–99)
HCT VFR BLD CALC: 32.2 % — LOW (ref 37–47)
HGB BLD-MCNC: 10.9 G/DL — LOW (ref 12–16)
IMM GRANULOCYTES NFR BLD AUTO: 4.1 % — HIGH (ref 0.1–0.3)
LYMPHOCYTES # BLD AUTO: 1.18 K/UL — LOW (ref 1.2–3.4)
LYMPHOCYTES # BLD AUTO: 10.5 % — LOW (ref 20.5–51.1)
MAGNESIUM SERPL-MCNC: 1.6 MG/DL — LOW (ref 1.8–2.4)
MCHC RBC-ENTMCNC: 31.4 PG — HIGH (ref 27–31)
MCHC RBC-ENTMCNC: 33.9 G/DL — SIGNIFICANT CHANGE UP (ref 32–37)
MCV RBC AUTO: 92.8 FL — SIGNIFICANT CHANGE UP (ref 81–99)
MONOCYTES # BLD AUTO: 0.86 K/UL — HIGH (ref 0.1–0.6)
MONOCYTES NFR BLD AUTO: 7.7 % — SIGNIFICANT CHANGE UP (ref 1.7–9.3)
NEUTROPHILS # BLD AUTO: 8.49 K/UL — HIGH (ref 1.4–6.5)
NEUTROPHILS NFR BLD AUTO: 75.7 % — HIGH (ref 42.2–75.2)
NRBC # BLD: 0 /100 WBCS — SIGNIFICANT CHANGE UP (ref 0–0)
ORGANISM # SPEC MICROSCOPIC CNT: ABNORMAL
ORGANISM # SPEC MICROSCOPIC CNT: SIGNIFICANT CHANGE UP
PHOSPHATE SERPL-MCNC: 3.2 MG/DL — SIGNIFICANT CHANGE UP (ref 2.1–4.9)
PLATELET # BLD AUTO: 309 K/UL — SIGNIFICANT CHANGE UP (ref 130–400)
PMV BLD: 9.6 FL — SIGNIFICANT CHANGE UP (ref 7.4–10.4)
POTASSIUM SERPL-MCNC: 3.5 MMOL/L — SIGNIFICANT CHANGE UP (ref 3.5–5)
POTASSIUM SERPL-SCNC: 3.5 MMOL/L — SIGNIFICANT CHANGE UP (ref 3.5–5)
RBC # BLD: 3.47 M/UL — LOW (ref 4.2–5.4)
RBC # FLD: 14.7 % — HIGH (ref 11.5–14.5)
SODIUM SERPL-SCNC: 130 MMOL/L — LOW (ref 135–146)
SPECIMEN SOURCE: SIGNIFICANT CHANGE UP
TROPONIN T, HIGH SENSITIVITY RESULT: 27 NG/L — HIGH (ref 6–13)
VANCOMYCIN TROUGH SERPL-MCNC: 10.6 UG/ML — HIGH (ref 5–10)
WBC # BLD: 11.21 K/UL — HIGH (ref 4.8–10.8)
WBC # FLD AUTO: 11.21 K/UL — HIGH (ref 4.8–10.8)

## 2024-02-13 PROCEDURE — 99232 SBSQ HOSP IP/OBS MODERATE 35: CPT | Mod: FS

## 2024-02-13 PROCEDURE — 99222 1ST HOSP IP/OBS MODERATE 55: CPT | Mod: 25,57

## 2024-02-13 RX ORDER — METOPROLOL TARTRATE 50 MG
5 TABLET ORAL ONCE
Refills: 0 | Status: COMPLETED | OUTPATIENT
Start: 2024-02-13 | End: 2024-02-13

## 2024-02-13 RX ORDER — DAPTOMYCIN 500 MG/10ML
INJECTION, POWDER, LYOPHILIZED, FOR SOLUTION INTRAVENOUS
Refills: 0 | Status: DISCONTINUED | OUTPATIENT
Start: 2024-02-13 | End: 2024-02-14

## 2024-02-13 RX ORDER — SODIUM CHLORIDE 9 MG/ML
1 INJECTION INTRAMUSCULAR; INTRAVENOUS; SUBCUTANEOUS
Refills: 0 | Status: DISCONTINUED | OUTPATIENT
Start: 2024-02-13 | End: 2024-02-14

## 2024-02-13 RX ORDER — POTASSIUM CHLORIDE 20 MEQ
40 PACKET (EA) ORAL ONCE
Refills: 0 | Status: COMPLETED | OUTPATIENT
Start: 2024-02-13 | End: 2024-02-13

## 2024-02-13 RX ORDER — DAPTOMYCIN 500 MG/10ML
750 INJECTION, POWDER, LYOPHILIZED, FOR SOLUTION INTRAVENOUS ONCE
Refills: 0 | Status: COMPLETED | OUTPATIENT
Start: 2024-02-13 | End: 2024-02-13

## 2024-02-13 RX ORDER — MAGNESIUM SULFATE 500 MG/ML
2 VIAL (ML) INJECTION ONCE
Refills: 0 | Status: COMPLETED | OUTPATIENT
Start: 2024-02-13 | End: 2024-02-13

## 2024-02-13 RX ORDER — DAPTOMYCIN 500 MG/10ML
750 INJECTION, POWDER, LYOPHILIZED, FOR SOLUTION INTRAVENOUS EVERY 24 HOURS
Refills: 0 | Status: DISCONTINUED | OUTPATIENT
Start: 2024-02-14 | End: 2024-02-14

## 2024-02-13 RX ORDER — KETOROLAC TROMETHAMINE 30 MG/ML
15 SYRINGE (ML) INJECTION EVERY 6 HOURS
Refills: 0 | Status: DISCONTINUED | OUTPATIENT
Start: 2024-02-13 | End: 2024-02-14

## 2024-02-13 RX ORDER — MORPHINE SULFATE 50 MG/1
2 CAPSULE, EXTENDED RELEASE ORAL ONCE
Refills: 0 | Status: DISCONTINUED | OUTPATIENT
Start: 2024-02-13 | End: 2024-02-13

## 2024-02-13 RX ORDER — FAMOTIDINE 10 MG/ML
20 INJECTION INTRAVENOUS ONCE
Refills: 0 | Status: COMPLETED | OUTPATIENT
Start: 2024-02-13 | End: 2024-02-13

## 2024-02-13 RX ADMIN — CHLORHEXIDINE GLUCONATE 1 APPLICATION(S): 213 SOLUTION TOPICAL at 09:03

## 2024-02-13 RX ADMIN — FAMOTIDINE 104 MILLIGRAM(S): 10 INJECTION INTRAVENOUS at 16:34

## 2024-02-13 RX ADMIN — Medication 100 MILLIGRAM(S): at 05:35

## 2024-02-13 RX ADMIN — SENNA PLUS 2 TABLET(S): 8.6 TABLET ORAL at 21:40

## 2024-02-13 RX ADMIN — Medication 15 MILLIGRAM(S): at 18:02

## 2024-02-13 RX ADMIN — SODIUM CHLORIDE 1 GRAM(S): 9 INJECTION INTRAMUSCULAR; INTRAVENOUS; SUBCUTANEOUS at 18:12

## 2024-02-13 RX ADMIN — MORPHINE SULFATE 2 MILLIGRAM(S): 50 CAPSULE, EXTENDED RELEASE ORAL at 15:46

## 2024-02-13 RX ADMIN — Medication 25 GRAM(S): at 21:40

## 2024-02-13 RX ADMIN — Medication 100 MILLIGRAM(S): at 17:48

## 2024-02-13 RX ADMIN — Medication 81 MILLIGRAM(S): at 12:07

## 2024-02-13 RX ADMIN — Medication 40 MILLIEQUIVALENT(S): at 21:40

## 2024-02-13 RX ADMIN — FAMOTIDINE 20 MILLIGRAM(S): 10 INJECTION INTRAVENOUS at 05:37

## 2024-02-13 RX ADMIN — ENOXAPARIN SODIUM 40 MILLIGRAM(S): 100 INJECTION SUBCUTANEOUS at 05:36

## 2024-02-13 RX ADMIN — Medication 250 MILLIGRAM(S): at 17:47

## 2024-02-13 RX ADMIN — SERTRALINE 50 MILLIGRAM(S): 25 TABLET, FILM COATED ORAL at 12:08

## 2024-02-13 RX ADMIN — Medication 650 MILLIGRAM(S): at 09:32

## 2024-02-13 RX ADMIN — MORPHINE SULFATE 2 MILLIGRAM(S): 50 CAPSULE, EXTENDED RELEASE ORAL at 15:31

## 2024-02-13 RX ADMIN — CHLORHEXIDINE GLUCONATE 1 APPLICATION(S): 213 SOLUTION TOPICAL at 21:40

## 2024-02-13 RX ADMIN — Medication 25 GRAM(S): at 18:12

## 2024-02-13 RX ADMIN — Medication 650 MILLIGRAM(S): at 15:03

## 2024-02-13 RX ADMIN — Medication 650 MILLIGRAM(S): at 15:30

## 2024-02-13 RX ADMIN — Medication 5 MILLIGRAM(S): at 07:07

## 2024-02-13 RX ADMIN — ATORVASTATIN CALCIUM 40 MILLIGRAM(S): 80 TABLET, FILM COATED ORAL at 21:40

## 2024-02-13 RX ADMIN — CEFTRIAXONE 100 MILLIGRAM(S): 500 INJECTION, POWDER, FOR SOLUTION INTRAMUSCULAR; INTRAVENOUS at 21:40

## 2024-02-13 RX ADMIN — Medication 2000 UNIT(S): at 12:07

## 2024-02-13 RX ADMIN — Medication 15 MILLIGRAM(S): at 17:47

## 2024-02-13 RX ADMIN — Medication 250 MILLIGRAM(S): at 05:37

## 2024-02-13 RX ADMIN — DAPTOMYCIN 130 MILLIGRAM(S): 500 INJECTION, POWDER, LYOPHILIZED, FOR SOLUTION INTRAVENOUS at 16:34

## 2024-02-13 RX ADMIN — Medication 650 MILLIGRAM(S): at 09:02

## 2024-02-13 RX ADMIN — Medication 25 MILLIGRAM(S): at 05:37

## 2024-02-13 RX ADMIN — Medication 250 MILLIGRAM(S): at 06:45

## 2024-02-13 RX ADMIN — Medication 1 MILLIGRAM(S): at 12:08

## 2024-02-13 NOTE — PROGRESS NOTE ADULT - ASSESSMENT
· Assessment	  83y F w/ hx of RA on methotrexate, Depression, GERD, HTN, HLD breast ca s/p lumpectomy in 2015 is presents to ED for left foot cellulitis. patient's daughter at beside states that visiting nurse saw her mother today and reported that wound of left foot looked cellulitic. Patient endorses chills, feeling feverish, and nausea. Patient had 1 episode of emesis in the ED. Denies chest pain, sob, numbness, tingling.     < from: CT Foot w/ IV Cont, Left (02.10.24 @ 21:56) >    Wound/ulcer at the dorsal medial aspect of the midfoot, with a sinus   tract/direct extension into the first tarsometatarsal joint, and foci of   air in the first tarsometatarsal joint, findings highly suspicious for   septic arthritis.    1.3 cm focal subcutaneous fluid collection with mild rim enhancement   adjacent to the ulcer, indicative of a small abscess.    < end of copied text >      IMPRESSION/RECOMMENDATIONS  Left foot with septic arthritis/abscess with ORSA  2/9 S/p debridement with WCx ORSA  2/9 BCx ORSA  2/10,11 BCX NG  No evidence of Gout/inflammatory synovitis    -ECHO  -f/u with Podiatry/Burn  -start Daptomycin 750 mg iv q24h  -d/c vancomycin  -Rocephin 1 gm iv q24h  -Flagyl 500 mg iv q12h  -  -Off loading to prevent pressure sores and preventive measures to avoid aspiration     COVID-19 with no active disease  -d/c isolation

## 2024-02-13 NOTE — PROGRESS NOTE ADULT - ASSESSMENT
CM met with patient's wife to review weekly team meeting and discuss current functional barriers  She is agreeable to dc 8/14 with cont'd C RN/PT/OT/SLP/ HHA services  Wife is currently in the process of arranging and hiring additional help to assist her  CM will assist in dc needs  [facility] referral to [facility] for RN/PT/OT/SLP/HHA services  Patient is a 83y old  Female who presents with a chief complaint of LLE infected wound; found to have MRSA bacteremia .     Full thickness wound of left foot; + MRSA   - 2/8: s/p debridement of left lower extremity  - IVF: IVL  - ABx per ID 2/13: -start Daptomycin 750 mg iv q24h-d/c vancomycin, Rocephin 1 gm iv q24h, Flagyl 500 mg iv q12h  - Pain management   - LWC: vashe wet to dry twice a day  - wcx 2/8: MRSA, 2/9: few MRSA  - xray left foot 2/7 No radiographic evidence of osteomyelitis. No acute fracture or dislocation. 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  - CT left foot with IV contrast 2/11- Wound/ulcer at the dorsal medial aspect of the midfoot, with a sinus tract/direct extension into the first tarsometatarsal joint, and foci of air in the first tarsometatarsal joint, findings highly suspicious for septic arthritis. 1.3 cm focal subcutaneous fluid collection with mild rim enhancement adjacent to the ulcer, indicative of a small abscess.  -Podiatry c/s 2/12 placed would benefit from joint wash out with partial bone debridement. will schedule for surgery sometime this week  -Plan for OR tomorrow Wed 2/14 for further debridement     Cards:   - Continue with home medications: HCTZ, metoprolol, rosuvastatin (atorvastatin inpatient)  - Monitor vital signs  - Dash/TLC diet  -2/12 patient c/o left sided CP- CXR left basilar opacity., EKG at baseline, CE w/ elevated trop noted 57, 53 46- cards recalled not ACS,  treat GERD. CP resolved   -Patient also hypertensive- hospitalist c/s placed 2/12- f/u recs  -Echo ordered 2/12- f/u results    Endo: RA  - Rheumatology c/s 2/9: - fu pathology to r/o presence of tophi.  no tophi in other areas - recommend xrays for right foot and bilateral hands (completed, f/u result)  - on methotrexate once a week and Remicade infusion once every 6 weeks  - hold meds for now until active infection has resolved and wound has healed.  - Methotrexate - holding for infection  - Xray hands 2/9 - No acute osseous abnormality. Severe bilateral hand osteoarthritis. No osseous erosion.  - xray right foot 2/9- No acute osseous abnormality. No osseous erosion. Diffuse osteoarthritis. Chronic appearing fracture at the second metatarsal neck with displacement and impaction.    GI: Hx of GERD   - Continue home medication: famotidine     ID:   #Left foot with abscess flexor aspect with surrounding cellulitis  #COVID-19 with moderate-severe illness  #MRSA bacteremia   - Blood cultures positive for MRSA - on contact isolation  - Blood cultures 2/7: Methicillin resistant Staphylococcus aureus (MRSA),   - Bcx 2/9 x2: #1 prelim gram + cocci clusters #2prelim NG --> f/u final  - Bcx 2/10x2 : NGTD  - ID c/s Dr. Kaur 2/9 -Vancomycin 1 gm iv q12h, -Vancomycin dose  based on AUC/GILBERT as per Pharm ID recommendations, Rocephin 1 gm iv q24h, Flagyl 500 mg iv q12h, d/c cefepime. - mg iv on Day 1, then 100 mg iv D2 and D3.  - Vanco trough 2/10 4pm 8.1 --> per Pharm ID Vanco lowered to 750mg q12h. vanco troughs- f/u result & pharm ID for further dosing rec's   2/13: -start Daptomycin 750 mg iv q24. -d/c vancomycin, Rocephin 1 gm iv q24h. Flagyl 500 mg iv q12h. Completed RDV for COVID, d/c isolation    GI   - bowel regimen ; Miralax daily, lactuose x 1   - f/u BMs  -PPI px    Renal/  - monitor electrolytes; replete prn    - cr at baseline 0.6     Psych: Hx of depression  -Continue Zoloft    Miscellaneous  - Ambulate as tolerated  - DVT/GI ppx  - PT / OT

## 2024-02-13 NOTE — PROGRESS NOTE ADULT - SUBJECTIVE AND OBJECTIVE BOX
Patient is a 83y old  Female who presents with a chief complaint of Left foot wound.     AM Rounds  INTERVAL HISTORY:  No acute events overnight.     Vital Signs Last 24 Hrs  T(C): 37.1 (13 Feb 2024 08:25), Max: 37.1 (13 Feb 2024 07:45)  T(F): 98.7 (13 Feb 2024 08:25), Max: 98.7 (13 Feb 2024 07:45)  HR: 69 (13 Feb 2024 08:25) (69 - 84)  BP: 186/81 (13 Feb 2024 08:25) (149/65 - 187/74)  BP(mean): 110 (13 Feb 2024 07:45) (107 - 118)  RR: 18 (13 Feb 2024 08:25) (18 - 19)  SpO2: 95% (13 Feb 2024 07:45) (95% - 99%)    Parameters below as of 13 Feb 2024 07:45  Patient On (Oxygen Delivery Method): room air    I&O's Detail    12 Feb 2024 07:01  -  13 Feb 2024 07:00  --------------------------------------------------------  IN:  Total IN: 0 mL    OUT:    Voided (mL): 1000 mL  Total OUT: 1000 mL    Total NET: -1000 mL            MEDICATIONS  (STANDING):  aspirin  chewable 81 milliGRAM(s) Oral daily  atorvastatin 40 milliGRAM(s) Oral at bedtime  cefTRIAXone   IVPB 1000 milliGRAM(s) IV Intermittent every 24 hours  cefTRIAXone   IVPB      chlorhexidine 4% Liquid 1 Application(s) Topical two times a day  cholecalciferol 2000 Unit(s) Oral daily  enoxaparin Injectable 40 milliGRAM(s) SubCutaneous every 24 hours  famotidine    Tablet 20 milliGRAM(s) Oral two times a day  folic acid 1 milliGRAM(s) Oral daily  hydrochlorothiazide 50 milliGRAM(s) Oral daily  metoprolol succinate ER 25 milliGRAM(s) Oral daily  metroNIDAZOLE  IVPB 500 milliGRAM(s) IV Intermittent every 12 hours  metroNIDAZOLE  IVPB      saccharomyces boulardii 250 milliGRAM(s) Oral two times a day  senna 2 Tablet(s) Oral at bedtime  sertraline 50 milliGRAM(s) Oral daily  vancomycin  IVPB 750 milliGRAM(s) IV Intermittent every 12 hours    MEDICATIONS  (PRN):  acetaminophen     Tablet .. 650 milliGRAM(s) Oral every 6 hours PRN Temp greater or equal to 38C (100.4F), Mild Pain (1 - 3)  oxycodone    5 mG/acetaminophen 325 mG 1 Tablet(s) Oral every 6 hours PRN Moderate Pain (4 - 6)    Allergies    No Known Allergies    Intolerances        Lab Results:                        11.2   8.04  )-----------( 252      ( 12 Feb 2024 11:59 )             33.4     02-12    131<L>  |  95<L>  |  17  ----------------------------<  135<H>  4.0   |  18  |  0.6<L>    Ca    8.6      12 Feb 2024 11:59  Phos  2.5     02-12  Mg     1.7     02-12        Urinalysis Basic - ( 12 Feb 2024 11:59 )    Color: x / Appearance: x / SG: x / pH: x  Gluc: 135 mg/dL / Ketone: x  / Bili: x / Urobili: x   Blood: x / Protein: x / Nitrite: x   Leuk Esterase: x / RBC: x / WBC x   Sq Epi: x / Non Sq Epi: x / Bacteria: x        CAPILLARY BLOOD GLUCOSE                  IMAGING STUDIES:       EXAM:                     Patient is a 83y old  Female who presents with a chief complaint of Left foot wound.     AM Rounds  INTERVAL HISTORY:  No acute events overnight. Afebrile  Patient seen at bedside. Dressing change performed. Patient tolerated well.   Plan for OR tomorrow for further debridement.      Vital Signs Last 24 Hrs  T(C): 37.1 (13 Feb 2024 08:25), Max: 37.1 (13 Feb 2024 07:45)  T(F): 98.7 (13 Feb 2024 08:25), Max: 98.7 (13 Feb 2024 07:45)  HR: 69 (13 Feb 2024 08:25) (69 - 84)  BP: 186/81 (13 Feb 2024 08:25) (149/65 - 187/74)  BP(mean): 110 (13 Feb 2024 07:45) (107 - 118)  RR: 18 (13 Feb 2024 08:25) (18 - 19)  SpO2: 95% (13 Feb 2024 07:45) (95% - 99%)    Parameters below as of 13 Feb 2024 07:45  Patient On (Oxygen Delivery Method): room air    I&O's Detail    12 Feb 2024 07:01  -  13 Feb 2024 07:00  --------------------------------------------------------  IN:  Total IN: 0 mL    OUT:    Voided (mL): 1000 mL  Total OUT: 1000 mL    Total NET: -1000 mL      MEDICATIONS  (STANDING):  aspirin  chewable 81 milliGRAM(s) Oral daily  atorvastatin 40 milliGRAM(s) Oral at bedtime  cefTRIAXone   IVPB 1000 milliGRAM(s) IV Intermittent every 24 hours  cefTRIAXone   IVPB      chlorhexidine 4% Liquid 1 Application(s) Topical two times a day  cholecalciferol 2000 Unit(s) Oral daily  enoxaparin Injectable 40 milliGRAM(s) SubCutaneous every 24 hours  famotidine    Tablet 20 milliGRAM(s) Oral two times a day  folic acid 1 milliGRAM(s) Oral daily  hydrochlorothiazide 50 milliGRAM(s) Oral daily  metoprolol succinate ER 25 milliGRAM(s) Oral daily  metroNIDAZOLE  IVPB 500 milliGRAM(s) IV Intermittent every 12 hours  metroNIDAZOLE  IVPB      saccharomyces boulardii 250 milliGRAM(s) Oral two times a day  senna 2 Tablet(s) Oral at bedtime  sertraline 50 milliGRAM(s) Oral daily  vancomycin  IVPB 750 milliGRAM(s) IV Intermittent every 12 hours    MEDICATIONS  (PRN):  acetaminophen     Tablet .. 650 milliGRAM(s) Oral every 6 hours PRN Temp greater or equal to 38C (100.4F), Mild Pain (1 - 3)  oxycodone    5 mG/acetaminophen 325 mG 1 Tablet(s) Oral every 6 hours PRN Moderate Pain (4 - 6)    Allergies    No Known Allergies    Intolerances        Lab Results:                        11.2   8.04  )-----------( 252      ( 12 Feb 2024 11:59 )             33.4     02-12    131<L>  |  95<L>  |  17  ----------------------------<  135<H>  4.0   |  18  |  0.6<L>    Ca    8.6      12 Feb 2024 11:59  Phos  2.5     02-12  Mg     1.7     02-12        Urinalysis Basic - ( 12 Feb 2024 11:59 )    Color: x / Appearance: x / SG: x / pH: x  Gluc: 135 mg/dL / Ketone: x  / Bili: x / Urobili: x   Blood: x / Protein: x / Nitrite: x   Leuk Esterase: x / RBC: x / WBC x   Sq Epi: x / Non Sq Epi: x / Bacteria: x    IMAGING STUDIES:   < from: CT Foot w/ IV Cont, Left (02.10.24 @ 21:56) >  IMPRESSION:    Wound/ulcer at the dorsal medial aspect of the midfoot, with a sinus   tract/direct extension into the first tarsometatarsal joint, and foci of   air in the first tarsometatarsal joint, findings highly suspicious for   septic arthritis.    1.3 cm focal subcutaneous fluid collection with mild rim enhancement   adjacent to the ulcer, indicative of a small abscess.    --- End of Report ---      < end of copied text >      EXAM:  GENERAL: Lying in bed in NAD.  HEAD:  Atraumatic, Normocephalic  EYES: EOMI, PERRLA  NERVOUS SYSTEM:  Alert & Oriented X3  CHEST/LUNG: Breathing comfortably on RA, b/l chest rise appreciated  HEART: In no cardiopulmonary    Wound: Left foot dorsal aspect with full thickness wound s/p debridement, pink and moist with some yellow necrotic tissue with serous/yellow drainage noted. Mild surrounding erythema, improved. No active bleeding or malodor noted.    dressing changed

## 2024-02-13 NOTE — PHYSICAL THERAPY INITIAL EVALUATION ADULT - GAIT TRAINING, PT EVAL
Pt will ambulate using RW or least restrictive AD for 100 ft with supervision by discharge to facilitate return to PLOF. Pt will negotiate 3 steps using 1 HR under supervision

## 2024-02-13 NOTE — PHYSICAL THERAPY INITIAL EVALUATION ADULT - PERTINENT HX OF CURRENT PROBLEM, REHAB EVAL
83y F w/ hx of RA on methotrexate, Depression, GERD, HTN, HLD breast ca s/p lumpectomy in 2015 is presents to ED for left foot cellulitis. patient's daughter at beside states that visiting nurse saw her mother today and reported that wound of left foot looked cellulitic. Patient endorses chills, feeling feverish, and nausea. Patient had 1 episode of emesis in the ED. Denies chest pain, sob, numbness, tingling

## 2024-02-13 NOTE — PHYSICAL THERAPY INITIAL EVALUATION ADULT - GENERAL OBSERVATIONS, REHAB EVAL
Pt was approached for PT IE. As per attendant, + chest pain, pending Troponin. PT will follow up when appropriate
13:15-13:45. chart reviewed. Pt received sitting at B/S, alert, oriented, able to follow multi-step instructions and agreeable to PT evaluation. + Lt foot ace wrapping, + IV, + primafit, VSS, -ve orthostatic, NAD. off airborne isolation

## 2024-02-13 NOTE — PHARMACOTHERAPY INTERVENTION NOTE - COMMENTS
Consistent with ID note, recommended to order metronidazole 500 mg IV q12h to add anaerobic coverage for the treatment of infected foot wound.    Jamal Barnes, PharmD, BCIDP  Clinical Pharmacy Specialist, Infectious Diseases  Tele-Antimicrobial Stewardship Program (Tele-ASP)  Tele-ASP Phone: [phone]  
Patient on vancomycin for MRSA bacteremia, recommend initial dose of 1250 mg IV q24h, per PrecisePK predicts a therapeutic AUC/GILBERT of 520.09 hr*mg/L (range 400-600) and obtain trough before the 3rd dose   
Consistent with ID note, recommended to change cefepime to ceftriaxone 1g IV q24h for the treatment of infected foot wound.    Jamal Barnes, PharmD, [facility]  Clinical Pharmacy Specialist, Infectious Diseases  Tele-Antimicrobial Stewardship Program (Tele-ASP)  Tele-ASP Phone: [phone]  
As per policy, ordered a creatine kinase level for 2/19 AM in the setting of daptomycin therapy.    Andrae Ramirez, PharmD, [facility]  Clinical Pharmacy Specialist, Infectious Diseases  Tele-Antimicrobial Stewardship Program (Tele-ASP)  Tele-ASP Phone: [phone] 
As per policy, ordered a vancomycin trough for 2/12 @1600 for vancomycin 750 mg q12h regimen in order to assist with vancomycin pharmacokinetic monitoring.      Deborah Kern, PharmD   Clinical Pharmacy Specialist, Infectious Diseases  Tele-Antimicrobial Stewardship Program (Tele-ASP)  Tele-ASP Phone: [phone]  
Recommend to obtain trough prior to the 3rd dose on 2/10 16:00
Recommended to adjust vancomycin dose from 1250 mg IV q24h to 750 mg IV q12h since the vancomycin level today, 2/10 was 8.1 mg/L. As per PrecisePK calculations, current vancomycin AUC/GILBERT is 375 mg/L*h, which is lower than the therapeutic range of 400 - 600 mg/L*h. Increasing the dose is predicted to yield an AUC/GILBERT of 426 mg/L*h. Scr 0.6.      Deborah Kern PharmD   Clinical Pharmacy Specialist, Infectious Diseases  Tele-Antimicrobial Stewardship Program (Tele-ASP)  Tele-ASP Phone: [phone]

## 2024-02-13 NOTE — PHARMACOTHERAPY INTERVENTION NOTE - NSPHARMCOMMASP
ASP - De-escalation
ASP - Dose optimization/Non-Renal dose adjustment
ASP - Lab/ test recommended
ASP - Therapy recommended/ Alternative therapy
ASP - Lab/ test recommended

## 2024-02-13 NOTE — PHYSICAL THERAPY INITIAL EVALUATION ADULT - ADDITIONAL COMMENTS
Pt resides in 1st floor of a private house using 3 steps to enter, no other stairs to negotiate. Pt used to be independent with overall functional mobility, able to ambulate without AD at baseline.

## 2024-02-14 ENCOUNTER — RESULT REVIEW (OUTPATIENT)
Age: 84
End: 2024-02-14

## 2024-02-14 LAB
ANION GAP SERPL CALC-SCNC: 12 MMOL/L — SIGNIFICANT CHANGE UP (ref 7–14)
BASOPHILS # BLD AUTO: 0.04 K/UL — SIGNIFICANT CHANGE UP (ref 0–0.2)
BASOPHILS NFR BLD AUTO: 0.4 % — SIGNIFICANT CHANGE UP (ref 0–1)
BUN SERPL-MCNC: 17 MG/DL — SIGNIFICANT CHANGE UP (ref 10–20)
CALCIUM SERPL-MCNC: 8.2 MG/DL — LOW (ref 8.4–10.4)
CHLORIDE SERPL-SCNC: 91 MMOL/L — LOW (ref 98–110)
CO2 SERPL-SCNC: 25 MMOL/L — SIGNIFICANT CHANGE UP (ref 17–32)
CREAT SERPL-MCNC: 0.7 MG/DL — SIGNIFICANT CHANGE UP (ref 0.7–1.5)
CULTURE RESULTS: ABNORMAL
CULTURE RESULTS: ABNORMAL
CULTURE RESULTS: SIGNIFICANT CHANGE UP
CULTURE RESULTS: SIGNIFICANT CHANGE UP
EGFR: 85 ML/MIN/1.73M2 — SIGNIFICANT CHANGE UP
EOSINOPHIL # BLD AUTO: 0.22 K/UL — SIGNIFICANT CHANGE UP (ref 0–0.7)
EOSINOPHIL NFR BLD AUTO: 2.2 % — SIGNIFICANT CHANGE UP (ref 0–8)
GLUCOSE SERPL-MCNC: 119 MG/DL — HIGH (ref 70–99)
HCT VFR BLD CALC: 33 % — LOW (ref 37–47)
HGB BLD-MCNC: 10.8 G/DL — LOW (ref 12–16)
IMM GRANULOCYTES NFR BLD AUTO: 2.3 % — HIGH (ref 0.1–0.3)
LYMPHOCYTES # BLD AUTO: 1.19 K/UL — LOW (ref 1.2–3.4)
LYMPHOCYTES # BLD AUTO: 11.9 % — LOW (ref 20.5–51.1)
MAGNESIUM SERPL-MCNC: 1.7 MG/DL — LOW (ref 1.8–2.4)
MCHC RBC-ENTMCNC: 31.3 PG — HIGH (ref 27–31)
MCHC RBC-ENTMCNC: 32.7 G/DL — SIGNIFICANT CHANGE UP (ref 32–37)
MCV RBC AUTO: 95.7 FL — SIGNIFICANT CHANGE UP (ref 81–99)
MONOCYTES # BLD AUTO: 0.71 K/UL — HIGH (ref 0.1–0.6)
MONOCYTES NFR BLD AUTO: 7.1 % — SIGNIFICANT CHANGE UP (ref 1.7–9.3)
NEUTROPHILS # BLD AUTO: 7.59 K/UL — HIGH (ref 1.4–6.5)
NEUTROPHILS NFR BLD AUTO: 76.1 % — HIGH (ref 42.2–75.2)
NRBC # BLD: 0 /100 WBCS — SIGNIFICANT CHANGE UP (ref 0–0)
ORGANISM # SPEC MICROSCOPIC CNT: ABNORMAL
ORGANISM # SPEC MICROSCOPIC CNT: ABNORMAL
ORGANISM # SPEC MICROSCOPIC CNT: SIGNIFICANT CHANGE UP
ORGANISM # SPEC MICROSCOPIC CNT: SIGNIFICANT CHANGE UP
PHOSPHATE SERPL-MCNC: 3.4 MG/DL — SIGNIFICANT CHANGE UP (ref 2.1–4.9)
PLATELET # BLD AUTO: 337 K/UL — SIGNIFICANT CHANGE UP (ref 130–400)
PMV BLD: 9.7 FL — SIGNIFICANT CHANGE UP (ref 7.4–10.4)
POTASSIUM SERPL-MCNC: 4 MMOL/L — SIGNIFICANT CHANGE UP (ref 3.5–5)
POTASSIUM SERPL-SCNC: 4 MMOL/L — SIGNIFICANT CHANGE UP (ref 3.5–5)
RBC # BLD: 3.45 M/UL — LOW (ref 4.2–5.4)
RBC # FLD: 15 % — HIGH (ref 11.5–14.5)
SODIUM SERPL-SCNC: 128 MMOL/L — LOW (ref 135–146)
SPECIMEN SOURCE: SIGNIFICANT CHANGE UP
WBC # BLD: 9.98 K/UL — SIGNIFICANT CHANGE UP (ref 4.8–10.8)
WBC # FLD AUTO: 9.98 K/UL — SIGNIFICANT CHANGE UP (ref 4.8–10.8)

## 2024-02-14 PROCEDURE — 99232 SBSQ HOSP IP/OBS MODERATE 35: CPT | Mod: FS

## 2024-02-14 PROCEDURE — 93306 TTE W/DOPPLER COMPLETE: CPT | Mod: 26

## 2024-02-14 PROCEDURE — 11044 DBRDMT BONE 1ST 20 SQ CM/<: CPT

## 2024-02-14 PROCEDURE — 71045 X-RAY EXAM CHEST 1 VIEW: CPT | Mod: 26

## 2024-02-14 PROCEDURE — 88304 TISSUE EXAM BY PATHOLOGIST: CPT | Mod: 26

## 2024-02-14 RX ORDER — METOPROLOL TARTRATE 50 MG
25 TABLET ORAL DAILY
Refills: 0 | Status: DISCONTINUED | OUTPATIENT
Start: 2024-02-14 | End: 2024-02-19

## 2024-02-14 RX ORDER — ASPIRIN/CALCIUM CARB/MAGNESIUM 324 MG
81 TABLET ORAL DAILY
Refills: 0 | Status: DISCONTINUED | OUTPATIENT
Start: 2024-02-14 | End: 2024-02-19

## 2024-02-14 RX ORDER — SERTRALINE 25 MG/1
50 TABLET, FILM COATED ORAL DAILY
Refills: 0 | Status: DISCONTINUED | OUTPATIENT
Start: 2024-02-14 | End: 2024-02-19

## 2024-02-14 RX ORDER — METRONIDAZOLE 500 MG
500 TABLET ORAL ONCE
Refills: 0 | Status: COMPLETED | OUTPATIENT
Start: 2024-02-14 | End: 2024-02-14

## 2024-02-14 RX ORDER — ACETAMINOPHEN 500 MG
650 TABLET ORAL EVERY 6 HOURS
Refills: 0 | Status: DISCONTINUED | OUTPATIENT
Start: 2024-02-14 | End: 2024-02-19

## 2024-02-14 RX ORDER — SODIUM CHLORIDE 9 MG/ML
1 INJECTION INTRAMUSCULAR; INTRAVENOUS; SUBCUTANEOUS
Refills: 0 | Status: COMPLETED | OUTPATIENT
Start: 2024-02-14 | End: 2024-02-17

## 2024-02-14 RX ORDER — DAPTOMYCIN 500 MG/10ML
750 INJECTION, POWDER, LYOPHILIZED, FOR SOLUTION INTRAVENOUS EVERY 24 HOURS
Refills: 0 | Status: DISCONTINUED | OUTPATIENT
Start: 2024-02-14 | End: 2024-02-19

## 2024-02-14 RX ORDER — SODIUM CHLORIDE 9 MG/ML
1000 INJECTION INTRAMUSCULAR; INTRAVENOUS; SUBCUTANEOUS
Refills: 0 | Status: DISCONTINUED | OUTPATIENT
Start: 2024-02-14 | End: 2024-02-14

## 2024-02-14 RX ORDER — FAMOTIDINE 10 MG/ML
20 INJECTION INTRAVENOUS
Refills: 0 | Status: DISCONTINUED | OUTPATIENT
Start: 2024-02-14 | End: 2024-02-19

## 2024-02-14 RX ORDER — MAGNESIUM SULFATE 500 MG/ML
2 VIAL (ML) INJECTION ONCE
Refills: 0 | Status: COMPLETED | OUTPATIENT
Start: 2024-02-14 | End: 2024-02-14

## 2024-02-14 RX ORDER — CHLORHEXIDINE GLUCONATE 213 G/1000ML
1 SOLUTION TOPICAL
Refills: 0 | Status: DISCONTINUED | OUTPATIENT
Start: 2024-02-14 | End: 2024-02-19

## 2024-02-14 RX ORDER — ENOXAPARIN SODIUM 100 MG/ML
40 INJECTION SUBCUTANEOUS EVERY 24 HOURS
Refills: 0 | Status: DISCONTINUED | OUTPATIENT
Start: 2024-02-14 | End: 2024-02-19

## 2024-02-14 RX ORDER — SENNA PLUS 8.6 MG/1
2 TABLET ORAL AT BEDTIME
Refills: 0 | Status: DISCONTINUED | OUTPATIENT
Start: 2024-02-14 | End: 2024-02-19

## 2024-02-14 RX ORDER — HYDRALAZINE HCL 50 MG
10 TABLET ORAL ONCE
Refills: 0 | Status: COMPLETED | OUTPATIENT
Start: 2024-02-14 | End: 2024-02-14

## 2024-02-14 RX ORDER — METRONIDAZOLE 500 MG
500 TABLET ORAL EVERY 12 HOURS
Refills: 0 | Status: DISCONTINUED | OUTPATIENT
Start: 2024-02-15 | End: 2024-02-15

## 2024-02-14 RX ORDER — FOLIC ACID 0.8 MG
1 TABLET ORAL DAILY
Refills: 0 | Status: DISCONTINUED | OUTPATIENT
Start: 2024-02-14 | End: 2024-02-19

## 2024-02-14 RX ORDER — CHOLECALCIFEROL (VITAMIN D3) 125 MCG
2000 CAPSULE ORAL DAILY
Refills: 0 | Status: DISCONTINUED | OUTPATIENT
Start: 2024-02-14 | End: 2024-02-19

## 2024-02-14 RX ORDER — SACCHAROMYCES BOULARDII 250 MG
250 POWDER IN PACKET (EA) ORAL
Refills: 0 | Status: DISCONTINUED | OUTPATIENT
Start: 2024-02-14 | End: 2024-02-19

## 2024-02-14 RX ORDER — KETOROLAC TROMETHAMINE 30 MG/ML
15 SYRINGE (ML) INJECTION EVERY 6 HOURS
Refills: 0 | Status: DISCONTINUED | OUTPATIENT
Start: 2024-02-14 | End: 2024-02-16

## 2024-02-14 RX ORDER — CEFTRIAXONE 500 MG/1
1000 INJECTION, POWDER, FOR SOLUTION INTRAMUSCULAR; INTRAVENOUS EVERY 24 HOURS
Refills: 0 | Status: DISCONTINUED | OUTPATIENT
Start: 2024-02-14 | End: 2024-02-15

## 2024-02-14 RX ORDER — METRONIDAZOLE 500 MG
TABLET ORAL
Refills: 0 | Status: DISCONTINUED | OUTPATIENT
Start: 2024-02-14 | End: 2024-02-15

## 2024-02-14 RX ORDER — ATORVASTATIN CALCIUM 80 MG/1
40 TABLET, FILM COATED ORAL AT BEDTIME
Refills: 0 | Status: DISCONTINUED | OUTPATIENT
Start: 2024-02-14 | End: 2024-02-19

## 2024-02-14 RX ADMIN — Medication 15 MILLIGRAM(S): at 07:15

## 2024-02-14 RX ADMIN — CHLORHEXIDINE GLUCONATE 1 APPLICATION(S): 213 SOLUTION TOPICAL at 17:14

## 2024-02-14 RX ADMIN — Medication 250 MILLIGRAM(S): at 17:11

## 2024-02-14 RX ADMIN — Medication 15 MILLIGRAM(S): at 06:24

## 2024-02-14 RX ADMIN — ENOXAPARIN SODIUM 40 MILLIGRAM(S): 100 INJECTION SUBCUTANEOUS at 17:12

## 2024-02-14 RX ADMIN — ATORVASTATIN CALCIUM 40 MILLIGRAM(S): 80 TABLET, FILM COATED ORAL at 21:49

## 2024-02-14 RX ADMIN — Medication 100 MILLIGRAM(S): at 14:26

## 2024-02-14 RX ADMIN — Medication 25 MILLIGRAM(S): at 06:25

## 2024-02-14 RX ADMIN — SODIUM CHLORIDE 50 MILLILITER(S): 9 INJECTION INTRAMUSCULAR; INTRAVENOUS; SUBCUTANEOUS at 06:50

## 2024-02-14 RX ADMIN — Medication 25 GRAM(S): at 19:46

## 2024-02-14 RX ADMIN — CEFTRIAXONE 100 MILLIGRAM(S): 500 INJECTION, POWDER, FOR SOLUTION INTRAMUSCULAR; INTRAVENOUS at 14:26

## 2024-02-14 RX ADMIN — SODIUM CHLORIDE 1 GRAM(S): 9 INJECTION INTRAMUSCULAR; INTRAVENOUS; SUBCUTANEOUS at 17:12

## 2024-02-14 RX ADMIN — Medication 250 MILLIGRAM(S): at 06:25

## 2024-02-14 RX ADMIN — Medication 15 MILLIGRAM(S): at 17:11

## 2024-02-14 RX ADMIN — DAPTOMYCIN 130 MILLIGRAM(S): 500 INJECTION, POWDER, LYOPHILIZED, FOR SOLUTION INTRAVENOUS at 21:47

## 2024-02-14 RX ADMIN — SODIUM CHLORIDE 1 GRAM(S): 9 INJECTION INTRAMUSCULAR; INTRAVENOUS; SUBCUTANEOUS at 06:27

## 2024-02-14 RX ADMIN — SENNA PLUS 2 TABLET(S): 8.6 TABLET ORAL at 21:48

## 2024-02-14 RX ADMIN — SODIUM CHLORIDE 50 MILLILITER(S): 9 INJECTION INTRAMUSCULAR; INTRAVENOUS; SUBCUTANEOUS at 14:27

## 2024-02-14 RX ADMIN — FAMOTIDINE 20 MILLIGRAM(S): 10 INJECTION INTRAVENOUS at 06:24

## 2024-02-14 RX ADMIN — Medication 100 MILLIGRAM(S): at 06:24

## 2024-02-14 RX ADMIN — Medication 15 MILLIGRAM(S): at 17:41

## 2024-02-14 RX ADMIN — Medication 15 MILLIGRAM(S): at 00:46

## 2024-02-14 RX ADMIN — FAMOTIDINE 20 MILLIGRAM(S): 10 INJECTION INTRAVENOUS at 17:11

## 2024-02-14 NOTE — BRIEF OPERATIVE NOTE - NSICDXBRIEFPOSTOP_GEN_ALL_CORE_FT
POST-OP DIAGNOSIS:  Wound 08-Feb-2024 19:28:14  Moe Barone  
POST-OP DIAGNOSIS:  Wound 08-Feb-2024 19:28:14  Moe Barone

## 2024-02-14 NOTE — PROGRESS NOTE ADULT - SUBJECTIVE AND OBJECTIVE BOX
GIAN MELISSA  83y, Female    All available historical data reviewed    OVERNIGHT EVENTS:  no fevers      ROS:  General: Denies rigors, nightsweats  HEENT: Denies headache, rhinorrhea, sore throat, eye pain  CV: Denies CP, palpitations  PULM: Denies wheezing, hemoptysis  GI: Denies hematemesis, hematochezia, melena  : Denies discharge, hematuria  MSK: Denies arthralgias, myalgias  SKIN: Denies rash, lesions  NEURO: Denies paresthesias, weakness  PSYCH: Denies depression, anxiety    VITALS:  T(F): 98, Max: 99.4 (02-13-24 @ 15:25)  HR: 77  BP: 179/72  RR: 20Vital Signs Last 24 Hrs  T(C): 36.7 (14 Feb 2024 12:32), Max: 37.4 (13 Feb 2024 15:25)  T(F): 98 (14 Feb 2024 12:05), Max: 99.4 (13 Feb 2024 15:25)  HR: 77 (14 Feb 2024 12:32) (65 - 77)  BP: 179/72 (14 Feb 2024 12:32) (148/67 - 184/74)  BP(mean): 102 (14 Feb 2024 12:32) (102 - 107)  RR: 20 (14 Feb 2024 12:32) (18 - 22)  SpO2: 97% (14 Feb 2024 12:05) (93% - 97%)    Parameters below as of 14 Feb 2024 12:05  Patient On (Oxygen Delivery Method): nasal cannula  O2 Flow (L/min): 2      TESTS & MEASUREMENTS:                        10.9   11.21 )-----------( 309      ( 13 Feb 2024 16:36 )             32.2     02-13    130<L>  |  92<L>  |  18  ----------------------------<  119<H>  3.5   |  26  |  0.7    Ca    8.5      13 Feb 2024 16:36  Phos  3.2     02-13  Mg     1.6     02-13          Culture - Other (collected 02-12-24 @ 12:24)  Source: .Other left foot  Preliminary Report (02-13-24 @ 16:15):    No growth    Culture - Blood (collected 02-11-24 @ 11:45)  Source: .Blood None  Preliminary Report (02-13-24 @ 20:01):    No growth at 48 Hours    Culture - Blood (collected 02-10-24 @ 16:45)  Source: .Blood Blood-Peripheral  Preliminary Report (02-14-24 @ 01:01):    No growth at 72 Hours    Culture - Blood (collected 02-10-24 @ 16:45)  Source: .Blood Blood-Peripheral  Preliminary Report (02-14-24 @ 01:01):    No growth at 72 Hours    Culture - Other (collected 02-09-24 @ 20:08)  Source: Wound Wound  Final Report (02-11-24 @ 14:08):    Few Methicillin Resistant Staphylococcus aureus  Organism: Methicillin resistant Staphylococcus aureus (02-11-24 @ 14:08)  Organism: Methicillin resistant Staphylococcus aureus (02-11-24 @ 14:08)      Method Type: GILBERT      -  Ampicillin/Sulbactam: R <=8/4      -  Cefazolin: R <=4      -  Clindamycin: R >4      -  Daptomycin: S <=0.25      -  Erythromycin: R >4      -  Gentamicin: S <=1 Should not be used as monotherapy      -  Linezolid: S 2      -  Oxacillin: R >2      -  Penicillin: R >8      -  Rifampin: S <=1 Should not be used as monotherapy      -  Tetracycline: S <=1      -  Trimethoprim/Sulfamethoxazole: S <=0.5/9.5      -  Vancomycin: S 2    Culture - Urine (collected 02-09-24 @ 20:08)  Source: Clean Catch Clean Catch (Midstream)  Final Report (02-10-24 @ 23:42):    No growth    Culture - Blood (collected 02-09-24 @ 07:30)  Source: .Blood None  Final Report (02-14-24 @ 14:00):    No growth at 5 days    Culture - Blood (collected 02-09-24 @ 01:16)  Source: .Blood None  Gram Stain (02-10-24 @ 13:35):    Growth in anaerobic bottle: Gram Positive Cocci in Clusters  Final Report (02-11-24 @ 11:04):    Growth in anaerobic bottle: Methicillin Resistant Staphylococcus aureus    See previous culture [id number]    Culture - Surgical Swab (collected 02-08-24 @ 15:15)  Source: .Surgical Swab None  Final Report (02-14-24 @ 07:01):    Few Methicillin Resistant Staphylococcus aureus  Organism: Methicillin resistant Staphylococcus aureus (02-14-24 @ 07:01)  Organism: Methicillin resistant Staphylococcus aureus (02-14-24 @ 07:01)      Method Type: GILBERT      -  Ampicillin/Sulbactam: R 16/8      -  Cefazolin: R <=4      -  Clindamycin: R >4      -  Daptomycin: S 0.5      -  Erythromycin: R >4      -  Gentamicin: S <=1 Should not be used as monotherapy      -  Linezolid: S 2      -  Oxacillin: R >2      -  Penicillin: R >8      -  Rifampin: S <=1 Should not be used as monotherapy      -  Tetracycline: S <=1      -  Trimethoprim/Sulfamethoxazole: S <=0.5/9.5      -  Vancomycin: S 2    Culture - Fungal, Other (collected 02-08-24 @ 15:15)  Source: .Other None  Preliminary Report (02-10-24 @ 15:04):    Culture is being performed. Fungal cultures are held for 4 weeks.    Culture - Acid Fast - Other w/Smear (collected 02-08-24 @ 15:15)  Source: .Other None  Preliminary Report (02-10-24 @ 15:10):    Culture is being performed.    Culture - Fungal, Other (collected 02-08-24 @ 15:15)  Source: .Other None  Preliminary Report (02-10-24 @ 15:04):    Culture is being performed. Fungal cultures are held for 4 weeks.    Culture - Acid Fast - Other w/Smear (collected 02-08-24 @ 15:15)  Source: .Other None  Preliminary Report (02-10-24 @ 15:10):    Culture is being performed.    Culture - Surgical Swab (collected 02-08-24 @ 15:15)  Source: .Surgical Swab None  Final Report (02-14-24 @ 07:38):    Numerous Methicillin Resistant Staphylococcus aureus  Organism: Methicillin resistant Staphylococcus aureus (02-14-24 @ 07:38)  Organism: Methicillin resistant Staphylococcus aureus (02-14-24 @ 07:38)      Method Type: GILBERT      -  Ampicillin/Sulbactam: R 16/8      -  Cefazolin: R <=4      -  Clindamycin: R >4      -  Daptomycin: S 0.5      -  Erythromycin: R >4      -  Gentamicin: S <=1 Should not be used as monotherapy      -  Linezolid: S 2      -  Oxacillin: R >2      -  Penicillin: R >8      -  Rifampin: S <=1 Should not be used as monotherapy      -  Tetracycline: S <=1      -  Trimethoprim/Sulfamethoxazole: S <=0.5/9.5      -  Vancomycin: S 1    Culture - Fungal, Tissue (collected 02-08-24 @ 15:15)  Source: .Tissue None  Preliminary Report (02-10-24 @ 15:04):    Culture is being performed. Fungal cultures are held for 4 weeks.    Culture - Acid Fast - Tissue w/Smear (collected 02-08-24 @ 15:15)  Source: .Tissue None  Preliminary Report (02-10-24 @ 15:10):    Culture is being performed.    Culture - Tissue with Gram Stain (collected 02-08-24 @ 15:15)  Source: .Tissue None  Gram Stain (02-10-24 @ 16:01):    No polymorphonuclear cells seen per low power field    No organisms seen per oil power field  Final Report (02-13-24 @ 16:04):    Rare Methicillin Resistant Staphylococcus aureus  Organism: Methicillin resistant Staphylococcus aureus (02-13-24 @ 16:04)  Organism: Methicillin resistant Staphylococcus aureus (02-13-24 @ 16:04)      Method Type: GILBERT      -  Ampicillin/Sulbactam: R <=8/4      -  Cefazolin: R <=4      -  Clindamycin: R >4      -  Daptomycin: S 0.5      -  Erythromycin: R >4      -  Gentamicin: S <=1 Should not be used as monotherapy      -  Linezolid: S 1      -  Oxacillin: R >2      -  Penicillin: R >8      -  Rifampin: S <=1 Should not be used as monotherapy      -  Tetracycline: S <=1      -  Trimethoprim/Sulfamethoxazole: S <=0.5/9.5      -  Vancomycin: S 2    Culture - Other (collected 02-08-24 @ 13:29)  Source: .Other left foot wound  Final Report (02-10-24 @ 14:47):    Numerous Methicillin Resistant Staphylococcus aureus  Organism: Methicillin resistant Staphylococcus aureus (02-10-24 @ 14:47)  Organism: Methicillin resistant Staphylococcus aureus (02-10-24 @ 14:47)      Method Type: GILBERT      -  Ampicillin/Sulbactam: R <=8/4      -  Cefazolin: R <=4      -  Clindamycin: R >4      -  Daptomycin: S <=0.25      -  Erythromycin: R >4      -  Gentamicin: S <=1 Should not be used as monotherapy      -  Linezolid: S 2      -  Oxacillin: R >2      -  Penicillin: R >8      -  Rifampin: S <=1 Should not be used as monotherapy      -  Tetracycline: S <=1      -  Trimethoprim/Sulfamethoxazole: S <=0.5/9.5      -  Vancomycin: S 2    Culture - Blood (collected 02-07-24 @ 18:39)  Source: .Blood Blood-Peripheral  Gram Stain (02-08-24 @ 17:17):    Growth in aerobic bottle: Gram Positive Cocci in Clusters    Growth in anaerobic bottle: Gram Positive Cocci in Clusters  Final Report (02-10-24 @ 07:41):    Growth in aerobic and anaerobic bottles: Methicillin Resistant    Staphylococcus aureus    See previous culture [id number]    Culture - Blood (collected 02-07-24 @ 18:39)  Source: .Blood Blood-Peripheral  Gram Stain (02-08-24 @ 15:14):    Growth in aerobic bottle: Gram Positive Cocci in Clusters    Growth in anaerobic bottle: Gram Positive Cocci in Clusters  Final Report (02-10-24 @ 07:40):    Growth in aerobic and anaerobic bottles: Methicillin Resistant    Staphylococcus aureus    Direct identification is available within approximately 3-5    hours either by Blood Panel Multiplexed PCR or Direct    MALDI-TOF. Details: [email or web address]  Organism: Blood Culture PCR  Methicillin resistant Staphylococcus aureus (02-10-24 @ 07:40)  Organism: Methicillin resistant Staphylococcus aureus (02-10-24 @ 07:40)      Method Type: GILBERT      -  Ampicillin/Sulbactam: R <=8/4      -  Cefazolin: R <=4      -  Clindamycin: R >4      -  Daptomycin: S <=0.25      -  Erythromycin: R >4      -  Gentamicin: S <=1 Should not be used as monotherapy      -  Linezolid: S 1      -  Oxacillin: R >2      -  Penicillin: R >8      -  Rifampin: S <=1 Should not be used as monotherapy      -  Tetracycline: S <=1      -  Trimethoprim/Sulfamethoxazole: S <=0.5/9.5      -  Vancomycin: S 1  Organism: Blood Culture PCR (02-10-24 @ 07:40)      Method Type: PCR      -  Methicillin resistant Staphylococcus aureus (MRSA): Detec      Urinalysis Basic - ( 13 Feb 2024 16:36 )    Color: x / Appearance: x / SG: x / pH: x  Gluc: 119 mg/dL / Ketone: x  / Bili: x / Urobili: x   Blood: x / Protein: x / Nitrite: x   Leuk Esterase: x / RBC: x / WBC x   Sq Epi: x / Non Sq Epi: x / Bacteria: x          RADIOLOGY & ADDITIONAL TESTS:  Personal review of radiological diagnostics performed  Echo and EKG results noted when applicable.     MEDICATIONS:      ANTIBIOTICS:

## 2024-02-14 NOTE — PROGRESS NOTE ADULT - SUBJECTIVE AND OBJECTIVE BOX
Patient is a 83y old  Female who presents with a chief complaint of Left foot wound.    AM Rounds  No acute events overnight.   Pt afebrile  Patient seen at bedside. Dressing change performed. Patient tolerated well.         INTERVAL HPI/OVERNIGHT EVENTS:  ICU Vital Signs Last 24 Hrs  T(C): 37.2 (14 Feb 2024 07:10), Max: 37.4 (13 Feb 2024 15:25)  T(F): 98.9 (14 Feb 2024 07:10), Max: 99.4 (13 Feb 2024 15:25)  HR: 65 (14 Feb 2024 07:10) (65 - 75)  BP: 167/71 (14 Feb 2024 07:10) (122/60 - 184/74)  BP(mean): 102 (14 Feb 2024 07:10) (86 - 107)  RR: 18 (14 Feb 2024 07:10) (18 - 22)  SpO2: 93% (14 Feb 2024 08:00) (92% - 96%)    O2 Parameters below as of 14 Feb 2024 08:00  Patient On (Oxygen Delivery Method): room air          I&O's Summary    13 Feb 2024 07:01  -  14 Feb 2024 07:00  --------------------------------------------------------  IN: 265 mL / OUT: 800 mL / NET: -535 mL          LABS:                        10.9   11.21 )-----------( 309      ( 13 Feb 2024 16:36 )             32.2     02-13    130<L>  |  92<L>  |  18  ----------------------------<  119<H>  3.5   |  26  |  0.7    Ca    8.5      13 Feb 2024 16:36  Phos  3.2     02-13  Mg     1.6     02-13    Culture - Other (02.12.24 @ 12:24)    Specimen Source: .Other left foot   Culture Results:   No growth    Culture - Blood (02.11.24 @ 11:45)    Specimen Source: .Blood None   Culture Results:   No growth at 48 Hours    CT of foot 2/10  IMPRESSION:    Wound/ulcer at the dorsal medial aspect of the midfoot, with a sinus   tract/direct extension into the first tarsometatarsal joint, and foci of   air in the first tarsometatarsal joint, findings highly suspicious for   septic arthritis.    1.3 cm focal subcutaneous fluid collection with mild rim enhancement   adjacent to the ulcer, indicative of a small abscess.      Consultant(s) Notes Reviewed:  [x ] YES  [ ] NO    MEDICATIONS  (STANDING):  aspirin  chewable 81 milliGRAM(s) Oral daily  atorvastatin 40 milliGRAM(s) Oral at bedtime  cefTRIAXone   IVPB 1000 milliGRAM(s) IV Intermittent every 24 hours  cefTRIAXone   IVPB      chlorhexidine 4% Liquid 1 Application(s) Topical two times a day  cholecalciferol 2000 Unit(s) Oral daily  DAPTOmycin IVPB      DAPTOmycin IVPB 750 milliGRAM(s) IV Intermittent every 24 hours  enoxaparin Injectable 40 milliGRAM(s) SubCutaneous every 24 hours  famotidine    Tablet 20 milliGRAM(s) Oral two times a day  folic acid 1 milliGRAM(s) Oral daily  hydrochlorothiazide 50 milliGRAM(s) Oral daily  ketorolac   Injectable 15 milliGRAM(s) IV Push every 6 hours  metoprolol succinate ER 25 milliGRAM(s) Oral daily  metroNIDAZOLE  IVPB      metroNIDAZOLE  IVPB 500 milliGRAM(s) IV Intermittent every 12 hours  saccharomyces boulardii 250 milliGRAM(s) Oral two times a day  senna 2 Tablet(s) Oral at bedtime  sertraline 50 milliGRAM(s) Oral daily  sodium chloride 1 Gram(s) Oral two times a day  sodium chloride 0.9%. 1000 milliLiter(s) (50 mL/Hr) IV Continuous <Continuous>    MEDICATIONS  (PRN):  acetaminophen     Tablet .. 650 milliGRAM(s) Oral every 6 hours PRN Temp greater or equal to 38C (100.4F), Mild Pain (1 - 3)  oxycodone    5 mG/acetaminophen 325 mG 1 Tablet(s) Oral every 6 hours PRN Moderate Pain (4 - 6)      PHYSICAL EXAM:  GENERAL: Lying comfortably in bed in NAD.  NERVOUS SYSTEM:  Alert & Oriented X3  CHEST/LUNG: Breathing comfortably on RA, b/l chest rise appreciated  HEART: In no cardiopulmonary distress  Wound: Left foot dorsal aspect with full thickness wound pink and moist with some yellow necrotic tissue with serous/yellow drainage noted. Mild surrounding erythema, improved. No active bleeding or malodor noted.    Dressing changed    Care Discussed with Consultants/Other Providers [ x] YES  [ ] NO Patient is a 83y old  Female who presents with a chief complaint of Left foot wound.  POD#6 s/p debridement of left foot    AM Rounds  Plan for OR today  No acute events overnight.   Pt afebrile  Patient seen at bedside. Dressing change performed. Patient tolerated well.         INTERVAL HPI/OVERNIGHT EVENTS:  ICU Vital Signs Last 24 Hrs  T(C): 37.2 (14 Feb 2024 07:10), Max: 37.4 (13 Feb 2024 15:25)  T(F): 98.9 (14 Feb 2024 07:10), Max: 99.4 (13 Feb 2024 15:25)  HR: 65 (14 Feb 2024 07:10) (65 - 75)  BP: 167/71 (14 Feb 2024 07:10) (122/60 - 184/74)  BP(mean): 102 (14 Feb 2024 07:10) (86 - 107)  RR: 18 (14 Feb 2024 07:10) (18 - 22)  SpO2: 93% (14 Feb 2024 08:00) (92% - 96%)    O2 Parameters below as of 14 Feb 2024 08:00  Patient On (Oxygen Delivery Method): room air          I&O's Summary    13 Feb 2024 07:01  -  14 Feb 2024 07:00  --------------------------------------------------------  IN: 265 mL / OUT: 800 mL / NET: -535 mL          LABS:                        10.9   11.21 )-----------( 309      ( 13 Feb 2024 16:36 )             32.2     02-13    130<L>  |  92<L>  |  18  ----------------------------<  119<H>  3.5   |  26  |  0.7    Ca    8.5      13 Feb 2024 16:36  Phos  3.2     02-13  Mg     1.6     02-13    Culture - Other (02.12.24 @ 12:24)    Specimen Source: .Other left foot   Culture Results:   No growth    Culture - Blood (02.11.24 @ 11:45)    Specimen Source: .Blood None   Culture Results:   No growth at 48 Hours    CT of foot 2/10  IMPRESSION:    Wound/ulcer at the dorsal medial aspect of the midfoot, with a sinus   tract/direct extension into the first tarsometatarsal joint, and foci of   air in the first tarsometatarsal joint, findings highly suspicious for   septic arthritis.    1.3 cm focal subcutaneous fluid collection with mild rim enhancement   adjacent to the ulcer, indicative of a small abscess.      Consultant(s) Notes Reviewed:  [x ] YES  [ ] NO    MEDICATIONS  (STANDING):  aspirin  chewable 81 milliGRAM(s) Oral daily  atorvastatin 40 milliGRAM(s) Oral at bedtime  cefTRIAXone   IVPB 1000 milliGRAM(s) IV Intermittent every 24 hours  cefTRIAXone   IVPB      chlorhexidine 4% Liquid 1 Application(s) Topical two times a day  cholecalciferol 2000 Unit(s) Oral daily  DAPTOmycin IVPB      DAPTOmycin IVPB 750 milliGRAM(s) IV Intermittent every 24 hours  enoxaparin Injectable 40 milliGRAM(s) SubCutaneous every 24 hours  famotidine    Tablet 20 milliGRAM(s) Oral two times a day  folic acid 1 milliGRAM(s) Oral daily  hydrochlorothiazide 50 milliGRAM(s) Oral daily  ketorolac   Injectable 15 milliGRAM(s) IV Push every 6 hours  metoprolol succinate ER 25 milliGRAM(s) Oral daily  metroNIDAZOLE  IVPB      metroNIDAZOLE  IVPB 500 milliGRAM(s) IV Intermittent every 12 hours  saccharomyces boulardii 250 milliGRAM(s) Oral two times a day  senna 2 Tablet(s) Oral at bedtime  sertraline 50 milliGRAM(s) Oral daily  sodium chloride 1 Gram(s) Oral two times a day  sodium chloride 0.9%. 1000 milliLiter(s) (50 mL/Hr) IV Continuous <Continuous>    MEDICATIONS  (PRN):  acetaminophen     Tablet .. 650 milliGRAM(s) Oral every 6 hours PRN Temp greater or equal to 38C (100.4F), Mild Pain (1 - 3)  oxycodone    5 mG/acetaminophen 325 mG 1 Tablet(s) Oral every 6 hours PRN Moderate Pain (4 - 6)      PHYSICAL EXAM:  GENERAL: Lying comfortably in bed in NAD.  NERVOUS SYSTEM:  Alert & Oriented X3  CHEST/LUNG: Breathing comfortably on RA, b/l chest rise appreciated  HEART: In no cardiopulmonary distress  SKIN: Left foot dorsal aspect with full thickness wound pink and moist with some yellow necrotic tissue with serous/yellow drainage noted. Mild surrounding erythema, improved. No active bleeding or malodor noted.    Dressing changed    Care Discussed with Consultants/Other Providers [ x] YES  [ ] NO

## 2024-02-14 NOTE — BRIEF OPERATIVE NOTE - NSICDXBRIEFPROCEDURE_GEN_ALL_CORE_FT
PROCEDURES:  Selective debridement 08-Feb-2024 19:24:07 excisional debridement with scalpel and drainage left foot, bone biopsy, bone culture Moe Barone  Selective debridement 14-Feb-2024 16:51:16 excisional debridement with scalpel , wound vac left foot Moe Barone  
PROCEDURES:  Selective debridement 08-Feb-2024 19:24:07 excisional debridement with scalpel and drainage left foot, bone biopsy, bone culture Moe Barone

## 2024-02-14 NOTE — BRIEF OPERATIVE NOTE - NSICDXBRIEFPREOP_GEN_ALL_CORE_FT
PRE-OP DIAGNOSIS:  Wound 08-Feb-2024 19:27:58  Moe Barone  
PRE-OP DIAGNOSIS:  Wound 08-Feb-2024 19:27:58  Moe Barone

## 2024-02-14 NOTE — PROGRESS NOTE ADULT - ASSESSMENT
· Assessment	  83y F w/ hx of RA on methotrexate, Depression, GERD, HTN, HLD breast ca s/p lumpectomy in 2015 is presents to ED for left foot cellulitis. patient's daughter at beside states that visiting nurse saw her mother today and reported that wound of left foot looked cellulitic. Patient endorses chills, feeling feverish, and nausea. Patient had 1 episode of emesis in the ED. Denies chest pain, sob, numbness, tingling.     < from: CT Foot w/ IV Cont, Left (02.10.24 @ 21:56) >    Wound/ulcer at the dorsal medial aspect of the midfoot, with a sinus   tract/direct extension into the first tarsometatarsal joint, and foci of   air in the first tarsometatarsal joint, findings highly suspicious for   septic arthritis.    1.3 cm focal subcutaneous fluid collection with mild rim enhancement   adjacent to the ulcer, indicative of a small abscess.    < end of copied text >      IMPRESSION/RECOMMENDATIONS  Left foot with septic arthritis/abscess with ORSA  2/9 S/p debridement with WCx ORSA  2/9 BCx ORSA  2/10,11 BCX NG  No evidence of Gout/inflammatory synovitis    -ECHO  -Burn for further debridement today  -Daptomycin 750 mg iv q24h  -d/c vancomycin  -Rocephin 1 gm iv q24h  -Flagyl 500 mg iv q12h  -long term will d/c on vancomycin for circa 6 weeks from last Sx debridement  -  -Off loading to prevent pressure sores and preventive measures to avoid aspiration     COVID-19 with no active disease

## 2024-02-14 NOTE — PROGRESS NOTE ADULT - ASSESSMENT
Patient is a 83y old  Female who presents with a chief complaint of LLE infected wound; found to have MRSA bacteremia .     Full thickness wound of left foot; + MRSA   - 2/8: s/p debridement of left lower extremity  - IVF: IVL  - ABx per ID 2/13: -start Daptomycin 750 mg iv q24h-d/c vancomycin, Rocephin 1 gm iv q24h, Flagyl 500 mg iv q12h  - Pain management   - LWC: vashe wet to dry twice a day  - wcx 2/8: MRSA, 2/9: few MRSA, WCx 2/12: NG  - xray left foot 2/7 No radiographic evidence of osteomyelitis. No acute fracture or dislocation. 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  - CT left foot with IV contrast 2/11- Wound/ulcer at the dorsal medial aspect of the midfoot, with a sinus tract/direct extension into the first tarsometatarsal joint, and foci of air in the first tarsometatarsal joint, findings highly suspicious for septic arthritis. 1.3 cm focal subcutaneous fluid collection with mild rim enhancement adjacent to the ulcer, indicative of a small abscess.  -Podiatry c/s 2/12 placed would benefit from joint wash out with partial bone debridement,   -Plan for OR tomorrow Wed 2/14 for further debridement     Cards:   - Continue with home medications: HCTZ, metoprolol, rosuvastatin (atorvastatin inpatient)  - Monitor vital signs  - Dash/TLC diet  -2/12 patient c/o left sided CP- CXR left basilar opacity., EKG at baseline, CE w/ elevated trop noted 57, 53 46- cards recalled not ACS,  treat GERD. CP resolved   -Patient also hypertensive- hospitalist c/s placed 2/12- f/u recs  -Echo ordered 2/12- f/u results    Endo: RA  - Rheumatology c/s 2/9: - fu pathology to r/o presence of tophi.  no tophi in other areas - recommend xrays for right foot and bilateral hands (completed, f/u result)  - on methotrexate once a week and Remicade infusion once every 6 weeks  - hold meds for now until active infection has resolved and wound has healed.  - Methotrexate - holding for infection  - Xray hands 2/9 - No acute osseous abnormality. Severe bilateral hand osteoarthritis. No osseous erosion.  - xray right foot 2/9- No acute osseous abnormality. No osseous erosion. Diffuse osteoarthritis. Chronic appearing fracture at the second metatarsal neck with displacement and impaction.    GI: Hx of GERD   - Continue home medication: famotidine     ID:   #Left foot with abscess flexor aspect with surrounding cellulitis  #COVID-19 with moderate-severe illness  #MRSA bacteremia   - Blood cultures positive for MRSA - on contact isolation  - Blood cultures 2/7: Methicillin resistant Staphylococcus aureus (MRSA),   - Bcx 2/9 x2: #1 prelim gram + cocci clusters #2prelim NG --> f/u final  - Bcx 2/10x2 : NGTD  - WCx 2/8: MRSA, 2/9: few MRSA, WCx 2/12: NG  - ID c/s Dr. Kaur 2/9 -Vancomycin 1 gm iv q12h, -Vancomycin dose  based on AUC/GILBERT as per Pharm ID recommendations, Rocephin 1 gm iv q24h, Flagyl 500 mg iv q12h, d/c cefepime. - mg iv on Day 1, then 100 mg iv D2 and D3.  - Vanco trough 2/10 4pm 8.1 --> per Pharm ID Vanco lowered to 750mg q12h. vanco troughs- f/u result & pharm ID for further dosing rec's   2/13: start Daptomycin 750 mg iv q24, d/c vancomycin, Rocephin 1 gm iv q24h. Flagyl 500 mg iv q12h. Completed RDV for COVID, d/c isolation    GI   - bowel regimen ; Miralax daily, lactuose x 1   - f/u BMs  - PPI px    Renal/  - monitor electrolytes; replete prn    - hypomagnesemia, magnesium   - cr at baseline 0.6     Psych: Hx of depression  -Continue Zoloft    Miscellaneous  - Ambulate as tolerated  - DVT/GI ppx  - PT / OT  Plan discussed with pt and she is in agreement

## 2024-02-15 ENCOUNTER — APPOINTMENT (OUTPATIENT)
Dept: BURN CARE | Facility: CLINIC | Age: 84
End: 2024-02-15

## 2024-02-15 ENCOUNTER — TRANSCRIPTION ENCOUNTER (OUTPATIENT)
Age: 84
End: 2024-02-15

## 2024-02-15 LAB
ANION GAP SERPL CALC-SCNC: 12 MMOL/L — SIGNIFICANT CHANGE UP (ref 7–14)
BASOPHILS # BLD AUTO: 0.04 K/UL — SIGNIFICANT CHANGE UP (ref 0–0.2)
BASOPHILS NFR BLD AUTO: 0.4 % — SIGNIFICANT CHANGE UP (ref 0–1)
BUN SERPL-MCNC: 18 MG/DL — SIGNIFICANT CHANGE UP (ref 10–20)
CALCIUM SERPL-MCNC: 8.5 MG/DL — SIGNIFICANT CHANGE UP (ref 8.4–10.5)
CHLORIDE SERPL-SCNC: 94 MMOL/L — LOW (ref 98–110)
CO2 SERPL-SCNC: 24 MMOL/L — SIGNIFICANT CHANGE UP (ref 17–32)
CREAT SERPL-MCNC: 0.7 MG/DL — SIGNIFICANT CHANGE UP (ref 0.7–1.5)
CRP SERPL-MCNC: 100.9 MG/L — HIGH
EGFR: 85 ML/MIN/1.73M2 — SIGNIFICANT CHANGE UP
EOSINOPHIL # BLD AUTO: 0.28 K/UL — SIGNIFICANT CHANGE UP (ref 0–0.7)
EOSINOPHIL NFR BLD AUTO: 2.9 % — SIGNIFICANT CHANGE UP (ref 0–8)
ERYTHROCYTE [SEDIMENTATION RATE] IN BLOOD: 81 MM/HR — HIGH (ref 0–20)
GLUCOSE SERPL-MCNC: 110 MG/DL — HIGH (ref 70–99)
HCT VFR BLD CALC: 32.7 % — LOW (ref 37–47)
HGB BLD-MCNC: 10.9 G/DL — LOW (ref 12–16)
IMM GRANULOCYTES NFR BLD AUTO: 2.7 % — HIGH (ref 0.1–0.3)
LYMPHOCYTES # BLD AUTO: 1.16 K/UL — LOW (ref 1.2–3.4)
LYMPHOCYTES # BLD AUTO: 12.1 % — LOW (ref 20.5–51.1)
MAGNESIUM SERPL-MCNC: 1.9 MG/DL — SIGNIFICANT CHANGE UP (ref 1.8–2.4)
MCHC RBC-ENTMCNC: 31.3 PG — HIGH (ref 27–31)
MCHC RBC-ENTMCNC: 33.3 G/DL — SIGNIFICANT CHANGE UP (ref 32–37)
MCV RBC AUTO: 94 FL — SIGNIFICANT CHANGE UP (ref 81–99)
MONOCYTES # BLD AUTO: 0.77 K/UL — HIGH (ref 0.1–0.6)
MONOCYTES NFR BLD AUTO: 8 % — SIGNIFICANT CHANGE UP (ref 1.7–9.3)
NEUTROPHILS # BLD AUTO: 7.11 K/UL — HIGH (ref 1.4–6.5)
NEUTROPHILS NFR BLD AUTO: 73.9 % — SIGNIFICANT CHANGE UP (ref 42.2–75.2)
NIGHT BLUE STAIN TISS: SIGNIFICANT CHANGE UP
NRBC # BLD: 0 /100 WBCS — SIGNIFICANT CHANGE UP (ref 0–0)
NT-PROBNP SERPL-SCNC: 992 PG/ML — HIGH (ref 0–300)
PHOSPHATE SERPL-MCNC: 3.6 MG/DL — SIGNIFICANT CHANGE UP (ref 2.1–4.9)
PLATELET # BLD AUTO: 364 K/UL — SIGNIFICANT CHANGE UP (ref 130–400)
PMV BLD: 9.6 FL — SIGNIFICANT CHANGE UP (ref 7.4–10.4)
POTASSIUM SERPL-MCNC: 4.3 MMOL/L — SIGNIFICANT CHANGE UP (ref 3.5–5)
POTASSIUM SERPL-SCNC: 4.3 MMOL/L — SIGNIFICANT CHANGE UP (ref 3.5–5)
RBC # BLD: 3.48 M/UL — LOW (ref 4.2–5.4)
RBC # FLD: 15 % — HIGH (ref 11.5–14.5)
SODIUM SERPL-SCNC: 130 MMOL/L — LOW (ref 135–146)
SPECIMEN SOURCE: SIGNIFICANT CHANGE UP
WBC # BLD: 9.62 K/UL — SIGNIFICANT CHANGE UP (ref 4.8–10.8)
WBC # FLD AUTO: 9.62 K/UL — SIGNIFICANT CHANGE UP (ref 4.8–10.8)

## 2024-02-15 PROCEDURE — 99223 1ST HOSP IP/OBS HIGH 75: CPT

## 2024-02-15 RX ORDER — METOPROLOL TARTRATE 50 MG
25 TABLET ORAL ONCE
Refills: 0 | Status: COMPLETED | OUTPATIENT
Start: 2024-02-15 | End: 2024-02-15

## 2024-02-15 RX ORDER — ALBUTEROL 90 UG/1
1 AEROSOL, METERED ORAL EVERY 6 HOURS
Refills: 0 | Status: DISCONTINUED | OUTPATIENT
Start: 2024-02-15 | End: 2024-02-19

## 2024-02-15 RX ORDER — FUROSEMIDE 40 MG
20 TABLET ORAL ONCE
Refills: 0 | Status: COMPLETED | OUTPATIENT
Start: 2024-02-15 | End: 2024-02-15

## 2024-02-15 RX ADMIN — Medication 81 MILLIGRAM(S): at 11:43

## 2024-02-15 RX ADMIN — Medication 1 MILLIGRAM(S): at 11:43

## 2024-02-15 RX ADMIN — Medication 25 MILLIGRAM(S): at 05:44

## 2024-02-15 RX ADMIN — SODIUM CHLORIDE 1 GRAM(S): 9 INJECTION INTRAMUSCULAR; INTRAVENOUS; SUBCUTANEOUS at 17:31

## 2024-02-15 RX ADMIN — Medication 10 MILLIGRAM(S): at 00:33

## 2024-02-15 RX ADMIN — Medication 250 MILLIGRAM(S): at 17:30

## 2024-02-15 RX ADMIN — CHLORHEXIDINE GLUCONATE 1 APPLICATION(S): 213 SOLUTION TOPICAL at 05:43

## 2024-02-15 RX ADMIN — Medication 15 MILLIGRAM(S): at 17:34

## 2024-02-15 RX ADMIN — SERTRALINE 50 MILLIGRAM(S): 25 TABLET, FILM COATED ORAL at 11:44

## 2024-02-15 RX ADMIN — CHLORHEXIDINE GLUCONATE 1 APPLICATION(S): 213 SOLUTION TOPICAL at 17:30

## 2024-02-15 RX ADMIN — Medication 100 MILLIGRAM(S): at 06:00

## 2024-02-15 RX ADMIN — Medication 15 MILLIGRAM(S): at 11:46

## 2024-02-15 RX ADMIN — ENOXAPARIN SODIUM 40 MILLIGRAM(S): 100 INJECTION SUBCUTANEOUS at 17:35

## 2024-02-15 RX ADMIN — DAPTOMYCIN 130 MILLIGRAM(S): 500 INJECTION, POWDER, LYOPHILIZED, FOR SOLUTION INTRAVENOUS at 21:04

## 2024-02-15 RX ADMIN — Medication 2000 UNIT(S): at 11:44

## 2024-02-15 RX ADMIN — Medication 20 MILLIGRAM(S): at 12:36

## 2024-02-15 RX ADMIN — Medication 25 MILLIGRAM(S): at 23:54

## 2024-02-15 RX ADMIN — Medication 15 MILLIGRAM(S): at 05:44

## 2024-02-15 RX ADMIN — ATORVASTATIN CALCIUM 40 MILLIGRAM(S): 80 TABLET, FILM COATED ORAL at 21:04

## 2024-02-15 RX ADMIN — Medication 15 MILLIGRAM(S): at 23:32

## 2024-02-15 RX ADMIN — FAMOTIDINE 20 MILLIGRAM(S): 10 INJECTION INTRAVENOUS at 17:34

## 2024-02-15 RX ADMIN — SENNA PLUS 2 TABLET(S): 8.6 TABLET ORAL at 21:04

## 2024-02-15 RX ADMIN — SODIUM CHLORIDE 1 GRAM(S): 9 INJECTION INTRAMUSCULAR; INTRAVENOUS; SUBCUTANEOUS at 05:43

## 2024-02-15 RX ADMIN — Medication 250 MILLIGRAM(S): at 05:44

## 2024-02-15 RX ADMIN — FAMOTIDINE 20 MILLIGRAM(S): 10 INJECTION INTRAVENOUS at 05:43

## 2024-02-15 RX ADMIN — Medication 15 MILLIGRAM(S): at 06:45

## 2024-02-15 NOTE — PROGRESS NOTE ADULT - ASSESSMENT
Patient is a 83y old  Female with PMHx of HTN, HLD, GERD, RA on methotrexate, breast ca s/p lumpectomy in 2015, Depression, recent COVID 12/2023,   who presents with a chief complaint of LLE infected wound; found to have MRSA bacteremia .     # Full thickness wound of left foot; + MRSA   - xray left foot 2/7 No radiographic evidence of osteomyelitis. No acute fracture or dislocation. 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  - CT left foot with IV contrast 2/11- Wound/ulcer at the dorsal medial aspect of the midfoot, with a sinus tract/direct extension into the first tarsometatarsal joint, and foci of air in the first tarsometatarsal joint, findings highly suspicious for septic arthritis. 1.3 cm focal subcutaneous fluid collection with mild rim enhancement adjacent to the ulcer, indicative of a small abscess.  - Inpatient procedures:  2/8 s/p debr Left foot   2/15 s/p debr Left foot + NPWT  - continue wound vac therapy, change wound vac dressing every three days  - WCx 2/8: MRSA, WCx 2/9: few MRSA, WCx 2/12: NG  - as per ID initially started on Rocephin 1 gm iv q24h; Flagyl 500 mg iv q12h; Vancomycin 1 gm iv q12h, -> on 2/14 recommended to stop Vanco, and start  Daptomycin 750 mg iv q24h, and continue Rocephin 1 gm iv q24h, Flagyl 500 mg iv q12h,  - on discharge continue Daptomycin 750 mg iv q24h x 6 weeks - end date 3/27/24  - pt awaiting PICC line placement   - Pain management   - ambulate as tolerate     # Cards:   # Hx HTN and HLD  - vitals stable, continue to monitor   - Continue with home medications: HCTZ, metoprolol, rosuvastatin (atorvastatin inpatient)  -2/12 patient c/o left sided Chest pain: EKG at baseline, CE w/ elevated trop noted 57, 53, 46, CXR left basilar opacity -> as per cardiology consult not ACS,  treat GERD. CP resolved   - Echo 2/12: EF 54%  - 2/13 pt c/o of right sided chest pain, reproducible. worse with movement, probably  muscular, started Toradol 15mg IV q6h x 2 days, chest pain resolved;    - Dash/TLC diet    # Pulm:   # COVID-19 with moderate-severe illness  # Hypoxia post - op 2/14 - required 2L O2 NC  - as per Pulm c/s 2/15:  --> CXR noted with vascular congestion and left basilar opacity, present since 2/8   --> Lung POCUS showed small left pleural effusion and mildly increased L lung B lines. R lung POCUS no effusion.  --> Check BNP.  --> Give Lasix 20mg IV x1 now.   --> No window for thoracentesis right now.  --> COVID-19 positive since December 26, 2023, doubt active COVID infection causing hypoxia.   --> No need for steroids. Can do albuterol nebs PRN.   - Incentive spirometry.     # GI: Hx of GERD   - Continue home medication: famotidine   - bowel regimen ; Miralax daily, senna hs    # Renal/  - monitor electrolytes; replete prn    - cr at baseline 0.6     # Rheum: hx RA  - on methotrexate once a week and Remicade infusion once every 6 weeks  - hold meds for now until active infection has resolved and wound has healed.  - Rheumatology c/s 2/9: - fu pathology to r/o presence of tophi.  no tophi in other areas - recommend xrays :  - Xray hands 2/9 - No acute osseous abnormality. Severe bilateral hand osteoarthritis. No osseous erosion.  - xray right foot 2/9- No acute osseous abnormality. No osseous erosion. Diffuse osteoarthritis. Chronic appearing fracture at the second metatarsal neck with displacement and impaction.  - Methotrexate - holding for infection  - f/u outpatient     # ID:   #Left foot with abscess flexor aspect with surrounding cellulitis  #COVID-19 with moderate-severe illness  #MRSA bacteremia   - COVID 12/26/23: positive and COVID 2/7: positive,   - Bcx x2 2/7: MRSA  - BCx 2/9 x2: MRSA 2nd: NG@72hrs  - Bcx 2/10x2: NG@24hrs bcx 2/11 pending  - Wcx 12/24: moder Proteus mirabil   - WCx 2/8: few MRSA  - WCx 2/9: MRSA 2/9 NG Wcx 2/12: pending  - bone bx 2/8 chronic osteomyelitis  - as per ID initially started on Rocephin 1 gm iv q24h; Flagyl 500 mg iv q12h; Vancomycin 1 gm iv q12h, -> on 2/14 recommended to stop Vanco, and start  Daptomycin 750 mg iv q24h, and continue Rocephin 1 gm iv q24h, Flagyl 500 mg iv q12h,  - on discharge continue Daptomycin 750 mg iv q24h x 6 weeks - end date 3/27/24  - for COVID infection pt completed RDV 200mg IV daily 2/9-2/11, can take off isilation as per ID    # Psych: Hx of depression  - Continue Zoloft    # Miscellaneous  - Ambulate as tolerated  - DVT/GI ppx  - PT / OT  - SW for d/c planning   Plan discussed with pt and she is in agreement

## 2024-02-15 NOTE — CONSULT NOTE ADULT - ATTENDING COMMENTS
left dorsal foot ulcer with exposed bone  CT: 1.3 cm focal subcutaneous fluid collection with mild rim enhancement adjacent to the ulcer, indicative of a small abscess.  would benefit from joint wash out with partial bone debridement  will schedule for surgery sometime this week  discussed risks of surgery including pain, bleeding, infection, delayed wound healing
84 y/o woman with h/o RA admitted with a L foot infected wound s/p debridement on 2/8, rheumatology consulted for question of gout. Pt says that she has had long-standing b/l dorsal foot nodules on her feet, which would rub against her shoes. The nodule on her L foot turned into an open wound which became infected, requiring an admission to [facility] and debridement. During the debridement, the wound expressed white exudate, and the question of a tophus arose. Pt says she has not had any h/o gout flares and has never been diagnosed with gout. Her exam today does not demonstrate any e/o tophi and no clear e/o chronic gout. She does have a bony nodule on her dorsal R foot but it is not typical in appearance for a tophus. Pt also had a L foot x-ray prior to her debridement which did not demonstrate e/o tophi.   - f/u x-rays of b/l hands and R foot. If there is no e/o tophi or gouty erosions on pt's x-rays, no further evaluation is necessary at this point
Impression:    Acute hypoxemic respiratory failure on NC - improving  LLE septic arthritis with abscess SP debridement   ORSA bacteremia   COVID positive since December 2023   HO RA on methotrexate   Small left effusion with adjacent atelectasis    Impression and plan above have been altered and are my own.

## 2024-02-15 NOTE — CONSULT NOTE ADULT - SUBJECTIVE AND OBJECTIVE BOX
Patient is a 83y old  Female who presents with a chief complaint of LLE wound.    PAST MEDICAL & SURGICAL HISTORY:  - Rheumatoid arthritis  - HTN (hypertension)  - Breast cancer - last radiation 2015  - Depression  - Chronic GERD  - OA (osteoarthritis)  - Rheumatoid arthritis  - History of right hip replacement  - Status post left hip replacement  - S/P total knee replacement, left  - H/O vaginal hysterectomy  - Status post cholecystectomy  - S/P lumpectomy of breast, 2015    FAMILY HISTORY:  - FH: cancer  - Non-hodgkin's sister    Vital Signs Last 24 Hrs  T(C): 36.8 (07 Feb 2024 11:04), Max: 36.8 (07 Feb 2024 11:04)  T(F): 98.2 (07 Feb 2024 11:04), Max: 98.2 (07 Feb 2024 11:04)  HR: 82 (07 Feb 2024 11:04) (82 - 82)  BP: 146/86 (07 Feb 2024 11:04) (146/86 - 146/86)  RR: 19 (07 Feb 2024 11:04) (19 - 19)  SpO2: 99% (07 Feb 2024 11:04) (99% - 99%)    O2 Parameters below as of 07 Feb 2024 11:04  Patient On (Oxygen Delivery Method): room air    PHYSICAL EXAM:  GENERAL: Lying in bed   HEAD:  Atraumatic, Normocephalic  CHEST/LUNG: Dry productive cough, b/l chest rise appreciated  HEART: In no cardiopulmonary distress  PSYCH: Lethargic - delayed responses   SKIN:   LLE: Dorsal aspect of left foot with ~2cm x 2cm wound, swelling noted with erythema. Warm to touch. No active bleeding, purulence or drainage noted. 
Patient is a 83y old  Female who presents with a chief complaint of Left foot wound (09 Feb 2024 10:40)    HPI:  Patient is a 83y F w/ hx of RA on methotrexate, Depression, GERD, HTN, HLD breast ca s/p lumpectomy in 2015 is presents to ED for left foot cellulitis. patient's daughter at beside states that visiting nurse saw her mother today and reported that wound of left foot looked cellulitic. Patient endorses chills, feeling feverish, and nausea. Patient had 1 episode of emesis in the ED. Denies chest pain, sob, numbness, tingling.  (07 Feb 2024 18:10)    Additional Information:    REVIEW OF SYSTEMS:  All Review of systems negative, except what was mentioned in HPI    MEDICATIONS  (STANDING):  aspirin  chewable 81 milliGRAM(s) Oral daily  atorvastatin 40 milliGRAM(s) Oral at bedtime  cefepime   IVPB 1000 milliGRAM(s) IV Intermittent every 12 hours  chlorhexidine 4% Liquid 1 Application(s) Topical two times a day  cholecalciferol 2000 Unit(s) Oral daily  enoxaparin Injectable 40 milliGRAM(s) SubCutaneous every 24 hours  famotidine    Tablet 20 milliGRAM(s) Oral two times a day  folic acid 1 milliGRAM(s) Oral daily  hydrochlorothiazide 50 milliGRAM(s) Oral daily  metoprolol succinate ER 25 milliGRAM(s) Oral daily  saccharomyces boulardii 250 milliGRAM(s) Oral two times a day  senna 2 Tablet(s) Oral at bedtime  sertraline 50 milliGRAM(s) Oral daily  vancomycin  IVPB 1250 milliGRAM(s) IV Intermittent every 24 hours    MEDICATIONS  (PRN):  acetaminophen     Tablet .. 650 milliGRAM(s) Oral every 6 hours PRN Temp greater or equal to 38C (100.4F), Mild Pain (1 - 3)  morphine  - Injectable 2 milliGRAM(s) IV Push every 6 hours PRN Severe Pain (7 - 10)  morphine  - Injectable 2 milliGRAM(s) IV Push two times a day PRN wound care  oxycodone    5 mG/acetaminophen 325 mG 1 Tablet(s) Oral every 6 hours PRN Moderate Pain (4 - 6)  polyethylene glycol 3350 17 Gram(s) Oral once PRN Constipation    Allergies    No Known Allergies    Intolerances      PPD:  Vaccines:    PAST MEDICAL & SURGICAL HISTORY:  Rheumatoid arthritis      HTN (hypertension)      Breast cancer  last radiation 2015      Depression      Chronic GERD      OA (osteoarthritis)      Rheumatoid arthritis      History of right hip replacement      Status post left hip replacement      S/P total knee replacement, left      H/O vaginal hysterectomy      Status post cholecystectomy      S/P lumpectomy of breast  2015        Growth & Development:      SOCIAL HISTORY:  School Performance/Attendance:  [] Animal/Insect Exposure:    Vital Signs Last 24 Hrs  T(C): 37.3 (09 Feb 2024 08:20), Max: 38.6 (08 Feb 2024 20:00)  T(F): 99.2 (09 Feb 2024 08:20), Max: 101.5 (08 Feb 2024 20:00)  HR: 74 (09 Feb 2024 08:20) (67 - 93)  BP: 147/67 (09 Feb 2024 08:20) (134/63 - 177/76)  BP(mean): 98 (09 Feb 2024 05:01) (90 - 109)  RR: 18 (09 Feb 2024 08:20) (18 - 19)  SpO2: 98% (09 Feb 2024 05:01) (95% - 98%)    Parameters below as of 09 Feb 2024 05:01  Patient On (Oxygen Delivery Method): nasal cannula  O2 Flow (L/min): 2    Daily Height in cm: 172.7 (09 Feb 2024 04:12)    Daily     PHYSICAL EXAM:    GENERAL: NAD, lying in bed comfortably  HEAD:  Atraumatic, Normocephalic  CHEST/LUNG: Clear to auscultation bilaterally; No rales, rhonchi, wheezing, or rubs. Unlabored respirations  HEART: Regular rate and rhythm; No murmurs, rubs, or gallops  ABDOMEN: BSx4; Soft, nontender, nondistended  EXTREMITIES:  left leg wrapped      Lab Results:                        10.6   8.83  )-----------( 185      ( 09 Feb 2024 11:10 )             32.1     02-09    130<L>  |  91<L>  |  25<H>  ----------------------------<  134<H>  2.8<L>   |  27  |  0.7    Ca    8.5      09 Feb 2024 11:10  Phos  2.1     02-09  Mg     1.8     02-09        Urinalysis Basic - ( 09 Feb 2024 11:10 )    Color: x / Appearance: x / SG: x / pH: x  Gluc: 134 mg/dL / Ketone: x  / Bili: x / Urobili: x   Blood: x / Protein: x / Nitrite: x   Leuk Esterase: x / RBC: x / WBC x   Sq Epi: x / Non Sq Epi: x / Bacteria: x    
Patient is a 83y old  Female who presents with a chief complaint of Left foot wound (14 Feb 2024 14:10)      HPI:  Patient is a 83y F w/ hx of RA on methotrexate, Depression, GERD, HTN, HLD breast ca s/p lumpectomy in 2015 is presents to ED for left foot cellulitis. patient's daughter at beside states that visiting nurse saw her mother today and reported that wound of left foot looked cellulitic. Patient endorses chills, feeling feverish, and nausea. Patient had 1 episode of emesis in the ED. Denies chest pain, sob, numbness, tingling.  (07 Feb 2024 18:10)      PAST MEDICAL & SURGICAL HISTORY:  Rheumatoid arthritis      HTN (hypertension)      Breast cancer  last radiation 2015      Depression      Chronic GERD      OA (osteoarthritis)      Rheumatoid arthritis      History of right hip replacement      Status post left hip replacement      S/P total knee replacement, left      H/O vaginal hysterectomy      Status post cholecystectomy      S/P lumpectomy of breast  2015          SOCIAL HX:   Smoking          denies               ETOH                            Other    FAMILY HISTORY:  FH: cancer  Nonhodgkins : sister    .  No cardiovascular or pulmonary family history     REVIEW OF SYSTEMS:    All ROS are negative exept per HPI       Allergies    No Known Allergies    Intolerances          PHYSICAL EXAM  Vital Signs Last 24 Hrs  T(C): 36.2 (14 Feb 2024 23:25), Max: 36.7 (14 Feb 2024 12:05)  T(F): 97.2 (14 Feb 2024 23:25), Max: 98 (14 Feb 2024 12:05)  HR: 78 (15 Feb 2024 02:24) (70 - 84)  BP: 154/68 (15 Feb 2024 02:24) (128/60 - 197/79)  BP(mean): 96 (15 Feb 2024 02:24) (87 - 113)  RR: 21 (15 Feb 2024 02:24) (17 - 21)  SpO2: 93% (15 Feb 2024 02:24) (93% - 100%)    Parameters below as of 15 Feb 2024 02:24  Patient On (Oxygen Delivery Method): nasal cannula  O2 Flow (L/min): 2      CONSTITUTIONAL:  in NAD    ENT:   Airway patent,   No thrush    EYES:   Clear bilaterally,   pupils equal,   round and reactive to light.    CARDIAC:   Normal rate,   regular rhythm.    no edema    RESPIRATORY:   Decreased bilateral BS   Normal chest expansion  Not tachypneic,  No use of accessory muscles    GASTROINTESTINAL:  Abdomen soft, non-tender,   No guarding,   Positive BS    MUSCULOSKELETAL:   Range of motion is not limited,  LLE dressing c/d/i     NEUROLOGICAL:   Alert and oriented   No motor deficits.    SKIN:   Skin normal color for race,   No evidence of rash.    LABS:                          10.8   9.98  )-----------( 337      ( 14 Feb 2024 18:07 )             33.0                                               02-14    128<L>  |  91<L>  |  17  ----------------------------<  119<H>  4.0   |  25  |  0.7    Ca    8.2<L>      14 Feb 2024 18:07  Phos  3.4     02-14  Mg     1.7     02-14                                               Urinalysis Basic - ( 14 Feb 2024 18:07 )    Color: x / Appearance: x / SG: x / pH: x  Gluc: 119 mg/dL / Ketone: x  / Bili: x / Urobili: x   Blood: x / Protein: x / Nitrite: x   Leuk Esterase: x / RBC: x / WBC x   Sq Epi: x / Non Sq Epi: x / Bacteria: x                                                                                           Culture - Other (collected 12 Feb 2024 12:24)  Source: .Other left foot  Final Report (14 Feb 2024 15:08):    No growth at 48 hours                                                    MEDICATIONS  (STANDING):  aspirin  chewable 81 milliGRAM(s) Oral daily  atorvastatin 40 milliGRAM(s) Oral at bedtime  chlorhexidine 4% Liquid 1 Application(s) Topical two times a day  cholecalciferol 2000 Unit(s) Oral daily  DAPTOmycin IVPB 750 milliGRAM(s) IV Intermittent every 24 hours  enoxaparin Injectable 40 milliGRAM(s) SubCutaneous every 24 hours  famotidine    Tablet 20 milliGRAM(s) Oral two times a day  folic acid 1 milliGRAM(s) Oral daily  hydrochlorothiazide 50 milliGRAM(s) Oral daily  ketorolac   Injectable 15 milliGRAM(s) IV Push every 6 hours  metoprolol succinate ER 25 milliGRAM(s) Oral daily  saccharomyces boulardii 250 milliGRAM(s) Oral two times a day  senna 2 Tablet(s) Oral at bedtime  sertraline 50 milliGRAM(s) Oral daily  sodium chloride 1 Gram(s) Oral two times a day    MEDICATIONS  (PRN):  acetaminophen     Tablet .. 650 milliGRAM(s) Oral every 6 hours PRN Temp greater or equal to 38C (100.4F), Mild Pain (1 - 3)  oxycodone    5 mG/acetaminophen 325 mG 1 Tablet(s) Oral every 6 hours PRN Moderate Pain (4 - 6)      X-Rays reviewed:  
Podiatry Consult Note    Subjective:  GIAN MELISSA  Seen Bedside 83y Female  .   Patient is a 83y old  Female who presents with a chief complaint of Cellulitis     (12 Feb 2024 11:02)    HPI:  Patient is a 83y F w/ hx of RA on methotrexate, Depression, GERD, HTN, HLD breast ca s/p lumpectomy in 2015 is presents to ED for left foot cellulitis. patient's daughter at beside states that visiting nurse saw her mother today and reported that wound of left foot looked cellulitic. Patient endorses chills, feeling feverish, and nausea. Patient had 1 episode of emesis in the ED. Denies chest pain, sob, numbness, tingling.  (07 Feb 2024 18:10)      Past Medical History and Surgical History  PAST MEDICAL & SURGICAL HISTORY:  Rheumatoid arthritis      HTN (hypertension)      Breast cancer  last radiation 2015      Depression      Chronic GERD      OA (osteoarthritis)      Rheumatoid arthritis      History of right hip replacement      Status post left hip replacement      S/P total knee replacement, left      H/O vaginal hysterectomy      Status post cholecystectomy      S/P lumpectomy of breast  2015           Review of Systems:  [X] Ten point review of systems is otherwise negative except as noted     Objective:  Vital Signs Last 24 Hrs  T(C): 36.7 (12 Feb 2024 15:27), Max: 36.7 (12 Feb 2024 15:27)  T(F): 98 (12 Feb 2024 15:27), Max: 98 (12 Feb 2024 15:27)  HR: 84 (12 Feb 2024 16:25) (67 - 84)  BP: 187/74 (12 Feb 2024 16:25) (153/70 - 187/74)  BP(mean): 107 (12 Feb 2024 16:25) (101 - 124)  RR: 18 (12 Feb 2024 15:27) (18 - 20)  SpO2: 98% (12 Feb 2024 15:27) (94% - 98%)    Parameters below as of 12 Feb 2024 15:27  Patient On (Oxygen Delivery Method): room air                            11.2   8.04  )-----------( 252      ( 12 Feb 2024 11:59 )             33.4                 02-12    131<L>  |  95<L>  |  17  ----------------------------<  135<H>  4.0   |  18  |  0.6<L>    Ca    8.6      12 Feb 2024 11:59  Phos  2.5     02-12  Mg     1.7     02-12    X-ray R foot, 2/9/24  IMPRESSION:  No acute osseous abnormality. No osseous erosion.  Diffuse osteoarthritis.  Chronic appearing fracture at the second metatarsal neck with displacement and impaction.  X-ray, L foot 2/7/24  IMPRESSION:  No radiographic evidence of osteomyelitis. No acute fracture or dislocation. Moderate to severe midfoot arthrosis.  CT scan, L foot, 2/10/24  IMPRESSION:  Wound/ulcer at the dorsal medial aspect of the midfoot, with a sinus tract/direct extension into the first tarsometatarsal joint, and foci of air in the first tarsometatarsal joint, findings highly suspicious for septic arthritis.  1.3 cm focal subcutaneous fluid collection with mild rim enhancement adjacent to the ulcer, indicative of a small abscess.      Physical Exam - Lower Extremity Focused:   Derm:  Left foot: Full thickness dorsal midfoot wound probes deep to bone, fibrogranular base, mild serosanguinous drainage noted, surrounding edema and erythema. Nails thickened elongated.    Vascular: DP and PT Pulses palpable; Foot is Warm to Warm to the touch; Capillary Refill Time < 3 Seconds;    Neuro: Protective Sensation Diminished / Moderately Neuropathic   MSK: Mild Pain On Palpation. 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      Assessment:   - S/P debridement Left foot, DOS: 2/8/24  - Chronic 2nd met fracture, Right foot     Plan:  Chart reviewed and Patient evaluated. All Questions and Concerns Addressed and Answered  XR Imaging b/l Foot; Reviewed as above   Local Wound Care; Done with the burn team, wound washed with soap and water, vache WTD dressing, Kerlix, ACE  Weight Bearing Status; WBAT   Attending will round tomorrow am with the final plan     Discussed Plan w/ Dr. Shepherd     Podiatry 
  GIAN MELISSA  83y, Female  Allergy: No Known Allergies      All historical available data reviewed.    HPI:  Patient is a 83y F w/ hx of RA on methotrexate, Depression, GERD, HTN, HLD breast ca s/p lumpectomy in 2015 is presents to ED for left foot cellulitis. patient's daughter at beside states that visiting nurse saw her mother today and reported that wound of left foot looked cellulitic. Patient endorses chills, feeling feverish, and nausea. Patient had 1 episode of emesis in the ED. Denies chest pain, sob, numbness, tingling.  (07 Feb 2024 18:10)    FAMILY HISTORY:  FH: cancer  Nonhodgkins : sister      PAST MEDICAL & SURGICAL HISTORY:  Rheumatoid arthritis      HTN (hypertension)      Breast cancer  last radiation 2015      Depression      Chronic GERD      OA (osteoarthritis)      Rheumatoid arthritis      History of right hip replacement      Status post left hip replacement      S/P total knee replacement, left      H/O vaginal hysterectomy      Status post cholecystectomy      S/P lumpectomy of breast  2015            VITALS:  T(F): 99.4, Max: 101.5 (02-08-24 @ 20:00)  HR: 67  BP: 155/68  RR: 18Vital Signs Last 24 Hrs  T(C): 37.4 (09 Feb 2024 05:01), Max: 38.6 (08 Feb 2024 20:00)  T(F): 99.4 (09 Feb 2024 05:01), Max: 101.5 (08 Feb 2024 20:00)  HR: 67 (09 Feb 2024 05:01) (67 - 93)  BP: 155/68 (09 Feb 2024 05:01) (127/69 - 177/76)  BP(mean): 98 (09 Feb 2024 05:01) (90 - 109)  RR: 18 (09 Feb 2024 05:01) (16 - 19)  SpO2: 98% (09 Feb 2024 05:01) (93% - 98%)    Parameters below as of 09 Feb 2024 05:01  Patient On (Oxygen Delivery Method): nasal cannula  O2 Flow (L/min): 2      TESTS & MEASUREMENTS:                        11.8   12.58 )-----------( 167      ( 08 Feb 2024 13:23 )             35.1     02-08    132<L>  |  92<L>  |  22<H>  ----------------------------<  109<H>  3.6   |  27  |  0.8    Ca    9.1      08 Feb 2024 13:23  Phos  2.4     02-08  Mg     2.3     02-08    TPro  7.3  /  Alb  4.2  /  TBili  0.7  /  DBili  x   /  AST  34  /  ALT  24  /  AlkPhos  103  02-07    LIVER FUNCTIONS - ( 07 Feb 2024 13:17 )  Alb: 4.2 g/dL / Pro: 7.3 g/dL / ALK PHOS: 103 U/L / ALT: 24 U/L / AST: 34 U/L / GGT: x             Culture - Blood (collected 02-07-24 @ 18:39)  Source: .Blood Blood-Peripheral  Gram Stain (02-08-24 @ 17:17):    Growth in aerobic bottle: Gram Positive Cocci in Clusters    Growth in anaerobic bottle: Gram Positive Cocci in Clusters  Preliminary Report (02-08-24 @ 17:17):    Growth in aerobic bottle: Gram Positive Cocci in Clusters    Growth in anaerobic bottle: Gram Positive Cocci in Clusters    Culture - Blood (collected 02-07-24 @ 18:39)  Source: .Blood Blood-Peripheral  Gram Stain (02-08-24 @ 15:14):    Growth in aerobic bottle: Gram Positive Cocci in Clusters    Growth in anaerobic bottle: Gram Positive Cocci in Clusters  Preliminary Report (02-08-24 @ 15:14):    Growth in aerobic bottle: Gram Positive Cocci in Clusters    Growth in anaerobic bottle: Gram Positive Cocci in Clusters    Direct identification is available within approximately 3-5    hours either by Blood Panel Multiplexed PCR or Direct    MALDI-TOF. Details: [email or web address]  Organism: Blood Culture PCR (02-08-24 @ 13:17)  Organism: Blood Culture PCR (02-08-24 @ 13:17)      Method Type: PCR      -  Methicillin resistant Staphylococcus aureus (MRSA): Detec      Urinalysis Basic - ( 08 Feb 2024 13:23 )    Color: x / Appearance: x / SG: x / pH: x  Gluc: 109 mg/dL / Ketone: x  / Bili: x / Urobili: x   Blood: x / Protein: x / Nitrite: x   Leuk Esterase: x / RBC: x / WBC x   Sq Epi: x / Non Sq Epi: x / Bacteria: x          RADIOLOGY & ADDITIONAL TESTS:  Personal review of radiological diagnostics performed  Echo and EKG results noted when applicable.     MEDICATIONS:  acetaminophen     Tablet .. 650 milliGRAM(s) Oral every 6 hours PRN  aspirin  chewable 81 milliGRAM(s) Oral daily  atorvastatin 40 milliGRAM(s) Oral at bedtime  cefepime   IVPB 1000 milliGRAM(s) IV Intermittent every 12 hours  chlorhexidine 4% Liquid 1 Application(s) Topical two times a day  cholecalciferol 2000 Unit(s) Oral daily  enoxaparin Injectable 40 milliGRAM(s) SubCutaneous every 24 hours  famotidine    Tablet 20 milliGRAM(s) Oral two times a day  folic acid 1 milliGRAM(s) Oral daily  hydrochlorothiazide 50 milliGRAM(s) Oral daily  metoprolol succinate ER 25 milliGRAM(s) Oral daily  morphine  - Injectable 2 milliGRAM(s) IV Push every 6 hours PRN  morphine  - Injectable 2 milliGRAM(s) IV Push two times a day PRN  oxycodone    5 mG/acetaminophen 325 mG 1 Tablet(s) Oral every 6 hours PRN  polyethylene glycol 3350 17 Gram(s) Oral once PRN  saccharomyces boulardii 250 milliGRAM(s) Oral two times a day  senna 2 Tablet(s) Oral at bedtime  sertraline 50 milliGRAM(s) Oral daily  vancomycin  IVPB 1250 milliGRAM(s) IV Intermittent every 24 hours      ANTIBIOTICS:  cefepime   IVPB 1000 milliGRAM(s) IV Intermittent every 12 hours  vancomycin  IVPB 1250 milliGRAM(s) IV Intermittent every 24 hours

## 2024-02-15 NOTE — PROGRESS NOTE ADULT - ASSESSMENT
· Assessment	  83y F w/ hx of RA on methotrexate, Depression, GERD, HTN, HLD breast ca s/p lumpectomy in 2015 is presents to ED for left foot cellulitis. patient's daughter at beside states that visiting nurse saw her mother today and reported that wound of left foot looked cellulitic. Patient endorses chills, feeling feverish, and nausea. Patient had 1 episode of emesis in the ED. Denies chest pain, sob, numbness, tingling.     < from: CT Foot w/ IV Cont, Left (02.10.24 @ 21:56) >    Wound/ulcer at the dorsal medial aspect of the midfoot, with a sinus   tract/direct extension into the first tarsometatarsal joint, and foci of   air in the first tarsometatarsal joint, findings highly suspicious for   septic arthritis.    1.3 cm focal subcutaneous fluid collection with mild rim enhancement   adjacent to the ulcer, indicative of a small abscess.    < end of copied text >      IMPRESSION/RECOMMENDATIONS  Left foot with septic arthritis/abscess with ORSA  2/9 S/p debridement with WCx ORSA  2/9 BCx ORSA  2/10,11 BCX NG  No evidence of Gout/inflammatory synovitis    -Burn for further debridement   -Daptomycin 750 mg iv q24h  -d/c vancomycin  -d/c Rocephin / Flagyl   -long term will d/c on  for  6 weeks of  Daptomycin 600 mg q24h   weeks from last Sx debridement  -Off loading to prevent pressure sores and preventive measures to avoid aspiration     COVID-19 with no active disease    f/u with Dr Lewis [phone] at [facility] on tuesday via telehealth . Pt will be called  between 10-12.30 am.  [address]  [phone]  Fax [phone]     Please do not hesitate to recall ID if any questions arise either through [employer] or through [employer]

## 2024-02-15 NOTE — CONSULT NOTE ADULT - CONSULT REASON
abscess
r/o gout
B/l foot eval
LLE wound with cellulitis
?COVID PNA, finished a course of RDV. O2 sat high 80s on RA, now on O2 ,

## 2024-02-15 NOTE — DISCHARGE NOTE NURSING/CASE MANAGEMENT/SOCIAL WORK - NSSCNAMETXT_GEN_ALL_CORE
Capri Pulliam is an 87 yr old female who arrives via EMS from [facility] with c/o Left Hip pain after an unwitnessed fall. Patient denies hitting her head or LOC. EMS reports external rotation to extremity.     Staff at patient's living facility stated patient has a history of a left dislocation and just came back from rehab.     Patient reporting 0/10 pain with rest, refused pain medication from EMS.  
[facility]

## 2024-02-15 NOTE — PROGRESS NOTE ADULT - SUBJECTIVE AND OBJECTIVE BOX
GIAN MELISSA  83y, Female    All available historical data reviewed    OVERNIGHT EVENTS:  feels well and has no new complaints  No fevers   ROS:  General: Denies rigors, nightsweats  HEENT: Denies headache, rhinorrhea, sore throat, eye pain  CV: Denies CP, palpitations  PULM: Denies wheezing, hemoptysis  GI: Denies hematemesis, hematochezia, melena  : Denies discharge, hematuria  MSK: Denies arthralgias, myalgias  SKIN: Denies rash, lesions  NEURO: Denies paresthesias, weakness  PSYCH: Denies depression, anxiety    VITALS:  T(F): 98.7, Max: 98.7 (02-15-24 @ 08:00)  HR: 71  BP: 169/75  RR: 18Vital Signs Last 24 Hrs  T(C): 37.1 (15 Feb 2024 08:00), Max: 37.1 (15 Feb 2024 08:00)  T(F): 98.7 (15 Feb 2024 08:00), Max: 98.7 (15 Feb 2024 08:00)  HR: 71 (15 Feb 2024 08:00) (70 - 84)  BP: 169/75 (15 Feb 2024 08:00) (128/60 - 197/79)  BP(mean): 96 (15 Feb 2024 02:24) (87 - 113)  RR: 18 (15 Feb 2024 08:00) (17 - 21)  SpO2: 96% (15 Feb 2024 08:00) (93% - 100%)    Parameters below as of 15 Feb 2024 02:24  Patient On (Oxygen Delivery Method): nasal cannula  O2 Flow (L/min): 2      TESTS & MEASUREMENTS:                        10.8   9.98  )-----------( 337      ( 14 Feb 2024 18:07 )             33.0     02-14    128<L>  |  91<L>  |  17  ----------------------------<  119<H>  4.0   |  25  |  0.7    Ca    8.2<L>      14 Feb 2024 18:07  Phos  3.4     02-14  Mg     1.7     02-14          Culture - Fungal, Other (collected 02-14-24 @ 17:16)  Source: .Other None  Preliminary Report (02-15-24 @ 09:48):    Testing in progress    Culture - Other (collected 02-12-24 @ 12:24)  Source: .Other left foot  Final Report (02-14-24 @ 15:08):    No growth at 48 hours    Culture - Blood (collected 02-11-24 @ 11:45)  Source: .Blood None  Preliminary Report (02-14-24 @ 20:01):    No growth at 72 Hours    Culture - Blood (collected 02-10-24 @ 16:45)  Source: .Blood Blood-Peripheral  Preliminary Report (02-15-24 @ 01:01):    No growth at 4 days    Culture - Blood (collected 02-10-24 @ 16:45)  Source: .Blood Blood-Peripheral  Preliminary Report (02-15-24 @ 01:01):    No growth at 4 days    Culture - Other (collected 02-09-24 @ 20:08)  Source: Wound Wound  Final Report (02-11-24 @ 14:08):    Few Methicillin Resistant Staphylococcus aureus  Organism: Methicillin resistant Staphylococcus aureus (02-11-24 @ 14:08)  Organism: Methicillin resistant Staphylococcus aureus (02-11-24 @ 14:08)      Method Type: GILBERT      -  Ampicillin/Sulbactam: R <=8/4      -  Cefazolin: R <=4      -  Clindamycin: R >4      -  Daptomycin: S <=0.25      -  Erythromycin: R >4      -  Gentamicin: S <=1 Should not be used as monotherapy      -  Linezolid: S 2      -  Oxacillin: R >2      -  Penicillin: R >8      -  Rifampin: S <=1 Should not be used as monotherapy      -  Tetracycline: S <=1      -  Trimethoprim/Sulfamethoxazole: S <=0.5/9.5      -  Vancomycin: S 2    Culture - Urine (collected 02-09-24 @ 20:08)  Source: Clean Catch Clean Catch (Midstream)  Final Report (02-10-24 @ 23:42):    No growth    Culture - Blood (collected 02-09-24 @ 07:30)  Source: .Blood None  Final Report (02-14-24 @ 14:00):    No growth at 5 days    Culture - Blood (collected 02-09-24 @ 01:16)  Source: .Blood None  Gram Stain (02-10-24 @ 13:35):    Growth in anaerobic bottle: Gram Positive Cocci in Clusters  Final Report (02-11-24 @ 11:04):    Growth in anaerobic bottle: Methicillin Resistant Staphylococcus aureus    See previous culture [id number]    Culture - Surgical Swab (collected 02-08-24 @ 15:15)  Source: .Surgical Swab None  Final Report (02-14-24 @ 07:01):    Few Methicillin Resistant Staphylococcus aureus  Organism: Methicillin resistant Staphylococcus aureus (02-14-24 @ 07:01)  Organism: Methicillin resistant Staphylococcus aureus (02-14-24 @ 07:01)      Method Type: GILBERT      -  Ampicillin/Sulbactam: R 16/8      -  Cefazolin: R <=4      -  Clindamycin: R >4      -  Daptomycin: S 0.5      -  Erythromycin: R >4      -  Gentamicin: S <=1 Should not be used as monotherapy      -  Linezolid: S 2      -  Oxacillin: R >2      -  Penicillin: R >8      -  Rifampin: S <=1 Should not be used as monotherapy      -  Tetracycline: S <=1      -  Trimethoprim/Sulfamethoxazole: S <=0.5/9.5      -  Vancomycin: S 2    Culture - Fungal, Other (collected 02-08-24 @ 15:15)  Source: .Other None  Preliminary Report (02-10-24 @ 15:04):    Culture is being performed. Fungal cultures are held for 4 weeks.    Culture - Acid Fast - Other w/Smear (collected 02-08-24 @ 15:15)  Source: .Other None  Preliminary Report (02-10-24 @ 15:10):    Culture is being performed.    Culture - Fungal, Other (collected 02-08-24 @ 15:15)  Source: .Other None  Preliminary Report (02-10-24 @ 15:04):    Culture is being performed. Fungal cultures are held for 4 weeks.    Culture - Acid Fast - Other w/Smear (collected 02-08-24 @ 15:15)  Source: .Other None  Preliminary Report (02-10-24 @ 15:10):    Culture is being performed.    Culture - Surgical Swab (collected 02-08-24 @ 15:15)  Source: .Surgical Swab None  Final Report (02-14-24 @ 07:38):    Numerous Methicillin Resistant Staphylococcus aureus  Organism: Methicillin resistant Staphylococcus aureus (02-14-24 @ 07:38)  Organism: Methicillin resistant Staphylococcus aureus (02-14-24 @ 07:38)      Method Type: GILBERT      -  Ampicillin/Sulbactam: R 16/8      -  Cefazolin: R <=4      -  Clindamycin: R >4      -  Daptomycin: S 0.5      -  Erythromycin: R >4      -  Gentamicin: S <=1 Should not be used as monotherapy      -  Linezolid: S 2      -  Oxacillin: R >2      -  Penicillin: R >8      -  Rifampin: S <=1 Should not be used as monotherapy      -  Tetracycline: S <=1      -  Trimethoprim/Sulfamethoxazole: S <=0.5/9.5      -  Vancomycin: S 1    Culture - Fungal, Tissue (collected 02-08-24 @ 15:15)  Source: .Tissue None  Preliminary Report (02-10-24 @ 15:04):    Culture is being performed. Fungal cultures are held for 4 weeks.    Culture - Acid Fast - Tissue w/Smear (collected 02-08-24 @ 15:15)  Source: .Tissue None  Preliminary Report (02-10-24 @ 15:10):    Culture is being performed.    Culture - Tissue with Gram Stain (collected 02-08-24 @ 15:15)  Source: .Tissue None  Gram Stain (02-10-24 @ 16:01):    No polymorphonuclear cells seen per low power field    No organisms seen per oil power field  Final Report (02-13-24 @ 16:04):    Rare Methicillin Resistant Staphylococcus aureus  Organism: Methicillin resistant Staphylococcus aureus (02-13-24 @ 16:04)  Organism: Methicillin resistant Staphylococcus aureus (02-13-24 @ 16:04)      Method Type: GILBERT      -  Ampicillin/Sulbactam: R <=8/4      -  Cefazolin: R <=4      -  Clindamycin: R >4      -  Daptomycin: S 0.5      -  Erythromycin: R >4      -  Gentamicin: S <=1 Should not be used as monotherapy      -  Linezolid: S 1      -  Oxacillin: R >2      -  Penicillin: R >8      -  Rifampin: S <=1 Should not be used as monotherapy      -  Tetracycline: S <=1      -  Trimethoprim/Sulfamethoxazole: S <=0.5/9.5      -  Vancomycin: S 2    Culture - Other (collected 02-08-24 @ 13:29)  Source: .Other left foot wound  Final Report (02-10-24 @ 14:47):    Numerous Methicillin Resistant Staphylococcus aureus  Organism: Methicillin resistant Staphylococcus aureus (02-10-24 @ 14:47)  Organism: Methicillin resistant Staphylococcus aureus (02-10-24 @ 14:47)      Method Type: GILBERT      -  Ampicillin/Sulbactam: R <=8/4      -  Cefazolin: R <=4      -  Clindamycin: R >4      -  Daptomycin: S <=0.25      -  Erythromycin: R >4      -  Gentamicin: S <=1 Should not be used as monotherapy      -  Linezolid: S 2      -  Oxacillin: R >2      -  Penicillin: R >8      -  Rifampin: S <=1 Should not be used as monotherapy      -  Tetracycline: S <=1      -  Trimethoprim/Sulfamethoxazole: S <=0.5/9.5      -  Vancomycin: S 2      Urinalysis Basic - ( 14 Feb 2024 18:07 )    Color: x / Appearance: x / SG: x / pH: x  Gluc: 119 mg/dL / Ketone: x  / Bili: x / Urobili: x   Blood: x / Protein: x / Nitrite: x   Leuk Esterase: x / RBC: x / WBC x   Sq Epi: x / Non Sq Epi: x / Bacteria: x          RADIOLOGY & ADDITIONAL TESTS:  Personal review of radiological diagnostics performed  Echo and EKG results noted when applicable.     MEDICATIONS:  acetaminophen     Tablet .. 650 milliGRAM(s) Oral every 6 hours PRN  albuterol    90 MICROgram(s) HFA Inhaler 1 Puff(s) Inhalation every 6 hours PRN  aspirin  chewable 81 milliGRAM(s) Oral daily  atorvastatin 40 milliGRAM(s) Oral at bedtime  chlorhexidine 4% Liquid 1 Application(s) Topical two times a day  cholecalciferol 2000 Unit(s) Oral daily  DAPTOmycin IVPB 750 milliGRAM(s) IV Intermittent every 24 hours  enoxaparin Injectable 40 milliGRAM(s) SubCutaneous every 24 hours  famotidine    Tablet 20 milliGRAM(s) Oral two times a day  folic acid 1 milliGRAM(s) Oral daily  furosemide   Injectable 20 milliGRAM(s) IV Push once  hydrochlorothiazide 50 milliGRAM(s) Oral daily  ketorolac   Injectable 15 milliGRAM(s) IV Push every 6 hours  metoprolol succinate ER 25 milliGRAM(s) Oral daily  oxycodone    5 mG/acetaminophen 325 mG 1 Tablet(s) Oral every 6 hours PRN  saccharomyces boulardii 250 milliGRAM(s) Oral two times a day  senna 2 Tablet(s) Oral at bedtime  sertraline 50 milliGRAM(s) Oral daily  sodium chloride 1 Gram(s) Oral two times a day      ANTIBIOTICS:  DAPTOmycin IVPB 750 milliGRAM(s) IV Intermittent every 24 hours

## 2024-02-15 NOTE — DISCHARGE NOTE NURSING/CASE MANAGEMENT/SOCIAL WORK - PATIENT PORTAL LINK FT
You can access the FollowMyHealth Patient Portal offered by [facility] by registering at the following website: [email or web address]. By joining [facility]’s FollowMyHealth portal, you will also be able to view your health information using other applications (apps) compatible with our system.

## 2024-02-15 NOTE — CONSULT NOTE ADULT - ASSESSMENT
Impression:    Acute hypoxemic respiratory failure on NC - improving  LLE septic arthritis with abscess SP debridement   ORSA bacteremia   COVID positive since December 2023   HO RA on methotrexate     Recommendations:     Wean O2 as tolerated, target SpO2 92-96%.   On 2L NC.   CXR noted with left basilar opacity, present since 2/8   will do left lung POCUS today.   COVID-19 positive since December 26, 2023, doubt active COVID infection causing hypoxia.   No need for steroids. Can do albuterol nebs PRN.   Incentive spirometry.   OOBTC if no contraindications  DVT   Impression:    Acute hypoxemic respiratory failure on NC - improving  LLE septic arthritis with abscess SP debridement   ORSA bacteremia   COVID positive since December 2023   HO RA on methotrexate     Recommendations:     Wean O2 as tolerated, target SpO2 92-96%.   On 2L NC.   CXR noted with left basilar opacity, present since 2/8   will do left lung POCUS today.   COVID-19 positive since December 26, 2023, doubt active COVID infection causing hypoxia.   No need for steroids. Can do albuterol nebs PRN.   Incentive spirometry.   ABX per ID   Wound care per Burn   OOBTC if no contraindications  DVT ppx    Impression:    Acute hypoxemic respiratory failure on NC - improving  LLE septic arthritis with abscess SP debridement   ORSA bacteremia   COVID positive since December 2023   HO RA on methotrexate     Recommendations:     Wean O2 as tolerated, target SpO2 92-96%.   On 2L NC.   CXR noted with vascular congestion and left basilar opacity, present since 2/8   will do left lung POCUS today.   COVID-19 positive since December 26, 2023, doubt active COVID infection causing hypoxia.   No need for steroids. Can do albuterol nebs PRN.   Check BNP.   Incentive spirometry.   ABX per ID   Wound care per Burn   OOBTC if no contraindications  DVT ppx    Impression:    Acute hypoxemic respiratory failure on NC - improving  LLE septic arthritis with abscess SP debridement   ORSA bacteremia   COVID positive since December 2023   HO RA on methotrexate     Recommendations:     Wean O2 as tolerated, target SpO2 92-96%.   On 2L NC.   CXR noted with vascular congestion and left basilar opacity, present since 2/8   Lung POCUS showed small left pleural effusion and mildly increased L lung B lines. R lung POCUS no effusion.  Check BNP.  Give Lasix 20mg IV x1 now.   COVID-19 positive since December 26, 2023, doubt active COVID infection causing hypoxia.   No need for steroids. Can do albuterol nebs PRN.   Incentive spirometry.   ABX per ID   Wound care per Burn   OOBTC if no contraindications  DVT ppx    Impression:    Acute hypoxemic respiratory failure on NC - improving  LLE septic arthritis with abscess SP debridement   ORSA bacteremia   COVID positive since December 2023   HO RA on methotrexate   Small left effusion with adjacent atelectasis    Recommendations:     Wean O2 as tolerated, target SpO2 92-96%.   On 2L NC. Clinically feels fine.   CXR noted with vascular congestion and left basilar opacity, present since 2/8   Lung POCUS showed small left pleural effusion and mildly increased L lung B lines. R lung POCUS no effusion.  Check BNP.  Give Lasix 20mg IV x1 now.   No window for thoracentesis right now.  COVID-19 positive since December 26, 2023, doubt active COVID infection causing hypoxia.   No need for steroids. Can do albuterol nebs PRN.   Incentive spirometry.   ABX per ID   Wound care per Burn   OOBTC if no contraindications  DVT ppx

## 2024-02-15 NOTE — PROGRESS NOTE ADULT - CARDIOVASCULAR
normal/regular rate and rhythm/S1 S2 present/no gallops/no rub/no murmur
no new murmurs
normal/regular rate and rhythm/S1 S2 present/no gallops/no rub/no murmur

## 2024-02-15 NOTE — PROGRESS NOTE ADULT - SUBJECTIVE AND OBJECTIVE BOX
Patient is a 83y old  Female with PMHx of HTN, HLD, GERD, RA on methotrexate, breast ca s/p lumpectomy in 2015, Depression, recent COVID 12/2023,   who presents with a chief complaint of LLE infected wound; found to have MRSA bacteremia .     INTERVAL HPI/OVERNIGHT EVENTS:  afebrile, no acute events overnight    Vital Signs Last 24 Hrs  T(C): 37.1 (15 Feb 2024 08:00), Max: 37.1 (15 Feb 2024 08:00)  T(F): 98.7 (15 Feb 2024 08:00), Max: 98.7 (15 Feb 2024 08:00)  HR: 71 (15 Feb 2024 08:00) (70 - 84)  BP: 169/75 (15 Feb 2024 08:00) (128/60 - 197/79)  BP(mean): 96 (15 Feb 2024 02:24) (87 - 113)  RR: 18 (15 Feb 2024 08:00) (17 - 21)  SpO2: 96% (15 Feb 2024 08:00) (93% - 100%)    O2 Parameters below as of 15 Feb 2024 02:24  Patient On (Oxygen Delivery Method): nasal cannula  O2 Flow (L/min): 2        I&O's Summary    14 Feb 2024 07:01  -  15 Feb 2024 07:00  --------------------------------------------------------  IN: 770 mL / OUT: 800 mL / NET: -30 mL    Allergies  No Known Allergies    Lab Results:                        10.8   9.98  )-----------( 337      ( 14 Feb 2024 18:07 )             33.0     02-14    128<L>  |  91<L>  |  17  ----------------------------<  119<H>  4.0   |  25  |  0.7    Ca    8.2<L>      14 Feb 2024 18:07  Phos  3.4     02-14  Mg     1.7     02-14    MEDICATIONS  (STANDING):  aspirin  chewable 81 milliGRAM(s) Oral daily  atorvastatin 40 milliGRAM(s) Oral at bedtime  chlorhexidine 4% Liquid 1 Application(s) Topical two times a day  cholecalciferol 2000 Unit(s) Oral daily  DAPTOmycin IVPB 750 milliGRAM(s) IV Intermittent every 24 hours  enoxaparin Injectable 40 milliGRAM(s) SubCutaneous every 24 hours  famotidine    Tablet 20 milliGRAM(s) Oral two times a day  folic acid 1 milliGRAM(s) Oral daily  hydrochlorothiazide 50 milliGRAM(s) Oral daily  ketorolac   Injectable 15 milliGRAM(s) IV Push every 6 hours  metoprolol succinate ER 25 milliGRAM(s) Oral daily  saccharomyces boulardii 250 milliGRAM(s) Oral two times a day  senna 2 Tablet(s) Oral at bedtime  sertraline 50 milliGRAM(s) Oral daily  sodium chloride 1 Gram(s) Oral two times a day    MEDICATIONS  (PRN):  acetaminophen     Tablet .. 650 milliGRAM(s) Oral every 6 hours PRN Temp greater or equal to 38C (100.4F), Mild Pain (1 - 3)  albuterol    90 MICROgram(s) HFA Inhaler 1 Puff(s) Inhalation every 6 hours PRN Shortness of Breath and/or Wheezing  oxycodone    5 mG/acetaminophen 325 mG 1 Tablet(s) Oral every 6 hours PRN Moderate Pain (4 - 6)    PHYSICAL EXAM:  GENERAL: Lying comfortably darya bed,  NAD, cooperative   NERVOUS SYSTEM:  Alert & Oriented, non focal exam   CHEST/LUNG: Breathing comfortably on RA, b/l chest rise appreciated, on 2L O2 NC  HEART: In no cardiopulmonary distress  SKIN: Left foot dorsal aspect with full thickness wound , s/p debridement and wound vac placement, wound vac dressing in place, no bleeding noted;

## 2024-02-16 LAB
ANION GAP SERPL CALC-SCNC: 14 MMOL/L — SIGNIFICANT CHANGE UP (ref 7–14)
BASOPHILS # BLD AUTO: 0.04 K/UL — SIGNIFICANT CHANGE UP (ref 0–0.2)
BASOPHILS NFR BLD AUTO: 0.4 % — SIGNIFICANT CHANGE UP (ref 0–1)
BUN SERPL-MCNC: 21 MG/DL — HIGH (ref 10–20)
CALCIUM SERPL-MCNC: 8.7 MG/DL — SIGNIFICANT CHANGE UP (ref 8.4–10.5)
CHLORIDE SERPL-SCNC: 93 MMOL/L — LOW (ref 98–110)
CO2 SERPL-SCNC: 26 MMOL/L — SIGNIFICANT CHANGE UP (ref 17–32)
CREAT SERPL-MCNC: 0.7 MG/DL — SIGNIFICANT CHANGE UP (ref 0.7–1.5)
CULTURE RESULTS: SIGNIFICANT CHANGE UP
EGFR: 85 ML/MIN/1.73M2 — SIGNIFICANT CHANGE UP
EOSINOPHIL # BLD AUTO: 0.23 K/UL — SIGNIFICANT CHANGE UP (ref 0–0.7)
EOSINOPHIL NFR BLD AUTO: 2.5 % — SIGNIFICANT CHANGE UP (ref 0–8)
GLUCOSE SERPL-MCNC: 92 MG/DL — SIGNIFICANT CHANGE UP (ref 70–99)
HCT VFR BLD CALC: 32.8 % — LOW (ref 37–47)
HGB BLD-MCNC: 10.9 G/DL — LOW (ref 12–16)
IMM GRANULOCYTES NFR BLD AUTO: 2.5 % — HIGH (ref 0.1–0.3)
LYMPHOCYTES # BLD AUTO: 1.2 K/UL — SIGNIFICANT CHANGE UP (ref 1.2–3.4)
LYMPHOCYTES # BLD AUTO: 13.2 % — LOW (ref 20.5–51.1)
MAGNESIUM SERPL-MCNC: 1.6 MG/DL — LOW (ref 1.8–2.4)
MCHC RBC-ENTMCNC: 31.4 PG — HIGH (ref 27–31)
MCHC RBC-ENTMCNC: 33.2 G/DL — SIGNIFICANT CHANGE UP (ref 32–37)
MCV RBC AUTO: 94.5 FL — SIGNIFICANT CHANGE UP (ref 81–99)
MONOCYTES # BLD AUTO: 0.71 K/UL — HIGH (ref 0.1–0.6)
MONOCYTES NFR BLD AUTO: 7.8 % — SIGNIFICANT CHANGE UP (ref 1.7–9.3)
NEUTROPHILS # BLD AUTO: 6.67 K/UL — HIGH (ref 1.4–6.5)
NEUTROPHILS NFR BLD AUTO: 73.6 % — SIGNIFICANT CHANGE UP (ref 42.2–75.2)
NRBC # BLD: 0 /100 WBCS — SIGNIFICANT CHANGE UP (ref 0–0)
PHOSPHATE SERPL-MCNC: 3.3 MG/DL — SIGNIFICANT CHANGE UP (ref 2.1–4.9)
PLATELET # BLD AUTO: 406 K/UL — HIGH (ref 130–400)
PMV BLD: 9.7 FL — SIGNIFICANT CHANGE UP (ref 7.4–10.4)
POTASSIUM SERPL-MCNC: 4 MMOL/L — SIGNIFICANT CHANGE UP (ref 3.5–5)
POTASSIUM SERPL-SCNC: 4 MMOL/L — SIGNIFICANT CHANGE UP (ref 3.5–5)
RBC # BLD: 3.47 M/UL — LOW (ref 4.2–5.4)
RBC # FLD: 15.1 % — HIGH (ref 11.5–14.5)
SODIUM SERPL-SCNC: 133 MMOL/L — LOW (ref 135–146)
SPECIMEN SOURCE: SIGNIFICANT CHANGE UP
WBC # BLD: 9.08 K/UL — SIGNIFICANT CHANGE UP (ref 4.8–10.8)
WBC # FLD AUTO: 9.08 K/UL — SIGNIFICANT CHANGE UP (ref 4.8–10.8)

## 2024-02-16 PROCEDURE — 71045 X-RAY EXAM CHEST 1 VIEW: CPT | Mod: 26

## 2024-02-16 PROCEDURE — 36573 INSJ PICC RS&I 5 YR+: CPT

## 2024-02-16 PROCEDURE — 99232 SBSQ HOSP IP/OBS MODERATE 35: CPT | Mod: FS

## 2024-02-16 RX ORDER — METHOTREXATE 2.5 MG/1
6 TABLET ORAL
Qty: 0 | Refills: 0 | DISCHARGE

## 2024-02-16 RX ORDER — ACETAMINOPHEN 500 MG
2 TABLET ORAL
Qty: 0 | Refills: 0 | DISCHARGE
Start: 2024-02-16

## 2024-02-16 RX ORDER — MORPHINE SULFATE 50 MG/1
2 CAPSULE, EXTENDED RELEASE ORAL DAILY
Refills: 0 | Status: DISCONTINUED | OUTPATIENT
Start: 2024-02-16 | End: 2024-02-19

## 2024-02-16 RX ORDER — DAPTOMYCIN 500 MG/10ML
600 INJECTION, POWDER, LYOPHILIZED, FOR SOLUTION INTRAVENOUS
Qty: 0 | Refills: 0 | DISCHARGE
Start: 2024-02-16

## 2024-02-16 RX ORDER — SACCHAROMYCES BOULARDII 250 MG
1 POWDER IN PACKET (EA) ORAL
Qty: 0 | Refills: 0 | DISCHARGE
Start: 2024-02-16

## 2024-02-16 RX ORDER — SENNA PLUS 8.6 MG/1
2 TABLET ORAL
Qty: 0 | Refills: 0 | DISCHARGE
Start: 2024-02-16

## 2024-02-16 RX ORDER — ALBUTEROL 90 UG/1
1 AEROSOL, METERED ORAL
Qty: 0 | Refills: 0 | DISCHARGE
Start: 2024-02-16

## 2024-02-16 RX ORDER — MAGNESIUM SULFATE 500 MG/ML
2 VIAL (ML) INJECTION ONCE
Refills: 0 | Status: COMPLETED | OUTPATIENT
Start: 2024-02-16 | End: 2024-02-16

## 2024-02-16 RX ORDER — DAPTOMYCIN 500 MG/10ML
600 INJECTION, POWDER, LYOPHILIZED, FOR SOLUTION INTRAVENOUS EVERY 24 HOURS
Refills: 0 | Status: DISCONTINUED | OUTPATIENT
Start: 2024-02-16 | End: 2024-02-16

## 2024-02-16 RX ADMIN — MORPHINE SULFATE 2 MILLIGRAM(S): 50 CAPSULE, EXTENDED RELEASE ORAL at 09:45

## 2024-02-16 RX ADMIN — MORPHINE SULFATE 2 MILLIGRAM(S): 50 CAPSULE, EXTENDED RELEASE ORAL at 09:31

## 2024-02-16 RX ADMIN — CHLORHEXIDINE GLUCONATE 1 APPLICATION(S): 213 SOLUTION TOPICAL at 05:51

## 2024-02-16 RX ADMIN — Medication 250 MILLIGRAM(S): at 05:52

## 2024-02-16 RX ADMIN — Medication 2000 UNIT(S): at 11:36

## 2024-02-16 RX ADMIN — Medication 1 MILLIGRAM(S): at 11:35

## 2024-02-16 RX ADMIN — FAMOTIDINE 20 MILLIGRAM(S): 10 INJECTION INTRAVENOUS at 17:11

## 2024-02-16 RX ADMIN — Medication 15 MILLIGRAM(S): at 06:25

## 2024-02-16 RX ADMIN — SODIUM CHLORIDE 1 GRAM(S): 9 INJECTION INTRAMUSCULAR; INTRAVENOUS; SUBCUTANEOUS at 17:12

## 2024-02-16 RX ADMIN — FAMOTIDINE 20 MILLIGRAM(S): 10 INJECTION INTRAVENOUS at 05:52

## 2024-02-16 RX ADMIN — Medication 15 MILLIGRAM(S): at 05:52

## 2024-02-16 RX ADMIN — Medication 25 GRAM(S): at 19:52

## 2024-02-16 RX ADMIN — ENOXAPARIN SODIUM 40 MILLIGRAM(S): 100 INJECTION SUBCUTANEOUS at 17:11

## 2024-02-16 RX ADMIN — Medication 15 MILLIGRAM(S): at 12:12

## 2024-02-16 RX ADMIN — Medication 15 MILLIGRAM(S): at 11:35

## 2024-02-16 RX ADMIN — ATORVASTATIN CALCIUM 40 MILLIGRAM(S): 80 TABLET, FILM COATED ORAL at 21:17

## 2024-02-16 RX ADMIN — Medication 81 MILLIGRAM(S): at 11:35

## 2024-02-16 RX ADMIN — SENNA PLUS 2 TABLET(S): 8.6 TABLET ORAL at 21:17

## 2024-02-16 RX ADMIN — SERTRALINE 50 MILLIGRAM(S): 25 TABLET, FILM COATED ORAL at 11:36

## 2024-02-16 RX ADMIN — Medication 250 MILLIGRAM(S): at 17:11

## 2024-02-16 RX ADMIN — Medication 25 MILLIGRAM(S): at 05:52

## 2024-02-16 RX ADMIN — DAPTOMYCIN 130 MILLIGRAM(S): 500 INJECTION, POWDER, LYOPHILIZED, FOR SOLUTION INTRAVENOUS at 21:16

## 2024-02-16 RX ADMIN — Medication 15 MILLIGRAM(S): at 00:02

## 2024-02-16 RX ADMIN — SODIUM CHLORIDE 1 GRAM(S): 9 INJECTION INTRAMUSCULAR; INTRAVENOUS; SUBCUTANEOUS at 05:52

## 2024-02-16 RX ADMIN — CHLORHEXIDINE GLUCONATE 1 APPLICATION(S): 213 SOLUTION TOPICAL at 17:12

## 2024-02-16 NOTE — DISCHARGE NOTE PROVIDER - NSDCFUSCHEDAPPT_GEN_ALL_CORE_FT
Unknown, Doctor  [facility]  [facility] PreAdmits  Scheduled Appointment: 04/02/2024    [facility]  [id number]  Scheduled Appointment: 04/02/2024

## 2024-02-16 NOTE — DISCHARGE NOTE PROVIDER - NSDCMRMEDTOKEN_GEN_ALL_CORE_FT
albuterol 90 mcg/inh inhalation aerosol: 1 puff(s) inhaled every 6 hours As needed Shortness of Breath and/or Wheezing  Calcium 600+D 600 mg-200 intl units oral tablet: 1 tab(s) orally 2 times a day  famotidine 20 mg oral tablet: 1 tab(s) orally 2 times a day  folic acid 1 mg oral tablet: 1 tab(s) orally 2 times a day  hydroCHLOROthiazide 50 mg oral tablet: 1 tab(s) orally once a day  Low Dose ASA 81 mg oral tablet: 1 tab(s) orally once a day  metoprolol succinate 25 mg oral tablet, extended release: 1 tab(s) orally once a day (at bedtime)  rosuvastatin 10 mg oral tablet: 1 tab(s) orally once a day  saccharomyces boulardii lyo 250 mg oral capsule: 1 cap(s) orally 2 times a day  senna leaf extract oral tablet: 2 tab(s) orally once a day (at bedtime)  Tylenol 325 mg oral tablet: 2 tab(s) orally every 6 hours as needed for  mild pain  Vitamin C: 1000 milligram(s) orally once a day  Vitamin D3 1000 intl units oral tablet: 2 tab(s) orally once a day  Zoloft 50 mg oral tablet: orally once a day (at bedtime)  zolpidem 5 mg oral tablet: 5 tab(s) orally once a day (at bedtime) as needed for  insomnia   albuterol 90 mcg/inh inhalation aerosol: 1 puff(s) inhaled every 6 hours As needed Shortness of Breath and/or Wheezing  Calcium 600+D 600 mg-200 intl units oral tablet: 1 tab(s) orally 2 times a day  DAPTOmycin 500 mg intravenous injection: 600 milligram(s) intravenous every 24 hours  famotidine 20 mg oral tablet: 1 tab(s) orally 2 times a day  folic acid 1 mg oral tablet: 1 tab(s) orally 2 times a day  hydroCHLOROthiazide 50 mg oral tablet: 1 tab(s) orally once a day  Low Dose ASA 81 mg oral tablet: 1 tab(s) orally once a day  metoprolol succinate 25 mg oral tablet, extended release: 1 tab(s) orally once a day (at bedtime)  rosuvastatin 10 mg oral tablet: 1 tab(s) orally once a day  saccharomyces boulardii lyo 250 mg oral capsule: 1 cap(s) orally 2 times a day  senna leaf extract oral tablet: 2 tab(s) orally once a day (at bedtime)  Tylenol 325 mg oral tablet: 2 tab(s) orally every 6 hours as needed for  mild pain  Vitamin C: 1000 milligram(s) orally once a day  Vitamin D3 1000 intl units oral tablet: 2 tab(s) orally once a day  Zoloft 50 mg oral tablet: orally once a day (at bedtime)  zolpidem 5 mg oral tablet: 5 tab(s) orally once a day (at bedtime) as needed for  insomnia   albuterol 90 mcg/inh inhalation aerosol: 1 puff(s) inhaled every 6 hours As needed Shortness of Breath and/or Wheezing  Calcium 600+D 600 mg-200 intl units oral tablet: 1 tab(s) orally 2 times a day  DAPTOmycin 500 mg intravenous injection: 600 milligram(s) intravenous every 24 hours  famotidine 20 mg oral tablet: 1 tab(s) orally 2 times a day  folic acid 1 mg oral tablet: 1 tab(s) orally 2 times a day  hydroCHLOROthiazide 50 mg oral tablet: 1 tab(s) orally once a day  Low Dose ASA 81 mg oral tablet: 1 tab(s) orally once a day  metoprolol succinate 25 mg oral tablet, extended release: 1 tab(s) orally once a day (at bedtime)  oxycodone-acetaminophen 5 mg-325 mg oral tablet: 1 tab(s) orally every 6 hours As needed Moderate Pain (4 - 6)  rosuvastatin 10 mg oral tablet: 1 tab(s) orally once a day  saccharomyces boulardii lyo 250 mg oral capsule: 1 cap(s) orally 2 times a day  senna leaf extract oral tablet: 2 tab(s) orally once a day (at bedtime)  Tylenol 325 mg oral tablet: 2 tab(s) orally every 6 hours as needed for  mild pain  Vitamin C: 1000 milligram(s) orally once a day  Vitamin D3 1000 intl units oral tablet: 2 tab(s) orally once a day  Zoloft 50 mg oral tablet: orally once a day (at bedtime)  zolpidem 5 mg oral tablet: 5 tab(s) orally once a day (at bedtime) as needed for  insomnia

## 2024-02-16 NOTE — PROCEDURE NOTE - ADDITIONAL PROCEDURE DETAILS
Existing midline exchanged for 5 Fr 45 cm single lumen tapviva Power PICC. Tip at cavoatrial junction. Ready for use.

## 2024-02-16 NOTE — DISCHARGE NOTE PROVIDER - NSDCCPCAREPLAN_GEN_ALL_CORE_FT
PRINCIPAL DISCHARGE DIAGNOSIS  Diagnosis: Infected wound  Assessment and Plan of Treatment:      PRINCIPAL DISCHARGE DIAGNOSIS  Diagnosis: Infected wound  Assessment and Plan of Treatment: Patient presented with infected full thickness wound of left foot;   - Xray left foot 2/7 No radiographic evidence of osteomyelitis. No acute fracture or dislocation. 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  - CT left foot with IV contrast 2/11- Wound/ulcer at the dorsal medial aspect of the midfoot, with a sinus tract/direct extension into the first tarsometatarsal joint, and foci of air in the first tarsometatarsal joint, findings highly suspicious for septic arthritis. 1.3 cm focal subcutaneous fluid collection with mild rim enhancement adjacent to the ulcer, indicative of a small abscess.  - Pt underwent inpatient procedures:  2/8/24 s/p debridement of Left foot   2/15 s/p debridement of Left foot + wound vac placement  - continue wound vac therapy, change wound vac dressing every three days (Monday, Wednesday, Friday)  - WCx 2/8/24: +MRSA, WCx 2/9/24: + few MRSA, WCx 2/12/23: Negat  - as per ID recommendations continue Daptomycin 600mg iv q24h x 6 weeks - end date 3/27/24  - PICC line placed 2/16  - take Tylenol or Ibuprofene for pain as needed   - ambulate as tolerate   - call [phone] to schedule follow up appointment with Dr Barone in one-two weeks after discharge         SECONDARY DISCHARGE DIAGNOSES  Diagnosis: Rheumatoid arthritis  Assessment and Plan of Treatment: - patient on on Methotrexate once a week and Remicade infusion once every 6 weeks as outpatient  - as per rheumatology recommendations hold all medications for now until active infection has resolved and wound has healed.  - follow up with your rheumatologist as outpatient       Diagnosis: COVID-19 virus infection  Assessment and Plan of Treatment: COVID-19 with moderate  illness  -- COVID 12/26/23: positive and COVID 2/7: positive,   -- as per ID recommendations pt completed Remdesevir x 3 days   -- No need for steroids. Can do albuterol nebs PRN.   - Incentive spirometry.       Diagnosis: Hypertension  Assessment and Plan of Treatment: Hx HTN and HLD  - vitals stable, continue to monitor   - Echo 2/14/2024   1. Normal global left ventricular systolic function.   2. LV Ejection Fraction by Brothers's Method with a biplane EF of 54 %.   3. Moderate concentric left ventricular hypertrophy.   4. Grade II diastolic dysfunction   5. Elevated mean left atrial pressure.   6. Normal right ventricular size and function.   7. Moderately enlarged left atrium.   8. Mild mitral valve regurgitation.   9. Mild tricuspid regurgitation.  10. Mild pulmonic valve regurgitation.  11. Estimated pulmonary artery systolic pressure is 35.5 mmHg assuming a right atrial pressure of 3 mmHg, which is consistent with borderline pulmonary hypertension.  12. Dilatation of the ascending aorta (measuring 3.9 cm, indexed 2.02   cm/m2).  - Continue with home medications and follow up with primary care doctor for further management        PRINCIPAL DISCHARGE DIAGNOSIS  Diagnosis: Infected wound  Assessment and Plan of Treatment: Patient presented with infected full thickness wound of left foot;   - Xray left foot 2/7 No radiographic evidence of osteomyelitis. No acute fracture or dislocation. 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  - CT left foot with IV contrast 2/11- Wound/ulcer at the dorsal medial aspect of the midfoot, with a sinus tract/direct extension into the first tarsometatarsal joint, and foci of air in the first tarsometatarsal joint, findings highly suspicious for septic arthritis. 1.3 cm focal subcutaneous fluid collection with mild rim enhancement adjacent to the ulcer, indicative of a small abscess.  - Pt underwent inpatient procedures:  2/8/24 s/p debridement of Left foot   2/15 s/p debridement of Left foot + wound vac placement  - continue wound vac therapy, change wound vac dressing every three days (Monday, Wednesday, Friday) - last changed mon 2/19. please set to 125mmhg to continuous suction  - WCx 2/8/24: +MRSA, WCx 2/9/24: + few MRSA, WCx 2/12/23: Negat  - as per ID recommendations continue Daptomycin 600mg iv q24h x 6 weeks - end date 3/27/24 - check CK levels weekly, last check 2/12  - PICC line placed 2/16  - take Tylenol or Ibuprofene for pain as needed   - ambulate as tolerate   - call [phone] to schedule follow up appointment with Dr Barone in one-two weeks after discharge         SECONDARY DISCHARGE DIAGNOSES  Diagnosis: Rheumatoid arthritis  Assessment and Plan of Treatment: - patient on on Methotrexate once a week and Remicade infusion once every 6 weeks as outpatient  - as per rheumatology recommendations hold all medications for now until active infection has resolved and wound has healed.  - follow up with your rheumatologist as outpatient       Diagnosis: COVID-19 virus infection  Assessment and Plan of Treatment: COVID-19 with moderate  illness  -- COVID 12/26/23: positive and COVID 2/7: positive,   -- as per ID recommendations pt completed Remdesevir x 3 days   -- No need for steroids. Can do albuterol nebs PRN.   - Incentive spirometry.       Diagnosis: Hypertension  Assessment and Plan of Treatment: Hx HTN and HLD  - vitals stable, continue to monitor   - Echo 2/14/2024   1. Normal global left ventricular systolic function.   2. LV Ejection Fraction by Brothers's Method with a biplane EF of 54 %.   3. Moderate concentric left ventricular hypertrophy.   4. Grade II diastolic dysfunction   5. Elevated mean left atrial pressure.   6. Normal right ventricular size and function.   7. Moderately enlarged left atrium.   8. Mild mitral valve regurgitation.   9. Mild tricuspid regurgitation.  10. Mild pulmonic valve regurgitation.  11. Estimated pulmonary artery systolic pressure is 35.5 mmHg assuming a right atrial pressure of 3 mmHg, which is consistent with borderline pulmonary hypertension.  12. Dilatation of the ascending aorta (measuring 3.9 cm, indexed 2.02   cm/m2).  - Continue with home medications and follow up with primary care doctor for further management   - may need to add additional antihypertensive

## 2024-02-16 NOTE — PROGRESS NOTE ADULT - ASSESSMENT
Patient is a 83y old  Female with PMHx of HTN, HLD, GERD, RA on methotrexate, breast ca s/p lumpectomy in 2015, Depression, recent COVID 12/2023,   who presents with a chief complaint of LLE infected wound; found to have MRSA bacteremia .     # Full thickness wound of left foot; + MRSA   - xray left foot 2/7 No radiographic evidence of osteomyelitis. No acute fracture or dislocation. 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  - CT left foot with IV contrast 2/11- Wound/ulcer at the dorsal medial aspect of the midfoot, with a sinus tract/direct extension into the first tarsometatarsal joint, and foci of air in the first tarsometatarsal joint, findings highly suspicious for septic arthritis. 1.3 cm focal subcutaneous fluid collection with mild rim enhancement adjacent to the ulcer, indicative of a small abscess.  - Inpatient procedures:  2/8 s/p debr Left foot   2/15 s/p debr Left foot + NPWT  - continue wound vac therapy, change wound vac dressing every three days,   - WCx 2/8: MRSA, WCx 2/9: few MRSA, WCx 2/12: NG  - as per ID initially started on Rocephin 1 gm iv q24h; Flagyl 500 mg iv q12h; Vancomycin 1 gm iv q12h, -> on 2/14 recommended to stop Vanco, and start  Daptomycin 750 mg iv q24h, and continue Rocephin 1 gm iv q24h, Flagyl 500 mg iv q12h,  - on discharge continue Daptomycin 750 mg iv q24h x 6 weeks - end date 3/27/24  - pt awaiting PICC line placement - plan for today   - Pain management   - ambulate as tolerate     # Cards:   # Hx HTN and HLD  - vitals stable, continue to monitor   - Continue with home medications: HCTZ, metoprolol, rosuvastatin (atorvastatin inpatient)  - 2/12 patient c/o left sided Chest pain: EKG at baseline, CE w/ elevated trop noted 57, 53, 46, CXR left basilar opacity -> as per cardiology consult not ACS,  treat GERD. CP resolved   - Echo 2/12: EF 54%  - 2/13 pt c/o of right sided chest pain, reproducible. worse with movement, probably  muscular, started Toradol 15mg IV q6h x 2 days, chest pain resolved;    - Dash/TLC diet    # Pulm:   # COVID-19 with moderate-severe illness  # Hypoxia post - op 2/14 - required 2L O2 NC  - as per Pulm c/s 2/15:  --> CXR noted with vascular congestion and left basilar opacity, present since 2/8   --> Lung POCUS showed small left pleural effusion and mildly increased L lung B lines. R lung POCUS no effusion.  --> BNP - 992  --> Given Lasix 20mg IV x1 on 2/15   --> No window for thoracentesis right now.  --> COVID-19 positive since December 26, 2023, doubt active COVID infection causing hypoxia.   --> No need for steroids. Can do albuterol nebs PRN.   - Incentive spirometry.   - CXR repeated today shows Bilateral opacities and effusions greater on the  right, similar to prior.    # GI: Hx of GERD   - Continue home medication: famotidine   - bowel regimen ; Miralax daily, senna hs    # Renal/  - monitor electrolytes; replete prn    - cr at baseline 0.6     # Rheum: hx RA  - on methotrexate once a week and Remicade infusion once every 6 weeks  - hold meds for now until active infection has resolved and wound has healed.  - Rheumatology c/s 2/9: - fu pathology to r/o presence of tophi.  no tophi in other areas - recommend xrays :  - Xray hands 2/9 - No acute osseous abnormality. Severe bilateral hand osteoarthritis. No osseous erosion.  - xray right foot 2/9- No acute osseous abnormality. No osseous erosion. Diffuse osteoarthritis. Chronic appearing fracture at the second metatarsal neck with displacement and impaction.  - Methotrexate - holding for infection  - f/u outpatient     # ID:   #Left foot with abscess flexor aspect with surrounding cellulitis  #COVID-19 with moderate-severe illness  #MRSA bacteremia   - COVID 12/26/23: positive and COVID 2/7: positive,   - Bcx x2 2/7: MRSA  - BCx 2/9 x2: MRSA 2nd: NG@72hrs  - Bcx 2/10x2: NG@24hrs bcx 2/11 pending  - Wcx 12/24: moder Proteus mirabil   - WCx 2/8: few MRSA  - WCx 2/9: MRSA 2/9 NG Wcx 2/12: pending  - bone bx 2/8 chronic osteomyelitis  - as per ID initially started on Rocephin 1 gm iv q24h; Flagyl 500 mg iv q12h; Vancomycin 1 gm iv q12h, -> on 2/14 recommended to stop Vanco, and start  Daptomycin 750 mg iv q24h, and continue Rocephin 1 gm iv q24h, Flagyl 500 mg iv q12h,  - on discharge continue Daptomycin 750 mg iv q24h x 6 weeks - end date 3/27/24  - for COVID infection pt completed RDV 200mg IV daily 2/9-2/11, can take off isolation as per ID    # Psych: Hx of depression  - Continue Zoloft    # Miscellaneous  - Ambulate as tolerated  - DVT/GI ppx  - PT / OT  - SW for d/c planning   Plan discussed with pt and she is in agreement

## 2024-02-16 NOTE — DISCHARGE NOTE PROVIDER - NSFOLLOWUPCLINICS_GEN_ALL_ED_FT
[facility]  [facility]  [address], [address]  [place] [address]  Phone: [phone]  Fax:

## 2024-02-16 NOTE — DISCHARGE NOTE PROVIDER - HOSPITAL COURSE
Patient is a 83y old  Female with PMHx of HTN, HLD, GERD, RA on methotrexate, breast ca s/p lumpectomy in 2015, Depression, recent COVID 12/2023,   who presents with a chief complaint of LLE infected wound; found to have MRSA bacteremia.     Hospital course as follow:  # Full thickness wound of left foot; WCx + MRSA   - Xray left foot 2/7 No radiographic evidence of osteomyelitis. No acute fracture or dislocation. 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  - CT left foot with IV contrast 2/11- Wound/ulcer at the dorsal medial aspect of the midfoot, with a sinus tract/direct extension into the first tarsometatarsal joint, and foci of air in the first tarsometatarsal joint, findings highly suspicious for septic arthritis. 1.3 cm focal subcutaneous fluid collection with mild rim enhancement adjacent to the ulcer, indicative of a small abscess.  - Inpatient procedures:  2/8 s/p debr Left foot   2/15 s/p debr Left foot + NPWT  - continue wound vac therapy, change wound vac dressing every three days,   - WCx 2/8: MRSA, WCx 2/9: few MRSA, WCx 2/12: NG  - as per ID initially started on Rocephin 1 gm iv q24h; Flagyl 500 mg iv q12h; Vancomycin 1 gm iv q12h, -> on 2/14 recommended to stop Vanco, and start  Daptomycin 750 mg iv q24h, and continue Rocephin 1 gm iv q24h, Flagyl 500 mg iv q12h,  - on discharge continue Daptomycin 750 mg iv q24h x 6 weeks - end date 3/27/24  - PICC line placed 2/16  - Pain management   - ambulate as tolerate     # Cards:   # Hx HTN and HLD  - vitals stable, continue to monitor   - Continue with home medications: HCTZ, metoprolol, rosuvastatin (atorvastatin inpatient)  - 2/12 patient c/o left sided Chest pain: EKG at baseline, CE w/ elevated trop noted 57, 53, 46, CXR left basilar opacity -> as per cardiology consult not ACS,  treat GERD. CP resolved   - Echo 2/12: EF 54%  - Dash/TLC diet    # Pulm:   # COVID-19 with moderate-severe illness  # Hypoxia post - op 2/14 - required 2L O2 NC  - as per Pulm c/s 2/15:  --> CXR noted with vascular congestion and left basilar opacity, present since 2/8   --> Lung POCUS showed small left pleural effusion and mildly increased L lung B lines. R lung POCUS no effusion.  --> BNP - 992  --> Given Lasix 20mg IV x1 on 2/15  -> follow up 2/16 O2 sat improved, stable on RA  --> COVID-19 positive since December 26, 2023, doubt active COVID infection causing hypoxia.   --> No need for steroids. Can do albuterol nebs PRN.   - Incentive spirometry.       # GI: Hx of GERD   - Continue home medication: famotidine   - bowel regimen ; Miralax daily, senna hs    # Renal/  - monitor electrolytes; replete prn    - cr at baseline 0.6     # Rheum: hx RA  - on methotrexate once a week and Remicade infusion once every 6 weeks  - hold meds for now until active infection has resolved and wound has healed.  - Rheumatology c/s 2/9: - fu pathology to r/o presence of tophi.  no tophi in other areas - recommend xrays :  - Xray hands 2/9 - No acute osseous abnormality. Severe bilateral hand osteoarthritis. No osseous erosion.  - xray right foot 2/9- No acute osseous abnormality. No osseous erosion. Diffuse osteoarthritis. Chronic appearing fracture at the second metatarsal neck with displacement and impaction.  - Methotrexate - holding for infection  - f/u outpatient     # ID:   #Left foot with abscess flexor aspect with surrounding cellulitis  #COVID-19 with moderate-severe illness  #MRSA bacteremia   - COVID 12/26/23: positive and COVID 2/7: positive,   - Bcx x2 2/7: MRSA  - BCx 2/9 x2: MRSA 2nd: NG@72hrs  - Bcx 2/10x2: NG@24hrs bcx 2/11 pending  - Wcx 12/24: moder Proteus mirabil   - WCx 2/8: few MRSA  - WCx 2/9: MRSA 2/9 NG Wcx 2/12: pending  - bone bx 2/8 chronic osteomyelitis  - as per ID initially started on Rocephin 1 gm iv q24h; Flagyl 500 mg iv q12h; Vancomycin 1 gm iv q12h, -> on 2/14 recommended to stop Vanco, and start  Daptomycin 750 mg iv q24h, and continue Rocephin 1 gm iv q24h, Flagyl 500 mg iv q12h,  - on discharge continue Daptomycin 750 mg iv q24h x 6 weeks via PICC line -  end date 3/27/24  - for COVID infection pt completed RDV 200mg IV daily 2/9-2/11, can take off isolation as per ID    # Psych: Hx of depression  - Continue Zoloft    Pt stable to be discharge home with VNS to continue wound vac therapy, continue IV abx, and follow up with Dr Barone in [facility] in one-two weeks after discharge for further recommendations.    Patient is a 83y old  Female with PMHx of HTN, HLD, GERD, RA on methotrexate, breast ca s/p lumpectomy in 2015, Depression, recent COVID 12/2023,   who presents with a chief complaint of LLE infected wound; found to have MRSA bacteremia.     Hospital course as follow:  # Full thickness wound of left foot; WCx + MRSA   - Xray left foot 2/7 No radiographic evidence of osteomyelitis. No acute fracture or dislocation. 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  - CT left foot with IV contrast 2/11- Wound/ulcer at the dorsal medial aspect of the midfoot, with a sinus tract/direct extension into the first tarsometatarsal joint, and foci of air in the first tarsometatarsal joint, findings highly suspicious for septic arthritis. 1.3 cm focal subcutaneous fluid collection with mild rim enhancement adjacent to the ulcer, indicative of a small abscess.  - Inpatient procedures:  2/8 s/p debr Left foot   2/15 s/p debr Left foot + NPWT  - continue wound vac therapy, change wound vac dressing every three days, last change 2/19  - WCx 2/8: MRSA, WCx 2/9: few MRSA, WCx 2/12: NG  - as per ID initially started on Rocephin 1 gm iv q24h; Flagyl 500 mg iv q12h; Vancomycin 1 gm iv q12h, -> on 2/14 recommended to stop Vanco, and start  Daptomycin 750 mg iv q24h, and continue Rocephin 1 gm iv q24h, Flagyl 500 mg iv q12h,  - on discharge continue Daptomycin 750 mg iv q24h x 6 weeks - end date 3/27/24 -> check CK levels weekly, last CK 2/12: 15  - pt awaiting PICC line placement - completed friday 2/16  - Pain management   - ambulate as tolerate     # Cards:   # Hx HTN and HLD  - vitals stable, continue to monitor   - Continue with home medications: HCTZ, metoprolol, rosuvastatin (atorvastatin inpatient)  - 2/12 patient c/o left sided Chest pain: EKG at baseline, CE w/ elevated trop noted 57, 53, 46, CXR left basilar opacity -> as per cardiology consult not ACS,  treat GERD. CP resolved   - Echo 2/12: EF 54%  - 2/13 pt c/o of right sided chest pain, reproducible. worse with movement, probably  muscular, started Toradol 15mg IV q6h x 2 days, chest pain resolved;    - Dash/TLC diet  -  HTN may need to add additional anti-hypertensive -> continue to monitor    # Pulm:   # COVID-19 with moderate-severe illness  # Hypoxia post - op 2/14 - required 2L O2 NC  - as per Pulm c/s 2/15:  --> CXR noted with vascular congestion and left basilar opacity, present since 2/8   --> Lung POCUS showed small left pleural effusion and mildly increased L lung B lines. R lung POCUS no effusion.  --> BNP - 992  --> Given Lasix 20mg IV x1 on 2/15  -> follow up 2/16 O2 sat improved, stable on RA  --> COVID-19 positive since December 26, 2023, doubt active COVID infection causing hypoxia.   --> No need for steroids. Can do albuterol nebs PRN.   - Incentive spirometry.       # GI: Hx of GERD   - Continue home medication: famotidine   - bowel regimen ; Miralax daily, senna hs    # Renal/  - monitor electrolytes; replete prn    - cr at baseline 0.6     # Rheum: hx RA  - on methotrexate once a week and Remicade infusion once every 6 weeks  - hold meds for now until active infection has resolved and wound has healed.  - Rheumatology c/s 2/9: - fu pathology to r/o presence of tophi.  no tophi in other areas - recommend xrays :  - Xray hands 2/9 - No acute osseous abnormality. Severe bilateral hand osteoarthritis. No osseous erosion.  - xray right foot 2/9- No acute osseous abnormality. No osseous erosion. Diffuse osteoarthritis. Chronic appearing fracture at the second metatarsal neck with displacement and impaction.  - Methotrexate - holding for infection  - f/u outpatient     # ID:   #Left foot with abscess flexor aspect with surrounding cellulitis  #COVID-19 with moderate-severe illness  #MRSA bacteremia   - COVID 12/26/23: positive and COVID 2/7: positive,   - Bcx x2 2/7: MRSA  - BCx 2/9 x2: MRSA 2nd: NG@72hrs  - Bcx 2/10x2: NG@24hrs bcx 2/11 pending  - Wcx 12/24: moder Proteus mirabil   - WCx 2/8: few MRSA  - WCx 2/9: MRSA 2/9 NG Wcx 2/12: pending  - bone bx 2/8 chronic osteomyelitis  - as per ID initially started on Rocephin 1 gm iv q24h; Flagyl 500 mg iv q12h; Vancomycin 1 gm iv q12h, -> on 2/14 recommended to stop Vanco, and start  Daptomycin 750 mg iv q24h, and continue Rocephin 1 gm iv q24h, Flagyl 500 mg iv q12h,  - on discharge continue Daptomycin 750 mg iv q24h x 6 weeks via PICC line -  end date 3/27/24  - for COVID infection pt completed RDV 200mg IV daily 2/9-2/11, can take off isolation as per ID    # Psych: Hx of depression  - Continue Zoloft    Pt stable to be discharge to facility continue wound vac therapy, continue IV abx, and follow up with Dr Barone in [facility] in one-two weeks after discharge for further recommendations.

## 2024-02-16 NOTE — PROCEDURE NOTE - NSPROCDETAILS_GEN_ALL_CORE
location identified, draped/prepped, sterile technique used/sterile dressing applied/Trendelenburg position/ultrasound guidance
location identified, draped/prepped, sterile technique used/sterile dressing applied/supine position

## 2024-02-16 NOTE — DISCHARGE NOTE PROVIDER - NSDCFUADDAPPT_GEN_ALL_CORE_FT
Please call [phone] to make a follow up appointment within 1 week with Dr. Barone or Dr. Danielson. Clinic is located at [address] [place] [address] on Tuesdays (2-4pm) or Thursdays (9am-1pm).

## 2024-02-16 NOTE — PROGRESS NOTE ADULT - SUBJECTIVE AND OBJECTIVE BOX
Patient is a 83y old  Female with PMHx of HTN, HLD, GERD, RA on methotrexate, breast ca s/p lumpectomy in 2015, Depression, recent COVID 12/2023,   who presents with a chief complaint of LLE infected wound; found to have MRSA bacteremia .     INTERVAL HPI/OVERNIGHT EVENTS:  afebrile, no acute events overnight      Vital Signs Last 24 Hrs  T(C): 36.6 (16 Feb 2024 07:10), Max: 36.8 (15 Feb 2024 23:30)  T(F): 97.9 (16 Feb 2024 07:10), Max: 98.3 (15 Feb 2024 23:30)  HR: 73 (16 Feb 2024 07:10) (73 - 83)  BP: 139/102 (16 Feb 2024 07:10) (130/60 - 188/86)  BP(mean): 105 (16 Feb 2024 07:10) (105 - 105)  RR: 18 (16 Feb 2024 07:10) (18 - 18)  SpO2: 92% (16 Feb 2024 07:10) (92% - 98%)    O2 Parameters below as of 16 Feb 2024 07:10  Patient On (Oxygen Delivery Method): room air      I&O's Summary    15 Feb 2024 07:01  -  16 Feb 2024 07:00  --------------------------------------------------------  IN: 0 mL / OUT: 350 mL / NET: -350 mL      Allergies  No Known Allergies    Intolerances        Lab Results:                        10.9   9.62  )-----------( 364      ( 15 Feb 2024 12:02 )             32.7     02-15    130<L>  |  94<L>  |  18  ----------------------------<  110<H>  4.3   |  24  |  0.7    Ca    8.5      15 Feb 2024 12:02  Phos  3.6     02-15  Mg     1.9     02-15      MEDICATIONS  (STANDING):  aspirin  chewable 81 milliGRAM(s) Oral daily  atorvastatin 40 milliGRAM(s) Oral at bedtime  chlorhexidine 4% Liquid 1 Application(s) Topical two times a day  cholecalciferol 2000 Unit(s) Oral daily  DAPTOmycin IVPB 750 milliGRAM(s) IV Intermittent every 24 hours  enoxaparin Injectable 40 milliGRAM(s) SubCutaneous every 24 hours  famotidine    Tablet 20 milliGRAM(s) Oral two times a day  folic acid 1 milliGRAM(s) Oral daily  hydrochlorothiazide 50 milliGRAM(s) Oral daily  metoprolol succinate ER 25 milliGRAM(s) Oral daily  saccharomyces boulardii 250 milliGRAM(s) Oral two times a day  senna 2 Tablet(s) Oral at bedtime  sertraline 50 milliGRAM(s) Oral daily  sodium chloride 1 Gram(s) Oral two times a day    MEDICATIONS  (PRN):  acetaminophen     Tablet .. 650 milliGRAM(s) Oral every 6 hours PRN Temp greater or equal to 38C (100.4F), Mild Pain (1 - 3)  albuterol    90 MICROgram(s) HFA Inhaler 1 Puff(s) Inhalation every 6 hours PRN Shortness of Breath and/or Wheezing  morphine  - Injectable 2 milliGRAM(s) IV Push daily PRN wound care  oxycodone    5 mG/acetaminophen 325 mG 1 Tablet(s) Oral every 6 hours PRN Moderate Pain (4 - 6)    PHYSICAL EXAM:  GENERAL: Lying comfortably darya bed,  NAD, cooperative   NERVOUS SYSTEM:  Alert & Oriented, non focal exam   CHEST/LUNG: Breathing comfortably on RA, b/l chest rise appreciated, on 2L O2 NC  HEART: In no cardiopulmonary distress  SKIN: Left foot dorsal aspect with full thickness wound , wound vac dressing removed, wound base looks pink, and clean, no pus drainage, or bleeding. New wound vac dressing applied. Pt tolerated well.

## 2024-02-16 NOTE — DISCHARGE NOTE PROVIDER - NSDCCPTREATMENT_GEN_ALL_CORE_FT
PRINCIPAL PROCEDURE  Procedure: Selective debridement  Findings and Treatment: excisional debridement with scalpel , wound vac left foot      SECONDARY PROCEDURE  Procedure: Selective debridement  Findings and Treatment: excisional debridement with scalpel and drainage left foot, bone biopsy, bone culture

## 2024-02-16 NOTE — DISCHARGE NOTE PROVIDER - CARE PROVIDER_API CALL
Moe Barone  Plastic Surgery  [address] [place] [id number]  Phone: [phone]  Fax: [phone]  Follow Up Time:

## 2024-02-17 LAB
-  AMPICILLIN/SULBACTAM: SIGNIFICANT CHANGE UP
-  CEFAZOLIN: SIGNIFICANT CHANGE UP
-  CLINDAMYCIN: SIGNIFICANT CHANGE UP
-  DAPTOMYCIN: SIGNIFICANT CHANGE UP
-  ERYTHROMYCIN: SIGNIFICANT CHANGE UP
-  GENTAMICIN: SIGNIFICANT CHANGE UP
-  LINEZOLID: SIGNIFICANT CHANGE UP
-  OXACILLIN: SIGNIFICANT CHANGE UP
-  PENICILLIN: SIGNIFICANT CHANGE UP
-  RIFAMPIN: SIGNIFICANT CHANGE UP
-  TETRACYCLINE: SIGNIFICANT CHANGE UP
-  TRIMETHOPRIM/SULFAMETHOXAZOLE: SIGNIFICANT CHANGE UP
-  VANCOMYCIN: SIGNIFICANT CHANGE UP
ANION GAP SERPL CALC-SCNC: 13 MMOL/L — SIGNIFICANT CHANGE UP (ref 7–14)
BASOPHILS # BLD AUTO: 0.05 K/UL — SIGNIFICANT CHANGE UP (ref 0–0.2)
BASOPHILS NFR BLD AUTO: 0.5 % — SIGNIFICANT CHANGE UP (ref 0–1)
BUN SERPL-MCNC: 17 MG/DL — SIGNIFICANT CHANGE UP (ref 10–20)
CALCIUM SERPL-MCNC: 9 MG/DL — SIGNIFICANT CHANGE UP (ref 8.4–10.5)
CHLORIDE SERPL-SCNC: 95 MMOL/L — LOW (ref 98–110)
CO2 SERPL-SCNC: 25 MMOL/L — SIGNIFICANT CHANGE UP (ref 17–32)
CREAT SERPL-MCNC: 0.7 MG/DL — SIGNIFICANT CHANGE UP (ref 0.7–1.5)
EGFR: 85 ML/MIN/1.73M2 — SIGNIFICANT CHANGE UP
EOSINOPHIL # BLD AUTO: 0.23 K/UL — SIGNIFICANT CHANGE UP (ref 0–0.7)
EOSINOPHIL NFR BLD AUTO: 2.2 % — SIGNIFICANT CHANGE UP (ref 0–8)
GLUCOSE SERPL-MCNC: 107 MG/DL — HIGH (ref 70–99)
HCT VFR BLD CALC: 33.3 % — LOW (ref 37–47)
HGB BLD-MCNC: 11.1 G/DL — LOW (ref 12–16)
IMM GRANULOCYTES NFR BLD AUTO: 1.2 % — HIGH (ref 0.1–0.3)
LYMPHOCYTES # BLD AUTO: 1.32 K/UL — SIGNIFICANT CHANGE UP (ref 1.2–3.4)
LYMPHOCYTES # BLD AUTO: 12.9 % — LOW (ref 20.5–51.1)
MAGNESIUM SERPL-MCNC: 1.5 MG/DL — LOW (ref 1.8–2.4)
MCHC RBC-ENTMCNC: 31 PG — SIGNIFICANT CHANGE UP (ref 27–31)
MCHC RBC-ENTMCNC: 33.3 G/DL — SIGNIFICANT CHANGE UP (ref 32–37)
MCV RBC AUTO: 93 FL — SIGNIFICANT CHANGE UP (ref 81–99)
METHOD TYPE: SIGNIFICANT CHANGE UP
MONOCYTES # BLD AUTO: 0.72 K/UL — HIGH (ref 0.1–0.6)
MONOCYTES NFR BLD AUTO: 7 % — SIGNIFICANT CHANGE UP (ref 1.7–9.3)
NEUTROPHILS # BLD AUTO: 7.81 K/UL — HIGH (ref 1.4–6.5)
NEUTROPHILS NFR BLD AUTO: 76.2 % — HIGH (ref 42.2–75.2)
NRBC # BLD: 0 /100 WBCS — SIGNIFICANT CHANGE UP (ref 0–0)
PHOSPHATE SERPL-MCNC: 4 MG/DL — SIGNIFICANT CHANGE UP (ref 2.1–4.9)
PLATELET # BLD AUTO: 483 K/UL — HIGH (ref 130–400)
PMV BLD: 9.3 FL — SIGNIFICANT CHANGE UP (ref 7.4–10.4)
POTASSIUM SERPL-MCNC: 4.5 MMOL/L — SIGNIFICANT CHANGE UP (ref 3.5–5)
POTASSIUM SERPL-SCNC: 4.5 MMOL/L — SIGNIFICANT CHANGE UP (ref 3.5–5)
RBC # BLD: 3.58 M/UL — LOW (ref 4.2–5.4)
RBC # FLD: 14.9 % — HIGH (ref 11.5–14.5)
SODIUM SERPL-SCNC: 133 MMOL/L — LOW (ref 135–146)
WBC # BLD: 10.25 K/UL — SIGNIFICANT CHANGE UP (ref 4.8–10.8)
WBC # FLD AUTO: 10.25 K/UL — SIGNIFICANT CHANGE UP (ref 4.8–10.8)

## 2024-02-17 PROCEDURE — 99232 SBSQ HOSP IP/OBS MODERATE 35: CPT

## 2024-02-17 RX ORDER — METOPROLOL TARTRATE 50 MG
25 TABLET ORAL ONCE
Refills: 0 | Status: DISCONTINUED | OUTPATIENT
Start: 2024-02-17 | End: 2024-02-18

## 2024-02-17 RX ORDER — MAGNESIUM SULFATE 500 MG/ML
2 VIAL (ML) INJECTION
Refills: 0 | Status: COMPLETED | OUTPATIENT
Start: 2024-02-17 | End: 2024-02-17

## 2024-02-17 RX ADMIN — SERTRALINE 50 MILLIGRAM(S): 25 TABLET, FILM COATED ORAL at 11:28

## 2024-02-17 RX ADMIN — Medication 25 MILLIGRAM(S): at 05:10

## 2024-02-17 RX ADMIN — Medication 2000 UNIT(S): at 11:28

## 2024-02-17 RX ADMIN — SENNA PLUS 2 TABLET(S): 8.6 TABLET ORAL at 22:41

## 2024-02-17 RX ADMIN — Medication 1 MILLIGRAM(S): at 11:28

## 2024-02-17 RX ADMIN — Medication 25 GRAM(S): at 22:42

## 2024-02-17 RX ADMIN — SODIUM CHLORIDE 1 GRAM(S): 9 INJECTION INTRAMUSCULAR; INTRAVENOUS; SUBCUTANEOUS at 05:10

## 2024-02-17 RX ADMIN — CHLORHEXIDINE GLUCONATE 1 APPLICATION(S): 213 SOLUTION TOPICAL at 05:12

## 2024-02-17 RX ADMIN — ATORVASTATIN CALCIUM 40 MILLIGRAM(S): 80 TABLET, FILM COATED ORAL at 22:42

## 2024-02-17 RX ADMIN — Medication 81 MILLIGRAM(S): at 11:28

## 2024-02-17 RX ADMIN — ENOXAPARIN SODIUM 40 MILLIGRAM(S): 100 INJECTION SUBCUTANEOUS at 17:26

## 2024-02-17 RX ADMIN — Medication 250 MILLIGRAM(S): at 05:10

## 2024-02-17 RX ADMIN — FAMOTIDINE 20 MILLIGRAM(S): 10 INJECTION INTRAVENOUS at 05:11

## 2024-02-17 RX ADMIN — Medication 250 MILLIGRAM(S): at 17:26

## 2024-02-17 RX ADMIN — DAPTOMYCIN 130 MILLIGRAM(S): 500 INJECTION, POWDER, LYOPHILIZED, FOR SOLUTION INTRAVENOUS at 22:42

## 2024-02-17 RX ADMIN — CHLORHEXIDINE GLUCONATE 1 APPLICATION(S): 213 SOLUTION TOPICAL at 17:26

## 2024-02-17 RX ADMIN — FAMOTIDINE 20 MILLIGRAM(S): 10 INJECTION INTRAVENOUS at 17:26

## 2024-02-17 RX ADMIN — Medication 25 GRAM(S): at 19:01

## 2024-02-17 NOTE — PROGRESS NOTE ADULT - ASSESSMENT
Patient is a 83y old  Female with PMHx of HTN, HLD, GERD, RA on methotrexate, breast ca s/p lumpectomy in 2015, Depression, recent COVID 12/2023,   who presents with a chief complaint of LLE infected wound; found to have MRSA bacteremia .     # Full thickness wound of left foot; + MRSA   - xray left foot 2/7 No radiographic evidence of osteomyelitis. No acute fracture or dislocation. 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  - CT left foot with IV contrast 2/11- Wound/ulcer at the dorsal medial aspect of the midfoot, with a sinus tract/direct extension into the first tarsometatarsal joint, and foci of air in the first tarsometatarsal joint, findings highly suspicious for septic arthritis. 1.3 cm focal subcutaneous fluid collection with mild rim enhancement adjacent to the ulcer, indicative of a small abscess.  - Inpatient procedures:  2/8 s/p debr Left foot   2/15 s/p debr Left foot + NPWT  - continue wound vac therapy, change wound vac dressing every three days,   - WCx 2/8: MRSA, WCx 2/9: few MRSA, WCx 2/12: NG  - as per ID initially started on Rocephin 1 gm iv q24h; Flagyl 500 mg iv q12h; Vancomycin 1 gm iv q12h, -> on 2/14 recommended to stop Vanco, and start  Daptomycin 750 mg iv q24h, and continue Rocephin 1 gm iv q24h, Flagyl 500 mg iv q12h,  - on discharge continue Daptomycin 750 mg iv q24h x 6 weeks - end date 3/27/24  - Pain management   - ambulate as tolerate     # Cards:   # Hx HTN and HLD  - vitals stable, continue to monitor   - Continue with home medications: HCTZ, metoprolol, rosuvastatin (atorvastatin inpatient)  - 2/12 patient c/o left sided Chest pain: EKG at baseline, CE w/ elevated trop noted 57, 53, 46, CXR left basilar opacity -> as per cardiology consult not ACS,  treat GERD. CP resolved   - Echo 2/12: EF 54%  - 2/13 pt c/o of right sided chest pain, reproducible. worse with movement, probably  muscular, started Toradol 15mg IV q6h x 2 days, chest pain resolved;    - Dash/TLC diet    # Pulm:   # COVID-19 with moderate-severe illness  # Hypoxia post - op 2/14 - required 2L O2 NC  - as per Pulm c/s 2/15:  --> CXR noted with vascular congestion and left basilar opacity, present since 2/8   --> Lung POCUS showed small left pleural effusion and mildly increased L lung B lines. R lung POCUS no effusion.  --> BNP - 992  --> Given Lasix 20mg IV x1 on 2/15   --> No window for thoracentesis right now.  --> COVID-19 positive since December 26, 2023, doubt active COVID infection causing hypoxia.   --> No need for steroids. Can do albuterol nebs PRN.   - Incentive spirometry.   - CXR repeated today shows Bilateral opacities and effusions greater on the  right, similar to prior.    # GI: Hx of GERD   - Continue home medication: famotidine   - bowel regimen ; Miralax daily, senna hs    # Renal/  - monitor electrolytes; replete prn    - cr at baseline 0.6     # Rheum: hx RA  - on methotrexate once a week and Remicade infusion once every 6 weeks  - hold meds for now until active infection has resolved and wound has healed.  - Rheumatology c/s 2/9: - fu pathology to r/o presence of tophi.  no tophi in other areas - recommend xrays :  - Xray hands 2/9 - No acute osseous abnormality. Severe bilateral hand osteoarthritis. No osseous erosion.  - xray right foot 2/9- No acute osseous abnormality. No osseous erosion. Diffuse osteoarthritis. Chronic appearing fracture at the second metatarsal neck with displacement and impaction.  - Methotrexate - holding for infection  - f/u outpatient     # ID:   #Left foot with abscess flexor aspect with surrounding cellulitis  #COVID-19 with moderate-severe illness  #MRSA bacteremia   - COVID 12/26/23: positive and COVID 2/7: positive,   - Bcx x2 2/7: MRSA  - BCx 2/9 x2: MRSA 2nd: NG@72hrs  - Bcx 2/10x2: NG@24hrs bcx 2/11 pending  - Wcx 12/24: moder Proteus mirabil   - WCx 2/8: few MRSA  - WCx 2/9: MRSA 2/9 NG Wcx 2/12: pending  - bone bx 2/8 chronic osteomyelitis  - as per ID initially started on Rocephin 1 gm iv q24h; Flagyl 500 mg iv q12h; Vancomycin 1 gm iv q12h, -> on 2/14 recommended to stop Vanco, and start  Daptomycin 750 mg iv q24h, and continue Rocephin 1 gm iv q24h, Flagyl 500 mg iv q12h,  - on discharge continue Daptomycin 750 mg iv q24h x 6 weeks - end date 3/27/24  - for COVID infection pt completed RDV 200mg IV daily 2/9-2/11, can take off isolation as per ID    # Psych: Hx of depression  - Continue Zoloft    # Miscellaneous  - Ambulate as tolerated  - DVT/GI ppx  - PT / OT  - SW for d/c planning   Plan discussed with pt and she is in agreement

## 2024-02-17 NOTE — PROGRESS NOTE ADULT - SUBJECTIVE AND OBJECTIVE BOX
Patient is a 83y old  Female who presents with a chief complaint of Left foot wound (16 Feb 2024 16:01)    No acute events overnight. Today morning pt c/o mild chest and back pain increased on certain movements. Tenderness to Rt upperback.  No SOB, no dizziness,     Vital Signs Last 24 Hrs  T(C): 36.3 (17 Feb 2024 07:18), Max: 36.8 (17 Feb 2024 04:00)  T(F): 97.4 (17 Feb 2024 07:18), Max: 98.2 (17 Feb 2024 04:00)  HR: 73 (17 Feb 2024 07:18) (71 - 88)  BP: 121/82 (17 Feb 2024 07:18) (121/82 - 194/86)  BP(mean): 98 (17 Feb 2024 07:18) (96 - 124)  RR: 18 (17 Feb 2024 07:18) (17 - 19)  SpO2: 98% (17 Feb 2024 07:18) (90% - 98%)    Parameters below as of 17 Feb 2024 07:18  Patient On (Oxygen Delivery Method): room air      I&O's Summary    16 Feb 2024 07:01  -  17 Feb 2024 07:00  --------------------------------------------------------  IN: 378 mL / OUT: 790 mL / NET: -412 mL    17 Feb 2024 07:01  -  17 Feb 2024 12:47  --------------------------------------------------------  IN: 218 mL / OUT: 480 mL / NET: -262 mL        Meds:  MEDICATIONS  (STANDING):  aspirin  chewable 81 milliGRAM(s) Oral daily  atorvastatin 40 milliGRAM(s) Oral at bedtime  chlorhexidine 4% Liquid 1 Application(s) Topical two times a day  cholecalciferol 2000 Unit(s) Oral daily  DAPTOmycin IVPB 750 milliGRAM(s) IV Intermittent every 24 hours  enoxaparin Injectable 40 milliGRAM(s) SubCutaneous every 24 hours  famotidine    Tablet 20 milliGRAM(s) Oral two times a day  folic acid 1 milliGRAM(s) Oral daily  hydrochlorothiazide 50 milliGRAM(s) Oral daily  metoprolol succinate ER 25 milliGRAM(s) Oral daily  saccharomyces boulardii 250 milliGRAM(s) Oral two times a day  senna 2 Tablet(s) Oral at bedtime  sertraline 50 milliGRAM(s) Oral daily    MEDICATIONS  (PRN):  acetaminophen     Tablet .. 650 milliGRAM(s) Oral every 6 hours PRN Temp greater or equal to 38C (100.4F), Mild Pain (1 - 3)  albuterol    90 MICROgram(s) HFA Inhaler 1 Puff(s) Inhalation every 6 hours PRN Shortness of Breath and/or Wheezing  morphine  - Injectable 2 milliGRAM(s) IV Push daily PRN wound care  oxycodone    5 mG/acetaminophen 325 mG 1 Tablet(s) Oral every 6 hours PRN Moderate Pain (4 - 6)        Culture - Acid Fast - Other w/Smear (collected 14 Feb 2024 17:16)  Source: .Other None    Culture - Fungal, Other (collected 14 Feb 2024 17:16)  Source: .Other None  Preliminary Report (15 Feb 2024 09:48):    Testing in progress    Culture - Surgical Swab (collected 14 Feb 2024 17:16)  Source: .Surgical Swab None  Preliminary Report (16 Feb 2024 22:19):    Rare Staphylococcus aureus        Labs:                        11.1   10.25 )-----------( 483      ( 17 Feb 2024 11:15 )             33.3     02-16    133<L>  |  93<L>  |  21<H>  ----------------------------<  92  4.0   |  26  |  0.7    Ca    8.7      16 Feb 2024 11:01  Phos  3.3     02-16  Mg     1.6     02-16          PE: AAO x 3  VAC dressing to Rt foot in place. Serosang dc

## 2024-02-18 LAB
ANION GAP SERPL CALC-SCNC: 12 MMOL/L — SIGNIFICANT CHANGE UP (ref 7–14)
BASOPHILS # BLD AUTO: 0.04 K/UL — SIGNIFICANT CHANGE UP (ref 0–0.2)
BASOPHILS NFR BLD AUTO: 0.4 % — SIGNIFICANT CHANGE UP (ref 0–1)
BUN SERPL-MCNC: 13 MG/DL — SIGNIFICANT CHANGE UP (ref 10–20)
CALCIUM SERPL-MCNC: 9.4 MG/DL — SIGNIFICANT CHANGE UP (ref 8.4–10.5)
CHLORIDE SERPL-SCNC: 92 MMOL/L — LOW (ref 98–110)
CO2 SERPL-SCNC: 27 MMOL/L — SIGNIFICANT CHANGE UP (ref 17–32)
CREAT SERPL-MCNC: 0.7 MG/DL — SIGNIFICANT CHANGE UP (ref 0.7–1.5)
EGFR: 85 ML/MIN/1.73M2 — SIGNIFICANT CHANGE UP
EOSINOPHIL # BLD AUTO: 0.24 K/UL — SIGNIFICANT CHANGE UP (ref 0–0.7)
EOSINOPHIL NFR BLD AUTO: 2.4 % — SIGNIFICANT CHANGE UP (ref 0–8)
GLUCOSE SERPL-MCNC: 83 MG/DL — SIGNIFICANT CHANGE UP (ref 70–99)
HCT VFR BLD CALC: 33.4 % — LOW (ref 37–47)
HGB BLD-MCNC: 11 G/DL — LOW (ref 12–16)
IMM GRANULOCYTES NFR BLD AUTO: 0.8 % — HIGH (ref 0.1–0.3)
LYMPHOCYTES # BLD AUTO: 1.51 K/UL — SIGNIFICANT CHANGE UP (ref 1.2–3.4)
LYMPHOCYTES # BLD AUTO: 14.8 % — LOW (ref 20.5–51.1)
MAGNESIUM SERPL-MCNC: 1.7 MG/DL — LOW (ref 1.8–2.4)
MCHC RBC-ENTMCNC: 31.2 PG — HIGH (ref 27–31)
MCHC RBC-ENTMCNC: 32.9 G/DL — SIGNIFICANT CHANGE UP (ref 32–37)
MCV RBC AUTO: 94.6 FL — SIGNIFICANT CHANGE UP (ref 81–99)
MONOCYTES # BLD AUTO: 0.92 K/UL — HIGH (ref 0.1–0.6)
MONOCYTES NFR BLD AUTO: 9 % — SIGNIFICANT CHANGE UP (ref 1.7–9.3)
NEUTROPHILS # BLD AUTO: 7.39 K/UL — HIGH (ref 1.4–6.5)
NEUTROPHILS NFR BLD AUTO: 72.6 % — SIGNIFICANT CHANGE UP (ref 42.2–75.2)
NRBC # BLD: 0 /100 WBCS — SIGNIFICANT CHANGE UP (ref 0–0)
PHOSPHATE SERPL-MCNC: 4 MG/DL — SIGNIFICANT CHANGE UP (ref 2.1–4.9)
PLATELET # BLD AUTO: 445 K/UL — HIGH (ref 130–400)
PMV BLD: 9 FL — SIGNIFICANT CHANGE UP (ref 7.4–10.4)
POTASSIUM SERPL-MCNC: 4 MMOL/L — SIGNIFICANT CHANGE UP (ref 3.5–5)
POTASSIUM SERPL-SCNC: 4 MMOL/L — SIGNIFICANT CHANGE UP (ref 3.5–5)
RBC # BLD: 3.53 M/UL — LOW (ref 4.2–5.4)
RBC # FLD: 15 % — HIGH (ref 11.5–14.5)
SODIUM SERPL-SCNC: 131 MMOL/L — LOW (ref 135–146)
WBC # BLD: 10.18 K/UL — SIGNIFICANT CHANGE UP (ref 4.8–10.8)
WBC # FLD AUTO: 10.18 K/UL — SIGNIFICANT CHANGE UP (ref 4.8–10.8)

## 2024-02-18 PROCEDURE — 99232 SBSQ HOSP IP/OBS MODERATE 35: CPT

## 2024-02-18 RX ORDER — MAGNESIUM SULFATE 500 MG/ML
2 VIAL (ML) INJECTION ONCE
Refills: 0 | Status: COMPLETED | OUTPATIENT
Start: 2024-02-18 | End: 2024-02-18

## 2024-02-18 RX ORDER — METOPROLOL TARTRATE 50 MG
5 TABLET ORAL ONCE
Refills: 0 | Status: COMPLETED | OUTPATIENT
Start: 2024-02-18 | End: 2024-02-18

## 2024-02-18 RX ORDER — METOPROLOL TARTRATE 50 MG
25 TABLET ORAL ONCE
Refills: 0 | Status: COMPLETED | OUTPATIENT
Start: 2024-02-18 | End: 2024-02-18

## 2024-02-18 RX ORDER — SODIUM CHLORIDE 9 MG/ML
1 INJECTION INTRAMUSCULAR; INTRAVENOUS; SUBCUTANEOUS
Refills: 0 | Status: DISCONTINUED | OUTPATIENT
Start: 2024-02-18 | End: 2024-02-19

## 2024-02-18 RX ADMIN — Medication 25 MILLIGRAM(S): at 05:49

## 2024-02-18 RX ADMIN — DAPTOMYCIN 130 MILLIGRAM(S): 500 INJECTION, POWDER, LYOPHILIZED, FOR SOLUTION INTRAVENOUS at 21:52

## 2024-02-18 RX ADMIN — Medication 2000 UNIT(S): at 11:40

## 2024-02-18 RX ADMIN — Medication 250 MILLIGRAM(S): at 17:26

## 2024-02-18 RX ADMIN — CHLORHEXIDINE GLUCONATE 1 APPLICATION(S): 213 SOLUTION TOPICAL at 05:49

## 2024-02-18 RX ADMIN — FAMOTIDINE 20 MILLIGRAM(S): 10 INJECTION INTRAVENOUS at 05:49

## 2024-02-18 RX ADMIN — Medication 5 MILLIGRAM(S): at 00:06

## 2024-02-18 RX ADMIN — SERTRALINE 50 MILLIGRAM(S): 25 TABLET, FILM COATED ORAL at 11:39

## 2024-02-18 RX ADMIN — Medication 1 MILLIGRAM(S): at 11:39

## 2024-02-18 RX ADMIN — ATORVASTATIN CALCIUM 40 MILLIGRAM(S): 80 TABLET, FILM COATED ORAL at 21:52

## 2024-02-18 RX ADMIN — Medication 81 MILLIGRAM(S): at 11:40

## 2024-02-18 RX ADMIN — Medication 250 MILLIGRAM(S): at 05:49

## 2024-02-18 RX ADMIN — ENOXAPARIN SODIUM 40 MILLIGRAM(S): 100 INJECTION SUBCUTANEOUS at 17:25

## 2024-02-18 RX ADMIN — FAMOTIDINE 20 MILLIGRAM(S): 10 INJECTION INTRAVENOUS at 17:26

## 2024-02-18 RX ADMIN — CHLORHEXIDINE GLUCONATE 1 APPLICATION(S): 213 SOLUTION TOPICAL at 17:31

## 2024-02-18 RX ADMIN — SENNA PLUS 2 TABLET(S): 8.6 TABLET ORAL at 21:52

## 2024-02-18 RX ADMIN — Medication 25 MILLIGRAM(S): at 22:08

## 2024-02-18 RX ADMIN — Medication 25 GRAM(S): at 19:46

## 2024-02-18 NOTE — PROGRESS NOTE ADULT - ASSESSMENT
Patient is a 83y old  Female with PMHx of HTN, HLD, GERD, RA on methotrexate, breast ca s/p lumpectomy in 2015, Depression, recent COVID 12/2023,   who presents with a chief complaint of LLE infected wound; found to have MRSA bacteremia .     # Full thickness wound of left foot; + MRSA   - xray left foot 2/7 No radiographic evidence of osteomyelitis. No acute fracture or dislocation. 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  - CT left foot with IV contrast 2/11- Wound/ulcer at the dorsal medial aspect of the midfoot, with a sinus tract/direct extension into the first tarsometatarsal joint, and foci of air in the first tarsometatarsal joint, findings highly suspicious for septic arthritis. 1.3 cm focal subcutaneous fluid collection with mild rim enhancement adjacent to the ulcer, indicative of a small abscess.  - Inpatient procedures:  2/8 s/p debr Left foot   2/15 s/p debr Left foot + NPWT  - continue wound vac therapy, change wound vac dressing every three days,   - as per ID on discharge continue Daptomycin 750 mg iv q24h x 6 weeks - end date 3/27/24  - Pain management   - ambulate as tolerate     # Cards:   # Hx HTN and HLD  - vitals stable, continue to monitor   - Continue with home medications: HCTZ, metoprolol, rosuvastatin (atorvastatin inpatient)  - Dash/TLC diet    # GI: Hx of GERD   - Continue home medication: famotidine   - bowel regimen ; Miralax daily, senna hs    # Renal/  - monitor electrolytes; replete prn    - cr at baseline 0.6     # Rheum: hx RA  - on methotrexate once a week and Remicade infusion once every 6 weeks  - hold meds for now until active infection has resolved and wound has healed.  - f/u outpatient     # ID:   discharge continue Daptomycin 750 mg iv q24h x 6 weeks - end date 3/27/24      # Psych: Hx of depression  - Continue Zoloft    # Miscellaneous  - Ambulate as tolerated  - DVT/GI ppx  - PT / OT  -  for d/c planning   Plan discussed with pt and she is in agreement

## 2024-02-18 NOTE — PROGRESS NOTE ADULT - TIME BILLING
cont wound VAC  IV antibx  d/c planning for tomorrow
cont wound VAC  IV antibx  d/c planning for tomorrow
cont wound care  IV antibx  f/u CT scan
I participated in direct clinical care, patient counseling regarding current condition, communication with nursing staff, coordination with primary team and pertinent subspecialty providers, independent review of old records and hospitalizations, review of current active medications, review of available test results, and resident supervision.

## 2024-02-18 NOTE — PROGRESS NOTE ADULT - SUBJECTIVE AND OBJECTIVE BOX
Patient is a 83y old  Female who presents with a chief complaint of Left foot wound (16 Feb 2024 16:01)    No acute events overnight.     Vital Signs Last 24 Hrs  T(C): 37.1 (18 Feb 2024 07:15), Max: 37.1 (18 Feb 2024 07:15)  T(F): 98.7 (18 Feb 2024 07:15), Max: 98.7 (18 Feb 2024 07:15)  HR: 70 (18 Feb 2024 07:15) (70 - 84)  BP: 172/77 (18 Feb 2024 07:15) (115/55 - 197/100)  BP(mean): 111 (18 Feb 2024 07:15) (97 - 140)  RR: 18 (18 Feb 2024 07:15) (18 - 19)  SpO2: 98% (18 Feb 2024 07:15) (94% - 98%)    Parameters below as of 18 Feb 2024 07:15  Patient On (Oxygen Delivery Method): room air    I&O's Summary    17 Feb 2024 07:01  -  18 Feb 2024 07:00  --------------------------------------------------------  IN: 458 mL / OUT: 780 mL / NET: -322 mL    Meds:  MEDICATIONS  (STANDING):  aspirin  chewable 81 milliGRAM(s) Oral daily  atorvastatin 40 milliGRAM(s) Oral at bedtime  chlorhexidine 4% Liquid 1 Application(s) Topical two times a day  cholecalciferol 2000 Unit(s) Oral daily  DAPTOmycin IVPB 750 milliGRAM(s) IV Intermittent every 24 hours  enoxaparin Injectable 40 milliGRAM(s) SubCutaneous every 24 hours  famotidine    Tablet 20 milliGRAM(s) Oral two times a day  folic acid 1 milliGRAM(s) Oral daily  hydrochlorothiazide 50 milliGRAM(s) Oral daily  metoprolol succinate ER 25 milliGRAM(s) Oral daily  saccharomyces boulardii 250 milliGRAM(s) Oral two times a day  senna 2 Tablet(s) Oral at bedtime  sertraline 50 milliGRAM(s) Oral daily    MEDICATIONS  (PRN):  acetaminophen     Tablet .. 650 milliGRAM(s) Oral every 6 hours PRN Temp greater or equal to 38C (100.4F), Mild Pain (1 - 3)  albuterol    90 MICROgram(s) HFA Inhaler 1 Puff(s) Inhalation every 6 hours PRN Shortness of Breath and/or Wheezing  morphine  - Injectable 2 milliGRAM(s) IV Push daily PRN wound care  oxycodone    5 mG/acetaminophen 325 mG 1 Tablet(s) Oral every 6 hours PRN Moderate Pain (4 - 6)    LABS:                        11.1   10.25 )-----------( 483      ( 17 Feb 2024 11:15 )             33.3     17 Feb 2024 11:15    133    |  95     |  17     ----------------------------<  107    4.5     |  25     |  0.7      Ca    9.0        17 Feb 2024 11:15  Phos  4.0       17 Feb 2024 11:15  Mg     1.5       17 Feb 2024 11:15        PE: AAO x 3  VAC dressing to Rt foot in place. Sergonzalo lemus

## 2024-02-19 ENCOUNTER — NON-APPOINTMENT (OUTPATIENT)
Age: 84
End: 2024-02-19

## 2024-02-19 VITALS
HEART RATE: 87 BPM | RESPIRATION RATE: 18 BRPM | SYSTOLIC BLOOD PRESSURE: 167 MMHG | OXYGEN SATURATION: 93 % | DIASTOLIC BLOOD PRESSURE: 72 MMHG | TEMPERATURE: 99 F

## 2024-02-19 LAB
CK SERPL-CCNC: 18 U/L — SIGNIFICANT CHANGE UP (ref 0–225)
CULTURE RESULTS: ABNORMAL
ORGANISM # SPEC MICROSCOPIC CNT: ABNORMAL
ORGANISM # SPEC MICROSCOPIC CNT: SIGNIFICANT CHANGE UP
SPECIMEN SOURCE: SIGNIFICANT CHANGE UP

## 2024-02-19 PROCEDURE — 99238 HOSP IP/OBS DSCHRG MGMT 30/<: CPT

## 2024-02-19 RX ORDER — HYDRALAZINE HCL 50 MG
5 TABLET ORAL ONCE
Refills: 0 | Status: COMPLETED | OUTPATIENT
Start: 2024-02-19 | End: 2024-02-19

## 2024-02-19 RX ORDER — OXYCODONE AND ACETAMINOPHEN 5; 325 MG/1; MG/1
1 TABLET ORAL
Qty: 0 | Refills: 0 | DISCHARGE
Start: 2024-02-19

## 2024-02-19 RX ADMIN — CHLORHEXIDINE GLUCONATE 1 APPLICATION(S): 213 SOLUTION TOPICAL at 04:39

## 2024-02-19 RX ADMIN — FAMOTIDINE 20 MILLIGRAM(S): 10 INJECTION INTRAVENOUS at 04:39

## 2024-02-19 RX ADMIN — Medication 1 MILLIGRAM(S): at 11:45

## 2024-02-19 RX ADMIN — MORPHINE SULFATE 2 MILLIGRAM(S): 50 CAPSULE, EXTENDED RELEASE ORAL at 09:05

## 2024-02-19 RX ADMIN — Medication 250 MILLIGRAM(S): at 04:40

## 2024-02-19 RX ADMIN — MORPHINE SULFATE 2 MILLIGRAM(S): 50 CAPSULE, EXTENDED RELEASE ORAL at 08:50

## 2024-02-19 RX ADMIN — Medication 5 MILLIGRAM(S): at 07:00

## 2024-02-19 RX ADMIN — Medication 25 MILLIGRAM(S): at 04:40

## 2024-02-19 RX ADMIN — Medication 81 MILLIGRAM(S): at 11:45

## 2024-02-19 RX ADMIN — SODIUM CHLORIDE 1 GRAM(S): 9 INJECTION INTRAMUSCULAR; INTRAVENOUS; SUBCUTANEOUS at 04:40

## 2024-02-19 RX ADMIN — Medication 2000 UNIT(S): at 11:45

## 2024-02-19 RX ADMIN — SERTRALINE 50 MILLIGRAM(S): 25 TABLET, FILM COATED ORAL at 11:45

## 2024-02-19 NOTE — PROGRESS NOTE ADULT - PROVIDER SPECIALTY LIST ADULT
Burn
Infectious Disease

## 2024-02-19 NOTE — PROGRESS NOTE ADULT - ASSESSMENT
Patient is a 83y old  Female with PMHx of HTN, HLD, GERD, RA on methotrexate, breast ca s/p lumpectomy in 2015, Depression, recent COVID 12/2023,   who presents with a chief complaint of LLE infected wound; found to have MRSA bacteremia .     # Full thickness wound of left foot; + MRSA   - xray left foot 2/7 No radiographic evidence of osteomyelitis. No acute fracture or dislocation. 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  - CT left foot with IV contrast 2/11- Wound/ulcer at the dorsal medial aspect of the midfoot, with a sinus tract/direct extension into the first tarsometatarsal joint, and foci of air in the first tarsometatarsal joint, findings highly suspicious for septic arthritis. 1.3 cm focal subcutaneous fluid collection with mild rim enhancement adjacent to the ulcer, indicative of a small abscess.  - Inpatient procedures:  2/8 s/p debr Left foot   2/15 s/p debr Left foot + NPWT  - continue wound vac therapy, change wound vac dressing every three days,   - WCx 2/8: MRSA, WCx 2/9: few MRSA, WCx 2/12: NG  - as per ID initially started on Rocephin 1 gm iv q24h; Flagyl 500 mg iv q12h; Vancomycin 1 gm iv q12h, -> on 2/14 recommended to stop Vanco, and start  Daptomycin 750 mg iv q24h, and continue Rocephin 1 gm iv q24h, Flagyl 500 mg iv q12h,  - on discharge continue Daptomycin 750 mg iv q24h x 6 weeks - end date 3/27/24 -> check CK levels weekly, last CK 2/12: 15  - pt awaiting PICC line placement - completed friday 2/16  - Pain management   - ambulate as tolerate     # Cards:   # Hx HTN and HLD  - vitals stable, continue to monitor   - Continue with home medications: HCTZ, metoprolol, rosuvastatin (atorvastatin inpatient)  - 2/12 patient c/o left sided Chest pain: EKG at baseline, CE w/ elevated trop noted 57, 53, 46, CXR left basilar opacity -> as per cardiology consult not ACS,  treat GERD. CP resolved   - Echo 2/12: EF 54%  - 2/13 pt c/o of right sided chest pain, reproducible. worse with movement, probably  muscular, started Toradol 15mg IV q6h x 2 days, chest pain resolved;    - Dash/TLC diet  -  HTN may need to add additional anti-hypertensive -> continue to monitor    # Pulm:   # COVID-19 with moderate-severe illness  # Hypoxia post - op 2/14 - required 2L O2 NC  - as per Pulm c/s 2/15:  --> CXR noted with vascular congestion and left basilar opacity, present since 2/8   --> Lung POCUS showed small left pleural effusion and mildly increased L lung B lines. R lung POCUS no effusion.  --> BNP - 992  --> Given Lasix 20mg IV x1 on 2/15   --> No window for thoracentesis right now.  --> COVID-19 positive since December 26, 2023, doubt active COVID infection causing hypoxia.   --> No need for steroids. Can do albuterol nebs PRN.   - Incentive spirometry.   - CXR repeated today shows Bilateral opacities and effusions greater on the  right, similar to prior.    # GI: Hx of GERD   - Continue home medication: famotidine   - bowel regimen ; Miralax daily, senna hs    # Renal/  - monitor electrolytes; replete prn    - cr at baseline 0.6     # Rheum: hx RA  - on methotrexate once a week and Remicade infusion once every 6 weeks  - hold meds for now until active infection has resolved and wound has healed.  - Rheumatology c/s 2/9: - fu pathology to r/o presence of tophi.  no tophi in other areas - recommend xrays :  - Xray hands 2/9 - No acute osseous abnormality. Severe bilateral hand osteoarthritis. No osseous erosion.  - xray right foot 2/9- No acute osseous abnormality. No osseous erosion. Diffuse osteoarthritis. Chronic appearing fracture at the second metatarsal neck with displacement and impaction.  - Methotrexate - holding for infection  - f/u outpatient     # ID:   #Left foot with abscess flexor aspect with surrounding cellulitis  #COVID-19 with moderate-severe illness  #MRSA bacteremia   - COVID 12/26/23: positive and COVID 2/7: positive,   - Bcx x2 2/7: MRSA  - BCx 2/9 x2: MRSA 2nd: NG@72hrs  - Bcx 2/10x2: NG@24hrs bcx 2/11 pending  - Wcx 12/24: moder Proteus mirabil   - WCx 2/8: few MRSA  - WCx 2/9: MRSA 2/9 NG Wcx 2/12: pending  - bone bx 2/8 chronic osteomyelitis  - as per ID initially started on Rocephin 1 gm iv q24h; Flagyl 500 mg iv q12h; Vancomycin 1 gm iv q12h, -> on 2/14 recommended to stop Vanco, and start  Daptomycin 750 mg iv q24h, and continue Rocephin 1 gm iv q24h, Flagyl 500 mg iv q12h,  - on discharge continue Daptomycin 750 mg iv q24h x 6 weeks - end date 3/27/24  - for COVID infection pt completed RDV 200mg IV daily 2/9-2/11, can take off isolation as per ID    # Psych: Hx of depression  - Continue Zoloft    # Miscellaneous  - Ambulate as tolerated  - DVT/GI ppx  - PT / OT  -  for d/c planning   Plan discussed with pt and she is in agreement - going to [facility]

## 2024-02-19 NOTE — PROGRESS NOTE ADULT - REASON FOR ADMISSION
Left foot wound

## 2024-02-19 NOTE — PROGRESS NOTE ADULT - SUBJECTIVE AND OBJECTIVE BOX
Patient is a 83y old  Female with PMHx of HTN, HLD, GERD, RA on methotrexate, breast ca s/p lumpectomy in 2015, Depression, recent COVID 12/2023,   who presents with a chief complaint of LLE infected wound; found to have MRSA bacteremia .     INTERVAL HPI/OVERNIGHT EVENTS:  afebrile, no acute events overnight  dispo planning  hypomagnesemia repleted overnight    Events past 24 hrs***  Vital Signs Last 24 Hrs  T(C): 37.2 (19 Feb 2024 07:00), Max: 37.2 (18 Feb 2024 23:15)  T(F): 99 (19 Feb 2024 07:00), Max: 99 (19 Feb 2024 07:00)  HR: 87 (19 Feb 2024 07:00) (70 - 87)  BP: 167/72 (19 Feb 2024 07:00) (161/72 - 201/93)  BP(mean): 104 (19 Feb 2024 07:00) (104 - 134)  RR: 18 (19 Feb 2024 07:00) (16 - 18)  SpO2: 93% (19 Feb 2024 07:00) (93% - 98%)    Parameters below as of 19 Feb 2024 07:00  Patient On (Oxygen Delivery Method): room air    I&O's Detail    18 Feb 2024 07:01  -  19 Feb 2024 07:00  --------------------------------------------------------  IN:  Total IN: 0 mL    OUT:    Voided (mL): 1750 mL  Total OUT: 1750 mL    Total NET: -1750 mL    MEDICATIONS  (STANDING):  aspirin  chewable 81 milliGRAM(s) Oral daily  atorvastatin 40 milliGRAM(s) Oral at bedtime  chlorhexidine 4% Liquid 1 Application(s) Topical two times a day  cholecalciferol 2000 Unit(s) Oral daily  DAPTOmycin IVPB 750 milliGRAM(s) IV Intermittent every 24 hours  enoxaparin Injectable 40 milliGRAM(s) SubCutaneous every 24 hours  famotidine    Tablet 20 milliGRAM(s) Oral two times a day  folic acid 1 milliGRAM(s) Oral daily  hydrochlorothiazide 50 milliGRAM(s) Oral daily  metoprolol succinate ER 25 milliGRAM(s) Oral daily  saccharomyces boulardii 250 milliGRAM(s) Oral two times a day  senna 2 Tablet(s) Oral at bedtime  sertraline 50 milliGRAM(s) Oral daily  sodium chloride 1 Gram(s) Oral two times a day    MEDICATIONS  (PRN):  acetaminophen     Tablet .. 650 milliGRAM(s) Oral every 6 hours PRN Temp greater or equal to 38C (100.4F), Mild Pain (1 - 3)  albuterol    90 MICROgram(s) HFA Inhaler 1 Puff(s) Inhalation every 6 hours PRN Shortness of Breath and/or Wheezing  morphine  - Injectable 2 milliGRAM(s) IV Push daily PRN wound care  oxycodone    5 mG/acetaminophen 325 mG 1 Tablet(s) Oral every 6 hours PRN Moderate Pain (4 - 6)    Allergies  No Known Allergies  Intolerances    Lab Results:                        11.0   10.18 )-----------( 445      ( 18 Feb 2024 11:25 )             33.4     02-18    131<L>  |  92<L>  |  13  ----------------------------<  83  4.0   |  27  |  0.7    Ca    9.4      18 Feb 2024 11:25  Phos  4.0     02-18  Mg     1.7     02-18    Urinalysis Basic - ( 18 Feb 2024 11:25 )    Color: x / Appearance: x / SG: x / pH: x  Gluc: 83 mg/dL / Ketone: x  / Bili: x / Urobili: x   Blood: x / Protein: x / Nitrite: x   Leuk Esterase: x / RBC: x / WBC x   Sq Epi: x / Non Sq Epi: x / Bacteria: x    PHYSICAL EXAM:  GENERAL: Lying comfortably darya bed,  NAD, cooperative   NERVOUS SYSTEM:  Alert & Oriented, non focal exam   CHEST/LUNG: Breathing comfortably on RA, b/l chest rise appreciated, on 2L O2 NC  HEART: In no cardiopulmonary distress  SKIN: Left foot dorsal aspect with full thickness wound , wound vac dressing removed, wound base looks pink, and clean, no pus drainage, or bleeding. New wound vac dressing applied. Pt tolerated well.

## 2024-02-20 ENCOUNTER — NON-APPOINTMENT (OUTPATIENT)
Age: 84
End: 2024-02-20

## 2024-02-20 LAB — SURGICAL PATHOLOGY STUDY: SIGNIFICANT CHANGE UP

## 2024-02-21 ENCOUNTER — NON-APPOINTMENT (OUTPATIENT)
Age: 84
End: 2024-02-21

## 2024-02-21 NOTE — CDI QUERY NOTE - NSCDIOTHERTXTBX_GEN_ALL_CORE_HH
Clinical documentation and/or evidence of the patient’s presentation, evaluation, and medical management, as evidenced below, may support a diagnosis that is not documented in the medical record.  In order to ensure accurate coding and accuracy of the clinical record, the documentation in this patient’s medical record requires additional clarification.  If you think the supporting documentation and/or clinical evidence supports a more specific diagnosis, please include more specific documentation of a diagnosis associated with these findings in your progress note and/or discharge summary.      Please clarify if MRSA bacteremia/Left foot septic arthritis can be further specified as:      -	MRSA bacteremia/Left foot septic arthritis with sepsis  -	MRSA bacteremia/Left foot septic arthritis without sepsis  -	Other (please specify):         Supporting documentation and/or clinical evidence:     2/7 ED Provider Note: Was this patient treated for sepsis? Yes … Bacterial etiology    2/9 Consult Note Adult-Infectious Disease Attending: L foot with abscess flexor aspect with surrounding cellulitis    2/12 Progress Note Adult-Burn NP/Attending: MRSA bacteremia … BC + for MRSA …     2/16 Consult note Adult-Pulmonology Attending: LLE septic arthritis with abscess s/p debridement; ORSA bacteremia    2/16 Chart Note-Event note: For MRSA bacteremia/Left foot sepsis arthritis, she is planned for 6 week course of Daptomycin.     2/19 Progress Note Adult-Burn Attending: Septic arthritis    2/19 Discharge Note Provider: LLE infected full thickness wound; found to have MRSA bacteremia; Left foot with abscess flexor aspect with surrounding cellulitis …     Vital Signs;   2/7 (2018) Temp 101, HR 98; (2130) Screened for sepsis = Yes    Labs:   2/7: (1317) WBC 12.07; (2000) WBC 17.63  2/7 Blood culture x 2: MRSA  2/8 (1329) Foot wound culture: MRSA; (1515) Tissue culture: MRSA; (1515) Surgical Swab culture: MRSA  2/9: BCx: MRSA  2/9 Wound Culture: MRSA  2/14 Surgical wound culture: MRSA    Orders:   2/7: LR 1L at 75mL/hr  2/7: Azithromycin 500mg IV (ind: PNA)  2/7- 2/9: Cefepime 2G IV (ind; pna/wound infection/cellulitis/bacteremia)  2/8 – 2/13: Vancomycin 1G IV (ind: bacteremia)  2/9 – 2/15: Rocephin 1G IV Q24H (ind: empiric)  2/9 – 2/15: Active: Flagyl (ind: empiric)  2/13 - 2/16: Daptomycin 600mg Q24H (ind: wound)      Thank you,  Georgian Oropeza CDI, RN, BSN  [phone]

## 2024-02-21 NOTE — CHART NOTE - NSCHARTNOTEFT_GEN_A_CORE
Chart Reviewed     For MRSA bacteremia/Left Foot Sepsis arthritis, she is planned for 6 week course of Daptomycin.   Will need PICC line.   Duration for 6 weeks - end date 3/27/24    - Weekly CBC, CMP, ESR/CRP, CK weekly  - ID follow-up with Dr. Faustino Lozada for Telehealth. We will call the patient between 10:30-1:30      [id number]        [phone]       Fax [phone]      Please call or message on Microsoft Teams if with any questions.  Spectra [id number]
Pt's diagnosis   MRSA bacteremia / left foot cellulitis and septic arthritis without sepsis.
Called by RN this AM for elevated BP likely 2/2 to pain under patient's left breast - patient states it feels like there is "pit" there, patient denied any other symptoms, BP elevated to SBP 180s, otherwise VSS patient A&O x 3, EKG at baseline, troponin elevated to 57 - cardiology fellow called at [phone] - reviewed EKG - and rec'd repeat troponin in 2 hours - stated if lower and no more chest pain no need  for cardiology consult --> repeat troponin was lower at 53 --> but patient complained of similar pain after eating - patient takes famotidine BID for GERD as well - cardiology fellow recalled, stated no need for cardiology consult at this time as this is not ACS and to treat GERD. Troponin reordered for 8pm and EKG reordered. Also patient's PCP was consulted regarding elevated BP for medication management.
Pt required vascular access for IV fluids and anitbiotics. Midline placed in LUE arm w/o complication.  Pt also requires CT w/ IV contrast which cannot be done through midline. US guided IV placed in left forearm for CT.  No complications, pt shelton well.

## 2024-02-23 DIAGNOSIS — M06.9 RHEUMATOID ARTHRITIS, UNSPECIFIED: ICD-10-CM

## 2024-02-23 DIAGNOSIS — Z92.3 PERSONAL HISTORY OF IRRADIATION: ICD-10-CM

## 2024-02-23 DIAGNOSIS — B95.62 METHICILLIN RESISTANT STAPHYLOCOCCUS AUREUS INFECTION AS THE CAUSE OF DISEASES CLASSIFIED ELSEWHERE: ICD-10-CM

## 2024-02-23 DIAGNOSIS — Z79.52 LONG TERM (CURRENT) USE OF SYSTEMIC STEROIDS: ICD-10-CM

## 2024-02-23 DIAGNOSIS — S91.302A UNSPECIFIED OPEN WOUND, LEFT FOOT, INITIAL ENCOUNTER: ICD-10-CM

## 2024-02-23 DIAGNOSIS — E83.42 HYPOMAGNESEMIA: ICD-10-CM

## 2024-02-23 DIAGNOSIS — Z90.710 ACQUIRED ABSENCE OF BOTH CERVIX AND UTERUS: ICD-10-CM

## 2024-02-23 DIAGNOSIS — L03.116 CELLULITIS OF LEFT LOWER LIMB: ICD-10-CM

## 2024-02-23 DIAGNOSIS — M00.072 STAPHYLOCOCCAL ARTHRITIS, LEFT ANKLE AND FOOT: ICD-10-CM

## 2024-02-23 DIAGNOSIS — F32.A DEPRESSION, UNSPECIFIED: ICD-10-CM

## 2024-02-23 DIAGNOSIS — R78.81 BACTEREMIA: ICD-10-CM

## 2024-02-23 DIAGNOSIS — E87.6 HYPOKALEMIA: ICD-10-CM

## 2024-02-23 DIAGNOSIS — L02.612 CUTANEOUS ABSCESS OF LEFT FOOT: ICD-10-CM

## 2024-02-23 DIAGNOSIS — U07.1 COVID-19: ICD-10-CM
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
## 2024-02-23 DIAGNOSIS — Z90.49 ACQUIRED ABSENCE OF OTHER SPECIFIED PARTS OF DIGESTIVE TRACT: ICD-10-CM
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
## 2024-02-23 DIAGNOSIS — L97.526 NON-PRESSURE CHRONIC ULCER OF OTHER PART OF LEFT FOOT WITH BONE INVOLVEMENT WITHOUT EVIDENCE OF NECROSIS: ICD-10-CM

## 2024-02-23 DIAGNOSIS — Z96.643 PRESENCE OF ARTIFICIAL HIP JOINT, BILATERAL: ICD-10-CM

## 2024-02-23 DIAGNOSIS — J96.01 ACUTE RESPIRATORY FAILURE WITH HYPOXIA: ICD-10-CM

## 2024-02-23 DIAGNOSIS — Z96.652 PRESENCE OF LEFT ARTIFICIAL KNEE JOINT: ICD-10-CM

## 2024-02-23 DIAGNOSIS — K21.9 GASTRO-ESOPHAGEAL REFLUX DISEASE WITHOUT ESOPHAGITIS: ICD-10-CM

## 2024-02-23 DIAGNOSIS — E83.39 OTHER DISORDERS OF PHOSPHORUS METABOLISM: ICD-10-CM

## 2024-03-05 ENCOUNTER — APPOINTMENT (OUTPATIENT)
Dept: BURN CARE | Facility: CLINIC | Age: 84
End: 2024-03-05

## 2024-03-09 LAB
CULTURE RESULTS: SIGNIFICANT CHANGE UP
SPECIMEN SOURCE: SIGNIFICANT CHANGE UP

## 2024-03-14 ENCOUNTER — APPOINTMENT (OUTPATIENT)
Dept: BURN CARE | Facility: CLINIC | Age: 84
End: 2024-03-14

## 2024-03-16 LAB
CULTURE RESULTS: SIGNIFICANT CHANGE UP
SPECIMEN SOURCE: SIGNIFICANT CHANGE UP

## 2024-03-26 NOTE — PATIENT PROFILE ADULT - NSPRONUTRITIONRISK_GEN_A_NUR
63 year old female presenting with 3 week history of worsening SOB and persistent cough. CT chest revealing multifocal ground glass and consolidative opacities involving all 5 lobes, most predominant in the bilateral lower lobes and right upper lobe. 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 No indicators present

## 2024-03-27 LAB
CULTURE RESULTS: SIGNIFICANT CHANGE UP
SPECIMEN SOURCE: SIGNIFICANT CHANGE UP

## 2024-04-03 LAB
CULTURE RESULTS: SIGNIFICANT CHANGE UP
SPECIMEN SOURCE: SIGNIFICANT CHANGE UP

## 2024-05-20 ENCOUNTER — APPOINTMENT (OUTPATIENT)
Dept: HEMATOLOGY ONCOLOGY | Facility: CLINIC | Age: 84
End: 2024-05-20

## 2024-06-18 ENCOUNTER — APPOINTMENT (OUTPATIENT)
Age: 84
End: 2024-06-18

## 2024-07-11 ENCOUNTER — APPOINTMENT (OUTPATIENT)
Dept: BURN CARE | Facility: CLINIC | Age: 84
End: 2024-07-11
Payer: MEDICARE

## 2024-07-11 ENCOUNTER — OUTPATIENT (OUTPATIENT)
Dept: OUTPATIENT SERVICES | Facility: HOSPITAL | Age: 84
LOS: 1 days | End: 2024-07-11
Payer: MEDICARE

## 2024-07-11 DIAGNOSIS — Z90.710 ACQUIRED ABSENCE OF BOTH CERVIX AND UTERUS: Chronic | ICD-10-CM

## 2024-07-11 DIAGNOSIS — Z96.641 PRESENCE OF RIGHT ARTIFICIAL HIP JOINT: Chronic | ICD-10-CM

## 2024-07-11 DIAGNOSIS — Z96.652 PRESENCE OF LEFT ARTIFICIAL KNEE JOINT: Chronic | ICD-10-CM

## 2024-07-11 DIAGNOSIS — Z98.890 OTHER SPECIFIED POSTPROCEDURAL STATES: Chronic | ICD-10-CM

## 2024-07-11 DIAGNOSIS — Z00.8 ENCOUNTER FOR OTHER GENERAL EXAMINATION: ICD-10-CM

## 2024-07-11 DIAGNOSIS — Z90.49 ACQUIRED ABSENCE OF OTHER SPECIFIED PARTS OF DIGESTIVE TRACT: Chronic | ICD-10-CM

## 2024-07-11 DIAGNOSIS — Z96.642 PRESENCE OF LEFT ARTIFICIAL HIP JOINT: Chronic | ICD-10-CM

## 2024-07-11 PROCEDURE — 99213 OFFICE O/P EST LOW 20 MIN: CPT

## 2024-07-15 DIAGNOSIS — Z98.890 OTHER SPECIFIED POSTPROCEDURAL STATES: ICD-10-CM

## 2024-07-15 DIAGNOSIS — S91.302D UNSPECIFIED OPEN WOUND, LEFT FOOT, SUBSEQUENT ENCOUNTER: ICD-10-CM

## 2024-08-09 ENCOUNTER — NON-APPOINTMENT (OUTPATIENT)
Age: 84
End: 2024-08-09

## 2024-08-22 ENCOUNTER — APPOINTMENT (OUTPATIENT)
Dept: BURN CARE | Facility: CLINIC | Age: 84
End: 2024-08-22
Payer: MEDICARE

## 2024-08-22 VITALS
HEIGHT: 68 IN | WEIGHT: 180 LBS | BODY MASS INDEX: 27.28 KG/M2 | DIASTOLIC BLOOD PRESSURE: 66 MMHG | SYSTOLIC BLOOD PRESSURE: 135 MMHG | HEART RATE: 76 BPM

## 2024-08-22 DIAGNOSIS — S21.102A UNSPECIFIED OPEN WOUND OF LEFT FRONT WALL OF THORAX W/OUT PENETRATION INTO THORACIC CAVITY, INITIAL ENCOUNTER: ICD-10-CM

## 2024-08-22 PROCEDURE — 99213 OFFICE O/P EST LOW 20 MIN: CPT

## 2024-08-22 NOTE — PHYSICAL EXAM
[de-identified] :  Subjective:   - Summary: Ms. Dalton Kwong, an 83-year-old female, presents for a follow-up visit regarding her chronic wound on the left foot and a new wound on the left clavicle area.   - Chief Complaint (CC): 1. Chronic wound to the left foot. 2. New wound to the left clavicle area after skin tag removal, with tenderness.   - History of Present Illness: Ms. Dalton Kwong is an 83-year-old female patient who presents for a follow-up visit regarding her chronic wound on the left foot and a new wound on the left clavicle area. The patient reports that she had a skin tag removed from the left clavicle area, which resulted in a new wound. The wound was treated with compression, but the patient is complaining of tenderness in that area. Additionally, the patient has been dealing with a chronic wound on her left foot, which has been an ongoing concern. The onset and duration of these wounds are not specified in the provided information. The patient's medical history includes cobalt toxicity, although the details of this condition are not provided.   - Past Medical History: The patient has a past medical history of cobalt toxicity, although the details of this condition are not provided in the given information.   - Past Surgical History: No specific information about the patient's past surgical history is provided.   - Family History: No information about the patient's family history is provided.   - Social History: No information about the patient's social history is provided.   - Review of Systems: No specific information about the review of systems is provided.   - Medications: No information about the patient's current medications is provided.   - Allergies: No information about the patient's allergies is provided.   Objective:   - Diagnostic Results: No diagnostic results are provided in the given information.   - Vital Signs: No vital signs are provided in the given information.   - Physical Examination (PE): No physical examination findings are provided in the given information.   Assessment and Plan:   - Chronic Wound on Left Foot: The patient has a chronic wound on her left foot, which has been an ongoing concern. Further assessment and evaluation of the wound are needed to determine the appropriate treatment plan.     - Therapeutic Interventions: Evaluate the wound and consider appropriate wound care management, such as dressing changes, debridement, or topical treatments.      - Diagnostic Tests: Order any necessary diagnostic tests, such as wound cultures or imaging studies, to assess the wound and rule out any underlying complications.      - Referrals: Consider referral to a wound care specialist or vascular surgeon if the wound is not responding to treatment or if there are concerns about underlying vascular issues.      - Patient Education: Provide education on proper wound care, signs of infection, and the importance of adherence to the treatment plan.      - Follow-Up: Schedule a follow-up appointment to monitor the progress of the wound and adjust the treatment plan as needed.    - New Wound on Left Clavicle Area: The patient has a new wound on the left clavicle area after having a skin tag removed. The wound was treated with compression, but the patient is experiencing tenderness.     - Therapeutic Interventions: Evaluate the wound and consider appropriate wound care management, such as dressing changes, topical treatments, or antibiotics if infection is suspected.      - Diagnostic Tests: Order any necessary diagnostic tests, such as wound cultures or imaging studies, to assess the wound and rule out any underlying complications.      - Referrals: Consider referral to a wound care specialist or plastic surgeon if the wound is not healing properly or if there are concerns about scarring or cosmetic issues.      - Patient Education: Provide education on proper wound care, signs of infection, and the importance of adherence to the treatment plan.      - Follow-Up: Schedule a follow-up appointment to monitor the progress of the wound and adjust the treatment plan as needed.    - Hansen Toxicity: The patient has a history of cobalt toxicity, although the details of this condition are not provided.     - Therapeutic Interventions: Evaluate the patient's current status and management of cobalt toxicity. Consider any necessary adjustments to treatment or monitoring.      - Diagnostic Tests: Order any necessary laboratory tests or imaging studies to assess the patient's cobalt levels and monitor for potential complications.      - Referrals: Consider referral to a  or specialist in metal toxicity if the patient's condition requires specialized management.      - Patient Education: Provide education on the potential risks and complications associated with cobalt toxicity, as well as the importance of adherence to the treatment plan.      - Follow-Up: Schedule regular follow-up appointments to monitor the patient's cobalt levels and overall health status.

## 2024-08-26 LAB — BACTERIA SPEC CULT: ABNORMAL

## 2024-09-03 RX ORDER — CIPROFLOXACIN HYDROCHLORIDE 500 MG/1
500 TABLET, FILM COATED ORAL
Qty: 14 | Refills: 0 | Status: ACTIVE | COMMUNITY
Start: 2024-09-03 | End: 1900-01-01

## 2024-09-04 ENCOUNTER — APPOINTMENT (OUTPATIENT)
Age: 84
End: 2024-09-04
Payer: MEDICARE

## 2024-09-04 ENCOUNTER — OUTPATIENT (OUTPATIENT)
Dept: OUTPATIENT SERVICES | Facility: HOSPITAL | Age: 84
LOS: 1 days | End: 2024-09-04
Payer: MEDICARE

## 2024-09-04 VITALS
SYSTOLIC BLOOD PRESSURE: 118 MMHG | DIASTOLIC BLOOD PRESSURE: 81 MMHG | BODY MASS INDEX: 27.13 KG/M2 | TEMPERATURE: 98 F | HEIGHT: 68 IN | RESPIRATION RATE: 16 BRPM | OXYGEN SATURATION: 98 % | HEART RATE: 83 BPM | WEIGHT: 179 LBS

## 2024-09-04 DIAGNOSIS — Z17.0 MALIGNANT NEOPLASM OF UPPER-OUTER QUADRANT OF RIGHT FEMALE BREAST: ICD-10-CM

## 2024-09-04 DIAGNOSIS — Z96.652 PRESENCE OF LEFT ARTIFICIAL KNEE JOINT: Chronic | ICD-10-CM

## 2024-09-04 DIAGNOSIS — Z96.641 PRESENCE OF RIGHT ARTIFICIAL HIP JOINT: Chronic | ICD-10-CM

## 2024-09-04 DIAGNOSIS — Z90.710 ACQUIRED ABSENCE OF BOTH CERVIX AND UTERUS: Chronic | ICD-10-CM

## 2024-09-04 DIAGNOSIS — Z96.642 PRESENCE OF LEFT ARTIFICIAL HIP JOINT: Chronic | ICD-10-CM

## 2024-09-04 DIAGNOSIS — Z98.890 OTHER SPECIFIED POSTPROCEDURAL STATES: Chronic | ICD-10-CM

## 2024-09-04 DIAGNOSIS — C50.411 MALIGNANT NEOPLASM OF UPPER-OUTER QUADRANT OF RIGHT FEMALE BREAST: ICD-10-CM

## 2024-09-04 DIAGNOSIS — Z90.49 ACQUIRED ABSENCE OF OTHER SPECIFIED PARTS OF DIGESTIVE TRACT: Chronic | ICD-10-CM

## 2024-09-04 PROCEDURE — 99213 OFFICE O/P EST LOW 20 MIN: CPT

## 2024-09-04 NOTE — HISTORY OF PRESENT ILLNESS
[de-identified] : This is a  77-year-old white female is here today for a followup visit.  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  After surgery, she received partial breast radiotherapy.  Since 03/2016, she has been on adjuvant endocrine therapy initially with anastrozole, and then switched to exemestane.  But she switched back to anastrozole.  She still has body and joint aching.  She also has RA and has been on MTX.  She is no longer on Prednisone. She follows with Dr. Wiley regularly.  Last visit was in March 2018.\par  \par  On 05/04/2016, she had a bone density which showed osteopenia in the hip.  Her bone density in the spine and femoral neck are normal.  Compared to the report from previous year, her bone density has been stable.  Her mammogram was done 10/24/16 and showed post surgical change including a 3.8 cm seroma in the right breast, stable.  There was no evidence of malignancy.  Latest mammogram was done 12/11/17 which showed no mammographic evidence of malignancy. Stable postsurgical changes  of the right breast. \par   [de-identified] : In 6/2017, right breast dx mammo did not showed abnormal finding.  On 5/4/2016, Bone  Density showed -----------------------------------------------------------------  Region                                      BMD        T-score    Z-score      Classification  -----------------------------------------------------------------  AP  Spine  (L1,  L2,  L3)           1.059        0.4            2.8          Normal  Femoral  Neck  (Left)                0.787      -0.6            1.6           Normal  Total  Hip  (Left)                        0.740      -1.7            0.2          Osteopenia  1/3  Radius  (Left)                0.510      -3.1          -0.3           -----------------------------------------------------------------   She is scheduled for right knee replacement. She reports body aching.   On 1029/18: The patient is here today for followup visit. She has been taking anastrozole daily and tolerated. She has stable chronic joint aching. In 7/2018, she had bone density which showed normal bone density in lumbar spine. Due to b/l hp replacement, bone density study was not able performed in the hip and femoral neck. She had right breast dx mammo and sonogram on 6/2018. There was no abnormal finding.  3/20/19: The patient is here for followup visit. She has been taking anastrozole daily and tolerated. She has stable chronic joint aching. In 7/2018, she had bone density which showed normal bone density in lumbar spine. Due to b/l hp replacement, bone density study was not able performed in the hip and femoral neck. In 12/2018, she had b/l dx mammo and there was no suspicious finding. She has left knee pain and will have replacement surgery in 4/2019.  9/18/19: The patient is here for followup visit. She has been taking anastrozole daily and tolerated. She has stable chronic joint aching. In 7/2018, she had bone density which showed normal bone density in lumbar spine. Due to b/l hp replacement, bone density study was not able performed in the hip and femoral neck. In 12/2018, she had b/l dx mammo and there was no suspicious finding. She had left knee replacement surgery in 4/2019.  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  She offers no new complaints. She has chronic joint pains.  She takes Anastrozole daily since 3/3016.  Last mammogram was done on 12/2019 which showed no evidence of malignancy.  Last bone density was normal on 7/2018.  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  Patient denies any new palpable breast lumps or pain, denies skin changes, denies nipple discharge. She offers no complaints.  She completed 5 years of  Anastrozole in 3/2021.  Last mammogram was done on 12/2021 which showed no evidence of malignancy.  Last bone density was normal on 9/2022.  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  Patient denies any new palpable breast lumps or pain, denies skin changes, denies nipple discharge. She offers no complaints.  She completed 5 years of  Anastrozole in 3/2021.  Last mammogram was done on 03/31/23  which showed There is no mammographic evidence of malignancy.   Last bone density was normal on 9/2022.  9/4/24 Elenita is here for follow up visit. She has stage IA ER/WY positive and HER2/estrella negative invasive well differentiated ductal carcinoma of the right breast. She completed 5 years of Anastrozole in 3/2021.  Last mammogram was done on 03/31/23 which showed no mammographic evidence of malignancy. She is in the NH x 6 months due to MRSA infection in left foot, s/p IV antibiotics and rehab and was discharged on 5/7/24. She follows up with Dr Wiley for RA and receives infusion. Patient denies any new palpable breast lumps or pain, denies skin changes, denies nipple discharge. She offers no complaints.

## 2024-09-04 NOTE — PHYSICAL EXAM
[Restricted in physically strenuous activity but ambulatory and able to carry out work of a light or sedentary nature] : Status 1- Restricted in physically strenuous activity but ambulatory and able to carry out work of a light or sedentary nature, e.g., light house work, office work [Normal] : affect appropriate [de-identified] : Uses a cane. [de-identified] : Right breast status post lumpectomy.  There is a lump of induration at the surgical scar- unchanged.  There is no palpable mass and no palpable right axillary l ymphadenopathy.  Left breast and left axilla are normal.

## 2024-09-04 NOTE — ASSESSMENT
[FreeTextEntry1] : 1. 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 There is no evidence of disease. 2. Rheumatoid arthritis.  RECOMMENDATION:  -- Breast exam today did not reveal palpable abnormality.  Bilateral screening mammo in 3/29/23 showed no suspicious findings. She is due for mammogram, script given -- Continue calcium and vitamin D supplements.  -- Followup with PCP for health maintenance.  -- Followup with Dr. Wiley for rheumatoid arthritis. -- RTO for followup in 1 year. She will call us if she has concerns.

## 2024-09-05 DIAGNOSIS — C50.411 MALIGNANT NEOPLASM OF UPPER-OUTER QUADRANT OF RIGHT FEMALE BREAST: ICD-10-CM

## 2024-09-09 ENCOUNTER — RESULT REVIEW (OUTPATIENT)
Age: 84
End: 2024-09-09

## 2024-09-09 ENCOUNTER — OUTPATIENT (OUTPATIENT)
Dept: OUTPATIENT SERVICES | Facility: HOSPITAL | Age: 84
LOS: 1 days | End: 2024-09-09
Payer: MEDICARE

## 2024-09-09 DIAGNOSIS — Z12.31 ENCOUNTER FOR SCREENING MAMMOGRAM FOR MALIGNANT NEOPLASM OF BREAST: ICD-10-CM

## 2024-09-09 DIAGNOSIS — Z90.49 ACQUIRED ABSENCE OF OTHER SPECIFIED PARTS OF DIGESTIVE TRACT: Chronic | ICD-10-CM

## 2024-09-09 DIAGNOSIS — Z96.652 PRESENCE OF LEFT ARTIFICIAL KNEE JOINT: Chronic | ICD-10-CM

## 2024-09-09 DIAGNOSIS — Z96.641 PRESENCE OF RIGHT ARTIFICIAL HIP JOINT: Chronic | ICD-10-CM

## 2024-09-09 DIAGNOSIS — Z96.642 PRESENCE OF LEFT ARTIFICIAL HIP JOINT: Chronic | ICD-10-CM

## 2024-09-09 PROCEDURE — 77067 SCR MAMMO BI INCL CAD: CPT

## 2024-09-09 PROCEDURE — 77063 BREAST TOMOSYNTHESIS BI: CPT

## 2024-09-09 PROCEDURE — 77063 BREAST TOMOSYNTHESIS BI: CPT | Mod: 26

## 2024-09-09 PROCEDURE — 77067 SCR MAMMO BI INCL CAD: CPT | Mod: 26

## 2024-09-10 DIAGNOSIS — Z12.31 ENCOUNTER FOR SCREENING MAMMOGRAM FOR MALIGNANT NEOPLASM OF BREAST: ICD-10-CM

## 2024-09-12 ENCOUNTER — NON-APPOINTMENT (OUTPATIENT)
Age: 84
End: 2024-09-12

## 2024-09-19 ENCOUNTER — APPOINTMENT (OUTPATIENT)
Dept: BURN CARE | Facility: CLINIC | Age: 84
End: 2024-09-19
Payer: MEDICARE

## 2024-09-19 ENCOUNTER — OUTPATIENT (OUTPATIENT)
Dept: OUTPATIENT SERVICES | Facility: HOSPITAL | Age: 84
LOS: 1 days | End: 2024-09-19
Payer: MEDICARE

## 2024-09-19 VITALS
BODY MASS INDEX: 27.37 KG/M2 | OXYGEN SATURATION: 96 % | WEIGHT: 180 LBS | HEART RATE: 75 BPM | SYSTOLIC BLOOD PRESSURE: 151 MMHG | DIASTOLIC BLOOD PRESSURE: 78 MMHG

## 2024-09-19 DIAGNOSIS — Z96.652 PRESENCE OF LEFT ARTIFICIAL KNEE JOINT: Chronic | ICD-10-CM

## 2024-09-19 DIAGNOSIS — Z98.890 OTHER SPECIFIED POSTPROCEDURAL STATES: Chronic | ICD-10-CM

## 2024-09-19 DIAGNOSIS — Z00.8 ENCOUNTER FOR OTHER GENERAL EXAMINATION: ICD-10-CM

## 2024-09-19 DIAGNOSIS — Z90.49 ACQUIRED ABSENCE OF OTHER SPECIFIED PARTS OF DIGESTIVE TRACT: Chronic | ICD-10-CM

## 2024-09-19 DIAGNOSIS — Z96.642 PRESENCE OF LEFT ARTIFICIAL HIP JOINT: Chronic | ICD-10-CM

## 2024-09-19 PROCEDURE — 99213 OFFICE O/P EST LOW 20 MIN: CPT

## 2024-09-20 DIAGNOSIS — L91.8 OTHER HYPERTROPHIC DISORDERS OF THE SKIN: ICD-10-CM

## 2024-09-20 DIAGNOSIS — S21.102D UNSPECIFIED OPEN WOUND OF LEFT FRONT WALL OF THORAX WITHOUT PENETRATION INTO THORACIC CAVITY, SUBSEQUENT ENCOUNTER: ICD-10-CM

## 2024-09-20 DIAGNOSIS — S91.302D UNSPECIFIED OPEN WOUND, LEFT FOOT, SUBSEQUENT ENCOUNTER: ICD-10-CM

## 2024-09-20 DIAGNOSIS — Z98.890 OTHER SPECIFIED POSTPROCEDURAL STATES: ICD-10-CM

## 2024-10-03 ENCOUNTER — RESULT REVIEW (OUTPATIENT)
Age: 84
End: 2024-10-03

## 2024-10-03 ENCOUNTER — OUTPATIENT (OUTPATIENT)
Dept: OUTPATIENT SERVICES | Facility: HOSPITAL | Age: 84
LOS: 1 days | End: 2024-10-03
Payer: MEDICARE

## 2024-10-03 ENCOUNTER — APPOINTMENT (OUTPATIENT)
Dept: BURN CARE | Facility: CLINIC | Age: 84
End: 2024-10-03

## 2024-10-03 ENCOUNTER — APPOINTMENT (OUTPATIENT)
Dept: PODIATRY | Facility: CLINIC | Age: 84
End: 2024-10-03
Payer: MEDICARE

## 2024-10-03 DIAGNOSIS — Z96.642 PRESENCE OF LEFT ARTIFICIAL HIP JOINT: Chronic | ICD-10-CM

## 2024-10-03 DIAGNOSIS — Z98.890 OTHER SPECIFIED POSTPROCEDURAL STATES: Chronic | ICD-10-CM

## 2024-10-03 DIAGNOSIS — M21.969 UNSPECIFIED ACQUIRED DEFORMITY OF UNSPECIFIED LOWER LEG: ICD-10-CM

## 2024-10-03 DIAGNOSIS — Z96.641 PRESENCE OF RIGHT ARTIFICIAL HIP JOINT: Chronic | ICD-10-CM

## 2024-10-03 DIAGNOSIS — Z00.00 ENCOUNTER FOR GENERAL ADULT MEDICAL EXAMINATION WITHOUT ABNORMAL FINDINGS: ICD-10-CM

## 2024-10-03 DIAGNOSIS — L97.529 NON-PRESSURE CHRONIC ULCER OF OTHER PART OF LEFT FOOT WITH UNSPECIFIED SEVERITY: ICD-10-CM

## 2024-10-03 DIAGNOSIS — Z90.49 ACQUIRED ABSENCE OF OTHER SPECIFIED PARTS OF DIGESTIVE TRACT: Chronic | ICD-10-CM

## 2024-10-03 DIAGNOSIS — Z90.710 ACQUIRED ABSENCE OF BOTH CERVIX AND UTERUS: Chronic | ICD-10-CM

## 2024-10-03 DIAGNOSIS — Z96.652 PRESENCE OF LEFT ARTIFICIAL KNEE JOINT: Chronic | ICD-10-CM

## 2024-10-03 PROCEDURE — 99203 OFFICE O/P NEW LOW 30 MIN: CPT

## 2024-10-03 PROCEDURE — 73630 X-RAY EXAM OF FOOT: CPT | Mod: 26,LT

## 2024-10-03 PROCEDURE — 99213 OFFICE O/P EST LOW 20 MIN: CPT

## 2024-10-03 NOTE — OCCUPATIONAL THERAPY INITIAL EVALUATION ADULT - THERAPY FREQUENCY, OT EVAL
"RN spoke with Cris about incidental finding on CT chest on 10/2. Impression #2 was \"coronary artery calcium moderate or severe\"    Amy Raya, RN  Pulmonary Nurse Care Coordinator    "
[facility] Call Center    Phone Message    May a detailed message be left on voicemail: yes     Reason for Call: Other: Cris from [employer] is calling to speak to someone about an incidental finding. Please call Cris back at ph: [phone].     Action Taken: Message routed to:  [facility] ([facility]): Pulm     Travel Screening: Not Applicable     Date of Service: 10/3  
2-3x/week

## 2024-10-04 DIAGNOSIS — M21.969 UNSPECIFIED ACQUIRED DEFORMITY OF UNSPECIFIED LOWER LEG: ICD-10-CM

## 2024-10-04 PROBLEM — L97.529 NON-PRESSURE CHRONIC ULCER OF OTHER PART OF LEFT FOOT: Status: ACTIVE | Noted: 2024-10-04

## 2024-10-04 NOTE — HISTORY OF PRESENT ILLNESS
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 Pt has had multiple infections over the ulceration

## 2024-10-04 NOTE — PHYSICAL EXAM
[General Appearance - Alert] : alert [General Appearance - In No Acute Distress] : in no acute distress [1+] : left foot dorsalis pedis 1+ [de-identified] : mikal prominences over the metatarsal-cuneiform joint, left foot  [FreeTextEntry1] : noted ulceration overlying the mikal prominence, measuring 0.2cm x 0.2cm. no probe to bone, no erythema

## 2024-10-11 DIAGNOSIS — M21.969 UNSPECIFIED ACQUIRED DEFORMITY OF UNSPECIFIED LOWER LEG: ICD-10-CM
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
## 2024-10-11 DIAGNOSIS — L97.529 NON-PRESSURE CHRONIC ULCER OF OTHER PART OF LEFT FOOT WITH UNSPECIFIED SEVERITY: ICD-10-CM

## 2024-10-16 ENCOUNTER — NON-APPOINTMENT (OUTPATIENT)
Age: 84
End: 2024-10-16

## 2024-10-17 NOTE — H&P PST ADULT - DATE OF LAST VACCINATION
No care due was identified.  [facility] Embedded Care Due Messages. Reference number: [id number].   10/17/2024 11:55:42 AM CDT  
21-Feb-2021

## 2024-10-17 NOTE — H&P PST ADULT - VENOUS THROMBOEMBOLISM FOR WOMEN ONLY
Health Maintenance       Colorectal Cancer Screen (Colonoscopy - Every 10 Years)  Scheduled for 11/7/2024    Pneumococcal Vaccine 0-64 (2 of 2 - PCV)  Overdue since 4/7/2024    Breast Cancer Screening (Every 2 Years)  Overdue since 5/23/2024    Cervical Cancer Screening (HPV/Cotest - Every 6 Months)  Overdue since 7/11/2024           Following review of the above:  Patient is not proceeding with:   and Pneumococcal    Note: Refer to final orders and clinician documentation.       (0) indicator not present

## 2024-11-08 ENCOUNTER — OUTPATIENT (OUTPATIENT)
Dept: OUTPATIENT SERVICES | Facility: HOSPITAL | Age: 84
LOS: 1 days | End: 2024-11-08
Payer: MEDICARE

## 2024-11-08 VITALS
OXYGEN SATURATION: 97 % | SYSTOLIC BLOOD PRESSURE: 150 MMHG | RESPIRATION RATE: 18 BRPM | WEIGHT: 190.04 LBS | HEART RATE: 70 BPM | DIASTOLIC BLOOD PRESSURE: 80 MMHG | TEMPERATURE: 98 F | HEIGHT: 68 IN

## 2024-11-08 DIAGNOSIS — Z90.710 ACQUIRED ABSENCE OF BOTH CERVIX AND UTERUS: Chronic | ICD-10-CM

## 2024-11-08 DIAGNOSIS — M21.969 UNSPECIFIED ACQUIRED DEFORMITY OF UNSPECIFIED LOWER LEG: ICD-10-CM

## 2024-11-08 DIAGNOSIS — Z96.652 PRESENCE OF LEFT ARTIFICIAL KNEE JOINT: Chronic | ICD-10-CM

## 2024-11-08 DIAGNOSIS — Z90.49 ACQUIRED ABSENCE OF OTHER SPECIFIED PARTS OF DIGESTIVE TRACT: Chronic | ICD-10-CM

## 2024-11-08 DIAGNOSIS — Z98.890 OTHER SPECIFIED POSTPROCEDURAL STATES: Chronic | ICD-10-CM

## 2024-11-08 DIAGNOSIS — Z96.642 PRESENCE OF LEFT ARTIFICIAL HIP JOINT: Chronic | ICD-10-CM

## 2024-11-08 DIAGNOSIS — Z96.641 PRESENCE OF RIGHT ARTIFICIAL HIP JOINT: Chronic | ICD-10-CM

## 2024-11-08 DIAGNOSIS — Z01.818 ENCOUNTER FOR OTHER PREPROCEDURAL EXAMINATION: ICD-10-CM

## 2024-11-08 LAB
ALBUMIN SERPL ELPH-MCNC: 4.2 G/DL — SIGNIFICANT CHANGE UP (ref 3.5–5.2)
ALP SERPL-CCNC: 79 U/L — SIGNIFICANT CHANGE UP (ref 30–115)
ALT FLD-CCNC: 21 U/L — SIGNIFICANT CHANGE UP (ref 0–41)
ANION GAP SERPL CALC-SCNC: 12 MMOL/L — SIGNIFICANT CHANGE UP (ref 7–14)
AST SERPL-CCNC: 26 U/L — SIGNIFICANT CHANGE UP (ref 0–41)
BILIRUB SERPL-MCNC: 0.6 MG/DL — SIGNIFICANT CHANGE UP (ref 0.2–1.2)
BUN SERPL-MCNC: 25 MG/DL — HIGH (ref 10–20)
CALCIUM SERPL-MCNC: 10 MG/DL — SIGNIFICANT CHANGE UP (ref 8.4–10.5)
CHLORIDE SERPL-SCNC: 96 MMOL/L — LOW (ref 98–110)
CO2 SERPL-SCNC: 28 MMOL/L — SIGNIFICANT CHANGE UP (ref 17–32)
CREAT SERPL-MCNC: 1.1 MG/DL — SIGNIFICANT CHANGE UP (ref 0.7–1.5)
EGFR: 49 ML/MIN/1.73M2 — LOW
GLUCOSE SERPL-MCNC: 101 MG/DL — HIGH (ref 70–99)
POTASSIUM SERPL-MCNC: 3.9 MMOL/L — SIGNIFICANT CHANGE UP (ref 3.5–5)
POTASSIUM SERPL-SCNC: 3.9 MMOL/L — SIGNIFICANT CHANGE UP (ref 3.5–5)
PROT SERPL-MCNC: 6.8 G/DL — SIGNIFICANT CHANGE UP (ref 6–8)
SODIUM SERPL-SCNC: 136 MMOL/L — SIGNIFICANT CHANGE UP (ref 135–146)

## 2024-11-08 PROCEDURE — 93010 ELECTROCARDIOGRAM REPORT: CPT

## 2024-11-08 PROCEDURE — 99214 OFFICE O/P EST MOD 30 MIN: CPT | Mod: 25

## 2024-11-08 PROCEDURE — 93005 ELECTROCARDIOGRAM TRACING: CPT

## 2024-11-08 PROCEDURE — 80053 COMPREHEN METABOLIC PANEL: CPT

## 2024-11-08 PROCEDURE — 36415 COLL VENOUS BLD VENIPUNCTURE: CPT

## 2024-11-08 NOTE — H&P PST ADULT - HEIGHT IN FEET
My apologies, [facility]     Please [facility] and have them re-faxed and let Sandra know I will work on it as soon as I get them.     Joel Wegener,MD     5

## 2024-11-08 NOTE — H&P PST ADULT - REASON FOR ADMISSION
Price (Use Numbers Only, No Special Characters Or $): 407 Price (Use Numbers Only, No Special Characters Or $): 925 Case Type: OP Non-block TimeSuite: CASProceduralist: Nitin Steevns  Confirmed Surgery DateTime: 11- - 0:00PAST DateTime: 11- - 10:30Procedure: PARTIAL EXCISION OF BONE LEFT FOOT  ERP?: UnavailableLaterality: LeftLength of Procedure: 60 Minutes  Anesthesia Type: Local Standby

## 2024-11-08 NOTE — H&P PST ADULT - HISTORY OF PRESENT ILLNESS
Patient is a 84 year old  female presenting to PAST in preparation for 11- on PARTIAL EXCISION OF BONE LEFT FOOT under  Local Standby anesthesia by Dr.Ilya Stevens  .    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 unlikely to heal with conservative care  pt would benefit from partial resection of overlying bone prominences  referred for xrays  will schedule for surgery"  Today, patient states she is experiencing generalized body aches due to her rheumatoid arthritis.  States her left foot will occasional ache due to wound.    PATIENT CURRENTLY DENIES CHEST PAIN  SHORTNESS OF BREATH  PALPITATIONS,  DYSURIA, OR UPPER RESPIRATORY INFECTION IN PAST 2 WEEKS    denies travel outside the [place] in the past 30 days    Anesthesia Alert  YES--Difficult Airway CLASS 3 AIRWAY  NO--History of neck surgery or radiation  NO--Limited ROM of neck  NO--History of Malignant hyperthermia  NO--No personal or family history of Pseudocholinesterase deficiency.  NO--Prior Anesthesia Complication  NO--Latex Allergy  NO--Loose teeth  YES--History of Rheumatoid Arthritis- on Methotrexate and Celebrex  YES--Bleeding risk- taking Baby ASA 81mg  NO--MACRINA  NO--Other_____    PT DENIES ANY RASHES, ABRASION, OR OPEN WOUNDS OR CUTS    AS PER THE PATIENT, THIS IS HIS/HER COMPLETE MEDICAL AND SURGICAL HX, INCLUDING MEDICATIONS PRESCRIBED AND OVER THE COUNTER    Patient communicated understanding of instructions and was given the opportunity to ask questions and have them answered.    pt denies any suicidal ideation or thoughts, pt states not a threat to self or others    Revised Cardiac Risk Index for Pre-Operative Risk from [email or web address]  on 11/8/2024  ** All calculations should be rechecked by clinician prior to use **    RESULT SUMMARY:  0 points  Class I Risk    3.9 %  30-day risk of death, MI, or cardiac arrest    From Duceppe 2017. These numbers are higher than those from the original study (Saurabh 1999). See Evidence for details.      INPUTS:  Elevated-risk surgery —> 0 = No  History of ischemic heart disease —> 0 = No  History of congestive heart failure —> 0 = No  History of cerebrovascular disease —> 0 = No  Pre-operative treatment with insulin —> 0 = No  Pre-operative creatinine >2 mg/dL / 176.8 µmol/L —> 0 = No      Duke Activity Status Index (DASI) from [email or web address]  on 11/8/2024  ** All calculations should be rechecked by clinician prior to use **    RESULT SUMMARY:  18.95 points  The higher the score (maximum 58.2), the higher the functional status.    5.07 METs        INPUTS:  Take care of self —> 2.75 = Yes  Walk indoors —> 1.75 = Yes  Walk 1&ndash;2 blocks on level ground —> 2.75 = Yes  Climb a flight of stairs or walk up a hill —> 5.5 = Yes  Run a short distance —> 0 = No  Do light work around the house —> 2.7 = Yes  Do moderate work around the house —> 3.5 = Yes  Do heavy work around the house —> 0 = No  Do yardwork —> 0 = No  Have sexual relations —> 0 = No  Participate in moderate recreational activities —> 0 = No  Participate in strenuous sports —> 0 = No

## 2024-11-09 DIAGNOSIS — Z01.818 ENCOUNTER FOR OTHER PREPROCEDURAL EXAMINATION: ICD-10-CM

## 2024-11-09 DIAGNOSIS — M21.969 UNSPECIFIED ACQUIRED DEFORMITY OF UNSPECIFIED LOWER LEG: ICD-10-CM

## 2024-11-15 ENCOUNTER — RESULT REVIEW (OUTPATIENT)
Age: 84
End: 2024-11-15

## 2024-11-15 ENCOUNTER — TRANSCRIPTION ENCOUNTER (OUTPATIENT)
Age: 84
End: 2024-11-15

## 2024-11-15 ENCOUNTER — OUTPATIENT (OUTPATIENT)
Dept: OUTPATIENT SERVICES | Facility: HOSPITAL | Age: 84
LOS: 1 days | Discharge: HOME | End: 2024-11-15
Payer: MEDICARE

## 2024-11-15 VITALS
TEMPERATURE: 98 F | RESPIRATION RATE: 20 BRPM | WEIGHT: 190.04 LBS | HEIGHT: 68 IN | HEART RATE: 71 BPM | OXYGEN SATURATION: 97 % | DIASTOLIC BLOOD PRESSURE: 90 MMHG | SYSTOLIC BLOOD PRESSURE: 205 MMHG

## 2024-11-15 VITALS
RESPIRATION RATE: 19 BRPM | DIASTOLIC BLOOD PRESSURE: 85 MMHG | OXYGEN SATURATION: 99 % | HEART RATE: 72 BPM | SYSTOLIC BLOOD PRESSURE: 179 MMHG

## 2024-11-15 DIAGNOSIS — Z90.49 ACQUIRED ABSENCE OF OTHER SPECIFIED PARTS OF DIGESTIVE TRACT: Chronic | ICD-10-CM

## 2024-11-15 DIAGNOSIS — Z96.641 PRESENCE OF RIGHT ARTIFICIAL HIP JOINT: Chronic | ICD-10-CM

## 2024-11-15 DIAGNOSIS — M21.969 UNSPECIFIED ACQUIRED DEFORMITY OF UNSPECIFIED LOWER LEG: ICD-10-CM

## 2024-11-15 DIAGNOSIS — Z96.642 PRESENCE OF LEFT ARTIFICIAL HIP JOINT: Chronic | ICD-10-CM

## 2024-11-15 DIAGNOSIS — Z98.890 OTHER SPECIFIED POSTPROCEDURAL STATES: Chronic | ICD-10-CM

## 2024-11-15 DIAGNOSIS — Z96.652 PRESENCE OF LEFT ARTIFICIAL KNEE JOINT: Chronic | ICD-10-CM

## 2024-11-15 DIAGNOSIS — Z98.890 OTHER SPECIFIED POSTPROCEDURAL STATES: ICD-10-CM

## 2024-11-15 DIAGNOSIS — Z90.710 ACQUIRED ABSENCE OF BOTH CERVIX AND UTERUS: Chronic | ICD-10-CM

## 2024-11-15 PROCEDURE — 73630 X-RAY EXAM OF FOOT: CPT | Mod: 26,LT

## 2024-11-15 PROCEDURE — 73630 X-RAY EXAM OF FOOT: CPT | Mod: LT

## 2024-11-15 PROCEDURE — 28288 PARTIAL REMOVAL OF FOOT BONE: CPT

## 2024-11-15 RX ORDER — LOSARTAN POTASSIUM 25 MG/1
1 TABLET ORAL
Refills: 0 | DISCHARGE

## 2024-11-15 RX ORDER — ONDANSETRON HYDROCHLORIDE 2 MG/ML
4 INJECTION, SOLUTION INTRAMUSCULAR; INTRAVENOUS ONCE
Refills: 0 | Status: DISCONTINUED | OUTPATIENT
Start: 2024-11-15 | End: 2024-11-15

## 2024-11-15 RX ORDER — OXYCODONE HYDROCHLORIDE 30 MG/1
5 TABLET ORAL ONCE
Refills: 0 | Status: DISCONTINUED | OUTPATIENT
Start: 2024-11-15 | End: 2024-11-15

## 2024-11-15 RX ORDER — ACETAMINOPHEN 500 MG
1000 TABLET ORAL ONCE
Refills: 0 | Status: DISCONTINUED | OUTPATIENT
Start: 2024-11-15 | End: 2024-11-15

## 2024-11-15 RX ORDER — HYDROMORPHONE HCL/0.9% NACL/PF 6 MG/30 ML
0.5 PATIENT CONTROLLED ANALGESIA SYRINGE INTRAVENOUS
Refills: 0 | Status: DISCONTINUED | OUTPATIENT
Start: 2024-11-15 | End: 2024-11-15

## 2024-11-15 RX ORDER — CEFADROXIL 500 MG/1
500 CAPSULE ORAL TWICE DAILY
Qty: 14 | Refills: 0 | Status: ACTIVE | COMMUNITY
Start: 2024-11-15 | End: 1900-01-01

## 2024-11-15 RX ORDER — CELECOXIB 100 MG
1 CAPSULE ORAL
Refills: 0 | DISCHARGE

## 2024-11-15 RX ORDER — OXYCODONE AND ACETAMINOPHEN 5; 325 MG/1; MG/1
5-325 TABLET ORAL
Qty: 20 | Refills: 0 | Status: ACTIVE | COMMUNITY
Start: 2024-11-15 | End: 1900-01-01

## 2024-11-15 NOTE — ASU DISCHARGE PLAN (ADULT/PEDIATRIC) - CARE PROVIDER_API CALL
Nitin Stevens  Podiatric Medicine  [address] [place] [address], [facility] [address] [place] [address]  Phone: [phone]  Fax: [phone]  Follow Up Time:

## 2024-11-15 NOTE — ASU DISCHARGE PLAN (ADULT/PEDIATRIC) - ACCOMPANIED BY
[FreeTextEntry1] : 57 y.o postmenopausal woman  UTD with Pap semar Pt states has IUD for 10 years recently bled in December for 1 week, bled thru her pants. Previous bleed years ago when IUD inserted  Pt states we couldn't remove the IUD and it might have come out with the vaginal bleeding  no pain hasn't had a colonoscopu 
Family

## 2024-11-15 NOTE — ASU DISCHARGE PLAN (ADULT/PEDIATRIC) - FINANCIAL ASSISTANCE
[facility] provides services at a reduced cost to those who are determined to be eligible through [facility]’s financial assistance program. Information regarding [facility]’s financial assistance program can be found by going to [email or web address] or by calling [phone].

## 2024-11-15 NOTE — PRE-ANESTHESIA EVALUATION ADULT - NSANTHPMHFT_GEN_ALL_CORE
METS>4 without CP/palpitations/sob  Denies orthopnea  RA currently on prednisone 10mg for last 3-4 weeks  BP elevated in preop - per patient and daughter, BP has been high preoperatively frequently in the past. Pt denying headache, double vision, chest pain.  CKD GFR 49

## 2024-11-15 NOTE — ASU PREOP CHECKLIST - SURGICAL CONSENT
Problem: Patient Care Overview (Adult)  Goal: Plan of Care Review  Outcome: Ongoing (interventions implemented as appropriate)    09/01/17 0937   Coping/Psychosocial Response Interventions   Plan Of Care Reviewed With patient   Patient Care Overview   Progress progress toward functional goals as expected       Goal: Adult Individualization and Mutuality  Outcome: Ongoing (interventions implemented as appropriate)    09/01/17 0937   Mutuality/Individual Preferences   What Anxieties, Fears or Concerns Do You Have About Your Health or Care? NONE       Goal: Discharge Needs Assessment  Outcome: Ongoing (interventions implemented as appropriate)    09/01/17 0937   Discharge Needs Assessment   Concerns To Be Addressed no discharge needs identified   Readmission Within The Last 30 Days no previous admission in last 30 days   Discharge Disposition home or self-care         Problem: GI Endoscopy (Adult)  Goal: Signs and Symptoms of Listed Potential Problems Will be Absent or Manageable (GI Endoscopy)  Outcome: Ongoing (interventions implemented as appropriate)    09/01/17 0937   GI Endoscopy   Problems Assessed (GI Endoscopy) all   Problems Present (GI Endoscopy) bleeding           
done

## 2024-11-15 NOTE — ASU DISCHARGE PLAN (ADULT/PEDIATRIC) - NS MD DC FALL RISK RISK
For information on Fall & Injury Prevention, visit: [email or web address] OR  [email or web address] OR  https://www.cdc.gov/steadi/patient.html

## 2024-11-15 NOTE — CHART NOTE - NSCHARTNOTEFT_GEN_A_CORE
PACU ANESTHESIA ADMISSION NOTE      Procedure: Exostectomy, foot      Post op diagnosis:      ____  Intubated  TV:______       Rate: ______      FiO2: ______    __x__  Patent Airway    __x__  Full return of protective reflexes    __x__  Full recovery from anesthesia / back to baseline     Vitals:   T:  37         R:  18                BP:  170/76                Sat: 97                   P: 77      Mental Status:  __x__ Awake   ___x__ Alert   _____ Drowsy   _____ Sedated    Nausea/Vomiting:  _x___ NO  ______Yes,   See Post - Op Orders          Pain Scale (0-10):  _____    Treatment: __x__ None    ____ See Post - Op/PCA Orders    Post - Operative Fluids:   ____ Oral   ___x_ See Post - Op Orders    Plan: Discharge:   __x__Home       _____Floor     _____Critical Care    _____  Other:_________________    Comments:    Uneventful anesthesia. Patient transported to  spontaneously breathing and hemodynamically stable.

## 2024-11-21 DIAGNOSIS — Z90.49 ACQUIRED ABSENCE OF OTHER SPECIFIED PARTS OF DIGESTIVE TRACT: ICD-10-CM

## 2024-11-21 DIAGNOSIS — M89.8X7 OTHER SPECIFIED DISORDERS OF BONE, ANKLE AND FOOT: ICD-10-CM

## 2024-11-21 DIAGNOSIS — Z96.643 PRESENCE OF ARTIFICIAL HIP JOINT, BILATERAL: ICD-10-CM

## 2024-11-21 DIAGNOSIS — M06.9 RHEUMATOID ARTHRITIS, UNSPECIFIED: ICD-10-CM

## 2024-11-21 DIAGNOSIS — M19.90 UNSPECIFIED OSTEOARTHRITIS, UNSPECIFIED SITE: ICD-10-CM

## 2024-11-21 DIAGNOSIS — I10 ESSENTIAL (PRIMARY) HYPERTENSION: ICD-10-CM

## 2024-11-21 DIAGNOSIS — Z96.652 PRESENCE OF LEFT ARTIFICIAL KNEE JOINT: ICD-10-CM

## 2024-11-21 DIAGNOSIS — K21.9 GASTRO-ESOPHAGEAL REFLUX DISEASE WITHOUT ESOPHAGITIS: ICD-10-CM

## 2024-11-21 DIAGNOSIS — Z85.3 PERSONAL HISTORY OF MALIGNANT NEOPLASM OF BREAST: ICD-10-CM

## 2024-11-21 DIAGNOSIS — F32.A DEPRESSION, UNSPECIFIED: ICD-10-CM

## 2024-11-23 ENCOUNTER — EMERGENCY (EMERGENCY)
Facility: HOSPITAL | Age: 84
LOS: 0 days | Discharge: HOME | End: 2024-11-23
Attending: STUDENT IN AN ORGANIZED HEALTH CARE EDUCATION/TRAINING PROGRAM
Payer: MEDICARE

## 2024-11-23 VITALS
TEMPERATURE: 98 F | WEIGHT: 179.9 LBS | OXYGEN SATURATION: 97 % | RESPIRATION RATE: 18 BRPM | DIASTOLIC BLOOD PRESSURE: 96 MMHG | HEART RATE: 82 BPM | SYSTOLIC BLOOD PRESSURE: 197 MMHG | HEIGHT: 68 IN
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
## 2024-11-23 DIAGNOSIS — Z98.890 OTHER SPECIFIED POSTPROCEDURAL STATES: Chronic | ICD-10-CM

## 2024-11-23 DIAGNOSIS — S02.2XXA FRACTURE OF NASAL BONES, INITIAL ENCOUNTER FOR CLOSED FRACTURE: ICD-10-CM

## 2024-11-23 DIAGNOSIS — Z96.642 PRESENCE OF LEFT ARTIFICIAL HIP JOINT: Chronic | ICD-10-CM

## 2024-11-23 DIAGNOSIS — S01.81XA LACERATION WITHOUT FOREIGN BODY OF OTHER PART OF HEAD, INITIAL ENCOUNTER: ICD-10-CM

## 2024-11-23 DIAGNOSIS — Z90.710 ACQUIRED ABSENCE OF BOTH CERVIX AND UTERUS: Chronic | ICD-10-CM

## 2024-11-23 DIAGNOSIS — Z90.49 ACQUIRED ABSENCE OF OTHER SPECIFIED PARTS OF DIGESTIVE TRACT: Chronic | ICD-10-CM
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
## 2024-11-23 DIAGNOSIS — Z96.652 PRESENCE OF LEFT ARTIFICIAL KNEE JOINT: Chronic | ICD-10-CM

## 2024-11-23 DIAGNOSIS — Z04.3 ENCOUNTER FOR EXAMINATION AND OBSERVATION FOLLOWING OTHER ACCIDENT: ICD-10-CM

## 2024-11-23 DIAGNOSIS — Z23 ENCOUNTER FOR IMMUNIZATION: ICD-10-CM
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
## 2024-11-23 DIAGNOSIS — Z96.641 PRESENCE OF RIGHT ARTIFICIAL HIP JOINT: Chronic | ICD-10-CM

## 2024-11-23 LAB
ALBUMIN SERPL ELPH-MCNC: 4.2 G/DL — SIGNIFICANT CHANGE UP (ref 3.5–5.2)
ALP SERPL-CCNC: 76 U/L — SIGNIFICANT CHANGE UP (ref 30–115)
ALT FLD-CCNC: 31 U/L — SIGNIFICANT CHANGE UP (ref 0–41)
ANION GAP SERPL CALC-SCNC: 11 MMOL/L — SIGNIFICANT CHANGE UP (ref 7–14)
AST SERPL-CCNC: 80 U/L — HIGH (ref 0–41)
BASOPHILS # BLD AUTO: 0.04 K/UL — SIGNIFICANT CHANGE UP (ref 0–0.2)
BASOPHILS NFR BLD AUTO: 0.4 % — SIGNIFICANT CHANGE UP (ref 0–1)
BILIRUB SERPL-MCNC: 0.6 MG/DL — SIGNIFICANT CHANGE UP (ref 0.2–1.2)
BUN SERPL-MCNC: 26 MG/DL — HIGH (ref 10–20)
CALCIUM SERPL-MCNC: 9.7 MG/DL — SIGNIFICANT CHANGE UP (ref 8.4–10.5)
CHLORIDE SERPL-SCNC: 91 MMOL/L — LOW (ref 98–110)
CO2 SERPL-SCNC: 27 MMOL/L — SIGNIFICANT CHANGE UP (ref 17–32)
CREAT SERPL-MCNC: 1 MG/DL — SIGNIFICANT CHANGE UP (ref 0.7–1.5)
EGFR: 55 ML/MIN/1.73M2 — LOW
EGFR: 55 ML/MIN/1.73M2 — LOW
EOSINOPHIL # BLD AUTO: 0.02 K/UL — SIGNIFICANT CHANGE UP (ref 0–0.7)
EOSINOPHIL NFR BLD AUTO: 0.2 % — SIGNIFICANT CHANGE UP (ref 0–8)
GLUCOSE SERPL-MCNC: 97 MG/DL — SIGNIFICANT CHANGE UP (ref 70–99)
HCT VFR BLD CALC: 40.5 % — SIGNIFICANT CHANGE UP (ref 37–47)
HGB BLD-MCNC: 13.4 G/DL — SIGNIFICANT CHANGE UP (ref 12–16)
IMM GRANULOCYTES NFR BLD AUTO: 0.9 % — HIGH (ref 0.1–0.3)
LACTATE SERPL-SCNC: 1.8 MMOL/L — SIGNIFICANT CHANGE UP (ref 0.7–2)
LIDOCAIN IGE QN: 33 U/L — SIGNIFICANT CHANGE UP (ref 7–60)
LYMPHOCYTES # BLD AUTO: 1.06 K/UL — LOW (ref 1.2–3.4)
LYMPHOCYTES # BLD AUTO: 10.1 % — LOW (ref 20.5–51.1)
MCHC RBC-ENTMCNC: 31.5 PG — HIGH (ref 27–31)
MCHC RBC-ENTMCNC: 33.1 G/DL — SIGNIFICANT CHANGE UP (ref 32–37)
MCV RBC AUTO: 95.3 FL — SIGNIFICANT CHANGE UP (ref 81–99)
MONOCYTES # BLD AUTO: 0.53 K/UL — SIGNIFICANT CHANGE UP (ref 0.1–0.6)
MONOCYTES NFR BLD AUTO: 5 % — SIGNIFICANT CHANGE UP (ref 1.7–9.3)
NEUTROPHILS # BLD AUTO: 8.78 K/UL — HIGH (ref 1.4–6.5)
NEUTROPHILS NFR BLD AUTO: 83.4 % — HIGH (ref 42.2–75.2)
NRBC # BLD: 0 /100 WBCS — SIGNIFICANT CHANGE UP (ref 0–0)
NRBC BLD-RTO: 0 /100 WBCS — SIGNIFICANT CHANGE UP (ref 0–0)
PLATELET # BLD AUTO: 194 K/UL — SIGNIFICANT CHANGE UP (ref 130–400)
PMV BLD: 10.6 FL — HIGH (ref 7.4–10.4)
POTASSIUM SERPL-MCNC: 4.1 MMOL/L — SIGNIFICANT CHANGE UP (ref 3.5–5)
POTASSIUM SERPL-MCNC: 6.1 MMOL/L — CRITICAL HIGH (ref 3.5–5)
POTASSIUM SERPL-SCNC: 4.1 MMOL/L — SIGNIFICANT CHANGE UP (ref 3.5–5)
POTASSIUM SERPL-SCNC: 6.1 MMOL/L — CRITICAL HIGH (ref 3.5–5)
PROT SERPL-MCNC: 7.4 G/DL — SIGNIFICANT CHANGE UP (ref 6–8)
RBC # BLD: 4.25 M/UL — SIGNIFICANT CHANGE UP (ref 4.2–5.4)
RBC # FLD: 14 % — SIGNIFICANT CHANGE UP (ref 11.5–14.5)
SODIUM SERPL-SCNC: 129 MMOL/L — LOW (ref 135–146)
WBC # BLD: 10.52 K/UL — SIGNIFICANT CHANGE UP (ref 4.8–10.8)
WBC # FLD AUTO: 10.52 K/UL — SIGNIFICANT CHANGE UP (ref 4.8–10.8)

## 2024-11-23 PROCEDURE — 12011 RPR F/E/E/N/L/M 2.5 CM/<: CPT

## 2024-11-23 PROCEDURE — 93010 ELECTROCARDIOGRAM REPORT: CPT

## 2024-11-23 PROCEDURE — 70486 CT MAXILLOFACIAL W/O DYE: CPT | Mod: MC

## 2024-11-23 PROCEDURE — 70450 CT HEAD/BRAIN W/O DYE: CPT | Mod: 26,MC

## 2024-11-23 PROCEDURE — 96374 THER/PROPH/DIAG INJ IV PUSH: CPT | Mod: XU

## 2024-11-23 PROCEDURE — 72125 CT NECK SPINE W/O DYE: CPT | Mod: MC

## 2024-11-23 PROCEDURE — 72170 X-RAY EXAM OF PELVIS: CPT

## 2024-11-23 PROCEDURE — 74177 CT ABD & PELVIS W/CONTRAST: CPT | Mod: 26,MC

## 2024-11-23 PROCEDURE — 83605 ASSAY OF LACTIC ACID: CPT

## 2024-11-23 PROCEDURE — 71045 X-RAY EXAM CHEST 1 VIEW: CPT

## 2024-11-23 PROCEDURE — 36415 COLL VENOUS BLD VENIPUNCTURE: CPT

## 2024-11-23 PROCEDURE — 80053 COMPREHEN METABOLIC PANEL: CPT

## 2024-11-23 PROCEDURE — 93005 ELECTROCARDIOGRAM TRACING: CPT | Mod: XU

## 2024-11-23 PROCEDURE — 83690 ASSAY OF LIPASE: CPT

## 2024-11-23 PROCEDURE — 84132 ASSAY OF SERUM POTASSIUM: CPT

## 2024-11-23 PROCEDURE — 99285 EMERGENCY DEPT VISIT HI MDM: CPT | Mod: 25

## 2024-11-23 PROCEDURE — 90715 TDAP VACCINE 7 YRS/> IM: CPT

## 2024-11-23 PROCEDURE — 85025 COMPLETE CBC W/AUTO DIFF WBC: CPT

## 2024-11-23 PROCEDURE — 70450 CT HEAD/BRAIN W/O DYE: CPT | Mod: MC

## 2024-11-23 PROCEDURE — 72170 X-RAY EXAM OF PELVIS: CPT | Mod: 26

## 2024-11-23 PROCEDURE — 70486 CT MAXILLOFACIAL W/O DYE: CPT | Mod: 26,MC

## 2024-11-23 PROCEDURE — 71260 CT THORAX DX C+: CPT | Mod: 26,MC

## 2024-11-23 PROCEDURE — 74177 CT ABD & PELVIS W/CONTRAST: CPT | Mod: MC

## 2024-11-23 PROCEDURE — 71260 CT THORAX DX C+: CPT | Mod: MC

## 2024-11-23 PROCEDURE — 72125 CT NECK SPINE W/O DYE: CPT | Mod: 26,MC

## 2024-11-23 PROCEDURE — 71045 X-RAY EXAM CHEST 1 VIEW: CPT | Mod: 26

## 2024-11-23 PROCEDURE — 90471 IMMUNIZATION ADMIN: CPT

## 2024-11-23 RX ORDER — ACETAMINOPHEN 500 MG/5ML
650 LIQUID (ML) ORAL ONCE
Refills: 0 | Status: COMPLETED | OUTPATIENT
Start: 2024-11-23 | End: 2024-11-23

## 2024-11-23 RX ORDER — MAGNESIUM SULFATE 500 MG/ML
2 SYRINGE (ML) INJECTION ONCE
Refills: 0 | Status: COMPLETED | OUTPATIENT
Start: 2024-11-23 | End: 2024-11-23

## 2024-11-23 RX ADMIN — Medication 650 MILLIGRAM(S): at 16:14

## 2024-11-23 RX ADMIN — Medication 25 GRAM(S): at 15:04

## 2024-11-26 ENCOUNTER — OUTPATIENT (OUTPATIENT)
Dept: OUTPATIENT SERVICES | Facility: HOSPITAL | Age: 84
LOS: 1 days | End: 2024-11-26
Payer: MEDICARE

## 2024-11-26 ENCOUNTER — APPOINTMENT (OUTPATIENT)
Dept: PODIATRY | Facility: CLINIC | Age: 84
End: 2024-11-26
Payer: MEDICARE

## 2024-11-26 DIAGNOSIS — Z96.652 PRESENCE OF LEFT ARTIFICIAL KNEE JOINT: Chronic | ICD-10-CM

## 2024-11-26 DIAGNOSIS — Z96.642 PRESENCE OF LEFT ARTIFICIAL HIP JOINT: Chronic | ICD-10-CM

## 2024-11-26 DIAGNOSIS — Z98.890 OTHER SPECIFIED POSTPROCEDURAL STATES: Chronic | ICD-10-CM

## 2024-11-26 DIAGNOSIS — T81.30XA DISRUPTION OF WOUND, UNSPECIFIED, INITIAL ENCOUNTER: ICD-10-CM

## 2024-11-26 DIAGNOSIS — Z90.49 ACQUIRED ABSENCE OF OTHER SPECIFIED PARTS OF DIGESTIVE TRACT: Chronic | ICD-10-CM

## 2024-11-26 DIAGNOSIS — Z00.00 ENCOUNTER FOR GENERAL ADULT MEDICAL EXAMINATION WITHOUT ABNORMAL FINDINGS: ICD-10-CM

## 2024-11-26 DIAGNOSIS — Z90.710 ACQUIRED ABSENCE OF BOTH CERVIX AND UTERUS: Chronic | ICD-10-CM

## 2024-11-26 PROCEDURE — 99024 POSTOP FOLLOW-UP VISIT: CPT

## 2024-11-27 PROBLEM — T81.30XA DEHISCENCE OF WOUND OF SKIN, INITIAL ENCOUNTER: Status: ACTIVE | Noted: 2024-11-27

## 2024-12-01 ENCOUNTER — INPATIENT (INPATIENT)
Facility: HOSPITAL | Age: 84
LOS: 3 days | Discharge: HOME | DRG: 534 | End: 2024-12-05
Attending: INTERNAL MEDICINE | Admitting: STUDENT IN AN ORGANIZED HEALTH CARE EDUCATION/TRAINING PROGRAM
Payer: MEDICARE

## 2024-12-01 VITALS
DIASTOLIC BLOOD PRESSURE: 96 MMHG | SYSTOLIC BLOOD PRESSURE: 158 MMHG | HEIGHT: 68 IN | WEIGHT: 184.97 LBS | TEMPERATURE: 98 F | HEART RATE: 92 BPM | RESPIRATION RATE: 19 BRPM | OXYGEN SATURATION: 99 %

## 2024-12-01 DIAGNOSIS — Z96.641 PRESENCE OF RIGHT ARTIFICIAL HIP JOINT: Chronic | ICD-10-CM

## 2024-12-01 DIAGNOSIS — Z96.642 PRESENCE OF LEFT ARTIFICIAL HIP JOINT: Chronic | ICD-10-CM

## 2024-12-01 DIAGNOSIS — Z96.652 PRESENCE OF LEFT ARTIFICIAL KNEE JOINT: Chronic | ICD-10-CM

## 2024-12-01 DIAGNOSIS — S72.491A OTHER FRACTURE OF LOWER END OF RIGHT FEMUR, INITIAL ENCOUNTER FOR CLOSED FRACTURE: ICD-10-CM

## 2024-12-01 DIAGNOSIS — Z90.710 ACQUIRED ABSENCE OF BOTH CERVIX AND UTERUS: Chronic | ICD-10-CM

## 2024-12-01 DIAGNOSIS — Z90.49 ACQUIRED ABSENCE OF OTHER SPECIFIED PARTS OF DIGESTIVE TRACT: Chronic | ICD-10-CM

## 2024-12-01 DIAGNOSIS — Z98.890 OTHER SPECIFIED POSTPROCEDURAL STATES: Chronic | ICD-10-CM

## 2024-12-01 LAB
ALBUMIN SERPL ELPH-MCNC: 4.2 G/DL — SIGNIFICANT CHANGE UP (ref 3.5–5.2)
ALP SERPL-CCNC: 87 U/L — SIGNIFICANT CHANGE UP (ref 30–115)
ALT FLD-CCNC: 23 U/L — SIGNIFICANT CHANGE UP (ref 0–41)
ANION GAP SERPL CALC-SCNC: 17 MMOL/L — HIGH (ref 7–14)
APTT BLD: 27 SEC — SIGNIFICANT CHANGE UP (ref 27–39.2)
AST SERPL-CCNC: 36 U/L — SIGNIFICANT CHANGE UP (ref 0–41)
BASOPHILS # BLD AUTO: 0.05 K/UL — SIGNIFICANT CHANGE UP (ref 0–0.2)
BASOPHILS NFR BLD AUTO: 0.4 % — SIGNIFICANT CHANGE UP (ref 0–1)
BILIRUB SERPL-MCNC: 0.7 MG/DL — SIGNIFICANT CHANGE UP (ref 0.2–1.2)
BLD GP AB SCN SERPL QL: SIGNIFICANT CHANGE UP
BUN SERPL-MCNC: 23 MG/DL — HIGH (ref 10–20)
CALCIUM SERPL-MCNC: 9.9 MG/DL — SIGNIFICANT CHANGE UP (ref 8.4–10.5)
CHLORIDE SERPL-SCNC: 91 MMOL/L — LOW (ref 98–110)
CK SERPL-CCNC: 230 U/L — HIGH (ref 0–225)
CO2 SERPL-SCNC: 22 MMOL/L — SIGNIFICANT CHANGE UP (ref 17–32)
CREAT SERPL-MCNC: 1.1 MG/DL — SIGNIFICANT CHANGE UP (ref 0.7–1.5)
EGFR: 49 ML/MIN/1.73M2 — LOW
EOSINOPHIL # BLD AUTO: 0.01 K/UL — SIGNIFICANT CHANGE UP (ref 0–0.7)
EOSINOPHIL NFR BLD AUTO: 0.1 % — SIGNIFICANT CHANGE UP (ref 0–8)
ETHANOL SERPL-MCNC: <10 MG/DL — SIGNIFICANT CHANGE UP
GLUCOSE BLDC GLUCOMTR-MCNC: 117 MG/DL — HIGH (ref 70–99)
GLUCOSE SERPL-MCNC: 155 MG/DL — HIGH (ref 70–99)
HCT VFR BLD CALC: 37.8 % — SIGNIFICANT CHANGE UP (ref 37–47)
HGB BLD-MCNC: 12.5 G/DL — SIGNIFICANT CHANGE UP (ref 12–16)
IMM GRANULOCYTES NFR BLD AUTO: 0.7 % — HIGH (ref 0.1–0.3)
INR BLD: 0.95 RATIO — SIGNIFICANT CHANGE UP (ref 0.65–1.3)
LACTATE SERPL-SCNC: 3.2 MMOL/L — HIGH (ref 0.7–2)
LIDOCAIN IGE QN: 14 U/L — SIGNIFICANT CHANGE UP (ref 7–60)
LYMPHOCYTES # BLD AUTO: 1.67 K/UL — SIGNIFICANT CHANGE UP (ref 1.2–3.4)
LYMPHOCYTES # BLD AUTO: 12.4 % — LOW (ref 20.5–51.1)
MCHC RBC-ENTMCNC: 31.5 PG — HIGH (ref 27–31)
MCHC RBC-ENTMCNC: 33.1 G/DL — SIGNIFICANT CHANGE UP (ref 32–37)
MCV RBC AUTO: 95.2 FL — SIGNIFICANT CHANGE UP (ref 81–99)
MONOCYTES # BLD AUTO: 1.01 K/UL — HIGH (ref 0.1–0.6)
MONOCYTES NFR BLD AUTO: 7.5 % — SIGNIFICANT CHANGE UP (ref 1.7–9.3)
NEUTROPHILS # BLD AUTO: 10.68 K/UL — HIGH (ref 1.4–6.5)
NEUTROPHILS NFR BLD AUTO: 78.9 % — HIGH (ref 42.2–75.2)
NRBC # BLD: 0 /100 WBCS — SIGNIFICANT CHANGE UP (ref 0–0)
PLATELET # BLD AUTO: 277 K/UL — SIGNIFICANT CHANGE UP (ref 130–400)
PMV BLD: 9.7 FL — SIGNIFICANT CHANGE UP (ref 7.4–10.4)
POTASSIUM SERPL-MCNC: 4.5 MMOL/L — SIGNIFICANT CHANGE UP (ref 3.5–5)
POTASSIUM SERPL-SCNC: 4.5 MMOL/L — SIGNIFICANT CHANGE UP (ref 3.5–5)
PROT SERPL-MCNC: 6.8 G/DL — SIGNIFICANT CHANGE UP (ref 6–8)
PROTHROM AB SERPL-ACNC: 11.2 SEC — SIGNIFICANT CHANGE UP (ref 9.95–12.87)
RBC # BLD: 3.97 M/UL — LOW (ref 4.2–5.4)
RBC # FLD: 13.8 % — SIGNIFICANT CHANGE UP (ref 11.5–14.5)
SODIUM SERPL-SCNC: 130 MMOL/L — LOW (ref 135–146)
WBC # BLD: 13.52 K/UL — HIGH (ref 4.8–10.8)
WBC # FLD AUTO: 13.52 K/UL — HIGH (ref 4.8–10.8)

## 2024-12-01 PROCEDURE — 71045 X-RAY EXAM CHEST 1 VIEW: CPT | Mod: 26

## 2024-12-01 PROCEDURE — 85025 COMPLETE CBC W/AUTO DIFF WBC: CPT

## 2024-12-01 PROCEDURE — 97162 PT EVAL MOD COMPLEX 30 MIN: CPT | Mod: GP

## 2024-12-01 PROCEDURE — 85027 COMPLETE CBC AUTOMATED: CPT

## 2024-12-01 PROCEDURE — C1713: CPT

## 2024-12-01 PROCEDURE — 73700 CT LOWER EXTREMITY W/O DYE: CPT | Mod: 26,RT,MC

## 2024-12-01 PROCEDURE — C1776: CPT

## 2024-12-01 PROCEDURE — 83605 ASSAY OF LACTIC ACID: CPT

## 2024-12-01 PROCEDURE — 73552 X-RAY EXAM OF FEMUR 2/>: CPT | Mod: 26,RT

## 2024-12-01 PROCEDURE — 97116 GAIT TRAINING THERAPY: CPT | Mod: GP

## 2024-12-01 PROCEDURE — C9399: CPT

## 2024-12-01 PROCEDURE — 97165 OT EVAL LOW COMPLEX 30 MIN: CPT | Mod: GO

## 2024-12-01 PROCEDURE — 83735 ASSAY OF MAGNESIUM: CPT

## 2024-12-01 PROCEDURE — 80053 COMPREHEN METABOLIC PANEL: CPT

## 2024-12-01 PROCEDURE — 72170 X-RAY EXAM OF PELVIS: CPT | Mod: 26

## 2024-12-01 PROCEDURE — 73552 X-RAY EXAM OF FEMUR 2/>: CPT | Mod: RT

## 2024-12-01 PROCEDURE — 73560 X-RAY EXAM OF KNEE 1 OR 2: CPT | Mod: 26,RT

## 2024-12-01 PROCEDURE — 82550 ASSAY OF CK (CPK): CPT

## 2024-12-01 PROCEDURE — 70450 CT HEAD/BRAIN W/O DYE: CPT | Mod: 26,MC

## 2024-12-01 PROCEDURE — 70486 CT MAXILLOFACIAL W/O DYE: CPT | Mod: MC

## 2024-12-01 PROCEDURE — 73590 X-RAY EXAM OF LOWER LEG: CPT | Mod: 26,RT

## 2024-12-01 PROCEDURE — 80048 BASIC METABOLIC PNL TOTAL CA: CPT

## 2024-12-01 PROCEDURE — C1769: CPT

## 2024-12-01 PROCEDURE — 99285 EMERGENCY DEPT VISIT HI MDM: CPT

## 2024-12-01 PROCEDURE — 87640 STAPH A DNA AMP PROBE: CPT

## 2024-12-01 PROCEDURE — 97110 THERAPEUTIC EXERCISES: CPT | Mod: GP

## 2024-12-01 PROCEDURE — 36415 COLL VENOUS BLD VENIPUNCTURE: CPT

## 2024-12-01 PROCEDURE — 87641 MR-STAPH DNA AMP PROBE: CPT

## 2024-12-01 PROCEDURE — 93010 ELECTROCARDIOGRAM REPORT: CPT

## 2024-12-01 RX ORDER — FENTANYL 12 UG/H
100 PATCH, EXTENDED RELEASE TRANSDERMAL ONCE
Refills: 0 | Status: DISCONTINUED | OUTPATIENT
Start: 2024-12-01 | End: 2024-12-01

## 2024-12-01 RX ADMIN — FENTANYL 100 MICROGRAM(S): 12 PATCH, EXTENDED RELEASE TRANSDERMAL at 20:13

## 2024-12-01 RX ADMIN — Medication 6 MILLIGRAM(S): at 22:11

## 2024-12-01 NOTE — CONSULT NOTE ADULT - SUBJECTIVE AND OBJECTIVE BOX
ORTHOPAEDIC SURGERY CONSULT NOTE    Reason for Consult: Right periprosthetic distal femur fracture    HPI: 84F presents with R leg pain s/p mechanical ground level fall on the morning of presentation. She reports tripping and falling in her home, she was home alone and unable to reach a phone so was on the ground for several hours before someone came and found her.   In the ED she reports R thigh pain but no pain elsewhere. No numbness/tingling. Of note, patient had a fall the week prior wherein she landed on her face, she was seen in the ED at that time and found no significant injuries and was discharged to home. She has been using a walker since that time for stability. Also of note patient had a L forefoot exostosis removal with podiatry approximately two weeks prior but has been WBAT on that side.    Patient lives alone with family nearby including a daughter who is a nurse at [employer].  Prior to recent bunion surgery she walked unassisted or with a cane.   Does not take blood thinners  Denies tobacco/ETOH/illicit drugs.    History of R conversion ROHIT, R primary TKA, L ROHIT, and L TKA all with Dr. Adams in the past 20-25 years.     PMH/PSH:    Other fracture of lower end of right femur, initial encounter for closed fracture    Cervicalgia    Corns and callosities    Encounter for other general examination    Long term (current) use of aromatase inhibitors    Malignant neoplasm of upper-outer quadrant of right female breast    Metatarsalgia, right foot    Nocturia    Other bursitis of hip, right hip    Other enthesopathy of right foot and ankle    Other specified disorders of the skin and subcutaneous tissue    Pain due to internal orthopedic prosthetic devices, implants and grafts, initial encounter    Pain in right foot    Radiculopathy, cervical region    Toxic effect of chromium and its compounds, accidental (unintentional), initial encounter    Toxic effect of other metals, accidental (unintentional), initial encounter    Unspecified open wound, left foot, initial encounter    FH: cancer    MEWS Score    Rheumatoid arthritis    HTN (hypertension)    Breast cancer    Depression    Chronic GERD    OA (osteoarthritis)    Rheumatoid arthritis    Other closed fracture of distal end of right femur    History of right hip replacement    Status post left hip replacement    S/P total knee replacement, left    H/O vaginal hysterectomy    Status post cholecystectomy    S/P lumpectomy of breast    FALL LEG INJURY    8    Fall at home    SysAdmin_VisitLink    No Known Allergies    Physical exam:  T(C): 36.4 (12-01-24 @ 19:39), Max: 36.4 (12-01-24 @ 19:39)  HR: 92 (12-01-24 @ 19:39) (92 - 92)  BP: 158/96 (12-01-24 @ 19:39) (158/96 - 158/96)  RR: 19 (12-01-24 @ 19:39) (19 - 19)  SpO2: 99% (12-01-24 @ 19:39) (99% - 99%)    Gen:  Alert  Breathing on RA  Resolving ecchymoses over face    Bilateral upper extremity:  Skin intact  Nontender to palpation throughout  Full painless active ROM  NVID    RLE:  Skin intact, no open wounds  Well-healed anterior knee and lateral hip incisions  +TTP thigh  NTTP at hip/groin  NTTP leg/ankle  ROM of the foot/ankle full and painless  Hip/knee ROM deferred  NVID distally in all distributions with 2+ pulses    LLE  Well-healed anterior knee incision and lateral hip incisions  NTTP throughout LLE  Painless ROM hip/knee/ankle  Able to SLR  Clean/dry dressing on left forefoot, not removed  NVID    Labs:                        12.5   13.52 )-----------( 277      ( 01 Dec 2024 20:12 )             37.8     12-01    130[L]  |  91[L]  |  23[H]  ----------------------------<  155[H]  4.5   |  22  |  1.1    Ca    9.9      01 Dec 2024 20:12    TPro  6.8  /  Alb  4.2  /  TBili  0.7  /  DBili  x   /  AST  36  /  ALT  23  /  AlkPhos  87  12-01    PT/INR - ( 01 Dec 2024 20:12 )   PT: 11.20 sec;   INR: 0.95 ratio         PTT - ( 01 Dec 2024 20:12 )  PTT:27.0 sec    Imaging XR: pelvis, R femur, knee, tib/fib: Interprosthetic spiral distal femur fracture, ROHIT components well-fixed, fracture extends to level of anterior flange of TKA femoral component.    Assesment/Plan: 84F with a interprosthetic distal femur fracture.   Patient will require surgery to return to ambulation, and decrease morbidity/mortality.  R/B/A of surgery discussed with patient/family. They wish to proceed with surgery.    - Bed rest  - Pain control per primary team  - DVT PPx per primary, to be held on morning of procedure - please give stat dose of heparin this evening then hold in AM  - Patient requires Internal Medicine pre-op clearance / risk stratification / medical optimization  - Patient's PMD is Dr. Cruz  - Pre-Op CBC, CMP/BMP, PT/PTT/INR, Type & Screen x2, CXR, EKG  - Trend Hgb  - 2u RBC on hold for surgery  - NPO with IVF for surgery tomorrow ORTHOPAEDIC SURGERY CONSULT NOTE    Reason for Consult: Right periprosthetic distal femur fracture    HPI: 84F presents with R leg pain s/p mechanical ground level fall on the morning of presentation. She reports tripping and falling in her home, she was home alone and unable to reach a phone so was on the ground for several hours before someone came and found her.   In the ED she reports R thigh pain but no pain elsewhere. No numbness/tingling. Of note, patient had a fall the week prior wherein she landed on her face, she was seen in the ED at that time and found no significant injuries and was discharged to home. She has been using a walker since that time for stability. Also of note patient had a L forefoot exostosis removal with podiatry approximately two weeks prior but has been WBAT on that side.    Patient lives alone with family nearby including a daughter who is a nurse at [employer].  Prior to recent bunion surgery she walked unassisted or with a cane.   Does not take blood thinners  Denies tobacco/ETOH/illicit drugs.    History of R conversion ROHIT, R primary TKA, L ROHIT, and L TKA all with Dr. Wilder in the past 20-25 years.     PMH/PSH:    Other fracture of lower end of right femur, initial encounter for closed fracture    Cervicalgia    Corns and callosities    Encounter for other general examination    Long term (current) use of aromatase inhibitors    Malignant neoplasm of upper-outer quadrant of right female breast    Metatarsalgia, right foot    Nocturia    Other bursitis of hip, right hip    Other enthesopathy of right foot and ankle    Other specified disorders of the skin and subcutaneous tissue    Pain due to internal orthopedic prosthetic devices, implants and grafts, initial encounter    Pain in right foot    Radiculopathy, cervical region    Toxic effect of chromium and its compounds, accidental (unintentional), initial encounter    Toxic effect of other metals, accidental (unintentional), initial encounter    Unspecified open wound, left foot, initial encounter    FH: cancer    MEWS Score    Rheumatoid arthritis    HTN (hypertension)    Breast cancer    Depression    Chronic GERD    OA (osteoarthritis)    Rheumatoid arthritis    Other closed fracture of distal end of right femur    History of right hip replacement    Status post left hip replacement    S/P total knee replacement, left    H/O vaginal hysterectomy    Status post cholecystectomy    S/P lumpectomy of breast    FALL LEG INJURY    8    Fall at home    SysAdmin_VisitLink    No Known Allergies    Physical exam:  T(C): 36.4 (12-01-24 @ 19:39), Max: 36.4 (12-01-24 @ 19:39)  HR: 92 (12-01-24 @ 19:39) (92 - 92)  BP: 158/96 (12-01-24 @ 19:39) (158/96 - 158/96)  RR: 19 (12-01-24 @ 19:39) (19 - 19)  SpO2: 99% (12-01-24 @ 19:39) (99% - 99%)    Gen:  Alert  Breathing on RA  Resolving ecchymoses over face    Bilateral upper extremity:  Skin intact  Nontender to palpation throughout  Full painless active ROM  NVID    RLE:  Skin intact, no open wounds  Well-healed anterior knee and lateral hip incisions  +TTP thigh  NTTP at hip/groin  NTTP leg/ankle  ROM of the foot/ankle full and painless  Hip/knee ROM deferred  NVID distally in all distributions with 2+ pulses    LLE  Well-healed anterior knee incision and lateral hip incisions  NTTP throughout LLE  Painless ROM hip/knee/ankle  Able to SLR  Clean/dry dressing on left forefoot, not removed  NVID    Labs:                        12.5   13.52 )-----------( 277      ( 01 Dec 2024 20:12 )             37.8     12-01    130[L]  |  91[L]  |  23[H]  ----------------------------<  155[H]  4.5   |  22  |  1.1    Ca    9.9      01 Dec 2024 20:12    TPro  6.8  /  Alb  4.2  /  TBili  0.7  /  DBili  x   /  AST  36  /  ALT  23  /  AlkPhos  87  12-01    PT/INR - ( 01 Dec 2024 20:12 )   PT: 11.20 sec;   INR: 0.95 ratio         PTT - ( 01 Dec 2024 20:12 )  PTT:27.0 sec    Imaging XR: pelvis, R femur, knee, tib/fib: Interprosthetic spiral distal femur fracture, ROHIT components well-fixed, fracture extends to level of anterior flange of TKA femoral component.    Assesment/Plan: 84F with a interprosthetic distal femur fracture.   Patient will require surgery to return to ambulation, and decrease morbidity/mortality.  R/B/A of surgery discussed with patient/family. They wish to proceed with surgery.    - Well-padded KI brace placed, keep clean/dry/intact  - Bed rest  - Pain control per primary team  - DVT PPx per primary, to be held on morning of procedure - please give stat dose of heparin this evening then hold in AM  - Patient requires Internal Medicine pre-op clearance / risk stratification / medical optimization  - Patient's PMD is Dr. Cruz  - Pre-Op CBC, CMP/BMP, PT/PTT/INR, Type & Screen x2, CXR, EKG  - Trend Hgb  - 2u RBC on hold for surgery  - NPO with IVF for surgery tomorrow ORTHOPAEDIC SURGERY CONSULT NOTE    Reason for Consult: Right periprosthetic distal femur fracture    HPI: 84F presents with R leg pain s/p mechanical ground level fall on the morning of presentation. She reports tripping and falling in her home, she was home alone and unable to reach a phone so was on the ground for several hours before someone came and found her.   In the ED she reports R thigh pain but no pain elsewhere. No numbness/tingling. Of note, patient had a fall the week prior wherein she landed on her face, she was seen in the ED at that time and found no significant injuries and was discharged to home. She has been using a walker since that time for stability. Also of note patient had a L forefoot exostosis removal with podiatry approximately two weeks prior but has been WBAT on that side.    Patient lives alone with family nearby including a daughter who is a nurse at [employer].  Prior to recent bunion surgery she walked unassisted or with a cane.   Does not take blood thinners  Denies tobacco/ETOH/illicit drugs.    History of R conversion ROHIT, R primary TKA, L ROHIT, and L TKA all with Dr. Wilder in the past 20-25 years.     PMH/PSH:    Other fracture of lower end of right femur, initial encounter for closed fracture    Cervicalgia    Corns and callosities    Encounter for other general examination    Long term (current) use of aromatase inhibitors    Malignant neoplasm of upper-outer quadrant of right female breast    Metatarsalgia, right foot    Nocturia    Other bursitis of hip, right hip    Other enthesopathy of right foot and ankle    Other specified disorders of the skin and subcutaneous tissue    Pain due to internal orthopedic prosthetic devices, implants and grafts, initial encounter    Pain in right foot    Radiculopathy, cervical region    Toxic effect of chromium and its compounds, accidental (unintentional), initial encounter    Toxic effect of other metals, accidental (unintentional), initial encounter    Unspecified open wound, left foot, initial encounter    FH: cancer    MEWS Score    Rheumatoid arthritis    HTN (hypertension)    Breast cancer    Depression    Chronic GERD    OA (osteoarthritis)    Rheumatoid arthritis    Other closed fracture of distal end of right femur    History of right hip replacement    Status post left hip replacement    S/P total knee replacement, left    H/O vaginal hysterectomy    Status post cholecystectomy    S/P lumpectomy of breast    FALL LEG INJURY    8    Fall at home    SysAdmin_VisitLink    No Known Allergies    Physical exam:  T(C): 36.4 (12-01-24 @ 19:39), Max: 36.4 (12-01-24 @ 19:39)  HR: 92 (12-01-24 @ 19:39) (92 - 92)  BP: 158/96 (12-01-24 @ 19:39) (158/96 - 158/96)  RR: 19 (12-01-24 @ 19:39) (19 - 19)  SpO2: 99% (12-01-24 @ 19:39) (99% - 99%)    Gen:  Alert  Breathing on RA  Resolving ecchymoses over face    Bilateral upper extremity:  Skin intact  Nontender to palpation throughout  Full painless active ROM  NVID    RLE:  Skin intact, no open wounds  Well-healed anterior knee and lateral hip incisions  +TTP thigh  NTTP at hip/groin  NTTP leg/ankle  ROM of the foot/ankle full and painless  Hip/knee ROM deferred  NVID distally in all distributions with 2+ pulses    LLE  Well-healed anterior knee incision and lateral hip incisions  NTTP throughout LLE  Painless ROM hip/knee/ankle  Able to SLR  Clean/dry dressing on left forefoot, not removed  NVID    Labs:                        12.5   13.52 )-----------( 277      ( 01 Dec 2024 20:12 )             37.8     12-01    130[L]  |  91[L]  |  23[H]  ----------------------------<  155[H]  4.5   |  22  |  1.1    Ca    9.9      01 Dec 2024 20:12    TPro  6.8  /  Alb  4.2  /  TBili  0.7  /  DBili  x   /  AST  36  /  ALT  23  /  AlkPhos  87  12-01    PT/INR - ( 01 Dec 2024 20:12 )   PT: 11.20 sec;   INR: 0.95 ratio         PTT - ( 01 Dec 2024 20:12 )  PTT:27.0 sec    Imaging XR: pelvis, R femur, knee, tib/fib: Interprosthetic spiral distal femur fracture, ROHIT components well-fixed, fracture extends to level of anterior flange of TKA femoral component.    Assessment/Plan 84F with a interprosthetic distal femur fracture.   Patient will require surgery to return to ambulation, and decrease morbidity/mortality.  R/B/A of surgery discussed with patient/family. They wish to proceed with surgery.    - Well-padded KI brace placed, keep clean/dry/intact  - Bed rest  - Pain control per primary team  - DVT PPx per primary, to be held on morning of procedure - please give stat dose of heparin this evening then hold in AM  - Patient requires Internal Medicine pre-op clearance / risk stratification / medical optimization  - Patient's PMD is Dr. Cruz  - Pre-Op CBC, CMP/BMP, PT/PTT/INR, Type & Screen x2, repeat lactate, CXR, EKG  - Trend Hgb  - 2u RBC on hold for surgery  - NPO with IVF for surgery tomorrow

## 2024-12-01 NOTE — ED PROVIDER NOTE - PHYSICAL EXAMINATION
VITAL SIGNS: I have reviewed nursing notes and confirm.  CONSTITUTIONAL: Well-appearing, non-toxic, in NAD  SKIN: Warm dry, normal skin turgor  HEAD: NCAT  EYES: No conjunctival injection, scleral anicteric  ENT: Moist mucous membranes, normal pharynx with no erythema or exudates  NECK: Supple; full ROM. Nontender. No cervical LAD  CARD: RRR, no murmurs, rubs or gallops  RESP: Clear to ausculation bilaterally.  No rales, rhonchi, or wheezing.  ABD: Soft, non-distended, non-tender  EXT: right hip shortened and externally rotated; distally neurovascularly intact   NEURO: Normal motor, normal sensory.

## 2024-12-01 NOTE — ED ADULT NURSE NOTE - OBJECTIVE STATEMENT
pt BIBA for mechanical fall at home. pt sates she leaned to far and fell onto her right leg and hit the back of her head. -LOC -AC.

## 2024-12-01 NOTE — ED PROVIDER NOTE - ATTENDING CONTRIBUTION TO CARE
Patient is 84-year-old female on aspirin who tripped using her rollator this morning fell onto the ground sustaining right hip injury.  History of bilateral hip replacement.  She was unable to get up and filing when her friend came to check on her they called 911 prompting the ER visit.  She states she hit the back of her head very softly but denies any LOC altered mental status or vomiting.  No other complaints.    Exam: Stable pelvis, right femur tenderness, no bony tenderness of knee, 2+ DP pulses, no chest wall tenderness, NCAT, nonfocal neuroexam, no acute distress  Plan: Labs, CT head, right femur x-ray, Ortho consult    Number of diagnoses or management options:  [x] Referral or consultation made: ortho, trauma    Complexity of data reviewed:  [x] Decision made to obtain old record(s) or additional history from the family, caretaker, or other source  [x] Laboratory test(s) ordered and/or reviewed  [ ] Independent interpretation of EKG by Dr. Morris Mayorga:  [x] Independent interpretation of Radiology by Dr. Morris Mayorga: distal femur fx  [ ] I, Morris Mayorga, had a discussion with    Risk (Management Options):  [ ] High: emergency surgery; monitored drug therapy; controlled parenteral therapy; decision for DNR Patient is 84-year-old female on aspirin who tripped using her rollator this morning fell onto the ground sustaining right hip injury.  History of bilateral hip replacement.  She was unable to get up and filing when her friend came to check on her they called 911 prompting the ER visit.  She states she hit the back of her head very softly but denies any LOC altered mental status or vomiting.  No other complaints.    Exam: Stable pelvis, right femur tenderness, no bony tenderness of knee, 2+ DP pulses, no chest wall tenderness, NCAT, nonfocal neuroexam, no acute distress  Plan: Labs, CT head, right femur x-ray, Ortho consult    Number of diagnoses or management options:  [x] Referral or consultation made: ortho, trauma    Complexity of data reviewed:  [x] Decision made to obtain old record(s) or additional history from the family, caretaker, or other source  [x] Laboratory test(s) ordered and/or reviewed  [x] Independent interpretation of EKG by Dr. Morris Mayorga: NSR @ 87  [x] Independent interpretation of Radiology by Dr. Morris Mayorga: distal femur fx  [ ] I, Morris Mayorga, had a discussion with    Risk (Management Options):  [ ] High: emergency surgery; monitored drug therapy; controlled parenteral therapy; decision for DNR Patient is 84-year-old female on aspirin who tripped using her rollator this morning fell onto the ground sustaining right hip injury.  History of bilateral hip replacement.  She was unable to get up and filing when her friend came to check on her they called 911 prompting the ER visit.  She states she hit the back of her head very softly but denies any LOC altered mental status or vomiting.  No other complaints.    Exam: Stable pelvis, right femur tenderness, no bony tenderness of knee, 2+ DP pulses, no chest wall tenderness, NCAT, nonfocal neuroexam, no acute distress  Plan: Labs, CT head, right femur x-ray, Ortho consult    Number of diagnoses or management options:  [x] Referral or consultation made: ortho, trauma    Complexity of data reviewed:  [x] Decision made to obtain old record(s) or additional history from the family, caretaker, or other source  [x] Laboratory test(s) ordered and/or reviewed  [x] Independent interpretation of EKG by Dr. Morris Mayorga: NSR @ 87  [x] Independent interpretation of Radiology by Dr. Morris Mayorga: distal femur fx  [x] I, Morris Mayorga, had a discussion with trauma Attending Dr. Peña - requesting medicine admission for traumatic brandon-prosthetic fracture    Risk (Management Options):  [x] High: controlled parenteral therapy

## 2024-12-01 NOTE — ED ADULT TRIAGE NOTE - CHIEF COMPLAINT QUOTE
BIBA with complaints of S/P fall, while grabbing something on the floor and lost her balance, fell on her right side, hit head on a rug floor. No LOC, on ASA. + right hip pain with external rotation/shortening

## 2024-12-01 NOTE — ED PROVIDER NOTE - CARE PLAN
Principal Discharge DX:	Other closed fracture of distal end of right femur  Secondary Diagnosis:	Fall at home   1

## 2024-12-01 NOTE — ED PROVIDER NOTE - OBJECTIVE STATEMENT
Patient is an 84-year-old female past med history of GERD, hypertension, osteoarthritis, rheumatoid arthritis, breast cancer in remission since 2015, past surgical history of bilateral hip replacements, and knee replacement presenting for a fall.  Patient states she had mechanical fall earlier today from standing, striking her head and right on the ground.  Patient is now endorsing worsening right lower extremity discomfort and deformity.  Patient denies headache, neck pain, dizziness, chest pain, shortness of breath, nausea, vomiting, vomiting, urinary symptoms, or additional concerns.

## 2024-12-02 LAB
ALBUMIN SERPL ELPH-MCNC: 3.7 G/DL — SIGNIFICANT CHANGE UP (ref 3.5–5.2)
ALP SERPL-CCNC: 73 U/L — SIGNIFICANT CHANGE UP (ref 30–115)
ALT FLD-CCNC: 19 U/L — SIGNIFICANT CHANGE UP (ref 0–41)
ANION GAP SERPL CALC-SCNC: 11 MMOL/L — SIGNIFICANT CHANGE UP (ref 7–14)
ANION GAP SERPL CALC-SCNC: 15 MMOL/L — HIGH (ref 7–14)
AST SERPL-CCNC: 26 U/L — SIGNIFICANT CHANGE UP (ref 0–41)
BASOPHILS # BLD AUTO: 0.03 K/UL — SIGNIFICANT CHANGE UP (ref 0–0.2)
BASOPHILS NFR BLD AUTO: 0.4 % — SIGNIFICANT CHANGE UP (ref 0–1)
BILIRUB SERPL-MCNC: 0.6 MG/DL — SIGNIFICANT CHANGE UP (ref 0.2–1.2)
BUN SERPL-MCNC: 27 MG/DL — HIGH (ref 10–20)
BUN SERPL-MCNC: 29 MG/DL — HIGH (ref 10–20)
CALCIUM SERPL-MCNC: 9.2 MG/DL — SIGNIFICANT CHANGE UP (ref 8.4–10.4)
CALCIUM SERPL-MCNC: 9.7 MG/DL — SIGNIFICANT CHANGE UP (ref 8.4–10.5)
CHLORIDE SERPL-SCNC: 92 MMOL/L — LOW (ref 98–110)
CHLORIDE SERPL-SCNC: 95 MMOL/L — LOW (ref 98–110)
CK SERPL-CCNC: 173 U/L — SIGNIFICANT CHANGE UP (ref 0–225)
CO2 SERPL-SCNC: 25 MMOL/L — SIGNIFICANT CHANGE UP (ref 17–32)
CO2 SERPL-SCNC: 27 MMOL/L — SIGNIFICANT CHANGE UP (ref 17–32)
CREAT SERPL-MCNC: 1 MG/DL — SIGNIFICANT CHANGE UP (ref 0.7–1.5)
CREAT SERPL-MCNC: 1.1 MG/DL — SIGNIFICANT CHANGE UP (ref 0.7–1.5)
EGFR: 49 ML/MIN/1.73M2 — LOW
EGFR: 55 ML/MIN/1.73M2 — LOW
EOSINOPHIL # BLD AUTO: 0.02 K/UL — SIGNIFICANT CHANGE UP (ref 0–0.7)
EOSINOPHIL NFR BLD AUTO: 0.2 % — SIGNIFICANT CHANGE UP (ref 0–8)
GLUCOSE SERPL-MCNC: 116 MG/DL — HIGH (ref 70–99)
GLUCOSE SERPL-MCNC: 131 MG/DL — HIGH (ref 70–99)
HCT VFR BLD CALC: 33.6 % — LOW (ref 37–47)
HGB BLD-MCNC: 10.8 G/DL — LOW (ref 12–16)
IMM GRANULOCYTES NFR BLD AUTO: 0.6 % — HIGH (ref 0.1–0.3)
LACTATE SERPL-SCNC: 1.9 MMOL/L — SIGNIFICANT CHANGE UP (ref 0.7–2)
LACTATE SERPL-SCNC: 2.8 MMOL/L — HIGH (ref 0.7–2)
LYMPHOCYTES # BLD AUTO: 1.73 K/UL — SIGNIFICANT CHANGE UP (ref 1.2–3.4)
LYMPHOCYTES # BLD AUTO: 21.5 % — SIGNIFICANT CHANGE UP (ref 20.5–51.1)
MAGNESIUM SERPL-MCNC: 1.6 MG/DL — LOW (ref 1.8–2.4)
MCHC RBC-ENTMCNC: 31.1 PG — HIGH (ref 27–31)
MCHC RBC-ENTMCNC: 32.1 G/DL — SIGNIFICANT CHANGE UP (ref 32–37)
MCV RBC AUTO: 96.8 FL — SIGNIFICANT CHANGE UP (ref 81–99)
MONOCYTES # BLD AUTO: 0.9 K/UL — HIGH (ref 0.1–0.6)
MONOCYTES NFR BLD AUTO: 11.2 % — HIGH (ref 1.7–9.3)
NEUTROPHILS # BLD AUTO: 5.31 K/UL — SIGNIFICANT CHANGE UP (ref 1.4–6.5)
NEUTROPHILS NFR BLD AUTO: 66.1 % — SIGNIFICANT CHANGE UP (ref 42.2–75.2)
NRBC # BLD: 0 /100 WBCS — SIGNIFICANT CHANGE UP (ref 0–0)
PLATELET # BLD AUTO: 255 K/UL — SIGNIFICANT CHANGE UP (ref 130–400)
PMV BLD: 9.8 FL — SIGNIFICANT CHANGE UP (ref 7.4–10.4)
POTASSIUM SERPL-MCNC: 4.8 MMOL/L — SIGNIFICANT CHANGE UP (ref 3.5–5)
POTASSIUM SERPL-MCNC: 4.9 MMOL/L — SIGNIFICANT CHANGE UP (ref 3.5–5)
POTASSIUM SERPL-SCNC: 4.8 MMOL/L — SIGNIFICANT CHANGE UP (ref 3.5–5)
POTASSIUM SERPL-SCNC: 4.9 MMOL/L — SIGNIFICANT CHANGE UP (ref 3.5–5)
PROT SERPL-MCNC: 6.1 G/DL — SIGNIFICANT CHANGE UP (ref 6–8)
RBC # BLD: 3.47 M/UL — LOW (ref 4.2–5.4)
RBC # FLD: 14.2 % — SIGNIFICANT CHANGE UP (ref 11.5–14.5)
SODIUM SERPL-SCNC: 132 MMOL/L — LOW (ref 135–146)
SODIUM SERPL-SCNC: 133 MMOL/L — LOW (ref 135–146)
WBC # BLD: 8.04 K/UL — SIGNIFICANT CHANGE UP (ref 4.8–10.8)
WBC # FLD AUTO: 8.04 K/UL — SIGNIFICANT CHANGE UP (ref 4.8–10.8)

## 2024-12-02 PROCEDURE — 70486 CT MAXILLOFACIAL W/O DYE: CPT | Mod: 26

## 2024-12-02 PROCEDURE — 27506 TREATMENT OF THIGH FRACTURE: CPT | Mod: 22,RT

## 2024-12-02 RX ORDER — METOPROLOL TARTRATE 100 MG/1
25 TABLET, FILM COATED ORAL DAILY
Refills: 0 | Status: DISCONTINUED | OUTPATIENT
Start: 2024-12-02 | End: 2024-12-02

## 2024-12-02 RX ORDER — CHLORHEXIDINE GLUCONATE 1.2 MG/ML
1 RINSE ORAL
Refills: 0 | Status: DISCONTINUED | OUTPATIENT
Start: 2024-12-02 | End: 2024-12-02

## 2024-12-02 RX ORDER — HYDROMORPHONE HYDROCHLORIDE 2 MG/1
0.5 TABLET ORAL EVERY 6 HOURS
Refills: 0 | Status: DISCONTINUED | OUTPATIENT
Start: 2024-12-02 | End: 2024-12-02

## 2024-12-02 RX ORDER — OXYCODONE HYDROCHLORIDE 30 MG/1
5 TABLET ORAL EVERY 4 HOURS
Refills: 0 | Status: DISCONTINUED | OUTPATIENT
Start: 2024-12-02 | End: 2024-12-05

## 2024-12-02 RX ORDER — 0.9 % SODIUM CHLORIDE 0.9 %
1000 INTRAVENOUS SOLUTION INTRAVENOUS
Refills: 0 | Status: DISCONTINUED | OUTPATIENT
Start: 2024-12-02 | End: 2024-12-02

## 2024-12-02 RX ORDER — HYDROMORPHONE HYDROCHLORIDE 2 MG/1
0.2 TABLET ORAL
Refills: 0 | Status: DISCONTINUED | OUTPATIENT
Start: 2024-12-02 | End: 2024-12-03

## 2024-12-02 RX ORDER — ROSUVASTATIN CALCIUM 5 MG/1
10 TABLET, FILM COATED ORAL AT BEDTIME
Refills: 0 | Status: DISCONTINUED | OUTPATIENT
Start: 2024-12-02 | End: 2024-12-02

## 2024-12-02 RX ORDER — ACETAMINOPHEN 500MG 500 MG/1
975 TABLET, COATED ORAL EVERY 6 HOURS
Refills: 0 | Status: DISCONTINUED | OUTPATIENT
Start: 2024-12-02 | End: 2024-12-05

## 2024-12-02 RX ORDER — GLUCOSAMINE SULFATE DIPOT CHLR 500 MG
1 CAPSULE ORAL DAILY
Refills: 0 | Status: DISCONTINUED | OUTPATIENT
Start: 2024-12-02 | End: 2024-12-02

## 2024-12-02 RX ORDER — SERTRALINE HYDROCHLORIDE 100 MG/1
50 TABLET, FILM COATED ORAL DAILY
Refills: 0 | Status: DISCONTINUED | OUTPATIENT
Start: 2024-12-02 | End: 2024-12-02

## 2024-12-02 RX ORDER — ENOXAPARIN SODIUM 30 MG/.3ML
40 INJECTION SUBCUTANEOUS EVERY 24 HOURS
Refills: 0 | Status: DISCONTINUED | OUTPATIENT
Start: 2024-12-03 | End: 2024-12-05

## 2024-12-02 RX ORDER — CHOLECALCIFEROL (VITAMIN D3) 10MCG/0.25
2000 DROPS ORAL DAILY
Refills: 0 | Status: DISCONTINUED | OUTPATIENT
Start: 2024-12-02 | End: 2024-12-02

## 2024-12-02 RX ORDER — 0.9 % SODIUM CHLORIDE 0.9 %
1000 INTRAVENOUS SOLUTION INTRAVENOUS
Refills: 0 | Status: DISCONTINUED | OUTPATIENT
Start: 2024-12-02 | End: 2024-12-03

## 2024-12-02 RX ORDER — PREDNISONE 20 MG/1
10 TABLET ORAL DAILY
Refills: 0 | Status: DISCONTINUED | OUTPATIENT
Start: 2024-12-02 | End: 2024-12-02

## 2024-12-02 RX ORDER — CEFAZOLIN SODIUM 10 G
2000 VIAL (EA) INJECTION EVERY 8 HOURS
Refills: 0 | Status: COMPLETED | OUTPATIENT
Start: 2024-12-02 | End: 2024-12-03

## 2024-12-02 RX ORDER — LOSARTAN POTASSIUM 100 MG/1
25 TABLET, FILM COATED ORAL DAILY
Refills: 0 | Status: DISCONTINUED | OUTPATIENT
Start: 2024-12-02 | End: 2024-12-02

## 2024-12-02 RX ORDER — ACETAMINOPHEN 500MG 500 MG/1
650 TABLET, COATED ORAL EVERY 6 HOURS
Refills: 0 | Status: DISCONTINUED | OUTPATIENT
Start: 2024-12-02 | End: 2024-12-02

## 2024-12-02 RX ORDER — OXYCODONE HYDROCHLORIDE 30 MG/1
10 TABLET ORAL EVERY 4 HOURS
Refills: 0 | Status: DISCONTINUED | OUTPATIENT
Start: 2024-12-02 | End: 2024-12-05

## 2024-12-02 RX ORDER — HEPARIN SODIUM,PORCINE 1000/ML
5000 VIAL (ML) INJECTION ONCE
Refills: 0 | Status: COMPLETED | OUTPATIENT
Start: 2024-12-02 | End: 2024-12-02

## 2024-12-02 RX ORDER — OXYCODONE HYDROCHLORIDE 30 MG/1
2.5 TABLET ORAL EVERY 4 HOURS
Refills: 0 | Status: DISCONTINUED | OUTPATIENT
Start: 2024-12-02 | End: 2024-12-05

## 2024-12-02 RX ORDER — SENNOSIDES 8.6 MG
2 TABLET ORAL AT BEDTIME
Refills: 0 | Status: DISCONTINUED | OUTPATIENT
Start: 2024-12-02 | End: 2024-12-05

## 2024-12-02 RX ORDER — ONDANSETRON HYDROCHLORIDE 4 MG/1
4 TABLET, FILM COATED ORAL ONCE
Refills: 0 | Status: DISCONTINUED | OUTPATIENT
Start: 2024-12-02 | End: 2024-12-03

## 2024-12-02 RX ORDER — FAMOTIDINE 20 MG/1
20 TABLET, FILM COATED ORAL
Refills: 0 | Status: DISCONTINUED | OUTPATIENT
Start: 2024-12-02 | End: 2024-12-02

## 2024-12-02 RX ADMIN — Medication 2000 UNIT(S): at 11:42

## 2024-12-02 RX ADMIN — ACETAMINOPHEN 500MG 650 MILLIGRAM(S): 500 TABLET, COATED ORAL at 13:33

## 2024-12-02 RX ADMIN — FAMOTIDINE 20 MILLIGRAM(S): 20 TABLET, FILM COATED ORAL at 17:23

## 2024-12-02 RX ADMIN — METOPROLOL TARTRATE 25 MILLIGRAM(S): 100 TABLET, FILM COATED ORAL at 05:23

## 2024-12-02 RX ADMIN — Medication 1 MILLIGRAM(S): at 11:42

## 2024-12-02 RX ADMIN — PREDNISONE 10 MILLIGRAM(S): 20 TABLET ORAL at 05:19

## 2024-12-02 RX ADMIN — Medication 50 MILLILITER(S): at 02:53

## 2024-12-02 RX ADMIN — LOSARTAN POTASSIUM 25 MILLIGRAM(S): 100 TABLET, FILM COATED ORAL at 05:19

## 2024-12-02 RX ADMIN — Medication 50 MILLILITER(S): at 12:13

## 2024-12-02 RX ADMIN — HYDROMORPHONE HYDROCHLORIDE 0.5 MILLIGRAM(S): 2 TABLET ORAL at 17:00

## 2024-12-02 RX ADMIN — HYDROMORPHONE HYDROCHLORIDE 0.5 MILLIGRAM(S): 2 TABLET ORAL at 17:49

## 2024-12-02 RX ADMIN — FAMOTIDINE 20 MILLIGRAM(S): 20 TABLET, FILM COATED ORAL at 05:19

## 2024-12-02 RX ADMIN — Medication 25 GRAM(S): at 12:13

## 2024-12-02 RX ADMIN — Medication 81 MILLIGRAM(S): at 11:42

## 2024-12-02 RX ADMIN — ACETAMINOPHEN 500MG 650 MILLIGRAM(S): 500 TABLET, COATED ORAL at 14:34

## 2024-12-02 RX ADMIN — Medication 5000 UNIT(S): at 03:04

## 2024-12-02 RX ADMIN — HYDROMORPHONE HYDROCHLORIDE 0.5 MILLIGRAM(S): 2 TABLET ORAL at 05:56

## 2024-12-02 NOTE — PROGRESS NOTE ADULT - ASSESSMENT
84-year-old female past med history of GERD, hypertension, osteoarthritis, rheumatoid arthritis, breast cancer in remission since 2015, past surgical history of bilateral hip replacements, and left knee replacement presenting for a fall.    #Mechanical Fall   #Inter prosthetic distal femur fracture  - CT femur: Acute comminuted right femoral distal metadiaphyseal fracture with volar apex angulation and 1.5 cm posterior displacement. Small knee joint effusion.  - CT head: IMPRESSION: No acute process seen  - x ray pelvis:   - Well-padded KI brace placed, keep clean/dry/intact  - Bed rest  - Pain control   - DVT PPx  to be held on morning of procedure - holding today 12/2  - Pre-op clearance / risk stratification / medical optimization  - Pre-Op CBC, CMP/BMP, PT/PTT/INR, Type & Screen x2, repeat lactate (2.8 on 12/2), CXR, EKG  - Trend Hgb  - 2u RBC on hold for surgery  - NPO with IVF for surgery today 12/2--- > Right femur open reduction internal fixation      #GERD  - famotidine 20 bid       #Hypertension  - losartan 25mg od  - HCT 50mg od     #osteoarthritis  #rheumatoid arthritis  - on prednisone 10mg od     #Anxiety/depression  - on sertraline  - qt 500  - will hold for now       #breast cancer  -  in remission     DVT PPX: on hold   GI PPX: none   DIET: npo   ACTIVITY: bed rest   CODE STATUS: full   DISPOSITION:  84-year-old female past med history of GERD, hypertension, osteoarthritis, rheumatoid arthritis, breast cancer in remission since 2015, past surgical history of bilateral hip replacements, and left knee replacement presenting for a fall.    #Mechanical Fall   #Inter prosthetic distal femur fracture  - CT femur: Acute comminuted right femoral distal metadiaphyseal fracture with volar apex angulation and 1.5 cm posterior displacement. Small knee joint effusion.  - CT head: IMPRESSION: No acute process seen  - x ray pelvis:   - Well-padded KI brace placed, keep clean/dry/intact  - Bed rest  - Pain control   - DVT PPx  to be held on morning of procedure - holding today 12/2  - Pre-op clearance / risk stratification / medical optimization  - Pre-Op CBC, CMP/BMP, PT/PTT/INR, Type & Screen x2, repeat lactate (2.8 on 12/2), CXR, EKG  - Trend Hgb -> dropped to 10.8 on 12/2   - 2u RBC on hold for surgery  - NPO with IVF for surgery today 12/2--- > Right femur open reduction internal fixation      #GERD  - famotidine 20 bid       #Hypertension  - losartan 25mg od  - HCT 50mg od     #osteoarthritis  #rheumatoid arthritis  - on prednisone 10mg od     #Anxiety/depression  - on sertraline  - qt 500  - will hold for now       #breast cancer  -  in remission     DVT PPX: on hold   GI PPX: none   DIET: npo   ACTIVITY: bed rest   CODE STATUS: full   DISPOSITION:     pending: surgery today 12/2, trend HgB  84-year-old female past med history of GERD, hypertension, osteoarthritis, rheumatoid arthritis, breast cancer in remission since 2015, past surgical history of bilateral hip replacements, and left knee replacement presenting for a fall.    #Mechanical Fall   #Inter prosthetic distal femur fracture  - CT femur: Acute comminuted right femoral distal metadiaphyseal fracture with volar apex angulation and 1.5 cm posterior displacement. Small knee joint effusion.  - CT head: IMPRESSION: No acute process seen  - x ray pelvis:   - Well-padded KI brace placed, keep clean/dry/intact  - Bed rest  - Pain control   - DVT PPx  to be held on morning of procedure - holding today 12/2  - Pre-op clearance / risk stratification / medical optimization  - Pre-Op CBC, CMP/BMP, PT/PTT/INR, Type & Screen x2, repeat lactate (2.8 on 12/2), CXR, EKG  - Trend Hgb -> dropped to 10.8 on 12/2   - monitor Hb post OP   - 2u RBC on hold for surgery  - NPO with IVF for surgery today 12/2--- > Right femur open reduction internal fixation      #GERD  - famotidine 20 bid       #Hypertension  - losartan 25mg od  - HCT 50mg od     #osteoarthritis  #rheumatoid arthritis  - on prednisone 10mg od     #Anxiety/depression  - on sertraline  - qt 500  - will hold for now       #breast cancer  -  in remission     DVT PPX: on hold   GI PPX: none   DIET: npo   ACTIVITY: bed rest   CODE STATUS: full   DISPOSITION:     pending: surgery today 12/2, trend HgB

## 2024-12-02 NOTE — H&P ADULT - NSHPREVIEWOFSYSTEMS_GEN_ALL_CORE
REVIEW OF SYSTEMS:    CONSTITUTIONAL:  PAIN IN RIGHT LEG   EYES/ENT:  No visual changes;  No vertigo or throat pain   NECK:  No pain or stiffness  RESPIRATORY:  No cough, wheezing, hemoptysis; No shortness of breath  CARDIOVASCULAR:  No chest pain or palpitations  GASTROINTESTINAL:  No abdominal or epigastric pain. No nausea, vomiting, or hematemesis; No diarrhea or constipation. No melena or hematochezia.  GENITOURINARY:  No dysuria, frequency or hematuria  NEUROLOGICAL:  RIGHT LEG WEAKNESS   SKIN:  No itching, rashes

## 2024-12-02 NOTE — PATIENT PROFILE ADULT - FALL HARM RISK - HARM RISK INTERVENTIONS

## 2024-12-02 NOTE — H&P ADULT - ASSESSMENT
84-year-old female past med history of GERD, hypertension, osteoarthritis, rheumatoid arthritis, breast cancer in remission since 2015, past surgical history of bilateral hip replacements, and knee replacement presenting for a fall.    #Mechanical Fall   #Inter prosthetic distal femur fracture  - CT femur: Acute comminuted right femoral distal metadiaphyseal fracture with volar apex angulation and 1.5 cm posterior displacement. Small knee joint effusion.  - CT head: IMPRESSION: No acute process seen  - x ray pelvis:   - Well-padded KI brace placed, keep clean/dry/intact  - Bed rest  - Pain control   - DVT PPx  to be held on morning of procedure - please give stat dose of heparin this evening then hold in AM  - Pre-op clearance / risk stratification / medical optimization  - Pre-Op CBC, CMP/BMP, PT/PTT/INR, Type & Screen x2, repeat lactate, CXR, EKG  - Trend Hgb  - 2u RBC on hold for surgery  - NPO with IVF for surgery tomorrow--- > Right femur open reduction internal fixation      #GERD      #Hypertension    #osteoarthritis  #rheumatoid arthritis      #breast cancer  -  in remission     DVT PPX: on hold   GI PPX: none   DIET: npo   ACTIVITY: bed rest   CODE STATUS: full   DISPOSITION:     PENDING: please confirm med rec in am              84-year-old female past med history of GERD, hypertension, osteoarthritis, rheumatoid arthritis, breast cancer in remission since 2015, past surgical history of bilateral hip replacements, and knee replacement presenting for a fall.    #Mechanical Fall   #Inter prosthetic distal femur fracture  - CT femur: Acute comminuted right femoral distal metadiaphyseal fracture with volar apex angulation and 1.5 cm posterior displacement. Small knee joint effusion.  - CT head: IMPRESSION: No acute process seen  - x ray pelvis:   - Well-padded KI brace placed, keep clean/dry/intact  - Bed rest  - Pain control   - DVT PPx  to be held on morning of procedure - please give stat dose of heparin this evening then hold in AM  - Pre-op clearance / risk stratification / medical optimization  - Pre-Op CBC, CMP/BMP, PT/PTT/INR, Type & Screen x2, repeat lactate, CXR, EKG  - Trend Hgb  - 2u RBC on hold for surgery  - NPO with IVF for surgery tomorrow--- > Right femur open reduction internal fixation      #GERD  - famotidine 20 bid       #Hypertension  - losartan 25mg od  - HCT 50mg od     #osteoarthritis  #rheumatoid arthritis  - on prednisone 10mg od       #breast cancer  -  in remission     DVT PPX: on hold   GI PPX: none   DIET: npo   ACTIVITY: bed rest   CODE STATUS: full   DISPOSITION:     PENDING: med rec confirmed with daughter via phone call               84-year-old female past med history of GERD, hypertension, osteoarthritis, rheumatoid arthritis, breast cancer in remission since 2015, past surgical history of bilateral hip replacements, and knee replacement presenting for a fall.    #Mechanical Fall   #Inter prosthetic distal femur fracture  - CT femur: Acute comminuted right femoral distal metadiaphyseal fracture with volar apex angulation and 1.5 cm posterior displacement. Small knee joint effusion.  - CT head: IMPRESSION: No acute process seen  - x ray pelvis:   - Well-padded KI brace placed, keep clean/dry/intact  - Bed rest  - Pain control   - DVT PPx  to be held on morning of procedure - please give stat dose of heparin this evening then hold in AM  - Pre-op clearance / risk stratification / medical optimization  - Pre-Op CBC, CMP/BMP, PT/PTT/INR, Type & Screen x2, repeat lactate, CXR, EKG  - Trend Hgb  - 2u RBC on hold for surgery  - NPO with IVF for surgery tomorrow--- > Right femur open reduction internal fixation      #GERD  - famotidine 20 bid       #Hypertension  - losartan 25mg od  - HCT 50mg od     #osteoarthritis  #rheumatoid arthritis  - on prednisone 10mg od     #Anxiety/depression  - on sertraline  - qt 500  - will hold for now       #breast cancer  -  in remission     DVT PPX: on hold   GI PPX: none   DIET: npo   ACTIVITY: bed rest   CODE STATUS: full   DISPOSITION:     PENDING: med rec confirmed with daughter via phone call

## 2024-12-02 NOTE — PRE PROCEDURE NOTE - PRE PROCEDURE EVALUATION
ORTHOPEDIC SURGERY PRE OP NOTE    Diagnosis: Right interprosthetic distal femur fracture    Planned Procedure: Right femur open reduction internal fixation    Consent: TO BE OBTAINED BY ATTENDING                   Risks/benefits/alternatives were discussed with the patient/family and they wish to proceed with surgery.     ANTICIPATED DATE OF PROCEDURE: 12/2/24  SCHEDULED CASE OR ADD-ON CASE: Add on    Clearances:   Medicine    T(C): 36.4 (12-01-24 @ 19:39), Max: 36.4 (12-01-24 @ 19:39)  HR: 92 (12-01-24 @ 19:39) (92 - 92)  BP: 158/96 (12-01-24 @ 19:39) (158/96 - 158/96)  RR: 19 (12-01-24 @ 19:39) (19 - 19)  SpO2: 99% (12-01-24 @ 19:39) (99% - 99%)    Labs:                        12.5   13.52 )-----------( 277      ( 01 Dec 2024 20:12 )             37.8     12-01    130[L]  |  91[L]  |  23[H]  ----------------------------<  155[H]  4.5   |  22  |  1.1    Ca    9.9      01 Dec 2024 20:12    TPro  6.8  /  Alb  4.2  /  TBili  0.7  /  DBili  x   /  AST  36  /  ALT  23  /  AlkPhos  87  12-01    PT/INR - ( 01 Dec 2024 20:12 )   PT: 11.20 sec;   INR: 0.95 ratio         PTT - ( 01 Dec 2024 20:12 )  PTT:27.0 sec  Type and Screen X 2  EKG  CXR:     DIET: NPO after midnight  IVF: per primary team    ANTICOAGULATION STATUS ( include name of anticoagulant) : heparin subQ - stat dose 12/1 PM then hold AM dose           A/P: Patient is a 84y y/o Female pending R femur ORIF tomorrow    [ ] NPO and IVF @ midnight  [ ] pain control/analgesia prn per primary team   [ ] Incentive Spirometry   [ ] F/U Clearance  [ ] F/U Pending Labs  [ ] Notify Ortho with any questions- spectra [id number]    [ ]DISCUSSED WITH PRIMARY TEAM MEMBER ORTHOPEDIC SURGERY PRE OP NOTE    Diagnosis: Right interprosthetic distal femur fracture    Planned Procedure: Right femur open reduction internal fixation    Consent: TO BE OBTAINED BY ATTENDING                   Risks/benefits/alternatives were discussed with the patient/family and they wish to proceed with surgery.     ANTICIPATED DATE OF PROCEDURE: 12/2/24  SCHEDULED CASE OR ADD-ON CASE: Add on    Clearances:   Medicine    T(C): 36.4 (12-01-24 @ 19:39), Max: 36.4 (12-01-24 @ 19:39)  HR: 92 (12-01-24 @ 19:39) (92 - 92)  BP: 158/96 (12-01-24 @ 19:39) (158/96 - 158/96)  RR: 19 (12-01-24 @ 19:39) (19 - 19)  SpO2: 99% (12-01-24 @ 19:39) (99% - 99%)    Labs:                        12.5   13.52 )-----------( 277      ( 01 Dec 2024 20:12 )             37.8     12-01    130[L]  |  91[L]  |  23[H]  ----------------------------<  155[H]  4.5   |  22  |  1.1    Ca    9.9      01 Dec 2024 20:12    TPro  6.8  /  Alb  4.2  /  TBili  0.7  /  DBili  x   /  AST  36  /  ALT  23  /  AlkPhos  87  12-01    PT/INR - ( 01 Dec 2024 20:12 )   PT: 11.20 sec;   INR: 0.95 ratio         PTT - ( 01 Dec 2024 20:12 )  PTT:27.0 sec  Type and Screen X 2  4U RBC ON HOLD FOR OR  EKG  CXR    DIET: NPO after midnight  IVF: per primary team    ANTICOAGULATION STATUS ( include name of anticoagulant) : heparin subQ - stat dose 12/1 PM then hold AM dose           A/P: Patient is a 84y y/o Female pending R femur ORIF tomorrow    [ ] NPO and IVF @ midnight  [ ] pain control/analgesia prn per primary team   [ ] Incentive Spirometry   [ ] F/U Clearance  [ ] F/U Pending Labs  [ ] Notify Ortho with any questions- spectra [id number]    [ ]DISCUSSED WITH PRIMARY TEAM MEMBER

## 2024-12-02 NOTE — H&P ADULT - TIME BILLING
spending 58   minutes on this patient's case:  18  minutes w patient, 25  minutes reviewing the chart, (old/new, and OP)   and 25    minutes speaking to the HO surgery, RN and family, also coordinating care and documenting all. for preoperative risk evaluation

## 2024-12-02 NOTE — H&P ADULT - HISTORY OF PRESENT ILLNESS
84-year-old female past med history of GERD, hypertension, osteoarthritis, rheumatoid arthritis, breast cancer in remission since 2015, past surgical history of bilateral hip replacements, and knee replacement presenting for a fall. Hx taken from the patient and is aox3 at time of the interview. Patient states that this morning around 10 am , she was trying to reach out from chair to another chair, while in sitting position, and while stretching she lost her balance and fell to the ground on her right side, hitting her head against the ground. She further states that, after the fall she was unable to get up from the ground and hence she was lying on the floor for many hours ( around 8 hr ) until her friend came to her house who saw her on the ground, and called the 911. After the fall, she is complaining of right lower extremity pain and is unable to move her right leg due to pain. on further inquiry,  patient had a fall the week prior wherein she landed on her face, she was seen in the ED at that time and found no significant injuries and was discharged to home. She has been using a walker since that time for stability. Also of note patient had a L forefoot exostosis removal with podiatry approximately two weeks prior but has been WBAT on that side.  Patient denies headache, loss of consciousness neck pain, dizziness, chest pain, palpitations  shortness of breath, chest pain, nausea, vomiting , urinary symptoms, and other significant complaints.   Patient lives alone with family nearby including a daughter who is a nurse at [employer].    ED VITALS    · BP Systolic	158 mm Hg  · BP Diastolic	 96 mm Hg  · Heart Rate	92 /min  · Respiration Rate (breaths/min)	19 /min  · Temp (F)	97.5 Degrees F  · Temp (C)	36.4 Degrees C  · Temp site	oral  · SpO2 (%)	99 %  · O2 Delivery/Oxygen Delivery Method	room air  · Temp at ED Arrival (C)	36.4 Degrees C    ED LABS    WBC 13K, SODIUM 130, ANION GAP 17, GFR 49, LACTATE 3.2,

## 2024-12-02 NOTE — PATIENT PROFILE ADULT - MONEY FOR FOOD
Form completed by Dr. Bernard but needs front section to be completed by parent before we could scan into the chart. Left voicemail to mom to call back.    no

## 2024-12-02 NOTE — H&P ADULT - ATTENDING COMMENTS
Chart reviewed, patient examined. Pertinent results reviewed.  Case discussed with HO; specialist f/u reviewed  HD#2; Patient is well known to me for many years.    Above admission reviewed.   I agree w A&P- she fell with a simple mvt(M) , and was down around 8 hrs. She has an emerg. button, but was not wearing it at the T of the fall.  Hosp eval nopted- + Femur FX. Needs ORIF.   PMH, PSH, SH (lives alone) reviewed  I just cleared her for her L Foot surgery 2 weeks ago.   Long H/O HTN/DL. An ECHO here a few mos ago- showed G2DD, Good EF. She's hed multiple NEG Nuclear stress tests in the last few yeARS.   The EKG changes are chronic.   She is MOD risk, but stable for the planned, and necessary procedure. Ortho may proceed.  Dr Farrell is her Cardiologist if anything changes.

## 2024-12-02 NOTE — BRIEF OPERATIVE NOTE - NSICDXBRIEFPREOP_GEN_ALL_CORE_FT
PRE-OP DIAGNOSIS:  Displaced spiral fracture of shaft of right femur 02-Dec-2024 22:55:46 diabmetaphyseal right femur about retained revision total hip and total knee Janes Dailey

## 2024-12-02 NOTE — BRIEF OPERATIVE NOTE - NSICDXBRIEFPOSTOP_GEN_ALL_CORE_FT
POST-OP DIAGNOSIS:  Displaced spiral fracture of shaft of right femur 02-Dec-2024 22:56:33 diabmetaphyseal right femur about retained revision total hip and total knee Janes Dailey

## 2024-12-02 NOTE — BRIEF OPERATIVE NOTE - NSICDXBRIEFPROCEDURE_GEN_ALL_CORE_FT
PROCEDURES:  Intramedullary nailing of fracture of shaft of femur 02-Dec-2024 22:54:07 CPT code 32226 [id number] modified because of retained total knee and total hip requiring longer incision more fixation and greater operative time, essential 2 x operative time and should be reimbursed accordingly Janes Dailey

## 2024-12-02 NOTE — PROGRESS NOTE ADULT - SUBJECTIVE AND OBJECTIVE BOX
SUBJECTIVE/OVERNIGHT EVENTS  Today is hospital day 1d. This morning patient was seen and examined at bedside, resting comfortably in bed. No acute or major events overnight. Pt rates the pain 1/10 this morning. NPO since yesterday. Normal urination, no bowel movement since Saturday.     Other fracture of lower end of right femur, initial encounter for closed fracture    Cervicalgia    Corns and callosities    Encounter for other general examination    Long term (current) use of aromatase inhibitors    Malignant neoplasm of upper-outer quadrant of right female breast    Metatarsalgia, right foot    Nocturia    Other bursitis of hip, right hip    Other enthesopathy of right foot and ankle    Other specified disorders of the skin and subcutaneous tissue    Pain due to internal orthopedic prosthetic devices, implants and grafts, initial encounter    Pain in right foot    Radiculopathy, cervical region    Toxic effect of chromium and its compounds, accidental (unintentional), initial encounter    Toxic effect of other metals, accidental (unintentional), initial encounter    Unspecified open wound, left foot, initial encounter    FH: cancer    Handoff    MEWS Score    Rheumatoid arthritis    HTN (hypertension)    Breast cancer    Depression    Chronic GERD    OA (osteoarthritis)    Rheumatoid arthritis    Other closed fracture of distal end of right femur    History of right hip replacement    Status post left hip replacement    S/P total knee replacement, left    H/O vaginal hysterectomy    Status post cholecystectomy    S/P lumpectomy of breast    FALL LEG INJURY    8    Fall at home    SysAdmin_VisitLink        MEDICATIONS  STANDING MEDICATIONS  aspirin enteric coated 81 milliGRAM(s) Oral daily  cholecalciferol 2000 Unit(s) Oral daily  famotidine    Tablet 20 milliGRAM(s) Oral two times a day  folic acid 1 milliGRAM(s) Oral daily  hydrochlorothiazide 50 milliGRAM(s) Oral daily  lactated ringers. 1000 milliLiter(s) IV Continuous <Continuous>  losartan 25 milliGRAM(s) Oral daily  metoprolol succinate ER 25 milliGRAM(s) Oral daily  predniSONE   Tablet 10 milliGRAM(s) Oral daily  rosuvastatin 10 milliGRAM(s) Oral at bedtime    PRN MEDICATIONS  acetaminophen     Tablet .. 650 milliGRAM(s) Oral every 6 hours PRN  HYDROmorphone  Injectable 0.5 milliGRAM(s) IV Push every 6 hours PRN    VITALS  T(F): 97.4 (12-02-24 @ 04:43), Max: 98 (12-02-24 @ 00:15)  HR: 74 (12-02-24 @ 04:43) (74 - 92)  BP: 118/77 (12-02-24 @ 04:43) (118/77 - 158/96)  RR: 18 (12-02-24 @ 04:43) (18 - 19)  SpO2: 99% (12-02-24 @ 00:15) (99% - 99%)  POCT Blood Glucose.: 117 mg/dL (12-01-24 @ 19:57)    PHYSICAL EXAM  GENERAL  ( X ) NAD, lying in bed comfortably     (  ) obtunded     (  ) lethargic     (  ) somnolent    HEAD  ( X ) Atraumatic     (  ) hematoma     (  ) laceration (specify location:       )     NECK  ( X ) Supple     (  ) neck stiffness     (  ) nuchal rigidity     (  )  no JVD     (  ) JVD present ( -- cm)    HEART  Rate -->  ( X ) normal rate    (  ) bradycardic    (  ) tachycardic  Rhythm -->  ( X ) regular    (  ) regularly irregular    (  ) irregularly irregular  Murmurs -->  (  ) normal s1/s2    (  ) systolic murmur    (  ) diastolic murmur    (  ) continuous murmur     (  ) S3 present    (  ) S4 present    LUNGS  ( X )Unlabored respirations     (  ) tachypnea  ( X ) B/L air entry     (  ) decreased breath sounds in:  (location     )    (  ) no adventitious sound     (  ) crackles     (  ) wheezing      (  ) rhonchi      (specify location:       )  (  ) chest wall tenderness (specify location:       )    ABDOMEN  ( X ) Soft     (  ) tense   |   (  ) nondistended     (  ) distended   |   (  ) +BS     (  ) hypoactive bowel sounds     (  ) hyperactive bowel sounds  (  ) nontender     (  ) RUQ tenderness     (  ) RLQ tenderness     (  ) LLQ tenderness     (  ) epigastric tenderness     (  ) diffuse tenderness  (  ) Splenomegaly      (  ) Hepatomegaly      (  ) Jaundice     (  ) ecchymosis     EXTREMITIES  ( X ) Normal     (  ) Rash     (  ) ecchymosis     (  ) varicose veins      (  ) pitting edema     (  ) non-pitting edema   (  ) ulceration     (  ) gangrene:     (location:     )    NERVOUS SYSTEM  ( X ) A&Ox3     (  ) confused     (  ) lethargic  CN II-XII:     (  ) Intact     (  ) focal deficits  (Specify:     )   Upper extremities:     (  ) strength X/5     (  ) focal deficit (specify:    )  Lower extremities:     (  ) strength  X/5    (  ) focal deficit (specify:    )    SKIN  (  ) No rashes or lesions     (  ) maculopapular rash     (  ) pustules     (  ) vesicles     (  ) ulcer     (  ) ecchymosis     (specify location:     )  - bruising on cheeks     (  ) Indwelling Penny Catheter   Date insterted:    Reason (  ) Critical illness     (  ) urinary retention    (  ) Accurate Ins/Outs Monitoring     (  ) CMO patient    (  ) Central Line  Date inserted:  Location: (  ) Right IJ   (  ) Left IJ   (  ) Right Fem   (  ) Left Fem    (  ) SPC  (  ) pigtail  (  ) PEG tube  (  ) colostomy  (  ) jejunostomy  (  ) U-Dall    LABS             12.5   13.52 )-----------( 277      ( 12-01-24 @ 20:12 )             37.8     132  |  92  |  27  -------------------------<  131   12-02-24 @ 01:59  4.8  |  25  |  1.0    Ca      9.7     12-02-24 @ 01:59    TPro  6.8  /  Alb  4.2  /  TBili  0.7  /  DBili  x   /  AST  36  /  ALT  23  /  AlkPhos  87  /  GGT  x     12-01-24 @ 20:12    PT/INR - ( 12-01-24 @ 20:12 )   PT: 11.20 sec;   INR: 0.95 ratio  PTT - ( 12-01-24 @ 20:12 )  PTT:27.0 sec      Urinalysis Basic - ( 02 Dec 2024 01:59 )    Color: x / Appearance: x / SG: x / pH: x  Gluc: 131 mg/dL / Ketone: x  / Bili: x / Urobili: x   Blood: x / Protein: x / Nitrite: x   Leuk Esterase: x / RBC: x / WBC x   Sq Epi: x / Non Sq Epi: x / Bacteria: x          IMAGING    General:  - DVT proph  - GI proph  - Diet  - Activity  - Code Status     SUBJECTIVE/OVERNIGHT EVENTS  Today is hospital day 1d. This morning patient was seen and examined at bedside, resting comfortably in bed. No acute or major events overnight. Pt rates the pain 1/10 this morning. NPO since yesterday. Normal urination, no bowel movement since Saturday.     Other fracture of lower end of right femur, initial encounter for closed fracture    Cervicalgia    Corns and callosities    Encounter for other general examination    Long term (current) use of aromatase inhibitors    Malignant neoplasm of upper-outer quadrant of right female breast    Metatarsalgia, right foot    Nocturia    Other bursitis of hip, right hip    Other enthesopathy of right foot and ankle    Other specified disorders of the skin and subcutaneous tissue    Pain due to internal orthopedic prosthetic devices, implants and grafts, initial encounter    Pain in right foot    Radiculopathy, cervical region    Toxic effect of chromium and its compounds, accidental (unintentional), initial encounter    Toxic effect of other metals, accidental (unintentional), initial encounter    Unspecified open wound, left foot, initial encounter    FH: cancer    Handoff    MEWS Score    Rheumatoid arthritis    HTN (hypertension)    Breast cancer    Depression    Chronic GERD    OA (osteoarthritis)    Rheumatoid arthritis    Other closed fracture of distal end of right femur    History of right hip replacement    Status post left hip replacement    S/P total knee replacement, left    H/O vaginal hysterectomy    Status post cholecystectomy    S/P lumpectomy of breast    FALL LEG INJURY    8    Fall at home    SysAdmin_VisitLink        MEDICATIONS  STANDING MEDICATIONS  aspirin enteric coated 81 milliGRAM(s) Oral daily  cholecalciferol 2000 Unit(s) Oral daily  famotidine    Tablet 20 milliGRAM(s) Oral two times a day  folic acid 1 milliGRAM(s) Oral daily  hydrochlorothiazide 50 milliGRAM(s) Oral daily  lactated ringers. 1000 milliLiter(s) IV Continuous <Continuous>  losartan 25 milliGRAM(s) Oral daily  metoprolol succinate ER 25 milliGRAM(s) Oral daily  predniSONE   Tablet 10 milliGRAM(s) Oral daily  rosuvastatin 10 milliGRAM(s) Oral at bedtime    PRN MEDICATIONS  acetaminophen     Tablet .. 650 milliGRAM(s) Oral every 6 hours PRN  HYDROmorphone  Injectable 0.5 milliGRAM(s) IV Push every 6 hours PRN    VITALS  T(F): 97.4 (12-02-24 @ 04:43), Max: 98 (12-02-24 @ 00:15)  HR: 74 (12-02-24 @ 04:43) (74 - 92)  BP: 118/77 (12-02-24 @ 04:43) (118/77 - 158/96)  RR: 18 (12-02-24 @ 04:43) (18 - 19)  SpO2: 99% (12-02-24 @ 00:15) (99% - 99%)  POCT Blood Glucose.: 117 mg/dL (12-01-24 @ 19:57)    PHYSICAL EXAM  GENERAL  ( X ) NAD, lying in bed comfortably     (  ) obtunded     (  ) lethargic     (  ) somnolent    HEAD  ( X ) Atraumatic     (  ) hematoma     (  ) laceration (specify location:       )     NECK  ( X ) Supple     (  ) neck stiffness     (  ) nuchal rigidity     (  )  no JVD     (  ) JVD present ( -- cm)    HEART  Rate -->  ( X ) normal rate    (  ) bradycardic    (  ) tachycardic  Rhythm -->  ( X ) regular    (  ) regularly irregular    (  ) irregularly irregular  Murmurs -->  (  ) normal s1/s2    (  ) systolic murmur    (  ) diastolic murmur    (  ) continuous murmur     (  ) S3 present    (  ) S4 present    LUNGS  ( X )Unlabored respirations     (  ) tachypnea  ( X ) B/L air entry     (  ) decreased breath sounds in:  (location     )    (  ) no adventitious sound     (  ) crackles     (  ) wheezing      (  ) rhonchi      (specify location:       )  (  ) chest wall tenderness (specify location:       )    ABDOMEN  ( X ) Soft     (  ) tense   |   (  ) nondistended     (  ) distended   |   (  ) +BS     (  ) hypoactive bowel sounds     (  ) hyperactive bowel sounds  (  ) nontender     (  ) RUQ tenderness     (  ) RLQ tenderness     (  ) LLQ tenderness     (  ) epigastric tenderness     (  ) diffuse tenderness  (  ) Splenomegaly      (  ) Hepatomegaly      (  ) Jaundice     (  ) ecchymosis     EXTREMITIES  ( X ) Normal     (  ) Rash     (  ) ecchymosis     (  ) varicose veins      (  ) pitting edema     (  ) non-pitting edema   (  ) ulceration     (  ) gangrene:     (location:     )    NERVOUS SYSTEM  ( X ) A&Ox3     (  ) confused     (  ) lethargic  CN II-XII:     (  ) Intact     (  ) focal deficits  (Specify:     )   Upper extremities:     (  ) strength X/5     (  ) focal deficit (specify:    )  Lower extremities:     (  ) strength  X/5    (  ) focal deficit (specify:    )    SKIN  (  ) No rashes or lesions     (  ) maculopapular rash     (  ) pustules     (  ) vesicles     (  ) ulcer     (  ) ecchymosis     (specify location:     )  - bruising on cheeks     LABS             12.5   13.52 )-----------( 277      ( 12-01-24 @ 20:12 )             37.8     132  |  92  |  27  -------------------------<  131   12-02-24 @ 01:59  4.8  |  25  |  1.0    Ca      9.7     12-02-24 @ 01:59    TPro  6.8  /  Alb  4.2  /  TBili  0.7  /  DBili  x   /  AST  36  /  ALT  23  /  AlkPhos  87  /  GGT  x     12-01-24 @ 20:12    PT/INR - ( 12-01-24 @ 20:12 )   PT: 11.20 sec;   INR: 0.95 ratio  PTT - ( 12-01-24 @ 20:12 )  PTT:27.0 sec      Urinalysis Basic - ( 02 Dec 2024 01:59 )    Color: x / Appearance: x / SG: x / pH: x  Gluc: 131 mg/dL / Ketone: x  / Bili: x / Urobili: x   Blood: x / Protein: x / Nitrite: x   Leuk Esterase: x / RBC: x / WBC x   Sq Epi: x / Non Sq Epi: x / Bacteria: x          IMAGING    General:  - DVT proph  - GI proph  - Diet  - Activity  - Code Status

## 2024-12-02 NOTE — PATIENT PROFILE ADULT - FUNCTIONAL ASSESSMENT - BASIC MOBILITY 6.
1-calculated by average/Not able to assess (calculate score using [facility] averaging method)

## 2024-12-02 NOTE — BRIEF OPERATIVE NOTE - OPERATION/FINDINGS
above; post op WBAT, Abx and DVT ppx per protocol  Dict:  Implants: [employer] 18 hole brandon-prostetic evos plate with 3 x 5.7mm locking; 2 x 3.5mm locking; 3 x 4.5mm unicortical 2 x 4.5mm bicortical, 1 x 2.0mm cable with cable saddle; [employer] retrograde nail (10 x 200) with 3 x 5.0mm inlocking screws, 1 through plate  above; post op WBAT, Abx and DVT ppx per protocol  Dict:[id number]  Implants: [employer]hew 18 hole brandon-prostetic evos plate with 3 x 5.7mm locking; 2 x 3.5mm locking; 3 x 4.5mm unicortical 2 x 4.5mm bicortical, 1 x 2.0mm cable with cable saddle; [employer] retrograde nail (10 x 200) with 3 x 5.0mm inlocking screws, 1 through plate

## 2024-12-02 NOTE — H&P ADULT - NSHPPHYSICALEXAM_GEN_ALL_CORE
LOS: 1d    VITALS:   T(C): 36.4 (12-01-24 @ 19:39), Max: 36.4 (12-01-24 @ 19:39)  HR: 92 (12-01-24 @ 19:39) (92 - 92)  BP: 158/96 (12-01-24 @ 19:39) (158/96 - 158/96)  RR: 19 (12-01-24 @ 19:39) (19 - 19)  SpO2: 99% (12-01-24 @ 19:39) (99% - 99%)    GENERAL: NAD, lying in bed comfortably, MULTIPLE BRUISES ON FACE   HEAD:  Atraumatic, Normocephalic  EYES: EOMI, PERRLA, conjunctiva and sclera clear  ENT: Moist mucous membranes  NECK: Supple, No JVD  CHEST/LUNG: Clear to auscultation bilaterally; No rales, rhonchi, wheezing, or rubs. Unlabored respirations  HEART: Regular rate and rhythm; No murmurs, rubs, or gallops  ABDOMEN: BSx4; Soft, nontender, nondistended  EXTREMITIES:  RIGHT LEG PAIN ON MOVEMENTS   NERVOUS SYSTEM:  A&Ox3,

## 2024-12-02 NOTE — PRE-OP CHECKLIST - PATIENT PROBLEMS/NEEDS
Occupational Therapy      Patient Name:  Deena Moody   MRN:  [id number]    Patient not seen today secondary to Blood transfusion, Patient fatigue. Pt daughter reported, pt having tests done because he was having a worse day and needed blood. Will follow-up per POC.     6/4/2024   Patient expressed no known problems or needs

## 2024-12-02 NOTE — CHART NOTE - NSCHARTNOTEFT_GEN_A_CORE
PACU ANESTHESIA ADMISSION NOTE    Procedure: Intramedullary nailing of fracture of shaft of femur  CPT code 38396 -72121 modified because of retained total knee and total hip requiring longer incision more fixation and greater operative time, essential 2 x operative time and should be reimbursed accordingly      Post op diagnosis:  Displaced spiral fracture of shaft of right femur        ____  Intubated  TV:______       Rate: ______      FiO2: ______  __x__  Patent Airway  __x__  Full return of protective reflexes  _x___  Full recovery from anesthesia / back to baseline     Vitals:       Sat:           96          BP:                112/58    R:          12  P: 89  T:     98        Mental Status:    _x___ Awake    ____ Alert     ____ Drowsy    ____ Sedated    Nausea/Vomiting:   __x__ NO    ____ Yes,   See Post - Op Orders          Pain Scale (0-10):    ____ Treatment:   _x___ None      ____ See Post - Op/PCA Orders    Post - Operative Fluids:    ____ Oral     __x__ See Post - Op Orders    Plan: Discharge:     ____Home         __x___Floor       _____Critical Care      _____  Other:_________________    Comments: no complications

## 2024-12-03 LAB
ANION GAP SERPL CALC-SCNC: 10 MMOL/L — SIGNIFICANT CHANGE UP (ref 7–14)
BASOPHILS # BLD AUTO: 0.01 K/UL — SIGNIFICANT CHANGE UP (ref 0–0.2)
BASOPHILS NFR BLD AUTO: 0.1 % — SIGNIFICANT CHANGE UP (ref 0–1)
BUN SERPL-MCNC: 35 MG/DL — HIGH (ref 10–20)
CALCIUM SERPL-MCNC: 8.5 MG/DL — SIGNIFICANT CHANGE UP (ref 8.4–10.4)
CHLORIDE SERPL-SCNC: 98 MMOL/L — SIGNIFICANT CHANGE UP (ref 98–110)
CO2 SERPL-SCNC: 26 MMOL/L — SIGNIFICANT CHANGE UP (ref 17–32)
CREAT SERPL-MCNC: 1.4 MG/DL — SIGNIFICANT CHANGE UP (ref 0.7–1.5)
EGFR: 37 ML/MIN/1.73M2 — LOW
EOSINOPHIL # BLD AUTO: 0 K/UL — SIGNIFICANT CHANGE UP (ref 0–0.7)
EOSINOPHIL NFR BLD AUTO: 0 % — SIGNIFICANT CHANGE UP (ref 0–8)
GLUCOSE SERPL-MCNC: 150 MG/DL — HIGH (ref 70–99)
HCT VFR BLD CALC: 25.9 % — LOW (ref 37–47)
HCT VFR BLD CALC: 26.8 % — LOW (ref 37–47)
HGB BLD-MCNC: 8.5 G/DL — LOW (ref 12–16)
HGB BLD-MCNC: 8.8 G/DL — LOW (ref 12–16)
IMM GRANULOCYTES NFR BLD AUTO: 0.4 % — HIGH (ref 0.1–0.3)
LYMPHOCYTES # BLD AUTO: 1.04 K/UL — LOW (ref 1.2–3.4)
LYMPHOCYTES # BLD AUTO: 7.5 % — LOW (ref 20.5–51.1)
MAGNESIUM SERPL-MCNC: 2 MG/DL — SIGNIFICANT CHANGE UP (ref 1.8–2.4)
MCHC RBC-ENTMCNC: 31.3 PG — HIGH (ref 27–31)
MCHC RBC-ENTMCNC: 31.4 PG — HIGH (ref 27–31)
MCHC RBC-ENTMCNC: 32.8 G/DL — SIGNIFICANT CHANGE UP (ref 32–37)
MCHC RBC-ENTMCNC: 32.8 G/DL — SIGNIFICANT CHANGE UP (ref 32–37)
MCV RBC AUTO: 95.4 FL — SIGNIFICANT CHANGE UP (ref 81–99)
MCV RBC AUTO: 95.6 FL — SIGNIFICANT CHANGE UP (ref 81–99)
MONOCYTES # BLD AUTO: 0.66 K/UL — HIGH (ref 0.1–0.6)
MONOCYTES NFR BLD AUTO: 4.8 % — SIGNIFICANT CHANGE UP (ref 1.7–9.3)
MRSA PCR RESULT.: NEGATIVE — SIGNIFICANT CHANGE UP
NEUTROPHILS # BLD AUTO: 12.01 K/UL — HIGH (ref 1.4–6.5)
NEUTROPHILS NFR BLD AUTO: 87.2 % — HIGH (ref 42.2–75.2)
NRBC # BLD: 0 /100 WBCS — SIGNIFICANT CHANGE UP (ref 0–0)
NRBC # BLD: 0 /100 WBCS — SIGNIFICANT CHANGE UP (ref 0–0)
PLATELET # BLD AUTO: 201 K/UL — SIGNIFICANT CHANGE UP (ref 130–400)
PLATELET # BLD AUTO: 231 K/UL — SIGNIFICANT CHANGE UP (ref 130–400)
PMV BLD: 9.7 FL — SIGNIFICANT CHANGE UP (ref 7.4–10.4)
PMV BLD: 9.9 FL — SIGNIFICANT CHANGE UP (ref 7.4–10.4)
POTASSIUM SERPL-MCNC: 4.8 MMOL/L — SIGNIFICANT CHANGE UP (ref 3.5–5)
POTASSIUM SERPL-SCNC: 4.8 MMOL/L — SIGNIFICANT CHANGE UP (ref 3.5–5)
RBC # BLD: 2.71 M/UL — LOW (ref 4.2–5.4)
RBC # BLD: 2.81 M/UL — LOW (ref 4.2–5.4)
RBC # FLD: 14.3 % — SIGNIFICANT CHANGE UP (ref 11.5–14.5)
RBC # FLD: 14.5 % — SIGNIFICANT CHANGE UP (ref 11.5–14.5)
SODIUM SERPL-SCNC: 134 MMOL/L — LOW (ref 135–146)
WBC # BLD: 13.78 K/UL — HIGH (ref 4.8–10.8)
WBC # BLD: 21.12 K/UL — HIGH (ref 4.8–10.8)
WBC # FLD AUTO: 13.78 K/UL — HIGH (ref 4.8–10.8)
WBC # FLD AUTO: 21.12 K/UL — HIGH (ref 4.8–10.8)

## 2024-12-03 RX ORDER — OFLOXACIN 0.3 %
1 DROPS OPHTHALMIC (EYE) THREE TIMES A DAY
Refills: 0 | Status: DISCONTINUED | OUTPATIENT
Start: 2024-12-03 | End: 2024-12-05

## 2024-12-03 RX ORDER — TETRACAINE HYDROCHLORIDE 5 MG/ML
1 SOLUTION OPHTHALMIC ONCE
Refills: 0 | Status: COMPLETED | OUTPATIENT
Start: 2024-12-03 | End: 2024-12-03

## 2024-12-03 RX ADMIN — TETRACAINE HYDROCHLORIDE 1 DROP(S): 5 SOLUTION OPHTHALMIC at 01:00

## 2024-12-03 RX ADMIN — OXYCODONE HYDROCHLORIDE 10 MILLIGRAM(S): 30 TABLET ORAL at 18:08

## 2024-12-03 RX ADMIN — Medication 100 MILLIGRAM(S): at 21:23

## 2024-12-03 RX ADMIN — Medication 1 DROP(S): at 01:49

## 2024-12-03 RX ADMIN — Medication 1 DROP(S): at 17:38

## 2024-12-03 RX ADMIN — ACETAMINOPHEN 500MG 975 MILLIGRAM(S): 500 TABLET, COATED ORAL at 12:41

## 2024-12-03 RX ADMIN — ACETAMINOPHEN 500MG 975 MILLIGRAM(S): 500 TABLET, COATED ORAL at 17:37

## 2024-12-03 RX ADMIN — Medication 2 TABLET(S): at 21:20

## 2024-12-03 RX ADMIN — OXYCODONE HYDROCHLORIDE 5 MILLIGRAM(S): 30 TABLET ORAL at 20:04

## 2024-12-03 RX ADMIN — OXYCODONE HYDROCHLORIDE 10 MILLIGRAM(S): 30 TABLET ORAL at 17:38

## 2024-12-03 RX ADMIN — ACETAMINOPHEN 500MG 975 MILLIGRAM(S): 500 TABLET, COATED ORAL at 21:20

## 2024-12-03 RX ADMIN — Medication 75 MILLILITER(S): at 05:01

## 2024-12-03 RX ADMIN — ACETAMINOPHEN 500MG 975 MILLIGRAM(S): 500 TABLET, COATED ORAL at 11:55

## 2024-12-03 RX ADMIN — Medication 100 MILLIGRAM(S): at 13:18

## 2024-12-03 RX ADMIN — ACETAMINOPHEN 500MG 975 MILLIGRAM(S): 500 TABLET, COATED ORAL at 05:01

## 2024-12-03 RX ADMIN — Medication 1 DROP(S): at 09:45

## 2024-12-03 RX ADMIN — Medication 100 MILLIGRAM(S): at 05:03

## 2024-12-03 RX ADMIN — ENOXAPARIN SODIUM 40 MILLIGRAM(S): 30 INJECTION SUBCUTANEOUS at 17:38

## 2024-12-03 NOTE — PROGRESS NOTE ADULT - SUBJECTIVE AND OBJECTIVE BOX
SUBJECTIVE/OVERNIGHT EVENTS  Today is hospital day 2d. This morning patient was seen and examined at bedside, resting comfortably in bed. No acute or major events overnight.      MEDICATIONS  STANDING MEDICATIONS  acetaminophen     Tablet .. 975 milliGRAM(s) Oral every 6 hours  ceFAZolin   IVPB 2000 milliGRAM(s) IV Intermittent every 8 hours  enoxaparin Injectable 40 milliGRAM(s) SubCutaneous every 24 hours  lactated ringers. 1000 milliLiter(s) IV Continuous <Continuous>  ofloxacin 0.3% Solution 1 Drop(s) Right EYE three times a day  senna 2 Tablet(s) Oral at bedtime    PRN MEDICATIONS  HYDROmorphone  Injectable 0.2 milliGRAM(s) IV Push every 10 minutes PRN  morphine  - Injectable 2 milliGRAM(s) IV Push every 4 hours PRN  ondansetron Injectable 4 milliGRAM(s) IV Push once PRN  oxyCODONE    IR 2.5 milliGRAM(s) Oral every 4 hours PRN  oxyCODONE    IR 10 milliGRAM(s) Oral every 4 hours PRN  oxyCODONE    IR 5 milliGRAM(s) Oral every 4 hours PRN    VITALS  T(F): 98 (12-03-24 @ 01:20), Max: 98.1 (12-02-24 @ 12:25)  HR: 90 (12-03-24 @ 06:00) (78 - 90)  BP: 128/63 (12-03-24 @ 06:00) (122/60 - 143/62)  RR: 14 (12-03-24 @ 06:00) (12 - 19)  SpO2: 99% (12-03-24 @ 06:00) (94% - 100%)    PHYSICAL EXAM  GENERAL  ( x ) NAD, lying in bed comfortably     (  ) obtunded     (  ) lethargic     (  ) somnolent    HEAD  (  ) Atraumatic     (  ) hematoma     (  ) laceration (specify location:       )     NECK  (  ) Supple     (  ) neck stiffness     (  ) nuchal rigidity     (  )  no JVD     (  ) JVD present ( -- cm)    HEART  Rate -->  ( x ) normal rate    (  ) bradycardic    (  ) tachycardic  Rhythm -->  ( x ) regular    (  ) regularly irregular    (  ) irregularly irregular  Murmurs -->  (  ) normal s1/s2    (  ) systolic murmur    (  ) diastolic murmur    (  ) continuous murmur     (  ) S3 present    (  ) S4 present    LUNGS  ( x )Unlabored respirations     (  ) tachypnea  ( x ) B/L air entry     (  ) decreased breath sounds in:  (location     )    (  ) no adventitious sound     (  ) crackles     (  ) wheezing      (  ) rhonchi      (specify location:       )  (  ) chest wall tenderness (specify location:       )    ABDOMEN  (x  ) Soft     (  ) tense   |   (  ) nondistended     (  ) distended   |   (  ) +BS     (  ) hypoactive bowel sounds     (  ) hyperactive bowel sounds  ( x ) nontender     (  ) RUQ tenderness     (  ) RLQ tenderness     (  ) LLQ tenderness     (  ) epigastric tenderness     (  ) diffuse tenderness  (  ) Splenomegaly      (  ) Hepatomegaly      (  ) Jaundice     (  ) ecchymosis     LABS             8.8    13.78 )-----------( 201      ( 12-03-24 @ 05:07 )             26.8     134  |  98  |  35  -------------------------<  150   12-03-24 @ 05:07  4.8  |  26  |  1.4    Ca      8.5     12-03-24 @ 05:07  Mg     2.0     12-03-24 @ 05:07    TPro  6.1  /  Alb  3.7  /  TBili  0.6  /  DBili  x   /  AST  26  /  ALT  19  /  AlkPhos  73  /  GGT  x     12-02-24 @ 07:18    PT/INR - ( 12-01-24 @ 20:12 )   PT: 11.20 sec;   INR: 0.95 ratio  PTT - ( 12-01-24 @ 20:12 )  PTT:27.0 sec      Urinalysis Basic - ( 03 Dec 2024 05:07 )    Color: x / Appearance: x / SG: x / pH: x  Gluc: 150 mg/dL / Ketone: x  / Bili: x / Urobili: x   Blood: x / Protein: x / Nitrite: x   Leuk Esterase: x / RBC: x / WBC x   Sq Epi: x / Non Sq Epi: x / Bacteria: x          IMAGING

## 2024-12-03 NOTE — PROGRESS NOTE ADULT - ASSESSMENT
84-year-old female past med history of GERD, hypertension, osteoarthritis, rheumatoid arthritis, breast cancer in remission since 2015, past surgical history of bilateral hip replacements, and left knee replacement presenting for a fall.    Mechanical Fall s  Inter prosthetic distal femur fracture  - s/p OR 12/2--- > Right femur open reduction internal fixation  - pain rx  - incentive spirometer  - rehab eval  - DVT prophylaxis    Nasal Fractures from prior fall    GERD  - Famotidine 20 bid     Hypertension  - Losartan 25mg od  - HCT 50mg od     Osteoarthritis / Rheumatoid arthritis  - on Prednisone 10mg od     Anxiety, Depression  - on Sertraline at home  - resume      hx of Breast Cancer  -  in remission     DVT PPX: on hold - resume per ortho  ACTIVITY: bed rest   CODE STATUS: full

## 2024-12-03 NOTE — PROGRESS NOTE ADULT - SUBJECTIVE AND OBJECTIVE BOX
Orthopaedic Surgery Postoperative Check Note    Procedure: ORIF Right femur   EBL: 200cc    Pt S&E after above procedure. Pt resting comfortably. Pain well controlled. Denies f/c/cp/sob/n/v/d    Vitals:  T(C): 36.7 (12-02-24 @ 23:50), Max: 36.7 (12-02-24 @ 12:25)  HR: 88 (12-02-24 @ 23:50) (74 - 89)  BP: 132/62 (12-02-24 @ 23:50) (118/77 - 138/65)  RR: 13 (12-02-24 @ 23:50) (12 - 18)  SpO2: 100% (12-02-24 @ 23:50) (95% - 100%)    P/E:  NAD, Awake, alert  Nonlabored breathing    RLE  Dressing in place, c/d/i  Compartments soft/compressible, no pain w/ passive stretch  SILT sp/dp/t/sural/saph  Firing ta/ehl/fhl/gs  DP pulse 2+, foot wwp    Labs:                        8.5    21.12 )-----------( 231      ( 02 Dec 2024 23:55 )             25.9       A/P:   Pt in stable condition after above procedure    Weight bearing: WBAT RLE  AM labs  DVT ppx: lovenox/heparin, scds  Postop abx for 24 hrs  Diet  Pain control  Home medications  PT/OT/rehab  Please page Ortho w/ any questions/concerns

## 2024-12-03 NOTE — CONSULT NOTE ADULT - ASSESSMENT
IMPRESSION: Rehab of R femur fx, s/p ORIF / s/p fall with head trauma, no LOC / recent fall with facial trauma with nasal bone fx, s/p recent left foot exostosis removal, GERD, hypertension, osteoarthritis, rheumatoid arthritis, breast cancer in remission since 2015, past surgical history of bilateral hip replacements, and left knee replacement     PRECAUTIONS: [   ] Cardiac  [   ] Respiratory  [   ] Seizures [ x  ] Contact Isolation  [   ] Droplet Isolation  [   ] Other    Weight Bearing Status: WBAT RLE    RECOMMENDATION:    Out of Bed to Chair     DVT/Decubiti Prophylaxis    REHAB PLAN:     [  x  ] Bedside P/T 3-5 times a week   [  x  ]   Bedside O/T  2-3 times a week             [    ] Speech Therapy               [    ]  No Rehab Therapy Indicated   Conditioning/ROM                                    ADL  Bed Mobility                                               Conditioning/ROM  Transfers                                                     Bed Mobility  Sitting /Standing Balance                         Transfers                                        Gait Training                                               Sitting/Standing Balance  Stair Training [   ]Applicable                    Home equipment Eval                                                                        Splinting  [   ] Only      GOALS:   ADL   [ x   ]   Independent                    Transfers  [ x   ] Independent                          Ambulation  [  x  ] Independent     [   x  ] With device                            [    ]  CG                                                         [    ]  CG                                                                  [    ] CG                            [    ] Min A                                                   [    ] Min A                                                              [    ] Min  A          DISCHARGE PLAN:   [    ]  Good candidate for Intensive Rehabilitation/Hospital based                                             Will tolerate 3hrs Intensive Rehab Daily                                       [     ]  Short Term Rehab in Skilled Nursing Facility                                       [     ]  Home with Outpatient or  services                                         [  x   ]  Possible Candidate for Intensive Hospital based Rehab                                       
84y Female w/ PMHx of GERD, hypertension, osteoarthritis, rheumatoid arthritis, breast cancer in remission since 2015, past surgical history of bilateral hip replacements, and knee replacement presenting for a fall.    PLAN:  - Patient seen and examined by attending  - Ortho recs appreciated  - Recommend CT Max/Face    Trauma Spectra: [phone]

## 2024-12-03 NOTE — CONSULT NOTE ADULT - SUBJECTIVE AND OBJECTIVE BOX
HPI:  84-year-old female past med history of GERD, hypertension, osteoarthritis, rheumatoid arthritis, breast cancer in remission since 2015, past surgical history of bilateral hip replacements, and knee replacement presenting for a fall. Hx taken from the patient and is aox3 at time of the interview. Patient states that this morning around 10 am , she was trying to reach out from chair to another chair, while in sitting position, and while stretching she lost her balance and fell to the ground on her right side, hitting her head against the ground. She further states that, after the fall she was unable to get up from the ground and hence she was lying on the floor for many hours ( around 8 hr ) until her friend came to her house who saw her on the ground, and called the 911. After the fall, she is complaining of right lower extremity pain and is unable to move her right leg due to pain. on further inquiry,  patient had a fall the week prior wherein she landed on her face, she was seen in the ED at that time and found no significant injuries and was discharged to home. She has been using a walker since that time for stability. Also of note patient had a L forefoot exostosis removal with podiatry approximately two weeks prior but has been WBAT on that side.  Patient denies headache, loss of consciousness neck pain, dizziness, chest pain, palpitations  shortness of breath, chest pain, nausea, vomiting , urinary symptoms, and other significant complaints.   Patient lives alone with family nearby including a daughter who is a nurse at [employer].    < from: CT Femur No Cont, Right (12.01.24 @ 21:08) >  IMPRESSION:  Acute comminuted right femoral distal metadiaphyseal fracture with volar   apex angulation and 1.5 cm posterior displacement.    Small knee joint effusion.      < end of copied text >      S/p ORIF R femur fx on 12/2/24, -    Weight bearing status: WBAT RLE as per ortho         PAST MEDICAL & SURGICAL HISTORY:  Rheumatoid arthritis      HTN (hypertension)      Breast cancer  last radiation 2015      Depression      Chronic GERD      OA (osteoarthritis)      Rheumatoid arthritis      History of right hip replacement      Status post left hip replacement      S/P total knee replacement, left      H/O vaginal hysterectomy      Status post cholecystectomy      S/P lumpectomy of breast  2015          Hospital Course:    TODAY'S SUBJECTIVE & REVIEW OF SYMPTOMS:     Constitutional WNL   Cardio WNL   Resp WNL   GI WNL  Heme WNL  Endo WNL  Skin WNL  MSK right leg pain   Neuro WNL  Cognitive WNL  Psych WNL      MEDICATIONS  (STANDING):  acetaminophen     Tablet .. 975 milliGRAM(s) Oral every 6 hours  ceFAZolin   IVPB 2000 milliGRAM(s) IV Intermittent every 8 hours  enoxaparin Injectable 40 milliGRAM(s) SubCutaneous every 24 hours  ofloxacin 0.3% Solution 1 Drop(s) Right EYE three times a day  senna 2 Tablet(s) Oral at bedtime    MEDICATIONS  (PRN):  morphine  - Injectable 2 milliGRAM(s) IV Push every 4 hours PRN breakthrough pain  oxyCODONE    IR 2.5 milliGRAM(s) Oral every 4 hours PRN Mild Pain (1 - 3)  oxyCODONE    IR 5 milliGRAM(s) Oral every 4 hours PRN Moderate Pain (4 - 6)  oxyCODONE    IR 10 milliGRAM(s) Oral every 4 hours PRN Severe Pain (7 - 10)      FAMILY HISTORY:  FH: cancer  Nonhodgkins : sister        Allergies    No Known Allergies    Intolerances        SOCIAL HISTORY:    [  ] Etoh  [  ] Smoking  [  ] Substance abuse     Home Environment:  [x   ] Home Alone  [   ] Lives with Family  [   ] Home Health Aid    Dwelling:  [   ] Apartment  [  x ] Private House  [   ] Adult Home  [   ] Skilled Nursing Facility      [   ] Short Term  [   ] Long Term  [ x  ] Stairs       Elevator [   ]    FUNCTIONAL STATUS PTA: (Check all that apply)  Ambulation: [ x   ]Independent    [   ] Dependent     [   ] Non-Ambulatory  Assistive Device: [ x  ] SA Cane  [   ]  Q Cane  [   x] Walker  [   ]  Wheelchair  ADL : [  x ] Independent  [    ]  Dependent       Vital Signs Last 24 Hrs  T(C): 37.1 (03 Dec 2024 15:30), Max: 37.2 (03 Dec 2024 08:00)  T(F): 98.8 (03 Dec 2024 15:30), Max: 98.9 (03 Dec 2024 08:00)  HR: 89 (03 Dec 2024 15:30) (78 - 99)  BP: 149/74 (03 Dec 2024 15:30) (122/60 - 163/66)  BP(mean): 90 (03 Dec 2024 06:00) (82 - 93)  RR: 18 (03 Dec 2024 15:30) (12 - 19)  SpO2: 95% (03 Dec 2024 15:30) (93% - 100%)    Parameters below as of 03 Dec 2024 15:30  Patient On (Oxygen Delivery Method): room air          PHYSICAL EXAM: Awake & Alert  GENERAL: NAD  HEAD:  Normocephalic  CHEST/LUNG: Clear   HEART: S1S2+  ABDOMEN: Soft, Nontender  EXTREMITIES:  no left  calf tenderness    NERVOUS SYSTEM:  Cranial Nerves 2-12 intact [   ] Abnormal  [   ]  ROM: WFL all extremities [   ]  Abnormal [ x  ]limited RLE  Motor Strength: WFL all extremities  [   ]  Abnormal [ x  ]limited RLE  Sensation: intact to light touch [ x  ] Abnormal [   ]    FUNCTIONAL STATUS:  Bed Mobility: Independent [   ]  Supervision [   ]  Needs Assistance [x   ]  N/A [   ]  Transfers: Independent [   ]  Supervision [   ]  Needs Assistance [   ]  N/A [   ]   Ambulation: Independent [   ]  Supervision [   ]  Needs Assistance [   ]  N/A [   ]  ADL: Independent [   ] Requires Assistance [   ] N/A [   ]      LABS:                        8.8    13.78 )-----------( 201      ( 03 Dec 2024 05:07 )             26.8     12-03    134[L]  |  98  |  35[H]  ----------------------------<  150[H]  4.8   |  26  |  1.4    Ca    8.5      03 Dec 2024 05:07  Mg     2.0     12-03    TPro  6.1  /  Alb  3.7  /  TBili  0.6  /  DBili  x   /  AST  26  /  ALT  19  /  AlkPhos  73  12-02    PT/INR - ( 01 Dec 2024 20:12 )   PT: 11.20 sec;   INR: 0.95 ratio         PTT - ( 01 Dec 2024 20:12 )  PTT:27.0 sec  Urinalysis Basic - ( 03 Dec 2024 05:07 )    Color: x / Appearance: x / SG: x / pH: x  Gluc: 150 mg/dL / Ketone: x  / Bili: x / Urobili: x   Blood: x / Protein: x / Nitrite: x   Leuk Esterase: x / RBC: x / WBC x   Sq Epi: x / Non Sq Epi: x / Bacteria: x        RADIOLOGY & ADDITIONAL STUDIES:

## 2024-12-03 NOTE — PROGRESS NOTE ADULT - SUBJECTIVE AND OBJECTIVE BOX
SUBJECTIVE: Patient seen and examined. Pain controlled.  Pt did well o/n  No f/c/n/v/cp/sob.     OBJECTIVE:  NAD  Vital Signs Last 24 Hrs  T(C): 36.7 (03 Dec 2024 01:20), Max: 36.7 (02 Dec 2024 12:25)  T(F): 98 (03 Dec 2024 01:20), Max: 98.1 (02 Dec 2024 12:25)  HR: 90 (03 Dec 2024 06:00) (78 - 90)  BP: 128/63 (03 Dec 2024 06:00) (122/60 - 143/62)  BP(mean): 90 (03 Dec 2024 06:00) (82 - 93)  RR: 14 (03 Dec 2024 06:00) (12 - 19)  SpO2: 99% (03 Dec 2024 06:00) (94% - 100%)    Parameters below as of 03 Dec 2024 06:00  Patient On (Oxygen Delivery Method): nasal cannula  O2 Flow (L/min): 2      Affected extremity:   RLE         Dressing: clean/dry/intact            Sensation: SILT         Motor exam: 5/5 TA/GS/EHL         warm well perfused; capillary refill <3 seconds                         8.8    13.78 )-----------( 201      ( 03 Dec 2024 05:07 )             26.8     12-03    134[L]  |  98  |  35[H]  ----------------------------<  150[H]  4.8   |  26  |  1.4    Ca    8.5      03 Dec 2024 05:07  Mg     2.0     12-03    TPro  6.1  /  Alb  3.7  /  TBili  0.6  /  DBili  x   /  AST  26  /  ALT  19  /  AlkPhos  73  12-02    A/P :  Pt is a 85yo Female s/p ORIF R femur on 12/2, POD#1, doing well.      -    Pain control  -    DVT ppx: SCD+chemoppx per primary   -    Weight bearing status: WBAT RLE  -    Physical Therapy  -    D/C planning

## 2024-12-03 NOTE — PROGRESS NOTE ADULT - SUBJECTIVE AND OBJECTIVE BOX
GIAN MELISSA  84y  Female  HPI:  84-year-old female past med history of GERD, hypertension, osteoarthritis, rheumatoid arthritis, breast cancer in remission since 2015, past surgical history of bilateral hip replacements, and knee replacement presenting for a fall. Hx taken from the patient and is aox3 at time of the interview. Patient states that this morning around 10 am , she was trying to reach out from chair to another chair, while in sitting position, and while stretching she lost her balance and fell to the ground on her right side, hitting her head against the ground. She further states that, after the fall she was unable to get up from the ground and hence she was lying on the floor for many hours ( around 8 hr ) until her friend came to her house who saw her on the ground, and called the 911. After the fall, she is complaining of right lower extremity pain and is unable to move her right leg due to pain. on further inquiry,  patient had a fall the week prior wherein she landed on her face, she was seen in the ED at that time and found no significant injuries and was discharged to home. She has been using a walker since that time for stability. Also of note patient had a L forefoot exostosis removal with podiatry approximately two weeks prior but has been WBAT on that side.  Patient denies headache, loss of consciousness neck pain, dizziness, chest pain, palpitations  shortness of breath, chest pain, nausea, vomiting , urinary symptoms, and other significant complaints.   Patient lives alone with family nearby including a daughter who is a nurse at [employer].    ED VITALS    · BP Systolic	158 mm Hg  · BP Diastolic	 96 mm Hg  · Heart Rate	92 /min  · Respiration Rate (breaths/min)	19 /min  · Temp (F)	97.5 Degrees F  · Temp (C)	36.4 Degrees C  · Temp site	oral  · SpO2 (%)	99 %  · O2 Delivery/Oxygen Delivery Method	room air  · Temp at ED Arrival (C)	36.4 Degrees C    ED LABS    WBC 13K, SODIUM 130, ANION GAP 17, GFR 49, LACTATE 3.2,    (02 Dec 2024 01:13)    MEDICATIONS  (STANDING):  acetaminophen     Tablet .. 975 milliGRAM(s) Oral every 6 hours  enoxaparin Injectable 40 milliGRAM(s) SubCutaneous every 24 hours  ofloxacin 0.3% Solution 1 Drop(s) Right EYE three times a day  senna 2 Tablet(s) Oral at bedtime    MEDICATIONS  (PRN):  morphine  - Injectable 2 milliGRAM(s) IV Push every 4 hours PRN breakthrough pain  oxyCODONE    IR 2.5 milliGRAM(s) Oral every 4 hours PRN Mild Pain (1 - 3)  oxyCODONE    IR 5 milliGRAM(s) Oral every 4 hours PRN Moderate Pain (4 - 6)  oxyCODONE    IR 10 milliGRAM(s) Oral every 4 hours PRN Severe Pain (7 - 10)    INTERVAL EVENTS: Patient seen today without distress post ORIF for facture. Patient in good spirits, daughter at bedside.    T(C): 37.1 (12-03-24 @ 15:30), Max: 37.2 (12-03-24 @ 08:00)  HR: 89 (12-03-24 @ 15:30) (86 - 99)  BP: 149/74 (12-03-24 @ 15:30) (122/60 - 163/66)  RR: 18 (12-03-24 @ 15:30) (12 - 19)  SpO2: 95% (12-03-24 @ 15:30) (93% - 100%)  Wt(kg): --Vital Signs Last 24 Hrs  T(C): 37.1 (03 Dec 2024 15:30), Max: 37.2 (03 Dec 2024 08:00)  T(F): 98.8 (03 Dec 2024 15:30), Max: 98.9 (03 Dec 2024 08:00)  HR: 89 (03 Dec 2024 15:30) (86 - 99)  BP: 149/74 (03 Dec 2024 15:30) (122/60 - 163/66)  BP(mean): 90 (03 Dec 2024 06:00) (82 - 93)  RR: 18 (03 Dec 2024 15:30) (12 - 19)  SpO2: 95% (03 Dec 2024 15:30) (93% - 100%)    Parameters below as of 03 Dec 2024 15:30  Patient On (Oxygen Delivery Method): room air        PHYSICAL EXAM:  GENERAL:   NECK: Supple, No JVD, Normal thyroid  CHEST/LUNG: Clear; No crackles or wheezing  HEART: S1, S2, Regular rate and rhythm;   ABDOMEN: Soft, Nontender, Nondistended; Bowel sounds present  EXTREMITIES: No clubbing, cyanosis, or edema  SKIN: No rashes or lesions    LABS:    RADIOLOGY & ADDITIONAL TESTS:     GIAN MELISSA  84y  Female  HPI:  84-year-old female past med history of GERD, hypertension, osteoarthritis, rheumatoid arthritis, breast cancer in remission since 2015, past surgical history of bilateral hip replacements, and knee replacement presenting for a fall. Hx taken from the patient and is aox3 at time of the interview. Patient states that this morning around 10 am , she was trying to reach out from chair to another chair, while in sitting position, and while stretching she lost her balance and fell to the ground on her right side, hitting her head against the ground. She further states that, after the fall she was unable to get up from the ground and hence she was lying on the floor for many hours ( around 8 hr ) until her friend came to her house who saw her on the ground, and called the 911. After the fall, she is complaining of right lower extremity pain and is unable to move her right leg due to pain. on further inquiry,  patient had a fall the week prior wherein she landed on her face, she was seen in the ED at that time and found no significant injuries and was discharged to home. She has been using a walker since that time for stability. Also of note patient had a L forefoot exostosis removal with podiatry approximately two weeks prior but has been WBAT on that side.  Patient denies headache, loss of consciousness neck pain, dizziness, chest pain, palpitations  shortness of breath, chest pain, nausea, vomiting , urinary symptoms, and other significant complaints.   Patient lives alone with family nearby including a daughter who is a nurse at [employer].    ED VITALS    · BP Systolic	158 mm Hg  · BP Diastolic	 96 mm Hg  · Heart Rate	92 /min  · Respiration Rate (breaths/min)	19 /min  · Temp (F)	97.5 Degrees F  · Temp (C)	36.4 Degrees C  · Temp site	oral  · SpO2 (%)	99 %  · O2 Delivery/Oxygen Delivery Method	room air  · Temp at ED Arrival (C)	36.4 Degrees C    ED LABS    WBC 13K, SODIUM 130, ANION GAP 17, GFR 49, LACTATE 3.2,    (02 Dec 2024 01:13)    MEDICATIONS  (STANDING):  acetaminophen     Tablet .. 975 milliGRAM(s) Oral every 6 hours  enoxaparin Injectable 40 milliGRAM(s) SubCutaneous every 24 hours  ofloxacin 0.3% Solution 1 Drop(s) Right EYE three times a day  senna 2 Tablet(s) Oral at bedtime    MEDICATIONS  (PRN):  morphine  - Injectable 2 milliGRAM(s) IV Push every 4 hours PRN breakthrough pain  oxyCODONE    IR 2.5 milliGRAM(s) Oral every 4 hours PRN Mild Pain (1 - 3)  oxyCODONE    IR 5 milliGRAM(s) Oral every 4 hours PRN Moderate Pain (4 - 6)  oxyCODONE    IR 10 milliGRAM(s) Oral every 4 hours PRN Severe Pain (7 - 10)    INTERVAL EVENTS: Patient seen today without distress post ORIF for facture. Patient in good spirits, daughter at bedside.    T(C): 37.1 (12-03-24 @ 15:30), Max: 37.2 (12-03-24 @ 08:00)  HR: 89 (12-03-24 @ 15:30) (86 - 99)  BP: 149/74 (12-03-24 @ 15:30) (122/60 - 163/66)  RR: 18 (12-03-24 @ 15:30) (12 - 19)  SpO2: 95% (12-03-24 @ 15:30) (93% - 100%)  Wt(kg): --Vital Signs Last 24 Hrs  T(C): 37.1 (03 Dec 2024 15:30), Max: 37.2 (03 Dec 2024 08:00)  T(F): 98.8 (03 Dec 2024 15:30), Max: 98.9 (03 Dec 2024 08:00)  HR: 89 (03 Dec 2024 15:30) (86 - 99)  BP: 149/74 (03 Dec 2024 15:30) (122/60 - 163/66)  BP(mean): 90 (03 Dec 2024 06:00) (82 - 93)  RR: 18 (03 Dec 2024 15:30) (12 - 19)  SpO2: 95% (03 Dec 2024 15:30) (93% - 100%)    Parameters below as of 03 Dec 2024 15:30  Patient On (Oxygen Delivery Method): room air        PHYSICAL EXAM:  GENERAL: NAD, resolving facial bruising  NECK: Supple, No JVD  CHEST/LUNG: Clear  HEART: S1, S2, Tachycardic  ABDOMEN: Soft, Nontender,  Bowel sounds present  EXTREMITIES: ++ edema  SKIN: No rashes or lesions    LABS:                        8.8    13.78 )-----------( 201      ( 03 Dec 2024 05:07 )             26.8             12-03    134[L]  |  98  |  35[H]  ----------------------------<  150[H]  4.8   |  26  |  1.4    Ca    8.5      03 Dec 2024 05:07  Mg     2.0     12-03    TPro  6.1  /  Alb  3.7  /  TBili  0.6  /  DBili  x   /  AST  26  /  ALT  19  /  AlkPhos  73  12-02    LIVER FUNCTIONS - ( 02 Dec 2024 07:18 )  Alb: 3.7 g/dL / Pro: 6.1 g/dL / ALK PHOS: 73 U/L / ALT: 19 U/L / AST: 26 U/L / GGT: x                     Urinalysis Basic - ( 03 Dec 2024 05:07 )    Color: x / Appearance: x / SG: x / pH: x  Gluc: 150 mg/dL / Ketone: x  / Bili: x / Urobili: x   Blood: x / Protein: x / Nitrite: x   Leuk Esterase: x / RBC: x / WBC x   Sq Epi: x / Non Sq Epi: x / Bacteria: x        RADIOLOGY & ADDITIONAL TESTS:  < from: CT Maxillofacial No Cont (12.02.24 @ 10:57) >  INTERPRETATION:  Clinical history: Status post fall    Technique: Multidetector noncontrast CT examination of the face.  Coronal   and sagittal reformations in soft tissue and bone windows were obtained.    COMPARISON: Maxillofacial CT dated 11/23/2024.    Findings:  Skin and subcutaneous soft tissues: Decreased paranasal soft tissue   swelling since the prior facial CT from 11/23/2024.    Osseous structures: Redemonstrated nondisplaced fracture of the bilateral   nasal bones No dislocation or destructive lesion. Stable severe   degenerative changes of the bilateral mandibular condyles.    Orbits: The globes, retrobulbar fat, extraocular muscles, and optic nerve   sheath complexes are intact and normal in morphology.    Paranasal sinuses: Stable mild mucosal thickening in bilateral ethmoid   and maxillary sinuses.    Mastoid air cells: Mild right mastoid effusion, stable.    Other:  The partially visualized intracranial structures are normal.    Impression:    Redemonstrated nondisplaced fractures of the bilateral nasal bones with   decreased paranasal soft tissue swelling since the prior maxillofacial CT   dated 11/23/2024.    --- End of Report ---    < end of copied text >  < from: CT Femur No Cont, Right (12.01.24 @ 21:08) >  PROCEDURE DATE:  12/01/2024          INTERPRETATION:  CT OF THE RIGHT FEMUR WITHOUT CONTRAST    CLINICAL HISTORY: Distal femur fracture, history of bilateral hip   replacements.    TECHNIQUE: Images were obtained of the right femur without contrast.   Coronal and sagittal reformatted images were also provided.    COMPARISON: Femur radiographs 12/1/2024.    FINDINGS:    BONES/JOINTS: Acute comminuted right femoral distal metadiaphyseal   fracture with volar apex angulation and 1.5 cm posterior displacement   (303/76). Post right total hip replacement and total knee arthroplasty   with no evidence of hardware complication. Degenerative changes of the   pubic symphysis and right sacroiliac joint. Small knee joint effusion.   Status post bilateral hip and knee arthroplasties.    SOFT TISSUES: Mild subcutaneous edema adjacent to the fracture site. No   large encapsulated hematoma. No soft tissue gas.    OTHER: Atherosclerotic calcifications.    IMPRESSION:  Acute comminuted right femoral distal metadiaphyseal fracture with volar   apex angulation and 1.5 cm posterior displacement.    Small knee joint effusion.    --- End of Report ---    < end of copied text >

## 2024-12-03 NOTE — PROGRESS NOTE ADULT - ASSESSMENT
84-year-old female past med history of GERD, hypertension, osteoarthritis, rheumatoid arthritis, breast cancer in remission since 2015, past surgical history of bilateral hip replacements, and left knee replacement presenting for a fall.    #Mechanical Fall   #Inter prosthetic distal femur fracture  - CT femur: Acute comminuted right femoral distal metadiaphyseal fracture with volar apex angulation and 1.5 cm posterior displacement. Small knee joint effusion.  - CT head: IMPRESSION: No acute process seen  - x ray pelvis:   - Well-padded KI brace placed, keep clean/dry/intact  - Bed rest  - Pain control   - DVT PPx  to be held on morning of procedure - holding today 12/2  - Pre-op clearance / risk stratification / medical optimization  - Pre-Op CBC, CMP/BMP, PT/PTT/INR, Type & Screen x2, repeat lactate (2.8 on 12/2), CXR, EKG  - Trend Hgb -> dropped to 10.8 on 12/2   - monitor Hb post OP   - s/p OR 12/2--- > Right femur open reduction internal fixation  - fu ortho 12/3      #GERD  - famotidine 20 bid       #Hypertension  - losartan 25mg od  - HCT 50mg od     #osteoarthritis  #rheumatoid arthritis  - on prednisone 10mg od     #Anxiety/depression  - on sertraline  - qt 500  - will hold for now       #breast cancer  -  in remission     DVT PPX: on hold - resume per ortho  ACTIVITY: bed rest   CODE STATUS: full      84-year-old female past med history of GERD, hypertension, osteoarthritis, rheumatoid arthritis, breast cancer in remission since 2015, past surgical history of bilateral hip replacements, and left knee replacement presenting for a fall.    #Mechanical Fall   #Inter prosthetic distal femur fracture  - CT femur: Acute comminuted right femoral distal metadiaphyseal fracture with volar apex angulation and 1.5 cm posterior displacement. Small knee joint effusion.  - CT head: IMPRESSION: No acute process seen  - x ray pelvis:   - Well-padded KI brace placed, keep clean/dry/intact  - Bed rest  - Pain control   - DVT PPx  to be held on morning of procedure - holding today 12/2  - Pre-op clearance / risk stratification / medical optimization  - Pre-Op CBC, CMP/BMP, PT/PTT/INR, Type & Screen x2, repeat lactate (2.8 on 12/2), CXR, EKG  - Trend Hgb -> dropped to 10.8 on 12/2   - monitor Hb post OP   - s/p OR 12/2--- > Right femur open reduction internal fixation      #Left foot wound  -burn consulted 12/3      #GERD  - famotidine 20 bid       #Hypertension  - losartan 25mg od  - HCT 50mg od     #osteoarthritis  #rheumatoid arthritis  - on prednisone 10mg od     #Anxiety/depression  - on sertraline  - qt 500  - will hold for now       #breast cancer  -  in remission     DVT PPX: on hold - resume per ortho  ACTIVITY: bed rest   CODE STATUS: full      84-year-old female past med history of GERD, hypertension, osteoarthritis, rheumatoid arthritis, breast cancer in remission since 2015, past surgical history of bilateral hip replacements, and left knee replacement presenting for a fall.    #Mechanical Fall   #Inter prosthetic distal femur fracture  - CT femur: Acute comminuted right femoral distal metadiaphyseal fracture with volar apex angulation and 1.5 cm posterior displacement. Small knee joint effusion.  - CT head: IMPRESSION: No acute process seen  - x ray pelvis:   - Well-padded KI brace placed, keep clean/dry/intact  - Bed rest  - Pain control   - DVT PPx  to be held on morning of procedure - holding today 12/2  - Pre-op clearance / risk stratification / medical optimization  - Pre-Op CBC, CMP/BMP, PT/PTT/INR, Type & Screen x2, repeat lactate (2.8 on 12/2), CXR, EKG  - Trend Hgb -> dropped to 10.8 on 12/2   - monitor Hb post OP   - s/p OR 12/2--- > Right femur open reduction internal fixation    #GERD  - famotidine 20 bid       #Hypertension  - losartan 25mg od  - HCT 50mg od     #osteoarthritis  #rheumatoid arthritis  - on prednisone 10mg od     #Anxiety/depression  - on sertraline  - qt 500  - will hold for now       #breast cancer  -  in remission     DVT PPX: on hold - resume per ortho  ACTIVITY: bed rest   CODE STATUS: full

## 2024-12-03 NOTE — OCCUPATIONAL THERAPY INITIAL EVALUATION ADULT - SPECIFY REASON(S)
Chart reviewed. Attempted bedside Ot assessment, Pt presently off unit in recovery. OT to f/u at later time if time permits.

## 2024-12-04 LAB
ANION GAP SERPL CALC-SCNC: 12 MMOL/L — SIGNIFICANT CHANGE UP (ref 7–14)
BUN SERPL-MCNC: 34 MG/DL — HIGH (ref 10–20)
CALCIUM SERPL-MCNC: 8.7 MG/DL — SIGNIFICANT CHANGE UP (ref 8.4–10.5)
CHLORIDE SERPL-SCNC: 94 MMOL/L — LOW (ref 98–110)
CO2 SERPL-SCNC: 27 MMOL/L — SIGNIFICANT CHANGE UP (ref 17–32)
CREAT SERPL-MCNC: 1 MG/DL — SIGNIFICANT CHANGE UP (ref 0.7–1.5)
EGFR: 55 ML/MIN/1.73M2 — LOW
GLUCOSE SERPL-MCNC: 97 MG/DL — SIGNIFICANT CHANGE UP (ref 70–99)
HCT VFR BLD CALC: 25.1 % — LOW (ref 37–47)
HGB BLD-MCNC: 8.1 G/DL — LOW (ref 12–16)
MCHC RBC-ENTMCNC: 31.5 PG — HIGH (ref 27–31)
MCHC RBC-ENTMCNC: 32.3 G/DL — SIGNIFICANT CHANGE UP (ref 32–37)
MCV RBC AUTO: 97.7 FL — SIGNIFICANT CHANGE UP (ref 81–99)
NRBC # BLD: 0 /100 WBCS — SIGNIFICANT CHANGE UP (ref 0–0)
PLATELET # BLD AUTO: 138 K/UL — SIGNIFICANT CHANGE UP (ref 130–400)
PMV BLD: 10.7 FL — HIGH (ref 7.4–10.4)
POTASSIUM SERPL-MCNC: 4.1 MMOL/L — SIGNIFICANT CHANGE UP (ref 3.5–5)
POTASSIUM SERPL-SCNC: 4.1 MMOL/L — SIGNIFICANT CHANGE UP (ref 3.5–5)
RBC # BLD: 2.57 M/UL — LOW (ref 4.2–5.4)
RBC # FLD: 14.4 % — SIGNIFICANT CHANGE UP (ref 11.5–14.5)
SODIUM SERPL-SCNC: 133 MMOL/L — LOW (ref 135–146)
WBC # BLD: 10.62 K/UL — SIGNIFICANT CHANGE UP (ref 4.8–10.8)
WBC # FLD AUTO: 10.62 K/UL — SIGNIFICANT CHANGE UP (ref 4.8–10.8)

## 2024-12-04 RX ORDER — CHLORHEXIDINE GLUCONATE 1.2 MG/ML
1 RINSE ORAL
Refills: 0 | Status: DISCONTINUED | OUTPATIENT
Start: 2024-12-04 | End: 2024-12-05

## 2024-12-04 RX ORDER — 0.9 % SODIUM CHLORIDE 0.9 %
1000 INTRAVENOUS SOLUTION INTRAVENOUS
Refills: 0 | Status: DISCONTINUED | OUTPATIENT
Start: 2024-12-04 | End: 2024-12-04

## 2024-12-04 RX ADMIN — Medication 75 MILLILITER(S): at 14:20

## 2024-12-04 RX ADMIN — OXYCODONE HYDROCHLORIDE 10 MILLIGRAM(S): 30 TABLET ORAL at 16:22

## 2024-12-04 RX ADMIN — ACETAMINOPHEN 500MG 975 MILLIGRAM(S): 500 TABLET, COATED ORAL at 18:02

## 2024-12-04 RX ADMIN — ACETAMINOPHEN 500MG 975 MILLIGRAM(S): 500 TABLET, COATED ORAL at 12:08

## 2024-12-04 RX ADMIN — Medication 1 DROP(S): at 13:47

## 2024-12-04 RX ADMIN — Medication 2 TABLET(S): at 21:53

## 2024-12-04 RX ADMIN — ENOXAPARIN SODIUM 40 MILLIGRAM(S): 30 INJECTION SUBCUTANEOUS at 18:02

## 2024-12-04 RX ADMIN — Medication 1 DROP(S): at 06:03

## 2024-12-04 RX ADMIN — OXYCODONE HYDROCHLORIDE 10 MILLIGRAM(S): 30 TABLET ORAL at 14:25

## 2024-12-04 RX ADMIN — OXYCODONE HYDROCHLORIDE 10 MILLIGRAM(S): 30 TABLET ORAL at 06:53

## 2024-12-04 RX ADMIN — ACETAMINOPHEN 500MG 975 MILLIGRAM(S): 500 TABLET, COATED ORAL at 06:03

## 2024-12-04 RX ADMIN — CHLORHEXIDINE GLUCONATE 1 APPLICATION(S): 1.2 RINSE ORAL at 21:53

## 2024-12-04 RX ADMIN — Medication 1 DROP(S): at 21:44

## 2024-12-04 NOTE — OCCUPATIONAL THERAPY INITIAL EVALUATION ADULT - TRANSFER TRAINING, PT EVAL
Pt will perform sit<>stand and bed<>chair transfers with Min A by discharge using most appropriate AD

## 2024-12-04 NOTE — PHYSICAL THERAPY INITIAL EVALUATION ADULT - AMBULATION SKILLS, REHAB EVAL
Rx for citalopram was approved based on protocol.      Refill Protocol Appointment Criteria  · Appointment scheduled in the past 6 months or in the next 3 months  Recent Outpatient Visits            2 months ago Iron deficiency anemia, unspecified iron defi independent

## 2024-12-04 NOTE — OCCUPATIONAL THERAPY INITIAL EVALUATION ADULT - GENERAL OBSERVATIONS, REHAB EVAL
Pt encountered reclined in bed, + IV locked, prima fit. Pt agreeable to bedside OT assessment, may be seen as confirmed with RN. Pt returned to bed in chair mode, + IV locked, + prima fit.

## 2024-12-04 NOTE — PHYSICAL THERAPY INITIAL EVALUATION ADULT - TRANSFER TRAINING, PT EVAL
Wellbutrin was sent to the wrong pharmacy   Can you please send to [facility], [id number] [facility] [facility]
Goal: sit to stand with RW mod A by D/C

## 2024-12-04 NOTE — PROGRESS NOTE ADULT - ASSESSMENT
84-year-old female past med history of GERD, hypertension, osteoarthritis, rheumatoid arthritis, breast cancer in remission since 2015, past surgical history of bilateral hip replacements, and left knee replacement presenting for a fall.    #Mechanical Fall   #Inter prosthetic distal femur fracture  - CT femur: Acute comminuted right femoral distal metadiaphyseal fracture with volar apex angulation and 1.5 cm posterior displacement. Small knee joint effusion.  - CT head: IMPRESSION: No acute process seen  - x ray pelvis:   - Well-padded KI brace placed, keep clean/dry/intact  - Bed rest  - Pain control   - DVT PPx  to be held on morning of procedure - holding today 12/2  - Pre-op clearance / risk stratification / medical optimization  - Pre-Op CBC, CMP/BMP, PT/PTT/INR, Type & Screen x2, repeat lactate (2.8 on 12/2), CXR, EKG  - Trend Hgb -> dropped to 10.8 on 12/2   - monitor Hb post OP   - s/p OR 12/2--- > Right femur open reduction internal fixation. Will need DVT proph for 6 weeks     #GERD  - famotidine 20 bid       #Hypertension  - losartan 25mg od  - HCT 50mg od     #osteoarthritis  #rheumatoid arthritis  - on prednisone 10mg od     #Anxiety/depression  - on sertraline  - qt 500  - will hold for now       #breast cancer  -  in remission     DVT PPX: Lovenox  ACTIVITY: WBAT RLE  CODE STATUS: full    84-year-old female past med history of GERD, hypertension, osteoarthritis, rheumatoid arthritis, breast cancer in remission since 2015, past surgical history of bilateral hip replacements, and left knee replacement presenting for a fall.    #Mechanical Fall   #Inter prosthetic distal femur fracture  - CT femur: Acute comminuted right femoral distal metadiaphyseal fracture with volar apex angulation and 1.5 cm posterior displacement. Small knee joint effusion.  - CT head: IMPRESSION: No acute process seen  - s/p OR 12/2--- > Right femur open reduction internal fixation. Will need DVT proph for 6 weeks   - Pain control   -PT/ OT- Dc to rehab  - Trend Hgb    #GERD  - famotidine 20 bid       #Hypertension  - losartan 25mg od  - HCT 50mg od     #osteoarthritis  #rheumatoid arthritis  - on prednisone 10mg od     #Anxiety/depression  - on sertraline  - qt 500  - will hold for now       #breast cancer  -  in remission     DVT PPX: Lovenox  ACTIVITY: WBAT RLE  CODE STATUS: full   Pending : Dispo planning

## 2024-12-04 NOTE — PROGRESS NOTE ADULT - TIME BILLING
spending 40  minutes on this patient's case: 15   minutes w patient, 12   minutes reviewing the chart,    and  13 minutes speaking to the HO, RN and family, also coordinating care and documenting all.

## 2024-12-04 NOTE — PHYSICAL THERAPY INITIAL EVALUATION ADULT - ADDITIONAL COMMENTS
decreased strength/decreased flexibility Pt with occasional RW use. Lives alone, + WARREN, flight inside with chair lift

## 2024-12-04 NOTE — OCCUPATIONAL THERAPY INITIAL EVALUATION ADULT - PERTINENT HX OF CURRENT PROBLEM, REHAB EVAL
84-year-old female past med history of GERD, hypertension, osteoarthritis, rheumatoid arthritis, breast cancer in remission since 2015, past surgical history of bilateral hip replacements, and knee replacement presenting for a fall. Hx taken from the patient and is aox3 at time of the interview. Patient states that this morning around 10 am , she was trying to reach out from chair to another chair, while in sitting position, and while stretching she lost her balance and fell to the ground on her right side, hitting her head against the ground. She further states that, after the fall she was unable to get up from the ground and hence she was lying on the floor for many hours ( around 8 hr ) until her friend came to her house who saw her on the ground, and called the 911. After the fall, she is complaining of right lower extremity pain and is unable to move her right leg due to pain. on further inquiry,  patient had a fall the week prior wherein she landed on her face, she was seen in the ED at that time and found no significant injuries and was discharged to home. She has been using a walker since that time for stability. Also of note patient had a L forefoot exostosis removal with podiatry approximately two weeks prior but has been WBAT on that side.  Patient denies headache, loss of consciousness neck pain, dizziness, chest pain, palpitations  shortness of breath, chest pain, nausea, vomiting , urinary symptoms, and other significant complaints.   Patient lives alone with family nearby including a daughter who is a nurse at [employer].

## 2024-12-04 NOTE — PHYSICAL THERAPY INITIAL EVALUATION ADULT - LEVEL OF INDEPENDENCE: SIT/STAND, REHAB EVAL
attempted sit to stand with RW; pt with poor sitting balance and participation at this time. Deferred 2* to safety./unable to perform

## 2024-12-04 NOTE — PROGRESS NOTE ADULT - SUBJECTIVE AND OBJECTIVE BOX
SUBJECTIVE/OVERNIGHT EVENTS  Today is hospital day 3d. This morning patient was seen and examined at bedside, resting comfortably in bed. No acute or major events overnight.    HPI:  84-year-old female past med history of GERD, hypertension, osteoarthritis, rheumatoid arthritis, breast cancer in remission since 2015, past surgical history of bilateral hip replacements, and knee replacement presenting for a fall. Hx taken from the patient and is aox3 at time of the interview. Patient states that this morning around 10 am , she was trying to reach out from chair to another chair, while in sitting position, and while stretching she lost her balance and fell to the ground on her right side, hitting her head against the ground. She further states that, after the fall she was unable to get up from the ground and hence she was lying on the floor for many hours ( around 8 hr ) until her friend came to her house who saw her on the ground, and called the 911. After the fall, she is complaining of right lower extremity pain and is unable to move her right leg due to pain. on further inquiry,  patient had a fall the week prior wherein she landed on her face, she was seen in the ED at that time and found no significant injuries and was discharged to home. She has been using a walker since that time for stability. Also of note patient had a L forefoot exostosis removal with podiatry approximately two weeks prior but has been WBAT on that side.  Patient denies headache, loss of consciousness neck pain, dizziness, chest pain, palpitations  shortness of breath, chest pain, nausea, vomiting , urinary symptoms, and other significant complaints.   Patient lives alone with family nearby including a daughter who is a nurse at [employer].    ED VITALS    · BP Systolic	158 mm Hg  · BP Diastolic	 96 mm Hg  · Heart Rate	92 /min  · Respiration Rate (breaths/min)	19 /min  · Temp (F)	97.5 Degrees F  · Temp (C)	36.4 Degrees C  · Temp site	oral  · SpO2 (%)	99 %  · O2 Delivery/Oxygen Delivery Method	room air  · Temp at ED Arrival (C)	36.4 Degrees C    ED LABS    WBC 13K, SODIUM 130, ANION GAP 17, GFR 49, LACTATE 3.2,    (02 Dec 2024 01:13)      MEDICATIONS  STANDING MEDICATIONS  acetaminophen     Tablet .. 975 milliGRAM(s) Oral every 6 hours  chlorhexidine 2% Cloths 1 Application(s) Topical <User Schedule>  enoxaparin Injectable 40 milliGRAM(s) SubCutaneous every 24 hours  ofloxacin 0.3% Solution 1 Drop(s) Right EYE three times a day  senna 2 Tablet(s) Oral at bedtime    PRN MEDICATIONS  morphine  - Injectable 2 milliGRAM(s) IV Push every 4 hours PRN  oxyCODONE    IR 2.5 milliGRAM(s) Oral every 4 hours PRN  oxyCODONE    IR 5 milliGRAM(s) Oral every 4 hours PRN  oxyCODONE    IR 10 milliGRAM(s) Oral every 4 hours PRN    VITALS  T(F): 98.9 (12-04-24 @ 09:45), Max: 98.9 (12-04-24 @ 09:45)  HR: 86 (12-04-24 @ 09:45) (76 - 99)  BP: 129/62 (12-04-24 @ 09:45) (129/62 - 163/66)  RR: 18 (12-04-24 @ 09:45) (17 - 18)  SpO2: 95% (12-04-24 @ 09:45) (93% - 98%)          PHYSICAL EXAM      GENERAL: No acute distress, well-developed  CHEST/LUNG: Clear to auscultation bilaterally; No wheeze, equal breath sounds bilaterally, respirations nonlabored  HEART: Regular rate and rhythm; No murmurs, rubs, or gallops  ABDOMEN: Soft, nontender, nondistended; Bowel sounds present, no organomegaly  NEUROLOGY: AAOx3, non-focal, moves all extremities spontaneously        PAST MEDICAL & SURGICAL HISTORY:  Rheumatoid arthritis      HTN (hypertension)      Breast cancer  last radiation 2015      Depression      Chronic GERD      OA (osteoarthritis)      Rheumatoid arthritis      History of right hip replacement      Status post left hip replacement      S/P total knee replacement, left      H/O vaginal hysterectomy      Status post cholecystectomy      S/P lumpectomy of breast  2015            LABS             8.8    13.78 )-----------( 201      ( 12-03-24 @ 05:07 )             26.8     134  |  98  |  35  -------------------------<  150   12-03-24 @ 05:07  4.8  |  26  |  1.4    Ca      8.5     12-03-24 @ 05:07  Mg     2.0     12-03-24 @ 05:07          Urinalysis Basic - ( 03 Dec 2024 05:07 )    Color: x / Appearance: x / SG: x / pH: x  Gluc: 150 mg/dL / Ketone: x  / Bili: x / Urobili: x   Blood: x / Protein: x / Nitrite: x   Leuk Esterase: x / RBC: x / WBC x   Sq Epi: x / Non Sq Epi: x / Bacteria: x          IMAGING

## 2024-12-04 NOTE — PHYSICAL THERAPY INITIAL EVALUATION ADULT - PERTINENT HX OF CURRENT PROBLEM, REHAB EVAL
84-year-old female past med history of GERD, hypertension, osteoarthritis, rheumatoid arthritis, breast cancer in remission since 2015, past surgical history of bilateral hip replacements, and knee replacement presenting for a fall. Hx taken from the patient and is aox3 at time of the interview. Patient states that this morning around 10 am , she was trying to reach out from chair to another chair, while in sitting position, and while stretching she lost her balance and fell to the ground on her right side, hitting her head against the ground. She further states that, after the fall she was unable to get up from the ground and hence she was lying on the floor for many hours ( around 8 hr ) until her friend came to her house who saw her on the ground, and called the 911. After the fall, she is complaining of right lower extremity pain and is unable to move her right leg due to pain. on further inquiry,  patient had a fall the week prior wherein she landed on her face, she was seen in the ED at that time and found no significant injuries and was discharged to home. She has been using a walker since that time for stability. Also of note patient had a L forefoot exostosis removal with podiatry approximately two weeks prior but has been WBAT on that side.  Patient denies headache, loss of consciousness neck pain, dizziness, chest pain, palpitations  shortness of breath, chest pain, nausea, vomiting , urinary symptoms, and other significant complaints.   Patient lives alone with family nearby including a daughter who is a nurse at [employer].

## 2024-12-04 NOTE — PROGRESS NOTE ADULT - ASSESSMENT
84-year-old female past med history of GERD, hypertension, osteoarthritis, rheumatoid arthritis, breast cancer in remission since 2015, past surgical history of bilateral hip replacements, and left knee replacement presenting for a fall.    #Mechanical Fall - multiple  #Inter prosthetic distal femur fracture  - CT femur: Acute comminuted right femoral distal metadiaphyseal fracture with volar apex angulation and 1.5 cm posterior displacement. Small knee joint effusion.  - CT head: IMPRESSION: No acute process seen  - s/p OR 12/2--- > Right femur open reduction internal fixation. Will need DVT proph for 6 weeks   - Pain control   -PT/ OT- DC to rehab- I reviewed w patient  - Trend Hgb    #GERD  - famotidine 20 bid       #Hypertension  - losartan 25mg od  - HCT 50mg od --being held;  - with INCreased creat, would give IVF for 1-2 days and monitor; if any worse- get sono    #osteoarthritis  #rheumatoid arthritis  - on prednisone 10mg od     #Anxiety/depression  - on sertraline  - qt 500  - will hold for now       #breast cancer  -  in remission     DVT PPX: Lovenox- see Ortho X6wks  ACTIVITY: WBAT RLE  CODE STATUS: full   Pending : Dispo planning- see OT and Rehab Med- ? 4A, vs short term Rehab @ SNF

## 2024-12-04 NOTE — PROGRESS NOTE ADULT - SUBJECTIVE AND OBJECTIVE BOX
ORTHOPAEDIC SURGERY PROGRESS NOTE    Interval History:  Patient seen and examined at bedside.  Pain is controlled.  No complaints of chest pain, SOB, N/V.    MEDICATIONS  (STANDING):  acetaminophen     Tablet .. 975 milliGRAM(s) Oral every 6 hours  enoxaparin Injectable 40 milliGRAM(s) SubCutaneous every 24 hours  ofloxacin 0.3% Solution 1 Drop(s) Right EYE three times a day  senna 2 Tablet(s) Oral at bedtime    MEDICATIONS  (PRN):  morphine  - Injectable 2 milliGRAM(s) IV Push every 4 hours PRN breakthrough pain  oxyCODONE    IR 2.5 milliGRAM(s) Oral every 4 hours PRN Mild Pain (1 - 3)  oxyCODONE    IR 5 milliGRAM(s) Oral every 4 hours PRN Moderate Pain (4 - 6)  oxyCODONE    IR 10 milliGRAM(s) Oral every 4 hours PRN Severe Pain (7 - 10)      Vital Signs Last 24 Hrs  T(C): 36.7 (04 Dec 2024 04:36), Max: 37.1 (03 Dec 2024 15:30)  T(F): 98 (04 Dec 2024 04:36), Max: 98.8 (03 Dec 2024 15:30)  HR: 79 (04 Dec 2024 04:36) (76 - 99)  BP: 145/71 (04 Dec 2024 04:36) (141/76 - 163/66)  BP(mean): --  RR: 18 (04 Dec 2024 04:36) (16 - 18)  SpO2: 93% (04 Dec 2024 04:36) (93% - 98%)    Physical Exam:   Dressing C/D/I  Compartments soft and compressible  Motor intact distally  SILT distally  CR<2sec, palpable pulses                           8.8    13.78 )-----------( 201      ( 03 Dec 2024 05:07 )             26.8     12-03    134[L]  |  98  |  35[H]  ----------------------------<  150[H]  4.8   |  26  |  1.4    Ca    8.5      03 Dec 2024 05:07  Mg     2.0     12-03          A/P: 84yFemale s/p R interprosthetic distal femur fracture ORIF on 12/2/24. Doing well.    - OOB to Chair   - WBAT RLE  - PT/OT  - Pain control   - Extremity icing/elevation  - Incentive Spirometry   - DVT Prophylaxis   - Discharge planning    - If discharged, patient should follow up with Dr. Dailey at [address] [place] [address]. Phone number [phone] for scheduling/appointment.  - Patient should be discharged on 6 weeks (from surgery date) of appropriate DVT prophylaxis due to their decreased functional/ambulation status after lower extremity surgery. Orthopaedic preference would be Aspirin 81 mg BID  if there are no contraindications. If patient is already on home anticoagulation, they may continue home anticoagulation medication instead of aspirin. If patient is going to inpatient rehab/nursing facility, and they plan for DVT PPx with SQH/LVX, they may be placed on that instead of aspirin.

## 2024-12-04 NOTE — OCCUPATIONAL THERAPY INITIAL EVALUATION ADULT - HEALTH SCREEN CRITERIA
no
yes
Island Pedicle Flap With Canthal Suspension Text: The defect edges were debeveled with a #15 scalpel blade.  Given the location of the defect, shape of the defect and the proximity to free margins an island pedicle advancement flap was deemed most appropriate.  Using a sterile surgical marker, an appropriate advancement flap was drawn incorporating the defect, outlining the appropriate donor tissue and placing the expected incisions within the relaxed skin tension lines where possible. The area thus outlined was incised deep to adipose tissue with a #15 scalpel blade.  The skin margins were undermined to an appropriate distance in all directions around the primary defect and laterally outward around the island pedicle utilizing iris scissors.  There was minimal undermining beneath the pedicle flap. A suspension suture was placed in the canthal tendon to prevent tension and prevent ectropion.

## 2024-12-04 NOTE — PROGRESS NOTE ADULT - SUBJECTIVE AND OBJECTIVE BOX
Chart reviewed, patient examined. Pertinent results reviewed.  Case discussed with HO; specialist f/u reviewed  HD#4; POD#2; Patient well known to me for many years  SUBJECTIVE/OVERNIGHT EVENTS   This morning patient was seen and examined at bedside, resting comfortably in bed. No acute or major events overnight. She participated w OT this AM.    HPI:  84-year-old female past med history of GERD, hypertension, osteoarthritis, rheumatoid arthritis, breast cancer in remission since 2015, past surgical history of bilateral hip replacements, and knee replacement presenting for a fall. Hx taken from the patient and is aox3 at time of the interview. Patient states that this morning around 10 am , she was trying to reach out from chair to another chair, while in sitting position, and while stretching she lost her balance and fell to the ground on her right side, hitting her head against the ground. She further states that, after the fall she was unable to get up from the ground and hence she was lying on the floor for many hours ( around 8 hr ) until her friend came to her house who saw her on the ground, and called the 911. After the fall, she is complaining of right lower extremity pain and is unable to move her right leg due to pain. on further inquiry,  patient had a fall the week prior wherein she landed on her face, she was seen in the ED at that time and found no significant injuries and was discharged to home. She has been using a walker since that time for stability. Also of note patient had a L forefoot exostosis removal with podiatry approximately two weeks prior but has been WBAT on that side. She was in theED 1 wk prior for another fall.  Patient denies headache, loss of consciousness neck pain, dizziness, chest pain, palpitations  shortness of breath, chest pain, nausea, vomiting , urinary symptoms, and other significant complaints.   Patient lives alone with family nearby including a daughter who is a nurse at [employer].   She was cleared for her surgery, Had an ORIF of R Femur 2 da ago w/o incident, and is now recovering. SHE IS IN GOOD SPIRITS.    MEDICATIONS  MEDICATIONS  (STANDING):  acetaminophen     Tablet .. 975 milliGRAM(s) Oral every 6 hours  chlorhexidine 2% Cloths 1 Application(s) Topical <User Schedule>  enoxaparin Injectable 40 milliGRAM(s) SubCutaneous every 24 hours  ofloxacin 0.3% Solution 1 Drop(s) Right EYE three times a day  senna 2 Tablet(s) Oral at bedtime    MEDICATIONS  (PRN):  morphine  - Injectable 2 milliGRAM(s) IV Push every 4 hours PRN breakthrough pain  oxyCODONE    IR 2.5 milliGRAM(s) Oral every 4 hours PRN Mild Pain (1 - 3)  oxyCODONE    IR 5 milliGRAM(s) Oral every 4 hours PRN Moderate Pain (4 - 6)  oxyCODONE    IR 10 milliGRAM(s) Oral every 4 hours PRN Severe Pain (7 - 10)      VITALS  Vital Signs Last 24 Hrs  T(C): 37.2 (04 Dec 2024 09:45), Max: 37.2 (04 Dec 2024 09:45)  T(F): 98.9 (04 Dec 2024 09:45), Max: 98.9 (04 Dec 2024 09:45)  HR: 86 (04 Dec 2024 09:45) (76 - 99)  BP: 129/62 (04 Dec 2024 09:45) (129/62 - 163/66)  BP(mean): --  RR: 18 (04 Dec 2024 09:45) (17 - 18)  SpO2: 95% (04 Dec 2024 09:45) (93% - 98%)    Parameters below as of 03 Dec 2024 15:30  Patient On (Oxygen Delivery Method): room air            PHYSICAL EXAM  GENERAL: No acute distress, well-developed; AAOx4;   H: + marked B/L facial ecchymoses- subacute; TTP over nose and L orbit  TH: MMM 3/4 Airway.; Neck: NT, no thyroid  CHEST/LUNG: Clear  bilaterally; No wheeze, equal breath sounds bilaterally, respirations nonlabored  HEART: RRR; No murmurs, rubs, or gallops  ABDOMEN: obese; Soft, nontender, nondistended; Bowel sounds present, no organomegaly  EXT: RLE wrapped over siurgical dressings- + weak and TTP.  NEUROLOGY: AAOx3, non-focal, moves all extremities spontaneously        PAST MEDICAL & SURGICAL HISTORY:  Rheumatoid arthritis      HTN (hypertension)  HT w DD, IVCD      Breast cancer  last radiation 2015      Depression/anxiety/insomnia      Chronic GERD      OA (osteoarthritis)      Rheumatoid arthritis      History of right hip replacement      Status post left hip replacement      S/P total knee replacement, left + R      H/O vaginal hysterectomy      Status post cholecystectomy      S/P lumpectomy of breast  2015            LABS             8.8    13.78 )-----------( 201      ( 12-03-24 @ 05:07 )             26.8     134  |  98  |  35  -------------------------<  150   12-03-24 @ 05:07  4.8  |  26  |  1.4    Ca      8.5     12-03-24 @ 05:07  Mg     2.0     12-03-24 @ 05:07          Urinalysis Basic - ( 03 Dec 2024 05:07 )    Color: x / Appearance: x / SG: x / pH: x  Gluc: 150 mg/dL / Ketone: x  / Bili: x / Urobili: x   Blood: x / Protein: x / Nitrite: x   Leuk Esterase: x / RBC: x / WBC x   Sq Epi: x / Non Sq Epi: x / Bacteria: x          IMAGING

## 2024-12-04 NOTE — OCCUPATIONAL THERAPY INITIAL EVALUATION ADULT - ADDITIONAL COMMENTS
Pt endorses residing alone in private house with 3-4 steps to enter and 1 FOS to bedroom/bathroom; chairlift present in house from spouse use, + bathtub with seat (folding chair per pt) + driving, has AE (reacher, sock aid and dressing stick), has HHA 2 days (9-4)

## 2024-12-05 ENCOUNTER — TRANSCRIPTION ENCOUNTER (OUTPATIENT)
Age: 84
End: 2024-12-05

## 2024-12-05 ENCOUNTER — INPATIENT (INPATIENT)
Facility: HOSPITAL | Age: 84
LOS: 18 days | Discharge: HOME HEALTH | DRG: 534 | End: 2024-12-24
Attending: PHYSICAL MEDICINE & REHABILITATION | Admitting: PHYSICAL MEDICINE & REHABILITATION
Payer: MEDICARE

## 2024-12-05 VITALS
HEART RATE: 96 BPM | RESPIRATION RATE: 18 BRPM | TEMPERATURE: 99 F | OXYGEN SATURATION: 95 % | SYSTOLIC BLOOD PRESSURE: 151 MMHG | DIASTOLIC BLOOD PRESSURE: 74 MMHG

## 2024-12-05 VITALS
SYSTOLIC BLOOD PRESSURE: 155 MMHG | WEIGHT: 203.05 LBS | HEIGHT: 68 IN | TEMPERATURE: 98 F | RESPIRATION RATE: 18 BRPM | OXYGEN SATURATION: 94 % | DIASTOLIC BLOOD PRESSURE: 77 MMHG | HEART RATE: 105 BPM

## 2024-12-05 DIAGNOSIS — S72.91XA UNSPECIFIED FRACTURE OF RIGHT FEMUR, INITIAL ENCOUNTER FOR CLOSED FRACTURE: ICD-10-CM

## 2024-12-05 DIAGNOSIS — Z90.49 ACQUIRED ABSENCE OF OTHER SPECIFIED PARTS OF DIGESTIVE TRACT: Chronic | ICD-10-CM

## 2024-12-05 DIAGNOSIS — T81.30XA DISRUPTION OF WOUND, UNSPECIFIED, INITIAL ENCOUNTER: ICD-10-CM
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
## 2024-12-05 DIAGNOSIS — Z96.652 PRESENCE OF LEFT ARTIFICIAL KNEE JOINT: Chronic | ICD-10-CM
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
## 2024-12-05 DIAGNOSIS — Z90.710 ACQUIRED ABSENCE OF BOTH CERVIX AND UTERUS: Chronic | ICD-10-CM

## 2024-12-05 DIAGNOSIS — Z96.642 PRESENCE OF LEFT ARTIFICIAL HIP JOINT: Chronic | ICD-10-CM

## 2024-12-05 DIAGNOSIS — Z96.641 PRESENCE OF RIGHT ARTIFICIAL HIP JOINT: Chronic | ICD-10-CM

## 2024-12-05 DIAGNOSIS — Z98.890 OTHER SPECIFIED POSTPROCEDURAL STATES: Chronic | ICD-10-CM

## 2024-12-05 LAB
ALBUMIN SERPL ELPH-MCNC: 3 G/DL — LOW (ref 3.5–5.2)
ALP SERPL-CCNC: 85 U/L — SIGNIFICANT CHANGE UP (ref 30–115)
ALT FLD-CCNC: 10 U/L — SIGNIFICANT CHANGE UP (ref 0–41)
ANION GAP SERPL CALC-SCNC: 11 MMOL/L — SIGNIFICANT CHANGE UP (ref 7–14)
AST SERPL-CCNC: 40 U/L — SIGNIFICANT CHANGE UP (ref 0–41)
BASOPHILS # BLD AUTO: 0.03 K/UL — SIGNIFICANT CHANGE UP (ref 0–0.2)
BASOPHILS NFR BLD AUTO: 0.3 % — SIGNIFICANT CHANGE UP (ref 0–1)
BILIRUB SERPL-MCNC: 0.6 MG/DL — SIGNIFICANT CHANGE UP (ref 0.2–1.2)
BUN SERPL-MCNC: 31 MG/DL — HIGH (ref 10–20)
CALCIUM SERPL-MCNC: 8.5 MG/DL — SIGNIFICANT CHANGE UP (ref 8.4–10.5)
CHLORIDE SERPL-SCNC: 95 MMOL/L — LOW (ref 98–110)
CO2 SERPL-SCNC: 25 MMOL/L — SIGNIFICANT CHANGE UP (ref 17–32)
CREAT SERPL-MCNC: 0.9 MG/DL — SIGNIFICANT CHANGE UP (ref 0.7–1.5)
EGFR: 63 ML/MIN/1.73M2 — SIGNIFICANT CHANGE UP
EOSINOPHIL # BLD AUTO: 0.2 K/UL — SIGNIFICANT CHANGE UP (ref 0–0.7)
EOSINOPHIL NFR BLD AUTO: 2.2 % — SIGNIFICANT CHANGE UP (ref 0–8)
GLUCOSE SERPL-MCNC: 100 MG/DL — HIGH (ref 70–99)
HCT VFR BLD CALC: 23.1 % — LOW (ref 37–47)
HGB BLD-MCNC: 7.5 G/DL — LOW (ref 12–16)
IMM GRANULOCYTES NFR BLD AUTO: 2.2 % — HIGH (ref 0.1–0.3)
LYMPHOCYTES # BLD AUTO: 1.89 K/UL — SIGNIFICANT CHANGE UP (ref 1.2–3.4)
LYMPHOCYTES # BLD AUTO: 20.9 % — SIGNIFICANT CHANGE UP (ref 20.5–51.1)
MAGNESIUM SERPL-MCNC: 1.7 MG/DL — LOW (ref 1.8–2.4)
MCHC RBC-ENTMCNC: 31.8 PG — HIGH (ref 27–31)
MCHC RBC-ENTMCNC: 32.5 G/DL — SIGNIFICANT CHANGE UP (ref 32–37)
MCV RBC AUTO: 97.9 FL — SIGNIFICANT CHANGE UP (ref 81–99)
MONOCYTES # BLD AUTO: 0.78 K/UL — HIGH (ref 0.1–0.6)
MONOCYTES NFR BLD AUTO: 8.6 % — SIGNIFICANT CHANGE UP (ref 1.7–9.3)
NEUTROPHILS # BLD AUTO: 5.93 K/UL — SIGNIFICANT CHANGE UP (ref 1.4–6.5)
NEUTROPHILS NFR BLD AUTO: 65.8 % — SIGNIFICANT CHANGE UP (ref 42.2–75.2)
NRBC # BLD: 0 /100 WBCS — SIGNIFICANT CHANGE UP (ref 0–0)
PLATELET # BLD AUTO: 220 K/UL — SIGNIFICANT CHANGE UP (ref 130–400)
PMV BLD: 9.5 FL — SIGNIFICANT CHANGE UP (ref 7.4–10.4)
POTASSIUM SERPL-MCNC: 4.2 MMOL/L — SIGNIFICANT CHANGE UP (ref 3.5–5)
POTASSIUM SERPL-SCNC: 4.2 MMOL/L — SIGNIFICANT CHANGE UP (ref 3.5–5)
PROT SERPL-MCNC: 5.4 G/DL — LOW (ref 6–8)
RBC # BLD: 2.36 M/UL — LOW (ref 4.2–5.4)
RBC # FLD: 14.6 % — HIGH (ref 11.5–14.5)
SODIUM SERPL-SCNC: 131 MMOL/L — LOW (ref 135–146)
WBC # BLD: 9.03 K/UL — SIGNIFICANT CHANGE UP (ref 4.8–10.8)
WBC # FLD AUTO: 9.03 K/UL — SIGNIFICANT CHANGE UP (ref 4.8–10.8)

## 2024-12-05 PROCEDURE — 97032 APPL MODALITY 1+ESTIM EA 15: CPT | Mod: GP

## 2024-12-05 PROCEDURE — 81001 URINALYSIS AUTO W/SCOPE: CPT

## 2024-12-05 PROCEDURE — 36415 COLL VENOUS BLD VENIPUNCTURE: CPT

## 2024-12-05 PROCEDURE — 93005 ELECTROCARDIOGRAM TRACING: CPT

## 2024-12-05 PROCEDURE — 86850 RBC ANTIBODY SCREEN: CPT

## 2024-12-05 PROCEDURE — 97535 SELF CARE MNGMENT TRAINING: CPT | Mod: GO

## 2024-12-05 PROCEDURE — 80048 BASIC METABOLIC PNL TOTAL CA: CPT

## 2024-12-05 PROCEDURE — 97010 HOT OR COLD PACKS THERAPY: CPT | Mod: GO

## 2024-12-05 PROCEDURE — 87086 URINE CULTURE/COLONY COUNT: CPT

## 2024-12-05 PROCEDURE — 82306 VITAMIN D 25 HYDROXY: CPT

## 2024-12-05 PROCEDURE — 85025 COMPLETE CBC W/AUTO DIFF WBC: CPT

## 2024-12-05 PROCEDURE — 73552 X-RAY EXAM OF FEMUR 2/>: CPT | Mod: RT

## 2024-12-05 PROCEDURE — 97140 MANUAL THERAPY 1/> REGIONS: CPT | Mod: GP

## 2024-12-05 PROCEDURE — 93970 EXTREMITY STUDY: CPT

## 2024-12-05 PROCEDURE — 97537 COMMUNITY/WORK REINTEGRATION: CPT | Mod: GO

## 2024-12-05 PROCEDURE — 97110 THERAPEUTIC EXERCISES: CPT | Mod: GP

## 2024-12-05 PROCEDURE — 86900 BLOOD TYPING SEROLOGIC ABO: CPT

## 2024-12-05 PROCEDURE — 73560 X-RAY EXAM OF KNEE 1 OR 2: CPT | Mod: RT

## 2024-12-05 PROCEDURE — 87186 SC STD MICRODIL/AGAR DIL: CPT

## 2024-12-05 PROCEDURE — 97116 GAIT TRAINING THERAPY: CPT | Mod: GP

## 2024-12-05 PROCEDURE — 97166 OT EVAL MOD COMPLEX 45 MIN: CPT | Mod: GO

## 2024-12-05 PROCEDURE — 83735 ASSAY OF MAGNESIUM: CPT

## 2024-12-05 PROCEDURE — 86901 BLOOD TYPING SEROLOGIC RH(D): CPT

## 2024-12-05 PROCEDURE — 97530 THERAPEUTIC ACTIVITIES: CPT | Mod: GO

## 2024-12-05 PROCEDURE — 80053 COMPREHEN METABOLIC PANEL: CPT

## 2024-12-05 PROCEDURE — 97112 NEUROMUSCULAR REEDUCATION: CPT | Mod: GO

## 2024-12-05 RX ORDER — ASPIRIN 81 MG
81 TABLET, DELAYED RELEASE (ENTERIC COATED) ORAL DAILY
Refills: 0 | Status: DISCONTINUED | OUTPATIENT
Start: 2024-12-05 | End: 2024-12-24

## 2024-12-05 RX ORDER — OXYCODONE HYDROCHLORIDE 30 MG/1
1 TABLET ORAL
Qty: 0 | Refills: 0 | DISCHARGE
Start: 2024-12-05

## 2024-12-05 RX ORDER — FAMOTIDINE 20 MG/1
20 TABLET, FILM COATED ORAL
Refills: 0 | Status: DISCONTINUED | OUTPATIENT
Start: 2024-12-05 | End: 2024-12-24

## 2024-12-05 RX ORDER — ACETAMINOPHEN 80 MG/.8ML
975 SOLUTION/ DROPS ORAL EVERY 6 HOURS
Refills: 0 | Status: DISCONTINUED | OUTPATIENT
Start: 2024-12-05 | End: 2024-12-06

## 2024-12-05 RX ORDER — GINKGO BILOBA 40 MG
3 CAPSULE ORAL AT BEDTIME
Refills: 0 | Status: DISCONTINUED | OUTPATIENT
Start: 2024-12-05 | End: 2024-12-24

## 2024-12-05 RX ORDER — CELECOXIB 200 MG/1
1 CAPSULE ORAL
Refills: 0 | DISCHARGE

## 2024-12-05 RX ORDER — CALCIUM CARBONATE/VITAMIN D3 500MG-5MCG
1 TABLET ORAL DAILY
Refills: 0 | Status: DISCONTINUED | OUTPATIENT
Start: 2024-12-05 | End: 2024-12-24

## 2024-12-05 RX ORDER — CHLORHEXIDINE GLUCONATE 1.2 MG/ML
1 RINSE ORAL
Refills: 0 | Status: DISCONTINUED | OUTPATIENT
Start: 2024-12-05 | End: 2024-12-24

## 2024-12-05 RX ORDER — OFLOXACIN 0.3 %
1 DROPS OPHTHALMIC (EYE)
Qty: 0 | Refills: 0 | DISCHARGE
Start: 2024-12-05

## 2024-12-05 RX ORDER — ROSUVASTATIN 40 MG/1
10 TABLET, FILM COATED ORAL AT BEDTIME
Refills: 0 | Status: DISCONTINUED | OUTPATIENT
Start: 2024-12-05 | End: 2024-12-24

## 2024-12-05 RX ORDER — OXYCODONE HCL 15 MG
5 TABLET ORAL EVERY 4 HOURS
Refills: 0 | Status: DISCONTINUED | OUTPATIENT
Start: 2024-12-05 | End: 2024-12-10

## 2024-12-05 RX ORDER — ENOXAPARIN SODIUM 60 MG/.6ML
40 INJECTION INTRAVENOUS; SUBCUTANEOUS EVERY 24 HOURS
Refills: 0 | Status: DISCONTINUED | OUTPATIENT
Start: 2024-12-05 | End: 2024-12-24

## 2024-12-05 RX ORDER — ENOXAPARIN SODIUM 30 MG/.3ML
40 INJECTION SUBCUTANEOUS
Qty: 0 | Refills: 0 | DISCHARGE
Start: 2024-12-05

## 2024-12-05 RX ORDER — ASCORBIC ACID 1000 MG
500 TABLET ORAL DAILY
Refills: 0 | Status: DISCONTINUED | OUTPATIENT
Start: 2024-12-05 | End: 2024-12-24

## 2024-12-05 RX ORDER — VITAMIN A 10000 UNIT
1 TABLET ORAL DAILY
Refills: 0 | Status: DISCONTINUED | OUTPATIENT
Start: 2024-12-05 | End: 2024-12-24

## 2024-12-05 RX ORDER — OXYCODONE HCL 15 MG
10 TABLET ORAL EVERY 4 HOURS
Refills: 0 | Status: DISCONTINUED | OUTPATIENT
Start: 2024-12-05 | End: 2024-12-10

## 2024-12-05 RX ORDER — OFLOXACIN 0.3 %
1 DROPS OPHTHALMIC (EYE) THREE TIMES A DAY
Refills: 0 | Status: DISCONTINUED | OUTPATIENT
Start: 2024-12-05 | End: 2024-12-20

## 2024-12-05 RX ORDER — SENNOSIDES 8.6 MG
2 TABLET ORAL
Qty: 0 | Refills: 0 | DISCHARGE
Start: 2024-12-05

## 2024-12-05 RX ORDER — SENNOSIDES 8.6 MG/1
2 TABLET, FILM COATED ORAL AT BEDTIME
Refills: 0 | Status: DISCONTINUED | OUTPATIENT
Start: 2024-12-05 | End: 2024-12-18

## 2024-12-05 RX ADMIN — ACETAMINOPHEN 500MG 975 MILLIGRAM(S): 500 TABLET, COATED ORAL at 17:01

## 2024-12-05 RX ADMIN — ACETAMINOPHEN 500MG 975 MILLIGRAM(S): 500 TABLET, COATED ORAL at 05:50

## 2024-12-05 RX ADMIN — OXYCODONE HYDROCHLORIDE 10 MILLIGRAM(S): 30 TABLET ORAL at 09:19

## 2024-12-05 RX ADMIN — Medication 1 DROP(S): at 13:15

## 2024-12-05 RX ADMIN — ENOXAPARIN SODIUM 40 MILLIGRAM(S): 30 INJECTION SUBCUTANEOUS at 17:01

## 2024-12-05 RX ADMIN — ACETAMINOPHEN 500MG 975 MILLIGRAM(S): 500 TABLET, COATED ORAL at 11:56

## 2024-12-05 RX ADMIN — Medication 25 GRAM(S): at 11:55

## 2024-12-05 RX ADMIN — CHLORHEXIDINE GLUCONATE 1 APPLICATION(S): 1.2 RINSE ORAL at 06:24

## 2024-12-05 RX ADMIN — Medication 2 TABLET(S): at 21:00

## 2024-12-05 RX ADMIN — Medication 1 DROP(S): at 06:24

## 2024-12-05 RX ADMIN — Medication 1 DROP(S): at 21:00

## 2024-12-05 NOTE — DISCHARGE NOTE PROVIDER - CARE PROVIDER_API CALL
Janes Dailey  Orthopaedic Surgery  [address] [facility] [place] [address]  Phone: [phone]  Fax: [phone]  Established Patient  Follow Up Time: 2 weeks

## 2024-12-05 NOTE — DISCHARGE NOTE PROVIDER - HOSPITAL COURSE
The patient is a 62y Male complaining of urinary symptoms.
4-year-old female past med history of GERD, hypertension, osteoarthritis, rheumatoid arthritis, breast cancer in remission since 2015, past surgical history of bilateral hip replacements, and left knee replacement presenting for a fall.    #Mechanical Fall   #Inter prosthetic distal femur fracture  - CT femur: Acute comminuted right femoral distal metadiaphyseal fracture with volar apex angulation and 1.5 cm posterior displacement. Small knee joint effusion.  - CT head: IMPRESSION: No acute process seen  - s/p OR 12/2--- > Right femur open reduction internal fixation. Will need DVT proph for 6 weeks   - Pain control   -PT/ OT- Dc to rehab  - Trend Hgb    #GERD  - famotidine 20 bid       #Hypertension  - losartan 25mg od  - HCT 50mg od     #osteoarthritis  #rheumatoid arthritis  - on prednisone 10mg od     #Anxiety/depression  - on sertraline  - qt 500  - will hold for now       #breast cancer  -  in remission     DVT PPX: Lovenox  ACTIVITY: WBAT RLE

## 2024-12-05 NOTE — PROGRESS NOTE ADULT - SUBJECTIVE AND OBJECTIVE BOX
GIAN MELISSA  84y  Female  HPI:  84-year-old female past med history of GERD, hypertension, osteoarthritis, rheumatoid arthritis, breast cancer in remission since 2015, past surgical history of bilateral hip replacements, and knee replacement presenting for a fall. Hx taken from the patient and is aox3 at time of the interview. Patient states that this morning around 10 am , she was trying to reach out from chair to another chair, while in sitting position, and while stretching she lost her balance and fell to the ground on her right side, hitting her head against the ground. She further states that, after the fall she was unable to get up from the ground and hence she was lying on the floor for many hours ( around 8 hr ) until her friend came to her house who saw her on the ground, and called the 911. After the fall, she is complaining of right lower extremity pain and is unable to move her right leg due to pain. on further inquiry,  patient had a fall the week prior wherein she landed on her face, she was seen in the ED at that time and found no significant injuries and was discharged to home. She has been using a walker since that time for stability. Also of note patient had a L forefoot exostosis removal with podiatry approximately two weeks prior but has been WBAT on that side.  Patient denies headache, loss of consciousness neck pain, dizziness, chest pain, palpitations  shortness of breath, chest pain, nausea, vomiting , urinary symptoms, and other significant complaints.   Patient lives alone with family nearby including a daughter who is a nurse at [employer].    ED VITALS    · BP Systolic	158 mm Hg  · BP Diastolic	 96 mm Hg  · Heart Rate	92 /min  · Respiration Rate (breaths/min)	19 /min  · Temp (F)	97.5 Degrees F  · Temp (C)	36.4 Degrees C  · Temp site	oral  · SpO2 (%)	99 %  · O2 Delivery/Oxygen Delivery Method	room air  · Temp at ED Arrival (C)	36.4 Degrees C    ED LABS    WBC 13K, SODIUM 130, ANION GAP 17, GFR 49, LACTATE 3.2,    (02 Dec 2024 01:13)    MEDICATIONS  (STANDING):  acetaminophen     Tablet .. 975 milliGRAM(s) Oral every 6 hours  chlorhexidine 2% Cloths 1 Application(s) Topical <User Schedule>  enoxaparin Injectable 40 milliGRAM(s) SubCutaneous every 24 hours  ofloxacin 0.3% Solution 1 Drop(s) Right EYE three times a day  senna 2 Tablet(s) Oral at bedtime    MEDICATIONS  (PRN):  morphine  - Injectable 2 milliGRAM(s) IV Push every 4 hours PRN breakthrough pain  oxyCODONE    IR 10 milliGRAM(s) Oral every 4 hours PRN Severe Pain (7 - 10)    INTERVAL EVENTS: Patient seen this am without distress    T(C): 36.8 (12-05-24 @ 22:36), Max: 37.1 (12-05-24 @ 16:00)  HR: 105 (12-05-24 @ 22:36) (93 - 105)  BP: 155/77 (12-05-24 @ 22:36) (143/74 - 155/77)  RR: 18 (12-05-24 @ 22:36) (18 - 18)  SpO2: 94% (12-05-24 @ 22:36) (94% - 97%)  Wt(kg): --Vital Signs Last 24 Hrs  T(C): 36.8 (05 Dec 2024 22:36), Max: 37.1 (05 Dec 2024 16:00)  T(F): 98.3 (05 Dec 2024 22:36), Max: 98.7 (05 Dec 2024 16:00)  HR: 105 (05 Dec 2024 22:36) (93 - 105)  BP: 155/77 (05 Dec 2024 22:36) (143/74 - 155/77)  BP(mean): 103 (05 Dec 2024 22:36) (103 - 103)  RR: 18 (05 Dec 2024 22:36) (18 - 18)  SpO2: 94% (05 Dec 2024 22:36) (94% - 97%)    Parameters below as of 05 Dec 2024 16:00  Patient On (Oxygen Delivery Method): room air        PHYSICAL EXAM:  GENERAL: resolving facial ecchymosis  NECK: Supple, No JVD  CHEST/LUNG: Decreased BS, occ wheeze  HEART: S1, S2,   ABDOMEN: Soft, Nontender, Bowel sounds present  EXTREMITIES: ++ edema    LABS:                        7.5    9.03  )-----------( 220      ( 05 Dec 2024 07:11 )             23.1             12-05    131[L]  |  95[L]  |  31[H]  ----------------------------<  100[H]  4.2   |  25  |  0.9    Ca    8.5      05 Dec 2024 07:11  Mg     1.7     12-05    TPro  5.4[L]  /  Alb  3.0[L]  /  TBili  0.6  /  DBili  x   /  AST  40  /  ALT  10  /  AlkPhos  85  12-05    LIVER FUNCTIONS - ( 05 Dec 2024 07:11 )  Alb: 3.0 g/dL / Pro: 5.4 g/dL / ALK PHOS: 85 U/L / ALT: 10 U/L / AST: 40 U/L / GGT: x             Urinalysis Basic - ( 05 Dec 2024 07:11 )    Color: x / Appearance: x / SG: x / pH: x  Gluc: 100 mg/dL / Ketone: x  / Bili: x / Urobili: x   Blood: x / Protein: x / Nitrite: x   Leuk Esterase: x / RBC: x / WBC x   Sq Epi: x / Non Sq Epi: x / Bacteria: x              RADIOLOGY & ADDITIONAL TESTS:

## 2024-12-05 NOTE — DISCHARGE NOTE NURSING/CASE MANAGEMENT/SOCIAL WORK - NSDCVIVACCINE_GEN_ALL_CORE_FT
Tdap; 23-Nov-2024 12:47; Raisa Galdamez (MASSIMO); [employer]; [id number] (Exp. Date: 31-Aug-2026); IntraMuscular; Deltoid Right.; 0.5 milliLiter(s); VIS (VIS Published: 09-May-2013, VIS Presented: 23-Nov-2024);

## 2024-12-05 NOTE — DISCHARGE NOTE NURSING/CASE MANAGEMENT/SOCIAL WORK - PATIENT PORTAL LINK FT
You can access the FollowMyHealth Patient Portal offered by [facility] by registering at the following website: [email or web address]. By joining [facility]’s FollowMyHealth portal, you will also be able to view your health information using other applications (apps) compatible with our system.

## 2024-12-05 NOTE — DISCHARGE NOTE PROVIDER - NSDCMRMEDTOKEN_GEN_ALL_CORE_FT
Calcium 600+D 600 mg-200 intl units oral tablet: 1 tab(s) orally 2 times a day  enoxaparin: 40 milligram(s) subcutaneous once a day  famotidine 20 mg oral tablet: 1 tab(s) orally 2 times a day  folic acid 1 mg oral tablet: 1 tab(s) orally 2 times a day  Low Dose ASA 81 mg oral tablet: 1 tab(s) orally once a day  morphine: 2 milligram(s) intravenous every 4 hours as needed for  moderate pain  ofloxacin 0.3% ophthalmic solution: 1 drop(s) to each affected eye 3 times a day  oxyCODONE 10 mg oral tablet: 1 tab(s) orally every 4 hours As needed Severe Pain (7 - 10)  rosuvastatin 10 mg oral tablet: 1 tab(s) orally once a day  senna leaf extract oral tablet: 2 tab(s) orally once a day (at bedtime)  Tylenol 325 mg oral tablet: 2 tab(s) orally every 6 hours as needed for  mild pain  Vitamin C: 1000 milligram(s) orally once a day

## 2024-12-05 NOTE — H&P ADULT - NSHPLABSRESULTS_GEN_ALL_CORE
12-05    131[L]  |  95[L]  |  31[H]  ----------------------------<  100[H]  4.2   |  25  |  0.9    Ca    8.5      05 Dec 2024 07:11  Mg     1.7     12-05    TPro  5.4[L]  /  Alb  3.0[L]  /  TBili  0.6  /  DBili  x   /  AST  40  /  ALT  10  /  AlkPhos  85  12-05                            7.5    9.03  )-----------( 220      ( 05 Dec 2024 07:11 )             23.1    CT femur: Acute comminuted right femoral distal metadiaphyseal fracture with volar apex angulation and 1.5 cm posterior displacement. Small knee joint effusion.  CT head: No acute process seen 12-05    131[L]  |  95[L]  |  31[H]  ----------------------------<  100[H]  4.2   |  25  |  0.9    Ca    8.5      05 Dec 2024 07:11  Mg     1.7     12-05    TPro  5.4[L]  /  Alb  3.0[L]  /  TBili  0.6  /  DBili  x   /  AST  40  /  ALT  10  /  AlkPhos  85  12-05                            7.5    9.03  )-----------( 220      ( 05 Dec 2024 07:11 )             23.1    CT of R femur: Acute comminuted right femoral distal metadiaphyseal fracture with volar apex angulation and 1.5 cm posterior displacement. Small knee joint effusion.  CT head: No acute process seen

## 2024-12-05 NOTE — PROGRESS NOTE ADULT - ASSESSMENT
84-year-old female past med history of GERD, hypertension, osteoarthritis, rheumatoid arthritis, breast cancer in remission since 2015, past surgical history of bilateral hip replacements, and left knee replacement presenting for a fall.    Mechanical Fall s  Inter prosthetic distal femur fracture  - s/p OR 12/2- Right femur open reduction internal fixation  - pain rx  - incentive spirometer  - for 4A adm  - DVT prophylaxis    Nasal Fractures from prior fall    GERD  - Famotidine 20 bid     Hypertension  - Losartan 25mg od  - HCT 50mg od     Osteoarthritis / Rheumatoid arthritis  - on Prednisone 10mg od     Anxiety, Depression  - on Sertraline at home  - resume      hx of Breast Cancer  -  in remission     DVT PPX:   ACTIVITY: bed rest   CODE STATUS: full     Will follow on rehab floor

## 2024-12-05 NOTE — DISCHARGE NOTE PROVIDER - NSDCCPCAREPLAN_GEN_ALL_CORE_FT
PRINCIPAL DISCHARGE DIAGNOSIS  Diagnosis: Other closed fracture of distal end of right femur  Assessment and Plan of Treatment: Fracture  A fracture is a break in one of your bones. This can occur from a variety of injuries, especially traumatic ones. Symptoms include pain, bruising, or swelling. Do not use the injured limb. If a fracture is in one of the bones below your waist, do not put weight on that limb unless instructed to do so by your healthcare provider. Crutches or a cane may have been provided. A splint or cast may have been applied by your health care provider. Make sure to keep it dry and follow up with an orthopedist as instructed.  SEEK IMMEDIATE MEDICAL CARE IF YOU HAVE ANY OF THE FOLLOWING SYMPTOMS: numbness, tingling, increasing pain, or weakness in any part of the injured limb.  Follow up with your orthopedist      SECONDARY DISCHARGE DIAGNOSES  Diagnosis: Fall at home  Assessment and Plan of Treatment:

## 2024-12-05 NOTE — H&P ADULT - HISTORY OF PRESENT ILLNESS
83 yo F with PMHx of GERD, HTN, OA s/p bilateral TKA and ROHIT, s/p L forefoot exostosis removal (11/2024) RA, and hx of breast cancer (in remission since 2015) who presents to the hospital after a fall. Around 1000, patient was seated in a chair and reaching for something on another chair when she lost her balance and fell onto her R side and hit her head. She was unable to get up from the ground and was lying on the floor for ~8 hours until her friend found her. +HS. -LOC. -AC. Subsequently, she had RLE pain. Of note, patient fell forwards two days prior and hit her face with subsequent bruising. Seen in ED at the time without any significant injuries found. No dizziness/lightheadedness associated with either fall. On admission, imaging showed acute comminuted right femoral distal metadiaphyseal fracture with volar apex angulation and 1.5 cm posterior displacement and small knee joint effusion. She underwent a R femur ORIF (12/2). Weightbearing status WBAT. During hospital course, patient had post-op blood loss anemia. Last H/H 7.5/23.1.    Imaging:  CT femur: Acute comminuted right femoral distal metadiaphyseal fracture with volar apex angulation and 1.5 cm posterior displacement. Small knee joint effusion.  CT head: No acute process seen    During their stay, the patient was evaluated by physical and occupational therapy. Prior to admission, patient was independent with ADLs, dependent on some iADLs (grocery, laundry), and ambulates with walker/cane occasionally The most recent evaluated functional status is max A with transfers, ambulates 1 side step by b/s secondary to decreased weight bearing tolerance on RLE and fear of falling, standby assist for UB dressing, dependent for LB dressing. Patient was deemed a good acute rehab candidate due to decline in functional status and ability to carry out activities of daily living. Patient is able to tolerate 3 hours of therapy 5-7 days a week and is motivated to participate.    The patient was evaluated by the PM&R team once medically stable. The patient was found to have functional limitations in terms of physical strength/mobility and ability to carry out ADLs (self care, transfers, ambulation). Patient admitted to   12/5 for rehabilitation of R femur fx, s/p ORIF with decline in ADLs.    Living Situation: resides in  with 1 FOS to bedroom (uses stair lift) and 3 steps to enter  PLOF:  independent with ADLs, dependent on some iADLs (grocery, laundry), and ambulates with walker/cane occasionally  CLOF:  max A with transfers, ambulates 1 side step by b/s secondary to decreased weight bearing tolerance on RLE and fear of falling, standby assist for UB dressing, dependent for LB dressing.     85 yo F with PMHx of GERD, HTN, OA s/p bilateral TKA and ROHIT, s/p L forefoot exostosis removal (11/2024) RA, and hx of breast cancer (in remission since 2015) who presents to the hospital after a fall. Around 1000, patient was seated in a chair and reaching for something on another chair when she lost her balance and fell onto her R side and hit her head. She was unable to get up from the ground and was lying on the floor for ~8 hours until her friend found her. +HS. -LOC. -AC. Subsequently, she had RLE pain. Of note, patient fell forwards two days prior and hit her face with subsequent bruising. Seen in ED at the time without any significant injuries found. No dizziness/lightheadedness associated with either fall. On admission, imaging showed acute comminuted right femoral distal metadiaphyseal fracture with volar apex angulation and 1.5 cm posterior displacement and small knee joint effusion. She underwent a R femur ORIF (12/2). Weightbearing status WBAT. During hospital course, patient had post-op blood loss anemia. Last H/H 7.5/23.1.    Imaging:  CT of R femur: Acute comminuted right femoral distal metadiaphyseal fracture with volar apex angulation and 1.5 cm posterior displacement. Small knee joint effusion.  CT head: No acute process seen    During their stay, the patient was evaluated by physical and occupational therapy. Prior to admission, patient was independent with ADLs, dependent on some iADLs (grocery, laundry), and ambulates with walker/cane occasionally The most recent evaluated functional status is max A with transfers, ambulates 1 side step by b/s secondary to decreased weight bearing tolerance on RLE and fear of falling, standby assist for UB dressing, dependent for LB dressing. Patient was deemed a good acute rehab candidate due to decline in functional status and ability to carry out activities of daily living. Patient is able to tolerate 3 hours of therapy 5-7 days a week and is motivated to participate.    The patient was evaluated by the PM&R team once medically stable. The patient was found to have functional limitations in terms of physical strength/mobility and ability to carry out ADLs (self care, transfers, ambulation). Patient admitted to   12/5 for rehabilitation of R femur fx, s/p ORIF with decline in ADLs.    Living Situation: resides in  with 1 FOS to bedroom (uses stair lift) and 3 steps to enter  PLOF:  independent with ADLs, dependent on some iADLs (grocery, laundry), and ambulates with walker/cane occasionally  CLOF:  max A with transfers, standby assist for UB dressing, dependent for LB dressing, and ambulates 1 side step by b/s secondary to decreased weight bearing tolerance on RLE and fear of falling,

## 2024-12-05 NOTE — H&P ADULT - ATTENDING COMMENTS
I reviewed the chart and examined the patient with the resident and we discussed the findings and treatment plan.  The patient is tolerating the bedside rehab program well. I agree with the findings and treatment plan above, which I modified as indicated. The patient requires 3 hrs a day of acute inpatient rehab. Patient with RA and OA, s/p bilateral hip and knee replacements, s/p recent left heel spur resection - WBAT, admitted to [facility] s/p mechanical fall and found to have a displaced, comminuted right distal femur fracture. She underwent ORIF and is cleared for WBAT. Her course was complicated by anemia, fever, and hyponatremia. PTA, she was ambulatory with a cane, using a walker for the past 2 weeks s/p podiatric surgery, and was independent in dressing and basic self care skills. She now requires max assist to stand and take side steps and for ADLs. She is motivated to perform acute rehab and return home. The patient requires acute interdisciplinary rehab including PT and OT to maximize function for safe d/c home in a reasonable time. The patient can tolerate and benefit from 3 hrs daily therapy and requires Physiatry f/u at least 3 days a week to manage her pain and her multiple recent injuries including her right femur fracture, recent head trauma and left foot surgery in a patient with RA/ OA with MTx currently on hold, and to monitor her multiple comorbidities including acute blood loss anemia and hyponatremia and HTN.    I read, edited and agree with the physical exam above and assessment:  #S/P right distal femur fracture/ ORIF  -CTH shows no acute findings.  -CT Right femur shows acute comminuted right femoral distal metadiaphyseal fracture with volar apex angulation and 1.5 cm posterior displacement and small knee joint effusion.  -s/p subsequent R femur ORIF (12/2).   -Weight bearing status WBAT.  -Per Ortho, DVT ppx x6 weeks post-op  -c/w ASA 81mg QD  - Pain control: oxycodone 2.5, 5, and 10mg prn, Tylenol  -Will adjust pain medications to optimize pain control  -c/w Calcium 600+D 600 mg-200 tablet: BID  -PT/ OT    #s/p recent fall last week with HT and bilateral facial contusions/ ecchymosis/ no LOC  - monitor    #Recent left heel spur resection  - WBAT  - limiting ambulation  - to wear cast-boot for longer distances as per patient    #Post-op acute blood loss anemia  -Baseline hgb ~10. Last H/H 7.5/23.1  -c/w H/H monitoring and hemodynamic status monitoring - recheck in am  -Transfuse if hgb<7.    #Hypomagnesemia  -Mg 1.7 (12/5)  -c/w magnesium supplement as needed    #Azotemia  - recheck in am    #Hyponatremia  -Na 131 (12/5)  - on regular salt diet  - repeat with serum osms in am  -will continue to monitor     #HTN  -continue to monitor hemodynamic status   -will adjust medications as needed    #HLD  -c/w rosuvastatin 10mg QD     #GERD  - c/w famotidine 20mg  BID    #OA  #RA  - s/p bilat THR/ TKR  - Previously on methotrexate.   -Not on medication currently  - Follows up outpatient with rheumatology  - monitor for exacerbation now off Methotrexate    #Anxiety/depression  - Previously on sertraline  - Will monitor

## 2024-12-05 NOTE — H&P ADULT - ASSESSMENT
Assessment:   [...]    Prior to hospitalization he  was independent in all activities with his cane. Currently he  needs assist with all ADLs and ambulate short distance with walker with assist.     Plan:    #Rehab of (from attending consult note)…….. with impairment in mobility and ADLs and iADLs.The patient presented with co-morbidities requiring close physiatry follow-up at least 3 times a week to monitor his progress and monitor his comorbidities including ……… .The patient's co-morbidities do not limit the patient's ability to participate in rehabilitative therapy. The patient was seen by PT/OT and  required (write PT/OT eval).. The patient was previously (PLOF)and now presents with functional decline. The patient can benefit from and tolerate 3 hours of restorative therapy daily. The patient is an excellent acute inpatient rehabilitation candidate.     #Other medical conditions      84-year-old female past med history of GERD, hypertension, osteoarthritis, rheumatoid arthritis, breast cancer in remission since 2015, past surgical history of bilateral hip replacements, and left knee replacement presenting for a fall.    #Mechanical Fall   #Inter prosthetic distal femur fracture  - CT femur: Acute comminuted right femoral distal metadiaphyseal fracture with volar apex angulation and 1.5 cm posterior displacement. Small knee joint effusion.  - CT head: IMPRESSION: No acute process seen  - s/p OR 12/2--- > Right femur open reduction internal fixation. Will need DVT proph for 6 weeks   - Pain control   -PT/ OT- Dc to rehab  - Trend Hgb    #GERD  - famotidine 20 bid       #Hypertension  - losartan 25mg od  - HCT 50mg od     #osteoarthritis  #rheumatoid arthritis  - on prednisone 10mg od     #Anxiety/depression  - on sertraline  - qt 500  - will hold for now       #breast cancer  -  in remission     DVT PPX: Lovenox  ACTIVITY: WBAT RLE  CODE STATUS: full   Pending : Dispo planning      -Pain control:     -GI/Bowel Mgmt: Miralax, Senna    -Bladder management: [N/A]     -Skin:  [No active issues at this time  Skin breakdown  PU]    -FEN : [Monitor Na levels vs N/A]    - Diet: [Diet, Minced and Moist:   DASH/TLC Sodium & Cholesterol Restricted  Mildly Thick Liquids (MILDTHICKLIQS) (01-27-23 @ 13:02) [Hold]]      Precautions / PROPHYLAXIS:      - [Falls, sensory]      - DVT prophylaxis: [  ]      MEDICAL PROGNOSIS: GOOD            REHAB POTENTIAL: GOOD             ESTIMATED DISPOSITION: HOME WITH HOME CARE       [ x ]  The goals of the IRF admission were discussed with the patient and family member, who agreed             ELOS:  [  x   ] 7-14    [   ]  14-21    [    ]  Other    THERAPY ORDERS and INITIAL INDIVIDUALIZED PLAN OF CARE:  This initial individualized interdisciplinary plan of care, which was established by me (the attending physiatrist), is based on elements from the post admission evaluation. The interdisciplinary therapy program is to be at least 3 hrs a day, at least 5 days per week from from physical, occupational and/ or speech therapies as ordered by me below.      [ x  ] P.T. 90 mins. /day at least 5 out of 7 days:  [  x ] superficial  modalities prn, [ x  ] A/AAROM, [ x  ] PREs, [ x  ] transfer training,            [ x  ] progressive ambulation, [x   ] stairs                                               [ x  ] O.T. 90 mins. /day at least 5 out of 7 days::  [ x  ] modalities prn,  [ x  ]A/AAROM, [ x  ] PREs, [  x ] functional transfer training, [ x  ] ADL training           [ x  ] cognitive/ perceptual eval and training, [   ] splint eval                                                  [ x  ] S.L.P:  [ x  ] speech eval, [ x  ] swallow eval     [ x  ] Neuropsychology eval     [ x  ] Individualized rec. therapy      RATIONALE FOR INPATIENT ADMISSION - Patient demonstrates the following: (check all that apply)  [X] Medically appropriate for acute rehabilitation admission. Requires interdisiplinary therapy consisting of at least PT and OT, at least 3 hrs. a day at least 5 days a week  [X] Has attainable rehab goals with an appropriate initial discharge plan  [X] Has rehabilitation potential (expected to make a significant improvement within a reasonable period of time)  [X] Requires close medical management by a rehab physician, rehab nursing care,  and comprehensive interdisciplinary team (including PT, OT)    [X] Requires evaluation by a physiatrist at least 3 days a week to evaluate and manage and coordinate rehab and medical problems   Assessment:   85 yo F with PMHx of GERD, HTN, OA s/p bilateral TKA and ROHIT, s/p L forefoot exostosis removal (11/2024), RA (previously on methotrexate), and hx of breast cancer (in remission since 2015) who presented to the hospital after a fall and found to have comminuted right femoral distal metadiaphyseal fracture with volar apex angulation and 1.5 cm posterior displacement and small knee joint effusion. She underwent a R femur ORIF (12/2). Weightbearing status WBAT.    Prior to hospitalization she was independent with all her ADLs and some iADLs (dependent on laundry and grocery) and ambulated with cane/walker occassionally. Currently she is max A with transfers, standby assist for UB dressing, dependent for LB dressing, and ambulates 1 side step by b/s secondary to decreased weight bearing tolerance on RLE and fear of falling,     Plan:    #Rehab of Right femur fx s/p ORIF with impairment in mobility and ADLs and iADLs. The patient presented with co-morbidities requiring close physiatry follow-up at least 3 times a week to monitor his progress and monitor his comorbidities including HTN, OA s/p TKA and ROHIT, RA, and s/p L forefoot exostosis removal (11/2024).The patient's co-morbidities do not limit the patient's ability to participate in rehabilitative therapy. The patient was seen by PT/OT and  required max A with transfers, standby assist for UB dressing, dependent for LB dressing, and ambulates 1 side step by b/s secondary to decreased weight bearing tolerance on RLE and fear of falling, The patient was previously independent with all her ADLs and some iADLs (dependent on laundry and grocery) and ambulated with cane/walker occassionally and now presents with functional decline. The patient can benefit from and tolerate 3 hours of restorative therapy daily. The patient is an excellent acute inpatient rehabilitation candidate.     #S/P mechanical fall   -CTH shows no acute findings.  -CT Right femur shows acute comminuted right femoral distal metadiaphyseal fracture with volar apex angulation and 1.5 cm posterior displacement and small knee joint effusion.  -s/p subsequent R femur ORIF (12/2).   -Weightbearing status WBAT.  -Per Ortho, DVT ppx x6 weeks post-op  -c/w ASA 81mg QD  - Pain control: oxycodone 2.5, 5, and 10mg prn, Tylenol  -Will adjust pain medications to optimize pain control  -c/w Calcium 600+D 600 mg-200 tablet: BID  -PT/ OT    #Post-op acute blood loss anemia  -Baseline hgb ~10. Last H/H 7.5/23.1  -c/w H/H monitoring and hemodynamic status monitoring  -Transfuse if hgb<7.    #Hypomagnesemia  -Mg 1.7 (12/5)  -c/w magnesium supplement as needed    #Hyponatremia  -Na 131 (12/5)  -will continue to monitor and FWR as needed    #HTN  -continue to monitor hemodynamic status   -will adjust medications as needed    #HLD  -c/w rosuvastatin 10mg QD     #GERD  - c/w famotidine 20mg  BID    #OA  #RA  - Previously on methotrexate.   -Not on medication currently  - Follows up outpatient with rheumatology    #Anxiety/depression  - Previously on sertraline  - Will monitor     -Pain control: oxycodone and Tylenol    -GI/Bowel Mgmt: Miralax, Senna    -Bladder management: N/A     -Skin: surgical incision along RLE    -FEN : Monitor Na levels    - Diet: DASH/TLC Sodium & Cholesterol Restricted      Precautions / PROPHYLAXIS:      - Falls      - DVT prophylaxis: lovenox      MEDICAL PROGNOSIS: GOOD            REHAB POTENTIAL: GOOD             ESTIMATED DISPOSITION: HOME WITH HOME CARE       [ x ]  The goals of the IRF admission were discussed with the patient and family member, who agreed             ELOS:  [  x   ] 7-14    [   ]  14-21    [    ]  Other    THERAPY ORDERS and INITIAL INDIVIDUALIZED PLAN OF CARE:  This initial individualized interdisciplinary plan of care, which was established by me (the attending physiatrist), is based on elements from the post admission evaluation. The interdisciplinary therapy program is to be at least 3 hrs a day, at least 5 days per week from from physical, occupational and/ or speech therapies as ordered by me below.      [ x  ] P.T. 90 mins. /day at least 5 out of 7 days:  [  x ] superficial  modalities prn, [ x  ] A/AAROM, [ x  ] PREs, [ x  ] transfer training,            [ x  ] progressive ambulation, [x   ] stairs                                               [ x  ] O.T. 90 mins. /day at least 5 out of 7 days::  [ x  ] modalities prn,  [ x  ]A/AAROM, [ x  ] PREs, [  x ] functional transfer training, [ x  ] ADL training           [ x  ] cognitive/ perceptual eval and training, [   ] splint eval                                                  [   ] S.L.P:  [   ] speech eval, [   ] swallow eval     [   ] Neuropsychology eval     [ x  ] Individualized rec. therapy      RATIONALE FOR INPATIENT ADMISSION - Patient demonstrates the following: (check all that apply)  [X] Medically appropriate for acute rehabilitation admission. Requires interdisiplinary therapy consisting of at least PT and OT, at least 3 hrs. a day at least 5 days a week  [X] Has attainable rehab goals with an appropriate initial discharge plan  [X] Has rehabilitation potential (expected to make a significant improvement within a reasonable period of time)  [X] Requires close medical management by a rehab physician, rehab nursing care,  and comprehensive interdisciplinary team (including PT, OT)    [X] Requires evaluation by a physiatrist at least 3 days a week to evaluate and manage and coordinate rehab and medical problems Assessment:   83 yo F with PMHx of GERD, HTN, OA s/p bilateral TKA and ROHIT, s/p L forefoot exostosis removal (11/2024), RA (previously on methotrexate), and hx of breast cancer (in remission since 2015) who presented to the hospital after a fall and found to have comminuted right femoral distal metadiaphyseal fracture with volar apex angulation and 1.5 cm posterior displacement and small knee joint effusion. She underwent a R femur ORIF (12/2). Weightbearing status WBAT.    Prior to hospitalization she was independent with all her ADLs and some iADLs (dependent on laundry and grocery) and ambulated with cane/walker occassionally. Currently she is max A with transfers, standby assist for UB dressing, dependent for LB dressing, and ambulates 1 side step by b/s secondary to decreased weight bearing tolerance on RLE and fear of falling,     Plan:    #Rehab of Right femur fx s/p ORIF with impairment in mobility and ADLs and iADLs. The patient presented with co-morbidities requiring close physiatry follow-up at least 3 times a week to monitor his progress and monitor his comorbidities including HTN, OA s/p TKA and ROHIT, RA, and s/p L forefoot exostosis removal (11/2024).The patient's co-morbidities do not limit the patient's ability to participate in rehabilitative therapy. The patient was seen by PT/OT and  required max A with transfers, standby assist for UB dressing, dependent for LB dressing, and ambulates 1 side step by b/s secondary to decreased weight bearing tolerance on RLE and fear of falling, The patient was previously independent with all her ADLs and some iADLs (dependent on laundry and grocery) and ambulated with cane/walker occassionally and now presents with functional decline. The patient can benefit from and tolerate 3 hours of restorative therapy daily. The patient is an excellent acute inpatient rehabilitation candidate.     #S/P right distal femur fracture/ ORIF  -CTH shows no acute findings.  -CT Right femur shows acute comminuted right femoral distal metadiaphyseal fracture with volar apex angulation and 1.5 cm posterior displacement and small knee joint effusion.  -s/p subsequent R femur ORIF (12/2).   -Weight bearing status WBAT.  -Per Ortho, DVT ppx x6 weeks post-op  -c/w ASA 81mg QD  - Pain control: oxycodone 2.5, 5, and 10mg prn, Tylenol  -Will adjust pain medications to optimize pain control  -c/w Calcium 600+D 600 mg-200 tablet: BID  -PT/ OT    #s/p recent fall last week with HT and bilateral facial contusions/ ecchymosis/ no LOC  - monitor    #Recent left heel spur resection  - WBAT  - limiting ambulation  - to wear cast-boot for longer distances as per patient    #Post-op acute blood loss anemia  -Baseline hgb ~10. Last H/H 7.5/23.1  -c/w H/H monitoring and hemodynamic status monitoring - recheck in am  -Transfuse if hgb<7.    #Hypomagnesemia  -Mg 1.7 (12/5)  -c/w magnesium supplement as needed    #Azotemia  - recheck in am    #Hyponatremia  -Na 131 (12/5)  - on regular salt diet  - repeat with serum osms in am  -will continue to monitor     #HTN  -continue to monitor hemodynamic status   -will adjust medications as needed    #HLD  -c/w rosuvastatin 10mg QD     #GERD  - c/w famotidine 20mg  BID    #OA  #RA  - s/p bilat THR/ TKR  - Previously on methotrexate.   -Not on medication currently  - Follows up outpatient with rheumatology  - monitor for exacerbation now off Methotrexate    #Anxiety/depression  - Previously on sertraline  - Will monitor     -Pain control: oxycodone and Tylenol    -GI/Bowel Mgmt: Miralax, Senna     -Skin: surgical incision along RLE    - Diet Regular      Precautions / PROPHYLAXIS:      - Falls      - DVT prophylaxis: lovenox      MEDICAL PROGNOSIS: GOOD            REHAB POTENTIAL: GOOD             ESTIMATED DISPOSITION: HOME WITH HOME CARE       [ x ]  The goals of the IRF admission were discussed with the patient member, who agreed             ELOS:  [  x   ] 7-14    [   ]  14-21    [    ]  Other    THERAPY ORDERS and INITIAL INDIVIDUALIZED PLAN OF CARE:  This initial individualized interdisciplinary plan of care, which was established by me (the attending physiatrist), is based on elements from the post admission evaluation. The interdisciplinary therapy program is to be at least 3 hrs a day, at least 5 days per week from from physical, occupational and/ or speech therapies as ordered by me below.      [ x  ] P.T. 90 mins. /day at least 5 out of 7 days:  [  x ] superficial  modalities prn, [ x  ] A/AAROM, [ x  ] PREs, [ x  ] transfer training,            [ x  ] progressive ambulation, [x   ] stairs                                               [ x  ] O.T. 90 mins. /day at least 5 out of 7 days::  [ x  ] modalities prn,  [ x  ]A/AAROM, [ x  ] PREs, [  x ] functional transfer training, [ x  ] ADL training           [ x  ] cognitive/ perceptual eval and training, [   ] splint eval                                                  [   ] S.L.P:  [   ] speech eval, [   ] swallow eval     [   ] Neuropsychology eval     [ x  ] Individualized rec. therapy      RATIONALE FOR INPATIENT ADMISSION - Patient demonstrates the following: (check all that apply)  [X] Medically appropriate for acute rehabilitation admission. Requires interdisiplinary therapy consisting of at least PT and OT, at least 3 hrs. a day at least 5 days a week  [X] Has attainable rehab goals with an appropriate initial discharge plan  [X] Has rehabilitation potential (expected to make a significant improvement within a reasonable period of time)  [X] Requires close medical management by a rehab physician, rehab nursing care,  and comprehensive interdisciplinary team (including PT, OT)    [X] Requires evaluation by a physiatrist at least 3 days a week to evaluate and manage and coordinate rehab and medical problems

## 2024-12-05 NOTE — DISCHARGE NOTE NURSING/CASE MANAGEMENT/SOCIAL WORK - NSDCPEFALRISK_GEN_ALL_CORE
For information on Fall & Injury Prevention, visit: [email or web address] OR  [email or web address] OR  https://www.cdc.gov/steadi/patient.html

## 2024-12-05 NOTE — H&P ADULT - NSICDXPASTSURGICALHX_GEN_ALL_CORE_FT
PAST SURGICAL HISTORY:  H/O vaginal hysterectomy     History of right hip replacement     S/P lumpectomy of breast 2015    S/P total knee replacement, left     Status post cholecystectomy     Status post left hip replacement      [Well Developed] : well developed [Well Nourished] : well nourished [No Acute Distress] : no acute distress [Normal Conjunctiva] : normal conjunctiva [Normal Venous Pressure] : normal venous pressure [No Carotid Bruit] : no carotid bruit [Normal S1, S2] : normal S1, S2 [No Murmur] : no murmur [No Rub] : no rub [No Gallop] : no gallop [Clear Lung Fields] : clear lung fields [Good Air Entry] : good air entry [No Respiratory Distress] : no respiratory distress  [Soft] : abdomen soft [Non Tender] : non-tender [No Masses/organomegaly] : no masses/organomegaly [Normal Bowel Sounds] : normal bowel sounds [Normal Gait] : normal gait [No Edema] : no edema [No Clubbing] : no clubbing [No Cyanosis] : no cyanosis [No Varicosities] : no varicosities [No Rash] : no rash [No Skin Lesions] : no skin lesions [Moves all extremities] : moves all extremities [No Focal Deficits] : no focal deficits [Normal Speech] : normal speech [Alert and Oriented] : alert and oriented [Normal memory] : normal memory

## 2024-12-05 NOTE — DISCHARGE NOTE NURSING/CASE MANAGEMENT/SOCIAL WORK - FINANCIAL ASSISTANCE
[facility] provides services at a reduced cost to those who are determined to be eligible through [facility]’s financial assistance program. Information regarding [facility]’s financial assistance program can be found by going to [email or web address] or by calling [phone].

## 2024-12-05 NOTE — H&P ADULT - NSHPSOCIALHISTORY_GEN_ALL_CORE
Retired. Lives alone in , remote hx of smoking >50 years ago, drinks Etoh sociably, and no illicit drug use.

## 2024-12-05 NOTE — PROGRESS NOTE ADULT - SUBJECTIVE AND OBJECTIVE BOX
SUBJECTIVE/OVERNIGHT EVENTS  Today is hospital day 4d. This morning patient was seen and examined at bedside, resting comfortably in bed. No acute or major events overnight.    HPI:  84-year-old female past med history of GERD, hypertension, osteoarthritis, rheumatoid arthritis, breast cancer in remission since 2015, past surgical history of bilateral hip replacements, and knee replacement presenting for a fall. Hx taken from the patient and is aox3 at time of the interview. Patient states that this morning around 10 am , she was trying to reach out from chair to another chair, while in sitting position, and while stretching she lost her balance and fell to the ground on her right side, hitting her head against the ground. She further states that, after the fall she was unable to get up from the ground and hence she was lying on the floor for many hours ( around 8 hr ) until her friend came to her house who saw her on the ground, and called the 911. After the fall, she is complaining of right lower extremity pain and is unable to move her right leg due to pain. on further inquiry,  patient had a fall the week prior wherein she landed on her face, she was seen in the ED at that time and found no significant injuries and was discharged to home. She has been using a walker since that time for stability. Also of note patient had a L forefoot exostosis removal with podiatry approximately two weeks prior but has been WBAT on that side.  Patient denies headache, loss of consciousness neck pain, dizziness, chest pain, palpitations  shortness of breath, chest pain, nausea, vomiting , urinary symptoms, and other significant complaints.   Patient lives alone with family nearby including a daughter who is a nurse at [employer].    ED VITALS    · BP Systolic	158 mm Hg  · BP Diastolic	 96 mm Hg  · Heart Rate	92 /min  · Respiration Rate (breaths/min)	19 /min  · Temp (F)	97.5 Degrees F  · Temp (C)	36.4 Degrees C  · Temp site	oral  · SpO2 (%)	99 %  · O2 Delivery/Oxygen Delivery Method	room air  · Temp at ED Arrival (C)	36.4 Degrees C    ED LABS    WBC 13K, SODIUM 130, ANION GAP 17, GFR 49, LACTATE 3.2,    (02 Dec 2024 01:13)      MEDICATIONS  STANDING MEDICATIONS  acetaminophen     Tablet .. 975 milliGRAM(s) Oral every 6 hours  chlorhexidine 2% Cloths 1 Application(s) Topical <User Schedule>  enoxaparin Injectable 40 milliGRAM(s) SubCutaneous every 24 hours  magnesium sulfate  IVPB 2 Gram(s) IV Intermittent once  ofloxacin 0.3% Solution 1 Drop(s) Right EYE three times a day  senna 2 Tablet(s) Oral at bedtime    PRN MEDICATIONS  morphine  - Injectable 2 milliGRAM(s) IV Push every 4 hours PRN  oxyCODONE    IR 2.5 milliGRAM(s) Oral every 4 hours PRN  oxyCODONE    IR 5 milliGRAM(s) Oral every 4 hours PRN  oxyCODONE    IR 10 milliGRAM(s) Oral every 4 hours PRN    VITALS  T(F): 98.3 (12-05-24 @ 08:55), Max: 98.3 (12-05-24 @ 08:55)  HR: 93 (12-05-24 @ 05:00) (83 - 94)  BP: 143/74 (12-05-24 @ 08:55) (124/67 - 148/74)  RR: 18 (12-05-24 @ 08:55) (18 - 19)  SpO2: 96% (12-05-24 @ 08:55) (95% - 97%)          PHYSICAL EXAM      GENERAL: No acute distress, well-developed  CHEST/LUNG: Clear to auscultation bilaterally; No wheeze, equal breath sounds bilaterally, respirations nonlabored  HEART: Regular rate and rhythm; No murmurs, rubs, or gallops  ABDOMEN: Soft, nontender, nondistended; Bowel sounds present, no organomegaly  NEUROLOGY: AAOx3, non-focal, moves all extremities spontaneously          PAST MEDICAL & SURGICAL HISTORY:  Rheumatoid arthritis      HTN (hypertension)      Breast cancer  last radiation 2015      Depression      Chronic GERD      OA (osteoarthritis)      Rheumatoid arthritis      History of right hip replacement      Status post left hip replacement      S/P total knee replacement, left      H/O vaginal hysterectomy      Status post cholecystectomy      S/P lumpectomy of breast  2015            LABS             7.5    9.03  )-----------( 220      ( 12-05-24 @ 07:11 )             23.1     131  |  95  |  31  -------------------------<  100   12-05-24 @ 07:11  4.2  |  25  |  0.9    Ca      8.5     12-05-24 @ 07:11  Mg     1.7     12-05-24 @ 07:11    TPro  5.4  /  Alb  3.0  /  TBili  0.6  /  DBili  x   /  AST  40  /  ALT  10  /  AlkPhos  85  /  GGT  x     12-05-24 @ 07:11        Urinalysis Basic - ( 05 Dec 2024 07:11 )    Color: x / Appearance: x / SG: x / pH: x  Gluc: 100 mg/dL / Ketone: x  / Bili: x / Urobili: x   Blood: x / Protein: x / Nitrite: x   Leuk Esterase: x / RBC: x / WBC x   Sq Epi: x / Non Sq Epi: x / Bacteria: x          IMAGING

## 2024-12-05 NOTE — PROGRESS NOTE ADULT - ASSESSMENT
84-year-old female past med history of GERD, hypertension, osteoarthritis, rheumatoid arthritis, breast cancer in remission since 2015, past surgical history of bilateral hip replacements, and left knee replacement presenting for a fall.    #Mechanical Fall   #Inter prosthetic distal femur fracture  - CT femur: Acute comminuted right femoral distal metadiaphyseal fracture with volar apex angulation and 1.5 cm posterior displacement. Small knee joint effusion.  - CT head: IMPRESSION: No acute process seen  - s/p OR 12/2--- > Right femur open reduction internal fixation. Will need DVT proph for 6 weeks   - Pain control   -PT/ OT- Dc to rehab  - Trend Hgb    #GERD  - famotidine 20 bid       #Hypertension  - losartan 25mg od  - HCT 50mg od     #osteoarthritis  #rheumatoid arthritis  - on prednisone 10mg od     #Anxiety/depression  - on sertraline  - qt 500  - will hold for now       #breast cancer  -  in remission     DVT PPX: Lovenox  ACTIVITY: WBAT RLE  CODE STATUS: full   Pending : Dispo planning

## 2024-12-05 NOTE — H&P ADULT - NSHPPHYSICALEXAM_GEN_ALL_CORE
Vital Signs Last 24 Hrs  T(C): 36.8 (05 Dec 2024 08:55), Max: 36.8 (05 Dec 2024 05:00)  T(F): 98.3 (05 Dec 2024 08:55), Max: 98.3 (05 Dec 2024 08:55)  HR: 93 (05 Dec 2024 05:00) (83 - 94)  BP: 143/74 (05 Dec 2024 08:55) (124/67 - 148/74)  RR: 18 (05 Dec 2024 08:55) (18 - 19)  SpO2: 96% (05 Dec 2024 08:55) (95% - 97%)    General: Resting in bed with slight discomfort due to bed ulcer                              HEENT: [   ] NC/AT, EOMI,  Normal Conjunctivae,   [  x ] other: bilateral cheek facial abrasion, normal conjunctivae, EOMI  Cardio: [  x ] RRR, no murmur,   [   ] other:                              Pulm: [ x  ] No Respiratory Distress,  Lungs CTAB,   [   ] other:             Abdomen: [ x  ]ND/NT, Soft,   [   ] other:    : [ x  ] No martins catheter, [   ] Martins catheter present [  ] other:   MSK: [  ] No joint swelling,  [ x  ] other:  RLE joint swelling                      Ext: [ x  ]No C/C/E, No calf tenderness,   [   ]other:    Skin: [   ]intact,   [ x  ] other: RLE incision site    Neurological Examination:  Cognitive: [  x  ] AAO x 3,   [  ]  other:       Attention:  [ x  ] intact,   [    ]  other:   Language: [ x ] intact  [  ]    Memory: [ x  ] intact,    [  ]  other:     Mood/Affect: [  x ] wnl  [    ]  other:                                                                             Communication: [x   ]Fluent, no dysarthria [    ] other:   CN II - XII:  [ x   ] intact,  [    ] other:                                                                                         Motor:   RUE: 5/5 shoulder abduction, 5/5 elbow flexion, 5/5 elbow extension, 5/5 finger flexion  LUE: 5/5 shoulder abduction, 5/5 elbow flexion, 5/5 elbow extension, 5/5 finger flexion  RLE: 2/5 hip flexion (within restricted ROM), 2/5 knee ext/flex (within restricted ROM), 4+/5 plantarflexion/dorsiflexion   LLE: 5/5 hip flexion, 5/5 knee ext/flex, 4+/5 dorsiflexion/plantarflexion     Tone: [  x ] wnl,   [    ]  other:  DTRs: [ x ]symmetric, [   ] other:  Coordination:   [ x ] intact,   [  ] other:                                                                        Sensory: [ x   ] Intact to light touch  [  ] other: Vital Signs Last 24 Hrs  T(C): 36.8 (05 Dec 2024 08:55), Max: 36.8 (05 Dec 2024 05:00)  T(F): 98.3 (05 Dec 2024 08:55), Max: 98.3 (05 Dec 2024 08:55)  HR: 93 (05 Dec 2024 05:00) (83 - 94)  BP: 143/74 (05 Dec 2024 08:55) (124/67 - 148/74)  RR: 18 (05 Dec 2024 08:55) (18 - 19)  SpO2: 96% (05 Dec 2024 08:55) (95% - 97%)    General: Resting in bed with slight discomfort due to bed ulcer                              HEENT: [   ] NC/AT, EOMI,  Normal Conjunctivae,   [  x ] other: bilateral cheek facial abrasion, normal conjunctivae, EOMI  Cardio: [  x ] RRR, no murmur,   [   ] other:                              Pulm: [ x  ] No Respiratory Distress,  Lungs CTAB,   [   ] other:             Abdomen: [ x  ]ND/NT, Soft,   [   ] other:    : [ x  ] No martins catheter, [   ] Martins catheter present [  ] other:   MSK: [  ] No joint swelling,  [ x  ] other:  RLE/ knee joint swelling                      Ext: [ x  ]No C/C, No calf tenderness,   [  x ]other:  swelling of MCPs, no warmth  Skin: [   ]intact,   [ x  ] other: RLE incision site with post op dressing, C&D    Neurological Examination:  Cognitive: [  x  ] AAO x 3,   [  ]  other:       Attention:  [ x  ] intact,   [    ]  other:   Language: [ x ] intact  [  ]    Memory: [ x  ] intact,    [  ]  other:     Mood/Affect: [  x ] wnl  [    ]  other:                                                                             Communication: [x   ]Fluent, no dysarthria [    ] other:   CN II - XII:  [ x   ] intact,  [    ] other:                                                                                         Motor:   RUE: 5/5 shoulder abduction, 5/5 elbow flexion, 5/5 elbow extension, 5/5 finger flexion  LUE: 5/5 shoulder abduction, 5/5 elbow flexion, 5/5 elbow extension, 5/5 finger flexion  RLE: 2/5 hip flexion (within restricted ROM), 2+/5 knee ext/flex (within restricted ROM), 4+/5 plantarflexion/dorsiflexion   LLE: 5/5 hip flexion, 5/5 knee ext/flex, 4+/5 dorsiflexion/plantarflexion     Tone: [  x ] wnl,   [    ]  other:  DTRs: [ x ]symmetric, [   ] other:  Coordination:   [ x ] intact,   [  ] other:                                                                        Sensory: [ x   ] Intact to light touch  [  ] other:

## 2024-12-05 NOTE — PROGRESS NOTE ADULT - PROVIDER SPECIALTY LIST ADULT
Internal Medicine
Orthopedics
Internal Medicine
Orthopedics
Orthopedics
Internal Medicine

## 2024-12-05 NOTE — H&P ADULT - NSHPREVIEWOFSYSTEMS_GEN_ALL_CORE
Review of Systems:   Constitutional:    [x ] WNL           [   ] poor appetite   [ ] insomnia   [  ] tired   Cardio:           [x ] WNL           [   ] CP              [ ] MOJICA        [  ] palpitations               Resp:             [x ] WNL           [   ] SOB             [ ] cough      [  ] wheezing   GI:               [ x] WNL           [   ] constipation    [ ] diarrhea   [  ] abdominal pain       [ ] nausea   [  ] emesis                                :               [ x] WNL           [   ] BENNETT           [ ] dysuria    [  ] difficulty voiding   [ ] Other:    Endo:             [ x] WNL           [   ] polyuria        [ ] temperature intolerance                 Skin:             [ ] WNL           [   ] pain            [x ] incision-right LLE with dressing applied   [ x ] rash-ecchymosis along bilateral facial cheeks   MSK:              [ ] WNL           [ x  ] muscle pain     [x ] joint pain [x ] muscle tenderness   [x ] swelling    Neuro:            [ ] WNL           [   ] HA              [ ] change in vision   [   ] tremor   [x ] weakness [  ] dysphagia    Cognitive:        [x ] WNL           [   ] confusion      Psych:            [x ] WNL           [   ] hallucinations   [   ]agitation   [   ] delusion   [   ]depression Review of Systems:   Constitutional:    [x ] WNL           [   ] poor appetite   [ ] insomnia   [  ] tired   Cardio:           [x ] WNL           [   ] CP              [ ] MOJICA        [  ] palpitations               Resp:             [x ] WNL           [   ] SOB             [ ] cough      [  ] wheezing   GI:               [ x] WNL           [   ] constipation    [ ] diarrhea   [  ] abdominal pain       [ ] nausea   [  ] emesis                                :               [ x] WNL           [   ] BENNETT           [ ] dysuria    [  ] difficulty voiding   [ ] Other:    Endo:             [ x] WNL           [   ] polyuria        [ ] temperature intolerance                 Skin:             [ ] WNL           [   ] pain            [x ] incision-right LE with dressing applied   [ x ] rash-ecchymosis along bilateral facial cheeks   MSK:              [ ] WNL           [ x  ] muscle pain     [x ] joint pain [x ] muscle tenderness   [x ] swelling RLE   Neuro:            [ ] WNL           [   ] HA              [ ] change in vision   [   ] tremor   [x ] weakness [  ] dysphagia    Cognitive:        [x ] WNL           [   ] confusion      Psych:            [x ] WNL           [   ] hallucinations   [   ]agitation   [   ] delusion   [   ]depression

## 2024-12-05 NOTE — H&P ADULT - NSICDXFAMILYHX_GEN_ALL_CORE_FT
FAMILY HISTORY:  FH: cancer, Nonhodgkins : sister    Sibling  Still living? Unknown  FH: lymphoma, Age at diagnosis: Age Unknown

## 2024-12-06 ENCOUNTER — APPOINTMENT (OUTPATIENT)
Dept: PODIATRY | Facility: CLINIC | Age: 84
End: 2024-12-06

## 2024-12-06 LAB
ALBUMIN SERPL ELPH-MCNC: 2.9 G/DL — LOW (ref 3.5–5.2)
ALP SERPL-CCNC: 94 U/L — SIGNIFICANT CHANGE UP (ref 30–115)
ALT FLD-CCNC: 10 U/L — SIGNIFICANT CHANGE UP (ref 0–41)
ANION GAP SERPL CALC-SCNC: 10 MMOL/L — SIGNIFICANT CHANGE UP (ref 7–14)
AST SERPL-CCNC: 31 U/L — SIGNIFICANT CHANGE UP (ref 0–41)
BASOPHILS # BLD AUTO: 0.03 K/UL — SIGNIFICANT CHANGE UP (ref 0–0.2)
BASOPHILS NFR BLD AUTO: 0.4 % — SIGNIFICANT CHANGE UP (ref 0–1)
BILIRUB SERPL-MCNC: 0.5 MG/DL — SIGNIFICANT CHANGE UP (ref 0.2–1.2)
BLD GP AB SCN SERPL QL: SIGNIFICANT CHANGE UP
BUN SERPL-MCNC: 22 MG/DL — HIGH (ref 10–20)
CALCIUM SERPL-MCNC: 8.4 MG/DL — SIGNIFICANT CHANGE UP (ref 8.4–10.4)
CHLORIDE SERPL-SCNC: 95 MMOL/L — LOW (ref 98–110)
CO2 SERPL-SCNC: 26 MMOL/L — SIGNIFICANT CHANGE UP (ref 17–32)
CREAT SERPL-MCNC: 0.8 MG/DL — SIGNIFICANT CHANGE UP (ref 0.7–1.5)
EGFR: 72 ML/MIN/1.73M2 — SIGNIFICANT CHANGE UP
EOSINOPHIL # BLD AUTO: 0.09 K/UL — SIGNIFICANT CHANGE UP (ref 0–0.7)
EOSINOPHIL NFR BLD AUTO: 1.2 % — SIGNIFICANT CHANGE UP (ref 0–8)
GLUCOSE SERPL-MCNC: 108 MG/DL — HIGH (ref 70–99)
HCT VFR BLD CALC: 23.6 % — LOW (ref 37–47)
HGB BLD-MCNC: 7.6 G/DL — LOW (ref 12–16)
IMM GRANULOCYTES NFR BLD AUTO: 2.9 % — HIGH (ref 0.1–0.3)
LYMPHOCYTES # BLD AUTO: 1.3 K/UL — SIGNIFICANT CHANGE UP (ref 1.2–3.4)
LYMPHOCYTES # BLD AUTO: 17.8 % — LOW (ref 20.5–51.1)
MAGNESIUM SERPL-MCNC: 1.7 MG/DL — LOW (ref 1.8–2.4)
MCHC RBC-ENTMCNC: 31.3 PG — HIGH (ref 27–31)
MCHC RBC-ENTMCNC: 32.2 G/DL — SIGNIFICANT CHANGE UP (ref 32–37)
MCV RBC AUTO: 97.1 FL — SIGNIFICANT CHANGE UP (ref 81–99)
MONOCYTES # BLD AUTO: 0.54 K/UL — SIGNIFICANT CHANGE UP (ref 0.1–0.6)
MONOCYTES NFR BLD AUTO: 7.4 % — SIGNIFICANT CHANGE UP (ref 1.7–9.3)
NEUTROPHILS # BLD AUTO: 5.12 K/UL — SIGNIFICANT CHANGE UP (ref 1.4–6.5)
NEUTROPHILS NFR BLD AUTO: 70.3 % — SIGNIFICANT CHANGE UP (ref 42.2–75.2)
NRBC # BLD: 0 /100 WBCS — SIGNIFICANT CHANGE UP (ref 0–0)
PLATELET # BLD AUTO: 238 K/UL — SIGNIFICANT CHANGE UP (ref 130–400)
PMV BLD: 9.7 FL — SIGNIFICANT CHANGE UP (ref 7.4–10.4)
POTASSIUM SERPL-MCNC: 3.9 MMOL/L — SIGNIFICANT CHANGE UP (ref 3.5–5)
POTASSIUM SERPL-SCNC: 3.9 MMOL/L — SIGNIFICANT CHANGE UP (ref 3.5–5)
PROT SERPL-MCNC: 5.4 G/DL — LOW (ref 6–8)
RBC # BLD: 2.43 M/UL — LOW (ref 4.2–5.4)
RBC # FLD: 14.6 % — HIGH (ref 11.5–14.5)
SODIUM SERPL-SCNC: 131 MMOL/L — LOW (ref 135–146)
WBC # BLD: 7.29 K/UL — SIGNIFICANT CHANGE UP (ref 4.8–10.8)
WBC # FLD AUTO: 7.29 K/UL — SIGNIFICANT CHANGE UP (ref 4.8–10.8)

## 2024-12-06 PROCEDURE — 93970 EXTREMITY STUDY: CPT | Mod: 26

## 2024-12-06 RX ORDER — ACETAMINOPHEN 80 MG/.8ML
650 SOLUTION/ DROPS ORAL EVERY 6 HOURS
Refills: 0 | Status: DISCONTINUED | OUTPATIENT
Start: 2024-12-06 | End: 2024-12-24

## 2024-12-06 RX ORDER — METOPROLOL TARTRATE 50 MG
25 TABLET ORAL AT BEDTIME
Refills: 0 | Status: DISCONTINUED | OUTPATIENT
Start: 2024-12-06 | End: 2024-12-16

## 2024-12-06 RX ORDER — MAGNESIUM SULFATE 500 MG/ML
2 INJECTION, SOLUTION INTRAMUSCULAR; INTRAVENOUS ONCE
Refills: 0 | Status: COMPLETED | OUTPATIENT
Start: 2024-12-06 | End: 2024-12-06

## 2024-12-06 RX ORDER — BISACODYL 5 MG
10 TABLET, DELAYED RELEASE (ENTERIC COATED) ORAL ONCE
Refills: 0 | Status: COMPLETED | OUTPATIENT
Start: 2024-12-06 | End: 2024-12-06

## 2024-12-06 RX ORDER — POLYETHYLENE GLYCOL 3350 17 G/DOSE
17 POWDER (GRAM) ORAL DAILY
Refills: 0 | Status: DISCONTINUED | OUTPATIENT
Start: 2024-12-07 | End: 2024-12-24

## 2024-12-06 RX ORDER — LOSARTAN POTASSIUM 100 MG/1
1 TABLET, FILM COATED ORAL
Refills: 0 | DISCHARGE

## 2024-12-06 RX ADMIN — Medication 10 MILLIGRAM(S): at 19:27

## 2024-12-06 RX ADMIN — ACETAMINOPHEN 975 MILLIGRAM(S): 80 SOLUTION/ DROPS ORAL at 00:35

## 2024-12-06 RX ADMIN — Medication 5 MILLIGRAM(S): at 04:01

## 2024-12-06 RX ADMIN — ACETAMINOPHEN 975 MILLIGRAM(S): 80 SOLUTION/ DROPS ORAL at 13:22

## 2024-12-06 RX ADMIN — Medication 1 DROP(S): at 06:10

## 2024-12-06 RX ADMIN — ROSUVASTATIN 10 MILLIGRAM(S): 40 TABLET, FILM COATED ORAL at 21:30

## 2024-12-06 RX ADMIN — FAMOTIDINE 20 MILLIGRAM(S): 20 TABLET, FILM COATED ORAL at 05:36

## 2024-12-06 RX ADMIN — CHLORHEXIDINE GLUCONATE 1 APPLICATION(S): 1.2 RINSE ORAL at 05:37

## 2024-12-06 RX ADMIN — ACETAMINOPHEN 975 MILLIGRAM(S): 80 SOLUTION/ DROPS ORAL at 05:36

## 2024-12-06 RX ADMIN — ACETAMINOPHEN 975 MILLIGRAM(S): 80 SOLUTION/ DROPS ORAL at 18:45

## 2024-12-06 RX ADMIN — Medication 1 DROP(S): at 21:33

## 2024-12-06 RX ADMIN — Medication 25 MILLIGRAM(S): at 21:30

## 2024-12-06 RX ADMIN — ENOXAPARIN SODIUM 40 MILLIGRAM(S): 60 INJECTION INTRAVENOUS; SUBCUTANEOUS at 05:36

## 2024-12-06 RX ADMIN — Medication 81 MILLIGRAM(S): at 13:24

## 2024-12-06 RX ADMIN — FAMOTIDINE 20 MILLIGRAM(S): 20 TABLET, FILM COATED ORAL at 18:43

## 2024-12-06 RX ADMIN — Medication 3 MILLIGRAM(S): at 21:32

## 2024-12-06 RX ADMIN — ACETAMINOPHEN 975 MILLIGRAM(S): 80 SOLUTION/ DROPS ORAL at 01:30

## 2024-12-06 RX ADMIN — Medication 500 MILLIGRAM(S): at 13:29

## 2024-12-06 RX ADMIN — Medication 5 MILLIGRAM(S): at 04:30

## 2024-12-06 RX ADMIN — MAGNESIUM SULFATE 25 GRAM(S): 500 INJECTION, SOLUTION INTRAMUSCULAR; INTRAVENOUS at 18:42

## 2024-12-06 RX ADMIN — SENNOSIDES 2 TABLET(S): 8.6 TABLET, FILM COATED ORAL at 21:30

## 2024-12-06 RX ADMIN — Medication 1 MILLIGRAM(S): at 13:23

## 2024-12-06 RX ADMIN — Medication 1 TABLET(S): at 13:28

## 2024-12-06 NOTE — PROGRESS NOTE ADULT - SUBJECTIVE AND OBJECTIVE BOX
85 yo F with PMHx of GERD, HTN, OA s/p bilateral TKA and ROHIT, s/p L forefoot exostosis removal (11/2024) RA, and hx of breast cancer (in remission since 2015) who presents to the hospital after a fall. Around 1000, patient was seated in a chair and reaching for something on another chair when she lost her balance and fell onto her R side and hit her head. She was unable to get up from the ground and was lying on the floor for ~8 hours until her friend found her. +HS. -LOC. -AC. Subsequently, she had RLE pain. Of note, patient fell forwards two days prior and hit her face with subsequent bruising. Seen in ED at the time without any significant injuries found. No dizziness/lightheadedness associated with either fall. On admission, imaging showed acute comminuted right femoral distal metadiaphyseal fracture with volar apex angulation and 1.5 cm posterior displacement and small knee joint effusion. She underwent a R femur ORIF (12/2). Weightbearing status WBAT. During hospital course, patient had post-op blood loss anemia. Last H/H 7.5/23.1.    Imaging:  CT of R femur: Acute comminuted right femoral distal metadiaphyseal fracture with volar apex angulation and 1.5 cm posterior displacement. Small knee joint effusion.  CT head: No acute process seen    During their stay, the patient was evaluated by physical and occupational therapy. Prior to admission, patient was independent with ADLs, dependent on some iADLs (grocery, laundry), and ambulates with walker/cane occasionally The most recent evaluated functional status is max A with transfers, ambulates 1 side step by b/s secondary to decreased weight bearing tolerance on RLE and fear of falling, standby assist for UB dressing, dependent for LB dressing. Patient was deemed a good acute rehab candidate due to decline in functional status and ability to carry out activities of daily living. Patient is able to tolerate 3 hours of therapy 5-7 days a week and is motivated to participate.    The patient was evaluated by the PM&R team once medically stable. The patient was found to have functional limitations in terms of physical strength/mobility and ability to carry out ADLs (self care, transfers, ambulation). Patient admitted to   12/5 for rehabilitation of R femur fx, s/p ORIF with decline in ADLs.    Living Situation: resides in  with 1 FOS to bedroom (uses stair lift) and 3 steps to enter  PLOF:  independent with ADLs, dependent on some iADLs (grocery, laundry), and ambulates with walker/cane occasionally  CLOF:  max A with transfers, standby assist for UB dressing, dependent for LB dressing, and ambulates 1 side step by b/s secondary to decreased weight bearing tolerance on RLE and fear of falling    Today’s Subjective & Review of Symptoms  Patient admitted to acute rehab yesterday. No overnight events. Patient has no complaints this morning. Will begin participating in PT/OT today. Having regular BMs and voiding spontaneously. Vitals reviewed. AM labs reviewed. Mg continues to be 1.7, hgb 7.6, and Na 131.    Review of Systems:   Constitutional:    [x ] WNL           [   ] poor appetite   [ ] insomnia   [  ] tired   Cardio:           [x ] WNL           [   ] CP              [ ] MOJICA        [  ] palpitations               Resp:             [x ] WNL           [   ] SOB             [ ] cough      [  ] wheezing   GI:               [ x] WNL           [   ] constipation    [ ] diarrhea   [  ] abdominal pain       [ ] nausea   [  ] emesis                                :               [ x] WNL           [   ] MARTINS           [ ] dysuria    [  ] difficulty voiding   [ ] Other:    Endo:             [ x] WNL           [   ] polyuria        [ ] temperature intolerance                 Skin:             [ ] WNL           [   ] pain            [x ] incision-right LE with dressing applied   [ x ] rash-ecchymosis along bilateral facial cheeks   MSK:              [ ] WNL           [ x  ] muscle pain     [x ] joint pain [x ] muscle tenderness   [x ] swelling RLE   Neuro:            [ ] WNL           [   ] HA              [ ] change in vision   [   ] tremor   [x ] weakness [  ] dysphagia    Cognitive:        [x ] WNL           [   ] confusion      Psych:            [x ] WNL           [   ] hallucinations   [   ]agitation   [   ] delusion   [   ]depression    Physical Exam:    Vital Signs Last 24 Hrs  T(C): 36.7 (06 Dec 2024 05:09), Max: 37.1 (05 Dec 2024 16:00)  T(F): 98.1 (06 Dec 2024 05:09), Max: 98.7 (05 Dec 2024 16:00)  HR: 101 (06 Dec 2024 05:09) (96 - 105)  BP: 163/78 (06 Dec 2024 05:09) (151/74 - 163/78)  BP(mean): 103 (05 Dec 2024 22:36) (103 - 103)  RR: 18 (06 Dec 2024 05:09) (18 - 18)  SpO2: 96% (06 Dec 2024 05:09) (94% - 96%)    Parameters below as of 06 Dec 2024 05:09  Patient On (Oxygen Delivery Method): room air    General: Resting in bed with slight discomfort due to bed ulcer                              HEENT: [   ] NC/AT, EOMI,  Normal Conjunctivae,   [  x ] other: bilateral cheek facial abrasion, normal conjunctivae, EOMI  Cardio: [  x ] RRR, no murmur,   [   ] other:                              Pulm: [ x  ] No Respiratory Distress,  Lungs CTAB,   [   ] other:             Abdomen: [ x  ]ND/NT, Soft,   [   ] other:    : [ x  ] No martins catheter, [   ] Martins catheter present [  ] other:   MSK: [  ] No joint swelling,  [ x  ] other:  RLE/ knee joint swelling                      Ext: [ x  ]No C/C, No calf tenderness,   [  x ]other:  swelling of MCPs, no warmth  Skin: [   ]intact,   [ x  ] other: RLE incision site with post op dressing, C&D    Neurological Examination:  Cognitive: [  x  ] AAO x 3,   [  ]  other:       Attention:  [ x  ] intact,   [    ]  other:   Language: [ x ] intact  [  ]    Memory: [ x  ] intact,    [  ]  other:     Mood/Affect: [  x ] wnl  [    ]  other:                                                                             Communication: [x   ]Fluent, no dysarthria [    ] other:   CN II - XII:  [ x   ] intact,  [    ] other:                                                                                         Motor:   RUE: 5/5 shoulder abduction, 5/5 elbow flexion, 5/5 elbow extension, 5/5 finger flexion  LUE: 5/5 shoulder abduction, 5/5 elbow flexion, 5/5 elbow extension, 5/5 finger flexion  RLE: 2/5 hip flexion (within restricted ROM), 2+/5 knee ext/flex (within restricted ROM), 4+/5 plantarflexion/dorsiflexion   LLE: 5/5 hip flexion, 5/5 knee ext/flex, 4+/5 dorsiflexion/plantarflexion     Tone: [  x ] wnl,   [    ]  other:  DTRs: [ x ]symmetric, [   ] other:  Coordination:   [ x ] intact,   [  ] other:                                                                        Sensory: [ x   ] Intact to light touch  [  ] other:    MEDICATIONS  (STANDING):  acetaminophen     Tablet .. 975 milliGRAM(s) Oral every 6 hours  ascorbic acid 500 milliGRAM(s) Oral daily  aspirin enteric coated 81 milliGRAM(s) Oral daily  calcium carbonate 1250 mG  + Vitamin D (OsCal 500 + D) 1 Tablet(s) Oral daily  chlorhexidine 2% Cloths 1 Application(s) Topical <User Schedule>  enoxaparin Injectable 40 milliGRAM(s) SubCutaneous every 24 hours  famotidine    Tablet 20 milliGRAM(s) Oral two times a day  folic acid 1 milliGRAM(s) Oral daily  ofloxacin 0.3% Solution 1 Drop(s) Right EYE three times a day  rosuvastatin 10 milliGRAM(s) Oral at bedtime  senna 2 Tablet(s) Oral at bedtime    MEDICATIONS  (PRN):  melatonin 3 milliGRAM(s) Oral at bedtime PRN Insomnia  oxyCODONE    IR 5 milliGRAM(s) Oral every 4 hours PRN Moderate Pain (4 - 6)  oxyCODONE    IR 10 milliGRAM(s) Oral every 4 hours PRN Severe Pain (7 - 10)    Labs:    12-06    131[L]  |  95[L]  |  22[H]  ----------------------------<  108[H]  3.9   |  26  |  0.8    Ca    8.4      06 Dec 2024 05:45  Mg     1.7     12-06    TPro  5.4[L]  /  Alb  2.9[L]  /  TBili  0.5  /  DBili  x   /  AST  31  /  ALT  10  /  AlkPhos  94  12-06                          7.6    7.29  )-----------( 238      ( 06 Dec 2024 05:45 )             23.6      83 yo F with PMHx of GERD, HTN, OA s/p bilateral TKA and ROHIT, s/p L forefoot exostosis removal (11/2024) RA, and hx of breast cancer (in remission since 2015) who presents to the hospital after a fall. Around 1000, patient was seated in a chair and reaching for something on another chair when she lost her balance and fell onto her R side and hit her head. She was unable to get up from the ground and was lying on the floor for ~8 hours until her friend found her. +HS. -LOC. -AC. Subsequently, she had RLE pain. Of note, patient fell forwards two days prior and hit her face with subsequent bruising. Seen in ED at the time without any significant injuries found. No dizziness/lightheadedness associated with either fall. On admission, imaging showed acute comminuted right femoral distal metadiaphyseal fracture with volar apex angulation and 1.5 cm posterior displacement and small knee joint effusion. She underwent a R femur ORIF (12/2). Weightbearing status WBAT. During hospital course, patient had post-op blood loss anemia. Last H/H 7.5/23.1.    Imaging:  CT of R femur: Acute comminuted right femoral distal metadiaphyseal fracture with volar apex angulation and 1.5 cm posterior displacement. Small knee joint effusion.  CT head: No acute process seen    During their stay, the patient was evaluated by physical and occupational therapy. Prior to admission, patient was independent with ADLs, dependent on some iADLs (grocery, laundry), and ambulates with walker/cane occasionally The most recent evaluated functional status is max A with transfers, ambulates 1 side step by b/s secondary to decreased weight bearing tolerance on RLE and fear of falling, standby assist for UB dressing, dependent for LB dressing. Patient was deemed a good acute rehab candidate due to decline in functional status and ability to carry out activities of daily living. Patient is able to tolerate 3 hours of therapy 5-7 days a week and is motivated to participate.    The patient was evaluated by the PM&R team once medically stable. The patient was found to have functional limitations in terms of physical strength/mobility and ability to carry out ADLs (self care, transfers, ambulation). Patient admitted to   12/5 for rehabilitation of R femur fx, s/p ORIF with decline in ADLs.    Living Situation: resides in  with 1 FOS to bedroom (uses stair lift) and 3 steps to enter  PLOF:  independent with ADLs, dependent on some iADLs (grocery, laundry), and ambulates with walker/cane occasionally  CLOF:  max A with transfers, standby assist for UB dressing, dependent for LB dressing, and ambulates 1 side step by b/s secondary to decreased weight bearing tolerance on RLE and fear of falling    Today’s Subjective & Review of Symptoms  Patient admitted to acute rehab yesterday. No overnight events. Patient has no complaints this morning. Will begin participating in PT/OT today. Voiding spontaneously. Has not had a bowel movement in days. Will give her dulcolax today in addition to miralax and re-assess. Vitals reviewed. AM labs reviewed. Mg continues to be 1.7, hgb 7.6, and Na 131. Will replete Mg. 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 Discussed with infection control and they agree to discontinue isolation precautions     Review of Systems:   Constitutional:    [x ] WNL           [   ] poor appetite   [ ] insomnia   [  ] tired   Cardio:           [x ] WNL           [   ] CP              [ ] MOJIAC        [  ] palpitations               Resp:             [x ] WNL           [   ] SOB             [ ] cough      [  ] wheezing   GI:               [ x] WNL           [   ] constipation    [ ] diarrhea   [  ] abdominal pain       [ ] nausea   [  ] emesis                                :               [ x] WNL           [   ] MARTINS           [ ] dysuria    [  ] difficulty voiding   [ ] Other:    Endo:             [ x] WNL           [   ] polyuria        [ ] temperature intolerance                 Skin:             [ ] WNL           [   ] pain            [x ] incision-right LE with dressing applied   [ x ] rash-ecchymosis along bilateral facial cheeks   MSK:              [ ] WNL           [ x  ] muscle pain     [x ] joint pain [x ] muscle tenderness   [x ] swelling RLE   Neuro:            [ ] WNL           [   ] HA              [ ] change in vision   [   ] tremor   [x ] weakness [  ] dysphagia    Cognitive:        [x ] WNL           [   ] confusion      Psych:            [x ] WNL           [   ] hallucinations   [   ]agitation   [   ] delusion   [   ]depression    Physical Exam:    Vital Signs Last 24 Hrs  T(C): 36.7 (06 Dec 2024 05:09), Max: 37.1 (05 Dec 2024 16:00)  T(F): 98.1 (06 Dec 2024 05:09), Max: 98.7 (05 Dec 2024 16:00)  HR: 101 (06 Dec 2024 05:09) (96 - 105)  BP: 163/78 (06 Dec 2024 05:09) (151/74 - 163/78)  BP(mean): 103 (05 Dec 2024 22:36) (103 - 103)  RR: 18 (06 Dec 2024 05:09) (18 - 18)  SpO2: 96% (06 Dec 2024 05:09) (94% - 96%)    Parameters below as of 06 Dec 2024 05:09  Patient On (Oxygen Delivery Method): room air    General: Resting in bed with slight discomfort due to bed ulcer                              HEENT: [   ] NC/AT, EOMI,  Normal Conjunctivae,   [  x ] other: bilateral cheek facial abrasion, normal conjunctivae, EOMI  Cardio: [  x ] RRR, no murmur,   [   ] other:                              Pulm: [ x  ] No Respiratory Distress,  Lungs CTAB,   [   ] other:             Abdomen: [ x  ]ND/NT, Soft,   [   ] other:    : [ x  ] No martins catheter, [   ] Martins catheter present [  ] other:   MSK: [  ] No joint swelling,  [ x  ] other:  RLE/ knee joint swelling                      Ext: [ x  ]No C/C, No calf tenderness,   [  x ]other:  swelling of MCPs, no warmth  Skin: [   ]intact,   [ x  ] other: RLE incision site with post op dressing, C&D    Neurological Examination:  Cognitive: [  x  ] AAO x 3,   [  ]  other:       Attention:  [ x  ] intact,   [    ]  other:   Language: [ x ] intact  [  ]    Memory: [ x  ] intact,    [  ]  other:     Mood/Affect: [  x ] wnl  [    ]  other:                                                                             Communication: [x   ]Fluent, no dysarthria [    ] other:   CN II - XII:  [ x   ] intact,  [    ] other:                                                                                         Motor:   RUE: 5/5 shoulder abduction, 5/5 elbow flexion, 5/5 elbow extension, 5/5 finger flexion  LUE: 5/5 shoulder abduction, 5/5 elbow flexion, 5/5 elbow extension, 5/5 finger flexion  RLE: 2/5 hip flexion (within restricted ROM), 2+/5 knee ext/flex (within restricted ROM), 4+/5 plantarflexion/dorsiflexion   LLE: 5/5 hip flexion, 5/5 knee ext/flex, 4+/5 dorsiflexion/plantarflexion     Tone: [  x ] wnl,   [    ]  other:  DTRs: [ x ]symmetric, [   ] other:  Coordination:   [ x ] intact,   [  ] other:                                                                        Sensory: [ x   ] Intact to light touch  [  ] other:    MEDICATIONS  (STANDING):  acetaminophen     Tablet .. 975 milliGRAM(s) Oral every 6 hours  ascorbic acid 500 milliGRAM(s) Oral daily  aspirin enteric coated 81 milliGRAM(s) Oral daily  calcium carbonate 1250 mG  + Vitamin D (OsCal 500 + D) 1 Tablet(s) Oral daily  chlorhexidine 2% Cloths 1 Application(s) Topical <User Schedule>  enoxaparin Injectable 40 milliGRAM(s) SubCutaneous every 24 hours  famotidine    Tablet 20 milliGRAM(s) Oral two times a day  folic acid 1 milliGRAM(s) Oral daily  ofloxacin 0.3% Solution 1 Drop(s) Right EYE three times a day  rosuvastatin 10 milliGRAM(s) Oral at bedtime  senna 2 Tablet(s) Oral at bedtime    MEDICATIONS  (PRN):  melatonin 3 milliGRAM(s) Oral at bedtime PRN Insomnia  oxyCODONE    IR 5 milliGRAM(s) Oral every 4 hours PRN Moderate Pain (4 - 6)  oxyCODONE    IR 10 milliGRAM(s) Oral every 4 hours PRN Severe Pain (7 - 10)    Labs:    12-06    131[L]  |  95[L]  |  22[H]  ----------------------------<  108[H]  3.9   |  26  |  0.8    Ca    8.4      06 Dec 2024 05:45  Mg     1.7     12-06    TPro  5.4[L]  /  Alb  2.9[L]  /  TBili  0.5  /  DBili  x   /  AST  31  /  ALT  10  /  AlkPhos  94  12-06                          7.6    7.29  )-----------( 238      ( 06 Dec 2024 05:45 )             23.6      83 yo F with PMHx of GERD, HTN, OA s/p bilateral TKA and ROHIT, s/p L forefoot exostosis removal (11/2024) RA, and hx of breast cancer (in remission since 2015) who presents to the hospital after a fall. Around 1000, patient was seated in a chair and reaching for something on another chair when she lost her balance and fell onto her R side and hit her head. She was unable to get up from the ground and was lying on the floor for ~8 hours until her friend found her. +HS. -LOC. -AC. Subsequently, she had RLE pain. Of note, patient fell forwards two days prior and hit her face with subsequent bruising. Seen in ED at the time without any significant injuries found. No dizziness/lightheadedness associated with either fall. On admission, imaging showed acute comminuted right femoral distal metadiaphyseal fracture with volar apex angulation and 1.5 cm posterior displacement and small knee joint effusion. She underwent a R femur ORIF (12/2). Weightbearing status WBAT. During hospital course, patient had post-op blood loss anemia. Last H/H 7.5/23.1.    Imaging:  CT of R femur: Acute comminuted right femoral distal metadiaphyseal fracture with volar apex angulation and 1.5 cm posterior displacement. Small knee joint effusion.  CT head: No acute process seen    During their stay, the patient was evaluated by physical and occupational therapy. Prior to admission, patient was independent with ADLs, dependent on some iADLs (grocery, laundry), and ambulates with walker/cane occasionally The most recent evaluated functional status is max A with transfers, ambulates 1 side step by b/s secondary to decreased weight bearing tolerance on RLE and fear of falling, standby assist for UB dressing, dependent for LB dressing. Patient was deemed a good acute rehab candidate due to decline in functional status and ability to carry out activities of daily living. Patient is able to tolerate 3 hours of therapy 5-7 days a week and is motivated to participate.    The patient was evaluated by the PM&R team once medically stable. The patient was found to have functional limitations in terms of physical strength/mobility and ability to carry out ADLs (self care, transfers, ambulation). Patient admitted to   12/5 for rehabilitation of R femur fx, s/p ORIF with decline in ADLs.    Living Situation: resides in  with 1 FOS to bedroom (uses stair lift) and 3 steps to enter  PLOF:  independent with ADLs, dependent on some iADLs (grocery, laundry), and ambulates with walker/cane occasionally    Today’s Subjective & Review of Symptoms  Patient admitted to acute rehab yesterday. No overnight events. Patient has no complaints this morning. Will begin participating in PT/OT today. Voiding spontaneously. Has not had a bowel movement in days. Will give her dulcolax today in addition to miralax and re-assess. Vitals reviewed. AM labs reviewed. Mg continues to be 1.7, hgb 7.6, and Na 131. Will replete Mg. 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 Discussed with infection control and they agree to discontinue isolation precautions as of 12/5.    CLOF:  max A with transfers, standby assist for UB dressing, dependent for LB dressing, and ambulates 1 side step by b/s secondary to decreased weight bearing tolerance on RLE and fear of falling    Review of Systems:   Constitutional:    [x ] WNL           [   ] poor appetite   [ ] insomnia   [  ] tired   Cardio:           [x ] WNL           [   ] CP              [ ] MOJICA        [  ] palpitations               Resp:             [x ] WNL           [   ] SOB             [ ] cough      [  ] wheezing   GI:               [ x] WNL           [   ] constipation    [ ] diarrhea   [  ] abdominal pain       [ ] nausea   [  ] emesis                                :               [ x] WNL           [   ] MARTINS           [ ] dysuria    [  ] difficulty voiding   [ ] Other:    Endo:             [ x] WNL           [   ] polyuria        [ ] temperature intolerance                 Skin:             [ ] WNL           [   ] pain            [x ] incision-right LE with dressing applied   [ x ] rash-ecchymosis along bilateral facial cheeks   MSK:              [ ] WNL           [ x  ] muscle pain     [x ] joint pain [x ] muscle tenderness   [x ] swelling RLE   Neuro:            [ ] WNL           [   ] HA              [ ] change in vision   [   ] tremor   [x ] weakness [  ] dysphagia    Cognitive:        [x ] WNL           [   ] confusion      Psych:            [x ] WNL           [   ] hallucinations   [   ]agitation   [   ] delusion   [   ]depression    Physical Exam:    Vital Signs Last 24 Hrs  T(C): 36.7 (06 Dec 2024 05:09), Max: 37.1 (05 Dec 2024 16:00)  T(F): 98.1 (06 Dec 2024 05:09), Max: 98.7 (05 Dec 2024 16:00)  HR: 101 (06 Dec 2024 05:09) (96 - 105)  BP: 163/78 (06 Dec 2024 05:09) (151/74 - 163/78)  BP(mean): 103 (05 Dec 2024 22:36) (103 - 103)  RR: 18 (06 Dec 2024 05:09) (18 - 18)  SpO2: 96% (06 Dec 2024 05:09) (94% - 96%)    Parameters below as of 06 Dec 2024 05:09  Patient On (Oxygen Delivery Method): room air    General: Resting in bed with slight discomfort due to bed ulcer                              HEENT: [   ] NC/AT, EOMI,  Normal Conjunctivae,   [  x ] other: bilateral cheek facial abrasion, normal conjunctivae, EOMI  Cardio: [  x ] RRR, no murmur,   [   ] other:                              Pulm: [ x  ] No Respiratory Distress,  Lungs CTAB,   [   ] other:             Abdomen: [ x  ]ND/NT, Soft,   [   ] other:    : [ x  ] No martins catheter, [   ] Martins catheter present [  ] other:   MSK: [  ] No joint swelling,  [ x  ] other:  RLE/ knee joint swelling                      Ext: [ x  ]No C/C, No calf tenderness,   [  x ]other:  swelling of MCPs, no warmth  Skin: [   ]intact,   [ x  ] other: RLE incision site with post op dressing, C&D    Neurological Examination:  Cognitive: [  x  ] AAO x 3,   [  ]  other:       Attention:  [ x  ] intact,   [    ]  other:   Language: [ x ] intact  [  ]    Memory: [ x  ] intact,    [  ]  other:     Mood/Affect: [  x ] wnl  [    ]  other:                                                                             Communication: [x   ]Fluent, no dysarthria [    ] other:   CN II - XII:  [ x   ] intact,  [    ] other:                                                                                         Motor:   RUE: 5/5 shoulder abduction, 5/5 elbow flexion, 5/5 elbow extension, 5/5 finger flexion  LUE: 5/5 shoulder abduction, 5/5 elbow flexion, 5/5 elbow extension, 5/5 finger flexion  RLE: 2/5 hip flexion (within restricted ROM), 2+/5 knee ext/flex (within restricted ROM), 4+/5 plantarflexion/dorsiflexion   LLE: 5/5 hip flexion, 5/5 knee ext/flex, 4+/5 dorsiflexion/plantarflexion     Tone: [  x ] wnl,   [    ]  other:  DTRs: [ x ]symmetric, [   ] other:  Coordination:   [ x ] intact,   [  ] other:                                                                        Sensory: [ x   ] Intact to light touch  [  ] other:    MEDICATIONS  (STANDING):  acetaminophen     Tablet .. 975 milliGRAM(s) Oral every 6 hours  ascorbic acid 500 milliGRAM(s) Oral daily  aspirin enteric coated 81 milliGRAM(s) Oral daily  calcium carbonate 1250 mG  + Vitamin D (OsCal 500 + D) 1 Tablet(s) Oral daily  chlorhexidine 2% Cloths 1 Application(s) Topical <User Schedule>  enoxaparin Injectable 40 milliGRAM(s) SubCutaneous every 24 hours  famotidine    Tablet 20 milliGRAM(s) Oral two times a day  folic acid 1 milliGRAM(s) Oral daily  ofloxacin 0.3% Solution 1 Drop(s) Right EYE three times a day  rosuvastatin 10 milliGRAM(s) Oral at bedtime  senna 2 Tablet(s) Oral at bedtime    MEDICATIONS  (PRN):  melatonin 3 milliGRAM(s) Oral at bedtime PRN Insomnia  oxyCODONE    IR 5 milliGRAM(s) Oral every 4 hours PRN Moderate Pain (4 - 6)  oxyCODONE    IR 10 milliGRAM(s) Oral every 4 hours PRN Severe Pain (7 - 10)    Labs:    12-06    131[L]  |  95[L]  |  22[H]  ----------------------------<  108[H]  3.9   |  26  |  0.8    Ca    8.4      06 Dec 2024 05:45  Mg     1.7     12-06    TPro  5.4[L]  /  Alb  2.9[L]  /  TBili  0.5  /  DBili  x   /  AST  31  /  ALT  10  /  AlkPhos  94  12-06                          7.6    7.29  )-----------( 238      ( 06 Dec 2024 05:45 )             23.6

## 2024-12-06 NOTE — CONSULT NOTE ADULT - SUBJECTIVE AND OBJECTIVE BOX
Podiatry Consult Note    Subjective:  GIAN MELISSA  Seen Bedside 84y Female  .   Patient is a 84y old  Female who presents with a chief complaint of Rehab of Right femur fx s/p ORIF (06 Dec 2024 09:34). Patient was consulted for Podiatry in order to assess for any changes in surgical site.     HPI:  85 yo F with PMHx of GERD, HTN, OA s/p bilateral TKA and ROHIT, s/p L forefoot exostosis removal (11/2024) RA, and hx of breast cancer (in remission since 2015) who presents to the hospital after a fall. Around 1000, patient was seated in a chair and reaching for something on another chair when she lost her balance and fell onto her R side and hit her head. She was unable to get up from the ground and was lying on the floor for ~8 hours until her friend found her. +HS. -LOC. -AC. Subsequently, she had RLE pain. Of note, patient fell forwards two days prior and hit her face with subsequent bruising. Seen in ED at the time without any significant injuries found. No dizziness/lightheadedness associated with either fall. On admission, imaging showed acute comminuted right femoral distal metadiaphyseal fracture with volar apex angulation and 1.5 cm posterior displacement and small knee joint effusion. She underwent a R femur ORIF (12/2). Weightbearing status WBAT. During hospital course, patient had post-op blood loss anemia. Last H/H 7.5/23.1.    Imaging:  CT of R femur: Acute comminuted right femoral distal metadiaphyseal fracture with volar apex angulation and 1.5 cm posterior displacement. Small knee joint effusion.  CT head: No acute process seen    During their stay, the patient was evaluated by physical and occupational therapy. Prior to admission, patient was independent with ADLs, dependent on some iADLs (grocery, laundry), and ambulates with walker/cane occasionally The most recent evaluated functional status is max A with transfers, ambulates 1 side step by b/s secondary to decreased weight bearing tolerance on RLE and fear of falling, standby assist for UB dressing, dependent for LB dressing. Patient was deemed a good acute rehab candidate due to decline in functional status and ability to carry out activities of daily living. Patient is able to tolerate 3 hours of therapy 5-7 days a week and is motivated to participate.    The patient was evaluated by the PM&R team once medically stable. The patient was found to have functional limitations in terms of physical strength/mobility and ability to carry out ADLs (self care, transfers, ambulation). Patient admitted to   12/5 for rehabilitation of R femur fx, s/p ORIF with decline in ADLs.    Living Situation: resides in  with 1 FOS to bedroom (uses stair lift) and 3 steps to enter  PLOF:  independent with ADLs, dependent on some iADLs (grocery, laundry), and ambulates with walker/cane occasionally  CLOF:  max A with transfers, standby assist for UB dressing, dependent for LB dressing, and ambulates 1 side step by b/s secondary to decreased weight bearing tolerance on RLE and fear of falling,     (05 Dec 2024 11:53)      Past Medical History and Surgical History  PAST MEDICAL & SURGICAL HISTORY:  Rheumatoid arthritis      HTN (hypertension)      Breast cancer  last radiation 2015      Depression      Chronic GERD      OA (osteoarthritis)      Rheumatoid arthritis      History of right hip replacement      Status post left hip replacement      S/P total knee replacement, left      H/O vaginal hysterectomy      Status post cholecystectomy      S/P lumpectomy of breast  2015           Review of Systems:  [X] Ten point review of systems is otherwise negative except as noted     Objective:  Vital Signs Last 24 Hrs  T(C): 37.4 (06 Dec 2024 12:05), Max: 37.4 (06 Dec 2024 12:05)  T(F): 99.4 (06 Dec 2024 12:05), Max: 99.4 (06 Dec 2024 12:05)  HR: 98 (06 Dec 2024 12:05) (98 - 105)  BP: 134/79 (06 Dec 2024 12:05) (134/79 - 163/78)  BP(mean): 103 (05 Dec 2024 22:36) (103 - 103)  RR: 18 (06 Dec 2024 12:05) (18 - 18)  SpO2: 96% (06 Dec 2024 05:09) (94% - 96%)    Parameters below as of 06 Dec 2024 05:09  Patient On (Oxygen Delivery Method): room air                            7.6    7.29  )-----------( 238      ( 06 Dec 2024 05:45 )             23.6                 12-06    131[L]  |  95[L]  |  22[H]  ----------------------------<  108[H]  3.9   |  26  |  0.8    Ca    8.4      06 Dec 2024 05:45  Mg     1.7     12-06    TPro  5.4[L]  /  Alb  2.9[L]  /  TBili  0.5  /  DBili  x   /  AST  31  /  ALT  10  /  AlkPhos  94  12-06        Physical Exam - Lower Extremity Focused:   Derm: Sutures are intact at surgical site in region of Left First Metatrsal Cuneiform joint, no signs of dehiscence or purulence with minimal compression. No signs of Erythema around surgical site.   Vascular: DP and PT Pulses Intact; Foot is Warm to Warm to the touch; Capillary Refill Time < 3 Seconds;    Neuro: Protective Sensation Diminished / Moderately Neuropathic   MSK: No Pain On Palpation at Surgical Site     Assessment:  s/p 11/15, Left Foot Exostectomy     Plan:  Chart reviewed and Patient evaluated. All Questions and Concerns Addressed and Answered  Local Wound Care; Wound Dressed with Xeroform- Gauze- Abdominal Pad- Kerlix- Ace bandage.   Weight Bearing Status; WBAT   No Sx Debridement.   Patient is stable from Podiatry standpoint and could follow up in outpatient clinic.   Discussed Plan w/ Dr. Stevens.     Podiatry

## 2024-12-06 NOTE — PROGRESS NOTE ADULT - ASSESSMENT
Assessment:   85 yo F with PMHx of GERD, HTN, OA s/p bilateral TKA and ROHIT, s/p L forefoot exostosis removal (11/2024), RA (previously on methotrexate), and hx of breast cancer (in remission since 2015) who presented to the hospital after a fall and found to have comminuted right femoral distal metadiaphyseal fracture with volar apex angulation and 1.5 cm posterior displacement and small knee joint effusion. She underwent a R femur ORIF (12/2). Weightbearing status WBAT.    Plan:    #Rehab of Right femur fx s/p ORIF with impairment in mobility and ADLs and iADLs. The patient presented with co-morbidities requiring close physiatry follow-up at least 3 times a week to monitor his progress and monitor his comorbidities including HTN, OA s/p TKA and ROHIT, RA, and s/p L forefoot exostosis removal (11/2024).The patient's co-morbidities do not limit the patient's ability to participate in rehabilitative therapy. The patient was seen by PT/OT and  required max A with transfers, standby assist for UB dressing, dependent for LB dressing, and ambulates 1 side step by b/s secondary to decreased weight bearing tolerance on RLE and fear of falling, The patient was previously independent with all her ADLs and some iADLs (dependent on laundry and grocery) and ambulated with cane/walker occassionally and now presents with functional decline. The patient can benefit from and tolerate 3 hours of restorative therapy daily. The patient is an excellent acute inpatient rehabilitation candidate.     #S/P right distal femur fracture/ ORIF  -CTH shows no acute findings.  -CT Right femur shows acute comminuted right femoral distal metadiaphyseal fracture with volar apex angulation and 1.5 cm posterior displacement and small knee joint effusion.  -s/p subsequent R femur ORIF (12/2).   -Weight bearing status WBAT.  -Per Ortho, DVT ppx x6 weeks post-op  -c/w ASA 81mg QD  - Pain control: oxycodone 2.5, 5, and 10mg prn, Tylenol  -Will adjust pain medications to optimize pain control  -c/w Calcium 600+D 600 mg-200 tablet: BID  -PT/ OT    #s/p recent fall last week with HT and bilateral facial contusions/ ecchymosis/ no LOC  - monitor    #Recent left heel spur resection  - WBAT  - limiting ambulation  - to wear cast-boot for longer distances as per patient    #Post-op acute blood loss anemia  -Baseline hgb ~10.  -H/H 7.6/23.6 (7.5/23.1) 12/6  -c/w H/H monitoring and hemodynamic status monitoring  -Transfuse if hgb<7.    #Hypomagnesemia  -Mg 1.7 (12/6)  -c/w magnesium supplement as needed    #Azotemia  - recheck in am    #Hyponatremia  -Na 131 (12/6)  - on regular salt diet  - repeat with serum osms in am  -will continue to monitor     #HTN  -continue to monitor hemodynamic status   -will adjust medications as needed    #HLD  -c/w rosuvastatin 10mg QD     #GERD  - c/w famotidine 20mg  BID    #OA  #RA  - s/p bilat THR/ TKR  - Previously on methotrexate.   -Not on medication currently  - Follows up outpatient with rheumatology  - monitor for exacerbation now off Methotrexate    #Anxiety/depression  - Previously on sertraline  - Will monitor     -Pain control: oxycodone and Tylenol    -GI/Bowel Mgmt: Miralax, Senna     -Skin: surgical incision along RLE    - Diet Regular    Precautions / PROPHYLAXIS:      - Falls      - DVT prophylaxis: lovenox   Assessment:   85 yo F with PMHx of GERD, HTN, OA s/p bilateral TKA and ROHIT, s/p L forefoot exostosis removal (11/2024), RA (previously on methotrexate), and hx of breast cancer (in remission since 2015) who presented to the hospital after a fall and found to have comminuted right femoral distal metadiaphyseal fracture with volar apex angulation and 1.5 cm posterior displacement and small knee joint effusion. She underwent a R femur ORIF (12/2). Weightbearing status WBAT.    Plan:    #Rehab of Right femur fx s/p ORIF with impairment in mobility and ADLs and iADLs. The patient presented with co-morbidities requiring close physiatry follow-up at least 3 times a week to monitor his progress and monitor his comorbidities including HTN, OA s/p TKA and ROHIT, RA, and s/p L forefoot exostosis removal (11/2024).The patient's co-morbidities do not limit the patient's ability to participate in rehabilitative therapy. The patient was seen by PT/OT and  required max A with transfers, standby assist for UB dressing, dependent for LB dressing, and ambulates 1 side step by b/s secondary to decreased weight bearing tolerance on RLE and fear of falling, The patient was previously independent with all her ADLs and some iADLs (dependent on laundry and grocery) and ambulated with cane/walker occassionally and now presents with functional decline. The patient can benefit from and tolerate 3 hours of restorative therapy daily. The patient is an excellent acute inpatient rehabilitation candidate.     #S/P right distal femur fracture/ ORIF  -CTH shows no acute findings.  -CT Right femur shows acute comminuted right femoral distal metadiaphyseal fracture with volar apex angulation and 1.5 cm posterior displacement and small knee joint effusion.  -s/p subsequent R femur ORIF (12/2).   -Weight bearing status WBAT.  -Per Ortho, DVT ppx x6 weeks post-op  -c/w ASA 81mg QD  - Pain control: oxycodone 2.5, 5, and 10mg prn, Tylenol  -Will adjust pain medications to optimize pain control  -c/w Calcium 600+D 600 mg-200 tablet: BID  -PT/ OT    #s/p recent fall last week with HT and bilateral facial contusions/ ecchymosis/ no LOC  - monitor    #Recent left heel spur resection  - WBAT  - limiting ambulation  - to wear cast-boot for longer distances as per patient    #Post-op acute blood loss anemia  -Baseline hgb ~10.  -H/H 7.6/23.6 (7.5/23.1) 12/6  -c/w H/H monitoring and hemodynamic status monitoring  -Transfuse if hgb<7.    #Hypomagnesemia  -Mg 1.7 (12/6)  -c/w magnesium supplement as needed    #Azotemia  - recheck in am    #Hyponatremia  -Na 131 (12/6)  - on regular salt diet  - repeat with serum osms in am  -will continue to monitor     #HTN  -continue to monitor hemodynamic status   -will adjust medications as needed    #HLD  -c/w rosuvastatin 10mg QD     #GERD  - c/w famotidine 20mg  BID    #OA  #RA  - s/p bilat THR/ TKR  - Previously on methotrexate.   -Not on medication currently  - Follows up outpatient with rheumatology  - monitor for exacerbation now off Methotrexate    #MRSA precautions discontinued 12/6 per Infection Control.    #Anxiety/depression  - Previously on sertraline  - Will monitor     -Pain control: oxycodone and Tylenol    -GI/Bowel Mgmt: Miralax, Senna     -Skin: surgical incision along RLE    - Diet Regular    Precautions / PROPHYLAXIS:      - Falls      - DVT prophylaxis: lovenox

## 2024-12-06 NOTE — CHART NOTE - NSCHARTNOTEFT_GEN_A_CORE
sbp seems to be elevated to 160's  with high heart rate @100.  home  medicine metoprolol 25 xl started from tonight  ,he was on HCTZ 50 mg at home too , will resume this as needed , she was seen by podiatrist for recent foot surgery on left , incision checked  and new dressing orders in place ,        Vital Signs Last 24 Hrs  T(C): 36.7 (06 Dec 2024 19:59), Max: 37.4 (06 Dec 2024 12:05)  T(F): 98 (06 Dec 2024 19:59), Max: 99.4 (06 Dec 2024 12:05)  HR: 109 (06 Dec 2024 19:59) (98 - 109)  BP: 156/89 (06 Dec 2024 19:59) (134/79 - 163/78)  BP(mean): 109 (06 Dec 2024 19:59) (103 - 109)  RR: 18 (06 Dec 2024 19:59) (18 - 18)  SpO2: 96% (06 Dec 2024 05:09) (94% - 96%)    Parameters below as of 06 Dec 2024 05:09  Patient On (Oxygen Delivery Method): room air     will Monitor , also stat vascular duplex to r/o DVT , Signed out to  On call doctor for  the weekend.

## 2024-12-07 RX ORDER — BISACODYL 5 MG
10 TABLET, DELAYED RELEASE (ENTERIC COATED) ORAL ONCE
Refills: 0 | Status: DISCONTINUED | OUTPATIENT
Start: 2024-12-07 | End: 2024-12-18

## 2024-12-07 RX ADMIN — FAMOTIDINE 20 MILLIGRAM(S): 20 TABLET, FILM COATED ORAL at 05:54

## 2024-12-07 RX ADMIN — ACETAMINOPHEN 650 MILLIGRAM(S): 80 SOLUTION/ DROPS ORAL at 21:37

## 2024-12-07 RX ADMIN — Medication 17 GRAM(S): at 12:54

## 2024-12-07 RX ADMIN — ACETAMINOPHEN 650 MILLIGRAM(S): 80 SOLUTION/ DROPS ORAL at 16:19

## 2024-12-07 RX ADMIN — ACETAMINOPHEN 650 MILLIGRAM(S): 80 SOLUTION/ DROPS ORAL at 13:02

## 2024-12-07 RX ADMIN — CHLORHEXIDINE GLUCONATE 1 APPLICATION(S): 1.2 RINSE ORAL at 05:54

## 2024-12-07 RX ADMIN — Medication 1 DROP(S): at 12:55

## 2024-12-07 RX ADMIN — Medication 25 MILLIGRAM(S): at 21:37

## 2024-12-07 RX ADMIN — ENOXAPARIN SODIUM 40 MILLIGRAM(S): 60 INJECTION INTRAVENOUS; SUBCUTANEOUS at 05:54

## 2024-12-07 RX ADMIN — ACETAMINOPHEN 650 MILLIGRAM(S): 80 SOLUTION/ DROPS ORAL at 01:15

## 2024-12-07 RX ADMIN — Medication 10 MILLIGRAM(S): at 08:30

## 2024-12-07 RX ADMIN — ACETAMINOPHEN 650 MILLIGRAM(S): 80 SOLUTION/ DROPS ORAL at 07:04

## 2024-12-07 RX ADMIN — Medication 81 MILLIGRAM(S): at 12:54

## 2024-12-07 RX ADMIN — Medication 1 TABLET(S): at 12:55

## 2024-12-07 RX ADMIN — Medication 10 MILLIGRAM(S): at 21:36

## 2024-12-07 RX ADMIN — ACETAMINOPHEN 650 MILLIGRAM(S): 80 SOLUTION/ DROPS ORAL at 16:06

## 2024-12-07 RX ADMIN — ACETAMINOPHEN 650 MILLIGRAM(S): 80 SOLUTION/ DROPS ORAL at 00:15

## 2024-12-07 RX ADMIN — Medication 1 DROP(S): at 05:54

## 2024-12-07 RX ADMIN — FAMOTIDINE 20 MILLIGRAM(S): 20 TABLET, FILM COATED ORAL at 16:06

## 2024-12-07 RX ADMIN — ROSUVASTATIN 10 MILLIGRAM(S): 40 TABLET, FILM COATED ORAL at 21:37

## 2024-12-07 RX ADMIN — SENNOSIDES 2 TABLET(S): 8.6 TABLET, FILM COATED ORAL at 21:37

## 2024-12-07 RX ADMIN — Medication 1 MILLIGRAM(S): at 12:54

## 2024-12-07 RX ADMIN — ACETAMINOPHEN 650 MILLIGRAM(S): 80 SOLUTION/ DROPS ORAL at 05:54

## 2024-12-07 RX ADMIN — Medication 10 MILLIGRAM(S): at 12:47

## 2024-12-07 RX ADMIN — Medication 1 DROP(S): at 21:38

## 2024-12-07 RX ADMIN — ACETAMINOPHEN 650 MILLIGRAM(S): 80 SOLUTION/ DROPS ORAL at 12:55

## 2024-12-07 RX ADMIN — Medication 500 MILLIGRAM(S): at 12:54

## 2024-12-07 RX ADMIN — Medication 3 MILLIGRAM(S): at 21:36

## 2024-12-07 NOTE — PROGRESS NOTE ADULT - SUBJECTIVE AND OBJECTIVE BOX
Podiatry Progress Note    Subjective:  GIAN MELISSA is a  84y Female.   Seen bedside.   Patient is a 84y old  Female who presents with a chief complaint of Rehab of Right femur fx s/p ORIF (07 Dec 2024 13:20)      Past Medical History and Surgical History  PAST MEDICAL & SURGICAL HISTORY:  Rheumatoid arthritis      HTN (hypertension)      Breast cancer  last radiation 2015      Depression      Chronic GERD      OA (osteoarthritis)      Rheumatoid arthritis      History of right hip replacement      Status post left hip replacement      S/P total knee replacement, left      H/O vaginal hysterectomy      Status post cholecystectomy      S/P lumpectomy of breast  2015           Objective:  Vital Signs Last 24 Hrs  T(C): 36.7 (07 Dec 2024 12:18), Max: 36.8 (07 Dec 2024 06:19)  T(F): 98 (07 Dec 2024 12:18), Max: 98.2 (07 Dec 2024 06:19)  HR: 90 (07 Dec 2024 12:18) (88 - 109)  BP: 135/70 (07 Dec 2024 12:18) (135/70 - 156/89)  BP(mean): 99 (07 Dec 2024 06:19) (99 - 109)  RR: 18 (07 Dec 2024 12:18) (18 - 18)  SpO2: --                            7.6    7.29  )-----------( 238      ( 06 Dec 2024 05:45 )             23.6                 12-06    131[L]  |  95[L]  |  22[H]  ----------------------------<  108[H]  3.9   |  26  |  0.8    Ca    8.4      06 Dec 2024 05:45  Mg     1.7     12-06    TPro  5.4[L]  /  Alb  2.9[L]  /  TBili  0.5  /  DBili  x   /  AST  31  /  ALT  10  /  AlkPhos  94  12-06        Physical Exam Left Lower Extremity Focused:   Derm: Surgical site fully healed. Skin edges will approximated and coapted. Mild maceration at the most proximal suture. Sutures intact. Dressings clean dry and intact. No evidence of bleeding or surgical site infection at this time.  Vascular: DP and PT Pulses intact; Foot is Warm to the touch; Capillary Refill Time < 3 Seconds;    Neuro: Protective Sensation Diminished / Moderately Neuropathic   MSK: muscle strength 4/5     Assessment:  s/p Right femur ORIF 12/2    Plan:  Chart reviewed and Patient evaluated. All Questions and Concerns Addressed and Answered   Weight Bearing Status; WBAT    Sutures were removed from the left foot surgical site.  No current intervention is necessary from podiatry.  Patient Stable Per Podiatry Standpoint; Follow Up as an Outpatient w/ Dr. Stevens  Discussed Plan w/ Attending     Podiatry   Please message on teams for further questions

## 2024-12-07 NOTE — PROGRESS NOTE ADULT - ASSESSMENT
Assessment:   85 yo F with PMHx of GERD, HTN, OA s/p bilateral TKA and ROHIT, s/p L forefoot exostosis removal (11/2024), RA (previously on methotrexate), and hx of breast cancer (in remission since 2015) who presented to the hospital after a fall and found to have comminuted right femoral distal metadiaphyseal fracture with volar apex angulation and 1.5 cm posterior displacement and small knee joint effusion. She underwent a R femur ORIF (12/2). Weightbearing status WBAT.    Plan:    #Rehab of Right femur fx s/p ORIF with impairment in mobility and ADLs and iADLs. The patient presented with co-morbidities requiring close physiatry follow-up at least 3 times a week to monitor his progress and monitor his comorbidities including HTN, OA s/p TKA and ROHIT, RA, and s/p L forefoot exostosis removal (11/2024).The patient's co-morbidities do not limit the patient's ability to participate in rehabilitative therapy. The patient was seen by PT/OT and  required max A with transfers, standby assist for UB dressing, dependent for LB dressing, and ambulates 1 side step by b/s secondary to decreased weight bearing tolerance on RLE and fear of falling, The patient was previously independent with all her ADLs and some iADLs (dependent on laundry and grocery) and ambulated with cane/walker occassionally and now presents with functional decline. The patient can benefit from and tolerate 3 hours of restorative therapy daily. The patient is an excellent acute inpatient rehabilitation candidate.     #S/P right distal femur fracture/ ORIF  -CTH shows no acute findings.  -CT Right femur shows acute comminuted right femoral distal metadiaphyseal fracture with volar apex angulation and 1.5 cm posterior displacement and small knee joint effusion.  -s/p subsequent R femur ORIF (12/2).   -Weight bearing status WBAT.  -Per Ortho, DVT ppx x6 weeks post-op  -c/w ASA 81mg QD  - Pain control: oxycodone  5 and 10mg prn, Tylenol q6  -Will adjust pain medications to optimize pain control  -c/w Calcium 600+D 600 mg-200 tablet: BID  -PT/ OT  - 12/6 BLE dopplers: no DVT in LLE. Unable to visualize venous system in RLE    #s/p recent fall last week with HT and bilateral facial contusions/ ecchymosis/ no LOC  - monitor    #Recent left heel spur resection  - WBAT  - limiting ambulation  - to wear cast-boot for longer distances as per patient    #Post-op acute blood loss anemia  -Baseline hgb ~10.  -H/H 7.6/23.6 (7.5/23.1) 12/6  -c/w H/H monitoring and hemodynamic status monitoring  -Transfuse if hgb<7.    #Hypomagnesemia  -Mg 1.7 (12/6)  -c/w magnesium supplement as needed    #Azotemia  -12/6 BUN 31->22, improving    #Hyponatremia  -Na 131 (12/6)  - on regular salt diet  -will continue to monitor     #HTN  -continue to monitor hemodynamic status   - 12/6 started home metoprolol ER 25mg nightly  -will adjust medications as needed    #HLD  -c/w rosuvastatin 10mg QD     #GERD  - c/w famotidine 20mg  BID    #OA  #RA  - s/p bilat THR/ TKR  - Previously on methotrexate.   -Not on medication currently  - Follows up outpatient with rheumatology  - monitor for exacerbation now off Methotrexate    #MRSA precautions discontinued 12/6 per Infection Control.    #Anxiety/depression  - Previously on sertraline  - Will monitor     -Pain control: oxycodone and Tylenol    -GI/Bowel Mgmt: Miralax, Senna     -Skin: surgical incision along RLE    - Diet Regular    Precautions / PROPHYLAXIS:      - Falls      - DVT prophylaxis: lovenox

## 2024-12-07 NOTE — PROGRESS NOTE ADULT - SUBJECTIVE AND OBJECTIVE BOX
83 yo F with PMHx of GERD, HTN, OA s/p bilateral TKA and ROHIT, s/p L forefoot exostosis removal (11/2024) RA, and hx of breast cancer (in remission since 2015) who presents to the hospital after a fall. Around 1000, patient was seated in a chair and reaching for something on another chair when she lost her balance and fell onto her R side and hit her head. She was unable to get up from the ground and was lying on the floor for ~8 hours until her friend found her. +HS. -LOC. -AC. Subsequently, she had RLE pain. Of note, patient fell forwards two days prior and hit her face with subsequent bruising. Seen in ED at the time without any significant injuries found. No dizziness/lightheadedness associated with either fall. On admission, imaging showed acute comminuted right femoral distal metadiaphyseal fracture with volar apex angulation and 1.5 cm posterior displacement and small knee joint effusion. She underwent a R femur ORIF (12/2). Weightbearing status WBAT. During hospital course, patient had post-op blood loss anemia. Last H/H 7.5/23.1.    Imaging:  CT of R femur: Acute comminuted right femoral distal metadiaphyseal fracture with volar apex angulation and 1.5 cm posterior displacement. Small knee joint effusion.  CT head: No acute process seen    During their stay, the patient was evaluated by physical and occupational therapy. Prior to admission, patient was independent with ADLs, dependent on some iADLs (grocery, laundry), and ambulates with walker/cane occasionally The most recent evaluated functional status is max A with transfers, ambulates 1 side step by b/s secondary to decreased weight bearing tolerance on RLE and fear of falling, standby assist for UB dressing, dependent for LB dressing. Patient was deemed a good acute rehab candidate due to decline in functional status and ability to carry out activities of daily living. Patient is able to tolerate 3 hours of therapy 5-7 days a week and is motivated to participate.    The patient was evaluated by the PM&R team once medically stable. The patient was found to have functional limitations in terms of physical strength/mobility and ability to carry out ADLs (self care, transfers, ambulation). Patient admitted to   12/5 for rehabilitation of R femur fx, s/p ORIF with decline in ADLs.    Living Situation: resides in  with 1 FOS to bedroom (uses stair lift) and 3 steps to enter  PLOF:  independent with ADLs, dependent on some iADLs (grocery, laundry), and ambulates with walker/cane occasionally  CLOF:  max A with transfers, standby assist for UB dressing, dependent for LB dressing, and ambulates 1 side step by b/s secondary to decreased weight bearing tolerance on RLE and fear of falling    Today’s Subjective & Review of Symptoms  Overnight: patient complaining of constipation  Patient seen this morning. States took dulcolax yesterday with no bowel movement. Agreeable to trying a suppository.   Tolerating PT/OT today.   Voiding spontaneously.   Vitals reviewed.       Review of Systems:   Constitutional:    [x ] WNL           [   ] poor appetite   [ ] insomnia   [  ] tired   Cardio:           [x ] WNL           [   ] CP              [ ] MOJICA        [  ] palpitations               Resp:             [x ] WNL           [   ] SOB             [ ] cough      [  ] wheezing   GI:               [ ] WNL           [ x  ] constipation    [ ] diarrhea   [  ] abdominal pain       [ ] nausea   [  ] emesis                                :               [ x] WNL           [   ] MARTINS           [ ] dysuria    [  ] difficulty voiding   [ ] Other:    Endo:             [ x] WNL           [   ] polyuria        [ ] temperature intolerance                 Skin:             [ ] WNL           [   ] pain            [x ] incision-right LE with dressing applied   [ x ] rash-ecchymosis along bilateral facial cheeks   MSK:              [ ] WNL           [ x  ] muscle pain     [x ] joint pain [x ] muscle tenderness   [x ] swelling RLE   Neuro:            [ ] WNL           [   ] HA              [ ] change in vision   [   ] tremor   [x ] weakness [  ] dysphagia    Cognitive:        [x ] WNL           [   ] confusion      Psych:            [x ] WNL           [   ] hallucinations   [   ]agitation   [   ] delusion   [   ]depression    Physical Exam:  Vital Signs Last 24 Hrs  T(C): 36.7 (07 Dec 2024 12:18), Max: 36.8 (07 Dec 2024 06:19)  T(F): 98 (07 Dec 2024 12:18), Max: 98.2 (07 Dec 2024 06:19)  HR: 90 (07 Dec 2024 12:18) (88 - 109)  BP: 135/70 (07 Dec 2024 12:18) (135/70 - 156/89)  BP(mean): 99 (07 Dec 2024 06:19) (99 - 109)  RR: 18 (07 Dec 2024 12:18) (18 - 18)  SpO2: --      General: Resting in bed                     HEENT: [   ] NC/AT, EOMI,  Normal Conjunctivae,   [  x ] other: bilateral cheek facial abrasion, normal conjunctivae, EOMI  Cardio: [  x ] RRR, no murmur,   [   ] other:                              Pulm: [ x  ] No Respiratory Distress,  Lungs CTAB,   [   ] other:             Abdomen: [ x  ]ND/NT, Soft,   [   ] other:    : [ x  ] No martins catheter, [   ] Martins catheter present [  ] other:   MSK: [  ] No joint swelling,  [ x  ] other:  RLE/ knee joint swelling                      Ext: [ x  ]No C/C, No calf tenderness,   [  x ]other:  swelling of MCPs, no warmth  Skin: [   ]intact,   [ x  ] other: RLE incision site with post op dressing, C&D    Neurological Examination:  Cognitive: [  x  ] AAO x 3,   [  ]  other:       Attention:  [ x  ] intact,   [    ]  other:   Language: [ x ] intact  [  ]    Memory: [ x  ] intact,    [  ]  other:     Mood/Affect: [  x ] wnl  [    ]  other:                                                                             Communication: [x   ]Fluent, no dysarthria [    ] other:   CN II - XII:  [ x   ] intact,  [    ] other:                                                                                         Motor:   RUE: 5/5 shoulder abduction, 5/5 elbow flexion, 5/5 elbow extension, 5/5 finger flexion  LUE: 5/5 shoulder abduction, 5/5 elbow flexion, 5/5 elbow extension, 5/5 finger flexion  RLE: 2/5 hip flexion (within restricted ROM), 2+/5 knee ext/flex (within restricted ROM), 4+/5 plantarflexion/dorsiflexion   LLE: 5/5 hip flexion, 5/5 knee ext/flex, 4+/5 dorsiflexion/plantarflexion     Tone: [  x ] wnl,   [    ]  other:  DTRs: [ x ]symmetric, [   ] other:  Coordination:   [ x ] intact,   [  ] other:                                                                        Sensory: [ x   ] Intact to light touch  [  ] other:    MEDICATIONS  (STANDING):  acetaminophen     Tablet .. 650 milliGRAM(s) Oral every 6 hours  ascorbic acid 500 milliGRAM(s) Oral daily  aspirin enteric coated 81 milliGRAM(s) Oral daily  bisacodyl Suppository 10 milliGRAM(s) Rectal once  calcium carbonate 1250 mG  + Vitamin D (OsCal 500 + D) 1 Tablet(s) Oral daily  chlorhexidine 2% Cloths 1 Application(s) Topical <User Schedule>  enoxaparin Injectable 40 milliGRAM(s) SubCutaneous every 24 hours  famotidine    Tablet 20 milliGRAM(s) Oral two times a day  folic acid 1 milliGRAM(s) Oral daily  metoprolol succinate ER 25 milliGRAM(s) Oral at bedtime  ofloxacin 0.3% Solution 1 Drop(s) Right EYE three times a day  polyethylene glycol 3350 17 Gram(s) Oral daily  rosuvastatin 10 milliGRAM(s) Oral at bedtime  senna 2 Tablet(s) Oral at bedtime    MEDICATIONS  (PRN):  melatonin 3 milliGRAM(s) Oral at bedtime PRN Insomnia  oxyCODONE    IR 5 milliGRAM(s) Oral every 4 hours PRN Moderate Pain (4 - 6)  oxyCODONE    IR 10 milliGRAM(s) Oral every 4 hours PRN Severe Pain (7 - 10)      Labs:    12-06    131[L]  |  95[L]  |  22[H]  ----------------------------<  108[H]  3.9   |  26  |  0.8    Ca    8.4      06 Dec 2024 05:45  Mg     1.7     12-06    TPro  5.4[L]  /  Alb  2.9[L]  /  TBili  0.5  /  DBili  x   /  AST  31  /  ALT  10  /  AlkPhos  94  12-06                          7.6    7.29  )-----------( 238      ( 06 Dec 2024 05:45 )             23.6

## 2024-12-08 RX ADMIN — Medication 1 DROP(S): at 12:56

## 2024-12-08 RX ADMIN — Medication 1 DROP(S): at 05:44

## 2024-12-08 RX ADMIN — Medication 10 MILLIGRAM(S): at 04:37

## 2024-12-08 RX ADMIN — Medication 10 MILLIGRAM(S): at 20:37

## 2024-12-08 RX ADMIN — ROSUVASTATIN 10 MILLIGRAM(S): 40 TABLET, FILM COATED ORAL at 21:02

## 2024-12-08 RX ADMIN — ENOXAPARIN SODIUM 40 MILLIGRAM(S): 60 INJECTION INTRAVENOUS; SUBCUTANEOUS at 05:43

## 2024-12-08 RX ADMIN — FAMOTIDINE 20 MILLIGRAM(S): 20 TABLET, FILM COATED ORAL at 05:43

## 2024-12-08 RX ADMIN — Medication 81 MILLIGRAM(S): at 12:54

## 2024-12-08 RX ADMIN — ACETAMINOPHEN 650 MILLIGRAM(S): 80 SOLUTION/ DROPS ORAL at 12:54

## 2024-12-08 RX ADMIN — ACETAMINOPHEN 650 MILLIGRAM(S): 80 SOLUTION/ DROPS ORAL at 23:07

## 2024-12-08 RX ADMIN — Medication 10 MILLIGRAM(S): at 20:36

## 2024-12-08 RX ADMIN — ACETAMINOPHEN 650 MILLIGRAM(S): 80 SOLUTION/ DROPS ORAL at 17:31

## 2024-12-08 RX ADMIN — Medication 10 MILLIGRAM(S): at 13:01

## 2024-12-08 RX ADMIN — Medication 1 TABLET(S): at 12:54

## 2024-12-08 RX ADMIN — Medication 17 GRAM(S): at 12:54

## 2024-12-08 RX ADMIN — FAMOTIDINE 20 MILLIGRAM(S): 20 TABLET, FILM COATED ORAL at 17:30

## 2024-12-08 RX ADMIN — Medication 500 MILLIGRAM(S): at 12:54

## 2024-12-08 RX ADMIN — Medication 10 MILLIGRAM(S): at 17:50

## 2024-12-08 RX ADMIN — Medication 1 MILLIGRAM(S): at 12:54

## 2024-12-08 RX ADMIN — CHLORHEXIDINE GLUCONATE 1 APPLICATION(S): 1.2 RINSE ORAL at 05:43

## 2024-12-08 RX ADMIN — ACETAMINOPHEN 650 MILLIGRAM(S): 80 SOLUTION/ DROPS ORAL at 17:50

## 2024-12-08 RX ADMIN — ACETAMINOPHEN 650 MILLIGRAM(S): 80 SOLUTION/ DROPS ORAL at 12:57

## 2024-12-08 RX ADMIN — ACETAMINOPHEN 650 MILLIGRAM(S): 80 SOLUTION/ DROPS ORAL at 04:37

## 2024-12-08 RX ADMIN — Medication 1 DROP(S): at 21:01

## 2024-12-08 RX ADMIN — ACETAMINOPHEN 650 MILLIGRAM(S): 80 SOLUTION/ DROPS ORAL at 05:43

## 2024-12-08 NOTE — PROGRESS NOTE ADULT - SUBJECTIVE AND OBJECTIVE BOX
83 yo F with PMHx of GERD, HTN, OA s/p bilateral TKA and ROHIT, s/p L forefoot exostosis removal (11/2024) RA, and hx of breast cancer (in remission since 2015) who presents to the hospital after a fall. Around 1000, patient was seated in a chair and reaching for something on another chair when she lost her balance and fell onto her R side and hit her head. She was unable to get up from the ground and was lying on the floor for ~8 hours until her friend found her. +HS. -LOC. -AC. Subsequently, she had RLE pain. Of note, patient fell forwards two days prior and hit her face with subsequent bruising. Seen in ED at the time without any significant injuries found. No dizziness/lightheadedness associated with either fall. On admission, imaging showed acute comminuted right femoral distal metadiaphyseal fracture with volar apex angulation and 1.5 cm posterior displacement and small knee joint effusion. She underwent a R femur ORIF (12/2). Weightbearing status WBAT. During hospital course, patient had post-op blood loss anemia. Last H/H 7.5/23.1.    Imaging:  CT of R femur: Acute comminuted right femoral distal metadiaphyseal fracture with volar apex angulation and 1.5 cm posterior displacement. Small knee joint effusion.  CT head: No acute process seen    During their stay, the patient was evaluated by physical and occupational therapy. Prior to admission, patient was independent with ADLs, dependent on some iADLs (grocery, laundry), and ambulates with walker/cane occasionally The most recent evaluated functional status is max A with transfers, ambulates 1 side step by b/s secondary to decreased weight bearing tolerance on RLE and fear of falling, standby assist for UB dressing, dependent for LB dressing. Patient was deemed a good acute rehab candidate due to decline in functional status and ability to carry out activities of daily living. Patient is able to tolerate 3 hours of therapy 5-7 days a week and is motivated to participate.    The patient was evaluated by the PM&R team once medically stable. The patient was found to have functional limitations in terms of physical strength/mobility and ability to carry out ADLs (self care, transfers, ambulation). Patient admitted to   12/5 for rehabilitation of R femur fx, s/p ORIF with decline in ADLs.    Living Situation: resides in  with 1 FOS to bedroom (uses stair lift) and 3 steps to enter  PLOF:  independent with ADLs, dependent on some iADLs (grocery, laundry), and ambulates with walker/cane occasionally    Today’s Subjective & Review of Symptoms  No overnight events. Patient seen in the gym this AM. She has no acute complaints. Pain is mostly controlled. Had a BM yesterday and voiding spontaneously. Tolerating PT/OT today. Vitals reviewed.     CLOF:  max A with transfers, standby assist for UB dressing, dependent for LB dressing, and ambulates 1 side step by b/s secondary to decreased weight bearing tolerance on RLE and fear of falling    Review of Systems:   Constitutional:    [x ] WNL           [   ] poor appetite   [ ] insomnia   [  ] tired   Cardio:           [x ] WNL           [   ] CP              [ ] MOJICA        [  ] palpitations               Resp:             [x ] WNL           [   ] SOB             [ ] cough      [  ] wheezing   GI:               [ ] WNL           [ x  ] constipation    [ ] diarrhea   [  ] abdominal pain       [ ] nausea   [  ] emesis                                :               [ x] WNL           [   ] MARTINS           [ ] dysuria    [  ] difficulty voiding   [ ] Other:    Endo:             [ x] WNL           [   ] polyuria        [ ] temperature intolerance                 Skin:             [ ] WNL           [   ] pain            [x ] incision-right LE with dressing applied   [ x ] rash-ecchymosis along bilateral facial cheeks   MSK:              [ ] WNL           [ x  ] muscle pain     [x ] joint pain [x ] muscle tenderness   [x ] swelling RLE   Neuro:            [ ] WNL           [   ] HA              [ ] change in vision   [   ] tremor   [x ] weakness [  ] dysphagia    Cognitive:        [x ] WNL           [   ] confusion      Psych:            [x ] WNL           [   ] hallucinations   [   ]agitation   [   ] delusion   [   ]depression    Physical Exam:  Vital Signs Last 24 Hrs  T(C): 36.9 (08 Dec 2024 14:32), Max: 37 (07 Dec 2024 19:47)  T(F): 98.5 (08 Dec 2024 14:32), Max: 98.6 (07 Dec 2024 19:47)  HR: 83 (08 Dec 2024 14:32) (76 - 91)  BP: 117/64 (08 Dec 2024 14:32) (117/64 - 151/83)  BP(mean): --  RR: 20 (08 Dec 2024 14:32) (18 - 20)  SpO2: --    General: Resting in bed                     HEENT: [   ] NC/AT, EOMI,  Normal Conjunctivae,   [  x ] other: bilateral cheek facial abrasion, normal conjunctivae, EOMI  Cardio: [  x ] RRR, no murmur,   [   ] other:                              Pulm: [ x  ] No Respiratory Distress,  Lungs CTAB,   [   ] other:             Abdomen: [ x  ]ND/NT, Soft,   [   ] other:    : [ x  ] No martins catheter, [   ] Martins catheter present [  ] other:   MSK: [  ] No joint swelling,  [ x  ] other:  RLE/ knee joint swelling                      Ext: [ x  ]No C/C, No calf tenderness,   [  x ]other:  swelling of MCPs, no warmth  Skin: [   ]intact,   [ x  ] other: RLE incision site with post op dressing, C&D    Neurological Examination:  Cognitive: [  x  ] AAO x 3,   [  ]  other:       Attention:  [ x  ] intact,   [    ]  other:   Language: [ x ] intact  [  ]    Memory: [ x  ] intact,    [  ]  other:     Mood/Affect: [  x ] wnl  [    ]  other:                                                                             Communication: [x   ]Fluent, no dysarthria [    ] other:   CN II - XII:  [ x   ] intact,  [    ] other:                                                                                         Motor:   RUE: 5/5 shoulder abduction, 5/5 elbow flexion, 5/5 elbow extension, 5/5 finger flexion  LUE: 5/5 shoulder abduction, 5/5 elbow flexion, 5/5 elbow extension, 5/5 finger flexion  RLE: 2/5 hip flexion (within restricted ROM), 2+/5 knee ext/flex (within restricted ROM), 4+/5 plantarflexion/dorsiflexion   LLE: 5/5 hip flexion, 5/5 knee ext/flex, 4+/5 dorsiflexion/plantarflexion     Tone: [  x ] wnl,   [    ]  other:  DTRs: [ x ]symmetric, [   ] other:  Coordination:   [ x ] intact,   [  ] other:                                                                        Sensory: [ x   ] Intact to light touch  [  ] other:    MEDICATIONS  (STANDING):  acetaminophen     Tablet .. 650 milliGRAM(s) Oral every 6 hours  ascorbic acid 500 milliGRAM(s) Oral daily  aspirin enteric coated 81 milliGRAM(s) Oral daily  bisacodyl Suppository 10 milliGRAM(s) Rectal once  calcium carbonate 1250 mG  + Vitamin D (OsCal 500 + D) 1 Tablet(s) Oral daily  chlorhexidine 2% Cloths 1 Application(s) Topical <User Schedule>  enoxaparin Injectable 40 milliGRAM(s) SubCutaneous every 24 hours  famotidine    Tablet 20 milliGRAM(s) Oral two times a day  folic acid 1 milliGRAM(s) Oral daily  metoprolol succinate ER 25 milliGRAM(s) Oral at bedtime  ofloxacin 0.3% Solution 1 Drop(s) Right EYE three times a day  polyethylene glycol 3350 17 Gram(s) Oral daily  rosuvastatin 10 milliGRAM(s) Oral at bedtime  senna 2 Tablet(s) Oral at bedtime    MEDICATIONS  (PRN):  melatonin 3 milliGRAM(s) Oral at bedtime PRN Insomnia  oxyCODONE    IR 5 milliGRAM(s) Oral every 4 hours PRN Moderate Pain (4 - 6)  oxyCODONE    IR 10 milliGRAM(s) Oral every 4 hours PRN Severe Pain (7 - 10)      Labs:    12-06    131[L]  |  95[L]  |  22[H]  ----------------------------<  108[H]  3.9   |  26  |  0.8    Ca    8.4      06 Dec 2024 05:45  Mg     1.7     12-06    TPro  5.4[L]  /  Alb  2.9[L]  /  TBili  0.5  /  DBili  x   /  AST  31  /  ALT  10  /  AlkPhos  94  12-06                          7.6    7.29  )-----------( 238      ( 06 Dec 2024 05:45 )             23.6

## 2024-12-08 NOTE — PROGRESS NOTE ADULT - ASSESSMENT
Assessment:   83 yo F with PMHx of GERD, HTN, OA s/p bilateral TKA and ROHIT, s/p L forefoot exostosis removal (11/2024), RA (previously on methotrexate), and hx of breast cancer (in remission since 2015) who presented to the hospital after a fall and found to have comminuted right femoral distal metadiaphyseal fracture with volar apex angulation and 1.5 cm posterior displacement and small knee joint effusion. She underwent a R femur ORIF (12/2). Weightbearing status WBAT.    Plan:    #Rehab of Right femur fx s/p ORIF with impairment in mobility and ADLs and iADLs. The patient presented with co-morbidities requiring close physiatry follow-up at least 3 times a week to monitor his progress and monitor his comorbidities including HTN, OA s/p TKA and ROHIT, RA, and s/p L forefoot exostosis removal (11/2024).The patient's co-morbidities do not limit the patient's ability to participate in rehabilitative therapy. The patient was seen by PT/OT and  required max A with transfers, standby assist for UB dressing, dependent for LB dressing, and ambulates 1 side step by b/s secondary to decreased weight bearing tolerance on RLE and fear of falling, The patient was previously independent with all her ADLs and some iADLs (dependent on laundry and grocery) and ambulated with cane/walker occassionally and now presents with functional decline. The patient can benefit from and tolerate 3 hours of restorative therapy daily. The patient is an excellent acute inpatient rehabilitation candidate.     #S/P right distal femur fracture/ ORIF  -CTH shows no acute findings.  -CT Right femur shows acute comminuted right femoral distal metadiaphyseal fracture with volar apex angulation and 1.5 cm posterior displacement and small knee joint effusion.  -s/p subsequent R femur ORIF (12/2).   -Weight bearing status WBAT.  -Per Ortho, DVT ppx x6 weeks post-op  -c/w ASA 81mg QD  - Pain control: oxycodone  5 and 10mg prn, Tylenol q6  -Will adjust pain medications to optimize pain control  -c/w Calcium 600+D 600 mg-200 tablet: BID  -PT/ OT  - 12/6 BLE dopplers: no DVT in LLE. Unable to visualize venous system in RLE    #s/p recent fall last week with HT and bilateral facial contusions/ ecchymosis/ no LOC  - monitor    #Recent left heel spur resection  - WBAT  - limiting ambulation  - to wear cast-boot for longer distances as per patient    #Post-op acute blood loss anemia  -Baseline hgb ~10.  -H/H 7.6/23.6 (7.5/23.1) 12/6  -c/w H/H monitoring and hemodynamic status monitoring  -Transfuse if hgb<7.    #Hypomagnesemia  -Mg 1.7 (12/6)  -c/w magnesium supplement as needed    #Azotemia  -12/6 BUN 31->22, improving    #Hyponatremia  -Na 131 (12/6)  - on regular salt diet  -will continue to monitor     #HTN  -continue to monitor hemodynamic status   - 12/6 started home metoprolol ER 25mg nightly  -will adjust medications as needed    #HLD  -c/w rosuvastatin 10mg QD     #GERD  - c/w famotidine 20mg  BID    #OA  #RA  - s/p bilat THR/ TKR  - Previously on methotrexate.   -Not on medication currently  - Follows up outpatient with rheumatology  - monitor for exacerbation now off Methotrexate    #MRSA precautions discontinued 12/6 per Infection Control.    #Anxiety/depression  - Previously on sertraline  - Will monitor     -Pain control: oxycodone and Tylenol    -GI/Bowel Mgmt: Miralax, Senna     -Skin: surgical incision along RLE    - Diet Regular    Precautions / PROPHYLAXIS:      - Falls      - DVT prophylaxis: lovenox   Assessment:   83 yo F with PMHx of GERD, HTN, OA s/p bilateral TKA and ROHIT, s/p L forefoot exostosis removal (11/2024), RA (previously on methotrexate), and hx of breast cancer (in remission since 2015) who presented to the hospital after a fall and found to have comminuted right femoral distal metadiaphyseal fracture with volar apex angulation and 1.5 cm posterior displacement and small knee joint effusion. She underwent a R femur ORIF (12/2). Weightbearing status WBAT.    Plan:    #Rehab of Right femur fx s/p ORIF with impairment in mobility and ADLs and iADLs. The patient presented with co-morbidities requiring close physiatry follow-up at least 3 times a week to monitor his progress and monitor his comorbidities including HTN, OA s/p TKA and ROHIT, RA, and s/p L forefoot exostosis removal (11/2024).The patient's co-morbidities do not limit the patient's ability to participate in rehabilitative therapy. The patient was seen by PT/OT and  required max A with transfers, standby assist for UB dressing, dependent for LB dressing, and ambulates 1 side step by b/s secondary to decreased weight bearing tolerance on RLE and fear of falling, The patient was previously independent with all her ADLs and some iADLs (dependent on laundry and grocery) and ambulated with cane/walker occasionally and now presents with functional decline. The patient can benefit from and tolerate 3 hours of restorative therapy daily. The patient is an excellent acute inpatient rehabilitation candidate.     #S/P right distal femur fracture/ ORIF  -CTH shows no acute findings.  -CT Right femur shows acute comminuted right femoral distal metadiaphyseal fracture with volar apex angulation and 1.5 cm posterior displacement and small knee joint effusion.  -s/p subsequent R femur ORIF (12/2).   -Weight bearing status WBAT.  -Per Ortho, DVT ppx x6 weeks post-op  -c/w ASA 81mg QD  - Pain control: oxycodone  5 and 10mg prn, Tylenol q6  -Will adjust pain medications to optimize pain control  -c/w Calcium 600+D 600 mg-200 tablet: BID  -PT/ OT  - 12/6 BLE dopplers: no DVT in LLE. Unable to visualize venous system in RLE    #s/p recent fall last week with HT and bilateral facial contusions/ ecchymosis/ no LOC  - monitor    #Recent left heel spur resection  - WBAT  - limiting ambulation  - to wear cast-boot for longer distances as per patient    #Post-op acute blood loss anemia  -Baseline hgb ~10.  -H/H 7.6/23.6 (7.5/23.1) 12/6  -c/w H/H monitoring and hemodynamic status monitoring  -Transfuse if hgb<7.    #Hypomagnesemia  -Mg 1.7 (12/6)  -c/w magnesium supplement as needed    #Azotemia  -12/6 BUN 31->22, improving    #Hyponatremia  -Na 131 (12/6)  - on regular salt diet  -will continue to monitor     #HTN  -continue to monitor hemodynamic status   - 12/6 started home metoprolol ER 25mg nightly  -will adjust medications as needed    #HLD  -c/w rosuvastatin 10mg QD     #GERD  - c/w famotidine 20mg  BID    #OA  #RA  - s/p bilat THR/ TKR  - Previously on methotrexate.   -Not on medication currently  - Follows up outpatient with rheumatology  - monitor for exacerbation now off Methotrexate    #MRSA precautions discontinued 12/6 per Infection Control.    #Anxiety/depression  - Previously on sertraline  - Will monitor     -Pain control: oxycodone and Tylenol    -GI/Bowel Mgmt: Miralax, Senna     -Skin: surgical incision along RLE    - Diet Regular    Precautions / PROPHYLAXIS:      - Falls      - DVT prophylaxis: lovenox

## 2024-12-09 LAB
24R-OH-CALCIDIOL SERPL-MCNC: 59 NG/ML — SIGNIFICANT CHANGE UP (ref 30–80)
ALBUMIN SERPL ELPH-MCNC: 3 G/DL — LOW (ref 3.5–5.2)
ALP SERPL-CCNC: 115 U/L — SIGNIFICANT CHANGE UP (ref 30–115)
ALT FLD-CCNC: 14 U/L — SIGNIFICANT CHANGE UP (ref 0–41)
ANION GAP SERPL CALC-SCNC: 11 MMOL/L — SIGNIFICANT CHANGE UP (ref 7–14)
AST SERPL-CCNC: 31 U/L — SIGNIFICANT CHANGE UP (ref 0–41)
BASOPHILS # BLD AUTO: 0.02 K/UL — SIGNIFICANT CHANGE UP (ref 0–0.2)
BASOPHILS NFR BLD AUTO: 0.4 % — SIGNIFICANT CHANGE UP (ref 0–1)
BILIRUB SERPL-MCNC: 0.8 MG/DL — SIGNIFICANT CHANGE UP (ref 0.2–1.2)
BUN SERPL-MCNC: 16 MG/DL — SIGNIFICANT CHANGE UP (ref 10–20)
CALCIUM SERPL-MCNC: 8.9 MG/DL — SIGNIFICANT CHANGE UP (ref 8.4–10.5)
CHLORIDE SERPL-SCNC: 93 MMOL/L — LOW (ref 98–110)
CO2 SERPL-SCNC: 27 MMOL/L — SIGNIFICANT CHANGE UP (ref 17–32)
CREAT SERPL-MCNC: 0.7 MG/DL — SIGNIFICANT CHANGE UP (ref 0.7–1.5)
EGFR: 85 ML/MIN/1.73M2 — SIGNIFICANT CHANGE UP
EOSINOPHIL # BLD AUTO: 0.19 K/UL — SIGNIFICANT CHANGE UP (ref 0–0.7)
EOSINOPHIL NFR BLD AUTO: 3.8 % — SIGNIFICANT CHANGE UP (ref 0–8)
GLUCOSE SERPL-MCNC: 95 MG/DL — SIGNIFICANT CHANGE UP (ref 70–99)
HCT VFR BLD CALC: 25.4 % — LOW (ref 37–47)
HGB BLD-MCNC: 8 G/DL — LOW (ref 12–16)
IMM GRANULOCYTES NFR BLD AUTO: 2.8 % — HIGH (ref 0.1–0.3)
LYMPHOCYTES # BLD AUTO: 1.31 K/UL — SIGNIFICANT CHANGE UP (ref 1.2–3.4)
LYMPHOCYTES # BLD AUTO: 26.2 % — SIGNIFICANT CHANGE UP (ref 20.5–51.1)
MAGNESIUM SERPL-MCNC: 1.3 MG/DL — LOW (ref 1.8–2.4)
MCHC RBC-ENTMCNC: 31.3 PG — HIGH (ref 27–31)
MCHC RBC-ENTMCNC: 31.5 G/DL — LOW (ref 32–37)
MCV RBC AUTO: 99.2 FL — HIGH (ref 81–99)
MONOCYTES # BLD AUTO: 0.61 K/UL — HIGH (ref 0.1–0.6)
MONOCYTES NFR BLD AUTO: 12.2 % — HIGH (ref 1.7–9.3)
NEUTROPHILS # BLD AUTO: 2.73 K/UL — SIGNIFICANT CHANGE UP (ref 1.4–6.5)
NEUTROPHILS NFR BLD AUTO: 54.6 % — SIGNIFICANT CHANGE UP (ref 42.2–75.2)
NRBC # BLD: 0 /100 WBCS — SIGNIFICANT CHANGE UP (ref 0–0)
PLATELET # BLD AUTO: 291 K/UL — SIGNIFICANT CHANGE UP (ref 130–400)
PMV BLD: 9.3 FL — SIGNIFICANT CHANGE UP (ref 7.4–10.4)
POTASSIUM SERPL-MCNC: 4.2 MMOL/L — SIGNIFICANT CHANGE UP (ref 3.5–5)
POTASSIUM SERPL-SCNC: 4.2 MMOL/L — SIGNIFICANT CHANGE UP (ref 3.5–5)
PROT SERPL-MCNC: 5.3 G/DL — LOW (ref 6–8)
RBC # BLD: 2.56 M/UL — LOW (ref 4.2–5.4)
RBC # FLD: 15.2 % — HIGH (ref 11.5–14.5)
SODIUM SERPL-SCNC: 131 MMOL/L — LOW (ref 135–146)
WBC # BLD: 5 K/UL — SIGNIFICANT CHANGE UP (ref 4.8–10.8)
WBC # FLD AUTO: 5 K/UL — SIGNIFICANT CHANGE UP (ref 4.8–10.8)

## 2024-12-09 RX ORDER — MAGNESIUM SULFATE 500 MG/ML
2 INJECTION, SOLUTION INTRAMUSCULAR; INTRAVENOUS
Refills: 0 | Status: COMPLETED | OUTPATIENT
Start: 2024-12-09 | End: 2024-12-10

## 2024-12-09 RX ADMIN — FAMOTIDINE 20 MILLIGRAM(S): 20 TABLET, FILM COATED ORAL at 18:40

## 2024-12-09 RX ADMIN — MAGNESIUM SULFATE 25 GRAM(S): 500 INJECTION, SOLUTION INTRAMUSCULAR; INTRAVENOUS at 18:40

## 2024-12-09 RX ADMIN — Medication 400 MILLIGRAM(S): at 18:40

## 2024-12-09 RX ADMIN — Medication 17 GRAM(S): at 12:25

## 2024-12-09 RX ADMIN — CHLORHEXIDINE GLUCONATE 1 APPLICATION(S): 1.2 RINSE ORAL at 05:40

## 2024-12-09 RX ADMIN — Medication 1 TABLET(S): at 12:26

## 2024-12-09 RX ADMIN — Medication 500 MILLIGRAM(S): at 12:25

## 2024-12-09 RX ADMIN — Medication 1 DROP(S): at 18:41

## 2024-12-09 RX ADMIN — FAMOTIDINE 20 MILLIGRAM(S): 20 TABLET, FILM COATED ORAL at 05:40

## 2024-12-09 RX ADMIN — Medication 1 MILLIGRAM(S): at 12:25

## 2024-12-09 RX ADMIN — Medication 5 MILLIGRAM(S): at 22:38

## 2024-12-09 RX ADMIN — Medication 5 MILLIGRAM(S): at 10:29

## 2024-12-09 RX ADMIN — SENNOSIDES 2 TABLET(S): 8.6 TABLET, FILM COATED ORAL at 21:10

## 2024-12-09 RX ADMIN — Medication 1 DROP(S): at 05:35

## 2024-12-09 RX ADMIN — Medication 1 DROP(S): at 21:15

## 2024-12-09 RX ADMIN — ROSUVASTATIN 10 MILLIGRAM(S): 40 TABLET, FILM COATED ORAL at 21:10

## 2024-12-09 RX ADMIN — ACETAMINOPHEN 650 MILLIGRAM(S): 80 SOLUTION/ DROPS ORAL at 12:25

## 2024-12-09 RX ADMIN — Medication 5 MILLIGRAM(S): at 23:15

## 2024-12-09 RX ADMIN — Medication 5 MILLIGRAM(S): at 10:59

## 2024-12-09 RX ADMIN — ENOXAPARIN SODIUM 40 MILLIGRAM(S): 60 INJECTION INTRAVENOUS; SUBCUTANEOUS at 05:39

## 2024-12-09 RX ADMIN — ACETAMINOPHEN 650 MILLIGRAM(S): 80 SOLUTION/ DROPS ORAL at 18:40

## 2024-12-09 RX ADMIN — ACETAMINOPHEN 650 MILLIGRAM(S): 80 SOLUTION/ DROPS ORAL at 05:39

## 2024-12-09 RX ADMIN — ACETAMINOPHEN 650 MILLIGRAM(S): 80 SOLUTION/ DROPS ORAL at 05:40

## 2024-12-09 RX ADMIN — Medication 81 MILLIGRAM(S): at 12:25

## 2024-12-09 RX ADMIN — ACETAMINOPHEN 650 MILLIGRAM(S): 80 SOLUTION/ DROPS ORAL at 12:55

## 2024-12-09 RX ADMIN — MAGNESIUM SULFATE 25 GRAM(S): 500 INJECTION, SOLUTION INTRAMUSCULAR; INTRAVENOUS at 22:41

## 2024-12-09 NOTE — PROGRESS NOTE ADULT - SUBJECTIVE AND OBJECTIVE BOX
83 yo F with PMHx of GERD, HTN, OA s/p bilateral TKA and ROHIT, s/p L forefoot exostosis removal (11/2024) RA, and hx of breast cancer (in remission since 2015) who presents to the hospital after a fall. Around 1000, patient was seated in a chair and reaching for something on another chair when she lost her balance and fell onto her R side and hit her head. She was unable to get up from the ground and was lying on the floor for ~8 hours until her friend found her. +HS. -LOC. -AC. Subsequently, she had RLE pain. Of note, patient fell forwards two days prior and hit her face with subsequent bruising. Seen in ED at the time without any significant injuries found. No dizziness/lightheadedness associated with either fall. On admission, imaging showed acute comminuted right femoral distal metadiaphyseal fracture with volar apex angulation and 1.5 cm posterior displacement and small knee joint effusion. She underwent a R femur ORIF (12/2). Weightbearing status WBAT. During hospital course, patient had post-op blood loss anemia. Last H/H 7.5/23.1.    Imaging:  CT of R femur: Acute comminuted right femoral distal metadiaphyseal fracture with volar apex angulation and 1.5 cm posterior displacement. Small knee joint effusion.  CT head: No acute process seen    During their stay, the patient was evaluated by physical and occupational therapy. Prior to admission, patient was independent with ADLs, dependent on some iADLs (grocery, laundry), and ambulates with walker/cane occasionally The most recent evaluated functional status is max A with transfers, ambulates 1 side step by b/s secondary to decreased weight bearing tolerance on RLE and fear of falling, standby assist for UB dressing, dependent for LB dressing. Patient was deemed a good acute rehab candidate due to decline in functional status and ability to carry out activities of daily living. Patient is able to tolerate 3 hours of therapy 5-7 days a week and is motivated to participate.    The patient was evaluated by the PM&R team once medically stable. The patient was found to have functional limitations in terms of physical strength/mobility and ability to carry out ADLs (self care, transfers, ambulation). Patient admitted to   12/5 for rehabilitation of R femur fx, s/p ORIF with decline in ADLs.    Living Situation: resides in  with 1 FOS to bedroom (uses stair lift) and 3 steps to enter  PLOF:  independent with ADLs, dependent on some iADLs (grocery, laundry), and ambulates with walker/cane occasionally    Today’s Subjective & Review of Symptoms  No overnight events  Patient seen this morning ***    Vitals and labs reviewed  Tolerating PT/OT    CLOF:  max A with transfers, standby assist for UB dressing, dependent for LB dressing, and ambulates 1 side step by b/s secondary to decreased weight bearing tolerance on RLE and fear of falling    Review of Systems:   Constitutional:    [x ] WNL           [   ] poor appetite   [ ] insomnia   [  ] tired   Cardio:           [x ] WNL           [   ] CP              [ ] MOJICA        [  ] palpitations               Resp:             [x ] WNL           [   ] SOB             [ ] cough      [  ] wheezing   GI:               [ ] WNL           [ x  ] constipation    [ ] diarrhea   [  ] abdominal pain       [ ] nausea   [  ] emesis                                :               [ x] WNL           [   ] MARTINS           [ ] dysuria    [  ] difficulty voiding   [ ] Other:    Endo:             [ x] WNL           [   ] polyuria        [ ] temperature intolerance                 Skin:             [ ] WNL           [   ] pain            [x ] incision-right LE with dressing applied   [ x ] rash-ecchymosis along bilateral facial cheeks   MSK:              [ ] WNL           [ x  ] muscle pain     [x ] joint pain [x ] muscle tenderness   [x ] swelling RLE   Neuro:            [ ] WNL           [   ] HA              [ ] change in vision   [   ] tremor   [x ] weakness [  ] dysphagia    Cognitive:        [x ] WNL           [   ] confusion      Psych:            [x ] WNL           [   ] hallucinations   [   ]agitation   [   ] delusion   [   ]depression    Physical Exam:  Vital Signs Last 24 Hrs  T(C): 36.6 (09 Dec 2024 04:59), Max: 36.9 (08 Dec 2024 14:32)  T(F): 97.9 (09 Dec 2024 04:59), Max: 98.5 (08 Dec 2024 14:32)  HR: 84 (09 Dec 2024 04:59) (83 - 84)  BP: 155/82 (09 Dec 2024 04:59) (117/64 - 155/82)  BP(mean): --  RR: 20 (09 Dec 2024 04:59) (20 - 20)  SpO2: --    General: Resting in bed                     HEENT: [   ] NC/AT, EOMI,  Normal Conjunctivae,   [  x ] other: bilateral cheek facial abrasion, normal conjunctivae, EOMI  Cardio: [  x ] RRR, no murmur,   [   ] other:                              Pulm: [ x  ] No Respiratory Distress,  Lungs CTAB,   [   ] other:             Abdomen: [ x  ]ND/NT, Soft,   [   ] other:    : [ x  ] No martins catheter, [   ] Martins catheter present [  ] other:   MSK: [  ] No joint swelling,  [ x  ] other:  RLE/ knee joint swelling                      Ext: [ x  ]No C/C, No calf tenderness,   [  x ]other:  swelling of MCPs, no warmth  Skin: [   ]intact,   [ x  ] other: RLE incision site with post op dressing, C&D    Neurological Examination:  Cognitive: [  x  ] AAO x 3,   [  ]  other:       Attention:  [ x  ] intact,   [    ]  other:   Language: [ x ] intact  [  ]    Memory: [ x  ] intact,    [  ]  other:     Mood/Affect: [  x ] wnl  [    ]  other:                                                                             Communication: [x   ]Fluent, no dysarthria [    ] other:   CN II - XII:  [ x   ] intact,  [    ] other:                                                                                         Motor:   RUE: 5/5 shoulder abduction, 5/5 elbow flexion, 5/5 elbow extension, 5/5 finger flexion  LUE: 5/5 shoulder abduction, 5/5 elbow flexion, 5/5 elbow extension, 5/5 finger flexion  RLE: 2/5 hip flexion (within restricted ROM), 2+/5 knee ext/flex (within restricted ROM), 4+/5 plantarflexion/dorsiflexion   LLE: 5/5 hip flexion, 5/5 knee ext/flex, 4+/5 dorsiflexion/plantarflexion     Tone: [  x ] wnl,   [    ]  other:  DTRs: [ x ]symmetric, [   ] other:  Coordination:   [ x ] intact,   [  ] other:                                                                        Sensory: [ x   ] Intact to light touch  [  ] other:    MEDICATIONS  (STANDING):  acetaminophen     Tablet .. 650 milliGRAM(s) Oral every 6 hours  ascorbic acid 500 milliGRAM(s) Oral daily  aspirin enteric coated 81 milliGRAM(s) Oral daily  bisacodyl Suppository 10 milliGRAM(s) Rectal once  calcium carbonate 1250 mG  + Vitamin D (OsCal 500 + D) 1 Tablet(s) Oral daily  chlorhexidine 2% Cloths 1 Application(s) Topical <User Schedule>  enoxaparin Injectable 40 milliGRAM(s) SubCutaneous every 24 hours  famotidine    Tablet 20 milliGRAM(s) Oral two times a day  folic acid 1 milliGRAM(s) Oral daily  magnesium oxide 400 milliGRAM(s) Oral two times a day with meals  metoprolol succinate ER 25 milliGRAM(s) Oral at bedtime  ofloxacin 0.3% Solution 1 Drop(s) Right EYE three times a day  polyethylene glycol 3350 17 Gram(s) Oral daily  rosuvastatin 10 milliGRAM(s) Oral at bedtime  senna 2 Tablet(s) Oral at bedtime    MEDICATIONS  (PRN):  melatonin 3 milliGRAM(s) Oral at bedtime PRN Insomnia  oxyCODONE    IR 5 milliGRAM(s) Oral every 4 hours PRN Moderate Pain (4 - 6)  oxyCODONE    IR 10 milliGRAM(s) Oral every 4 hours PRN Severe Pain (7 - 10)      Labs:                        8.0    5.00  )-----------( 291      ( 09 Dec 2024 06:19 )             25.4     12-09    131[L]  |  93[L]  |  16  ----------------------------<  95  4.2   |  27  |  0.7    Ca    8.9      09 Dec 2024 06:19  Mg     1.3     12-09    TPro  5.3[L]  /  Alb  3.0[L]  /  TBili  0.8  /  DBili  x   /  AST  31  /  ALT  14  /  AlkPhos  115  12-09        12-06    131[L]  |  95[L]  |  22[H]  ----------------------------<  108[H]  3.9   |  26  |  0.8    Ca    8.4      06 Dec 2024 05:45  Mg     1.7     12-06    TPro  5.4[L]  /  Alb  2.9[L]  /  TBili  0.5  /  DBili  x   /  AST  31  /  ALT  10  /  AlkPhos  94  12-06                          7.6    7.29  )-----------( 238      ( 06 Dec 2024 05:45 )             23.6      85 yo F with PMHx of GERD, HTN, OA s/p bilateral TKA and ROHIT, s/p L forefoot exostosis removal (11/2024) RA, and hx of breast cancer (in remission since 2015) who presents to the hospital after a fall. Around 1000, patient was seated in a chair and reaching for something on another chair when she lost her balance and fell onto her R side and hit her head. She was unable to get up from the ground and was lying on the floor for ~8 hours until her friend found her. +HS. -LOC. -AC. Subsequently, she had RLE pain. Of note, patient fell forwards two days prior and hit her face with subsequent bruising. Seen in ED at the time without any significant injuries found. No dizziness/lightheadedness associated with either fall. On admission, imaging showed acute comminuted right femoral distal metadiaphyseal fracture with volar apex angulation and 1.5 cm posterior displacement and small knee joint effusion. She underwent a R femur ORIF (12/2). Weightbearing status WBAT. During hospital course, patient had post-op blood loss anemia. Last H/H 7.5/23.1.    Imaging:  CT of R femur: Acute comminuted right femoral distal metadiaphyseal fracture with volar apex angulation and 1.5 cm posterior displacement. Small knee joint effusion.  CT head: No acute process seen    During their stay, the patient was evaluated by physical and occupational therapy. Prior to admission, patient was independent with ADLs, dependent on some iADLs (grocery, laundry), and ambulates with walker/cane occasionally The most recent evaluated functional status is max A with transfers, ambulates 1 side step by b/s secondary to decreased weight bearing tolerance on RLE and fear of falling, standby assist for UB dressing, dependent for LB dressing. Patient was deemed a good acute rehab candidate due to decline in functional status and ability to carry out activities of daily living. Patient is able to tolerate 3 hours of therapy 5-7 days a week and is motivated to participate.    The patient was evaluated by the PM&R team once medically stable. The patient was found to have functional limitations in terms of physical strength/mobility and ability to carry out ADLs (self care, transfers, ambulation). Patient admitted to   12/5 for rehabilitation of R femur fx, s/p ORIF with decline in ADLs.    Living Situation: resides in  with 1 FOS to bedroom (uses stair lift) and 3 steps to enter  PLOF:  independent with ADLs, dependent on some iADLs (grocery, laundry), and ambulates with walker/cane occasionally    Today’s Subjective & Review of Symptoms  No overnight events  Patient seen this morning feeling well with no acute complaints. States had a bowel movement yesterday  Vitals and labs reviewed  Tolerating PT/OT    CLOF:  max A with transfers, standby assist for UB dressing, dependent for LB dressing, and ambulates 1 side step by b/s secondary to decreased weight bearing tolerance on RLE and fear of falling    Review of Systems:   Constitutional:    [x ] WNL           [   ] poor appetite   [ ] insomnia   [  ] tired   Cardio:           [x ] WNL           [   ] CP              [ ] MOJICA        [  ] palpitations               Resp:             [x ] WNL           [   ] SOB             [ ] cough      [  ] wheezing   GI:               [ x] WNL           [   ] constipation    [ ] diarrhea   [  ] abdominal pain       [ ] nausea   [  ] emesis                                :               [ x] WNL           [   ] MARTINS           [ ] dysuria    [  ] difficulty voiding   [ ] Other:    Endo:             [ x] WNL           [   ] polyuria        [ ] temperature intolerance                 Skin:             [ ] WNL           [   ] pain            [x ] incision-right LE with dressing applied   [ x ] rash-ecchymosis along bilateral facial cheeks   MSK:              [ ] WNL           [ x  ] muscle pain     [x ] joint pain [x ] muscle tenderness   [x ] swelling RLE   Neuro:            [ ] WNL           [   ] HA              [ ] change in vision   [   ] tremor   [x ] weakness [  ] dysphagia    Cognitive:        [x ] WNL           [   ] confusion      Psych:            [x ] WNL           [   ] hallucinations   [   ]agitation   [   ] delusion   [   ]depression    Physical Exam:  Vital Signs Last 24 Hrs  T(C): 36.6 (09 Dec 2024 04:59), Max: 36.9 (08 Dec 2024 14:32)  T(F): 97.9 (09 Dec 2024 04:59), Max: 98.5 (08 Dec 2024 14:32)  HR: 84 (09 Dec 2024 04:59) (83 - 84)  BP: 155/82 (09 Dec 2024 04:59) (117/64 - 155/82)  BP(mean): --  RR: 20 (09 Dec 2024 04:59) (20 - 20)  SpO2: --    General: Resting in bed                     HEENT: [   ] NC/AT, EOMI,  Normal Conjunctivae,   [  x ] other: bilateral cheek facial abrasion, normal conjunctivae, EOMI  Cardio: [  x ] RRR, no murmur,   [   ] other:                              Pulm: [ x  ] No Respiratory Distress,  Lungs CTAB,   [   ] other:             Abdomen: [ x  ]ND/NT, Soft,   [   ] other:    : [ x  ] No martins catheter, [   ] Martins catheter present [  ] other:   MSK: [  ] No joint swelling,  [ x  ] other:  RLE/ knee joint swelling                      Ext: [ x  ]No C/C, No calf tenderness,   [  x ]other:  swelling of MCPs, no warmth  Skin: [   ]intact,   [ x  ] other: RLE incision site with post op dressing, C&D    Neurological Examination:  Cognitive: [  x  ] AAO x 3,   [  ]  other:       Attention:  [ x  ] intact,   [    ]  other:   Language: [ x ] intact  [  ]    Memory: [ x  ] intact,    [  ]  other:     Mood/Affect: [  x ] wnl  [    ]  other:                                                                             Communication: [x   ]Fluent, no dysarthria [    ] other:   CN II - XII:  [ x   ] intact,  [    ] other:                                                                                         Motor:   RUE: 5/5 shoulder abduction, 5/5 elbow flexion, 5/5 elbow extension, 5/5 finger flexion  LUE: 5/5 shoulder abduction, 5/5 elbow flexion, 5/5 elbow extension, 5/5 finger flexion  RLE: 2/5 hip flexion (within restricted ROM), 2+/5 knee ext/flex (within restricted ROM), 4+/5 plantarflexion/dorsiflexion   LLE: 5/5 hip flexion, 5/5 knee ext/flex, 4+/5 dorsiflexion/plantarflexion     Tone: [  x ] wnl,   [    ]  other:  DTRs: [ x ]symmetric, [   ] other:  Coordination:   [ x ] intact,   [  ] other:                                                                        Sensory: [ x   ] Intact to light touch  [  ] other:    MEDICATIONS  (STANDING):  acetaminophen     Tablet .. 650 milliGRAM(s) Oral every 6 hours  ascorbic acid 500 milliGRAM(s) Oral daily  aspirin enteric coated 81 milliGRAM(s) Oral daily  bisacodyl Suppository 10 milliGRAM(s) Rectal once  calcium carbonate 1250 mG  + Vitamin D (OsCal 500 + D) 1 Tablet(s) Oral daily  chlorhexidine 2% Cloths 1 Application(s) Topical <User Schedule>  enoxaparin Injectable 40 milliGRAM(s) SubCutaneous every 24 hours  famotidine    Tablet 20 milliGRAM(s) Oral two times a day  folic acid 1 milliGRAM(s) Oral daily  magnesium oxide 400 milliGRAM(s) Oral two times a day with meals  metoprolol succinate ER 25 milliGRAM(s) Oral at bedtime  ofloxacin 0.3% Solution 1 Drop(s) Right EYE three times a day  polyethylene glycol 3350 17 Gram(s) Oral daily  rosuvastatin 10 milliGRAM(s) Oral at bedtime  senna 2 Tablet(s) Oral at bedtime    MEDICATIONS  (PRN):  melatonin 3 milliGRAM(s) Oral at bedtime PRN Insomnia  oxyCODONE    IR 5 milliGRAM(s) Oral every 4 hours PRN Moderate Pain (4 - 6)  oxyCODONE    IR 10 milliGRAM(s) Oral every 4 hours PRN Severe Pain (7 - 10)      Labs:                        8.0    5.00  )-----------( 291      ( 09 Dec 2024 06:19 )             25.4     12-09    131[L]  |  93[L]  |  16  ----------------------------<  95  4.2   |  27  |  0.7    Ca    8.9      09 Dec 2024 06:19  Mg     1.3     12-09    TPro  5.3[L]  /  Alb  3.0[L]  /  TBili  0.8  /  DBili  x   /  AST  31  /  ALT  14  /  AlkPhos  115  12-09 12-06    131[L]  |  95[L]  |  22[H]  ----------------------------<  108[H]  3.9   |  26  |  0.8    Ca    8.4      06 Dec 2024 05:45  Mg     1.7     12-06    TPro  5.4[L]  /  Alb  2.9[L]  /  TBili  0.5  /  DBili  x   /  AST  31  /  ALT  10  /  AlkPhos  94  12-06                          7.6    7.29  )-----------( 238      ( 06 Dec 2024 05:45 )             23.6      85 yo F with PMHx of GERD, HTN, OA s/p bilateral TKA and ROHIT, s/p L forefoot exostosis removal (11/2024) RA, and hx of breast cancer (in remission since 2015) who presents to the hospital after a fall. Around 1000, patient was seated in a chair and reaching for something on another chair when she lost her balance and fell onto her R side and hit her head. She was unable to get up from the ground and was lying on the floor for ~8 hours until her friend found her. +HS. -LOC. -AC. Subsequently, she had RLE pain. Of note, patient fell forwards two days prior and hit her face with subsequent bruising. Seen in ED at the time without any significant injuries found. No dizziness/lightheadedness associated with either fall. On admission, imaging showed acute comminuted right femoral distal metadiaphyseal fracture with volar apex angulation and 1.5 cm posterior displacement and small knee joint effusion. She underwent a R femur ORIF (12/2). Weightbearing status WBAT. During hospital course, patient had post-op blood loss anemia. Last H/H 7.5/23.1.    Imaging:  CT of R femur: Acute comminuted right femoral distal metadiaphyseal fracture with volar apex angulation and 1.5 cm posterior displacement. Small knee joint effusion.  CT head: No acute process seen    During their stay, the patient was evaluated by physical and occupational therapy. Prior to admission, patient was independent with ADLs, dependent on some iADLs (grocery, laundry), and ambulates with walker/cane occasionally The most recent evaluated functional status is max A with transfers, ambulates 1 side step by b/s secondary to decreased weight bearing tolerance on RLE and fear of falling, standby assist for UB dressing, dependent for LB dressing. Patient was deemed a good acute rehab candidate due to decline in functional status and ability to carry out activities of daily living. Patient is able to tolerate 3 hours of therapy 5-7 days a week and is motivated to participate.    The patient was evaluated by the PM&R team once medically stable. The patient was found to have functional limitations in terms of physical strength/mobility and ability to carry out ADLs (self care, transfers, ambulation). Patient admitted to   12/5 for rehabilitation of R femur fx, s/p ORIF with decline in ADLs.    Living Situation: resides in  with 1 FOS to bedroom (uses stair lift) and 3 steps to enter  PLOF:  independent with ADLs, dependent on some iADLs (grocery, laundry), and ambulates with walker/cane occasionally    Today’s Subjective & Review of Symptoms  No overnight events  Patient seen this morning feeling well with no acute complaints.  Regular bowel movement and voiding spontaneously  Vitals and labs reviewed  Tolerating PT/OT    CLOF:  max A with transfers, standby assist for UB dressing, dependent for LB dressing, and ambulates 1 side step by b/s secondary to decreased weight bearing tolerance on RLE and fear of falling    Review of Systems:   Constitutional:    [x ] WNL           [   ] poor appetite   [ ] insomnia   [  ] tired   Cardio:           [x ] WNL           [   ] CP              [ ] MOJICA        [  ] palpitations               Resp:             [x ] WNL           [   ] SOB             [ ] cough      [  ] wheezing   GI:               [ x] WNL           [   ] constipation    [ ] diarrhea   [  ] abdominal pain       [ ] nausea   [  ] emesis                                :               [ x] WNL           [   ] MARTINS           [ ] dysuria    [  ] difficulty voiding   [ ] Other:    Endo:             [ x] WNL           [   ] polyuria        [ ] temperature intolerance                 Skin:             [ ] WNL           [   ] pain            [x ] incision-right LE with dressing applied   [ x ] rash-ecchymosis along bilateral facial cheeks   MSK:              [ ] WNL           [ x  ] muscle pain     [x ] joint pain [x ] muscle tenderness   [x ] swelling RLE   Neuro:            [ ] WNL           [   ] HA              [ ] change in vision   [   ] tremor   [x ] weakness [  ] dysphagia    Cognitive:        [x ] WNL           [   ] confusion      Psych:            [x ] WNL           [   ] hallucinations   [   ]agitation   [   ] delusion   [   ]depression    Physical Exam:  Vital Signs Last 24 Hrs  T(C): 36.6 (09 Dec 2024 04:59), Max: 36.9 (08 Dec 2024 14:32)  T(F): 97.9 (09 Dec 2024 04:59), Max: 98.5 (08 Dec 2024 14:32)  HR: 84 (09 Dec 2024 04:59) (83 - 84)  BP: 155/82 (09 Dec 2024 04:59) (117/64 - 155/82)  BP(mean): --  RR: 20 (09 Dec 2024 04:59) (20 - 20)  SpO2: --    General: Resting in bed                     HEENT: [   ] NC/AT, EOMI,  Normal Conjunctivae,   [  x ] other: bilateral cheek facial abrasion, normal conjunctivae, EOMI  Cardio: [  x ] RRR, no murmur,   [   ] other:                              Pulm: [ x  ] No Respiratory Distress,  Lungs CTAB,   [   ] other:             Abdomen: [ x  ]ND/NT, Soft,   [   ] other:    : [ x  ] No martins catheter, [   ] Martins catheter present [  ] other:   MSK: [  ] No joint swelling,  [ x  ] other:  RLE/ knee joint swelling                      Ext: [ x  ]No C/C, No calf tenderness,   [  x ]other:  swelling of MCPs, no warmth  Skin: [   ]intact,   [ x  ] other: RLE incision site with post op dressing, C&D    Neurological Examination:  Cognitive: [  x  ] AAO x 3,   [  ]  other:       Attention:  [ x  ] intact,   [    ]  other:   Language: [ x ] intact  [  ]    Memory: [ x  ] intact,    [  ]  other:     Mood/Affect: [  x ] wnl  [    ]  other:                                                                             Communication: [x   ]Fluent, no dysarthria [    ] other:   CN II - XII:  [ x   ] intact,  [    ] other:                                                                                         Motor:   RUE: 5/5 shoulder abduction, 5/5 elbow flexion, 5/5 elbow extension, 5/5 finger flexion  LUE: 5/5 shoulder abduction, 5/5 elbow flexion, 5/5 elbow extension, 5/5 finger flexion  RLE: 2/5 hip flexion (within restricted ROM), 2+/5 knee ext/flex (within restricted ROM), 4+/5 plantarflexion/dorsiflexion   LLE: 5/5 hip flexion, 5/5 knee ext/flex, 4+/5 dorsiflexion/plantarflexion     Tone: [  x ] wnl,   [    ]  other:  DTRs: [ x ]symmetric, [   ] other:  Coordination:   [ x ] intact,   [  ] other:                                                                        Sensory: [ x   ] Intact to light touch  [  ] other:    MEDICATIONS  (STANDING):  acetaminophen     Tablet .. 650 milliGRAM(s) Oral every 6 hours  ascorbic acid 500 milliGRAM(s) Oral daily  aspirin enteric coated 81 milliGRAM(s) Oral daily  bisacodyl Suppository 10 milliGRAM(s) Rectal once  calcium carbonate 1250 mG  + Vitamin D (OsCal 500 + D) 1 Tablet(s) Oral daily  chlorhexidine 2% Cloths 1 Application(s) Topical <User Schedule>  enoxaparin Injectable 40 milliGRAM(s) SubCutaneous every 24 hours  famotidine    Tablet 20 milliGRAM(s) Oral two times a day  folic acid 1 milliGRAM(s) Oral daily  magnesium oxide 400 milliGRAM(s) Oral two times a day with meals  metoprolol succinate ER 25 milliGRAM(s) Oral at bedtime  ofloxacin 0.3% Solution 1 Drop(s) Right EYE three times a day  polyethylene glycol 3350 17 Gram(s) Oral daily  rosuvastatin 10 milliGRAM(s) Oral at bedtime  senna 2 Tablet(s) Oral at bedtime    MEDICATIONS  (PRN):  melatonin 3 milliGRAM(s) Oral at bedtime PRN Insomnia  oxyCODONE    IR 5 milliGRAM(s) Oral every 4 hours PRN Moderate Pain (4 - 6)  oxyCODONE    IR 10 milliGRAM(s) Oral every 4 hours PRN Severe Pain (7 - 10)      Labs:                        8.0    5.00  )-----------( 291      ( 09 Dec 2024 06:19 )             25.4     12-09    131[L]  |  93[L]  |  16  ----------------------------<  95  4.2   |  27  |  0.7    Ca    8.9      09 Dec 2024 06:19  Mg     1.3     12-09    TPro  5.3[L]  /  Alb  3.0[L]  /  TBili  0.8  /  DBili  x   /  AST  31  /  ALT  14  /  AlkPhos  115  12-09 12-06    131[L]  |  95[L]  |  22[H]  ----------------------------<  108[H]  3.9   |  26  |  0.8    Ca    8.4      06 Dec 2024 05:45  Mg     1.7     12-06    TPro  5.4[L]  /  Alb  2.9[L]  /  TBili  0.5  /  DBili  x   /  AST  31  /  ALT  10  /  AlkPhos  94  12-06                          7.6    7.29  )-----------( 238      ( 06 Dec 2024 05:45 )             23.6

## 2024-12-09 NOTE — PROGRESS NOTE ADULT - ASSESSMENT
Assessment:   85 yo F with PMHx of GERD, HTN, OA s/p bilateral TKA and ROHIT, s/p L forefoot exostosis removal (11/2024), RA (previously on methotrexate), and hx of breast cancer (in remission since 2015) who presented to the hospital after a fall and found to have comminuted right femoral distal metadiaphyseal fracture with volar apex angulation and 1.5 cm posterior displacement and small knee joint effusion. She underwent a R femur ORIF (12/2). Weightbearing status WBAT.    Plan:    #Rehab of Right femur fx s/p ORIF with impairment in mobility and ADLs and iADLs. The patient presented with co-morbidities requiring close physiatry follow-up at least 3 times a week to monitor his progress and monitor his comorbidities including HTN, OA s/p TKA and ROHIT, RA, and s/p L forefoot exostosis removal (11/2024).The patient's co-morbidities do not limit the patient's ability to participate in rehabilitative therapy. The patient was seen by PT/OT and  required max A with transfers, standby assist for UB dressing, dependent for LB dressing, and ambulates 1 side step by b/s secondary to decreased weight bearing tolerance on RLE and fear of falling, The patient was previously independent with all her ADLs and some iADLs (dependent on laundry and grocery) and ambulated with cane/walker occasionally and now presents with functional decline. The patient can benefit from and tolerate 3 hours of restorative therapy daily. The patient is an excellent acute inpatient rehabilitation candidate.     #S/P right distal femur fracture/ ORIF  -CTH shows no acute findings.  -CT Right femur shows acute comminuted right femoral distal metadiaphyseal fracture with volar apex angulation and 1.5 cm posterior displacement and small knee joint effusion.  -s/p subsequent R femur ORIF (12/2).   -Weight bearing status WBAT.  -Per Ortho, DVT ppx x6 weeks post-op  -c/w ASA 81mg QD  - Pain control: oxycodone  5 and 10mg prn, Tylenol q6  -Will adjust pain medications to optimize pain control  -c/w Calcium 600+D 600 mg-200 tablet: BID  -PT/ OT  - 12/6 BLE dopplers: no DVT in LLE. Unable to visualize venous system in RLE    #s/p recent fall last week with HT and bilateral facial contusions/ ecchymosis/ no LOC  - monitor    #Recent left heel spur resection  - WBAT  - limiting ambulation  - to wear cast-boot for longer distances as per patient    #Post-op acute blood loss anemia  -Baseline hgb ~10.  -H/H 7.6/23.6 (7.5/23.1) 12/6  -c/w H/H monitoring and hemodynamic status monitoring  -Transfuse if hgb<7.    #Hypomagnesemia  -Mg 1.7 (12/6)  -c/w magnesium supplement as needed    #Azotemia  -12/6 BUN 31->22, improving    #Hyponatremia  -Na 131 (12/6)  - on regular salt diet  -will continue to monitor     #HTN  -continue to monitor hemodynamic status   - 12/6 started home metoprolol ER 25mg nightly  -will adjust medications as needed    #HLD  -c/w rosuvastatin 10mg QD     #GERD  - c/w famotidine 20mg  BID    #OA  #RA  - s/p bilat THR/ TKR  - Previously on methotrexate.   -Not on medication currently  - Follows up outpatient with rheumatology  - monitor for exacerbation now off Methotrexate    #MRSA precautions discontinued 12/6 per Infection Control.    #Anxiety/depression  - Previously on sertraline  - Will monitor     -Pain control: oxycodone and Tylenol    -GI/Bowel Mgmt: Miralax, Senna     -Skin: surgical incision along RLE    - Diet Regular    Precautions / PROPHYLAXIS:      - Falls      - DVT prophylaxis: lovenox   Assessment:   85 yo F with PMHx of GERD, HTN, OA s/p bilateral TKA and ROHIT, s/p L forefoot exostosis removal (11/2024), RA (previously on methotrexate), and hx of breast cancer (in remission since 2015) who presented to the hospital after a fall and found to have comminuted right femoral distal metadiaphyseal fracture with volar apex angulation and 1.5 cm posterior displacement and small knee joint effusion. She underwent a R femur ORIF (12/2). Weightbearing status WBAT.    Plan:    #Rehab of Right femur fx s/p ORIF with impairment in mobility and ADLs and iADLs. The patient presented with co-morbidities requiring close physiatry follow-up at least 3 times a week to monitor his progress and monitor his comorbidities including HTN, OA s/p TKA and ROHIT, RA, and s/p L forefoot exostosis removal (11/2024).The patient's co-morbidities do not limit the patient's ability to participate in rehabilitative therapy. The patient was seen by PT/OT and  required max A with transfers, standby assist for UB dressing, dependent for LB dressing, and ambulates 1 side step by b/s secondary to decreased weight bearing tolerance on RLE and fear of falling, The patient was previously independent with all her ADLs and some iADLs (dependent on laundry and grocery) and ambulated with cane/walker occasionally and now presents with functional decline. The patient can benefit from and tolerate 3 hours of restorative therapy daily. The patient is an excellent acute inpatient rehabilitation candidate.     #S/P right distal femur fracture/ ORIF  -CTH shows no acute findings.  -CT Right femur shows acute comminuted right femoral distal metadiaphyseal fracture with volar apex angulation and 1.5 cm posterior displacement and small knee joint effusion.  -s/p subsequent R femur ORIF (12/2).   -Weight bearing status WBAT.  -Per Ortho, DVT ppx x6 weeks post-op  -c/w ASA 81mg QD  - Pain control: oxycodone  5 and 10mg prn, Tylenol q6  -Will adjust pain medications to optimize pain control  -c/w Calcium 600+D 600 mg-200 tablet: BID  -PT/ OT  - 12/6 BLE dopplers: no DVT in LLE. Unable to visualize venous system in RLE    #s/p recent fall last week with HT and bilateral facial contusions/ ecchymosis/ no LOC  - monitor    #Recent left heel spur resection  - WBAT  - limiting ambulation  - to wear cast-boot for longer distances as per patient    #Post-op acute blood loss anemia  -Baseline hgb ~10.  -H/H 7.6/23.6 (7.5/23.1) 12/6  -c/w H/H monitoring and hemodynamic status monitoring  -Transfuse if hgb<7.    #Hypomagnesemia  -Mg 1.9 (12/9)  -c/w magnesium supplement as needed    #Azotemia  -12/6 BUN 31->22, improving    #Hyponatremia  -Na 131 (12/9)  - on regular salt diet  -will continue to monitor     #HTN  -continue to monitor hemodynamic status   - 12/6 started home metoprolol ER 25mg nightly  -will adjust medications as needed    #HLD  -c/w rosuvastatin 10mg QD     #GERD  - c/w famotidine 20mg  BID    #OA  #RA  - s/p bilat THR/ TKR  - Previously on methotrexate.   -Not on medication currently  - Follows up outpatient with rheumatology  - monitor for exacerbation now off Methotrexate    #MRSA precautions discontinued 12/6 per Infection Control.    #Anxiety/depression  - Previously on sertraline  - Will monitor     -Pain control: oxycodone and Tylenol    -GI/Bowel Mgmt: Miralax, Senna     -Skin: surgical incision along RLE    - Diet Regular    Precautions / PROPHYLAXIS:      - Falls      - DVT prophylaxis: lovenox

## 2024-12-10 DIAGNOSIS — M97.11XA PERIPROSTHETIC FRACTURE AROUND INTERNAL PROSTHETIC RIGHT KNEE JOINT, INITIAL ENCOUNTER: ICD-10-CM

## 2024-12-10 DIAGNOSIS — M97.01XA PERIPROSTHETIC FRACTURE AROUND INTERNAL PROSTHETIC RIGHT HIP JOINT, INITIAL ENCOUNTER: ICD-10-CM

## 2024-12-10 DIAGNOSIS — I10 ESSENTIAL (PRIMARY) HYPERTENSION: ICD-10-CM

## 2024-12-10 DIAGNOSIS — K21.9 GASTRO-ESOPHAGEAL REFLUX DISEASE WITHOUT ESOPHAGITIS: ICD-10-CM

## 2024-12-10 DIAGNOSIS — F32.A DEPRESSION, UNSPECIFIED: ICD-10-CM
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
## 2024-12-10 DIAGNOSIS — Z96.652 PRESENCE OF LEFT ARTIFICIAL KNEE JOINT: ICD-10-CM

## 2024-12-10 DIAGNOSIS — M19.90 UNSPECIFIED OSTEOARTHRITIS, UNSPECIFIED SITE: ICD-10-CM

## 2024-12-10 DIAGNOSIS — Z90.710 ACQUIRED ABSENCE OF BOTH CERVIX AND UTERUS: ICD-10-CM

## 2024-12-10 DIAGNOSIS — S72.351A DISPLACED COMMINUTED FRACTURE OF SHAFT OF RIGHT FEMUR, INITIAL ENCOUNTER FOR CLOSED FRACTURE: ICD-10-CM

## 2024-12-10 DIAGNOSIS — Z85.3 PERSONAL HISTORY OF MALIGNANT NEOPLASM OF BREAST: ICD-10-CM
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
## 2024-12-10 DIAGNOSIS — Z96.643 PRESENCE OF ARTIFICIAL HIP JOINT, BILATERAL: ICD-10-CM

## 2024-12-10 RX ORDER — TRAMADOL HYDROCHLORIDE 50 MG/1
50 TABLET ORAL
Refills: 0 | Status: DISCONTINUED | OUTPATIENT
Start: 2024-12-10 | End: 2024-12-16

## 2024-12-10 RX ORDER — BISACODYL 5 MG
5 TABLET, DELAYED RELEASE (ENTERIC COATED) ORAL AT BEDTIME
Refills: 0 | Status: DISCONTINUED | OUTPATIENT
Start: 2024-12-10 | End: 2024-12-18

## 2024-12-10 RX ADMIN — Medication 1 TABLET(S): at 13:00

## 2024-12-10 RX ADMIN — ACETAMINOPHEN 650 MILLIGRAM(S): 80 SOLUTION/ DROPS ORAL at 00:49

## 2024-12-10 RX ADMIN — Medication 3 MILLIGRAM(S): at 21:41

## 2024-12-10 RX ADMIN — ENOXAPARIN SODIUM 40 MILLIGRAM(S): 60 INJECTION INTRAVENOUS; SUBCUTANEOUS at 06:19

## 2024-12-10 RX ADMIN — ACETAMINOPHEN 650 MILLIGRAM(S): 80 SOLUTION/ DROPS ORAL at 13:18

## 2024-12-10 RX ADMIN — FAMOTIDINE 20 MILLIGRAM(S): 20 TABLET, FILM COATED ORAL at 06:20

## 2024-12-10 RX ADMIN — TRAMADOL HYDROCHLORIDE 50 MILLIGRAM(S): 50 TABLET ORAL at 10:42

## 2024-12-10 RX ADMIN — TRAMADOL HYDROCHLORIDE 50 MILLIGRAM(S): 50 TABLET ORAL at 10:12

## 2024-12-10 RX ADMIN — ACETAMINOPHEN 650 MILLIGRAM(S): 80 SOLUTION/ DROPS ORAL at 01:30

## 2024-12-10 RX ADMIN — ACETAMINOPHEN 650 MILLIGRAM(S): 80 SOLUTION/ DROPS ORAL at 18:14

## 2024-12-10 RX ADMIN — ACETAMINOPHEN 650 MILLIGRAM(S): 80 SOLUTION/ DROPS ORAL at 17:26

## 2024-12-10 RX ADMIN — SENNOSIDES 2 TABLET(S): 8.6 TABLET, FILM COATED ORAL at 21:41

## 2024-12-10 RX ADMIN — Medication 25 MILLIGRAM(S): at 21:41

## 2024-12-10 RX ADMIN — CHLORHEXIDINE GLUCONATE 1 APPLICATION(S): 1.2 RINSE ORAL at 06:24

## 2024-12-10 RX ADMIN — FAMOTIDINE 20 MILLIGRAM(S): 20 TABLET, FILM COATED ORAL at 17:26

## 2024-12-10 RX ADMIN — ACETAMINOPHEN 650 MILLIGRAM(S): 80 SOLUTION/ DROPS ORAL at 23:56

## 2024-12-10 RX ADMIN — ACETAMINOPHEN 650 MILLIGRAM(S): 80 SOLUTION/ DROPS ORAL at 23:37

## 2024-12-10 RX ADMIN — ACETAMINOPHEN 650 MILLIGRAM(S): 80 SOLUTION/ DROPS ORAL at 06:20

## 2024-12-10 RX ADMIN — Medication 1 DROP(S): at 06:20

## 2024-12-10 RX ADMIN — ROSUVASTATIN 10 MILLIGRAM(S): 40 TABLET, FILM COATED ORAL at 21:41

## 2024-12-10 RX ADMIN — Medication 1 MILLIGRAM(S): at 13:01

## 2024-12-10 RX ADMIN — Medication 1 DROP(S): at 21:42

## 2024-12-10 RX ADMIN — Medication 1 DROP(S): at 13:01

## 2024-12-10 RX ADMIN — Medication 400 MILLIGRAM(S): at 08:39

## 2024-12-10 RX ADMIN — MAGNESIUM SULFATE 25 GRAM(S): 500 INJECTION, SOLUTION INTRAMUSCULAR; INTRAVENOUS at 00:48

## 2024-12-10 RX ADMIN — Medication 500 MILLIGRAM(S): at 13:00

## 2024-12-10 RX ADMIN — ACETAMINOPHEN 650 MILLIGRAM(S): 80 SOLUTION/ DROPS ORAL at 06:55

## 2024-12-10 RX ADMIN — TRAMADOL HYDROCHLORIDE 50 MILLIGRAM(S): 50 TABLET ORAL at 15:38

## 2024-12-10 RX ADMIN — Medication 81 MILLIGRAM(S): at 13:00

## 2024-12-10 RX ADMIN — Medication 17 GRAM(S): at 13:00

## 2024-12-10 RX ADMIN — TRAMADOL HYDROCHLORIDE 50 MILLIGRAM(S): 50 TABLET ORAL at 15:08

## 2024-12-10 RX ADMIN — Medication 5 MILLIGRAM(S): at 21:41

## 2024-12-10 RX ADMIN — ACETAMINOPHEN 650 MILLIGRAM(S): 80 SOLUTION/ DROPS ORAL at 13:00

## 2024-12-10 NOTE — PROGRESS NOTE ADULT - ASSESSMENT
Assessment:   83 yo F with PMHx of GERD, HTN, OA s/p bilateral TKA and ROHIT, s/p L forefoot exostosis removal (11/2024), RA (previously on methotrexate), and hx of breast cancer (in remission since 2015) who presented to the hospital after a fall and found to have comminuted right femoral distal metadiaphyseal fracture with volar apex angulation and 1.5 cm posterior displacement and small knee joint effusion. She underwent a R femur ORIF (12/2). Weightbearing status WBAT.    Plan:    #Rehab of Right femur fx s/p ORIF with impairment in mobility and ADLs and iADLs. The patient presented with co-morbidities requiring close physiatry follow-up at least 3 times a week to monitor his progress and monitor his comorbidities including HTN, OA s/p TKA and ROHIT, RA, and s/p L forefoot exostosis removal (11/2024).The patient's co-morbidities do not limit the patient's ability to participate in rehabilitative therapy. The patient was seen by PT/OT and  required max A with transfers, standby assist for UB dressing, dependent for LB dressing, and ambulates 1 side step by b/s secondary to decreased weight bearing tolerance on RLE and fear of falling, The patient was previously independent with all her ADLs and some iADLs (dependent on laundry and grocery) and ambulated with cane/walker occasionally and now presents with functional decline. The patient can benefit from and tolerate 3 hours of restorative therapy daily. The patient is an excellent acute inpatient rehabilitation candidate.     #S/P right distal femur fracture/ ORIF  -CTH shows no acute findings.  -CT Right femur shows acute comminuted right femoral distal metadiaphyseal fracture with volar apex angulation and 1.5 cm posterior displacement and small knee joint effusion.  -s/p subsequent R femur ORIF (12/2).   -Weight bearing status WBAT.  -Per Ortho, DVT ppx x6 weeks post-op  -c/w ASA 81mg QD  - Pain control: tramadol 50 q6 PRN, Tylenol q6  -Will adjust pain medications to optimize pain control  -c/w Calcium 600+D 600 mg-200 tablet: BID  -PT/ OT  - 12/6 BLE dopplers: no DVT in LLE. Unable to visualize venous system in RLE    #s/p recent fall last week with HT and bilateral facial contusions/ ecchymosis/ no LOC  - monitor    #Recent left heel spur resection  - WBAT  - limiting ambulation  - to wear cast-boot for longer distances as per patient  - followed by podiatry    #Post-op acute blood loss anemia  -Baseline hgb ~10.  -H/H 7.6/23.6 (7.5/23.1) 12/6  -c/w H/H monitoring and hemodynamic status monitoring  -Transfuse if hgb<7.    #Hypomagnesemia  -Mg 1.9 (12/9)  -c/w magnesium supplement as needed    #Azotemia  -12/6 BUN 31->22, improving    #Hyponatremia  -Na 131 (12/9)  - on regular salt diet  -will continue to monitor     #HTN  -continue to monitor hemodynamic status   - 12/6 started home metoprolol ER 25mg nightly  -will adjust medications as needed    #HLD  -c/w rosuvastatin 10mg QD     #GERD  - c/w famotidine 20mg  BID    #OA  #RA  - s/p bilat THR/ TKR  - Previously on methotrexate.   -Not on medication currently  - Follows up outpatient with rheumatology  - monitor for exacerbation now off Methotrexate    #MRSA precautions discontinued 12/6 per Infection Control.    #Anxiety/depression  - Previously on sertraline  - Will monitor     -Pain control: tramadol and Tylenol    -GI/Bowel Mgmt: Miralax, Senna, bisacodyl      -Skin: surgical incision along RLE    - Diet Regular    Precautions / PROPHYLAXIS:      - Falls      - DVT prophylaxis: lovenox

## 2024-12-10 NOTE — PROGRESS NOTE ADULT - SUBJECTIVE AND OBJECTIVE BOX
83 yo F with PMHx of GERD, HTN, OA s/p bilateral TKA and ROHIT, s/p L forefoot exostosis removal (11/2024) RA, and hx of breast cancer (in remission since 2015) who presents to the hospital after a fall. Around 1000, patient was seated in a chair and reaching for something on another chair when she lost her balance and fell onto her R side and hit her head. She was unable to get up from the ground and was lying on the floor for ~8 hours until her friend found her. +HS. -LOC. -AC. Subsequently, she had RLE pain. Of note, patient fell forwards two days prior and hit her face with subsequent bruising. Seen in ED at the time without any significant injuries found. No dizziness/lightheadedness associated with either fall. On admission, imaging showed acute comminuted right femoral distal metadiaphyseal fracture with volar apex angulation and 1.5 cm posterior displacement and small knee joint effusion. She underwent a R femur ORIF (12/2). Weightbearing status WBAT. During hospital course, patient had post-op blood loss anemia. Last H/H 7.5/23.1.    Imaging:  CT of R femur: Acute comminuted right femoral distal metadiaphyseal fracture with volar apex angulation and 1.5 cm posterior displacement. Small knee joint effusion.  CT head: No acute process seen    During their stay, the patient was evaluated by physical and occupational therapy. Prior to admission, patient was independent with ADLs, dependent on some iADLs (grocery, laundry), and ambulates with walker/cane occasionally The most recent evaluated functional status is max A with transfers, ambulates 1 side step by b/s secondary to decreased weight bearing tolerance on RLE and fear of falling, standby assist for UB dressing, dependent for LB dressing. Patient was deemed a good acute rehab candidate due to decline in functional status and ability to carry out activities of daily living. Patient is able to tolerate 3 hours of therapy 5-7 days a week and is motivated to participate.    The patient was evaluated by the PM&R team once medically stable. The patient was found to have functional limitations in terms of physical strength/mobility and ability to carry out ADLs (self care, transfers, ambulation). Patient admitted to   12/5 for rehabilitation of R femur fx, s/p ORIF with decline in ADLs.    Living Situation: resides in  with 1 FOS to bedroom (uses stair lift) and 3 steps to enter  PLOF:  independent with ADLs, dependent on some iADLs (grocery, laundry), and ambulates with walker/cane occasionally    Today’s Subjective & Review of Symptoms  No overnight events  Patient seen this morning feeling well with no acute complaints. States podiatry wrapped her left leg and foot yesterday. States last BM was 2-3 days ago.   Voiding spontaneously  Vitals and labs reviewed  Tolerating PT/OT    CLOF:  max A with transfers, standby assist for UB dressing, dependent for LB dressing, and ambulates 1 side step by b/s secondary to decreased weight bearing tolerance on RLE and fear of falling    Review of Systems:   Constitutional:    [x ] WNL           [   ] poor appetite   [ ] insomnia   [  ] tired   Cardio:           [x ] WNL           [   ] CP              [ ] MOJICA        [  ] palpitations               Resp:             [x ] WNL           [   ] SOB             [ ] cough      [  ] wheezing   GI:               [ x] WNL           [   ] constipation    [ ] diarrhea   [  ] abdominal pain       [ ] nausea   [  ] emesis                                :               [ x] WNL           [   ] BENNETT           [ ] dysuria    [  ] difficulty voiding   [ ] Other:    Endo:             [ x] WNL           [   ] polyuria        [ ] temperature intolerance                 Skin:             [ ] WNL           [   ] pain            [x ] incision-right LE with dressing applied   [ x ] rash-ecchymosis along bilateral facial cheeks   MSK:              [ ] WNL           [ x  ] muscle pain     [x ] joint pain [x ] muscle tenderness   [x ] swelling RLE   Neuro:            [ ] WNL           [   ] HA              [ ] change in vision   [   ] tremor   [x ] weakness [  ] dysphagia    Cognitive:        [x ] WNL           [   ] confusion      Psych:            [x ] WNL           [   ] hallucinations   [   ]agitation   [   ] delusion   [   ]depression    Physical Exam:  Vital Signs Last 24 Hrs  T(C): 36.8 (10 Dec 2024 05:00), Max: 37.2 (09 Dec 2024 14:06)  T(F): 98.2 (10 Dec 2024 05:00), Max: 99 (09 Dec 2024 14:06)  HR: 80 (10 Dec 2024 05:00) (80 - 100)  BP: 136/85 (10 Dec 2024 05:00) (116/71 - 136/85)  BP(mean): 86 (09 Dec 2024 20:26) (86 - 86)  RR: 18 (10 Dec 2024 05:00) (18 - 18)  SpO2: --    General: Resting in bed                     HEENT: [   ] NC/AT, EOMI,  Normal Conjunctivae,   [  x ] other: bilateral cheek facial abrasion, normal conjunctivae, EOMI  Cardio: [  x ] RRR, no murmur,   [   ] other:                              Pulm: [ x  ] No Respiratory Distress,  Lungs CTAB,   [   ] other:             Abdomen: [ x  ]ND/NT, Soft,   [   ] other:    : [ x  ] No bennett catheter, [   ] Bennett catheter present [  ] other:   MSK: [  ] No joint swelling,  [ x  ] other:  RLE/ knee joint swelling                      Ext: [ x  ]No C/C, No calf tenderness,   [  x ]other:  swelling of MCPs, no warmth  Skin: [   ]intact,   [ x  ] other: RLE incision site with post op dressing, C&D    Neurological Examination:  Cognitive: [  x  ] AAO x 3,   [  ]  other:       Attention:  [ x  ] intact,   [    ]  other:   Language: [ x ] intact  [  ]    Memory: [ x  ] intact,    [  ]  other:     Mood/Affect: [  x ] wnl  [    ]  other:                                                                             Communication: [x   ]Fluent, no dysarthria [    ] other:   CN II - XII:  [ x   ] intact,  [    ] other:                                                                                         Motor:   RUE: 5/5 shoulder abduction, 5/5 elbow flexion, 5/5 elbow extension, 5/5 finger flexion  LUE: 5/5 shoulder abduction, 5/5 elbow flexion, 5/5 elbow extension, 5/5 finger flexion  RLE: 2/5 hip flexion (within restricted ROM), 2+/5 knee ext/flex (within restricted ROM), 4+/5 plantarflexion/dorsiflexion   LLE: 5/5 hip flexion, 5/5 knee ext/flex, 4+/5 dorsiflexion/plantarflexion     Tone: [  x ] wnl,   [    ]  other:  DTRs: [ x ]symmetric, [   ] other:  Coordination:   [ x ] intact,   [  ] other:                                                                        Sensory: [ x   ] Intact to light touch  [  ] other:      MEDICATION  MEDICATIONS  (STANDING):  acetaminophen     Tablet .. 650 milliGRAM(s) Oral every 6 hours  ascorbic acid 500 milliGRAM(s) Oral daily  aspirin enteric coated 81 milliGRAM(s) Oral daily  bisacodyl 5 milliGRAM(s) Oral at bedtime  bisacodyl Suppository 10 milliGRAM(s) Rectal once  calcium carbonate 1250 mG  + Vitamin D (OsCal 500 + D) 1 Tablet(s) Oral daily  chlorhexidine 2% Cloths 1 Application(s) Topical <User Schedule>  enoxaparin Injectable 40 milliGRAM(s) SubCutaneous every 24 hours  famotidine    Tablet 20 milliGRAM(s) Oral two times a day  folic acid 1 milliGRAM(s) Oral daily  metoprolol succinate ER 25 milliGRAM(s) Oral at bedtime  ofloxacin 0.3% Solution 1 Drop(s) Right EYE three times a day  polyethylene glycol 3350 17 Gram(s) Oral daily  rosuvastatin 10 milliGRAM(s) Oral at bedtime  senna 2 Tablet(s) Oral at bedtime    MEDICATIONS  (PRN):  melatonin 3 milliGRAM(s) Oral at bedtime PRN Insomnia  traMADol 50 milliGRAM(s) Oral four times a day PRN Moderate Pain (4 - 6)      RECENT LABS/IMAGING                        8.0    5.00  )-----------( 291      ( 09 Dec 2024 06:19 )             25.4     12-09    131[L]  |  93[L]  |  16  ----------------------------<  95  4.2   |  27  |  0.7    Ca    8.9      09 Dec 2024 06:19  Mg     1.3     12-09    TPro  5.3[L]  /  Alb  3.0[L]  /  TBili  0.8  /  DBili  x   /  AST  31  /  ALT  14  /  AlkPhos  115  12-09      Urinalysis Basic - ( 09 Dec 2024 06:19 )    Color: x / Appearance: x / SG: x / pH: x  Gluc: 95 mg/dL / Ketone: x  / Bili: x / Urobili: x   Blood: x / Protein: x / Nitrite: x   Leuk Esterase: x / RBC: x / WBC x   Sq Epi: x / Non Sq Epi: x / Bacteria: x        CT Head No Cont:   ACC: [id number] EXAM:  CT BRAIN   ORDERED BY: SAL ANTHONY     PROCEDURE DATE:  12/01/2024          INTERPRETATION:  Trauma  The study was performed without IV contrast.  Previous exam 11/23/2024  The cisterns and ventricles are normal with no shift of the midline   structures.  No hemorrhage, infarct or mass formation is seen.  The skull is intact.    IMPRESSION: No acute process seen    --- End of Report ---    YI TONY MD; Attending Radiologist  This document has been electronically signed. Dec  1 2024  9:24PM (12-01-24 @ 20:54)

## 2024-12-11 RX ADMIN — ACETAMINOPHEN 650 MILLIGRAM(S): 80 SOLUTION/ DROPS ORAL at 05:01

## 2024-12-11 RX ADMIN — FAMOTIDINE 20 MILLIGRAM(S): 20 TABLET, FILM COATED ORAL at 17:56

## 2024-12-11 RX ADMIN — Medication 1 MILLIGRAM(S): at 12:46

## 2024-12-11 RX ADMIN — Medication 500 MILLIGRAM(S): at 12:47

## 2024-12-11 RX ADMIN — Medication 1 DROP(S): at 21:33

## 2024-12-11 RX ADMIN — Medication 5 MILLIGRAM(S): at 21:33

## 2024-12-11 RX ADMIN — Medication 17 GRAM(S): at 12:47

## 2024-12-11 RX ADMIN — Medication 1 DROP(S): at 05:02

## 2024-12-11 RX ADMIN — ACETAMINOPHEN 650 MILLIGRAM(S): 80 SOLUTION/ DROPS ORAL at 23:24

## 2024-12-11 RX ADMIN — FAMOTIDINE 20 MILLIGRAM(S): 20 TABLET, FILM COATED ORAL at 05:01

## 2024-12-11 RX ADMIN — CHLORHEXIDINE GLUCONATE 1 APPLICATION(S): 1.2 RINSE ORAL at 05:06

## 2024-12-11 RX ADMIN — TRAMADOL HYDROCHLORIDE 50 MILLIGRAM(S): 50 TABLET ORAL at 08:21

## 2024-12-11 RX ADMIN — ACETAMINOPHEN 650 MILLIGRAM(S): 80 SOLUTION/ DROPS ORAL at 17:56

## 2024-12-11 RX ADMIN — Medication 1 DROP(S): at 17:56

## 2024-12-11 RX ADMIN — ROSUVASTATIN 10 MILLIGRAM(S): 40 TABLET, FILM COATED ORAL at 21:34

## 2024-12-11 RX ADMIN — ACETAMINOPHEN 650 MILLIGRAM(S): 80 SOLUTION/ DROPS ORAL at 13:17

## 2024-12-11 RX ADMIN — Medication 25 MILLIGRAM(S): at 21:34

## 2024-12-11 RX ADMIN — Medication 3 MILLIGRAM(S): at 21:34

## 2024-12-11 RX ADMIN — ACETAMINOPHEN 650 MILLIGRAM(S): 80 SOLUTION/ DROPS ORAL at 12:47

## 2024-12-11 RX ADMIN — Medication 1 TABLET(S): at 12:47

## 2024-12-11 RX ADMIN — TRAMADOL HYDROCHLORIDE 50 MILLIGRAM(S): 50 TABLET ORAL at 17:59

## 2024-12-11 RX ADMIN — ACETAMINOPHEN 650 MILLIGRAM(S): 80 SOLUTION/ DROPS ORAL at 23:23

## 2024-12-11 RX ADMIN — Medication 81 MILLIGRAM(S): at 12:46

## 2024-12-11 RX ADMIN — ENOXAPARIN SODIUM 40 MILLIGRAM(S): 60 INJECTION INTRAVENOUS; SUBCUTANEOUS at 05:01

## 2024-12-11 RX ADMIN — SENNOSIDES 2 TABLET(S): 8.6 TABLET, FILM COATED ORAL at 21:34

## 2024-12-11 NOTE — PROGRESS NOTE ADULT - ASSESSMENT
Assessment:   83 yo F with PMHx of GERD, HTN, OA s/p bilateral TKA and ROHIT, s/p L forefoot exostosis removal (11/2024), RA (previously on methotrexate), and hx of breast cancer (in remission since 2015) who presented to the hospital after a fall and found to have comminuted right femoral distal metadiaphyseal fracture with volar apex angulation and 1.5 cm posterior displacement and small knee joint effusion. She underwent a R femur ORIF (12/2). Weightbearing status WBAT.    Plan:    #Rehab of Right femur fx s/p ORIF with impairment in mobility and ADLs and iADLs. The patient presented with co-morbidities requiring close physiatry follow-up at least 3 times a week to monitor his progress and monitor his comorbidities including HTN, OA s/p TKA and ROHIT, RA, and s/p L forefoot exostosis removal (11/2024).The patient's co-morbidities do not limit the patient's ability to participate in rehabilitative therapy. The patient was seen by PT/OT and  required max A with transfers, standby assist for UB dressing, dependent for LB dressing, and ambulates 1 side step by b/s secondary to decreased weight bearing tolerance on RLE and fear of falling, The patient was previously independent with all her ADLs and some iADLs (dependent on laundry and grocery) and ambulated with cane/walker occasionally and now presents with functional decline. The patient can benefit from and tolerate 3 hours of restorative therapy daily. The patient is an excellent acute inpatient rehabilitation candidate.     #S/P right distal femur fracture/ ORIF  -CTH shows no acute findings.  -CT Right femur shows acute comminuted right femoral distal metadiaphyseal fracture with volar apex angulation and 1.5 cm posterior displacement and small knee joint effusion.  -s/p subsequent R femur ORIF (12/2).   -Weight bearing status WBAT.  -Per Ortho, DVT ppx x6 weeks post-op  -c/w ASA 81mg QD  - Pain control: tramadol 50 q6 PRN, Tylenol q6  -Will adjust pain medications to optimize pain control  -c/w Calcium 600+D 600 mg-200 tablet: BID  -PT/ OT  - 12/6 BLE dopplers: no DVT in LLE. Unable to visualize venous system in RLE  The patient requires acute rehab with 3 hours of daily therapies at least 5 out of 7 days and close physiatry follow up.     #s/p recent fall last week with HT and bilateral facial contusions/ ecchymosis/ no LOC  - monitor    #Recent left heel spur resection  - WBAT  - limiting ambulation  - to wear cast-boot for longer distances as per patient  - followed by podiatry    #Post-op acute blood loss anemia  -Baseline hgb ~10.  -H/H 7.6/23.6 (7.5/23.1) 12/6  -c/w H/H monitoring and hemodynamic status monitoring  -Transfuse if hgb<7.    #Hypomagnesemia  -Mg 1.9 (12/9)  -c/w magnesium supplement as needed    #Azotemia  -12/6 BUN 31->22, improving    #Hyponatremia  -Na 131 (12/9)  - on regular salt diet  -will continue to monitor     #HTN  -continue to monitor hemodynamic status   - 12/6 started home metoprolol ER 25mg nightly  -will adjust medications as needed    #HLD  -c/w rosuvastatin 10mg QD     #GERD  - c/w famotidine 20mg  BID    #OA  #RA  - s/p bilat THR/ TKR  - Previously on methotrexate.   -Not on medication currently  - Follows up outpatient with rheumatology  - monitor for exacerbation now off Methotrexate    #MRSA precautions discontinued 12/6 per Infection Control.    #Anxiety/depression  - Previously on sertraline  - Will monitor     -Pain control: tramadol and Tylenol    -GI/Bowel Mgmt: Miralax, Senna, bisacodyl      -Skin: surgical incision along RLE    - Diet Regular    Precautions / PROPHYLAXIS:      - Falls      - DVT prophylaxis: lovenox

## 2024-12-11 NOTE — PROGRESS NOTE ADULT - TIME BILLING
I participated in the rehab interdisciplinary team meeting; the patient progressed to ambulate 30 ft RW mod assist/partial assist.

## 2024-12-11 NOTE — ED PROVIDER NOTE - ATTENDING APP SHARED VISIT CONTRIBUTION OF CARE
167.64 82-year-old female with past medical history of hypertension, hyperlipidemia, breast cancer in remission, rheumatoid arthritis presents emergency department for elevated D-dimer.  Patient traveled to [place] near yesterday and was having some left side pain worse with movement and palpation.  She went to an urgent care they did blood work and they told her she had an elevated D-dimer at 2.04.  No chest pain no shortness of breath no palpitations.      Const: NAD  Eyes: PERRL, no conjunctival injection  HENT:  Neck supple without meningismus   CV: RRR, Warm, well-perfused extremities  RESP: CTA B/L, no tachypnea   GI: soft, non-tender, non-distended  MSK: No gross deformities appreciated, tenderness to palpation of L ribs   Skin: Warm, dry. No rashes

## 2024-12-11 NOTE — PROGRESS NOTE ADULT - SUBJECTIVE AND OBJECTIVE BOX
83 yo F with PMHx of GERD, HTN, OA s/p bilateral TKA and ROHIT, s/p L forefoot exostosis removal (11/2024) RA, and hx of breast cancer (in remission since 2015) who presents to the hospital after a fall. Around 1000, patient was seated in a chair and reaching for something on another chair when she lost her balance and fell onto her R side and hit her head. She was unable to get up from the ground and was lying on the floor for ~8 hours until her friend found her. +HS. -LOC. -AC. Subsequently, she had RLE pain. Of note, patient fell forwards two days prior and hit her face with subsequent bruising. Seen in ED at the time without any significant injuries found. No dizziness/lightheadedness associated with either fall. On admission, imaging showed acute comminuted right femoral distal metadiaphyseal fracture with volar apex angulation and 1.5 cm posterior displacement and small knee joint effusion. She underwent a R femur ORIF (12/2). Weightbearing status WBAT. During hospital course, patient had post-op blood loss anemia. Last H/H 7.5/23.1.    Imaging:  CT of R femur: Acute comminuted right femoral distal metadiaphyseal fracture with volar apex angulation and 1.5 cm posterior displacement. Small knee joint effusion.  CT head: No acute process seen    During their stay, the patient was evaluated by physical and occupational therapy. Prior to admission, patient was independent with ADLs, dependent on some iADLs (grocery, laundry), and ambulates with walker/cane occasionally The most recent evaluated functional status is max A with transfers, ambulates 1 side step by b/s secondary to decreased weight bearing tolerance on RLE and fear of falling, standby assist for UB dressing, dependent for LB dressing. Patient was deemed a good acute rehab candidate due to decline in functional status and ability to carry out activities of daily living. Patient is able to tolerate 3 hours of therapy 5-7 days a week and is motivated to participate.    The patient was evaluated by the PM&R team once medically stable. The patient was found to have functional limitations in terms of physical strength/mobility and ability to carry out ADLs (self care, transfers, ambulation). Patient admitted to   12/5 for rehabilitation of R femur fx, s/p ORIF with decline in ADLs.    Living Situation: resides in  with 1 FOS to bedroom (uses stair lift) and 3 steps to enter  PLOF:  independent with ADLs, dependent on some iADLs (grocery, laundry), and ambulates with walker/cane occasionally    Today’s Subjective & Review of Symptoms  Doing better with tramadol for pain rather than Oxycodone.   No overnight events  Patient seen this morning feeling well with no acute complaints. Moving bowls.    Voiding spontaneously  Vitals and labs reviewed  Tolerating PT/OT    CLOF:  max A with transfers, standby assist for UB dressing, dependent for LB dressing, and ambulates 1 side step by b/s secondary to decreased weight bearing tolerance on RLE and fear of falling    Review of Systems:   Constitutional:    [x ] WNL           [   ] poor appetite   [ ] insomnia   [  ] tired   Cardio:           [x ] WNL           [   ] CP              [ ] MOJICA        [  ] palpitations               Resp:             [x ] WNL           [   ] SOB             [ ] cough      [  ] wheezing   GI:               [ x] WNL           [   ] constipation    [ ] diarrhea   [  ] abdominal pain       [ ] nausea   [  ] emesis                                :               [ x] WNL           [   ] BENNETT           [ ] dysuria    [  ] difficulty voiding   [ ] Other:    Endo:             [ x] WNL           [   ] polyuria        [ ] temperature intolerance                 Skin:             [ ] WNL           [   ] pain            [x ] incision-right LE with dressing applied   [ x ] rash-ecchymosis along bilateral facial cheeks   MSK:              [ ] WNL           [ x  ] muscle pain     [x ] joint pain [x ] muscle tenderness   [x ] swelling RLE   Neuro:            [ ] WNL           [   ] HA              [ ] change in vision   [   ] tremor   [x ] weakness [  ] dysphagia    Cognitive:        [x ] WNL           [   ] confusion      Psych:            [x ] WNL           [   ] hallucinations   [   ]agitation   [   ] delusion   [   ]depression    Physical Exam:  Vital Signs Last 24 Hrs  T(C): 36.7 (11 Dec 2024 14:00), Max: 36.8 (10 Dec 2024 22:03)  T(F): 98 (11 Dec 2024 14:00), Max: 98.2 (10 Dec 2024 22:03)  HR: 88 (11 Dec 2024 14:00) (76 - 91)  BP: 144/77 (11 Dec 2024 14:00) (135/71 - 144/77)  BP(mean): --  RR: 18 (11 Dec 2024 14:00) (18 - 18)  SpO2: --      General: Resting in bed                     HEENT: [   ] NC/AT, EOMI,  Normal Conjunctivae,   [  x ] other: bilateral cheek facial abrasion, normal conjunctivae, EOMI  Cardio: [  x ] RRR, no murmur,   [   ] other:                              Pulm: [ x  ] No Respiratory Distress,  Lungs CTAB,   [   ] other:             Abdomen: [ x  ]ND/NT, Soft,   [   ] other:    : [ x  ] No bennett catheter, [   ] Bennett catheter present [  ] other:   MSK: [  ] No joint swelling,  [ x  ] other:  RLE/ knee joint swelling                      Ext: [ x  ]No C/C, No calf tenderness,   [  x ]other:  swelling of MCPs, no warmth  Skin: [   ]intact,   [ x  ] other: RLE incision site with post op dressing, C&D    Neurological Examination:  Cognitive: [  x  ] AAO x 3,   [  ]  other:       Attention:  [ x  ] intact,   [    ]  other:   Language: [ x ] intact  [  ]    Memory: [ x  ] intact,    [  ]  other:     Mood/Affect: [  x ] wnl  [    ]  other:                                                                             Communication: [x   ]Fluent, no dysarthria [    ] other:   CN II - XII:  [ x   ] intact,  [    ] other:                                                                                         Motor:   RUE: 5/5 shoulder abduction, 5/5 elbow flexion, 5/5 elbow extension, 5/5 finger flexion  LUE: 5/5 shoulder abduction, 5/5 elbow flexion, 5/5 elbow extension, 5/5 finger flexion  RLE: 2/5 hip flexion (within restricted ROM), 2+/5 knee ext/flex (within restricted ROM), 4+/5 plantarflexion/dorsiflexion   LLE: 5/5 hip flexion, 5/5 knee ext/flex, 4+/5 dorsiflexion/plantarflexion     Tone: [  x ] wnl,   [    ]  other:  DTRs: [ x ]symmetric, [   ] other:  Coordination:   [ x ] intact,   [  ] other:                                                                        Sensory: [ x   ] Intact to light touch  [  ] other:      MEDICATION  MEDICATIONS  (STANDING):  acetaminophen     Tablet .. 650 milliGRAM(s) Oral every 6 hours  ascorbic acid 500 milliGRAM(s) Oral daily  aspirin enteric coated 81 milliGRAM(s) Oral daily  bisacodyl 5 milliGRAM(s) Oral at bedtime  bisacodyl Suppository 10 milliGRAM(s) Rectal once  calcium carbonate 1250 mG  + Vitamin D (OsCal 500 + D) 1 Tablet(s) Oral daily  chlorhexidine 2% Cloths 1 Application(s) Topical <User Schedule>  enoxaparin Injectable 40 milliGRAM(s) SubCutaneous every 24 hours  famotidine    Tablet 20 milliGRAM(s) Oral two times a day  folic acid 1 milliGRAM(s) Oral daily  metoprolol succinate ER 25 milliGRAM(s) Oral at bedtime  ofloxacin 0.3% Solution 1 Drop(s) Right EYE three times a day  polyethylene glycol 3350 17 Gram(s) Oral daily  rosuvastatin 10 milliGRAM(s) Oral at bedtime  senna 2 Tablet(s) Oral at bedtime    MEDICATIONS  (PRN):  melatonin 3 milliGRAM(s) Oral at bedtime PRN Insomnia  traMADol 50 milliGRAM(s) Oral four times a day PRN Moderate Pain (4 - 6)      RECENT LABS/IMAGING                        8.0    5.00  )-----------( 291      ( 09 Dec 2024 06:19 )             25.4     12-09    131[L]  |  93[L]  |  16  ----------------------------<  95  4.2   |  27  |  0.7    Ca    8.9      09 Dec 2024 06:19  Mg     1.3     12-09    TPro  5.3[L]  /  Alb  3.0[L]  /  TBili  0.8  /  DBili  x   /  AST  31  /  ALT  14  /  AlkPhos  115  12-09      Urinalysis Basic - ( 09 Dec 2024 06:19 )    Color: x / Appearance: x / SG: x / pH: x  Gluc: 95 mg/dL / Ketone: x  / Bili: x / Urobili: x   Blood: x / Protein: x / Nitrite: x   Leuk Esterase: x / RBC: x / WBC x   Sq Epi: x / Non Sq Epi: x / Bacteria: x        CT Head No Cont:   ACC: [id number] EXAM:  CT BRAIN   ORDERED BY: SLA ANTHONY     PROCEDURE DATE:  12/01/2024          INTERPRETATION:  Trauma  The study was performed without IV contrast.  Previous exam 11/23/2024  The cisterns and ventricles are normal with no shift of the midline   structures.  No hemorrhage, infarct or mass formation is seen.  The skull is intact.    IMPRESSION: No acute process seen    --- End of Report ---    YI TONY MD; Attending Radiologist  This document has been electronically signed. Dec  1 2024  9:24PM (12-01-24 @ 20:54)

## 2024-12-12 RX ADMIN — ACETAMINOPHEN 650 MILLIGRAM(S): 80 SOLUTION/ DROPS ORAL at 23:22

## 2024-12-12 RX ADMIN — Medication 1 DROP(S): at 12:21

## 2024-12-12 RX ADMIN — Medication 81 MILLIGRAM(S): at 11:59

## 2024-12-12 RX ADMIN — TRAMADOL HYDROCHLORIDE 50 MILLIGRAM(S): 50 TABLET ORAL at 09:49

## 2024-12-12 RX ADMIN — Medication 17 GRAM(S): at 11:58

## 2024-12-12 RX ADMIN — ACETAMINOPHEN 650 MILLIGRAM(S): 80 SOLUTION/ DROPS ORAL at 11:59

## 2024-12-12 RX ADMIN — ACETAMINOPHEN 650 MILLIGRAM(S): 80 SOLUTION/ DROPS ORAL at 06:20

## 2024-12-12 RX ADMIN — FAMOTIDINE 20 MILLIGRAM(S): 20 TABLET, FILM COATED ORAL at 17:07

## 2024-12-12 RX ADMIN — ACETAMINOPHEN 650 MILLIGRAM(S): 80 SOLUTION/ DROPS ORAL at 17:07

## 2024-12-12 RX ADMIN — CHLORHEXIDINE GLUCONATE 1 APPLICATION(S): 1.2 RINSE ORAL at 06:20

## 2024-12-12 RX ADMIN — ACETAMINOPHEN 650 MILLIGRAM(S): 80 SOLUTION/ DROPS ORAL at 13:20

## 2024-12-12 RX ADMIN — Medication 1 DROP(S): at 06:20

## 2024-12-12 RX ADMIN — FAMOTIDINE 20 MILLIGRAM(S): 20 TABLET, FILM COATED ORAL at 06:20

## 2024-12-12 RX ADMIN — Medication 1 TABLET(S): at 11:59

## 2024-12-12 RX ADMIN — ACETAMINOPHEN 650 MILLIGRAM(S): 80 SOLUTION/ DROPS ORAL at 06:58

## 2024-12-12 RX ADMIN — SENNOSIDES 2 TABLET(S): 8.6 TABLET, FILM COATED ORAL at 21:30

## 2024-12-12 RX ADMIN — ROSUVASTATIN 10 MILLIGRAM(S): 40 TABLET, FILM COATED ORAL at 21:31

## 2024-12-12 RX ADMIN — ACETAMINOPHEN 650 MILLIGRAM(S): 80 SOLUTION/ DROPS ORAL at 18:00

## 2024-12-12 RX ADMIN — Medication 1 DROP(S): at 21:30

## 2024-12-12 RX ADMIN — Medication 5 MILLIGRAM(S): at 21:31

## 2024-12-12 RX ADMIN — Medication 500 MILLIGRAM(S): at 11:59

## 2024-12-12 RX ADMIN — Medication 3 MILLIGRAM(S): at 21:31

## 2024-12-12 RX ADMIN — TRAMADOL HYDROCHLORIDE 50 MILLIGRAM(S): 50 TABLET ORAL at 10:25

## 2024-12-12 RX ADMIN — Medication 1 MILLIGRAM(S): at 11:58

## 2024-12-12 RX ADMIN — ENOXAPARIN SODIUM 40 MILLIGRAM(S): 60 INJECTION INTRAVENOUS; SUBCUTANEOUS at 06:20

## 2024-12-12 RX ADMIN — Medication 400 MILLIGRAM(S): at 17:07

## 2024-12-12 NOTE — CHART NOTE - NSCHARTNOTEFT_GEN_A_CORE
Due to gait abnormality secondary to a Rt. femur fracture, the patient will require a rolling walker for a safe DC. A RW can sufficiently resolve the patient's mobility deficit, and the patient can use the RW safely.  The patient will require a commode since she is confined to a single level without a bathroom. Due to gait abnormality secondary to a Rt. femur fracture, the patient will require a rolling walker for a safe DC. A RW can sufficiently resolve the patient's mobility deficit, and the patient can use the RW safely.

## 2024-12-12 NOTE — CHART NOTE - NSCHARTNOTEFT_GEN_A_CORE
rt lef had dressing on entire leg with cotton and ace wrapped incision upto left knee , she had her entire leg wrapped up with   cotton  and  sterile  gauze  and ace  wrap ,  Surgical incision is  upto the right knee , removed  dressing on rt , incision including rt hip , and thigh looks good , one  area  with slight blood oozing , dry sterile dressing  applied . or was on 12.2 rt femur with distal part . HER  leg  had dressing on entire leg with cotton and ace wrap up to left Ankle,  Surgical incision is  upto the right knee , Dressing removed  and kept it open to air except one area  slight drainage / few blood drops ,  OR WAS ON 12/2/24.

## 2024-12-12 NOTE — PROGRESS NOTE ADULT - SUBJECTIVE AND OBJECTIVE BOX
85 yo F with PMHx of GERD, HTN, OA s/p bilateral TKA and ROHIT, s/p L forefoot exostosis removal (11/2024) RA, and hx of breast cancer (in remission since 2015) who presents to the hospital after a fall. Around 1000, patient was seated in a chair and reaching for something on another chair when she lost her balance and fell onto her R side and hit her head. She was unable to get up from the ground and was lying on the floor for ~8 hours until her friend found her. +HS. -LOC. -AC. Subsequently, she had RLE pain. Of note, patient fell forwards two days prior and hit her face with subsequent bruising. Seen in ED at the time without any significant injuries found. No dizziness/lightheadedness associated with either fall. On admission, imaging showed acute comminuted right femoral distal metadiaphyseal fracture with volar apex angulation and 1.5 cm posterior displacement and small knee joint effusion. She underwent a R femur ORIF (12/2). Weightbearing status WBAT. During hospital course, patient had post-op blood loss anemia. Last H/H 7.5/23.1.    Imaging:  CT of R femur: Acute comminuted right femoral distal metadiaphyseal fracture with volar apex angulation and 1.5 cm posterior displacement. Small knee joint effusion.  CT head: No acute process seen    During their stay, the patient was evaluated by physical and occupational therapy. Prior to admission, patient was independent with ADLs, dependent on some iADLs (grocery, laundry), and ambulates with walker/cane occasionally The most recent evaluated functional status is max A with transfers, ambulates 1 side step by b/s secondary to decreased weight bearing tolerance on RLE and fear of falling, standby assist for UB dressing, dependent for LB dressing. Patient was deemed a good acute rehab candidate due to decline in functional status and ability to carry out activities of daily living. Patient is able to tolerate 3 hours of therapy 5-7 days a week and is motivated to participate.    The patient was evaluated by the PM&R team once medically stable. The patient was found to have functional limitations in terms of physical strength/mobility and ability to carry out ADLs (self care, transfers, ambulation). Patient admitted to   12/5 for rehabilitation of R femur fx, s/p ORIF with decline in ADLs.    Living Situation: resides in  with 1 FOS to bedroom (uses stair lift) and 3 steps to enter  PLOF:  independent with ADLs, dependent on some iADLs (grocery, laundry), and ambulates with walker/cane occasionally    Today’s Subjective & Review of Symptoms  No overnight events  Patient seen this morning. No acute complaints. Last BM was 2 days ago  Vitals and labs reviewed  Tolerating PT/OT    CLOF:  max A with transfers, standby assist for UB dressing, dependent for LB dressing, and ambulates 1 side step by b/s secondary to decreased weight bearing tolerance on RLE and fear of falling    Review of Systems:   Constitutional:    [x ] WNL           [   ] poor appetite   [ ] insomnia   [  ] tired   Cardio:           [x ] WNL           [   ] CP              [ ] MOJICA        [  ] palpitations               Resp:             [x ] WNL           [   ] SOB             [ ] cough      [  ] wheezing   GI:               [ x] WNL           [   ] constipation    [ ] diarrhea   [  ] abdominal pain       [ ] nausea   [  ] emesis                                :               [ x] WNL           [   ] BENNETT           [ ] dysuria    [  ] difficulty voiding   [ ] Other:    Endo:             [ x] WNL           [   ] polyuria        [ ] temperature intolerance                 Skin:             [ ] WNL           [   ] pain            [x ] incision-right LE with dressing applied   [ x ] rash-ecchymosis along bilateral facial cheeks   MSK:              [ ] WNL           [ x  ] muscle pain     [x ] joint pain [x ] muscle tenderness   [x ] swelling RLE   Neuro:            [ ] WNL           [   ] HA              [ ] change in vision   [   ] tremor   [x ] weakness [  ] dysphagia    Cognitive:        [x ] WNL           [   ] confusion      Psych:            [x ] WNL           [   ] hallucinations   [   ]agitation   [   ] delusion   [   ]depression    Physical Exam:  Vitals  Vital Signs Last 24 Hrs  T(C): 36.7 (12 Dec 2024 05:40), Max: 36.7 (11 Dec 2024 14:00)  T(F): 98.1 (12 Dec 2024 05:40), Max: 98.1 (12 Dec 2024 05:40)  HR: 82 (12 Dec 2024 05:40) (82 - 88)  BP: 153/76 (12 Dec 2024 05:40) (144/77 - 153/76)  BP(mean): --  RR: 18 (12 Dec 2024 05:40) (18 - 18)  SpO2: 99% (12 Dec 2024 05:40) (96% - 99%)    Parameters below as of 12 Dec 2024 05:40  Patient On (Oxygen Delivery Method): room air      General: Resting in bed                     HEENT: [   ] NC/AT, EOMI,  Normal Conjunctivae,   [  x ] other: bilateral cheek facial abrasion, normal conjunctivae, EOMI  Cardio: [  x ] RRR, no murmur,   [   ] other:                              Pulm: [ x  ] No Respiratory Distress,  Lungs CTAB,   [   ] other:             Abdomen: [ x  ]ND/NT, Soft,   [   ] other:    : [ x  ] No bennett catheter, [   ] Bennett catheter present [  ] other:   MSK: [  ] No joint swelling,  [ x  ] other:  RLE/ knee joint swelling                      Ext: [ x  ]No C/C, No calf tenderness,   [  x ]other:  swelling of MCPs, no warmth  Skin: [   ]intact,   [ x  ] other: RLE incision site with post op dressing, C&D    Neurological Examination:  Cognitive: [  x  ] AAO x 3,   [  ]  other:       Attention:  [ x  ] intact,   [    ]  other:   Language: [ x ] intact  [  ]    Memory: [ x  ] intact,    [  ]  other:     Mood/Affect: [  x ] wnl  [    ]  other:                                                                             Communication: [x   ]Fluent, no dysarthria [    ] other:   CN II - XII:  [ x   ] intact,  [    ] other:                                                                                         Motor:   RUE: 5/5 shoulder abduction, 5/5 elbow flexion, 5/5 elbow extension, 5/5 finger flexion  LUE: 5/5 shoulder abduction, 5/5 elbow flexion, 5/5 elbow extension, 5/5 finger flexion  RLE: 2/5 hip flexion (within restricted ROM), 2+/5 knee ext/flex (within restricted ROM), 4+/5 plantarflexion/dorsiflexion   LLE: 5/5 hip flexion, 5/5 knee ext/flex, 4+/5 dorsiflexion/plantarflexion     Tone: [  x ] wnl,   [    ]  other:  DTRs: [ x ]symmetric, [   ] other:  Coordination:   [ x ] intact,   [  ] other:                                                                        Sensory: [ x   ] Intact to light touch  [  ] other:        MEDICATION  MEDICATIONS  (STANDING):  acetaminophen     Tablet .. 650 milliGRAM(s) Oral every 6 hours  ascorbic acid 500 milliGRAM(s) Oral daily  aspirin enteric coated 81 milliGRAM(s) Oral daily  bisacodyl 5 milliGRAM(s) Oral at bedtime  bisacodyl Suppository 10 milliGRAM(s) Rectal once  calcium carbonate 1250 mG  + Vitamin D (OsCal 500 + D) 1 Tablet(s) Oral daily  chlorhexidine 2% Cloths 1 Application(s) Topical <User Schedule>  enoxaparin Injectable 40 milliGRAM(s) SubCutaneous every 24 hours  famotidine    Tablet 20 milliGRAM(s) Oral two times a day  folic acid 1 milliGRAM(s) Oral daily  metoprolol succinate ER 25 milliGRAM(s) Oral at bedtime  ofloxacin 0.3% Solution 1 Drop(s) Right EYE three times a day  polyethylene glycol 3350 17 Gram(s) Oral daily  rosuvastatin 10 milliGRAM(s) Oral at bedtime  senna 2 Tablet(s) Oral at bedtime    MEDICATIONS  (PRN):  melatonin 3 milliGRAM(s) Oral at bedtime PRN Insomnia  traMADol 50 milliGRAM(s) Oral four times a day PRN Moderate Pain (4 - 6)        RECENT LABS/IMAGING  CT Head No Cont:   ACC: [id number] EXAM:  CT BRAIN   ORDERED BY: SAL ANTHONY     PROCEDURE DATE:  12/01/2024          INTERPRETATION:  Trauma  The study was performed without IV contrast.  Previous exam 11/23/2024  The cisterns and ventricles are normal with no shift of the midline   structures.  No hemorrhage, infarct or mass formation is seen.  The skull is intact.    IMPRESSION: No acute process seen    --- End of Report ---            YI TONY MD; Attending Radiologist  This document has been electronically signed. Dec  1 2024  9:24PM (12-01-24 @ 20:54)

## 2024-12-13 LAB
ANION GAP SERPL CALC-SCNC: 10 MMOL/L — SIGNIFICANT CHANGE UP (ref 7–14)
APPEARANCE UR: ABNORMAL
BACTERIA # UR AUTO: ABNORMAL /HPF
BASOPHILS # BLD AUTO: 0.02 K/UL — SIGNIFICANT CHANGE UP (ref 0–0.2)
BASOPHILS NFR BLD AUTO: 0.4 % — SIGNIFICANT CHANGE UP (ref 0–1)
BILIRUB UR-MCNC: NEGATIVE — SIGNIFICANT CHANGE UP
BUN SERPL-MCNC: 13 MG/DL — SIGNIFICANT CHANGE UP (ref 10–20)
CALCIUM SERPL-MCNC: 8.9 MG/DL — SIGNIFICANT CHANGE UP (ref 8.4–10.5)
CAST: 0 /LPF — SIGNIFICANT CHANGE UP (ref 0–4)
CHLORIDE SERPL-SCNC: 95 MMOL/L — LOW (ref 98–110)
CO2 SERPL-SCNC: 26 MMOL/L — SIGNIFICANT CHANGE UP (ref 17–32)
COLOR SPEC: YELLOW — SIGNIFICANT CHANGE UP
CREAT SERPL-MCNC: 0.7 MG/DL — SIGNIFICANT CHANGE UP (ref 0.7–1.5)
DIFF PNL FLD: NEGATIVE — SIGNIFICANT CHANGE UP
EGFR: 85 ML/MIN/1.73M2 — SIGNIFICANT CHANGE UP
EOSINOPHIL # BLD AUTO: 0.15 K/UL — SIGNIFICANT CHANGE UP (ref 0–0.7)
EOSINOPHIL NFR BLD AUTO: 2.9 % — SIGNIFICANT CHANGE UP (ref 0–8)
GLUCOSE SERPL-MCNC: 82 MG/DL — SIGNIFICANT CHANGE UP (ref 70–99)
GLUCOSE UR QL: NEGATIVE MG/DL — SIGNIFICANT CHANGE UP
HCT VFR BLD CALC: 27.3 % — LOW (ref 37–47)
HGB BLD-MCNC: 8.7 G/DL — LOW (ref 12–16)
IMM GRANULOCYTES NFR BLD AUTO: 1 % — HIGH (ref 0.1–0.3)
KETONES UR-MCNC: NEGATIVE MG/DL — SIGNIFICANT CHANGE UP
LEUKOCYTE ESTERASE UR-ACNC: ABNORMAL
LYMPHOCYTES # BLD AUTO: 1.12 K/UL — LOW (ref 1.2–3.4)
LYMPHOCYTES # BLD AUTO: 21.9 % — SIGNIFICANT CHANGE UP (ref 20.5–51.1)
MAGNESIUM SERPL-MCNC: 1.8 MG/DL — SIGNIFICANT CHANGE UP (ref 1.8–2.4)
MCHC RBC-ENTMCNC: 31.3 PG — HIGH (ref 27–31)
MCHC RBC-ENTMCNC: 31.9 G/DL — LOW (ref 32–37)
MCV RBC AUTO: 98.2 FL — SIGNIFICANT CHANGE UP (ref 81–99)
MONOCYTES # BLD AUTO: 0.48 K/UL — SIGNIFICANT CHANGE UP (ref 0.1–0.6)
MONOCYTES NFR BLD AUTO: 9.4 % — HIGH (ref 1.7–9.3)
NEUTROPHILS # BLD AUTO: 3.29 K/UL — SIGNIFICANT CHANGE UP (ref 1.4–6.5)
NEUTROPHILS NFR BLD AUTO: 64.4 % — SIGNIFICANT CHANGE UP (ref 42.2–75.2)
NITRITE UR-MCNC: POSITIVE
NRBC # BLD: 0 /100 WBCS — SIGNIFICANT CHANGE UP (ref 0–0)
PH UR: 6.5 — SIGNIFICANT CHANGE UP (ref 5–8)
PLATELET # BLD AUTO: 349 K/UL — SIGNIFICANT CHANGE UP (ref 130–400)
PMV BLD: 9.4 FL — SIGNIFICANT CHANGE UP (ref 7.4–10.4)
POTASSIUM SERPL-MCNC: 4.1 MMOL/L — SIGNIFICANT CHANGE UP (ref 3.5–5)
POTASSIUM SERPL-SCNC: 4.1 MMOL/L — SIGNIFICANT CHANGE UP (ref 3.5–5)
PROT UR-MCNC: SIGNIFICANT CHANGE UP MG/DL
RBC # BLD: 2.78 M/UL — LOW (ref 4.2–5.4)
RBC # FLD: 16.1 % — HIGH (ref 11.5–14.5)
RBC CASTS # UR COMP ASSIST: 1 /HPF — SIGNIFICANT CHANGE UP (ref 0–4)
SODIUM SERPL-SCNC: 131 MMOL/L — LOW (ref 135–146)
SP GR SPEC: 1.02 — SIGNIFICANT CHANGE UP (ref 1–1.03)
SQUAMOUS # UR AUTO: 2 /HPF — SIGNIFICANT CHANGE UP (ref 0–5)
UROBILINOGEN FLD QL: 1 MG/DL — SIGNIFICANT CHANGE UP (ref 0.2–1)
WBC # BLD: 5.11 K/UL — SIGNIFICANT CHANGE UP (ref 4.8–10.8)
WBC # FLD AUTO: 5.11 K/UL — SIGNIFICANT CHANGE UP (ref 4.8–10.8)
WBC UR QL: 162 /HPF — HIGH (ref 0–5)

## 2024-12-13 RX ORDER — SULFAMETHOXAZOLE/TRIMETHOPRIM 800-160 MG
1 TABLET ORAL DAILY
Refills: 0 | Status: COMPLETED | OUTPATIENT
Start: 2024-12-13 | End: 2024-12-15

## 2024-12-13 RX ADMIN — FAMOTIDINE 20 MILLIGRAM(S): 20 TABLET, FILM COATED ORAL at 06:12

## 2024-12-13 RX ADMIN — Medication 400 MILLIGRAM(S): at 12:25

## 2024-12-13 RX ADMIN — Medication 1 DROP(S): at 17:15

## 2024-12-13 RX ADMIN — Medication 3 MILLIGRAM(S): at 22:09

## 2024-12-13 RX ADMIN — CHLORHEXIDINE GLUCONATE 1 APPLICATION(S): 1.2 RINSE ORAL at 06:12

## 2024-12-13 RX ADMIN — TRAMADOL HYDROCHLORIDE 50 MILLIGRAM(S): 50 TABLET ORAL at 10:22

## 2024-12-13 RX ADMIN — ACETAMINOPHEN 650 MILLIGRAM(S): 80 SOLUTION/ DROPS ORAL at 12:26

## 2024-12-13 RX ADMIN — Medication 1 TABLET(S): at 17:15

## 2024-12-13 RX ADMIN — ACETAMINOPHEN 650 MILLIGRAM(S): 80 SOLUTION/ DROPS ORAL at 06:11

## 2024-12-13 RX ADMIN — ACETAMINOPHEN 650 MILLIGRAM(S): 80 SOLUTION/ DROPS ORAL at 14:09

## 2024-12-13 RX ADMIN — ACETAMINOPHEN 650 MILLIGRAM(S): 80 SOLUTION/ DROPS ORAL at 23:04

## 2024-12-13 RX ADMIN — Medication 1 TABLET(S): at 12:25

## 2024-12-13 RX ADMIN — Medication 400 MILLIGRAM(S): at 17:18

## 2024-12-13 RX ADMIN — Medication 400 MILLIGRAM(S): at 08:46

## 2024-12-13 RX ADMIN — Medication 5 MILLIGRAM(S): at 22:06

## 2024-12-13 RX ADMIN — Medication 1 MILLIGRAM(S): at 12:25

## 2024-12-13 RX ADMIN — Medication 25 MILLIGRAM(S): at 22:07

## 2024-12-13 RX ADMIN — FAMOTIDINE 20 MILLIGRAM(S): 20 TABLET, FILM COATED ORAL at 17:15

## 2024-12-13 RX ADMIN — Medication 500 MILLIGRAM(S): at 12:25

## 2024-12-13 RX ADMIN — ROSUVASTATIN 10 MILLIGRAM(S): 40 TABLET, FILM COATED ORAL at 22:07

## 2024-12-13 RX ADMIN — Medication 81 MILLIGRAM(S): at 12:26

## 2024-12-13 RX ADMIN — SENNOSIDES 2 TABLET(S): 8.6 TABLET, FILM COATED ORAL at 22:07

## 2024-12-13 RX ADMIN — ACETAMINOPHEN 650 MILLIGRAM(S): 80 SOLUTION/ DROPS ORAL at 06:13

## 2024-12-13 RX ADMIN — TRAMADOL HYDROCHLORIDE 50 MILLIGRAM(S): 50 TABLET ORAL at 09:31

## 2024-12-13 RX ADMIN — ACETAMINOPHEN 650 MILLIGRAM(S): 80 SOLUTION/ DROPS ORAL at 18:08

## 2024-12-13 RX ADMIN — Medication 1 DROP(S): at 06:12

## 2024-12-13 RX ADMIN — ACETAMINOPHEN 650 MILLIGRAM(S): 80 SOLUTION/ DROPS ORAL at 17:18

## 2024-12-13 RX ADMIN — ENOXAPARIN SODIUM 40 MILLIGRAM(S): 60 INJECTION INTRAVENOUS; SUBCUTANEOUS at 06:11

## 2024-12-13 RX ADMIN — Medication 1 DROP(S): at 22:07

## 2024-12-13 NOTE — PROGRESS NOTE ADULT - ASSESSMENT
Assessment:   85 yo F with PMHx of GERD, HTN, OA s/p bilateral TKA and ROHIT, s/p L forefoot exostosis removal (11/2024), RA (previously on methotrexate), and hx of breast cancer (in remission since 2015) who presented to the hospital after a fall and found to have comminuted right femoral distal metadiaphyseal fracture with volar apex angulation and 1.5 cm posterior displacement and small knee joint effusion. She underwent a R femur ORIF (12/2). Weightbearing status WBAT.    Plan:    #Rehab of Right femur fx s/p ORIF with impairment in mobility and ADLs and iADLs. The patient presented with co-morbidities requiring close physiatry follow-up at least 3 times a week to monitor his progress and monitor his comorbidities including HTN, OA s/p TKA and ROHIT, RA, and s/p L forefoot exostosis removal (11/2024).The patient's co-morbidities do not limit the patient's ability to participate in rehabilitative therapy. The patient was seen by PT/OT and  required max A with transfers, standby assist for UB dressing, dependent for LB dressing, and ambulates 1 side step by b/s secondary to decreased weight bearing tolerance on RLE and fear of falling, The patient was previously independent with all her ADLs and some iADLs (dependent on laundry and grocery) and ambulated with cane/walker occasionally and now presents with functional decline. The patient can benefit from and tolerate 3 hours of restorative therapy daily. The patient is an excellent acute inpatient rehabilitation candidate.     #S/P right distal femur fracture/ ORIF  -CTH shows no acute findings.  -CT Right femur shows acute comminuted right femoral distal metadiaphyseal fracture with volar apex angulation and 1.5 cm posterior displacement and small knee joint effusion.  -s/p subsequent R femur ORIF (12/2).   -Weight bearing status WBAT.  -Per Ortho, DVT ppx x6 weeks post-op  -c/w ASA 81mg QD  - Pain control: tramadol 50 q6 PRN, Tylenol q6  -Will adjust pain medications to optimize pain control  -c/w Calcium 600+D 600 mg-200 tablet: BID  -PT/ OT  - 12/6 BLE dopplers: no DVT in LLE. Unable to visualize venous system in RLE  The patient requires acute rehab with 3 hours of daily therapies at least 5 out of 7 days and close physiatry follow up.     #s/p recent fall last week with HT and bilateral facial contusions/ ecchymosis/ no LOC  - monitor    #UTI  12/13 UA positive  -follow up urine culture  - Bactrim DS 1 dose daily (12/13-12/15)    #Recent left heel spur resection  - WBAT  - limiting ambulation  - to wear cast-boot for longer distances as per patient  - followed by podiatry    #Post-op acute blood loss anemia  -Baseline hgb ~10.  -H/H 7.6/23.6 (7.5/23.1) 12/6  -c/w H/H monitoring and hemodynamic status monitoring  -Transfuse if hgb<7.    #Hypomagnesemia  -Mg 1.9 (12/9)  -c/w magnesium supplement as needed    #Azotemia  -12/6 BUN 31->22, improving    #Hyponatremia  -Na 131 (12/13)  - on regular salt diet  -will continue to monitor   - fluid restriction    #HTN  -continue to monitor hemodynamic status   - 12/6 started home metoprolol ER 25mg nightly  -will adjust medications as needed    #HLD  -c/w rosuvastatin 10mg QD     #GERD  - c/w famotidine 20mg  BID    #OA  #RA  - s/p bilat THR/ TKR  - Previously on methotrexate.   -Not on medication currently  - Follows up outpatient with rheumatology  - monitor for exacerbation now off Methotrexate    #MRSA precautions discontinued 12/6 per Infection Control.    #Anxiety/depression  - Previously on sertraline  - Will monitor     -Pain control: tramadol and Tylenol  -GI/Bowel Mgmt: Miralax, Senna, bisacodyl   -Skin: surgical incision along RLE  - Diet Regular    Precautions / PROPHYLAXIS:    - Falls  - DVT prophylaxis: lovenox

## 2024-12-13 NOTE — PROGRESS NOTE ADULT - SUBJECTIVE AND OBJECTIVE BOX
83 yo F with PMHx of GERD, HTN, OA s/p bilateral TKA and ROHIT, s/p L forefoot exostosis removal (11/2024) RA, and hx of breast cancer (in remission since 2015) who presents to the hospital after a fall. Around 1000, patient was seated in a chair and reaching for something on another chair when she lost her balance and fell onto her R side and hit her head. She was unable to get up from the ground and was lying on the floor for ~8 hours until her friend found her. +HS. -LOC. -AC. Subsequently, she had RLE pain. Of note, patient fell forwards two days prior and hit her face with subsequent bruising. Seen in ED at the time without any significant injuries found. No dizziness/lightheadedness associated with either fall. On admission, imaging showed acute comminuted right femoral distal metadiaphyseal fracture with volar apex angulation and 1.5 cm posterior displacement and small knee joint effusion. She underwent a R femur ORIF (12/2). Weightbearing status WBAT. During hospital course, patient had post-op blood loss anemia. Last H/H 7.5/23.1.    Imaging:  CT of R femur: Acute comminuted right femoral distal metadiaphyseal fracture with volar apex angulation and 1.5 cm posterior displacement. Small knee joint effusion.  CT head: No acute process seen    During their stay, the patient was evaluated by physical and occupational therapy. Prior to admission, patient was independent with ADLs, dependent on some iADLs (grocery, laundry), and ambulates with walker/cane occasionally The most recent evaluated functional status is max A with transfers, ambulates 1 side step by b/s secondary to decreased weight bearing tolerance on RLE and fear of falling, standby assist for UB dressing, dependent for LB dressing. Patient was deemed a good acute rehab candidate due to decline in functional status and ability to carry out activities of daily living. Patient is able to tolerate 3 hours of therapy 5-7 days a week and is motivated to participate.    The patient was evaluated by the PM&R team once medically stable. The patient was found to have functional limitations in terms of physical strength/mobility and ability to carry out ADLs (self care, transfers, ambulation). Patient admitted to   12/5 for rehabilitation of R femur fx, s/p ORIF with decline in ADLs.    Living Situation: resides in  with 1 FOS to bedroom (uses stair lift) and 3 steps to enter  PLOF:  independent with ADLs, dependent on some iADLs (grocery, laundry), and ambulates with walker/cane occasionally    Today’s Subjective & Review of Symptoms  No overnight events  Patient seen this morning. No acute complaints. Had a BM today.   Vitals and labs reviewed  Tolerating PT/OT    CLOF:  modA with transfers, modA bed mobility, ambulates 50ft with RW and modA, LB dressing SPV, toileting TA    Review of Systems:   Constitutional:    [x ] WNL           [   ] poor appetite   [ ] insomnia   [  ] tired   Cardio:           [x ] WNL           [   ] CP              [ ] MOJICA        [  ] palpitations               Resp:             [x ] WNL           [   ] SOB             [ ] cough      [  ] wheezing   GI:               [ x] WNL           [   ] constipation    [ ] diarrhea   [  ] abdominal pain       [ ] nausea   [  ] emesis                                :               [ x] WNL           [   ] BENNETT           [ ] dysuria    [  ] difficulty voiding   [ ] Other:    Endo:             [ x] WNL           [   ] polyuria        [ ] temperature intolerance                 Skin:             [ ] WNL           [   ] pain            [x ] incision-right LE with dressing applied   [ x ] rash-ecchymosis along bilateral facial cheeks   MSK:              [ ] WNL           [ x  ] muscle pain     [x ] joint pain [x ] muscle tenderness   [x ] swelling RLE   Neuro:            [ ] WNL           [   ] HA              [ ] change in vision   [   ] tremor   [x ] weakness [  ] dysphagia    Cognitive:        [x ] WNL           [   ] confusion      Psych:            [x ] WNL           [   ] hallucinations   [   ]agitation   [   ] delusion   [   ]depression    Physical Exam:  Vitals  Vital Signs Last 24 Hrs  T(C): 36.6 (13 Dec 2024 06:14), Max: 36.7 (12 Dec 2024 22:03)  T(F): 97.9 (13 Dec 2024 06:14), Max: 98.1 (12 Dec 2024 22:03)  HR: 72 (13 Dec 2024 06:14) (72 - 83)  BP: 154/79 (13 Dec 2024 06:14) (113/68 - 155/81)  BP(mean): 83 (12 Dec 2024 22:03) (83 - 83)  RR: 18 (13 Dec 2024 06:14) (18 - 19)  SpO2: 96% (13 Dec 2024 06:14) (96% - 98%)    Parameters below as of 13 Dec 2024 06:14  Patient On (Oxygen Delivery Method): room air    General: Resting in bed                     HEENT: [   ] NC/AT, EOMI,  Normal Conjunctivae,   [  x ] other: bilateral cheek facial abrasion, normal conjunctivae, EOMI  Cardio: [  x ] RRR, no murmur,   [   ] other:                              Pulm: [ x  ] No Respiratory Distress,  Lungs CTAB,   [   ] other:             Abdomen: [ x  ]ND/NT, Soft,   [   ] other:    : [ x  ] No bennett catheter, [   ] Bennett catheter present [  ] other:   MSK: [  ] No joint swelling,  [ x  ] other:  RLE/ knee joint swelling                      Ext: [ x  ]No C/C, No calf tenderness,   [  x ]other:  swelling of MCPs, no warmth  Skin: [   ]intact,   [ x  ] other: RLE incision site with post op dressing, C&D    Neurological Examination:  Cognitive: [  x  ] AAO x 3,   [  ]  other:       Attention:  [ x  ] intact,   [    ]  other:   Language: [ x ] intact  [  ]    Memory: [ x  ] intact,    [  ]  other:     Mood/Affect: [  x ] wnl  [    ]  other:                                                                             Communication: [x   ]Fluent, no dysarthria [    ] other:   CN II - XII:  [ x   ] intact,  [    ] other:                                                                                         Motor:   RUE: 5/5 shoulder abduction, 5/5 elbow flexion, 5/5 elbow extension, 5/5 finger flexion  LUE: 5/5 shoulder abduction, 5/5 elbow flexion, 5/5 elbow extension, 5/5 finger flexion  RLE: 2/5 hip flexion (within restricted ROM), 2+/5 knee ext/flex (within restricted ROM), 4+/5 plantarflexion/dorsiflexion   LLE: 5/5 hip flexion, 5/5 knee ext/flex, 4+/5 dorsiflexion/plantarflexion     Tone: [  x ] wnl,   [    ]  other:  DTRs: [ x ]symmetric, [   ] other:  Coordination:   [ x ] intact,   [  ] other:                                                                        Sensory: [ x   ] Intact to light touch  [  ] other:        MEDICATION  MEDICATIONS  (STANDING):  acetaminophen     Tablet .. 650 milliGRAM(s) Oral every 6 hours  ascorbic acid 500 milliGRAM(s) Oral daily  aspirin enteric coated 81 milliGRAM(s) Oral daily  bisacodyl 5 milliGRAM(s) Oral at bedtime  bisacodyl Suppository 10 milliGRAM(s) Rectal once  calcium carbonate 1250 mG  + Vitamin D (OsCal 500 + D) 1 Tablet(s) Oral daily  chlorhexidine 2% Cloths 1 Application(s) Topical <User Schedule>  enoxaparin Injectable 40 milliGRAM(s) SubCutaneous every 24 hours  famotidine    Tablet 20 milliGRAM(s) Oral two times a day  folic acid 1 milliGRAM(s) Oral daily  magnesium oxide 400 milliGRAM(s) Oral three times a day with meals  metoprolol succinate ER 25 milliGRAM(s) Oral at bedtime  ofloxacin 0.3% Solution 1 Drop(s) Right EYE three times a day  polyethylene glycol 3350 17 Gram(s) Oral daily  rosuvastatin 10 milliGRAM(s) Oral at bedtime  senna 2 Tablet(s) Oral at bedtime  trimethoprim  160 mG/sulfamethoxazole 800 mG 1 Tablet(s) Oral daily    MEDICATIONS  (PRN):  melatonin 3 milliGRAM(s) Oral at bedtime PRN Insomnia  traMADol 50 milliGRAM(s) Oral four times a day PRN Moderate Pain (4 - 6)        RECENT LABS/IMAGING                        8.7    5.11  )-----------( 349      ( 13 Dec 2024 07:48 )             27.3     12-13    131[L]  |  95[L]  |  13  ----------------------------<  82  4.1   |  26  |  0.7    Ca    8.9      13 Dec 2024 07:48  Mg     1.8     12-13        Urinalysis Basic - ( 13 Dec 2024 07:48 )    Color: x / Appearance: x / SG: x / pH: x  Gluc: 82 mg/dL / Ketone: x  / Bili: x / Urobili: x   Blood: x / Protein: x / Nitrite: x   Leuk Esterase: x / RBC: x / WBC x   Sq Epi: x / Non Sq Epi: x / Bacteria: x            CT Head No Cont:   ACC: [id number] EXAM:  CT BRAIN   ORDERED BY: SAL ANTHONY     PROCEDURE DATE:  12/01/2024          INTERPRETATION:  Trauma  The study was performed without IV contrast.  Previous exam 11/23/2024  The cisterns and ventricles are normal with no shift of the midline   structures.  No hemorrhage, infarct or mass formation is seen.  The skull is intact.    IMPRESSION: No acute process seen    --- End of Report ---            YI TONY MD; Attending Radiologist  This document has been electronically signed. Dec  1 2024  9:24PM (12-01-24 @ 20:54)         dietitian/nutrition services

## 2024-12-14 RX ADMIN — Medication 400 MILLIGRAM(S): at 09:18

## 2024-12-14 RX ADMIN — TRAMADOL HYDROCHLORIDE 50 MILLIGRAM(S): 50 TABLET ORAL at 22:07

## 2024-12-14 RX ADMIN — Medication 400 MILLIGRAM(S): at 12:26

## 2024-12-14 RX ADMIN — ACETAMINOPHEN 650 MILLIGRAM(S): 80 SOLUTION/ DROPS ORAL at 13:10

## 2024-12-14 RX ADMIN — Medication 1 MILLIGRAM(S): at 12:26

## 2024-12-14 RX ADMIN — FAMOTIDINE 20 MILLIGRAM(S): 20 TABLET, FILM COATED ORAL at 17:42

## 2024-12-14 RX ADMIN — Medication 1 DROP(S): at 05:42

## 2024-12-14 RX ADMIN — Medication 500 MILLIGRAM(S): at 12:26

## 2024-12-14 RX ADMIN — TRAMADOL HYDROCHLORIDE 50 MILLIGRAM(S): 50 TABLET ORAL at 10:20

## 2024-12-14 RX ADMIN — Medication 1 TABLET(S): at 12:26

## 2024-12-14 RX ADMIN — Medication 400 MILLIGRAM(S): at 17:42

## 2024-12-14 RX ADMIN — FAMOTIDINE 20 MILLIGRAM(S): 20 TABLET, FILM COATED ORAL at 05:42

## 2024-12-14 RX ADMIN — Medication 25 MILLIGRAM(S): at 22:07

## 2024-12-14 RX ADMIN — ACETAMINOPHEN 650 MILLIGRAM(S): 80 SOLUTION/ DROPS ORAL at 00:04

## 2024-12-14 RX ADMIN — Medication 1 DROP(S): at 22:07

## 2024-12-14 RX ADMIN — ROSUVASTATIN 10 MILLIGRAM(S): 40 TABLET, FILM COATED ORAL at 22:07

## 2024-12-14 RX ADMIN — Medication 1 DROP(S): at 12:30

## 2024-12-14 RX ADMIN — Medication 81 MILLIGRAM(S): at 12:26

## 2024-12-14 RX ADMIN — ACETAMINOPHEN 650 MILLIGRAM(S): 80 SOLUTION/ DROPS ORAL at 18:20

## 2024-12-14 RX ADMIN — Medication 17 GRAM(S): at 12:26

## 2024-12-14 RX ADMIN — ACETAMINOPHEN 650 MILLIGRAM(S): 80 SOLUTION/ DROPS ORAL at 17:42

## 2024-12-14 RX ADMIN — TRAMADOL HYDROCHLORIDE 50 MILLIGRAM(S): 50 TABLET ORAL at 09:34

## 2024-12-14 RX ADMIN — ACETAMINOPHEN 650 MILLIGRAM(S): 80 SOLUTION/ DROPS ORAL at 05:42

## 2024-12-14 RX ADMIN — CHLORHEXIDINE GLUCONATE 1 APPLICATION(S): 1.2 RINSE ORAL at 05:39

## 2024-12-14 RX ADMIN — ENOXAPARIN SODIUM 40 MILLIGRAM(S): 60 INJECTION INTRAVENOUS; SUBCUTANEOUS at 05:42

## 2024-12-14 RX ADMIN — ACETAMINOPHEN 650 MILLIGRAM(S): 80 SOLUTION/ DROPS ORAL at 12:27

## 2024-12-14 RX ADMIN — ACETAMINOPHEN 650 MILLIGRAM(S): 80 SOLUTION/ DROPS ORAL at 06:45

## 2024-12-14 RX ADMIN — TRAMADOL HYDROCHLORIDE 50 MILLIGRAM(S): 50 TABLET ORAL at 22:40

## 2024-12-14 NOTE — PROGRESS NOTE ADULT - SUBJECTIVE AND OBJECTIVE BOX
85 yo F with PMHx of GERD, HTN, OA s/p bilateral TKA and ROHIT, s/p L forefoot exostosis removal (11/2024) RA, and hx of breast cancer (in remission since 2015) who presents to the hospital after a fall. Around 1000, patient was seated in a chair and reaching for something on another chair when she lost her balance and fell onto her R side and hit her head. She was unable to get up from the ground and was lying on the floor for ~8 hours until her friend found her. +HS. -LOC. -AC. Subsequently, she had RLE pain. Of note, patient fell forwards two days prior and hit her face with subsequent bruising. Seen in ED at the time without any significant injuries found. No dizziness/lightheadedness associated with either fall. On admission, imaging showed acute comminuted right femoral distal metadiaphyseal fracture with volar apex angulation and 1.5 cm posterior displacement and small knee joint effusion. She underwent a R femur ORIF (12/2). Weightbearing status WBAT. During hospital course, patient had post-op blood loss anemia. Last H/H 7.5/23.1.    Imaging:  CT of R femur: Acute comminuted right femoral distal metadiaphyseal fracture with volar apex angulation and 1.5 cm posterior displacement. Small knee joint effusion.  CT head: No acute process seen    During their stay, the patient was evaluated by physical and occupational therapy. Prior to admission, patient was independent with ADLs, dependent on some iADLs (grocery, laundry), and ambulates with walker/cane occasionally The most recent evaluated functional status is max A with transfers, ambulates 1 side step by b/s secondary to decreased weight bearing tolerance on RLE and fear of falling, standby assist for UB dressing, dependent for LB dressing. Patient was deemed a good acute rehab candidate due to decline in functional status and ability to carry out activities of daily living. Patient is able to tolerate 3 hours of therapy 5-7 days a week and is motivated to participate.    The patient was evaluated by the PM&R team once medically stable. The patient was found to have functional limitations in terms of physical strength/mobility and ability to carry out ADLs (self care, transfers, ambulation). Patient admitted to   12/5 for rehabilitation of R femur fx, s/p ORIF with decline in ADLs.    Living Situation: resides in  with 1 FOS to bedroom (uses stair lift) and 3 steps to enter  PLOF:  independent with ADLs, dependent on some iADLs (grocery, laundry), and ambulates with walker/cane occasionally    Today’s Subjective & Review of Symptoms  No overnight events  ***  Vitals and labs reviewed  Tolerating PT/OT    CLOF:  modA with transfers, modA bed mobility, ambulates 50ft with RW and modA, LB dressing SPV, toileting TA    Review of Systems:   Constitutional:    [x ] WNL           [   ] poor appetite   [ ] insomnia   [  ] tired   Cardio:           [x ] WNL           [   ] CP              [ ] MOJICA        [  ] palpitations               Resp:             [x ] WNL           [   ] SOB             [ ] cough      [  ] wheezing   GI:               [ x] WNL           [   ] constipation    [ ] diarrhea   [  ] abdominal pain       [ ] nausea   [  ] emesis                                :               [ x] WNL           [   ] BENNETT           [ ] dysuria    [  ] difficulty voiding   [ ] Other:    Endo:             [ x] WNL           [   ] polyuria        [ ] temperature intolerance                 Skin:             [ ] WNL           [   ] pain            [x ] incision-right LE with dressing applied   [ x ] rash-ecchymosis along bilateral facial cheeks   MSK:              [ ] WNL           [ x  ] muscle pain     [x ] joint pain [x ] muscle tenderness   [x ] swelling RLE   Neuro:            [ ] WNL           [   ] HA              [ ] change in vision   [   ] tremor   [x ] weakness [  ] dysphagia    Cognitive:        [x ] WNL           [   ] confusion      Psych:            [x ] WNL           [   ] hallucinations   [   ]agitation   [   ] delusion   [   ]depression    Physical Exam:  Vitals  Vital Signs Last 24 Hrs  T(C): 36.7 (14 Dec 2024 05:36), Max: 36.7 (14 Dec 2024 05:36)  T(F): 98 (14 Dec 2024 05:36), Max: 98 (14 Dec 2024 05:36)  HR: 73 (14 Dec 2024 05:36) (63 - 78)  BP: 161/83 (14 Dec 2024 05:36) (123/78 - 161/83)  BP(mean): --  RR: 18 (14 Dec 2024 05:36) (18 - 18)  SpO2: 98% (14 Dec 2024 05:36) (95% - 98%)        General: Resting in bed                     HEENT: [   ] NC/AT, EOMI,  Normal Conjunctivae,   [  x ] other: bilateral cheek facial abrasion, normal conjunctivae, EOMI  Cardio: [  x ] RRR, no murmur,   [   ] other:                              Pulm: [ x  ] No Respiratory Distress,  Lungs CTAB,   [   ] other:             Abdomen: [ x  ]ND/NT, Soft,   [   ] other:    : [ x  ] No bennett catheter, [   ] Bennett catheter present [  ] other:   MSK: [  ] No joint swelling,  [ x  ] other:  RLE/ knee joint swelling                      Ext: [ x  ]No C/C, No calf tenderness,   [  x ]other:  swelling of MCPs, no warmth  Skin: [   ]intact,   [ x  ] other: RLE incision site with post op dressing, C&D    Neurological Examination:  Cognitive: [  x  ] AAO x 3,   [  ]  other:       Attention:  [ x  ] intact,   [    ]  other:   Language: [ x ] intact  [  ]    Memory: [ x  ] intact,    [  ]  other:     Mood/Affect: [  x ] wnl  [    ]  other:                                                                             Communication: [x   ]Fluent, no dysarthria [    ] other:   CN II - XII:  [ x   ] intact,  [    ] other:                                                                                         Motor:   RUE: 5/5 shoulder abduction, 5/5 elbow flexion, 5/5 elbow extension, 5/5 finger flexion  LUE: 5/5 shoulder abduction, 5/5 elbow flexion, 5/5 elbow extension, 5/5 finger flexion  RLE: 2/5 hip flexion (within restricted ROM), 2+/5 knee ext/flex (within restricted ROM), 4+/5 plantarflexion/dorsiflexion   LLE: 5/5 hip flexion, 5/5 knee ext/flex, 4+/5 dorsiflexion/plantarflexion     Tone: [  x ] wnl,   [    ]  other:  DTRs: [ x ]symmetric, [   ] other:  Coordination:   [ x ] intact,   [  ] other:                                                                        Sensory: [ x   ] Intact to light touch  [  ] other:          MEDICATION  MEDICATIONS  (STANDING):  acetaminophen     Tablet .. 650 milliGRAM(s) Oral every 6 hours  ascorbic acid 500 milliGRAM(s) Oral daily  aspirin enteric coated 81 milliGRAM(s) Oral daily  bisacodyl 5 milliGRAM(s) Oral at bedtime  bisacodyl Suppository 10 milliGRAM(s) Rectal once  calcium carbonate 1250 mG  + Vitamin D (OsCal 500 + D) 1 Tablet(s) Oral daily  chlorhexidine 2% Cloths 1 Application(s) Topical <User Schedule>  enoxaparin Injectable 40 milliGRAM(s) SubCutaneous every 24 hours  famotidine    Tablet 20 milliGRAM(s) Oral two times a day  folic acid 1 milliGRAM(s) Oral daily  magnesium oxide 400 milliGRAM(s) Oral three times a day with meals  metoprolol succinate ER 25 milliGRAM(s) Oral at bedtime  ofloxacin 0.3% Solution 1 Drop(s) Right EYE three times a day  polyethylene glycol 3350 17 Gram(s) Oral daily  rosuvastatin 10 milliGRAM(s) Oral at bedtime  senna 2 Tablet(s) Oral at bedtime  trimethoprim  160 mG/sulfamethoxazole 800 mG 1 Tablet(s) Oral daily    MEDICATIONS  (PRN):  melatonin 3 milliGRAM(s) Oral at bedtime PRN Insomnia  traMADol 50 milliGRAM(s) Oral four times a day PRN Moderate Pain (4 - 6)        RECENT LABS/IMAGING                        8.7    5.11  )-----------( 349      ( 13 Dec 2024 07:48 )             27.3     12-13    131[L]  |  95[L]  |  13  ----------------------------<  82  4.1   |  26  |  0.7    Ca    8.9      13 Dec 2024 07:48  Mg     1.8     12-13        Urinalysis Basic - ( 13 Dec 2024 07:48 )    Color: x / Appearance: x / SG: x / pH: x  Gluc: 82 mg/dL / Ketone: x  / Bili: x / Urobili: x   Blood: x / Protein: x / Nitrite: x   Leuk Esterase: x / RBC: x / WBC x   Sq Epi: x / Non Sq Epi: x / Bacteria: x                      CT Head No Cont:   ACC: [id number] EXAM:  CT BRAIN   ORDERED BY: SAL ANTHONY     PROCEDURE DATE:  12/01/2024          INTERPRETATION:  Trauma  The study was performed without IV contrast.  Previous exam 11/23/2024  The cisterns and ventricles are normal with no shift of the midline   structures.  No hemorrhage, infarct or mass formation is seen.  The skull is intact.    IMPRESSION: No acute process seen    --- End of Report ---            YI TONY MD; Attending Radiologist  This document has been electronically signed. Dec  1 2024  9:24PM (12-01-24 @ 20:54)     83 yo F with PMHx of GERD, HTN, OA s/p bilateral TKA and ROHIT, s/p L forefoot exostosis removal (11/2024) RA, and hx of breast cancer (in remission since 2015) who presents to the hospital after a fall. Around 1000, patient was seated in a chair and reaching for something on another chair when she lost her balance and fell onto her R side and hit her head. She was unable to get up from the ground and was lying on the floor for ~8 hours until her friend found her. +HS. -LOC. -AC. Subsequently, she had RLE pain. Of note, patient fell forwards two days prior and hit her face with subsequent bruising. Seen in ED at the time without any significant injuries found. No dizziness/lightheadedness associated with either fall. On admission, imaging showed acute comminuted right femoral distal metadiaphyseal fracture with volar apex angulation and 1.5 cm posterior displacement and small knee joint effusion. She underwent a R femur ORIF (12/2). Weightbearing status WBAT. During hospital course, patient had post-op blood loss anemia. Last H/H 7.5/23.1.    Imaging:  CT of R femur: Acute comminuted right femoral distal metadiaphyseal fracture with volar apex angulation and 1.5 cm posterior displacement. Small knee joint effusion.  CT head: No acute process seen    During their stay, the patient was evaluated by physical and occupational therapy. Prior to admission, patient was independent with ADLs, dependent on some iADLs (grocery, laundry), and ambulates with walker/cane occasionally The most recent evaluated functional status is max A with transfers, ambulates 1 side step by b/s secondary to decreased weight bearing tolerance on RLE and fear of falling, standby assist for UB dressing, dependent for LB dressing. Patient was deemed a good acute rehab candidate due to decline in functional status and ability to carry out activities of daily living. Patient is able to tolerate 3 hours of therapy 5-7 days a week and is motivated to participate.    The patient was evaluated by the PM&R team once medically stable. The patient was found to have functional limitations in terms of physical strength/mobility and ability to carry out ADLs (self care, transfers, ambulation). Patient admitted to   12/5 for rehabilitation of R femur fx, s/p ORIF with decline in ADLs.    Living Situation: resides in  with 1 FOS to bedroom (uses stair lift) and 3 steps to enter  PLOF:  independent with ADLs, dependent on some iADLs (grocery, laundry), and ambulates with walker/cane occasionally    Today’s Subjective & Review of Symptoms  No overnight events  Patient seen this morning. States had increased urinary frequency yesterday. No dysuria. No other complaints  Vitals and labs reviewed  Tolerating PT/OT    CLOF:  modA with transfers, modA bed mobility, ambulates 50ft with RW and modA, LB dressing SPV, toileting TA    Review of Systems:   Constitutional:    [x ] WNL           [   ] poor appetite   [ ] insomnia   [  ] tired   Cardio:           [x ] WNL           [   ] CP              [ ] MOJICA        [  ] palpitations               Resp:             [x ] WNL           [   ] SOB             [ ] cough      [  ] wheezing   GI:               [ x] WNL           [   ] constipation    [ ] diarrhea   [  ] abdominal pain       [ ] nausea   [  ] emesis                                :               [ x] WNL           [   ] BENNETT           [ ] dysuria    [  ] difficulty voiding   [ ] Other:    Endo:             [ x] WNL           [   ] polyuria        [ ] temperature intolerance                 Skin:             [ ] WNL           [   ] pain            [x ] incision-right LE with dressing applied   [ x ] rash-ecchymosis along bilateral facial cheeks   MSK:              [ ] WNL           [ x  ] muscle pain     [x ] joint pain [x ] muscle tenderness   [x ] swelling RLE   Neuro:            [ ] WNL           [   ] HA              [ ] change in vision   [   ] tremor   [x ] weakness [  ] dysphagia    Cognitive:        [x ] WNL           [   ] confusion      Psych:            [x ] WNL           [   ] hallucinations   [   ]agitation   [   ] delusion   [   ]depression    Physical Exam:  Vitals  Vital Signs Last 24 Hrs  T(C): 36.7 (14 Dec 2024 05:36), Max: 36.7 (14 Dec 2024 05:36)  T(F): 98 (14 Dec 2024 05:36), Max: 98 (14 Dec 2024 05:36)  HR: 73 (14 Dec 2024 05:36) (63 - 78)  BP: 161/83 (14 Dec 2024 05:36) (123/78 - 161/83)  BP(mean): --  RR: 18 (14 Dec 2024 05:36) (18 - 18)  SpO2: 98% (14 Dec 2024 05:36) (95% - 98%)        General: Resting in bed                     HEENT: [   ] NC/AT, EOMI,  Normal Conjunctivae,   [  x ] other: bilateral cheek facial abrasion, normal conjunctivae, EOMI  Cardio: [  x ] RRR, no murmur,   [   ] other:                              Pulm: [ x  ] No Respiratory Distress,  Lungs CTAB,   [   ] other:             Abdomen: [ x  ]ND/NT, Soft,   [   ] other:    : [ x  ] No bennett catheter, [   ] Bennett catheter present [  ] other:   MSK: [  ] No joint swelling,  [ x  ] other:  RLE/ knee joint swelling                      Ext: [ x  ]No C/C, No calf tenderness,   [  x ]other:  swelling of MCPs, no warmth  Skin: [   ]intact,   [ x  ] other: RLE incision site with post op dressing, C&D    Neurological Examination:  Cognitive: [  x  ] AAO x 3,   [  ]  other:       Attention:  [ x  ] intact,   [    ]  other:   Language: [ x ] intact  [  ]    Memory: [ x  ] intact,    [  ]  other:     Mood/Affect: [  x ] wnl  [    ]  other:                                                                             Communication: [x   ]Fluent, no dysarthria [    ] other:   CN II - XII:  [ x   ] intact,  [    ] other:                                                                                         Motor:   RUE: 5/5 shoulder abduction, 5/5 elbow flexion, 5/5 elbow extension, 5/5 finger flexion  LUE: 5/5 shoulder abduction, 5/5 elbow flexion, 5/5 elbow extension, 5/5 finger flexion  RLE: 2/5 hip flexion (within restricted ROM), 2+/5 knee ext/flex (within restricted ROM), 4+/5 plantarflexion/dorsiflexion   LLE: 5/5 hip flexion, 5/5 knee ext/flex, 4+/5 dorsiflexion/plantarflexion     Tone: [  x ] wnl,   [    ]  other:  DTRs: [ x ]symmetric, [   ] other:  Coordination:   [ x ] intact,   [  ] other:                                                                        Sensory: [ x   ] Intact to light touch  [  ] other:          MEDICATION  MEDICATIONS  (STANDING):  acetaminophen     Tablet .. 650 milliGRAM(s) Oral every 6 hours  ascorbic acid 500 milliGRAM(s) Oral daily  aspirin enteric coated 81 milliGRAM(s) Oral daily  bisacodyl 5 milliGRAM(s) Oral at bedtime  bisacodyl Suppository 10 milliGRAM(s) Rectal once  calcium carbonate 1250 mG  + Vitamin D (OsCal 500 + D) 1 Tablet(s) Oral daily  chlorhexidine 2% Cloths 1 Application(s) Topical <User Schedule>  enoxaparin Injectable 40 milliGRAM(s) SubCutaneous every 24 hours  famotidine    Tablet 20 milliGRAM(s) Oral two times a day  folic acid 1 milliGRAM(s) Oral daily  magnesium oxide 400 milliGRAM(s) Oral three times a day with meals  metoprolol succinate ER 25 milliGRAM(s) Oral at bedtime  ofloxacin 0.3% Solution 1 Drop(s) Right EYE three times a day  polyethylene glycol 3350 17 Gram(s) Oral daily  rosuvastatin 10 milliGRAM(s) Oral at bedtime  senna 2 Tablet(s) Oral at bedtime  trimethoprim  160 mG/sulfamethoxazole 800 mG 1 Tablet(s) Oral daily    MEDICATIONS  (PRN):  melatonin 3 milliGRAM(s) Oral at bedtime PRN Insomnia  traMADol 50 milliGRAM(s) Oral four times a day PRN Moderate Pain (4 - 6)        RECENT LABS/IMAGING                        8.7    5.11  )-----------( 349      ( 13 Dec 2024 07:48 )             27.3     12-13    131[L]  |  95[L]  |  13  ----------------------------<  82  4.1   |  26  |  0.7    Ca    8.9      13 Dec 2024 07:48  Mg     1.8     12-13        Urinalysis Basic - ( 13 Dec 2024 07:48 )    Color: x / Appearance: x / SG: x / pH: x  Gluc: 82 mg/dL / Ketone: x  / Bili: x / Urobili: x   Blood: x / Protein: x / Nitrite: x   Leuk Esterase: x / RBC: x / WBC x   Sq Epi: x / Non Sq Epi: x / Bacteria: x                      CT Head No Cont:   ACC: [id number] EXAM:  CT BRAIN   ORDERED BY: SAL ANTHONY     PROCEDURE DATE:  12/01/2024          INTERPRETATION:  Trauma  The study was performed without IV contrast.  Previous exam 11/23/2024  The cisterns and ventricles are normal with no shift of the midline   structures.  No hemorrhage, infarct or mass formation is seen.  The skull is intact.    IMPRESSION: No acute process seen    --- End of Report ---            YI TONY MD; Attending Radiologist  This document has been electronically signed. Dec  1 2024  9:24PM (12-01-24 @ 20:54)

## 2024-12-15 RX ORDER — SODIUM CHLORIDE 9 MG/ML
1 INJECTION, SOLUTION INTRAMUSCULAR; INTRAVENOUS; SUBCUTANEOUS THREE TIMES A DAY
Refills: 0 | Status: DISCONTINUED | OUTPATIENT
Start: 2024-12-15 | End: 2024-12-24

## 2024-12-15 RX ADMIN — Medication 3 MILLIGRAM(S): at 21:22

## 2024-12-15 RX ADMIN — Medication 500 MILLIGRAM(S): at 11:47

## 2024-12-15 RX ADMIN — ACETAMINOPHEN 650 MILLIGRAM(S): 80 SOLUTION/ DROPS ORAL at 06:30

## 2024-12-15 RX ADMIN — ENOXAPARIN SODIUM 40 MILLIGRAM(S): 60 INJECTION INTRAVENOUS; SUBCUTANEOUS at 05:53

## 2024-12-15 RX ADMIN — Medication 1 DROP(S): at 05:53

## 2024-12-15 RX ADMIN — ACETAMINOPHEN 650 MILLIGRAM(S): 80 SOLUTION/ DROPS ORAL at 00:52

## 2024-12-15 RX ADMIN — Medication 5 MILLIGRAM(S): at 21:22

## 2024-12-15 RX ADMIN — SENNOSIDES 2 TABLET(S): 8.6 TABLET, FILM COATED ORAL at 21:20

## 2024-12-15 RX ADMIN — Medication 81 MILLIGRAM(S): at 11:47

## 2024-12-15 RX ADMIN — TRAMADOL HYDROCHLORIDE 50 MILLIGRAM(S): 50 TABLET ORAL at 14:00

## 2024-12-15 RX ADMIN — ACETAMINOPHEN 650 MILLIGRAM(S): 80 SOLUTION/ DROPS ORAL at 23:54

## 2024-12-15 RX ADMIN — ACETAMINOPHEN 650 MILLIGRAM(S): 80 SOLUTION/ DROPS ORAL at 17:30

## 2024-12-15 RX ADMIN — TRAMADOL HYDROCHLORIDE 50 MILLIGRAM(S): 50 TABLET ORAL at 22:00

## 2024-12-15 RX ADMIN — TRAMADOL HYDROCHLORIDE 50 MILLIGRAM(S): 50 TABLET ORAL at 21:28

## 2024-12-15 RX ADMIN — CHLORHEXIDINE GLUCONATE 1 APPLICATION(S): 1.2 RINSE ORAL at 05:53

## 2024-12-15 RX ADMIN — Medication 400 MILLIGRAM(S): at 07:59

## 2024-12-15 RX ADMIN — Medication 1 MILLIGRAM(S): at 11:47

## 2024-12-15 RX ADMIN — FAMOTIDINE 20 MILLIGRAM(S): 20 TABLET, FILM COATED ORAL at 05:53

## 2024-12-15 RX ADMIN — SODIUM CHLORIDE 1 GRAM(S): 9 INJECTION, SOLUTION INTRAMUSCULAR; INTRAVENOUS; SUBCUTANEOUS at 21:22

## 2024-12-15 RX ADMIN — Medication 25 MILLIGRAM(S): at 21:22

## 2024-12-15 RX ADMIN — Medication 1 DROP(S): at 21:22

## 2024-12-15 RX ADMIN — Medication 17 GRAM(S): at 11:47

## 2024-12-15 RX ADMIN — Medication 1 TABLET(S): at 11:46

## 2024-12-15 RX ADMIN — Medication 400 MILLIGRAM(S): at 17:30

## 2024-12-15 RX ADMIN — FAMOTIDINE 20 MILLIGRAM(S): 20 TABLET, FILM COATED ORAL at 17:30

## 2024-12-15 RX ADMIN — ACETAMINOPHEN 650 MILLIGRAM(S): 80 SOLUTION/ DROPS ORAL at 18:20

## 2024-12-15 RX ADMIN — ACETAMINOPHEN 650 MILLIGRAM(S): 80 SOLUTION/ DROPS ORAL at 05:52

## 2024-12-15 RX ADMIN — ACETAMINOPHEN 650 MILLIGRAM(S): 80 SOLUTION/ DROPS ORAL at 01:50

## 2024-12-15 RX ADMIN — SODIUM CHLORIDE 1 GRAM(S): 9 INJECTION, SOLUTION INTRAMUSCULAR; INTRAVENOUS; SUBCUTANEOUS at 13:08

## 2024-12-15 RX ADMIN — Medication 1 DROP(S): at 13:09

## 2024-12-15 RX ADMIN — ACETAMINOPHEN 650 MILLIGRAM(S): 80 SOLUTION/ DROPS ORAL at 11:46

## 2024-12-15 RX ADMIN — Medication 1 TABLET(S): at 11:47

## 2024-12-15 RX ADMIN — Medication 400 MILLIGRAM(S): at 11:46

## 2024-12-15 RX ADMIN — ROSUVASTATIN 10 MILLIGRAM(S): 40 TABLET, FILM COATED ORAL at 21:21

## 2024-12-15 RX ADMIN — TRAMADOL HYDROCHLORIDE 50 MILLIGRAM(S): 50 TABLET ORAL at 13:08

## 2024-12-15 RX ADMIN — ACETAMINOPHEN 650 MILLIGRAM(S): 80 SOLUTION/ DROPS ORAL at 12:20

## 2024-12-15 NOTE — PROGRESS NOTE ADULT - SUBJECTIVE AND OBJECTIVE BOX
85 yo F with PMHx of GERD, HTN, OA s/p bilateral TKA and ROHIT, s/p L forefoot exostosis removal (11/2024) RA, and hx of breast cancer (in remission since 2015) who presents to the hospital after a fall. Around 1000, patient was seated in a chair and reaching for something on another chair when she lost her balance and fell onto her R side and hit her head. She was unable to get up from the ground and was lying on the floor for ~8 hours until her friend found her. +HS. -LOC. -AC. Subsequently, she had RLE pain. Of note, patient fell forwards two days prior and hit her face with subsequent bruising. Seen in ED at the time without any significant injuries found. No dizziness/lightheadedness associated with either fall. On admission, imaging showed acute comminuted right femoral distal metadiaphyseal fracture with volar apex angulation and 1.5 cm posterior displacement and small knee joint effusion. She underwent a R femur ORIF (12/2). Weightbearing status WBAT. During hospital course, patient had post-op blood loss anemia. Last H/H 7.5/23.1.    Imaging:  CT of R femur: Acute comminuted right femoral distal metadiaphyseal fracture with volar apex angulation and 1.5 cm posterior displacement. Small knee joint effusion.  CT head: No acute process seen    During their stay, the patient was evaluated by physical and occupational therapy. Prior to admission, patient was independent with ADLs, dependent on some iADLs (grocery, laundry), and ambulates with walker/cane occasionally The most recent evaluated functional status is max A with transfers, ambulates 1 side step by b/s secondary to decreased weight bearing tolerance on RLE and fear of falling, standby assist for UB dressing, dependent for LB dressing. Patient was deemed a good acute rehab candidate due to decline in functional status and ability to carry out activities of daily living. Patient is able to tolerate 3 hours of therapy 5-7 days a week and is motivated to participate.    The patient was evaluated by the PM&R team once medically stable. The patient was found to have functional limitations in terms of physical strength/mobility and ability to carry out ADLs (self care, transfers, ambulation). Patient admitted to   12/5 for rehabilitation of R femur fx, s/p ORIF with decline in ADLs.    Living Situation: resides in  with 1 FOS to bedroom (uses stair lift) and 3 steps to enter  PLOF:  independent with ADLs, dependent on some iADLs (grocery, laundry), and ambulates with walker/cane occasionally    Today’s Subjective & Review of Symptoms  No overnight events.  Patient seen this morning. Has no complaints.   Having regular BMs and voiding spontaneously.   Vitals and labs reviewed. Discussed with patient about her hyponatremia.   Participating/tolerating PT/OT.     CLOF:  modA with transfers, modA bed mobility, ambulates 50ft with RW and modA, LB dressing SPV, toileting TA    Review of Systems:   Constitutional:    [x ] WNL           [   ] poor appetite   [ ] insomnia   [  ] tired   Cardio:           [x ] WNL           [   ] CP              [ ] MOJICA        [  ] palpitations               Resp:             [x ] WNL           [   ] SOB             [ ] cough      [  ] wheezing   GI:               [ x] WNL           [   ] constipation    [ ] diarrhea   [  ] abdominal pain       [ ] nausea   [  ] emesis                                :               [ x] WNL           [   ] BENNETT           [ ] dysuria    [  ] difficulty voiding   [ ] Other:    Endo:             [ x] WNL           [   ] polyuria        [ ] temperature intolerance                 Skin:             [ ] WNL           [   ] pain            [x ] incision-right LE with dressing applied   [ x ] rash-ecchymosis along bilateral facial cheeks   MSK:              [ ] WNL           [ x  ] muscle pain     [x ] joint pain [x ] muscle tenderness   [x ] swelling RLE   Neuro:            [ ] WNL           [   ] HA              [ ] change in vision   [   ] tremor   [x ] weakness [  ] dysphagia    Cognitive:        [x ] WNL           [   ] confusion      Psych:            [x ] WNL           [   ] hallucinations   [   ]agitation   [   ] delusion   [   ]depression    Physical Exam:  Vital Signs Last 24 Hrs  T(C): 36.7 (15 Dec 2024 05:23), Max: 36.7 (15 Dec 2024 05:23)  T(F): 98.1 (15 Dec 2024 05:23), Max: 98.1 (15 Dec 2024 05:23)  HR: 74 (15 Dec 2024 05:23) (74 - 81)  BP: 136/72 (15 Dec 2024 05:23) (136/72 - 162/74)  BP(mean): --  RR: 18 (15 Dec 2024 05:23) (18 - 18)  SpO2: 96% (15 Dec 2024 05:23) (96% - 98%)    Parameters below as of 15 Dec 2024 05:23  Patient On (Oxygen Delivery Method): room air    General: Resting in bed                     HEENT: [   ] NC/AT, EOMI,  Normal Conjunctivae,   [  x ] other: bilateral cheek facial abrasion, normal conjunctivae, EOMI  Cardio: [  x ] RRR, no murmur,   [   ] other:                              Pulm: [ x  ] No Respiratory Distress,  Lungs CTAB,   [   ] other:             Abdomen: [ x  ]ND/NT, Soft,   [   ] other:    : [ x  ] No bennett catheter, [   ] Bennett catheter present [  ] other:   MSK: [  ] No joint swelling,  [ x  ] other:  RLE/ knee joint swelling                      Ext: [ x  ]No C/C, No calf tenderness,   [  x ]other:  swelling of MCPs, no warmth  Skin: [   ]intact,   [ x  ] other: RLE incision site with post op dressing, C&D    Neurological Examination:  Cognitive: [  x  ] AAO x 3,   [  ]  other:       Attention:  [ x  ] intact,   [    ]  other:   Language: [ x ] intact  [  ]    Memory: [ x  ] intact,    [  ]  other:     Mood/Affect: [  x ] wnl  [    ]  other:                                                                             Communication: [x   ]Fluent, no dysarthria [    ] other:   CN II - XII:  [ x   ] intact,  [    ] other:                                                                                         Motor:   RUE: 5/5 shoulder abduction, 5/5 elbow flexion, 5/5 elbow extension, 5/5 finger flexion  LUE: 5/5 shoulder abduction, 5/5 elbow flexion, 5/5 elbow extension, 5/5 finger flexion  RLE: 2/5 hip flexion (within restricted ROM), 2+/5 knee ext/flex (within restricted ROM), 4+/5 plantarflexion/dorsiflexion   LLE: 5/5 hip flexion, 5/5 knee ext/flex, 4+/5 dorsiflexion/plantarflexion     Tone: [  x ] wnl,   [    ]  other:  DTRs: [ x ]symmetric, [   ] other:  Coordination:   [ x ] intact,   [  ] other:                                                                        Sensory: [ x   ] Intact to light touch  [  ] other:    MEDICATION  MEDICATIONS  (STANDING):  acetaminophen     Tablet .. 650 milliGRAM(s) Oral every 6 hours  ascorbic acid 500 milliGRAM(s) Oral daily  aspirin enteric coated 81 milliGRAM(s) Oral daily  bisacodyl 5 milliGRAM(s) Oral at bedtime  bisacodyl Suppository 10 milliGRAM(s) Rectal once  calcium carbonate 1250 mG  + Vitamin D (OsCal 500 + D) 1 Tablet(s) Oral daily  chlorhexidine 2% Cloths 1 Application(s) Topical <User Schedule>  enoxaparin Injectable 40 milliGRAM(s) SubCutaneous every 24 hours  famotidine    Tablet 20 milliGRAM(s) Oral two times a day  folic acid 1 milliGRAM(s) Oral daily  magnesium oxide 400 milliGRAM(s) Oral three times a day with meals  metoprolol succinate ER 25 milliGRAM(s) Oral at bedtime  ofloxacin 0.3% Solution 1 Drop(s) Right EYE three times a day  polyethylene glycol 3350 17 Gram(s) Oral daily  rosuvastatin 10 milliGRAM(s) Oral at bedtime  senna 2 Tablet(s) Oral at bedtime  sodium chloride 1 Gram(s) Oral three times a day  trimethoprim  160 mG/sulfamethoxazole 800 mG 1 Tablet(s) Oral daily    MEDICATIONS  (PRN):  melatonin 3 milliGRAM(s) Oral at bedtime PRN Insomnia  traMADol 50 milliGRAM(s) Oral four times a day PRN Moderate Pain (4 - 6)    RECENT LABS/IMAGING                        8.7    5.11  )-----------( 349      ( 13 Dec 2024 07:48 )             27.3     12-13    131[L]  |  95[L]  |  13  ----------------------------<  82  4.1   |  26  |  0.7    Ca    8.9      13 Dec 2024 07:48  Mg     1.8     12-13        Urinalysis Basic - ( 13 Dec 2024 07:48 )    Color: x / Appearance: x / SG: x / pH: x  Gluc: 82 mg/dL / Ketone: x  / Bili: x / Urobili: x   Blood: x / Protein: x / Nitrite: x   Leuk Esterase: x / RBC: x / WBC x   Sq Epi: x / Non Sq Epi: x / Bacteria: x                      CT Head No Cont:   ACC: [id number] EXAM:  CT BRAIN   ORDERED BY: SAL ANTHONY     PROCEDURE DATE:  12/01/2024          INTERPRETATION:  Trauma  The study was performed without IV contrast.  Previous exam 11/23/2024  The cisterns and ventricles are normal with no shift of the midline   structures.  No hemorrhage, infarct or mass formation is seen.  The skull is intact.    IMPRESSION: No acute process seen    --- End of Report ---            YI TONY MD; Attending Radiologist  This document has been electronically signed. Dec  1 2024  9:24PM (12-01-24 @ 20:54)     85 yo F with PMHx of GERD, HTN, OA s/p bilateral TKA and ROHIT, s/p L forefoot exostosis removal (11/2024) RA, and hx of breast cancer (in remission since 2015) who presents to the hospital after a fall. Around 1000, patient was seated in a chair and reaching for something on another chair when she lost her balance and fell onto her R side and hit her head. She was unable to get up from the ground and was lying on the floor for ~8 hours until her friend found her. +HS. -LOC. -AC. Subsequently, she had RLE pain. Of note, patient fell forwards two days prior and hit her face with subsequent bruising. Seen in ED at the time without any significant injuries found. No dizziness/lightheadedness associated with either fall. On admission, imaging showed acute comminuted right femoral distal metadiaphyseal fracture with volar apex angulation and 1.5 cm posterior displacement and small knee joint effusion. She underwent a R femur ORIF (12/2). Weightbearing status WBAT. During hospital course, patient had post-op blood loss anemia. Last H/H 7.5/23.1.    Imaging:  CT of R femur: Acute comminuted right femoral distal metadiaphyseal fracture with volar apex angulation and 1.5 cm posterior displacement. Small knee joint effusion.  CT head: No acute process seen    During their stay, the patient was evaluated by physical and occupational therapy. Prior to admission, patient was independent with ADLs, dependent on some iADLs (grocery, laundry), and ambulates with walker/cane occasionally The most recent evaluated functional status is max A with transfers, ambulates 1 side step by b/s secondary to decreased weight bearing tolerance on RLE and fear of falling, standby assist for UB dressing, dependent for LB dressing. Patient was deemed a good acute rehab candidate due to decline in functional status and ability to carry out activities of daily living. Patient is able to tolerate 3 hours of therapy 5-7 days a week and is motivated to participate.    The patient was evaluated by the PM&R team once medically stable. The patient was found to have functional limitations in terms of physical strength/mobility and ability to carry out ADLs (self care, transfers, ambulation). Patient admitted to   12/5 for rehabilitation of R femur fx, s/p ORIF with decline in ADLs.    Living Situation: resides in  with 1 FOS to bedroom (uses stair lift) and 3 steps to enter  PLOF:  independent with ADLs, dependent on some iADLs (grocery, laundry), and ambulates with walker/cane occasionally    Today’s Subjective & Review of Symptoms  No overnight events.  Patient seen this morning. Has no complaints.   Having regular BMs and voiding spontaneously.   Vitals and labs reviewed. Discussed with patient about her hyponatremia.   Participating/tolerating PT/OT.     CLOF:  modA with transfers, modA bed mobility, ambulates 50ft with RW and modA, LB dressing SPV, toileting TA    Review of Systems:   Constitutional:    [x ] WNL           [   ] poor appetite   [ ] insomnia   [  ] tired   Cardio:           [x ] WNL           [   ] CP              [ ] MOJICA        [  ] palpitations               Resp:             [x ] WNL           [   ] SOB             [ ] cough      [  ] wheezing   GI:               [ x] WNL           [   ] constipation    [ ] diarrhea   [  ] abdominal pain       [ ] nausea   [  ] emesis                                :               [ x] WNL           [   ] BENNETT           [ ] dysuria    [  ] difficulty voiding   [ ] Other:    Endo:             [ x] WNL           [   ] polyuria        [ ] temperature intolerance                 Skin:             [ ] WNL           [   ] pain            [x ] incision-right LE with dressing applied   [ x ] rash-ecchymosis along bilateral facial cheeks   MSK:              [ ] WNL           [ x  ] muscle pain     [x ] joint pain [x ] muscle tenderness   [x ] swelling RLE   Neuro:            [ ] WNL           [   ] HA              [ ] change in vision   [   ] tremor   [x ] weakness [  ] dysphagia    Cognitive:        [x ] WNL           [   ] confusion      Psych:            [x ] WNL           [   ] hallucinations   [   ]agitation   [   ] delusion   [   ]depression    Physical Exam:  Vital Signs Last 24 Hrs  T(C): 36.7 (15 Dec 2024 05:23), Max: 36.7 (15 Dec 2024 05:23)  T(F): 98.1 (15 Dec 2024 05:23), Max: 98.1 (15 Dec 2024 05:23)  HR: 74 (15 Dec 2024 05:23) (74 - 81)  BP: 136/72 (15 Dec 2024 05:23) (136/72 - 162/74)  BP(mean): --  RR: 18 (15 Dec 2024 05:23) (18 - 18)  SpO2: 96% (15 Dec 2024 05:23) (96% - 98%)    Parameters below as of 15 Dec 2024 05:23  Patient On (Oxygen Delivery Method): room air    General: Resting in bed                     HEENT: [   ] NC/AT, EOMI,  Normal Conjunctivae,   [  x ] other: bilateral cheek facial abrasion, normal conjunctivae, EOMI  Cardio: [  x ] RRR, no murmur,   [   ] other:                              Pulm: [ x  ] No Respiratory Distress,  Lungs CTAB,   [   ] other:             Abdomen: [ x  ]ND/NT, Soft,   [   ] other:    : [ x  ] No bennett catheter, [   ] Bennett catheter present [  ] other:   MSK: [  ] No joint swelling,  [ x  ] other:  RLE/ knee joint swelling                      Ext: [ x  ]No C/C, No calf tenderness,   [  x ]other:  swelling of MCPs, no warmth  Skin: [   ]intact,   [ x  ] other: RLE incision site with post op dressing, C&D    Neurological Examination:  Cognitive: [  x  ] AAO x 3,   [  ]  other:       Attention:  [ x  ] intact,   [    ]  other:   Language: [ x ] intact  [  ]    Memory: [ x  ] intact,    [  ]  other:     Mood/Affect: [  x ] wnl  [    ]  other:                                                                             Communication: [x   ]Fluent, no dysarthria [    ] other:   CN II - XII:  [ x   ] intact,  [    ] other:                                                                                         Motor:   RUE: 5/5 shoulder abduction, 5/5 elbow flexion, 5/5 elbow extension, 5/5 finger flexion  LUE: 5/5 shoulder abduction, 5/5 elbow flexion, 5/5 elbow extension, 5/5 finger flexion  RLE: 2/5 hip flexion (within restricted ROM), 2+/5 knee ext/flex (within restricted ROM), 4+/5 plantarflexion/dorsiflexion   LLE: 5/5 hip flexion, 5/5 knee ext/flex, 4+/5 dorsiflexion/plantarflexion     Tone: [  x ] wnl,   [    ]  other:  DTRs: [ x ]symmetric, [   ] other:  Coordination:   [ x ] intact,   [  ] other:                                                                        Sensory: [ x   ] Intact to light touch  [  ] other:    MEDICATION  MEDICATIONS  (STANDING):  acetaminophen     Tablet .. 650 milliGRAM(s) Oral every 6 hours  ascorbic acid 500 milliGRAM(s) Oral daily  aspirin enteric coated 81 milliGRAM(s) Oral daily  bisacodyl 5 milliGRAM(s) Oral at bedtime  bisacodyl Suppository 10 milliGRAM(s) Rectal once  calcium carbonate 1250 mG  + Vitamin D (OsCal 500 + D) 1 Tablet(s) Oral daily  chlorhexidine 2% Cloths 1 Application(s) Topical <User Schedule>  enoxaparin Injectable 40 milliGRAM(s) SubCutaneous every 24 hours  famotidine    Tablet 20 milliGRAM(s) Oral two times a day  folic acid 1 milliGRAM(s) Oral daily  magnesium oxide 400 milliGRAM(s) Oral three times a day with meals  metoprolol succinate ER 25 milliGRAM(s) Oral at bedtime  ofloxacin 0.3% Solution 1 Drop(s) Right EYE three times a day  polyethylene glycol 3350 17 Gram(s) Oral daily  rosuvastatin 10 milliGRAM(s) Oral at bedtime  senna 2 Tablet(s) Oral at bedtime  sodium chloride 1 Gram(s) Oral three times a day  trimethoprim  160 mG/sulfamethoxazole 800 mG 1 Tablet(s) Oral daily    MEDICATIONS  (PRN):  melatonin 3 milliGRAM(s) Oral at bedtime PRN Insomnia  traMADol 50 milliGRAM(s) Oral four times a day PRN Moderate Pain (4 - 6)    RECENT LABS/IMAGING                        8.7    5.11  )-----------( 349      ( 13 Dec 2024 07:48 )             27.3     12-13    131[L]  |  95[L]  |  13  ----------------------------<  82  4.1   |  26  |  0.7    Ca    8.9      13 Dec 2024 07:48  Mg     1.8     12-13          CT Head No Cont:   ACC: [id number] EXAM:  CT BRAIN   ORDERED BY: SAL ANTHONY     PROCEDURE DATE:  12/01/2024      INTERPRETATION:  Trauma  The study was performed without IV contrast.  Previous exam 11/23/2024  The cisterns and ventricles are normal with no shift of the midline   structures.  No hemorrhage, infarct or mass formation is seen.  The skull is intact.    IMPRESSION: No acute process seen    --- End of Report ---            YI TONY MD; Attending Radiologist  This document has been electronically signed. Dec  1 2024  9:24PM (12-01-24 @ 20:54)

## 2024-12-15 NOTE — PROGRESS NOTE ADULT - ASSESSMENT
Assessment:   83 yo F with PMHx of GERD, HTN, OA s/p bilateral TKA and ROHIT, s/p L forefoot exostosis removal (11/2024), RA (previously on methotrexate), and hx of breast cancer (in remission since 2015) who presented to the hospital after a fall and found to have comminuted right femoral distal metadiaphyseal fracture with volar apex angulation and 1.5 cm posterior displacement and small knee joint effusion. She underwent a R femur ORIF (12/2). Weightbearing status WBAT.    Plan:    #Rehab of Right femur fx s/p ORIF with impairment in mobility and ADLs and iADLs. The patient presented with co-morbidities requiring close physiatry follow-up at least 3 times a week to monitor his progress and monitor his comorbidities including HTN, OA s/p TKA and ROHIT, RA, and s/p L forefoot exostosis removal (11/2024).The patient's co-morbidities do not limit the patient's ability to participate in rehabilitative therapy. The patient was seen by PT/OT and  required max A with transfers, standby assist for UB dressing, dependent for LB dressing, and ambulates 1 side step by b/s secondary to decreased weight bearing tolerance on RLE and fear of falling, The patient was previously independent with all her ADLs and some iADLs (dependent on laundry and grocery) and ambulated with cane/walker occasionally and now presents with functional decline. The patient can benefit from and tolerate 3 hours of restorative therapy daily. The patient is an excellent acute inpatient rehabilitation candidate.     #S/P right distal femur fracture/ ORIF  -CTH shows no acute findings.  -CT Right femur shows acute comminuted right femoral distal metadiaphyseal fracture with volar apex angulation and 1.5 cm posterior displacement and small knee joint effusion.  -s/p subsequent R femur ORIF (12/2).   -Weight bearing status WBAT.  -Per Ortho, DVT ppx x6 weeks post-op  -c/w ASA 81mg QD  - Pain control: tramadol 50 q6 PRN, Tylenol q6  -Will adjust pain medications to optimize pain control  -c/w Calcium 600+D 600 mg-200 tablet: BID  -PT/ OT  - 12/6 BLE dopplers: no DVT in LLE. Unable to visualize venous system in RLE  The patient requires acute rehab with 3 hours of daily therapies at least 5 out of 7 days and close physiatry follow up.     #s/p recent fall last week with HT and bilateral facial contusions/ ecchymosis/ no LOC  - monitor    #UTI  12/13 UA positive  -follow up urine culture  - Bactrim DS 1 dose daily (12/13-12/15)    #Recent left heel spur resection  - WBAT  - limiting ambulation  - to wear cast-boot for longer distances as per patient  - followed by podiatry    #Post-op acute blood loss anemia  -Baseline hgb ~10.  -H/H 7.6/23.6 (7.5/23.1) 12/6  -c/w H/H monitoring and hemodynamic status monitoring  -Transfuse if hgb<7.    #Hypomagnesemia  -Mg 1.9 (12/9)  -c/w magnesium supplement as needed    #Azotemia  -12/6 BUN 31->22, improving    #Hyponatremia  -Na 131 (12/13)  - on regular salt diet  -will continue to monitor   - fluid restriction to 1500ml  -Started on sodium chloride 1g TID 12/15    #HTN  -continue to monitor hemodynamic status   - 12/6 started home metoprolol ER 25mg nightly  -will adjust medications as needed    #HLD  -c/w rosuvastatin 10mg QD     #GERD  - c/w famotidine 20mg  BID    #OA  #RA  - s/p bilat THR/ TKR  - Previously on methotrexate.   -Not on medication currently  - Follows up outpatient with rheumatology  - monitor for exacerbation now off Methotrexate    #MRSA precautions discontinued 12/6 per Infection Control.    #Anxiety/depression  - Previously on sertraline  - Will monitor     -Pain control: tramadol and Tylenol  -GI/Bowel Mgmt: Miralax, Senna, bisacodyl   -Skin: surgical incision along RLE  - Diet Regular    Precautions / PROPHYLAXIS:    - Falls  - DVT prophylaxis: lovenox

## 2024-12-16 ENCOUNTER — TRANSCRIPTION ENCOUNTER (OUTPATIENT)
Age: 84
End: 2024-12-16

## 2024-12-16 LAB
-  AMPICILLIN/SULBACTAM: SIGNIFICANT CHANGE UP
-  AMPICILLIN: SIGNIFICANT CHANGE UP
-  AZTREONAM: SIGNIFICANT CHANGE UP
-  CEFAZOLIN: SIGNIFICANT CHANGE UP
-  CEFEPIME: SIGNIFICANT CHANGE UP
-  CEFTRIAXONE: SIGNIFICANT CHANGE UP
-  CEFUROXIME: SIGNIFICANT CHANGE UP
-  CIPROFLOXACIN: SIGNIFICANT CHANGE UP
-  ERTAPENEM: SIGNIFICANT CHANGE UP
-  GENTAMICIN: SIGNIFICANT CHANGE UP
-  IMIPENEM: SIGNIFICANT CHANGE UP
-  LEVOFLOXACIN: SIGNIFICANT CHANGE UP
-  MEROPENEM: SIGNIFICANT CHANGE UP
-  NITROFURANTOIN: SIGNIFICANT CHANGE UP
-  PIPERACILLIN/TAZOBACTAM: SIGNIFICANT CHANGE UP
-  TOBRAMYCIN: SIGNIFICANT CHANGE UP
-  TRIMETHOPRIM/SULFAMETHOXAZOLE: SIGNIFICANT CHANGE UP
ALBUMIN SERPL ELPH-MCNC: 3.3 G/DL — LOW (ref 3.5–5.2)
ALP SERPL-CCNC: 146 U/L — HIGH (ref 30–115)
ALT FLD-CCNC: 11 U/L — SIGNIFICANT CHANGE UP (ref 0–41)
ANION GAP SERPL CALC-SCNC: 16 MMOL/L — HIGH (ref 7–14)
AST SERPL-CCNC: 26 U/L — SIGNIFICANT CHANGE UP (ref 0–41)
BASOPHILS # BLD AUTO: 0.02 K/UL — SIGNIFICANT CHANGE UP (ref 0–0.2)
BASOPHILS NFR BLD AUTO: 0.3 % — SIGNIFICANT CHANGE UP (ref 0–1)
BILIRUB SERPL-MCNC: 0.7 MG/DL — SIGNIFICANT CHANGE UP (ref 0.2–1.2)
BUN SERPL-MCNC: 15 MG/DL — SIGNIFICANT CHANGE UP (ref 10–20)
CALCIUM SERPL-MCNC: 9.1 MG/DL — SIGNIFICANT CHANGE UP (ref 8.4–10.4)
CHLORIDE SERPL-SCNC: 95 MMOL/L — LOW (ref 98–110)
CO2 SERPL-SCNC: 21 MMOL/L — SIGNIFICANT CHANGE UP (ref 17–32)
CREAT SERPL-MCNC: 1.1 MG/DL — SIGNIFICANT CHANGE UP (ref 0.7–1.5)
CULTURE RESULTS: ABNORMAL
EGFR: 49 ML/MIN/1.73M2 — LOW
EOSINOPHIL # BLD AUTO: 0.07 K/UL — SIGNIFICANT CHANGE UP (ref 0–0.7)
EOSINOPHIL NFR BLD AUTO: 1 % — SIGNIFICANT CHANGE UP (ref 0–8)
GLUCOSE SERPL-MCNC: 65 MG/DL — LOW (ref 70–99)
HCT VFR BLD CALC: 28.5 % — LOW (ref 37–47)
HGB BLD-MCNC: 8.8 G/DL — LOW (ref 12–16)
IMM GRANULOCYTES NFR BLD AUTO: 0.4 % — HIGH (ref 0.1–0.3)
LYMPHOCYTES # BLD AUTO: 1.41 K/UL — SIGNIFICANT CHANGE UP (ref 1.2–3.4)
LYMPHOCYTES # BLD AUTO: 19.5 % — LOW (ref 20.5–51.1)
MAGNESIUM SERPL-MCNC: 2.1 MG/DL — SIGNIFICANT CHANGE UP (ref 1.8–2.4)
MCHC RBC-ENTMCNC: 30.9 G/DL — LOW (ref 32–37)
MCHC RBC-ENTMCNC: 31 PG — SIGNIFICANT CHANGE UP (ref 27–31)
MCV RBC AUTO: 100.4 FL — HIGH (ref 81–99)
METHOD TYPE: SIGNIFICANT CHANGE UP
MONOCYTES # BLD AUTO: 0.77 K/UL — HIGH (ref 0.1–0.6)
MONOCYTES NFR BLD AUTO: 10.6 % — HIGH (ref 1.7–9.3)
NEUTROPHILS # BLD AUTO: 4.94 K/UL — SIGNIFICANT CHANGE UP (ref 1.4–6.5)
NEUTROPHILS NFR BLD AUTO: 68.2 % — SIGNIFICANT CHANGE UP (ref 42.2–75.2)
NRBC # BLD: 0 /100 WBCS — SIGNIFICANT CHANGE UP (ref 0–0)
ORGANISM # SPEC MICROSCOPIC CNT: ABNORMAL
ORGANISM # SPEC MICROSCOPIC CNT: SIGNIFICANT CHANGE UP
PLATELET # BLD AUTO: 293 K/UL — SIGNIFICANT CHANGE UP (ref 130–400)
PMV BLD: 9.8 FL — SIGNIFICANT CHANGE UP (ref 7.4–10.4)
POTASSIUM SERPL-MCNC: 5.2 MMOL/L — HIGH (ref 3.5–5)
POTASSIUM SERPL-SCNC: 5.2 MMOL/L — HIGH (ref 3.5–5)
PROT SERPL-MCNC: 6 G/DL — SIGNIFICANT CHANGE UP (ref 6–8)
RBC # BLD: 2.84 M/UL — LOW (ref 4.2–5.4)
RBC # FLD: 16.5 % — HIGH (ref 11.5–14.5)
SODIUM SERPL-SCNC: 132 MMOL/L — LOW (ref 135–146)
SPECIMEN SOURCE: SIGNIFICANT CHANGE UP
WBC # BLD: 7.24 K/UL — SIGNIFICANT CHANGE UP (ref 4.8–10.8)
WBC # FLD AUTO: 7.24 K/UL — SIGNIFICANT CHANGE UP (ref 4.8–10.8)

## 2024-12-16 RX ORDER — SODIUM CHLORIDE 9 MG/ML
500 INJECTION, SOLUTION INTRAMUSCULAR; INTRAVENOUS; SUBCUTANEOUS
Refills: 0 | Status: DISCONTINUED | OUTPATIENT
Start: 2024-12-16 | End: 2024-12-17

## 2024-12-16 RX ORDER — METOPROLOL TARTRATE 50 MG
50 TABLET ORAL AT BEDTIME
Refills: 0 | Status: DISCONTINUED | OUTPATIENT
Start: 2024-12-16 | End: 2024-12-18

## 2024-12-16 RX ORDER — PROCHLORPERAZINE MALEATE 10 MG
10 TABLET ORAL
Refills: 0 | Status: DISCONTINUED | OUTPATIENT
Start: 2024-12-16 | End: 2024-12-24

## 2024-12-16 RX ORDER — TRAMADOL HYDROCHLORIDE 50 MG/1
50 TABLET ORAL
Refills: 0 | Status: DISCONTINUED | OUTPATIENT
Start: 2024-12-18 | End: 2024-12-24

## 2024-12-16 RX ORDER — METOPROLOL TARTRATE 50 MG
50 TABLET ORAL AT BEDTIME
Refills: 0 | Status: DISCONTINUED | OUTPATIENT
Start: 2024-12-16 | End: 2024-12-16

## 2024-12-16 RX ADMIN — SODIUM CHLORIDE 1 GRAM(S): 9 INJECTION, SOLUTION INTRAMUSCULAR; INTRAVENOUS; SUBCUTANEOUS at 05:55

## 2024-12-16 RX ADMIN — ACETAMINOPHEN 650 MILLIGRAM(S): 80 SOLUTION/ DROPS ORAL at 18:14

## 2024-12-16 RX ADMIN — Medication 3 MILLIGRAM(S): at 21:52

## 2024-12-16 RX ADMIN — Medication 1 DROP(S): at 13:33

## 2024-12-16 RX ADMIN — Medication 400 MILLIGRAM(S): at 12:45

## 2024-12-16 RX ADMIN — CHLORHEXIDINE GLUCONATE 1 APPLICATION(S): 1.2 RINSE ORAL at 05:55

## 2024-12-16 RX ADMIN — FAMOTIDINE 20 MILLIGRAM(S): 20 TABLET, FILM COATED ORAL at 05:55

## 2024-12-16 RX ADMIN — ACETAMINOPHEN 650 MILLIGRAM(S): 80 SOLUTION/ DROPS ORAL at 01:00

## 2024-12-16 RX ADMIN — ACETAMINOPHEN 650 MILLIGRAM(S): 80 SOLUTION/ DROPS ORAL at 05:55

## 2024-12-16 RX ADMIN — FAMOTIDINE 20 MILLIGRAM(S): 20 TABLET, FILM COATED ORAL at 18:14

## 2024-12-16 RX ADMIN — TRAMADOL HYDROCHLORIDE 50 MILLIGRAM(S): 50 TABLET ORAL at 08:39

## 2024-12-16 RX ADMIN — Medication 500 MILLIGRAM(S): at 12:45

## 2024-12-16 RX ADMIN — TRAMADOL HYDROCHLORIDE 50 MILLIGRAM(S): 50 TABLET ORAL at 08:09

## 2024-12-16 RX ADMIN — Medication 1 DROP(S): at 05:55

## 2024-12-16 RX ADMIN — Medication 1 DROP(S): at 21:49

## 2024-12-16 RX ADMIN — SODIUM CHLORIDE 75 MILLILITER(S): 9 INJECTION, SOLUTION INTRAMUSCULAR; INTRAVENOUS; SUBCUTANEOUS at 19:22

## 2024-12-16 RX ADMIN — ACETAMINOPHEN 650 MILLIGRAM(S): 80 SOLUTION/ DROPS ORAL at 23:05

## 2024-12-16 RX ADMIN — ROSUVASTATIN 10 MILLIGRAM(S): 40 TABLET, FILM COATED ORAL at 21:48

## 2024-12-16 RX ADMIN — Medication 400 MILLIGRAM(S): at 18:14

## 2024-12-16 RX ADMIN — Medication 1 APPLICATION(S): at 18:14

## 2024-12-16 RX ADMIN — SODIUM CHLORIDE 1 GRAM(S): 9 INJECTION, SOLUTION INTRAMUSCULAR; INTRAVENOUS; SUBCUTANEOUS at 21:48

## 2024-12-16 RX ADMIN — Medication 1 MILLIGRAM(S): at 12:45

## 2024-12-16 RX ADMIN — Medication 81 MILLIGRAM(S): at 12:45

## 2024-12-16 RX ADMIN — Medication 50 MILLIGRAM(S): at 21:48

## 2024-12-16 RX ADMIN — SODIUM CHLORIDE 1 GRAM(S): 9 INJECTION, SOLUTION INTRAMUSCULAR; INTRAVENOUS; SUBCUTANEOUS at 13:32

## 2024-12-16 RX ADMIN — Medication 1 TABLET(S): at 12:45

## 2024-12-16 RX ADMIN — Medication 400 MILLIGRAM(S): at 08:09

## 2024-12-16 RX ADMIN — ENOXAPARIN SODIUM 40 MILLIGRAM(S): 60 INJECTION INTRAVENOUS; SUBCUTANEOUS at 05:55

## 2024-12-16 RX ADMIN — ACETAMINOPHEN 650 MILLIGRAM(S): 80 SOLUTION/ DROPS ORAL at 23:42

## 2024-12-16 RX ADMIN — ACETAMINOPHEN 650 MILLIGRAM(S): 80 SOLUTION/ DROPS ORAL at 12:44

## 2024-12-16 NOTE — DISCHARGE NOTE NURSING/CASE MANAGEMENT/SOCIAL WORK - PATIENT PORTAL LINK FT
You can access the FollowMyHealth Patient Portal offered by [facility] by registering at the following website: [email or web address]. By joining [employer]’s FollowMyHealth portal, you will also be able to view your health information using other applications (apps) compatible with our system.

## 2024-12-16 NOTE — CHART NOTE - NSCHARTNOTEFT_GEN_A_CORE
Today   pt did not feel well ,  Unable  to tolerate rehab therapy , missed OT , bactrim not sensitive  to ecoli , Id consult requested and iv fluid 500 cc bolus ordered , her bp goes  to 170,s  on metoprolol and lisinopril , need  to readjust the doses .  Rehab of Right femur fx s/p ORIF    85 yo F with PMHx of GERD, HTN, OA s/p bilateral TKA and ROHIT, s/p L forefoot exostosis removal (11/2024) RA, and hx of breast cancer (in remission since 2015) who presents to the hospital after a fall On admission, imaging showed acute comminuted right femoral distal metadiaphyseal fracture with volar apex angulation and 1.5 cm posterior displacement and small knee joint effusion. She underwent a R femur ORIF (12/2). Weightbearing status WBAT. During hospital course, patient had post-op blood loss anemia.  She missed rehab therapy . full OT for today  due to  not feeling  well ,     Last H/H 7.5/23.1  ICU Vital Signs Last 24 Hrs  T(C): 36.8 (16 Dec 2024 13:55), Max: 36.8 (16 Dec 2024 13:55)  T(F): 98.3 (16 Dec 2024 13:55), Max: 98.3 (16 Dec 2024 13:55)  HR: 82 (16 Dec 2024 13:55) (67 - 82)  BP: 133/77 (16 Dec 2024 13:55) (101/54 - 173/64)-  RR: 18 (16 Dec 2024 13:55) (18 - 18)  SpO2: 98% (16 Dec 2024 13:55) (96% - 98%)    O2 Parameters below as of 16 Dec 2024 13:55  Patient On (Oxygen Delivery Method): room air  MEDICATIONS  (STANDING):      acetaminophen     Tablet .. 650 milliGRAM(s) Oral every 6 hours  ascorbic acid 500 milliGRAM(s) Oral daily  aspirin enteric coated 81 milliGRAM(s) Oral daily  bisacodyl 5 milliGRAM(s) Oral at bedtime  bisacodyl Suppository 10 milliGRAM(s) Rectal once  calcium carbonate 1250 mG  + Vitamin D (OsCal 500 + D) 1 Tablet(s) Oral daily  chlorhexidine 2% Cloths 1 Application(s) Topical <User Schedule>  enoxaparin Injectable 40 milliGRAM(s) SubCutaneous every 24 hours  famotidine    Tablet 20 milliGRAM(s) Oral two times a day  folic acid 1 milliGRAM(s) Oral daily  magnesium oxide 400 milliGRAM(s) Oral three times a day with meals  metoprolol succinate ER 50 milliGRAM(s) Oral at bedtime  ofloxacin 0.3% Solution 1 Drop(s) Right EYE three times a day  polyethylene glycol 3350 17 Gram(s) Oral daily  rosuvastatin 10 milliGRAM(s) Oral at bedtime  senna 2 Tablet(s) Oral at bedtime  sodium chloride 1 Gram(s) Oral three times a day  sodium chloride 0.9%. 500 milliLiter(s) (75 mL/Hr) IV Continuous <Continuous>  zinc oxide 20% Ointment 1 Application(s) Topical two times a day    MEDICATIONS  (PRN):  melatonin 3 milliGRAM(s) Oral at bedtime PRN Insomnia  traMADol 50 milliGRAM(s) Oral four times a day PRN Moderate Pain (4 - 6) Today   pt did not feel well ,  Unable  to tolerate rehab therapy , missed OT , bactrim not sensitive  to ecoli , Id consult requested and iv fluid 500 cc bolus ordered , her bp goes  to 170,s  on metoprolol and lisinopril , need  to readjust the doses .  Rehab of Right femur fx s/p ORIF    83 yo F with PMHx of GERD, HTN, OA s/p bilateral TKA and ROHIT, s/p L forefoot exostosis removal (11/2024) RA, and hx of breast cancer (in remission since 2015) who presents to the hospital after a fall On admission, imaging showed acute comminuted right femoral distal metadiaphyseal fracture with volar apex angulation and 1.5 cm posterior displacement and small knee joint effusion. She underwent a R femur ORIF (12/2). Weightbearing status WBAT. During hospital course, patient had post-op blood loss anemia.  She missed rehab therapy . full OT for today  due to  not feeling  well ,   ID doctor had texted back saying  Enriqueta Dougherty will see patient today.     Last H/H 7.5/23.1  ICU Vital Signs Last 24 Hrs  T(C): 36.8 (16 Dec 2024 13:55), Max: 36.8 (16 Dec 2024 13:55)  T(F): 98.3 (16 Dec 2024 13:55), Max: 98.3 (16 Dec 2024 13:55)  HR: 82 (16 Dec 2024 13:55) (67 - 82)  BP: 133/77 (16 Dec 2024 13:55) (101/54 - 173/64)-  RR: 18 (16 Dec 2024 13:55) (18 - 18)  SpO2: 98% (16 Dec 2024 13:55) (96% - 98%)    O2 Parameters below as of 16 Dec 2024 13:55  Patient On (Oxygen Delivery Method): room air  MEDICATIONS  (STANDING):      acetaminophen     Tablet .. 650 milliGRAM(s) Oral every 6 hours  ascorbic acid 500 milliGRAM(s) Oral daily  aspirin enteric coated 81 milliGRAM(s) Oral daily  bisacodyl 5 milliGRAM(s) Oral at bedtime  bisacodyl Suppository 10 milliGRAM(s) Rectal once  calcium carbonate 1250 mG  + Vitamin D (OsCal 500 + D) 1 Tablet(s) Oral daily  chlorhexidine 2% Cloths 1 Application(s) Topical <User Schedule>  enoxaparin Injectable 40 milliGRAM(s) SubCutaneous every 24 hours  famotidine    Tablet 20 milliGRAM(s) Oral two times a day  folic acid 1 milliGRAM(s) Oral daily  magnesium oxide 400 milliGRAM(s) Oral three times a day with meals  metoprolol succinate ER 50 milliGRAM(s) Oral at bedtime  ofloxacin 0.3% Solution 1 Drop(s) Right EYE three times a day  polyethylene glycol 3350 17 Gram(s) Oral daily  rosuvastatin 10 milliGRAM(s) Oral at bedtime  senna 2 Tablet(s) Oral at bedtime  sodium chloride 1 Gram(s) Oral three times a day  sodium chloride 0.9%. 500 milliLiter(s) (75 mL/Hr) IV Continuous <Continuous>  zinc oxide 20% Ointment 1 Application(s) Topical two times a day    MEDICATIONS  (PRN):  melatonin 3 milliGRAM(s) Oral at bedtime PRN Insomnia  traMADol 50 milliGRAM(s) Oral four times a day PRN Moderate Pain (4 - 6)

## 2024-12-16 NOTE — PROGRESS NOTE ADULT - SUBJECTIVE AND OBJECTIVE BOX
83 yo F with PMHx of GERD, HTN, OA s/p bilateral TKA and ROHIT, s/p L forefoot exostosis removal (11/2024) RA, and hx of breast cancer (in remission since 2015) who presents to the hospital after a fall. Around 1000, patient was seated in a chair and reaching for something on another chair when she lost her balance and fell onto her R side and hit her head. She was unable to get up from the ground and was lying on the floor for ~8 hours until her friend found her. +HS. -LOC. -AC. Subsequently, she had RLE pain. Of note, patient fell forwards two days prior and hit her face with subsequent bruising. Seen in ED at the time without any significant injuries found. No dizziness/lightheadedness associated with either fall. On admission, imaging showed acute comminuted right femoral distal metadiaphyseal fracture with volar apex angulation and 1.5 cm posterior displacement and small knee joint effusion. She underwent a R femur ORIF (12/2). Weightbearing status WBAT. During hospital course, patient had post-op blood loss anemia. Last H/H 7.5/23.1.    Imaging:  CT of R femur: Acute comminuted right femoral distal metadiaphyseal fracture with volar apex angulation and 1.5 cm posterior displacement. Small knee joint effusion.  CT head: No acute process seen    During their stay, the patient was evaluated by physical and occupational therapy. Prior to admission, patient was independent with ADLs, dependent on some iADLs (grocery, laundry), and ambulates with walker/cane occasionally The most recent evaluated functional status is max A with transfers, ambulates 1 side step by b/s secondary to decreased weight bearing tolerance on RLE and fear of falling, standby assist for UB dressing, dependent for LB dressing. Patient was deemed a good acute rehab candidate due to decline in functional status and ability to carry out activities of daily living. Patient is able to tolerate 3 hours of therapy 5-7 days a week and is motivated to participate.    The patient was evaluated by the PM&R team once medically stable. The patient was found to have functional limitations in terms of physical strength/mobility and ability to carry out ADLs (self care, transfers, ambulation). Patient admitted to   12/5 for rehabilitation of R femur fx, s/p ORIF with decline in ADLs.    Living Situation: resides in  with 1 FOS to bedroom (uses stair lift) and 3 steps to enter  PLOF:  independent with ADLs, dependent on some iADLs (grocery, laundry), and ambulates with walker/cane occasionally    Today’s Subjective & Review of Symptoms  No overnight events.  Patient seen this morning. Had nausea without vomiting yesterday but has since improved. Has no complaints today.  Participating/tolerating PT/OT.   Having regular BMs and voiding spontaneously.   Vitals reviewed. AM labs pending.     CLOF:  modA with transfers, modA bed mobility, ambulates 50ft with RW and modA, LB dressing SPV, toileting TA    Review of Systems:   Constitutional:    [x ] WNL           [   ] poor appetite   [ ] insomnia   [  ] tired   Cardio:           [x ] WNL           [   ] CP              [ ] MOJICA        [  ] palpitations               Resp:             [x ] WNL           [   ] SOB             [ ] cough      [  ] wheezing   GI:               [ x] WNL           [   ] constipation    [ ] diarrhea   [  ] abdominal pain       [ ] nausea   [  ] emesis                                :               [ x] WNL           [   ] BENNETT           [ ] dysuria    [  ] difficulty voiding   [ ] Other:    Endo:             [ x] WNL           [   ] polyuria        [ ] temperature intolerance                 Skin:             [ ] WNL           [   ] pain            [x ] incision-right LE with dressing applied   [ x ] rash-ecchymosis along bilateral facial cheeks   MSK:              [ ] WNL           [ x  ] muscle pain     [x ] joint pain [x ] muscle tenderness   [x ] swelling RLE   Neuro:            [ ] WNL           [   ] HA              [ ] change in vision   [   ] tremor   [x ] weakness [  ] dysphagia    Cognitive:        [x ] WNL           [   ] confusion      Psych:            [x ] WNL           [   ] hallucinations   [   ]agitation   [   ] delusion   [   ]depression    Physical Exam:  Vital Signs Last 24 Hrs  T(C): 36.6 (16 Dec 2024 04:45), Max: 36.6 (16 Dec 2024 04:45)  T(F): 97.8 (16 Dec 2024 04:45), Max: 97.8 (16 Dec 2024 04:45)  HR: 81 (16 Dec 2024 04:45) (67 - 90)  BP: 173/64 (16 Dec 2024 04:45) (144/71 - 173/64)  BP(mean): --  RR: 18 (16 Dec 2024 04:45) (18 - 18)  SpO2: 98% (16 Dec 2024 04:45) (96% - 98%)    Parameters below as of 16 Dec 2024 04:45  Patient On (Oxygen Delivery Method): room air    General: Resting in bed                     HEENT: [   ] NC/AT, EOMI,  Normal Conjunctivae,   [  x ] other: bilateral cheek facial abrasion, normal conjunctivae, EOMI  Cardio: [  x ] RRR, no murmur,   [   ] other:                              Pulm: [ x  ] No Respiratory Distress,  Lungs CTAB,   [   ] other:             Abdomen: [ x  ]ND/NT, Soft,   [   ] other:    : [ x  ] No bennett catheter, [   ] Bennett catheter present [  ] other:   MSK: [  ] No joint swelling,  [ x  ] other:  RLE/ knee joint swelling                      Ext: [ x  ]No C/C, No calf tenderness,   [  x ]other:  swelling of MCPs, no warmth  Skin: [   ]intact,   [ x  ] other: RLE incision site with post op dressing, C&D    Neurological Examination:  Cognitive: [  x  ] AAO x 3,   [  ]  other:       Attention:  [ x  ] intact,   [    ]  other:   Language: [ x ] intact  [  ]    Memory: [ x  ] intact,    [  ]  other:     Mood/Affect: [  x ] wnl  [    ]  other:                                                                             Communication: [x   ]Fluent, no dysarthria [    ] other:   CN II - XII:  [ x   ] intact,  [    ] other:                                                                                         Motor:   RUE: 5/5 shoulder abduction, 5/5 elbow flexion, 5/5 elbow extension, 5/5 finger flexion  LUE: 5/5 shoulder abduction, 5/5 elbow flexion, 5/5 elbow extension, 5/5 finger flexion  RLE: 2/5 hip flexion (within restricted ROM), 2+/5 knee ext/flex (within restricted ROM), 4+/5 plantarflexion/dorsiflexion   LLE: 5/5 hip flexion, 5/5 knee ext/flex, 4+/5 dorsiflexion/plantarflexion     Tone: [  x ] wnl,   [    ]  other:  DTRs: [ x ]symmetric, [   ] other:  Coordination:   [ x ] intact,   [  ] other:                                                                        Sensory: [ x   ] Intact to light touch  [  ] other:    MEDICATIONS  (STANDING):  acetaminophen     Tablet .. 650 milliGRAM(s) Oral every 6 hours  ascorbic acid 500 milliGRAM(s) Oral daily  aspirin enteric coated 81 milliGRAM(s) Oral daily  bisacodyl 5 milliGRAM(s) Oral at bedtime  bisacodyl Suppository 10 milliGRAM(s) Rectal once  calcium carbonate 1250 mG  + Vitamin D (OsCal 500 + D) 1 Tablet(s) Oral daily  chlorhexidine 2% Cloths 1 Application(s) Topical <User Schedule>  enoxaparin Injectable 40 milliGRAM(s) SubCutaneous every 24 hours  famotidine    Tablet 20 milliGRAM(s) Oral two times a day  folic acid 1 milliGRAM(s) Oral daily  magnesium oxide 400 milliGRAM(s) Oral three times a day with meals  metoprolol succinate ER 25 milliGRAM(s) Oral at bedtime  ofloxacin 0.3% Solution 1 Drop(s) Right EYE three times a day  polyethylene glycol 3350 17 Gram(s) Oral daily  rosuvastatin 10 milliGRAM(s) Oral at bedtime  senna 2 Tablet(s) Oral at bedtime  sodium chloride 1 Gram(s) Oral three times a day    MEDICATIONS  (PRN):  melatonin 3 milliGRAM(s) Oral at bedtime PRN Insomnia  traMADol 50 milliGRAM(s) Oral four times a day PRN Moderate Pain (4 - 6)    RECENT LABS/IMAGING                        8.7    5.11  )-----------( 349      ( 13 Dec 2024 07:48 )             27.3     12-13    131[L]  |  95[L]  |  13  ----------------------------<  82  4.1   |  26  |  0.7    Ca    8.9      13 Dec 2024 07:48  Mg     1.8     12-13      CT Head No Cont:   ACC: [id number] EXAM:  CT BRAIN   ORDERED BY: SAL ANTHONY     PROCEDURE DATE:  12/01/2024      INTERPRETATION:  Trauma  The study was performed without IV contrast.  Previous exam 11/23/2024  The cisterns and ventricles are normal with no shift of the midline   structures.  No hemorrhage, infarct or mass formation is seen.  The skull is intact.    IMPRESSION: No acute process seen    --- End of Report ---            YI TONY MD; Attending Radiologist  This document has been electronically signed. Dec  1 2024  9:24PM (12-01-24 @ 20:54)

## 2024-12-16 NOTE — DISCHARGE NOTE NURSING/CASE MANAGEMENT/SOCIAL WORK - NSDCVIVACCINE_GEN_ALL_CORE_FT
COVID-19, mRNA, LNP-S, PF, 100 mcg/ 0.5 mL dose (Moderna); 2021/1/18 ; Annabella Santana (); [employer] [id number] (Exp. Date: 31-Dec-2069);   COVID-19, mRNA, LNP-S, PF, 100 mcg/ 0.5 mL dose (Moderna); 2021/2/21 ; Annabella Santana (); [employer] [id number] (Exp. Date: 31-Dec-2069);   COVID-19, mRNA, LNP-S, PF, 100 mcg/ 0.5 mL dose (Moderna); 2022/1/4 ; Annabella Santana (); [employer] [id number] (Exp. Date: 28-Apr-2022); 0.25 ML;   Influenza, high-dose, trivalent, preservative free; 2013/9/21 ; Annabella Santana (); [employer]; [id number] (Exp. Date: 05-May-2014); 0.5;   Influenza, seasonal, injectable; 2014/10/30 ; Annabella Santana (); [employer]; [id number] (Exp. Date: 25-May-2015); 0.5 ML;   Influenza, high-dose, trivalent, preservative free; 2015/10/12 ; Annabella Santana (); [employer]; [id number] (Exp. Date: 06-Apr-2016);   Influenza, high-dose, trivalent, preservative free; 2016/11/14 ; Annabella Santana (); Unknown ; [id number] (Exp. Date: 25-May-2017);   Influenza, high-dose, trivalent, preservative free; 2017/10/3 ; Annabella Santana ();   influenza, injectable, quadrivalent, preservative free; 2017/10/9 ; Annabella Santana (); [employer]; [id number] (Exp. Date: 30-Jun-2018);   Influenza, high-dose, trivalent, preservative free; 2018/10/8 ; Annabella Santana (); Unknown ; [id number] (Exp. Date: 02-May-2019);   Influenza, high-dose, trivalent, preservative free; 2019/9/23 ; Annabella Santana (); [employer]; [id number] (Exp. Date: 04-May-2020);   Influenza, high-dose, trivalent, preservative free; 2020/9/14 ; Annabella Santana ();   influenza, high-dose, quadrivalent; 2022/10/27 ; Annabella Santana (); [employer]; [id number] (Exp. Date: 30-Jun-2023); 0.7 ML;   influenza, high-dose, quadrivalent; 2023/10/30 ; Annabella Santana (); [employer]; [id number] (Exp. Date: 30-Jun-2024); 0.7 ML;   Pneumococcal conjugate PCV 13; 2018/12/27 ; Annabella Santana (); Unknown ; [id number] (Exp. Date: 30-Sep-2020);   Tdap; 23-Nov-2024 12:47; Raisa Galdamez (RN); [employer]; [id number] (Exp. Date: 31-Aug-2026); IntraMuscular; Deltoid Right.; 0.5 milliLiter(s); VIS (VIS Published: 09-May-2013, VIS Presented: 23-Nov-2024);

## 2024-12-16 NOTE — DISCHARGE NOTE NURSING/CASE MANAGEMENT/SOCIAL WORK - NSDCPEFALRISK_GEN_ALL_CORE
For information on Fall & Injury Prevention, visit: [email or web address] OR  [email or web address] OR  https://www.cdc.gov/steadi/patient.html

## 2024-12-16 NOTE — CHART NOTE - NSCHARTNOTEFT_GEN_A_CORE
Due to gait abnormality secondary to a right Femur Fracture s/p fall, the patient will require a rolling walker for a safe DC. A RW can sufficiently resolve the patient's mobility deficit, and the patient can use the RW safely. The patient will require a commode since she is confined to a single level without a bathroom. Due to gait abnormality secondary to a right Femur Fracture s/p fall, the patient will require a rolling walker for a safe DC. A RW can sufficiently resolve the patient's mobility deficit, and the patient can use the RW safely.

## 2024-12-16 NOTE — DISCHARGE NOTE NURSING/CASE MANAGEMENT/SOCIAL WORK - FINANCIAL ASSISTANCE
[facility] provides services at a reduced cost to those who are determined to be eligible through [facility]’s financial assistance program. Information regarding [facility]’s financial assistance program can be found by going to [email or web address] or by calling [phone].

## 2024-12-17 DIAGNOSIS — F32.A DEPRESSION, UNSPECIFIED: ICD-10-CM

## 2024-12-17 LAB
ANION GAP SERPL CALC-SCNC: 15 MMOL/L — HIGH (ref 7–14)
BASOPHILS # BLD AUTO: 0.03 K/UL — SIGNIFICANT CHANGE UP (ref 0–0.2)
BASOPHILS NFR BLD AUTO: 0.3 % — SIGNIFICANT CHANGE UP (ref 0–1)
BUN SERPL-MCNC: 17 MG/DL — SIGNIFICANT CHANGE UP (ref 10–20)
CALCIUM SERPL-MCNC: 9.1 MG/DL — SIGNIFICANT CHANGE UP (ref 8.4–10.4)
CHLORIDE SERPL-SCNC: 95 MMOL/L — LOW (ref 98–110)
CO2 SERPL-SCNC: 22 MMOL/L — SIGNIFICANT CHANGE UP (ref 17–32)
CREAT SERPL-MCNC: 1 MG/DL — SIGNIFICANT CHANGE UP (ref 0.7–1.5)
EGFR: 55 ML/MIN/1.73M2 — LOW
EOSINOPHIL # BLD AUTO: 0.09 K/UL — SIGNIFICANT CHANGE UP (ref 0–0.7)
EOSINOPHIL NFR BLD AUTO: 1 % — SIGNIFICANT CHANGE UP (ref 0–8)
GLUCOSE SERPL-MCNC: 72 MG/DL — SIGNIFICANT CHANGE UP (ref 70–99)
HCT VFR BLD CALC: 32.3 % — LOW (ref 37–47)
HGB BLD-MCNC: 9.8 G/DL — LOW (ref 12–16)
IMM GRANULOCYTES NFR BLD AUTO: 0.4 % — HIGH (ref 0.1–0.3)
LYMPHOCYTES # BLD AUTO: 1.79 K/UL — SIGNIFICANT CHANGE UP (ref 1.2–3.4)
LYMPHOCYTES # BLD AUTO: 19.3 % — LOW (ref 20.5–51.1)
MAGNESIUM SERPL-MCNC: 2.2 MG/DL — SIGNIFICANT CHANGE UP (ref 1.8–2.4)
MCHC RBC-ENTMCNC: 30.3 G/DL — LOW (ref 32–37)
MCHC RBC-ENTMCNC: 30.8 PG — SIGNIFICANT CHANGE UP (ref 27–31)
MCV RBC AUTO: 101.6 FL — HIGH (ref 81–99)
MONOCYTES # BLD AUTO: 1.38 K/UL — HIGH (ref 0.1–0.6)
MONOCYTES NFR BLD AUTO: 14.9 % — HIGH (ref 1.7–9.3)
NEUTROPHILS # BLD AUTO: 5.94 K/UL — SIGNIFICANT CHANGE UP (ref 1.4–6.5)
NEUTROPHILS NFR BLD AUTO: 64.1 % — SIGNIFICANT CHANGE UP (ref 42.2–75.2)
NRBC # BLD: 0 /100 WBCS — SIGNIFICANT CHANGE UP (ref 0–0)
PLATELET # BLD AUTO: 352 K/UL — SIGNIFICANT CHANGE UP (ref 130–400)
PMV BLD: 9.3 FL — SIGNIFICANT CHANGE UP (ref 7.4–10.4)
POTASSIUM SERPL-MCNC: 4.8 MMOL/L — SIGNIFICANT CHANGE UP (ref 3.5–5)
POTASSIUM SERPL-SCNC: 4.8 MMOL/L — SIGNIFICANT CHANGE UP (ref 3.5–5)
RBC # BLD: 3.18 M/UL — LOW (ref 4.2–5.4)
RBC # FLD: 16.4 % — HIGH (ref 11.5–14.5)
SODIUM SERPL-SCNC: 132 MMOL/L — LOW (ref 135–146)
WBC # BLD: 9.27 K/UL — SIGNIFICANT CHANGE UP (ref 4.8–10.8)
WBC # FLD AUTO: 9.27 K/UL — SIGNIFICANT CHANGE UP (ref 4.8–10.8)

## 2024-12-17 PROCEDURE — 93010 ELECTROCARDIOGRAM REPORT: CPT

## 2024-12-17 PROCEDURE — 99222 1ST HOSP IP/OBS MODERATE 55: CPT

## 2024-12-17 RX ORDER — NITROFURANTOIN MONOHYDRATE/MACROCRYSTALLINE 25; 75 MG/1; MG/1
100 CAPSULE ORAL
Refills: 0 | Status: DISCONTINUED | OUTPATIENT
Start: 2024-12-17 | End: 2024-12-17

## 2024-12-17 RX ORDER — SERTRALINE HYDROCHLORIDE 25 MG/1
25 TABLET ORAL AT BEDTIME
Refills: 0 | Status: DISCONTINUED | OUTPATIENT
Start: 2024-12-17 | End: 2024-12-24

## 2024-12-17 RX ADMIN — ROSUVASTATIN 10 MILLIGRAM(S): 40 TABLET, FILM COATED ORAL at 22:33

## 2024-12-17 RX ADMIN — ENOXAPARIN SODIUM 40 MILLIGRAM(S): 60 INJECTION INTRAVENOUS; SUBCUTANEOUS at 05:53

## 2024-12-17 RX ADMIN — Medication 1 TABLET(S): at 12:46

## 2024-12-17 RX ADMIN — ACETAMINOPHEN 650 MILLIGRAM(S): 80 SOLUTION/ DROPS ORAL at 05:53

## 2024-12-17 RX ADMIN — ACETAMINOPHEN 650 MILLIGRAM(S): 80 SOLUTION/ DROPS ORAL at 07:13

## 2024-12-17 RX ADMIN — SENNOSIDES 2 TABLET(S): 8.6 TABLET, FILM COATED ORAL at 22:32

## 2024-12-17 RX ADMIN — FAMOTIDINE 20 MILLIGRAM(S): 20 TABLET, FILM COATED ORAL at 05:53

## 2024-12-17 RX ADMIN — SODIUM CHLORIDE 1 GRAM(S): 9 INJECTION, SOLUTION INTRAMUSCULAR; INTRAVENOUS; SUBCUTANEOUS at 22:33

## 2024-12-17 RX ADMIN — Medication 400 MILLIGRAM(S): at 17:56

## 2024-12-17 RX ADMIN — SODIUM CHLORIDE 1 GRAM(S): 9 INJECTION, SOLUTION INTRAMUSCULAR; INTRAVENOUS; SUBCUTANEOUS at 13:02

## 2024-12-17 RX ADMIN — Medication 81 MILLIGRAM(S): at 12:46

## 2024-12-17 RX ADMIN — FAMOTIDINE 20 MILLIGRAM(S): 20 TABLET, FILM COATED ORAL at 17:56

## 2024-12-17 RX ADMIN — ACETAMINOPHEN 650 MILLIGRAM(S): 80 SOLUTION/ DROPS ORAL at 12:46

## 2024-12-17 RX ADMIN — ACETAMINOPHEN 650 MILLIGRAM(S): 80 SOLUTION/ DROPS ORAL at 17:56

## 2024-12-17 RX ADMIN — CHLORHEXIDINE GLUCONATE 1 APPLICATION(S): 1.2 RINSE ORAL at 05:58

## 2024-12-17 RX ADMIN — Medication 5 MILLIGRAM(S): at 22:32

## 2024-12-17 RX ADMIN — Medication 400 MILLIGRAM(S): at 12:47

## 2024-12-17 RX ADMIN — Medication 1 APPLICATION(S): at 05:52

## 2024-12-17 RX ADMIN — SODIUM CHLORIDE 1 GRAM(S): 9 INJECTION, SOLUTION INTRAMUSCULAR; INTRAVENOUS; SUBCUTANEOUS at 05:53

## 2024-12-17 RX ADMIN — Medication 400 MILLIGRAM(S): at 07:56

## 2024-12-17 RX ADMIN — SERTRALINE HYDROCHLORIDE 25 MILLIGRAM(S): 25 TABLET ORAL at 22:32

## 2024-12-17 RX ADMIN — Medication 1 DROP(S): at 05:54

## 2024-12-17 RX ADMIN — Medication 500 MILLIGRAM(S): at 12:47

## 2024-12-17 RX ADMIN — Medication 50 MILLIGRAM(S): at 22:32

## 2024-12-17 RX ADMIN — Medication 1 DROP(S): at 13:03

## 2024-12-17 RX ADMIN — Medication 1 MILLIGRAM(S): at 12:47

## 2024-12-17 RX ADMIN — Medication 1 DROP(S): at 22:33

## 2024-12-17 RX ADMIN — Medication 1 APPLICATION(S): at 17:56

## 2024-12-17 NOTE — BH CONSULTATION LIAISON ASSESSMENT NOTE - RISK ASSESSMENT
Patient is at low acute and chronic risk of suicidality. She never experienced any SIs and does not have an extensive psychiatric history. She has many protective factors including supportive family, can identify reasons to live, and is future oriented. Patient has no h/o impulsive behavior and has a stable affect.

## 2024-12-17 NOTE — BH CONSULTATION LIAISON ASSESSMENT NOTE - NSBHCONSULTRECOMMENDOTHER_PSY_A_CORE FT
-restart zoloft at 25mg and can uptitrate to 50mg if ineffective  -regular EKGs -restart zoloft at 25mg and can uptitrate to 50mg if ineffective  -regular EKGs (at least weekly) -restart zoloft at 25mg and can up titrate to 50mg if ineffective. Suspect symptoms are related to serotonin discontinuation.  -Discussed and educated patient and family about qt prolongation and risks/benefits.  -elevated if using the Bazett formula (500ms), however it is not elevated when using Dille formula (464ms), or King's (463ms) and therefore the increased risk of arrythmia while taking zoloft is relatively low  -regular EKGs (at least weekly) -restart zoloft at 25mg and can up titrate to 50mg if ineffective. Suspect symptoms are related to serotonin discontinuation.  -Discussed and educated patient and family about qt prolongation and risks/benefits.  -elevated if using the Bazett formula (500ms), however it is not elevated when using Ona formula (464ms), or King's (463ms) and therefore the increased risk of arrythmia while taking zoloft is relatively low  -regular EKGs (at least weekly)  -Psychiatry will sign off. Please reconsult prn.

## 2024-12-17 NOTE — BH CONSULTATION LIAISON ASSESSMENT NOTE - NSBHREFERDETAILS_PSY_A_CORE_FT
pt was on Zoloft 50 mg QD, stopped for porlonged QT, pt is not feeling like herself, please evaluate for possible restart of med or eval for differnt med, ECG is penging- Thank you

## 2024-12-17 NOTE — ADVANCED PRACTICE NURSE CONSULT - ASSESSMENT
History of Present Illness:   85 yo F with PMHx of GERD, HTN, OA s/p bilateral TKA and ROHIT, s/p L forefoot exostosis removal (11/2024) RA, and hx of breast cancer (in remission since 2015) who presents to the hospital after a fall. Around 1000, patient was seated in a chair and reaching for something on another chair when she lost her balance and fell onto her R side and hit her head. She was unable to get up from the ground and was lying on the floor for ~8 hours until her friend found her. +HS. -LOC. -AC. Subsequently, she had RLE pain. Of note, patient fell forwards two days prior and hit her face with subsequent bruising. Seen in ED at the time without any significant injuries found. No dizziness/lightheadedness associated with either fall. On admission, imaging showed acute comminuted right femoral distal metadiaphyseal fracture with volar apex angulation and 1.5 cm posterior displacement and small knee joint effusion. She underwent a R femur ORIF (12/2). Weightbearing status WBAT. During hospital course, patient had post-op blood loss anemia. Last H/H 7.5/23.1.        Patient received OOB to chair. Alert and oriented. Limited mobility. Incontinent of urine at times. High risk for pressure injury development and progression.    Type of Wound: Friction Injury  Location: Right posterior thigh  Wound bed: Dry light pink healing tissue  Wound edges: Intact  Periwound: Intact  Wound exudate: None  Wound odor: No  Induration, erythema, warmth: No  Wound pain: No    Skin assessed with primary RN bedside

## 2024-12-17 NOTE — BH CONSULTATION LIAISON ASSESSMENT NOTE - NSBHCHARTREVIEWVS_PSY_A_CORE FT
Vital Signs Last 24 Hrs  T(C): 36.4 (17 Dec 2024 12:47), Max: 36.8 (16 Dec 2024 21:21)  T(F): 97.6 (17 Dec 2024 12:47), Max: 98.2 (16 Dec 2024 21:21)  HR: 79 (17 Dec 2024 12:47) (79 - 87)  BP: 133/75 (17 Dec 2024 12:47) (118/61 - 163/84)  BP(mean): --  RR: 18 (17 Dec 2024 12:47) (18 - 18)  SpO2: 98% (17 Dec 2024 12:47) (96% - 98%)    Parameters below as of 17 Dec 2024 05:46  Patient On (Oxygen Delivery Method): room air

## 2024-12-17 NOTE — ADVANCED PRACTICE NURSE CONSULT - RECOMMEDATIONS
Cleanse wound to right posterior thigh with soap and water.   Pat dry, apply triad twice a day and prn for soiling.     Maintain pressure injury prevention.   Keep skin clean.   Maintain incontinence care.   Monitor skin for changes and notify provider   Case discussed with primary RN bedside

## 2024-12-17 NOTE — BH CONSULTATION LIAISON ASSESSMENT NOTE - NSBHCHARTREVIEWINVESTIGATE_PSY_A_CORE FT
< from: 12 Lead ECG (12.01.24 @ 21:07) >    QTC Calculation(Bazett) 500 ms    < end of copied text >

## 2024-12-17 NOTE — CONSULT NOTE ADULT - CONSULT REASON
patient had increased frequency and dysuria, UA was positive, started on bactrim for 3 days, but cx resulted E coli >100,000 and not sensitive to bactrim, patient does not feel well, has declined in therapy, Please advise on abx therapy.
Left foot surgical site evaluation

## 2024-12-17 NOTE — BH CONSULTATION LIAISON ASSESSMENT NOTE - HPI (INCLUDE ILLNESS QUALITY, SEVERITY, DURATION, TIMING, CONTEXT, MODIFYING FACTORS, ASSOCIATED SIGNS AND SYMPTOMS)
84F domiciled in private residence alone,  with 2 adult children, with PMH of GERD, HTN, OA, RA, and hx of breast cancer (in remission since 2015) with no formal PPH, no IPP admissions admitted to [facility] s/p fall. Psychiatry consulted for antidepressant medication management.    Prior to admission, patient was taking zoloft 50mg for >20yrs. Her PCP prescribed it and she does not recall what it was prescribed for and thinks he may have prescribed it for anxiety. When patient presented to the hospital following the fall, the QTC on EKG was reported to be 500ms so her medical team at that time decided to hold her zoloft. 2-3 days later the patient started feeling fatigued, nauseous, and a lack of interest in everything. Pt continues to feel this way and would like to restart her zoloft. She does not report depressed mood, but states she doesn't "feel right", and describes decreased energy. She denies any past or current SIs or thoughts of self- harm. She reports a decreased interest in everything, however excitement in regards to the holidays and speaks elaborately about her grandchildren.     Psychiatry team explained extensively to patient and her daughter (who is an RN) the risks of further QTC prolongation, and arrythmia if zoloft is restarted. They also explained the benefits of Cymbalta and how it is less likely to cause QT prolongation, and has similar psychopharmacological properties so may have the same effect as zoloft on patient. Family expressed understanding of the risk of restarting zoloft and the benefit of other treatment options, however they would like to restart zoloft since the patient was on it for multiple decades and it has worked well for her. Patient's daughter expresses that pt saw her cardiologist shortly before admission and follows with the cardiologist regularly which is another reason they are willing to presume the risks associated with zoloft.

## 2024-12-17 NOTE — CONSULT NOTE ADULT - SUBJECTIVE AND OBJECTIVE BOX
GIAN MELISSA  84y, Female  Allergy: No Known Allergies      CHIEF COMPLAINT: Rehab of Right femur fx s/p ORIF (16 Dec 2024 08:38)      HPI:  85 yo F with PMHx of GERD, HTN, OA s/p bilateral Total knee arthroplasty and Total hi[p arthroplasty, s/p L forefoot exostosis removal (11/2024), RA, and hx of breast cancer (in remission since 2015) who presents to the hospital after losing her balance and falling onto her R side and hitting her head. She was unable to get up from the ground and was lying on the floor for ~8 hours until her friend found her. +HS. -LOC. -AC. Subsequently, she had RLE pain. On admission, imaging showed acute comminuted right femoral distal metadiaphyseal fracture with volar apex angulation and 1.5 cm posterior displacement and small knee joint effusion. She underwent a R femur ORIF (12/2). Weightbearing status WBAT. During hospital course, patient had post-op blood loss anemia. Patient admitted to inpatient rehab. Patient now has a UTI with a positive UA and urine culture growing E. coli resistant to bactrim.     Infectious Diseases History:  Old Micro Data/Cultures:     Culture - Urine (12.13.24 @ 15:20)   - Ampicillin: R >16 These ampicillin results predict results for amoxicillin  - Ampicillin/Sulbactam: S <=4/2  - Aztreonam: R <=4  - Cefazolin: R >16 For uncomplicated UTI with K. pneumoniae, E. coli, or P. mirablis: GILBERT <=16 is sensitive and GILBERT >=32 is resistant. This also predicts results for oral agents cefaclor, cefdinir, cefpodoxime, cefprozil, cefuroxime axetil, cephalexin and locarbef for uncomplicated UTI. Note that some isolates may be susceptible to these agents while testing resistant to cefazolin.  - Cefepime: R <=2  - Ceftriaxone: R >32  - Cefuroxime: R >16  - Ciprofloxacin: R >2  - Ertapenem: S <=0.5  - Gentamicin: S <=2  - Imipenem: S <=1  - Levofloxacin: R 4  - Meropenem: S <=1  - Nitrofurantoin: S <=32 Should not be used to treat pyelonephritis  - Piperacillin/Tazobactam: S <=8  - Tobramycin: S <=2  - Trimethoprim/Sulfamethoxazole: R >2/38  Specimen Source: Clean Catch Clean Catch (Midstream)  Culture Results:   >100,000 CFU/ml Escherichia coli ESBL  Organism Identification: Escherichia coli ESBL  Organism: Escherichia coli ESBL  Method Type: GILBERT  Culture - Urine (02.09.24 @ 20:08)   Specimen Source: Clean Catch Clean Catch (Midstream)  Culture Results:   No growth  Culture - Urine (06.04.19 @ 11:31)   - Cefepime: R >16  - Cefoxitin: S <=8  - Ciprofloxacin: R >2  - Ceftriaxone: R >32 Enterobacter, Citrobacter, and Serratia may develop resistance during prolonged therapy  - Aztreonam: R 16  - Cefazolin: R >16 For uncomplicated UTI with K. pneumoniae, E. coli, or P. mirablis: GILBERT <=16 is sensitive and GILBERT >=32 is resistant. This also predicts results for oral agents cefaclor, cefdinir, cefpodoxime, cefprozil, cefuroxime axetil, cephalexin and locarbef for uncomplicated UTI. Note that some isolates may be susceptible to these agents while testing resistant to cefazolin.  - Ampicillin: R >16 These ampicillin results predict results for amoxicillin  - Amikacin: S <=16  - Ampicillin/Sulbactam: R 16/8  - Trimethoprim/Sulfamethoxazole: R >2/38  - Tigecycline: S <=2  - Tobramycin: S <=4  - Gentamicin: S <=4  - Imipenem: S <=1  - Ertapenem: S <=1  - Levofloxacin: R >4  - Meropenem: S <=1  - Piperacillin/Tazobactam: R <=16  - Nitrofurantoin: S <=32 Should not be used to treat pyelonephritis  Specimen Source: .Urine Clean Catch (Midstream)  Culture Results:   >100,000 CFU/ml Escherichia coli ESBL  Organism Identification: Escherichia coli ESBL  Organism: Escherichia coli ESBL  Method Type: GILBERT  Culture - Urine (03.19.19 @ 12:04)   - Ampicillin: R >16 These ampicillin results predict results for amoxicillin  - Ampicillin/Sulbactam: R <=8/4  - Amikacin: S <=16  - Aztreonam: R 16  - Cefazolin: R >16 For uncomplicated UTI with K. pneumoniae, E. coli, or P. mirablis: GILBERT <=16 is sensitive and GILBERT >=32 is resistant. This also predicts results for oral agents cefaclor, cefdinir, cefpodoxime, cefprozil, cefuroxime axetil, cephalexin and locarbef for uncomplicated UTI. Note that some isolates may be susceptible to these agents while testing resistant to cefazolin.  - Ceftriaxone: R 32 Enterobacter, Citrobacter, and Serratia may develop resistance during prolonged therapy  - Cefoxitin: S <=8  - Ciprofloxacin: R >2  - Cefepime: R 16  - Nitrofurantoin: S <=32 Should not be used to treat pyelonephritis  - Piperacillin/Tazobactam: R <=16  - Meropenem: S <=1  - Levofloxacin: R >4  - Ertapenem: S <=1  - Imipenem: S <=1  - Gentamicin: S <=4  - Tobramycin: S <=4  - Trimethoprim/Sulfamethoxazole: R >2/38  - Tigecycline: S <=2  Specimen Source: .Urine Clean Catch (Midstream)  Culture Results:   >100,000 CFU/ml Escherichia coli ESBL  Organism Identification: Escherichia coli ESBL  Organism: Escherichia coli ESBL  Method Type: GILBERT    FAMILY HISTORY:  FH: cancer  Nonhodgkins : sister    FH: lymphoma (Sibling)      PAST MEDICAL & SURGICAL HISTORY:  Rheumatoid arthritis      HTN (hypertension)      Breast cancer  last radiation 2015      Depression      Chronic GERD      OA (osteoarthritis)      Rheumatoid arthritis      History of right hip replacement      Status post left hip replacement      S/P total knee replacement, left      H/O vaginal hysterectomy      Status post cholecystectomy      S/P lumpectomy of breast  2015      SOCIAL HISTORY  Social History:  Retired. Lives alone in , remote hx of smoking >50 years ago, drinks Etoh sociably, and no illicit drug use. (05 Dec 2024 11:53)      Recent Travel: None  Other Exposures: None    ROS  General: Denies rigors, nightsweats  HEENT: Denies headache, rhinorrhea, sore throat, eye pain  CV: Denies CP, palpitations  PULM: Denies wheezing, hemoptysis  GI: Denies hematemesis, hematochezia, melena  : Denies discharge, hematuria  MSK: Denies arthralgias, myalgias  SKIN: Denies rash, lesions  NEURO: Denies paresthesias, weakness  PSYCH: Denies depression, anxiety    VITALS:  T(F): 97.4, Max: 98.3 (12-16-24 @ 13:55)  HR: 87  BP: 163/84  RR: 18Vital Signs Last 24 Hrs  T(C): 36.3 (17 Dec 2024 05:46), Max: 36.8 (16 Dec 2024 13:55)  T(F): 97.4 (17 Dec 2024 05:46), Max: 98.3 (16 Dec 2024 13:55)  HR: 87 (17 Dec 2024 05:46) (80 - 87)  BP: 163/84 (17 Dec 2024 05:46) (101/54 - 163/84)  BP(mean): --  RR: 18 (17 Dec 2024 05:46) (18 - 18)  SpO2: 96% (17 Dec 2024 05:46) (96% - 98%)    Parameters below as of 17 Dec 2024 05:46  Patient On (Oxygen Delivery Method): room air        PHYSICAL EXAM:    GENERAL: NAD, well-groomed, well-developed  HEAD:  Atraumatic, Normocephalic  EYES: EOMI, PERRLA, conjunctiva and sclera clear  ENMT: No tonsillar erythema, exudates, or enlargement; Moist mucous membranes  NECK: Supple, No JVD, Normal thyroid  HEART: Regular rate and rhythm; No murmurs, rubs, or gallops  RESPIRATORY: CTA B/L, No W/R/R  ABDOMEN: Soft, Nontender, Nondistended; Bowel sounds present  NEUROLOGY: A&Ox3, nonfocal, moving all extremities  EXTREMITIES:  2+ Peripheral Pulses, No clubbing, cyanosis, or edema  SKIN: warm, dry, normal color, no rash or abnormal lesions        TESTS & MEASUREMENTS:                        8.8    7.24  )-----------( 293      ( 16 Dec 2024 05:43 )             28.5     12-16    132[L]  |  95[L]  |  15  ----------------------------<  65[L]  5.2[H]   |  21  |  1.1    Ca    9.1      16 Dec 2024 05:43  Mg     2.1     12-16    TPro  6.0  /  Alb  3.3[L]  /  TBili  0.7  /  DBili  x   /  AST  26  /  ALT  11  /  AlkPhos  146[H]  12-16      LIVER FUNCTIONS - ( 16 Dec 2024 05:43 )  Alb: 3.3 g/dL / Pro: 6.0 g/dL / ALK PHOS: 146 U/L / ALT: 11 U/L / AST: 26 U/L / GGT: x           Urinalysis Basic - ( 16 Dec 2024 05:43 )    Color: x / Appearance: x / SG: x / pH: x  Gluc: 65 mg/dL / Ketone: x  / Bili: x / Urobili: x   Blood: x / Protein: x / Nitrite: x   Leuk Esterase: x / RBC: x / WBC x   Sq Epi: x / Non Sq Epi: x / Bacteria: x        Culture - Urine (collected 12-13-24 @ 15:20)  Source: Clean Catch Clean Catch (Midstream)  Final Report (12-16-24 @ 08:01):    >100,000 CFU/ml Escherichia coli ESBL  Organism: Escherichia coli ESBL (12-16-24 @ 08:01)  Organism: Escherichia coli ESBL (12-16-24 @ 08:01)      Method Type: GILBERT      -  Ampicillin: R >16 These ampicillin results predict results for amoxicillin      -  Ampicillin/Sulbactam: S <=4/2      -  Aztreonam: R <=4      -  Cefazolin: R >16 For uncomplicated UTI with K. pneumoniae, E. coli, or P. mirablis: GILBERT <=16 is sensitive and GILBERT >=32 is resistant. This also predicts results for oral agents cefaclor, cefdinir, cefpodoxime, cefprozil, cefuroxime axetil, cephalexin and locarbef for uncomplicated UTI. Note that some isolates may be susceptible to these agents while testing resistant to cefazolin.      -  Cefepime: R <=2      -  Ceftriaxone: R >32      -  Cefuroxime: R >16      -  Ciprofloxacin: R >2      -  Ertapenem: S <=0.5      -  Gentamicin: S <=2      -  Imipenem: S <=1      -  Levofloxacin: R 4      -  Meropenem: S <=1      -  Nitrofurantoin: S <=32 Should not be used to treat pyelonephritis      -  Piperacillin/Tazobactam: S <=8      -  Tobramycin: S <=2      -  Trimethoprim/Sulfamethoxazole: R >2/38            INFECTIOUS DISEASES TESTING  MRSA PCR Result.: Negative (12-03-24 @ 14:20)      RADIOLOGY & ADDITIONAL TESTS:  I have personally reviewed the last available imaging    < from: [facility] Duplex Lower Ext Vein Scan, Bilat (12.06.24 @ 21:11) >  IMPRESSION:  No evidence of deep venous thrombosis in the left lower extremity.    Right lower extremity venous system not visualized.    < end of copied text >    < from: CT Maxillofacial No Cont (12.02.24 @ 10:57) >  Impression:    Redemonstrated nondisplaced fractures of the bilateral nasal bones with   decreased paranasal soft tissue swelling since the prior maxillofacial CT   dated 11/23/2024.    < end of copied text >    < from: CT Femur No Cont, Right (12.01.24 @ 21:08) >  IMPRESSION:  Acute comminuted right femoral distal metadiaphyseal fracture with volar   apex angulation and 1.5 cm posterior displacement.    Small knee joint effusion.    < end of copied text >    < from: CT Head No Cont (12.01.24 @ 20:54) >  IMPRESSION: No acute process seen    < end of copied text >    < from: Xray Tibia + Fibula 2 Views, Right (12.01.24 @ 20:40) >  Findings/  impression: No acute tibial or fibular fracture or dislocation. Partially   visualized distal right femoral periprosthetic fracture, joint effusion,   knee knee total arthroplasty in satisfactory position with no hardware   complication.. Soft tissue vascular calcification.    < end of copied text >    < from: Xray Pelvis AP only (12.01.24 @ 20:40) >  IMPRESSION: No acute fractures seen    < end of copied text >    < from: Xray Knee 1 or 2 Views, Right (12.01.24 @ 20:36) >  Distal femoral shaft fracture terminating above the knee prosthesis.   Proximal femoral prosthesis and associated bone is intact.Knee   prosthesis appears intact as well. Moderate knee joint effusion.   Extensive atherosclerosis.    < end of copied text >    < from: Xray Chest 1 View AP/PA (12.01.24 @ 20:36) >  IMPRESSION:    Bilateral interstitial opacities, decreased. Redemonstration cardiomegaly.    < end of copied text >    < from: Xray Femur 2 Views, Right (12.01.24 @ 20:35) >  Findings/  impression:    Distal femoral shaft fracture terminating above the knee prosthesis.   Proximal femoral prosthesis and associated bone is intact.Knee   prosthesis appears intact as well. Moderate knee joint effusion.   Extensive atherosclerosis.    < end of copied text >        CARDIOLOGY TESTING    < from: 12 Lead ECG (12.01.24 @ 21:07) >    Ventricular Rate 87 BPM    Atrial Rate 87 BPM    P-R Interval 156 ms    QRS Duration 136 ms    Q-T Interval 416 ms    QTC Calculation(Bazett) 500 ms    P Axis 57 degrees    R Axis 26 degrees    T Axis 96 degrees    Diagnosis Line Normal sinus rhythm  Non-specific intra-ventricular conduction block  Minimal voltage criteria for LVH, may be normal variant ( Kevan product )  T wave abnormality, consider lateral ischemia  Abnormal ECG    < end of copied text >      All available historical records have been reviewed    MEDICATIONS  acetaminophen     Tablet .. 650  ascorbic acid 500  aspirin enteric coated 81  bisacodyl 5  bisacodyl Suppository 10  calcium carbonate 1250 mG  + Vitamin D (OsCal 500 + D) 1  chlorhexidine 2% Cloths 1  enoxaparin Injectable 40  famotidine    Tablet 20  folic acid 1  magnesium oxide 400  metoprolol succinate ER 50  ofloxacin 0.3% Solution 1  polyethylene glycol 3350 17  rosuvastatin 10  senna 2  sodium chloride 1  sodium chloride 0.9%. 500  zinc oxide 20% Ointment 1    All available historical data has been reviewed   GIAN MELISSA  84y, Female  Allergy: No Known Allergies      CHIEF COMPLAINT: Rehab of Right femur fx s/p ORIF (16 Dec 2024 08:38)      HPI:  85 yo F with PMHx of GERD, HTN, OA s/p bilateral Total knee arthroplasty and Total hi[p arthroplasty, s/p L forefoot exostosis removal (11/2024), RA, and hx of breast cancer (in remission since 2015) who presents to the hospital after losing her balance and falling onto her R side and hitting her head. She was unable to get up from the ground and was lying on the floor for ~8 hours until her friend found her. +HS. -LOC. -AC. Subsequently, she had RLE pain. On admission, imaging showed acute comminuted right femoral distal metadiaphyseal fracture with volar apex angulation and 1.5 cm posterior displacement and small knee joint effusion. She underwent a R femur ORIF (12/2). Weightbearing status WBAT. During hospital course, patient had post-op blood loss anemia. Patient admitted to inpatient rehab. Patient now has a UTI with a positive UA and urine culture growing E. coli resistant to bactrim.     Of note, patient has a history of ESBL e. coli x2 in 2019.    Infectious Diseases History:  Old Micro Data/Cultures:     Culture - Urine (12.13.24 @ 15:20)   - Ampicillin: R >16 These ampicillin results predict results for amoxicillin  - Ampicillin/Sulbactam: S <=4/2  - Aztreonam: R <=4  - Cefazolin: R >16 For uncomplicated UTI with K. pneumoniae, E. coli, or P. mirablis: GILBERT <=16 is sensitive and GILBERT >=32 is resistant. This also predicts results for oral agents cefaclor, cefdinir, cefpodoxime, cefprozil, cefuroxime axetil, cephalexin and locarbef for uncomplicated UTI. Note that some isolates may be susceptible to these agents while testing resistant to cefazolin.  - Cefepime: R <=2  - Ceftriaxone: R >32  - Cefuroxime: R >16  - Ciprofloxacin: R >2  - Ertapenem: S <=0.5  - Gentamicin: S <=2  - Imipenem: S <=1  - Levofloxacin: R 4  - Meropenem: S <=1  - Nitrofurantoin: S <=32 Should not be used to treat pyelonephritis  - Piperacillin/Tazobactam: S <=8  - Tobramycin: S <=2  - Trimethoprim/Sulfamethoxazole: R >2/38  Specimen Source: Clean Catch Clean Catch (Midstream)  Culture Results:   >100,000 CFU/ml Escherichia coli ESBL  Organism Identification: Escherichia coli ESBL  Organism: Escherichia coli ESBL  Method Type: GILBERT  Culture - Urine (02.09.24 @ 20:08)   Specimen Source: Clean Catch Clean Catch (Midstream)  Culture Results:   No growth  Culture - Urine (06.04.19 @ 11:31)   - Cefepime: R >16  - Cefoxitin: S <=8  - Ciprofloxacin: R >2  - Ceftriaxone: R >32 Enterobacter, Citrobacter, and Serratia may develop resistance during prolonged therapy  - Aztreonam: R 16  - Cefazolin: R >16 For uncomplicated UTI with K. pneumoniae, E. coli, or P. mirablis: GILBERT <=16 is sensitive and GILBERT >=32 is resistant. This also predicts results for oral agents cefaclor, cefdinir, cefpodoxime, cefprozil, cefuroxime axetil, cephalexin and locarbef for uncomplicated UTI. Note that some isolates may be susceptible to these agents while testing resistant to cefazolin.  - Ampicillin: R >16 These ampicillin results predict results for amoxicillin  - Amikacin: S <=16  - Ampicillin/Sulbactam: R 16/8  - Trimethoprim/Sulfamethoxazole: R >2/38  - Tigecycline: S <=2  - Tobramycin: S <=4  - Gentamicin: S <=4  - Imipenem: S <=1  - Ertapenem: S <=1  - Levofloxacin: R >4  - Meropenem: S <=1  - Piperacillin/Tazobactam: R <=16  - Nitrofurantoin: S <=32 Should not be used to treat pyelonephritis  Specimen Source: .Urine Clean Catch (Midstream)  Culture Results:   >100,000 CFU/ml Escherichia coli ESBL  Organism Identification: Escherichia coli ESBL  Organism: Escherichia coli ESBL  Method Type: GILBERT  Culture - Urine (03.19.19 @ 12:04)   - Ampicillin: R >16 These ampicillin results predict results for amoxicillin  - Ampicillin/Sulbactam: R <=8/4  - Amikacin: S <=16  - Aztreonam: R 16  - Cefazolin: R >16 For uncomplicated UTI with K. pneumoniae, E. coli, or P. mirablis: GILBERT <=16 is sensitive and GILBERT >=32 is resistant. This also predicts results for oral agents cefaclor, cefdinir, cefpodoxime, cefprozil, cefuroxime axetil, cephalexin and locarbef for uncomplicated UTI. Note that some isolates may be susceptible to these agents while testing resistant to cefazolin.  - Ceftriaxone: R 32 Enterobacter, Citrobacter, and Serratia may develop resistance during prolonged therapy  - Cefoxitin: S <=8  - Ciprofloxacin: R >2  - Cefepime: R 16  - Nitrofurantoin: S <=32 Should not be used to treat pyelonephritis  - Piperacillin/Tazobactam: R <=16  - Meropenem: S <=1  - Levofloxacin: R >4  - Ertapenem: S <=1  - Imipenem: S <=1  - Gentamicin: S <=4  - Tobramycin: S <=4  - Trimethoprim/Sulfamethoxazole: R >2/38  - Tigecycline: S <=2  Specimen Source: .Urine Clean Catch (Midstream)  Culture Results:   >100,000 CFU/ml Escherichia coli ESBL  Organism Identification: Escherichia coli ESBL  Organism: Escherichia coli ESBL  Method Type: GILBERT    FAMILY HISTORY:  FH: cancer  Nonhodgkins : sister    FH: lymphoma (Sibling)      PAST MEDICAL & SURGICAL HISTORY:  Rheumatoid arthritis      HTN (hypertension)      Breast cancer  last radiation 2015      Depression      Chronic GERD      OA (osteoarthritis)      Rheumatoid arthritis      History of right hip replacement      Status post left hip replacement      S/P total knee replacement, left      H/O vaginal hysterectomy      Status post cholecystectomy      S/P lumpectomy of breast  2015      SOCIAL HISTORY  Social History:  Retired. Lives alone in , remote hx of smoking >50 years ago, drinks Etoh sociably, and no illicit drug use. (05 Dec 2024 11:53)      Recent Travel: None  Other Exposures: None    ROS  General: Denies rigors, nightsweats  HEENT: Denies headache, rhinorrhea, sore throat, eye pain  CV: Denies CP, palpitations  PULM: Denies wheezing, hemoptysis  GI: Denies hematemesis, hematochezia, melena  : Denies discharge, hematuria  MSK: Denies arthralgias, myalgias  SKIN: Denies rash, lesions  NEURO: Denies paresthesias, weakness  PSYCH: Denies depression, anxiety    VITALS:  T(F): 97.4, Max: 98.3 (12-16-24 @ 13:55)  HR: 87  BP: 163/84  RR: 18Vital Signs Last 24 Hrs  T(C): 36.3 (17 Dec 2024 05:46), Max: 36.8 (16 Dec 2024 13:55)  T(F): 97.4 (17 Dec 2024 05:46), Max: 98.3 (16 Dec 2024 13:55)  HR: 87 (17 Dec 2024 05:46) (80 - 87)  BP: 163/84 (17 Dec 2024 05:46) (101/54 - 163/84)  BP(mean): --  RR: 18 (17 Dec 2024 05:46) (18 - 18)  SpO2: 96% (17 Dec 2024 05:46) (96% - 98%)    Parameters below as of 17 Dec 2024 05:46  Patient On (Oxygen Delivery Method): room air        PHYSICAL EXAM:    GENERAL: NAD, well-groomed, well-developed  HEAD:  Atraumatic, Normocephalic  EYES: EOMI, PERRLA, conjunctiva and sclera clear  ENMT: No tonsillar erythema, exudates, or enlargement; Moist mucous membranes  NECK: Supple, No JVD, Normal thyroid  HEART: Regular rate and rhythm; No murmurs, rubs, or gallops  RESPIRATORY: CTA B/L, No W/R/R  ABDOMEN: Soft, Nontender, Nondistended; Bowel sounds present  NEUROLOGY: A&Ox3, nonfocal, moving all extremities  EXTREMITIES:  2+ Peripheral Pulses, No clubbing, cyanosis, or edema  SKIN: warm, dry, normal color, no rash or abnormal lesions        TESTS & MEASUREMENTS:                        8.8    7.24  )-----------( 293      ( 16 Dec 2024 05:43 )             28.5     12-16    132[L]  |  95[L]  |  15  ----------------------------<  65[L]  5.2[H]   |  21  |  1.1    Ca    9.1      16 Dec 2024 05:43  Mg     2.1     12-16    TPro  6.0  /  Alb  3.3[L]  /  TBili  0.7  /  DBili  x   /  AST  26  /  ALT  11  /  AlkPhos  146[H]  12-16      LIVER FUNCTIONS - ( 16 Dec 2024 05:43 )  Alb: 3.3 g/dL / Pro: 6.0 g/dL / ALK PHOS: 146 U/L / ALT: 11 U/L / AST: 26 U/L / GGT: x           Urinalysis Basic - ( 16 Dec 2024 05:43 )    Color: x / Appearance: x / SG: x / pH: x  Gluc: 65 mg/dL / Ketone: x  / Bili: x / Urobili: x   Blood: x / Protein: x / Nitrite: x   Leuk Esterase: x / RBC: x / WBC x   Sq Epi: x / Non Sq Epi: x / Bacteria: x        Culture - Urine (collected 12-13-24 @ 15:20)  Source: Clean Catch Clean Catch (Midstream)  Final Report (12-16-24 @ 08:01):    >100,000 CFU/ml Escherichia coli ESBL  Organism: Escherichia coli ESBL (12-16-24 @ 08:01)  Organism: Escherichia coli ESBL (12-16-24 @ 08:01)      Method Type: GILBERT      -  Ampicillin: R >16 These ampicillin results predict results for amoxicillin      -  Ampicillin/Sulbactam: S <=4/2      -  Aztreonam: R <=4      -  Cefazolin: R >16 For uncomplicated UTI with K. pneumoniae, E. coli, or P. mirablis: GILBERT <=16 is sensitive and GILBERT >=32 is resistant. This also predicts results for oral agents cefaclor, cefdinir, cefpodoxime, cefprozil, cefuroxime axetil, cephalexin and locarbef for uncomplicated UTI. Note that some isolates may be susceptible to these agents while testing resistant to cefazolin.      -  Cefepime: R <=2      -  Ceftriaxone: R >32      -  Cefuroxime: R >16      -  Ciprofloxacin: R >2      -  Ertapenem: S <=0.5      -  Gentamicin: S <=2      -  Imipenem: S <=1      -  Levofloxacin: R 4      -  Meropenem: S <=1      -  Nitrofurantoin: S <=32 Should not be used to treat pyelonephritis      -  Piperacillin/Tazobactam: S <=8      -  Tobramycin: S <=2      -  Trimethoprim/Sulfamethoxazole: R >2/38            INFECTIOUS DISEASES TESTING  MRSA PCR Result.: Negative (12-03-24 @ 14:20)      RADIOLOGY & ADDITIONAL TESTS:  I have personally reviewed the last available imaging    < from: [facility] Duplex Lower Ext Vein Scan, Bilat (12.06.24 @ 21:11) >  IMPRESSION:  No evidence of deep venous thrombosis in the left lower extremity.    Right lower extremity venous system not visualized.    < end of copied text >    < from: CT Maxillofacial No Cont (12.02.24 @ 10:57) >  Impression:    Redemonstrated nondisplaced fractures of the bilateral nasal bones with   decreased paranasal soft tissue swelling since the prior maxillofacial CT   dated 11/23/2024.    < end of copied text >    < from: CT Femur No Cont, Right (12.01.24 @ 21:08) >  IMPRESSION:  Acute comminuted right femoral distal metadiaphyseal fracture with volar   apex angulation and 1.5 cm posterior displacement.    Small knee joint effusion.    < end of copied text >    < from: CT Head No Cont (12.01.24 @ 20:54) >  IMPRESSION: No acute process seen    < end of copied text >    < from: Xray Tibia + Fibula 2 Views, Right (12.01.24 @ 20:40) >  Findings/  impression: No acute tibial or fibular fracture or dislocation. Partially   visualized distal right femoral periprosthetic fracture, joint effusion,   knee knee total arthroplasty in satisfactory position with no hardware   complication.. Soft tissue vascular calcification.    < end of copied text >    < from: Xray Pelvis AP only (12.01.24 @ 20:40) >  IMPRESSION: No acute fractures seen    < end of copied text >    < from: Xray Knee 1 or 2 Views, Right (12.01.24 @ 20:36) >  Distal femoral shaft fracture terminating above the knee prosthesis.   Proximal femoral prosthesis and associated bone is intact.Knee   prosthesis appears intact as well. Moderate knee joint effusion.   Extensive atherosclerosis.    < end of copied text >    < from: Xray Chest 1 View AP/PA (12.01.24 @ 20:36) >  IMPRESSION:    Bilateral interstitial opacities, decreased. Redemonstration cardiomegaly.    < end of copied text >    < from: Xray Femur 2 Views, Right (12.01.24 @ 20:35) >  Findings/  impression:    Distal femoral shaft fracture terminating above the knee prosthesis.   Proximal femoral prosthesis and associated bone is intact.Knee   prosthesis appears intact as well. Moderate knee joint effusion.   Extensive atherosclerosis.    < end of copied text >        CARDIOLOGY TESTING    < from: 12 Lead ECG (12.01.24 @ 21:07) >    Ventricular Rate 87 BPM    Atrial Rate 87 BPM    P-R Interval 156 ms    QRS Duration 136 ms    Q-T Interval 416 ms    QTC Calculation(Bazett) 500 ms    P Axis 57 degrees    R Axis 26 degrees    T Axis 96 degrees    Diagnosis Line Normal sinus rhythm  Non-specific intra-ventricular conduction block  Minimal voltage criteria for LVH, may be normal variant ( [place] product )  T wave abnormality, consider lateral ischemia  Abnormal ECG    < end of copied text >      All available historical records have been reviewed    MEDICATIONS  acetaminophen     Tablet .. 650  ascorbic acid 500  aspirin enteric coated 81  bisacodyl 5  bisacodyl Suppository 10  calcium carbonate 1250 mG  + Vitamin D (OsCal 500 + D) 1  chlorhexidine 2% Cloths 1  enoxaparin Injectable 40  famotidine    Tablet 20  folic acid 1  magnesium oxide 400  metoprolol succinate ER 50  ofloxacin 0.3% Solution 1  polyethylene glycol 3350 17  rosuvastatin 10  senna 2  sodium chloride 1  sodium chloride 0.9%. 500  zinc oxide 20% Ointment 1    All available historical data has been reviewed   GIAN MELISSA  84y, Female  Allergy: No Known Allergies      CHIEF COMPLAINT: Rehab of Right femur fx s/p ORIF (16 Dec 2024 08:38)      HPI:  85 yo F with PMHx of GERD, HTN, OA s/p bilateral Total knee arthroplasty and Total hi[p arthroplasty, s/p L forefoot exostosis removal (11/2024), RA, and hx of breast cancer (in remission since 2015) who presents to the hospital after losing her balance and falling onto her R side and hitting her head. She was unable to get up from the ground and was lying on the floor for ~8 hours until her friend found her. +HS. -LOC. -AC. Subsequently, she had RLE pain. On admission, imaging showed acute comminuted right femoral distal metadiaphyseal fracture with volar apex angulation and 1.5 cm posterior displacement and small knee joint effusion. She underwent a R femur ORIF (12/2). Weightbearing status WBAT. During hospital course, patient had post-op blood loss anemia. Patient admitted to inpatient rehab. Patient now has a UTI with a positive UA and urine culture growing E. coli ESBL resistant to bactrim.     Of note, patient has a history of ESBL e. coli x2 in 2019 and was recently seen by ID in 02/2024 for septic arthritis/abscess in L foot with ORSA treated with daptomycin x 6wk.    Infectious Diseases History:   Old Micro Data/Cultures:     Culture - Urine (12.13.24 @ 15:20)   - Ampicillin: R >16 These ampicillin results predict results for amoxicillin  - Ampicillin/Sulbactam: S <=4/2  - Aztreonam: R <=4  - Cefazolin: R >16 For uncomplicated UTI with K. pneumoniae, E. coli, or P. mirablis: GILBERT <=16 is sensitive and GILBERT >=32 is resistant. This also predicts results for oral agents cefaclor, cefdinir, cefpodoxime, cefprozil, cefuroxime axetil, cephalexin and locarbef for uncomplicated UTI. Note that some isolates may be susceptible to these agents while testing resistant to cefazolin.  - Cefepime: R <=2  - Ceftriaxone: R >32  - Cefuroxime: R >16  - Ciprofloxacin: R >2  - Ertapenem: S <=0.5  - Gentamicin: S <=2  - Imipenem: S <=1  - Levofloxacin: R 4  - Meropenem: S <=1  - Nitrofurantoin: S <=32 Should not be used to treat pyelonephritis  - Piperacillin/Tazobactam: S <=8  - Tobramycin: S <=2  - Trimethoprim/Sulfamethoxazole: R >2/38  Specimen Source: Clean Catch Clean Catch (Midstream)  Culture Results:   >100,000 CFU/ml Escherichia coli ESBL  Organism Identification: Escherichia coli ESBL  Organism: Escherichia coli ESBL  Method Type: GILBERT  Culture - Urine (02.09.24 @ 20:08)   Specimen Source: Clean Catch Clean Catch (Midstream)  Culture Results:   No growth  Culture - Urine (06.04.19 @ 11:31)   - Cefepime: R >16  - Cefoxitin: S <=8  - Ciprofloxacin: R >2  - Ceftriaxone: R >32 Enterobacter, Citrobacter, and Serratia may develop resistance during prolonged therapy  - Aztreonam: R 16  - Cefazolin: R >16 For uncomplicated UTI with K. pneumoniae, E. coli, or P. mirablis: GILBERT <=16 is sensitive and GILBERT >=32 is resistant. This also predicts results for oral agents cefaclor, cefdinir, cefpodoxime, cefprozil, cefuroxime axetil, cephalexin and locarbef for uncomplicated UTI. Note that some isolates may be susceptible to these agents while testing resistant to cefazolin.  - Ampicillin: R >16 These ampicillin results predict results for amoxicillin  - Amikacin: S <=16  - Ampicillin/Sulbactam: R 16/8  - Trimethoprim/Sulfamethoxazole: R >2/38  - Tigecycline: S <=2  - Tobramycin: S <=4  - Gentamicin: S <=4  - Imipenem: S <=1  - Ertapenem: S <=1  - Levofloxacin: R >4  - Meropenem: S <=1  - Piperacillin/Tazobactam: R <=16  - Nitrofurantoin: S <=32 Should not be used to treat pyelonephritis  Specimen Source: .Urine Clean Catch (Midstream)  Culture Results:   >100,000 CFU/ml Escherichia coli ESBL  Organism Identification: Escherichia coli ESBL  Organism: Escherichia coli ESBL  Method Type: GILBERT  Culture - Urine (03.19.19 @ 12:04)   - Ampicillin: R >16 These ampicillin results predict results for amoxicillin  - Ampicillin/Sulbactam: R <=8/4  - Amikacin: S <=16  - Aztreonam: R 16  - Cefazolin: R >16 For uncomplicated UTI with K. pneumoniae, E. coli, or P. mirablis: GILBERT <=16 is sensitive and GILBERT >=32 is resistant. This also predicts results for oral agents cefaclor, cefdinir, cefpodoxime, cefprozil, cefuroxime axetil, cephalexin and locarbef for uncomplicated UTI. Note that some isolates may be susceptible to these agents while testing resistant to cefazolin.  - Ceftriaxone: R 32 Enterobacter, Citrobacter, and Serratia may develop resistance during prolonged therapy  - Cefoxitin: S <=8  - Ciprofloxacin: R >2  - Cefepime: R 16  - Nitrofurantoin: S <=32 Should not be used to treat pyelonephritis  - Piperacillin/Tazobactam: R <=16  - Meropenem: S <=1  - Levofloxacin: R >4  - Ertapenem: S <=1  - Imipenem: S <=1  - Gentamicin: S <=4  - Tobramycin: S <=4  - Trimethoprim/Sulfamethoxazole: R >2/38  - Tigecycline: S <=2  Specimen Source: .Urine Clean Catch (Midstream)  Culture Results:   >100,000 CFU/ml Escherichia coli ESBL  Organism Identification: Escherichia coli ESBL  Organism: Escherichia coli ESBL  Method Type: GILBERT    FAMILY HISTORY:  FH: cancer  Nonhodgkins : sister    FH: lymphoma (Sibling)      PAST MEDICAL & SURGICAL HISTORY:  Rheumatoid arthritis      HTN (hypertension)      Breast cancer  last radiation 2015      Depression      Chronic GERD      OA (osteoarthritis)      Rheumatoid arthritis      History of right hip replacement      Status post left hip replacement      S/P total knee replacement, left      H/O vaginal hysterectomy      Status post cholecystectomy      S/P lumpectomy of breast  2015      SOCIAL HISTORY  Social History:  Retired. Lives alone in , remote hx of smoking >50 years ago, drinks Etoh sociably, and no illicit drug use. (05 Dec 2024 11:53)      Recent Travel: None  Other Exposures: None    ROS  General: Denies rigors, nightsweats  HEENT: Denies headache, rhinorrhea, sore throat, eye pain  CV: Denies CP, palpitations  PULM: Denies wheezing, hemoptysis  GI: Denies hematemesis, hematochezia, melena  : Denies discharge, hematuria  MSK: Denies arthralgias, myalgias  SKIN: Denies rash, lesions  NEURO: Denies paresthesias, weakness  PSYCH: Denies depression, anxiety    VITALS:  T(F): 97.4, Max: 98.3 (12-16-24 @ 13:55)  HR: 87  BP: 163/84  RR: 18Vital Signs Last 24 Hrs  T(C): 36.3 (17 Dec 2024 05:46), Max: 36.8 (16 Dec 2024 13:55)  T(F): 97.4 (17 Dec 2024 05:46), Max: 98.3 (16 Dec 2024 13:55)  HR: 87 (17 Dec 2024 05:46) (80 - 87)  BP: 163/84 (17 Dec 2024 05:46) (101/54 - 163/84)  BP(mean): --  RR: 18 (17 Dec 2024 05:46) (18 - 18)  SpO2: 96% (17 Dec 2024 05:46) (96% - 98%)    Parameters below as of 17 Dec 2024 05:46  Patient On (Oxygen Delivery Method): room air        PHYSICAL EXAM:    GENERAL: NAD, well-groomed, well-developed  HEAD:  Atraumatic, Normocephalic  EYES: EOMI, PERRLA, conjunctiva and sclera clear  ENMT: No tonsillar erythema, exudates, or enlargement; Moist mucous membranes  NECK: Supple, No JVD, Normal thyroid  HEART: Regular rate and rhythm; No murmurs, rubs, or gallops  RESPIRATORY: CTA B/L, No W/R/R  ABDOMEN: Soft, Nontender, Nondistended; Bowel sounds present  NEUROLOGY: A&Ox3, nonfocal, moving all extremities  EXTREMITIES:  2+ Peripheral Pulses, No clubbing, cyanosis, or edema  SKIN: warm, dry, normal color, no rash or abnormal lesions        TESTS & MEASUREMENTS:                        8.8    7.24  )-----------( 293      ( 16 Dec 2024 05:43 )             28.5     12-16    132[L]  |  95[L]  |  15  ----------------------------<  65[L]  5.2[H]   |  21  |  1.1    Ca    9.1      16 Dec 2024 05:43  Mg     2.1     12-16    TPro  6.0  /  Alb  3.3[L]  /  TBili  0.7  /  DBili  x   /  AST  26  /  ALT  11  /  AlkPhos  146[H]  12-16      LIVER FUNCTIONS - ( 16 Dec 2024 05:43 )  Alb: 3.3 g/dL / Pro: 6.0 g/dL / ALK PHOS: 146 U/L / ALT: 11 U/L / AST: 26 U/L / GGT: x           Urinalysis Basic - ( 16 Dec 2024 05:43 )    Color: x / Appearance: x / SG: x / pH: x  Gluc: 65 mg/dL / Ketone: x  / Bili: x / Urobili: x   Blood: x / Protein: x / Nitrite: x   Leuk Esterase: x / RBC: x / WBC x   Sq Epi: x / Non Sq Epi: x / Bacteria: x        Culture - Urine (collected 12-13-24 @ 15:20)  Source: Clean Catch Clean Catch (Midstream)  Final Report (12-16-24 @ 08:01):    >100,000 CFU/ml Escherichia coli ESBL  Organism: Escherichia coli ESBL (12-16-24 @ 08:01)  Organism: Escherichia coli ESBL (12-16-24 @ 08:01)      Method Type: GILBERT      -  Ampicillin: R >16 These ampicillin results predict results for amoxicillin      -  Ampicillin/Sulbactam: S <=4/2      -  Aztreonam: R <=4      -  Cefazolin: R >16 For uncomplicated UTI with K. pneumoniae, E. coli, or P. mirablis: GILBERT <=16 is sensitive and GILBERT >=32 is resistant. This also predicts results for oral agents cefaclor, cefdinir, cefpodoxime, cefprozil, cefuroxime axetil, cephalexin and locarbef for uncomplicated UTI. Note that some isolates may be susceptible to these agents while testing resistant to cefazolin.      -  Cefepime: R <=2      -  Ceftriaxone: R >32      -  Cefuroxime: R >16      -  Ciprofloxacin: R >2      -  Ertapenem: S <=0.5      -  Gentamicin: S <=2      -  Imipenem: S <=1      -  Levofloxacin: R 4      -  Meropenem: S <=1      -  Nitrofurantoin: S <=32 Should not be used to treat pyelonephritis      -  Piperacillin/Tazobactam: S <=8      -  Tobramycin: S <=2      -  Trimethoprim/Sulfamethoxazole: R >2/38            INFECTIOUS DISEASES TESTING  MRSA PCR Result.: Negative (12-03-24 @ 14:20)      RADIOLOGY & ADDITIONAL TESTS:  I have personally reviewed the last available imaging    < from: [facility] Duplex Lower Ext Vein Scan, Bilat (12.06.24 @ 21:11) >  IMPRESSION:  No evidence of deep venous thrombosis in the left lower extremity.    Right lower extremity venous system not visualized.    < end of copied text >    < from: CT Maxillofacial No Cont (12.02.24 @ 10:57) >  Impression:    Redemonstrated nondisplaced fractures of the bilateral nasal bones with   decreased paranasal soft tissue swelling since the prior maxillofacial CT   dated 11/23/2024.    < end of copied text >    < from: CT Femur No Cont, Right (12.01.24 @ 21:08) >  IMPRESSION:  Acute comminuted right femoral distal metadiaphyseal fracture with volar   apex angulation and 1.5 cm posterior displacement.    Small knee joint effusion.    < end of copied text >    < from: CT Head No Cont (12.01.24 @ 20:54) >  IMPRESSION: No acute process seen    < end of copied text >    < from: Xray Tibia + Fibula 2 Views, Right (12.01.24 @ 20:40) >  Findings/  impression: No acute tibial or fibular fracture or dislocation. Partially   visualized distal right femoral periprosthetic fracture, joint effusion,   knee knee total arthroplasty in satisfactory position with no hardware   complication.. Soft tissue vascular calcification.    < end of copied text >    < from: Xray Pelvis AP only (12.01.24 @ 20:40) >  IMPRESSION: No acute fractures seen    < end of copied text >    < from: Xray Knee 1 or 2 Views, Right (12.01.24 @ 20:36) >  Distal femoral shaft fracture terminating above the knee prosthesis.   Proximal femoral prosthesis and associated bone is intact.Knee   prosthesis appears intact as well. Moderate knee joint effusion.   Extensive atherosclerosis.    < end of copied text >    < from: Xray Chest 1 View AP/PA (12.01.24 @ 20:36) >  IMPRESSION:    Bilateral interstitial opacities, decreased. Redemonstration cardiomegaly.    < end of copied text >    < from: Xray Femur 2 Views, Right (12.01.24 @ 20:35) >  Findings/  impression:    Distal femoral shaft fracture terminating above the knee prosthesis.   Proximal femoral prosthesis and associated bone is intact.Knee   prosthesis appears intact as well. Moderate knee joint effusion.   Extensive atherosclerosis.    < end of copied text >        CARDIOLOGY TESTING    < from: 12 Lead ECG (12.01.24 @ 21:07) >    Ventricular Rate 87 BPM    Atrial Rate 87 BPM    P-R Interval 156 ms    QRS Duration 136 ms    Q-T Interval 416 ms    QTC Calculation(Bazett) 500 ms    P Axis 57 degrees    R Axis 26 degrees    T Axis 96 degrees    Diagnosis Line Normal sinus rhythm  Non-specific intra-ventricular conduction block  Minimal voltage criteria for LVH, may be normal variant ( [place] product )  T wave abnormality, consider lateral ischemia  Abnormal ECG    < end of copied text >      All available historical records have been reviewed    MEDICATIONS  acetaminophen     Tablet .. 650  ascorbic acid 500  aspirin enteric coated 81  bisacodyl 5  bisacodyl Suppository 10  calcium carbonate 1250 mG  + Vitamin D (OsCal 500 + D) 1  chlorhexidine 2% Cloths 1  enoxaparin Injectable 40  famotidine    Tablet 20  folic acid 1  magnesium oxide 400  metoprolol succinate ER 50  ofloxacin 0.3% Solution 1  polyethylene glycol 3350 17  rosuvastatin 10  senna 2  sodium chloride 1  sodium chloride 0.9%. 500  zinc oxide 20% Ointment 1    All available historical data has been reviewed

## 2024-12-17 NOTE — BH CONSULTATION LIAISON ASSESSMENT NOTE - SUMMARY
84F domiciled in private residence alone, , with 2 adult children with no formal PPH, no IPP admissions admitted to [facility] s/p fall. Psychiatry consulted for antidepressant medication management. Prior to admission patient was taking zoloft for >20 yrs, however upon admission EKG showed QTC prolongation so zoloft was stopped. Patient and family would like to restart zoloft.    On assessment patient has a euthymic full affect, and linear thought process. She describes a dysthymic mood that is associated with low energy and decreased interest. She brightens up when speaking about her grandchildren and what they are studying in college and is looking forward to possibly spending time with them during the holidays.     Patient does not meet criteria for a diagnosis of MDD. 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 Patient is at increased risk of getting QTC prolongation and an arrythmia when taking zoloft, however it is a risk the patient and family is willing to take.  84F domiciled in private residence alone, , with 2 adult children with no formal PPH, no IPP admissions admitted to [facility] s/p fall. Psychiatry consulted for antidepressant medication management. Prior to admission patient was taking zoloft for >20 yrs, however upon admission EKG showed QTC prolongation so zoloft was stopped. Patient and family would like to restart zoloft.    On assessment patient has a euthymic full affect, and linear thought process. She describes a dysthymic mood that is associated with low energy and decreased interest. She brightens up when speaking about her grandchildren and what they are studying in college and is looking forward to possibly spending time with them during the holidays.     Patient does not meet criteria for a diagnosis of MDD. 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 When calculating patient's QTC it is elevated if using the Bazett formula (500ms), however it is not elevated when using [place] formula (464ms), or King's (463) and therefore the increased risk of arrythmia while taking zoloft is relatively low. Nonetheless patient and family understand the risk and would still prefer to restart zoloft.  84F domiciled in private residence alone, , with 2 adult children with no formal PPH, no IPP admissions admitted to [facility] s/p fall. Psychiatry consulted for antidepressant medication management. Prior to admission patient was taking zoloft for >20 yrs, however upon admission EKG showed QTC prolongation so zoloft was stopped. Patient and family would like to restart zoloft.    On assessment patient has a euthymic full affect, and linear thought process. She describes a dysthymic mood that is associated with low energy and decreased interest. She brightens up when speaking about her grandchildren and what they are studying in college and is looking forward to possibly spending time with them during the holidays.     Patient does not meet criteria for a diagnosis of MDD. 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 When calculating patient's QTC it is elevated if using the Bazett formula (500ms), however it is not elevated when using [place] formula (464ms), or King's (463) and therefore the increased risk of arrythmia while taking zoloft is relatively low. Nonetheless patient and family understand the risk and would still prefer to restart zoloft despite extensive discussion about a safer alternative medication (cymbalta).

## 2024-12-17 NOTE — BH CONSULTATION LIAISON ASSESSMENT NOTE - NSBHCHARTREVIEWLAB_PSY_A_CORE FT
9.8    9.27  )-----------( 352      ( 17 Dec 2024 05:38 )             32.3   12-17    132[L]  |  95[L]  |  17  ----------------------------<  72  4.8   |  22  |  1.0    Ca    9.1      17 Dec 2024 05:38  Mg     2.2     12-17    TPro  6.0  /  Alb  3.3[L]  /  TBili  0.7  /  DBili  x   /  AST  26  /  ALT  11  /  AlkPhos  146[H]  12-16

## 2024-12-17 NOTE — BH CONSULTATION LIAISON ASSESSMENT NOTE - NSBHATTESTCOMMENTATTENDFT_PSY_A_CORE
I saw and evaluated the patient today 12-17-24 with resident during key/critical portions of the visit.  Nursing/primary team/consultant records reviewed. I agree with the resident's note including past psych history, home medications, social history, allergies, surgical history, family history, and review of system. I have reviewed relevant vitals, laboratory values, imaging studies, and microbiology.  I agree with the trainee's findings and assessment and plan as documented in the trainee's note.     - Above resident's note was edited by me     - agree with rest of A/P as per detailed resident note, unless noted below.

## 2024-12-17 NOTE — BH CONSULTATION LIAISON ASSESSMENT NOTE - CURRENT MEDICATION
MEDICATIONS  (STANDING):  acetaminophen     Tablet .. 650 milliGRAM(s) Oral every 6 hours  ascorbic acid 500 milliGRAM(s) Oral daily  aspirin enteric coated 81 milliGRAM(s) Oral daily  bisacodyl 5 milliGRAM(s) Oral at bedtime  bisacodyl Suppository 10 milliGRAM(s) Rectal once  calcium carbonate 1250 mG  + Vitamin D (OsCal 500 + D) 1 Tablet(s) Oral daily  chlorhexidine 2% Cloths 1 Application(s) Topical <User Schedule>  enoxaparin Injectable 40 milliGRAM(s) SubCutaneous every 24 hours  famotidine    Tablet 20 milliGRAM(s) Oral two times a day  folic acid 1 milliGRAM(s) Oral daily  magnesium oxide 400 milliGRAM(s) Oral three times a day with meals  metoprolol succinate ER 50 milliGRAM(s) Oral at bedtime  nitrofurantoin monohydrate/macrocrystals (MACROBID) 100 milliGRAM(s) Oral two times a day  ofloxacin 0.3% Solution 1 Drop(s) Right EYE three times a day  polyethylene glycol 3350 17 Gram(s) Oral daily  rosuvastatin 10 milliGRAM(s) Oral at bedtime  senna 2 Tablet(s) Oral at bedtime  sertraline 25 milliGRAM(s) Oral at bedtime  sodium chloride 1 Gram(s) Oral three times a day  sodium chloride 0.9%. 500 milliLiter(s) (75 mL/Hr) IV Continuous <Continuous>  zinc oxide 20% Ointment 1 Application(s) Topical two times a day    MEDICATIONS  (PRN):  melatonin 3 milliGRAM(s) Oral at bedtime PRN Insomnia  prochlorperazine   Tablet 10 milliGRAM(s) Oral two times a day PRN nausea and vomiting  traMADol 50 milliGRAM(s) Oral four times a day PRN Moderate Pain (4 - 6)

## 2024-12-17 NOTE — BH CONSULTATION LIAISON ASSESSMENT NOTE - PATIENT'S CHIEF COMPLAINT
"They stopped my zoloft and now I feel fatigue, nausea, and I'm just not interested in doing anything"

## 2024-12-17 NOTE — CHART NOTE - NSCHARTNOTEFT_GEN_A_CORE
FREIDA MELISSAUREEN  84y  Female      Patient is a 84y old  Female who presents with a chief complaint of Rehab of Right femur fx s/p ORIF (17 Dec 2024 08:57)      INTERVAL HPI/OVERNIGHT EVENTS:      REVIEW OF SYSTEMS:  CONSTITUTIONAL: No fever, weight loss, or fatigue  EYES: No eye pain, visual disturbances, or discharge  ENMT:  No difficulty hearing, tinnitus, vertigo; No sinus or throat pain  NECK: No pain or stiffness  BREASTS: No pain, masses, or nipple discharge  RESPIRATORY: No cough, wheezing, chills or hemoptysis; No shortness of breath  CARDIOVASCULAR: No chest pain, palpitations, dizziness, or leg swelling  GASTROINTESTINAL: No abdominal or epigastric pain. No nausea, vomiting, or hematemesis; No diarrhea or constipation. No melena or hematochezia.  GENITOURINARY: No dysuria, frequency, hematuria, or incontinence  NEUROLOGICAL: No headaches, memory loss, loss of strength, numbness, or tremors  SKIN: No itching, burning, rashes, or lesions   LYMPH NODES: No enlarged glands  ENDOCRINE: No heat or cold intolerance; No hair loss  MUSCULOSKELETAL: No joint pain or swelling; No muscle, back, or extremity pain  PSYCHIATRIC: reports feeling sluggish, not "like herself"  HEME/LYMPH: No easy bruising, or bleeding gums  ALLERY AND IMMUNOLOGIC: No hives or eczema  FAMILY HISTORY:  FH: cancer  Nonhodgkins : sister    FH: lymphoma (Sibling)      Pt reports feeling "NOT LIKE HERSELF" was previously on Zolfot 50 mg QD, which was held due to prolonged QT interval. Pt reports fatigue, and has been reluctant to particpiate in therapy. Psychiatric ocnsult requested with Dr. Gonzalez for eval. Repeat ECG ordered, pending-  No suicidal ideation, will await consult rec's. and adjust acordingly. EUGENIABLANCA GIAN  84y  Female      Patient is a 84y old  Female who presents with a chief complaint of Rehab of Right femur fx s/p ORIF (17 Dec 2024 08:57)      INTERVAL HPI/OVERNIGHT EVENTS:      REVIEW OF SYSTEMS:  CONSTITUTIONAL: No fever, weight loss, or fatigue  EYES: No eye pain, visual disturbances, or discharge  ENMT:  No difficulty hearing, tinnitus, vertigo; No sinus or throat pain  NECK: No pain or stiffness  BREASTS: No pain, masses, or nipple discharge  RESPIRATORY: No cough, wheezing, chills or hemoptysis; No shortness of breath  CARDIOVASCULAR: No chest pain, palpitations, dizziness, or leg swelling  GASTROINTESTINAL: No abdominal or epigastric pain. No nausea, vomiting, or hematemesis; No diarrhea or constipation. No melena or hematochezia.  GENITOURINARY: No dysuria, frequency, hematuria, or incontinence  NEUROLOGICAL: No headaches, memory loss, loss of strength, numbness, or tremors  SKIN: No itching, burning, rashes, or lesions   LYMPH NODES: No enlarged glands  ENDOCRINE: No heat or cold intolerance; No hair loss  MUSCULOSKELETAL: No joint pain or swelling; No muscle, back, or extremity pain  PSYCHIATRIC: reports feeling sluggish, not "like herself"  HEME/LYMPH: No easy bruising, or bleeding gums  ALLERY AND IMMUNOLOGIC: No hives or eczema  FAMILY HISTORY:  FH: cancer  Nonhodgkins : sister    FH: lymphoma (Sibling)      Pt reports feeling "NOT LIKE HERSELF" was previously on Zoloft 50 mg QD reports concern over her mediaction being held, ,held due to prolonged QT interval. Pt reports fatigue, and has been reluctant to particpiate in therapy. Psychiatric consult requested with Dr. Gonzalez for eval. Repeat ECG ordered, pending-  No suicidal ideation, will await consult rec's. and adjust acordingly.

## 2024-12-17 NOTE — PROGRESS NOTE ADULT - SUBJECTIVE AND OBJECTIVE BOX
85 yo F with PMHx of GERD, HTN, OA s/p bilateral TKA and ROHIT, s/p L forefoot exostosis removal (11/2024) RA, and hx of breast cancer (in remission since 2015) who presents to the hospital after a fall. Around 1000, patient was seated in a chair and reaching for something on another chair when she lost her balance and fell onto her R side and hit her head. She was unable to get up from the ground and was lying on the floor for ~8 hours until her friend found her. +HS. -LOC. -AC. Subsequently, she had RLE pain. Of note, patient fell forwards two days prior and hit her face with subsequent bruising. Seen in ED at the time without any significant injuries found. No dizziness/lightheadedness associated with either fall. On admission, imaging showed acute comminuted right femoral distal metadiaphyseal fracture with volar apex angulation and 1.5 cm posterior displacement and small knee joint effusion. She underwent a R femur ORIF (12/2). Weightbearing status WBAT. During hospital course, patient had post-op blood loss anemia. Last H/H 7.5/23.1.    Imaging:  CT of R femur: Acute comminuted right femoral distal metadiaphyseal fracture with volar apex angulation and 1.5 cm posterior displacement. Small knee joint effusion.  CT head: No acute process seen    During their stay, the patient was evaluated by physical and occupational therapy. Prior to admission, patient was independent with ADLs, dependent on some iADLs (grocery, laundry), and ambulates with walker/cane occasionally The most recent evaluated functional status is max A with transfers, ambulates 1 side step by b/s secondary to decreased weight bearing tolerance on RLE and fear of falling, standby assist for UB dressing, dependent for LB dressing. Patient was deemed a good acute rehab candidate due to decline in functional status and ability to carry out activities of daily living. Patient is able to tolerate 3 hours of therapy 5-7 days a week and is motivated to participate.    The patient was evaluated by the PM&R team once medically stable. The patient was found to have functional limitations in terms of physical strength/mobility and ability to carry out ADLs (self care, transfers, ambulation). Patient admitted to   12/5 for rehabilitation of R femur fx, s/p ORIF with decline in ADLs.    Living Situation: resides in  with 1 FOS to bedroom (uses stair lift) and 3 steps to enter  PLOF:  independent with ADLs, dependent on some iADLs (grocery, laundry), and ambulates with walker/cane occasionally    Today’s Subjective & Review of Symptoms  No overnight events.  Patient seen this morning. States her nausea has improved. Was given 500cc bolus yesterday. Notes her nutrition intake has improved with the Ensures.   Yesterday, patient was fatigued during her OT session.    Having regular BMs and voiding spontaneously.   Patient's mentation improved today.   ID consulted.  WBC stable. CMP pending.  Vitals reviewed.     CLOF:  modA with transfers, modA bed mobility, ambulates 50ft with RW and modA, LB dressing SPV, toileting TA    Review of Systems:   Constitutional:    [x ] WNL           [   ] poor appetite   [ ] insomnia   [  ] tired   Cardio:           [x ] WNL           [   ] CP              [ ] MOJICA        [  ] palpitations               Resp:             [x ] WNL           [   ] SOB             [ ] cough      [  ] wheezing   GI:               [ x] WNL           [   ] constipation    [ ] diarrhea   [  ] abdominal pain       [ ] nausea   [  ] emesis                                :               [ x] WNL           [   ] BENNETT           [ ] dysuria    [  ] difficulty voiding   [ ] Other:    Endo:             [ x] WNL           [   ] polyuria        [ ] temperature intolerance                 Skin:             [ ] WNL           [   ] pain            [x ] incision-right LE with dressing applied   [ x ] rash-ecchymosis along bilateral facial cheeks   MSK:              [ ] WNL           [ x  ] muscle pain     [x ] joint pain [x ] muscle tenderness   [x ] swelling RLE   Neuro:            [ ] WNL           [   ] HA              [ ] change in vision   [   ] tremor   [x ] weakness [  ] dysphagia    Cognitive:        [x ] WNL           [   ] confusion      Psych:            [x ] WNL           [   ] hallucinations   [   ]agitation   [   ] delusion   [   ]depression    Physical Exam:  Vital Signs Last 24 Hrs  T(C): 36.3 (17 Dec 2024 05:46), Max: 36.8 (16 Dec 2024 13:55)  T(F): 97.4 (17 Dec 2024 05:46), Max: 98.3 (16 Dec 2024 13:55)  HR: 87 (17 Dec 2024 05:46) (80 - 87)  BP: 163/84 (17 Dec 2024 05:46) (101/54 - 163/84)  BP(mean): --  RR: 18 (17 Dec 2024 05:46) (18 - 18)  SpO2: 96% (17 Dec 2024 05:46) (96% - 98%)    Parameters below as of 17 Dec 2024 05:46  Patient On (Oxygen Delivery Method): room air    General: Resting in bed                     HEENT: [   ] NC/AT, EOMI,  Normal Conjunctivae,   [  x ] other: bilateral cheek facial abrasion, normal conjunctivae, EOMI  Cardio: [  x ] RRR, no murmur,   [   ] other:                              Pulm: [ x  ] No Respiratory Distress,  Lungs CTAB,   [   ] other:             Abdomen: [ x  ]ND/NT, Soft,   [   ] other:    : [ x  ] No bennett catheter, [   ] Bennett catheter present [  ] other:   MSK: [  ] No joint swelling,  [ x  ] other:  RLE/ knee joint swelling                      Ext: [ x  ]No C/C, No calf tenderness,   [  x ]other:  swelling of MCPs, no warmth  Skin: [   ]intact,   [ x  ] other: RLE incision site with post op dressing, C&D    Neurological Examination:  Cognitive: [  x  ] AAO x 3,   [  ]  other:       Attention:  [ x  ] intact,   [    ]  other:   Language: [ x ] intact  [  ]    Memory: [ x  ] intact,    [  ]  other:     Mood/Affect: [  x ] wnl  [    ]  other:                                                                             Communication: [x   ]Fluent, no dysarthria [    ] other:   CN II - XII:  [ x   ] intact,  [    ] other:                                                                                         Motor:   RUE: 5/5 shoulder abduction, 5/5 elbow flexion, 5/5 elbow extension, 5/5 finger flexion  LUE: 5/5 shoulder abduction, 5/5 elbow flexion, 5/5 elbow extension, 5/5 finger flexion  RLE: 2/5 hip flexion (within restricted ROM), 2+/5 knee ext/flex (within restricted ROM), 4+/5 plantarflexion/dorsiflexion   LLE: 5/5 hip flexion, 5/5 knee ext/flex, 4+/5 dorsiflexion/plantarflexion     Tone: [  x ] wnl,   [    ]  other:  DTRs: [ x ]symmetric, [   ] other:  Coordination:   [ x ] intact,   [  ] other:                                                                        Sensory: [ x   ] Intact to light touch  [  ] other:    MEDICATIONS  (STANDING):  acetaminophen     Tablet .. 650 milliGRAM(s) Oral every 6 hours  ascorbic acid 500 milliGRAM(s) Oral daily  aspirin enteric coated 81 milliGRAM(s) Oral daily  bisacodyl 5 milliGRAM(s) Oral at bedtime  bisacodyl Suppository 10 milliGRAM(s) Rectal once  calcium carbonate 1250 mG  + Vitamin D (OsCal 500 + D) 1 Tablet(s) Oral daily  chlorhexidine 2% Cloths 1 Application(s) Topical <User Schedule>  enoxaparin Injectable 40 milliGRAM(s) SubCutaneous every 24 hours  famotidine    Tablet 20 milliGRAM(s) Oral two times a day  folic acid 1 milliGRAM(s) Oral daily  magnesium oxide 400 milliGRAM(s) Oral three times a day with meals  metoprolol succinate ER 50 milliGRAM(s) Oral at bedtime  ofloxacin 0.3% Solution 1 Drop(s) Right EYE three times a day  polyethylene glycol 3350 17 Gram(s) Oral daily  rosuvastatin 10 milliGRAM(s) Oral at bedtime  senna 2 Tablet(s) Oral at bedtime  sodium chloride 1 Gram(s) Oral three times a day  sodium chloride 0.9%. 500 milliLiter(s) (75 mL/Hr) IV Continuous <Continuous>  zinc oxide 20% Ointment 1 Application(s) Topical two times a day    MEDICATIONS  (PRN):  melatonin 3 milliGRAM(s) Oral at bedtime PRN Insomnia  prochlorperazine   Tablet 10 milliGRAM(s) Oral two times a day PRN nausea and vomiting  traMADol 50 milliGRAM(s) Oral four times a day PRN Moderate Pain (4 - 6)    RECENT LABS/IMAGING                                   9.8    9.27  )-----------( 352      ( 17 Dec 2024 05:38 )             32.3     12-16    132[L]  |  95[L]  |  15  ----------------------------<  65[L]  5.2[H]   |  21  |  1.1    Ca    9.1      16 Dec 2024 05:43  Mg     2.1     12-16    TPro  6.0  /  Alb  3.3[L]  /  TBili  0.7  /  DBili  x   /  AST  26  /  ALT  11  /  AlkPhos  146[H]  12-16                 8.7    5.11  )-----------( 349      ( 13 Dec 2024 07:48 )             27.3     12-13    131[L]  |  95[L]  |  13  ----------------------------<  82  4.1   |  26  |  0.7    Ca    8.9      13 Dec 2024 07:48  Mg     1.8     12-13      CT Head No Cont:   ACC: [id number] EXAM:  CT BRAIN   ORDERED BY: SAL ANTHONY     PROCEDURE DATE:  12/01/2024      INTERPRETATION:  Trauma  The study was performed without IV contrast.  Previous exam 11/23/2024  The cisterns and ventricles are normal with no shift of the midline   structures.  No hemorrhage, infarct or mass formation is seen.  The skull is intact.    IMPRESSION: No acute process seen    --- End of Report ---            YI TONY MD; Attending Radiologist  This document has been electronically signed. Dec  1 2024  9:24PM (12-01-24 @ 20:54)

## 2024-12-17 NOTE — CONSULT NOTE ADULT - ASSESSMENT
83 yo F with PMHx of GERD, HTN, OA s/p bilateral Total knee arthroplasty and Total hi[p arthroplasty, s/p L forefoot exostosis removal (11/2024), RA, and hx of breast cancer (in remission since 2015) who presents to the hospital after losing her balance and falling onto her R side and hitting her head. She was unable to get up from the ground and was lying on the floor for ~8 hours until her friend found her. +HS. -LOC. -AC. Subsequently, she had RLE pain. On admission, imaging showed acute comminuted right femoral distal metadiaphyseal fracture with volar apex angulation and 1.5 cm posterior displacement and small knee joint effusion. She underwent a R femur ORIF (12/2). Weightbearing status WBAT. During hospital course, patient had post-op blood loss anemia. Patient admitted to inpatient rehab. Patient now has a UTI with a positive UA and urine culture growing E. coli resistant to bactrim.     IMPRESSION  #E. coli ESBL cystitis with pyuria  #Rehab of Right femur fx s/p ORIF with impairment in mobility and ADLs and iADLs.  #Azotemia- resolved  #Hyponatremia  #s/p recent fall with HT and bilateral facial contusions/ ecchymosis/ no LOC  #Recent left heel spur resection  #Post-op acute blood loss anemia  #HTN  #HLD  #GERD  #OA/RA  #MRSA precautions discontinued 12/6 per Infection Control.  #Anxiety/depression    RECOMMENDATIONS  - WBC wnl, slightly uptrending  - Azotemia resolved  - UA positive for nitrite, LE, WBC, bacteria  - Ucx positive for E. coli ESBL susceptible to unasyn, carbapenem, nitrofurantion, zosyn  - Rest of care per primary team    This is a pended note. All final recommendations to follow pending discussion with ID Attending    83 yo F with PMHx of GERD, HTN, OA s/p bilateral Total knee arthroplasty and Total hi[p arthroplasty, s/p L forefoot exostosis removal (11/2024), RA, and hx of breast cancer (in remission since 2015) who presents to the hospital after losing her balance and falling onto her R side and hitting her head. She was unable to get up from the ground and was lying on the floor for ~8 hours until her friend found her. +HS. -LOC. -AC. Subsequently, she had RLE pain. On admission, imaging showed acute comminuted right femoral distal metadiaphyseal fracture with volar apex angulation and 1.5 cm posterior displacement and small knee joint effusion. She underwent a R femur ORIF (12/2). Weightbearing status WBAT. During hospital course, patient had post-op blood loss anemia. Patient admitted to inpatient rehab. Patient now has a UTI with a positive UA and urine culture growing E. coli resistant to bactrim.     IMPRESSION  #E. coli ESBL simple cystitis with pyuria  #Rehab of Right femur fx s/p ORIF with impairment in mobility and ADLs and iADLs.  #Azotemia- resolved  #Hyponatremia  #s/p recent fall with HT and bilateral facial contusions/ ecchymosis/ no LOC  #Recent left heel spur resection  #Post-op acute blood loss anemia  #HTN  #HLD  #GERD  #OA/RA  #MRSA precautions discontinued 12/6 per Infection Control.  #Anxiety/depression    RECOMMENDATIONS  - WBC wnl, slightly uptrending  - Azotemia resolved  - UA positive for nitrite, LE, WBC, bacteria  - Ucx positive for E. coli ESBL susceptible to unasyn, carbapenem, nitrofurantion, zosyn  - Patient is afebrile  - Rest of care per primary team    This is a pended note. All final recommendations to follow pending discussion with ID Attending    85 yo F with PMHx of GERD, HTN, OA s/p bilateral Total knee arthroplasty and Total hi[p arthroplasty, s/p L forefoot exostosis removal (11/2024), RA, and hx of breast cancer (in remission since 2015) who presents to the hospital after losing her balance and falling onto her R side and hitting her head. She was unable to get up from the ground and was lying on the floor for ~8 hours until her friend found her. +HS. -LOC. -AC. Subsequently, she had RLE pain. On admission, imaging showed acute comminuted right femoral distal metadiaphyseal fracture with volar apex angulation and 1.5 cm posterior displacement and small knee joint effusion. She underwent a R femur ORIF (12/2). Weightbearing status WBAT. During hospital course, patient had post-op blood loss anemia. Patient admitted to inpatient rehab. Patient now has a UTI with a positive UA and urine culture growing E. coli resistant to bactrim.     IMPRESSION  #E. coli ESBL simple cystitis with pyuria  #Rehab of Right femur fx s/p ORIF with impairment in mobility and ADLs and iADLs.  #Azotemia- resolved  #Hyponatremia  #s/p recent fall with HT and bilateral facial contusions/ ecchymosis/ no LOC  #Recent left heel spur resection  #Post-op acute blood loss anemia  #HTN  #HLD  #GERD  #OA/RA  #MRSA precautions discontinued 12/6 per Infection Control.  #Anxiety/depression    RECOMMENDATIONS  - WBC wnl, slightly uptrending  - Azotemia resolved  - UA positive for nitrite, LE, WBC, bacteria  - Ucx positive for E. coli ESBL susceptible to unasyn, carbapenem, nitrofurantion, zosyn  - Patient is afebrile  - S/p Bactrim DS x 2d  - Rest of care per primary team    This is a pended note. All final recommendations to follow pending discussion with ID Attending    83 yo F with PMHx of GERD, HTN, OA s/p bilateral Total knee arthroplasty and Total hi[p arthroplasty, s/p L forefoot exostosis removal (11/2024), RA, and hx of breast cancer (in remission since 2015) who presents to the hospital after losing her balance and falling onto her R side and hitting her head. She was unable to get up from the ground and was lying on the floor for ~8 hours until her friend found her. +HS. -LOC. -AC. Subsequently, she had RLE pain. On admission, imaging showed acute comminuted right femoral distal metadiaphyseal fracture with volar apex angulation and 1.5 cm posterior displacement and small knee joint effusion. She underwent a R femur ORIF (12/2). Weightbearing status WBAT. During hospital course, patient had post-op blood loss anemia. Patient admitted to inpatient rehab. Patient now has a UTI with a positive UA and urine culture growing E. coli resistant to bactrim.     IMPRESSION  #E. coli ESBL bacteruira vs. cystitis  #Rehab of Right femur fx s/p ORIF with impairment in mobility and ADLs and iADLs.  #Azotemia- resolved  #Hyponatremia  #s/p recent fall with HT and bilateral facial contusions/ ecchymosis/ no LOC  #Recent left heel spur resection  #Post-op acute blood loss anemia  #HTN  #HLD  #GERD  #OA/RA  #MRSA precautions discontinued 12/6 per Infection Control.  #Anxiety/depression    RECOMMENDATIONS  - started on bactrim for suspected UTI -- although today, no clear urinary symptoms including dysuria, hematuria, urinary frequency/urgency   - despite not receiving targeted ESBL therapy, does not seem to be developing worsening urinary symptoms   - favor monitoring off antibiotics if this is just asymptomatic bacteuria, but if developing any urinary symptoms, low threshold to restart macrobid 100 mg BID for 5 days     Please call or message on [employer] Teams if with any questions.  Spectra [phone] -  83 yo F with PMHx of GERD, HTN, OA s/p bilateral Total knee arthroplasty and Total hi[p arthroplasty, s/p L forefoot exostosis removal (11/2024), RA, and hx of breast cancer (in remission since 2015) who presents to the hospital after losing her balance and falling onto her R side and hitting her head. She was unable to get up from the ground and was lying on the floor for ~8 hours until her friend found her. +HS. -LOC. -AC. Subsequently, she had RLE pain. On admission, imaging showed acute comminuted right femoral distal metadiaphyseal fracture with volar apex angulation and 1.5 cm posterior displacement and small knee joint effusion. She underwent a R femur ORIF (12/2). Weightbearing status WBAT. During hospital course, patient had post-op blood loss anemia. Patient admitted to inpatient rehab. Patient now has a UTI with a positive UA and urine culture growing E. coli resistant to bactrim.     IMPRESSION  #E. coli ESBL bacteruira vs. cystitis  #Rehab of Right femur fx s/p ORIF with impairment in mobility and ADLs and iADLs.  #Azotemia- resolved  #Hyponatremia  #s/p recent fall with HT and bilateral facial contusions/ ecchymosis/ no LOC  #Recent left heel spur resection  #Post-op acute blood loss anemia  #HTN  #HLD  #GERD  #OA/RA  #MRSA precautions discontinued 12/6 per Infection Control.  #Anxiety/depression    RECOMMENDATIONS  - started on bactrim for suspected UTI -- although today, no clear urinary symptoms including dysuria, hematuria, urinary frequency/urgency   - despite not receiving targeted ESBL therapy, does not seem to be developing worsening urinary symptoms   - favor monitoring off antibiotics if this is just asymptomatic bacteuria, but if developing any urinary symptoms or if not felt to be progressing as expected during rehab,  low threshold to restart macrobid 100 mg BID for 5 days     Please call or message on [employer] Teams if with any questions.  Spectra [phone]    -

## 2024-12-17 NOTE — BH CONSULTATION LIAISON ASSESSMENT NOTE - DETAILS
Mother had a "nervous breakdown" in her 50s and was hospitalized in a [facility] hospital for about 20 years until she passed

## 2024-12-17 NOTE — PROGRESS NOTE ADULT - ASSESSMENT
Assessment:   85 yo F with PMHx of GERD, HTN, OA s/p bilateral TKA and ROHIT, s/p L forefoot exostosis removal (11/2024), RA (previously on methotrexate), and hx of breast cancer (in remission since 2015) who presented to the hospital after a fall and found to have comminuted right femoral distal metadiaphyseal fracture with volar apex angulation and 1.5 cm posterior displacement and small knee joint effusion. She underwent a R femur ORIF (12/2). Weightbearing status WBAT.    Plan:    #Rehab of Right femur fx s/p ORIF with impairment in mobility and ADLs and iADLs. The patient presented with co-morbidities requiring close physiatry follow-up at least 3 times a week to monitor his progress and monitor his comorbidities including HTN, OA s/p TKA and ROHIT, RA, and s/p L forefoot exostosis removal (11/2024).The patient's co-morbidities do not limit the patient's ability to participate in rehabilitative therapy. The patient was seen by PT/OT and  required max A with transfers, standby assist for UB dressing, dependent for LB dressing, and ambulates 1 side step by b/s secondary to decreased weight bearing tolerance on RLE and fear of falling, The patient was previously independent with all her ADLs and some iADLs (dependent on laundry and grocery) and ambulated with cane/walker occasionally and now presents with functional decline. The patient can benefit from and tolerate 3 hours of restorative therapy daily. The patient is an excellent acute inpatient rehabilitation candidate.     #S/P right distal femur fracture/ ORIF  -CTH shows no acute findings.  -CT Right femur shows acute comminuted right femoral distal metadiaphyseal fracture with volar apex angulation and 1.5 cm posterior displacement and small knee joint effusion.  -s/p subsequent R femur ORIF (12/2).   -Weight bearing status WBAT.  -Per Ortho, DVT ppx x6 weeks post-op  -c/w ASA 81mg QD  - Pain control: tramadol 50 q6 PRN, Tylenol q6  -Will adjust pain medications to optimize pain control  -c/w Calcium 600+D 600 mg-200 tablet: BID  -PT/ OT  - 12/6 BLE dopplers: no DVT in LLE. Unable to visualize venous system in RLE  The patient requires acute rehab with 3 hours of daily therapies at least 5 out of 7 days and close physiatry follow up.     #s/p recent fall last week with HT and bilateral facial contusions/ ecchymosis/ no LOC  - monitor    #UTI  12/13 UA positive  -E. coli positive urine culture  - Bactrim DS 1 dose daily (12/13-12/15)  -WBC stable. Asymptomatic.  -ID consulted 12/16    #Recent left heel spur resection  - WBAT  - limiting ambulation  - to wear cast-boot for longer distances as per patient  - followed by podiatry    #Post-op acute blood loss anemia  -Baseline hgb ~10.  -H/H 7.6/23.6 (7.5/23.1) 12/6  -c/w H/H monitoring and hemodynamic status monitoring  -Transfuse if hgb<7.    #Hypomagnesemia  -Mg 1.9 (12/9)  -c/w magnesium supplement as needed    #Azotemia  -12/6 BUN 31->22, improving    Acute ADINA  -NaCI bolus given 12/16    #Hyponatremia  -Na 131 (12/13)  - on regular salt diet  -will continue to monitor   - fluid restriction to 1500ml  -Started on sodium chloride 1g TID 12/15    #HTN  -continue to monitor hemodynamic status   - 12/6 started home metoprolol ER 25mg nightly  -will adjust medications as needed    #HLD  -c/w rosuvastatin 10mg QD     #GERD  - c/w famotidine 20mg  BID    #OA  #RA  - s/p bilat THR/ TKR  - Previously on methotrexate.   -Not on medication currently  - Follows up outpatient with rheumatology  - monitor for exacerbation now off Methotrexate    #MRSA precautions discontinued 12/6 per Infection Control.    #Anxiety/depression  - Previously on sertraline  - Will monitor     -Pain control: tramadol and Tylenol  -GI/Bowel Mgmt: Miralax, Senna, bisacodyl   -Skin: surgical incision along RLE  - Diet Regular    Precautions / PROPHYLAXIS:    - Falls  - DVT prophylaxis: lovenox

## 2024-12-17 NOTE — CHART NOTE - NSCHARTNOTEFT_GEN_A_CORE
Per ID recommendations, Pt is asymptomatic, and reports she is feeling ok, afebrile, rec to DC Macrobid for UTI, despite recommendations to start abx, and continue to monitor for any symptoms

## 2024-12-18 RX ORDER — SENNOSIDES 8.6 MG/1
2 TABLET, FILM COATED ORAL AT BEDTIME
Refills: 0 | Status: DISCONTINUED | OUTPATIENT
Start: 2024-12-18 | End: 2024-12-24

## 2024-12-18 RX ORDER — METOPROLOL TARTRATE 50 MG
50 TABLET ORAL
Refills: 0 | Status: DISCONTINUED | OUTPATIENT
Start: 2024-12-18 | End: 2024-12-24

## 2024-12-18 RX ADMIN — SERTRALINE HYDROCHLORIDE 25 MILLIGRAM(S): 25 TABLET ORAL at 21:31

## 2024-12-18 RX ADMIN — SODIUM CHLORIDE 1 GRAM(S): 9 INJECTION, SOLUTION INTRAMUSCULAR; INTRAVENOUS; SUBCUTANEOUS at 06:14

## 2024-12-18 RX ADMIN — ACETAMINOPHEN 650 MILLIGRAM(S): 80 SOLUTION/ DROPS ORAL at 13:21

## 2024-12-18 RX ADMIN — Medication 50 MILLIGRAM(S): at 19:00

## 2024-12-18 RX ADMIN — Medication 400 MILLIGRAM(S): at 13:22

## 2024-12-18 RX ADMIN — Medication 1 DROP(S): at 06:15

## 2024-12-18 RX ADMIN — Medication 1 DROP(S): at 21:32

## 2024-12-18 RX ADMIN — ENOXAPARIN SODIUM 40 MILLIGRAM(S): 60 INJECTION INTRAVENOUS; SUBCUTANEOUS at 06:14

## 2024-12-18 RX ADMIN — ACETAMINOPHEN 650 MILLIGRAM(S): 80 SOLUTION/ DROPS ORAL at 06:14

## 2024-12-18 RX ADMIN — FAMOTIDINE 20 MILLIGRAM(S): 20 TABLET, FILM COATED ORAL at 17:56

## 2024-12-18 RX ADMIN — CHLORHEXIDINE GLUCONATE 1 APPLICATION(S): 1.2 RINSE ORAL at 06:15

## 2024-12-18 RX ADMIN — Medication 400 MILLIGRAM(S): at 17:56

## 2024-12-18 RX ADMIN — ROSUVASTATIN 10 MILLIGRAM(S): 40 TABLET, FILM COATED ORAL at 21:31

## 2024-12-18 RX ADMIN — Medication 500 MILLIGRAM(S): at 13:22

## 2024-12-18 RX ADMIN — Medication 1 TABLET(S): at 13:23

## 2024-12-18 RX ADMIN — SODIUM CHLORIDE 1 GRAM(S): 9 INJECTION, SOLUTION INTRAMUSCULAR; INTRAVENOUS; SUBCUTANEOUS at 13:23

## 2024-12-18 RX ADMIN — Medication 81 MILLIGRAM(S): at 13:20

## 2024-12-18 RX ADMIN — Medication 1 DROP(S): at 13:23

## 2024-12-18 RX ADMIN — FAMOTIDINE 20 MILLIGRAM(S): 20 TABLET, FILM COATED ORAL at 06:14

## 2024-12-18 RX ADMIN — Medication 1 MILLIGRAM(S): at 13:21

## 2024-12-18 RX ADMIN — ACETAMINOPHEN 650 MILLIGRAM(S): 80 SOLUTION/ DROPS ORAL at 18:56

## 2024-12-18 RX ADMIN — Medication 400 MILLIGRAM(S): at 08:55

## 2024-12-18 RX ADMIN — ACETAMINOPHEN 650 MILLIGRAM(S): 80 SOLUTION/ DROPS ORAL at 14:21

## 2024-12-18 RX ADMIN — ACETAMINOPHEN 650 MILLIGRAM(S): 80 SOLUTION/ DROPS ORAL at 00:11

## 2024-12-18 RX ADMIN — ACETAMINOPHEN 650 MILLIGRAM(S): 80 SOLUTION/ DROPS ORAL at 17:56

## 2024-12-18 RX ADMIN — Medication 1 APPLICATION(S): at 06:15

## 2024-12-18 RX ADMIN — SODIUM CHLORIDE 1 GRAM(S): 9 INJECTION, SOLUTION INTRAMUSCULAR; INTRAVENOUS; SUBCUTANEOUS at 21:31

## 2024-12-18 RX ADMIN — ACETAMINOPHEN 650 MILLIGRAM(S): 80 SOLUTION/ DROPS ORAL at 00:12

## 2024-12-18 NOTE — PROGRESS NOTE ADULT - ASSESSMENT
Assessment:   85 yo F with PMHx of GERD, HTN, OA s/p bilateral TKA and ROHIT, s/p L forefoot exostosis removal (11/2024), RA (previously on methotrexate), and hx of breast cancer (in remission since 2015) who presented to the hospital after a fall and found to have comminuted right femoral distal metadiaphyseal fracture with volar apex angulation and 1.5 cm posterior displacement and small knee joint effusion. She underwent a R femur ORIF (12/2). Weightbearing status WBAT.    Plan:    #Rehab of Right femur fx s/p ORIF with impairment in mobility and ADLs and iADLs. The patient presented with co-morbidities requiring close physiatry follow-up at least 3 times a week to monitor his progress and monitor his comorbidities including HTN, OA s/p TKA and ROHIT, RA, and s/p L forefoot exostosis removal (11/2024).The patient's co-morbidities do not limit the patient's ability to participate in rehabilitative therapy. The patient was seen by PT/OT and  required max A with transfers, standby assist for UB dressing, dependent for LB dressing, and ambulates 1 side step by b/s secondary to decreased weight bearing tolerance on RLE and fear of falling, The patient was previously independent with all her ADLs and some iADLs (dependent on laundry and grocery) and ambulated with cane/walker occasionally and now presents with functional decline. The patient can benefit from and tolerate 3 hours of restorative therapy daily. The patient is an excellent acute inpatient rehabilitation candidate.     #S/P right distal femur fracture/ ORIF  -CTH shows no acute findings.  -CT Right femur shows acute comminuted right femoral distal metadiaphyseal fracture with volar apex angulation and 1.5 cm posterior displacement and small knee joint effusion.  -s/p subsequent R femur ORIF (12/2).   -Weight bearing status WBAT.  -Per Ortho, DVT ppx x6 weeks post-op  -c/w ASA 81mg QD  - Pain control: tramadol 50 q6 PRN, Tylenol q6  -Will adjust pain medications to optimize pain control  -c/w Calcium 600+D 600 mg-200 tablet: BID  -PT/ OT  - 12/6 BLE dopplers: no DVT in LLE. Unable to visualize venous system in RLE  The patient requires acute rehab with 3 hours of daily therapies at least 5 out of 7 days and close physiatry follow up.     #s/p recent fall last week with HT and bilateral facial contusions/ ecchymosis/ no LOC  - monitor    #UTI  12/13 UA positive  -E. coli positive urine culture  - Bactrim DS 1 dose daily (12/13-12/15)  -WBC stable. Asymptomatic.  -ID consulted and do not recommend abx at this time.     #Recent left heel spur resection  - WBAT  - limiting ambulation  - to wear cast-boot for longer distances as per patient  - followed by podiatry    #Post-op acute blood loss anemia  -Baseline hgb ~10.  -H/H 7.6/23.6 (7.5/23.1) 12/6  -c/w H/H monitoring and hemodynamic status monitoring  -Transfuse if hgb<7.    #Hypomagnesemia  -Mg 1.9 (12/9)  -c/w magnesium supplement as needed    #Azotemia  -12/6 BUN 31->22, improving    Acute ADINA  -NaCI bolus given 12/16    #Hyponatremia  -Na 131 (12/13)  - on regular salt diet  -will continue to monitor   - fluid restriction to 1500ml  -Started on sodium chloride 1g TID 12/15    #HTN  -continue to monitor hemodynamic status   - 12/6 started home metoprolol ER 25mg nightly  -will adjust medications as needed    #HLD  -c/w rosuvastatin 10mg QD     #GERD  - c/w famotidine 20mg  BID    #OA  #RA  - s/p bilat THR/ TKR  - Previously on methotrexate.   -Not on medication currently  - Follows up outpatient with rheumatology  - monitor for exacerbation now off Methotrexate    #MRSA precautions discontinued 12/6 per Infection Control.    #Anxiety/depression  - Previously on sertraline  - Psych consulted 12/17   -Per Psych recs, zoloft 25mg qhs started and will tritiate up to her home 50mg qhs dose.    -Pain control: tramadol and Tylenol  -GI/Bowel Mgmt: Miralax prn, Senna prn, bisacodyl discontinued   -Skin: surgical incision along RLE  - Diet Regular with Ensure    Precautions / PROPHYLAXIS:    - Falls  - DVT prophylaxis: lovenox

## 2024-12-18 NOTE — PROGRESS NOTE ADULT - SUBJECTIVE AND OBJECTIVE BOX
85 yo F with PMHx of GERD, HTN, OA s/p bilateral TKA and ROHIT, s/p L forefoot exostosis removal (11/2024) RA, and hx of breast cancer (in remission since 2015) who presents to the hospital after a fall. Around 1000, patient was seated in a chair and reaching for something on another chair when she lost her balance and fell onto her R side and hit her head. She was unable to get up from the ground and was lying on the floor for ~8 hours until her friend found her. +HS. -LOC. -AC. Subsequently, she had RLE pain. Of note, patient fell forwards two days prior and hit her face with subsequent bruising. Seen in ED at the time without any significant injuries found. No dizziness/lightheadedness associated with either fall. On admission, imaging showed acute comminuted right femoral distal metadiaphyseal fracture with volar apex angulation and 1.5 cm posterior displacement and small knee joint effusion. She underwent a R femur ORIF (12/2). Weightbearing status WBAT. During hospital course, patient had post-op blood loss anemia. Last H/H 7.5/23.1.    Imaging:  CT of R femur: Acute comminuted right femoral distal metadiaphyseal fracture with volar apex angulation and 1.5 cm posterior displacement. Small knee joint effusion.  CT head: No acute process seen    During their stay, the patient was evaluated by physical and occupational therapy. Prior to admission, patient was independent with ADLs, dependent on some iADLs (grocery, laundry), and ambulates with walker/cane occasionally The most recent evaluated functional status is max A with transfers, ambulates 1 side step by b/s secondary to decreased weight bearing tolerance on RLE and fear of falling, standby assist for UB dressing, dependent for LB dressing. Patient was deemed a good acute rehab candidate due to decline in functional status and ability to carry out activities of daily living. Patient is able to tolerate 3 hours of therapy 5-7 days a week and is motivated to participate.    The patient was evaluated by the PM&R team once medically stable. The patient was found to have functional limitations in terms of physical strength/mobility and ability to carry out ADLs (self care, transfers, ambulation). Patient admitted to   12/5 for rehabilitation of R femur fx, s/p ORIF with decline in ADLs.    Living Situation: resides in  with 1 FOS to bedroom (uses stair lift) and 3 steps to enter  PLOF:  independent with ADLs, dependent on some iADLs (grocery, laundry), and ambulates with walker/cane occasionally    Today’s Subjective & Review of Symptoms  No overnight events.  Patient seen this morning. States her nausea has improved. Patient's mentation improved today.   Intermittently continues to be fatigued in PT/OT.   WBC stable. Patient asymptomatic from UTI. ID saw patient and do not recommend abx at this time.   Having some loose BMs. Will dc her bowel regimen. Voiding spontaneously.   Vitals reviewed.   Psych evaluated patient 12/17 and per recs restarted zoloft at 25mg qhs.    CLOF:  modA with transfers, modA bed mobility, ambulates 50ft with RW and modA, LB dressing SPV, toileting TA    Review of Systems:   Constitutional:    [x ] WNL           [   ] poor appetite   [ ] insomnia   [  ] tired   Cardio:           [x ] WNL           [   ] CP              [ ] MOJICA        [  ] palpitations               Resp:             [x ] WNL           [   ] SOB             [ ] cough      [  ] wheezing   GI:               [ x] WNL           [   ] constipation    [ ] diarrhea   [  ] abdominal pain       [ ] nausea   [  ] emesis                                :               [ x] WNL           [   ] BENNETT           [ ] dysuria    [  ] difficulty voiding   [ ] Other:    Endo:             [ x] WNL           [   ] polyuria        [ ] temperature intolerance                 Skin:             [ ] WNL           [   ] pain            [x ] incision-right LE with dressing applied   [ x ] rash-ecchymosis along bilateral facial cheeks   MSK:              [ ] WNL           [ x  ] muscle pain     [x ] joint pain [x ] muscle tenderness   [x ] swelling RLE   Neuro:            [ ] WNL           [   ] HA              [ ] change in vision   [   ] tremor   [x ] weakness [  ] dysphagia    Cognitive:        [x ] WNL           [   ] confusion      Psych:            [x ] WNL           [   ] hallucinations   [   ]agitation   [   ] delusion   [   ]depression    Physical Exam:  Vital Signs Last 24 Hrs  T(C): 37.1 (17 Dec 2024 20:52), Max: 37.1 (17 Dec 2024 20:52)  T(F): 98.7 (17 Dec 2024 20:52), Max: 98.7 (17 Dec 2024 20:52)  HR: 90 (17 Dec 2024 20:52) (79 - 90)  BP: 149/71 (17 Dec 2024 20:52) (133/75 - 149/71)  BP(mean): --  RR: 19 (17 Dec 2024 20:52) (18 - 19)  SpO2: 98% (17 Dec 2024 12:47) (98% - 98%)    General: Resting in bed                     HEENT: [   ] NC/AT, EOMI,  Normal Conjunctivae,   [  x ] other: bilateral cheek facial abrasion, normal conjunctivae, EOMI  Cardio: [  x ] RRR, no murmur,   [   ] other:                              Pulm: [ x  ] No Respiratory Distress,  Lungs CTAB,   [   ] other:             Abdomen: [ x  ]ND/NT, Soft,   [   ] other:    : [ x  ] No bennett catheter, [   ] Bennett catheter present [  ] other:   MSK: [  ] No joint swelling,  [ x  ] other:  RLE/ knee joint swelling                      Ext: [ x  ]No C/C, No calf tenderness,   [  x ]other:  swelling of MCPs, no warmth  Skin: [   ]intact,   [ x  ] other: RLE incision site with post op dressing, C&D    Neurological Examination:  Cognitive: [  x  ] AAO x 3,   [  ]  other:       Attention:  [ x  ] intact,   [    ]  other:   Language: [ x ] intact  [  ]    Memory: [ x  ] intact,    [  ]  other:     Mood/Affect: [  x ] wnl  [    ]  other:                                                                             Communication: [x   ]Fluent, no dysarthria [    ] other:   CN II - XII:  [ x   ] intact,  [    ] other:                                                                                         Motor:   RUE: 5/5 shoulder abduction, 5/5 elbow flexion, 5/5 elbow extension, 5/5 finger flexion  LUE: 5/5 shoulder abduction, 5/5 elbow flexion, 5/5 elbow extension, 5/5 finger flexion  RLE: 2/5 hip flexion (within restricted ROM), 2+/5 knee ext/flex (within restricted ROM), 4+/5 plantarflexion/dorsiflexion   LLE: 5/5 hip flexion, 5/5 knee ext/flex, 4+/5 dorsiflexion/plantarflexion     Tone: [  x ] wnl,   [    ]  other:  DTRs: [ x ]symmetric, [   ] other:  Coordination:   [ x ] intact,   [  ] other:                                                                        Sensory: [ x   ] Intact to light touch  [  ] other:    MEDICATIONS  (STANDING):  acetaminophen     Tablet .. 650 milliGRAM(s) Oral every 6 hours  ascorbic acid 500 milliGRAM(s) Oral daily  aspirin enteric coated 81 milliGRAM(s) Oral daily  bisacodyl 5 milliGRAM(s) Oral at bedtime  bisacodyl Suppository 10 milliGRAM(s) Rectal once  calcium carbonate 1250 mG  + Vitamin D (OsCal 500 + D) 1 Tablet(s) Oral daily  chlorhexidine 2% Cloths 1 Application(s) Topical <User Schedule>  enoxaparin Injectable 40 milliGRAM(s) SubCutaneous every 24 hours  famotidine    Tablet 20 milliGRAM(s) Oral two times a day  folic acid 1 milliGRAM(s) Oral daily  magnesium oxide 400 milliGRAM(s) Oral three times a day with meals  metoprolol succinate ER 50 milliGRAM(s) Oral at bedtime  ofloxacin 0.3% Solution 1 Drop(s) Right EYE three times a day  polyethylene glycol 3350 17 Gram(s) Oral daily  rosuvastatin 10 milliGRAM(s) Oral at bedtime  senna 2 Tablet(s) Oral at bedtime  sertraline 25 milliGRAM(s) Oral at bedtime  sodium chloride 1 Gram(s) Oral three times a day  zinc oxide 20% Ointment 1 Application(s) Topical two times a day    MEDICATIONS  (PRN):  melatonin 3 milliGRAM(s) Oral at bedtime PRN Insomnia  prochlorperazine   Tablet 10 milliGRAM(s) Oral two times a day PRN nausea and vomiting  traMADol 50 milliGRAM(s) Oral four times a day PRN Moderate Pain (4 - 6)      RECENT LABS/IMAGING                                   9.8    9.27  )-----------( 352      ( 17 Dec 2024 05:38 )             32.3     12-16    132[L]  |  95[L]  |  15  ----------------------------<  65[L]  5.2[H]   |  21  |  1.1    Ca    9.1      16 Dec 2024 05:43  Mg     2.1     12-16    TPro  6.0  /  Alb  3.3[L]  /  TBili  0.7  /  DBili  x   /  AST  26  /  ALT  11  /  AlkPhos  146[H]  12-16                 8.7    5.11  )-----------( 349      ( 13 Dec 2024 07:48 )             27.3     12-13    131[L]  |  95[L]  |  13  ----------------------------<  82  4.1   |  26  |  0.7    Ca    8.9      13 Dec 2024 07:48  Mg     1.8     12-13      CT Head No Cont:   ACC: [id number] EXAM:  CT BRAIN   ORDERED BY: SAL ANTHONY     PROCEDURE DATE:  12/01/2024      INTERPRETATION:  Trauma  The study was performed without IV contrast.  Previous exam 11/23/2024  The cisterns and ventricles are normal with no shift of the midline   structures.  No hemorrhage, infarct or mass formation is seen.  The skull is intact.    IMPRESSION: No acute process seen    --- End of Report ---      YI TONY MD; Attending Radiologist  This document has been electronically signed. Dec  1 2024  9:24PM (12-01-24 @ 20:54)

## 2024-12-19 RX ADMIN — SODIUM CHLORIDE 1 GRAM(S): 9 INJECTION, SOLUTION INTRAMUSCULAR; INTRAVENOUS; SUBCUTANEOUS at 15:25

## 2024-12-19 RX ADMIN — Medication 1 DROP(S): at 05:15

## 2024-12-19 RX ADMIN — Medication 17 GRAM(S): at 12:17

## 2024-12-19 RX ADMIN — Medication 400 MILLIGRAM(S): at 07:56

## 2024-12-19 RX ADMIN — FAMOTIDINE 20 MILLIGRAM(S): 20 TABLET, FILM COATED ORAL at 18:02

## 2024-12-19 RX ADMIN — Medication 500 MILLIGRAM(S): at 12:13

## 2024-12-19 RX ADMIN — SERTRALINE HYDROCHLORIDE 25 MILLIGRAM(S): 25 TABLET ORAL at 21:52

## 2024-12-19 RX ADMIN — ENOXAPARIN SODIUM 40 MILLIGRAM(S): 60 INJECTION INTRAVENOUS; SUBCUTANEOUS at 05:16

## 2024-12-19 RX ADMIN — ACETAMINOPHEN 650 MILLIGRAM(S): 80 SOLUTION/ DROPS ORAL at 18:01

## 2024-12-19 RX ADMIN — Medication 1 DROP(S): at 15:26

## 2024-12-19 RX ADMIN — CHLORHEXIDINE GLUCONATE 1 APPLICATION(S): 1.2 RINSE ORAL at 05:21

## 2024-12-19 RX ADMIN — Medication 1 DROP(S): at 21:53

## 2024-12-19 RX ADMIN — ACETAMINOPHEN 650 MILLIGRAM(S): 80 SOLUTION/ DROPS ORAL at 12:12

## 2024-12-19 RX ADMIN — ACETAMINOPHEN 650 MILLIGRAM(S): 80 SOLUTION/ DROPS ORAL at 05:14

## 2024-12-19 RX ADMIN — Medication 50 MILLIGRAM(S): at 18:04

## 2024-12-19 RX ADMIN — SODIUM CHLORIDE 1 GRAM(S): 9 INJECTION, SOLUTION INTRAMUSCULAR; INTRAVENOUS; SUBCUTANEOUS at 05:14

## 2024-12-19 RX ADMIN — Medication 1 TABLET(S): at 12:16

## 2024-12-19 RX ADMIN — Medication 400 MILLIGRAM(S): at 12:16

## 2024-12-19 RX ADMIN — Medication 1 MILLIGRAM(S): at 12:17

## 2024-12-19 RX ADMIN — SODIUM CHLORIDE 1 GRAM(S): 9 INJECTION, SOLUTION INTRAMUSCULAR; INTRAVENOUS; SUBCUTANEOUS at 21:52

## 2024-12-19 RX ADMIN — Medication 400 MILLIGRAM(S): at 18:01

## 2024-12-19 RX ADMIN — FAMOTIDINE 20 MILLIGRAM(S): 20 TABLET, FILM COATED ORAL at 05:14

## 2024-12-19 RX ADMIN — ROSUVASTATIN 10 MILLIGRAM(S): 40 TABLET, FILM COATED ORAL at 21:52

## 2024-12-19 RX ADMIN — Medication 81 MILLIGRAM(S): at 12:13

## 2024-12-19 RX ADMIN — ACETAMINOPHEN 650 MILLIGRAM(S): 80 SOLUTION/ DROPS ORAL at 12:42

## 2024-12-19 NOTE — PROGRESS NOTE ADULT - SUBJECTIVE AND OBJECTIVE BOX
83 yo F with PMHx of GERD, HTN, OA s/p bilateral TKA and ROHIT, s/p L forefoot exostosis removal (11/2024) RA, and hx of breast cancer (in remission since 2015) who presents to the hospital after a fall. Around 1000, patient was seated in a chair and reaching for something on another chair when she lost her balance and fell onto her R side and hit her head. She was unable to get up from the ground and was lying on the floor for ~8 hours until her friend found her. +HS. -LOC. -AC. Subsequently, she had RLE pain. Of note, patient fell forwards two days prior and hit her face with subsequent bruising. Seen in ED at the time without any significant injuries found. No dizziness/lightheadedness associated with either fall. On admission, imaging showed acute comminuted right femoral distal metadiaphyseal fracture with volar apex angulation and 1.5 cm posterior displacement and small knee joint effusion. She underwent a R femur ORIF (12/2). Weightbearing status WBAT. During hospital course, patient had post-op blood loss anemia. Last H/H 7.5/23.1.    Imaging:  CT of R femur: Acute comminuted right femoral distal metadiaphyseal fracture with volar apex angulation and 1.5 cm posterior displacement. Small knee joint effusion.  CT head: No acute process seen    During their stay, the patient was evaluated by physical and occupational therapy. Prior to admission, patient was independent with ADLs, dependent on some iADLs (grocery, laundry), and ambulates with walker/cane occasionally The most recent evaluated functional status is max A with transfers, ambulates 1 side step by b/s secondary to decreased weight bearing tolerance on RLE and fear of falling, standby assist for UB dressing, dependent for LB dressing. Patient was deemed a good acute rehab candidate due to decline in functional status and ability to carry out activities of daily living. Patient is able to tolerate 3 hours of therapy 5-7 days a week and is motivated to participate.    The patient was evaluated by the PM&R team once medically stable. The patient was found to have functional limitations in terms of physical strength/mobility and ability to carry out ADLs (self care, transfers, ambulation). Patient admitted to   12/5 for rehabilitation of R femur fx, s/p ORIF with decline in ADLs.    Living Situation: resides in  with 1 FOS to bedroom (uses stair lift) and 3 steps to enter  PLOF:  independent with ADLs, dependent on some iADLs (grocery, laundry), and ambulates with walker/cane occasionally    Today’s Subjective & Review of Symptoms  Patient's RLE staples were removed yesterday. No overnight events.  Patient seen this morning. Has no new complaints. Participating/tolerating PT/OT.   Having regular BM and voiding spontaneously.   Vitals reviewed.   Anticipate dc tomorrow, likely to STR.     CLOF:  modA with transfers, modA bed mobility, ambulates 50ft with RW and modA, LB dressing SPV, toileting TA    Review of Systems:   Constitutional:    [x ] WNL           [   ] poor appetite   [ ] insomnia   [  ] tired   Cardio:           [x ] WNL           [   ] CP              [ ] MOJICA        [  ] palpitations               Resp:             [x ] WNL           [   ] SOB             [ ] cough      [  ] wheezing   GI:               [ x] WNL           [   ] constipation    [ ] diarrhea   [  ] abdominal pain       [ ] nausea   [  ] emesis                                :               [ x] WNL           [   ] BENNETT           [ ] dysuria    [  ] difficulty voiding   [ ] Other:    Endo:             [ x] WNL           [   ] polyuria        [ ] temperature intolerance                 Skin:             [ ] WNL           [   ] pain            [x ] incision-right LE with dressing applied   [ x ] rash-ecchymosis along bilateral facial cheeks   MSK:              [ ] WNL           [ x  ] muscle pain     [x ] joint pain [x ] muscle tenderness   [x ] swelling RLE   Neuro:            [ ] WNL           [   ] HA              [ ] change in vision   [   ] tremor   [x ] weakness [  ] dysphagia    Cognitive:        [x ] WNL           [   ] confusion      Psych:            [x ] WNL           [   ] hallucinations   [   ]agitation   [   ] delusion   [   ]depression    Physical Exam:  Vital Signs Last 24 Hrs  T(C): 36.8 (19 Dec 2024 04:50), Max: 37.2 (18 Dec 2024 12:44)  T(F): 98.3 (19 Dec 2024 04:50), Max: 98.9 (18 Dec 2024 12:44)  HR: 84 (19 Dec 2024 04:50) (56 - 84)  BP: 158/70 (19 Dec 2024 04:50) (131/83 - 170/84)  BP(mean): 113 (18 Dec 2024 20:14) (113 - 113)  RR: 18 (19 Dec 2024 04:50) (18 - 18)  SpO2: 96% (19 Dec 2024 04:50) (96% - 100%)    Parameters below as of 19 Dec 2024 04:50  Patient On (Oxygen Delivery Method): room air    General: Resting in bed                     HEENT: [   ] NC/AT, EOMI,  Normal Conjunctivae,   [  x ] other: bilateral cheek facial abrasion, normal conjunctivae, EOMI  Cardio: [  x ] RRR, no murmur,   [   ] other:                              Pulm: [ x  ] No Respiratory Distress,  Lungs CTAB,   [   ] other:             Abdomen: [ x  ]ND/NT, Soft,   [   ] other:    : [ x  ] No bennett catheter, [   ] Bennett catheter present [  ] other:   MSK: [  ] No joint swelling,  [ x  ] other:  RLE/ knee joint swelling                      Ext: [ x  ]No C/C, No calf tenderness,   [  x ]other:  swelling of MCPs, no warmth  Skin: [   ]intact,   [ x  ] other: RLE incision site with post op dressing, C&D    Neurological Examination:  Cognitive: [  x  ] AAO x 3,   [  ]  other:       Attention:  [ x  ] intact,   [    ]  other:   Language: [ x ] intact  [  ]    Memory: [ x  ] intact,    [  ]  other:     Mood/Affect: [  x ] wnl  [    ]  other:                                                                             Communication: [x   ]Fluent, no dysarthria [    ] other:   CN II - XII:  [ x   ] intact,  [    ] other:                                                                                         Motor:   RUE: 5/5 shoulder abduction, 5/5 elbow flexion, 5/5 elbow extension, 5/5 finger flexion  LUE: 5/5 shoulder abduction, 5/5 elbow flexion, 5/5 elbow extension, 5/5 finger flexion  RLE: 2/5 hip flexion (within restricted ROM), 2+/5 knee ext/flex (within restricted ROM), 4+/5 plantarflexion/dorsiflexion   LLE: 5/5 hip flexion, 5/5 knee ext/flex, 4+/5 dorsiflexion/plantarflexion     Tone: [  x ] wnl,   [    ]  other:  DTRs: [ x ]symmetric, [   ] other:  Coordination:   [ x ] intact,   [  ] other:                                                                        Sensory: [ x   ] Intact to light touch  [  ] other:    MEDICATIONS  (STANDING):  acetaminophen     Tablet .. 650 milliGRAM(s) Oral every 6 hours  ascorbic acid 500 milliGRAM(s) Oral daily  aspirin enteric coated 81 milliGRAM(s) Oral daily  calcium carbonate 1250 mG  + Vitamin D (OsCal 500 + D) 1 Tablet(s) Oral daily  chlorhexidine 2% Cloths 1 Application(s) Topical <User Schedule>  enoxaparin Injectable 40 milliGRAM(s) SubCutaneous every 24 hours  famotidine    Tablet 20 milliGRAM(s) Oral two times a day  folic acid 1 milliGRAM(s) Oral daily  magnesium oxide 400 milliGRAM(s) Oral three times a day with meals  metoprolol succinate ER 50 milliGRAM(s) Oral <User Schedule>  ofloxacin 0.3% Solution 1 Drop(s) Right EYE three times a day  polyethylene glycol 3350 17 Gram(s) Oral daily  rosuvastatin 10 milliGRAM(s) Oral at bedtime  sertraline 25 milliGRAM(s) Oral at bedtime  sodium chloride 1 Gram(s) Oral three times a day    MEDICATIONS  (PRN):  melatonin 3 milliGRAM(s) Oral at bedtime PRN Insomnia  prochlorperazine   Tablet 10 milliGRAM(s) Oral two times a day PRN nausea and vomiting  senna 2 Tablet(s) Oral at bedtime PRN Constipation  traMADol 50 milliGRAM(s) Oral four times a day PRN Moderate Pain (4 - 6)    RECENT LABS/IMAGING                                   9.8    9.27  )-----------( 352      ( 17 Dec 2024 05:38 )             32.3     12-16    132[L]  |  95[L]  |  15  ----------------------------<  65[L]  5.2[H]   |  21  |  1.1    Ca    9.1      16 Dec 2024 05:43  Mg     2.1     12-16    TPro  6.0  /  Alb  3.3[L]  /  TBili  0.7  /  DBili  x   /  AST  26  /  ALT  11  /  AlkPhos  146[H]  12-16                 8.7    5.11  )-----------( 349      ( 13 Dec 2024 07:48 )             27.3     12-13    131[L]  |  95[L]  |  13  ----------------------------<  82  4.1   |  26  |  0.7    Ca    8.9      13 Dec 2024 07:48  Mg     1.8     12-13      CT Head No Cont:   ACC: [id number] EXAM:  CT BRAIN   ORDERED BY: SAL ANTHONY     PROCEDURE DATE:  12/01/2024      INTERPRETATION:  Trauma  The study was performed without IV contrast.  Previous exam 11/23/2024  The cisterns and ventricles are normal with no shift of the midline   structures.  No hemorrhage, infarct or mass formation is seen.  The skull is intact.    IMPRESSION: No acute process seen    --- End of Report ---      YI TONY MD; Attending Radiologist  This document has been electronically signed. Dec  1 2024  9:24PM (12-01-24 @ 20:54)

## 2024-12-19 NOTE — PROGRESS NOTE ADULT - ASSESSMENT
Assessment:   83 yo F with PMHx of GERD, HTN, OA s/p bilateral TKA and ROHIT, s/p L forefoot exostosis removal (11/2024), RA (previously on methotrexate), and hx of breast cancer (in remission since 2015) who presented to the hospital after a fall and found to have comminuted right femoral distal metadiaphyseal fracture with volar apex angulation and 1.5 cm posterior displacement and small knee joint effusion. She underwent a R femur ORIF (12/2). Weightbearing status WBAT.    Plan:    #Rehab of Right femur fx s/p ORIF with impairment in mobility and ADLs and iADLs. The patient presented with co-morbidities requiring close physiatry follow-up at least 3 times a week to monitor his progress and monitor his comorbidities including HTN, OA s/p TKA and ROHIT, RA, and s/p L forefoot exostosis removal (11/2024).The patient's co-morbidities do not limit the patient's ability to participate in rehabilitative therapy. The patient was seen by PT/OT and  required max A with transfers, standby assist for UB dressing, dependent for LB dressing, and ambulates 1 side step by b/s secondary to decreased weight bearing tolerance on RLE and fear of falling, The patient was previously independent with all her ADLs and some iADLs (dependent on laundry and grocery) and ambulated with cane/walker occasionally and now presents with functional decline. The patient can benefit from and tolerate 3 hours of restorative therapy daily. The patient is an excellent acute inpatient rehabilitation candidate.     #S/P right distal femur fracture/ ORIF  -CTH shows no acute findings.  -CT Right femur shows acute comminuted right femoral distal metadiaphyseal fracture with volar apex angulation and 1.5 cm posterior displacement and small knee joint effusion.  -s/p subsequent R femur ORIF (12/2).   -Weight bearing status WBAT.  -Per Ortho, DVT ppx x6 weeks post-op  -c/w ASA 81mg QD  - Pain control: tramadol 50 q6 PRN, Tylenol q6  -Will adjust pain medications to optimize pain control  -c/w Calcium 600+D 600 mg-200 tablet: BID  -PT/ OT  - 12/6 BLE dopplers: no DVT in LLE. Unable to visualize venous system in RLE  The patient requires acute rehab with 3 hours of daily therapies at least 5 out of 7 days and close physiatry follow up.     #s/p recent fall last week with HT and bilateral facial contusions/ ecchymosis/ no LOC  - monitor    #UTI  12/13 UA positive  -E. coli positive urine culture  - Bactrim DS 1 dose daily (12/13-12/15)  -WBC stable. Asymptomatic.  -ID consulted and do not recommend abx at this time.     #Recent left heel spur resection  - WBAT  - limiting ambulation  - to wear cast-boot for longer distances as per patient  - followed by podiatry    #Post-op acute blood loss anemia  -Baseline hgb ~10.  -H/H 7.6/23.6 (7.5/23.1) 12/6  -c/w H/H monitoring and hemodynamic status monitoring  -Transfuse if hgb<7.    #Hypomagnesemia  -Mg 1.9 (12/9)  -c/w magnesium supplement as needed    #Azotemia  -12/6 BUN 31->22, improving    Acute ADINA  -NaCI bolus given 12/16    #Hyponatremia  -Na 131 (12/13)  - on regular salt diet  -will continue to monitor   - fluid restriction to 1500ml  -Started on sodium chloride 1g TID 12/15    #HTN  -continue to monitor hemodynamic status   - 12/6 started home metoprolol ER 25mg nightly  -will adjust medications as needed    #HLD  -c/w rosuvastatin 10mg QD     #GERD  - c/w famotidine 20mg  BID    #OA  #RA  - s/p bilat THR/ TKR  - Previously on methotrexate.   -Not on medication currently  - Follows up outpatient with rheumatology  - monitor for exacerbation now off Methotrexate    #MRSA precautions discontinued 12/6 per Infection Control.    #Anxiety/depression  - Previously on sertraline  - Psych consulted 12/17   -Per Psych recs, zoloft 25mg qhs started and will tritiate up to her home 50mg qhs dose.    -Pain control: tramadol and Tylenol  -GI/Bowel Mgmt: Miralax prn, Senna prn, bisacodyl discontinued   -Skin: surgical incision along RLE  - Diet Regular with Ensure    Precautions / PROPHYLAXIS:    - Falls  - DVT prophylaxis: lovenox

## 2024-12-20 ENCOUNTER — TRANSCRIPTION ENCOUNTER (OUTPATIENT)
Age: 84
End: 2024-12-20

## 2024-12-20 LAB
ALBUMIN SERPL ELPH-MCNC: 3.2 G/DL — LOW (ref 3.5–5.2)
ALP SERPL-CCNC: 150 U/L — HIGH (ref 30–115)
ALT FLD-CCNC: 9 U/L — SIGNIFICANT CHANGE UP (ref 0–41)
ANION GAP SERPL CALC-SCNC: 10 MMOL/L — SIGNIFICANT CHANGE UP (ref 7–14)
AST SERPL-CCNC: 19 U/L — SIGNIFICANT CHANGE UP (ref 0–41)
BILIRUB SERPL-MCNC: 0.6 MG/DL — SIGNIFICANT CHANGE UP (ref 0.2–1.2)
BUN SERPL-MCNC: 16 MG/DL — SIGNIFICANT CHANGE UP (ref 10–20)
CALCIUM SERPL-MCNC: 9.1 MG/DL — SIGNIFICANT CHANGE UP (ref 8.4–10.5)
CHLORIDE SERPL-SCNC: 99 MMOL/L — SIGNIFICANT CHANGE UP (ref 98–110)
CO2 SERPL-SCNC: 27 MMOL/L — SIGNIFICANT CHANGE UP (ref 17–32)
CREAT SERPL-MCNC: 0.8 MG/DL — SIGNIFICANT CHANGE UP (ref 0.7–1.5)
EGFR: 72 ML/MIN/1.73M2 — SIGNIFICANT CHANGE UP
GLUCOSE SERPL-MCNC: 95 MG/DL — SIGNIFICANT CHANGE UP (ref 70–99)
POTASSIUM SERPL-MCNC: 4.9 MMOL/L — SIGNIFICANT CHANGE UP (ref 3.5–5)
POTASSIUM SERPL-SCNC: 4.9 MMOL/L — SIGNIFICANT CHANGE UP (ref 3.5–5)
PROT SERPL-MCNC: 6 G/DL — SIGNIFICANT CHANGE UP (ref 6–8)
SODIUM SERPL-SCNC: 136 MMOL/L — SIGNIFICANT CHANGE UP (ref 135–146)

## 2024-12-20 RX ORDER — CALCIUM CARBONATE/VITAMIN D3 500MG-5MCG
1 TABLET ORAL
Qty: 0 | Refills: 0 | DISCHARGE
Start: 2024-12-20

## 2024-12-20 RX ORDER — OFLOXACIN 0.3 %
1 DROPS OPHTHALMIC (EYE)
Qty: 0 | Refills: 0 | DISCHARGE
Start: 2024-12-20

## 2024-12-20 RX ORDER — LOSARTAN POTASSIUM 100 MG/1
1 TABLET, FILM COATED ORAL
Qty: 30 | Refills: 0
Start: 2024-12-20 | End: 2025-01-18

## 2024-12-20 RX ORDER — METOPROLOL TARTRATE 50 MG
1 TABLET ORAL
Qty: 0 | Refills: 0 | DISCHARGE
Start: 2024-12-20

## 2024-12-20 RX ORDER — SODIUM CHLORIDE 9 MG/ML
1 INJECTION, SOLUTION INTRAMUSCULAR; INTRAVENOUS; SUBCUTANEOUS
Qty: 90 | Refills: 0
Start: 2024-12-20 | End: 2025-01-18

## 2024-12-20 RX ORDER — SODIUM CHLORIDE 9 MG/ML
1 INJECTION, SOLUTION INTRAMUSCULAR; INTRAVENOUS; SUBCUTANEOUS
Qty: 0 | Refills: 0 | DISCHARGE
Start: 2024-12-20

## 2024-12-20 RX ORDER — ACETAMINOPHEN 80 MG/.8ML
2 SOLUTION/ DROPS ORAL
Qty: 0 | Refills: 0 | DISCHARGE
Start: 2024-12-20

## 2024-12-20 RX ORDER — FAMOTIDINE 20 MG/1
1 TABLET, FILM COATED ORAL
Qty: 60 | Refills: 0
Start: 2024-12-20 | End: 2025-01-18

## 2024-12-20 RX ORDER — LOSARTAN POTASSIUM 100 MG/1
1 TABLET, FILM COATED ORAL
Qty: 0 | Refills: 0 | DISCHARGE
Start: 2024-12-20

## 2024-12-20 RX ORDER — SENNOSIDES 8.6 MG/1
2 TABLET, FILM COATED ORAL
Qty: 0 | Refills: 0 | DISCHARGE
Start: 2024-12-20

## 2024-12-20 RX ORDER — HYDROCHLOROTHIAZIDE 25 MG/1
1 TABLET ORAL
Refills: 0 | DISCHARGE

## 2024-12-20 RX ORDER — FAMOTIDINE 20 MG/1
1 TABLET, FILM COATED ORAL
Qty: 0 | Refills: 0 | DISCHARGE
Start: 2024-12-20

## 2024-12-20 RX ORDER — LOSARTAN POTASSIUM 100 MG/1
25 TABLET, FILM COATED ORAL AT BEDTIME
Refills: 0 | Status: DISCONTINUED | OUTPATIENT
Start: 2024-12-20 | End: 2024-12-23

## 2024-12-20 RX ORDER — ASPIRIN 81 MG
1 TABLET, DELAYED RELEASE (ENTERIC COATED) ORAL
Qty: 60 | Refills: 0
Start: 2024-12-20 | End: 2025-01-18

## 2024-12-20 RX ORDER — SERTRALINE HYDROCHLORIDE 25 MG/1
1 TABLET ORAL
Qty: 30 | Refills: 0
Start: 2024-12-20 | End: 2025-01-18

## 2024-12-20 RX ORDER — METOPROLOL TARTRATE 50 MG
1 TABLET ORAL
Qty: 30 | Refills: 0
Start: 2024-12-20 | End: 2025-01-18

## 2024-12-20 RX ORDER — ROSUVASTATIN 40 MG/1
1 TABLET, FILM COATED ORAL
Qty: 0 | Refills: 0 | DISCHARGE
Start: 2024-12-20

## 2024-12-20 RX ORDER — SERTRALINE HYDROCHLORIDE 25 MG/1
1 TABLET ORAL
Qty: 0 | Refills: 0 | DISCHARGE
Start: 2024-12-20

## 2024-12-20 RX ORDER — METOPROLOL TARTRATE 50 MG
1 TABLET ORAL
Refills: 0 | DISCHARGE

## 2024-12-20 RX ORDER — ROSUVASTATIN 40 MG/1
1 TABLET, FILM COATED ORAL
Qty: 30 | Refills: 0
Start: 2024-12-20 | End: 2025-01-18

## 2024-12-20 RX ORDER — LOSARTAN POTASSIUM 100 MG/1
1 TABLET, FILM COATED ORAL
Refills: 0 | DISCHARGE

## 2024-12-20 RX ADMIN — SODIUM CHLORIDE 1 GRAM(S): 9 INJECTION, SOLUTION INTRAMUSCULAR; INTRAVENOUS; SUBCUTANEOUS at 14:41

## 2024-12-20 RX ADMIN — Medication 400 MILLIGRAM(S): at 17:24

## 2024-12-20 RX ADMIN — Medication 1 MILLIGRAM(S): at 12:48

## 2024-12-20 RX ADMIN — SODIUM CHLORIDE 1 GRAM(S): 9 INJECTION, SOLUTION INTRAMUSCULAR; INTRAVENOUS; SUBCUTANEOUS at 22:09

## 2024-12-20 RX ADMIN — Medication 500 MILLIGRAM(S): at 12:46

## 2024-12-20 RX ADMIN — Medication 1 DROP(S): at 14:42

## 2024-12-20 RX ADMIN — FAMOTIDINE 20 MILLIGRAM(S): 20 TABLET, FILM COATED ORAL at 06:04

## 2024-12-20 RX ADMIN — ACETAMINOPHEN 650 MILLIGRAM(S): 80 SOLUTION/ DROPS ORAL at 03:20

## 2024-12-20 RX ADMIN — ACETAMINOPHEN 650 MILLIGRAM(S): 80 SOLUTION/ DROPS ORAL at 01:30

## 2024-12-20 RX ADMIN — Medication 50 MILLIGRAM(S): at 18:33

## 2024-12-20 RX ADMIN — Medication 400 MILLIGRAM(S): at 12:47

## 2024-12-20 RX ADMIN — Medication 1 DROP(S): at 06:04

## 2024-12-20 RX ADMIN — Medication 3 MILLIGRAM(S): at 22:13

## 2024-12-20 RX ADMIN — Medication 400 MILLIGRAM(S): at 08:45

## 2024-12-20 RX ADMIN — SODIUM CHLORIDE 1 GRAM(S): 9 INJECTION, SOLUTION INTRAMUSCULAR; INTRAVENOUS; SUBCUTANEOUS at 06:03

## 2024-12-20 RX ADMIN — SERTRALINE HYDROCHLORIDE 25 MILLIGRAM(S): 25 TABLET ORAL at 22:09

## 2024-12-20 RX ADMIN — ACETAMINOPHEN 650 MILLIGRAM(S): 80 SOLUTION/ DROPS ORAL at 06:04

## 2024-12-20 RX ADMIN — TRAMADOL HYDROCHLORIDE 50 MILLIGRAM(S): 50 TABLET ORAL at 14:43

## 2024-12-20 RX ADMIN — FAMOTIDINE 20 MILLIGRAM(S): 20 TABLET, FILM COATED ORAL at 17:24

## 2024-12-20 RX ADMIN — ROSUVASTATIN 10 MILLIGRAM(S): 40 TABLET, FILM COATED ORAL at 22:09

## 2024-12-20 RX ADMIN — ACETAMINOPHEN 650 MILLIGRAM(S): 80 SOLUTION/ DROPS ORAL at 17:54

## 2024-12-20 RX ADMIN — Medication 1 TABLET(S): at 12:47

## 2024-12-20 RX ADMIN — ENOXAPARIN SODIUM 40 MILLIGRAM(S): 60 INJECTION INTRAVENOUS; SUBCUTANEOUS at 06:03

## 2024-12-20 RX ADMIN — ACETAMINOPHEN 650 MILLIGRAM(S): 80 SOLUTION/ DROPS ORAL at 12:47

## 2024-12-20 RX ADMIN — LOSARTAN POTASSIUM 25 MILLIGRAM(S): 100 TABLET, FILM COATED ORAL at 22:14

## 2024-12-20 RX ADMIN — ACETAMINOPHEN 650 MILLIGRAM(S): 80 SOLUTION/ DROPS ORAL at 13:17

## 2024-12-20 RX ADMIN — CHLORHEXIDINE GLUCONATE 1 APPLICATION(S): 1.2 RINSE ORAL at 06:03

## 2024-12-20 RX ADMIN — ACETAMINOPHEN 650 MILLIGRAM(S): 80 SOLUTION/ DROPS ORAL at 17:24

## 2024-12-20 RX ADMIN — Medication 81 MILLIGRAM(S): at 12:47

## 2024-12-20 NOTE — DISCHARGE NOTE PROVIDER - HOSPITAL COURSE
85 yo F with PMHx of GERD, HTN, OA s/p bilateral TKA and ROHIT, s/p L forefoot exostosis removal (11/2024) RA, and hx of breast cancer (in remission since 2015) who presents to the hospital after a fall. Around 1000, patient was seated in a chair and reaching for something on another chair when she lost her balance and fell onto her R side and hit her head. She was unable to get up from the ground and was lying on the floor for ~8 hours until her friend found her. +HS. -LOC. -AC. Subsequently, she had RLE pain. Of note, patient fell forwards two days prior and hit her face with subsequent bruising. Seen in ED at the time without any significant injuries found. No dizziness/lightheadedness associated with either fall. On admission, imaging showed acute comminuted right femoral distal metadiaphyseal fracture with volar apex angulation and 1.5 cm posterior displacement and small knee joint effusion. She underwent a R femur ORIF (12/2). Weightbearing status WBAT. During hospital course, patient had post-op blood loss anemia.   imaging: CT of R femur: Acute comminuted right femoral distal metadiaphyseal fracture with volar apex angulation and 1.5 cm posterior displacement. Small knee joint effusion.  CT head: No acute process seen  #S/P right distal femur fracture/ ORIF  -CTH shows no acute findings.  -CT Right femur shows acute comminuted right femoral distal metadiaphyseal fracture with volar apex angulation and 1.5 cm posterior displacement and small knee joint effusion.  -s/p subsequent R femur ORIF (12/2).   -Weight bearing status WBAT.  -Per Ortho, DVT ppx x6 weeks post-op  -c/w ASA 81mg QD- can increase to BID x 6 weeks until Jan 13. 2025, then resume Baby ASA once daily.   - Pain control: tramadol 50 q6 PRN, Tylenol q6  -c/w Calcium 600+D 600 mg-200 tablet: BID  -PT/ OT  - 12/6 BLE dopplers: no DVT in LLE. Unable to visualize venous system in RLE  The patient requires acute rehab with 3 hours of daily therapies at least 5 out of 7 days and close physiatry follow up.     #s/p recent fall last week with HT and bilateral facial contusions/ ecchymosis/ no LOC  - monitor    #Recent left heel spur resection  - WBAT  - limiting ambulation  - to wear cast-boot for longer distances as per patient  - followed by podiatry- upon DC podiatry clinic     #Post-op acute blood loss anemia (improving)  -Baseline hgb ~10.  -H/H 7.6/23.6 (7.5/23.1) 12/6  H/H 9.8/32.3 12/17        #HTN  -continue to monitor hemodynamic status   - 12/6 started home metoprolol ER 25mg nightly    #HLD  -c/w rosuvastatin 10mg QD     #GERD  - c/w famotidine 20mg  BID    #OA  #RA  #Osteoporosis  -She doesn't recall having a bone mineral density test or treatment for osteoporosis.   -She reports taking an aromatase inhibitor for 5 years for breast cancer past and has been on corticosteroids at times which are risk factors for osteoporosis.   - s/p bilat THR/ TKR- Previously on methotrexate; not on medication currently. Monitor.   - Follow up as outpatient with rheumatology for her RA and Osteoporosis is required. A Dexascan/ bone mineral density test is suggested.      #MRSA precautions discontinued 12/6 per Infection Control.    #Anxiety/depression  - Previously on sertraline  - Psych consulted 12/17   -Per Psych recs, zoloft 25mg qhs started and will titrate up to her home 50mg can fu with PCP    #Asymptomatic bacteruria vs ?UTI  12/13 UA positive  -E. coli positive urine culture.   - Bactrim DS 1 dose daily (12/13-12/15)  - However culture sensitivities showed resistance to bactrim after completion of bactrim course.  -WBC stable. Asymptomatic.  -ID consulted and do not recommend abx at this time.     #Hyponatremia (resolved)  -Na 131 (12/13)  - on regular salt diet  -will continue to monitor   -Started on sodium chloride 1g TID 12/15  -12/20 sodium 136    #Hypomagnesemia (resolved)  -Mg 1.9 (12/9)  -c/w magnesium supplement as needed    #Azotemia (resolved)  -12/6 BUN 31->22-> 16 (12/20)    #Acute ADINA (resolved)  -NaCI bolus given 12/16    -Pain control: tramadol and Tylenol  -GI/Bowel Mgmt: Miralax prn, Senna prn, bisacodyl discontinued   -Skin: surgical incision along RLE  - Diet Regular with Ensure    Precautions / PROPHYLAXIS:    - Falls  - DVT prophylaxis: lovenox

## 2024-12-20 NOTE — DISCHARGE NOTE PROVIDER - NSDCMRMEDTOKEN_GEN_ALL_CORE_FT
acetaminophen 325 mg oral tablet: 2 tab(s) orally every 6 hours  aspirin 81 mg oral delayed release tablet: 1 tab(s) orally every 12 hours continue Baby Aspirin every 12 hours  until January 13. 2025 -Take with food  famotidine 20 mg oral tablet: 1 tab(s) orally 2 times a day  folic acid 1 mg oral tablet: 1 tab(s) orally 2 times a day  losartan 25 mg oral tablet: 1 tab(s) orally once a day (at bedtime)  magnesium oxide 400 mg oral tablet: 1 tab(s) orally 3 times a day (with meals)  metoprolol succinate 50 mg oral tablet, extended release: 1 tab(s) orally once a day take every night at 7 pm  ofloxacin 0.3% ophthalmic solution: 1 drop(s) to each affected eye 3 times a day  oxyCODONE 10 mg oral tablet: 1 tab(s) orally every 4 hours As needed Severe Pain (7 - 10)  rosuvastatin 10 mg oral tablet: 1 tab(s) orally once a day (at bedtime)  senna leaf extract oral tablet: 2 tab(s) orally once a day (at bedtime) As needed Constipation  sertraline 25 mg oral tablet: 1 tab(s) orally once a day (at bedtime)  sodium chloride 1 g oral tablet: 1 tab(s) orally 3 times a day  Vitamin C: 1000 milligram(s) orally once a day  vitamin D-calcium (as carbonate) 5 mcg-500 mg oral tablet: 1 tab(s) orally once a day   acetaminophen 325 mg oral tablet: 2 tab(s) orally every 6 hours  aspirin 81 mg oral delayed release tablet: 1 tab(s) orally every 12 hours continue Baby Aspirin every 12 hours  until January 13. 2025 -Take with food  aspirin 81 mg oral delayed release tablet: 1 tab(s) orally every 12 hours continue Baby Aspirin every 12 hours  until January 13. 2025 -Take with food  famotidine 20 mg oral tablet: 1 tab(s) orally 2 times a day  folic acid 1 mg oral tablet: 1 tab(s) orally 2 times a day  losartan 25 mg oral tablet: 1 tab(s) orally once a day (at bedtime)  magnesium oxide 400 mg oral tablet: 1 tab(s) orally 3 times a day (with meals)  metoprolol succinate 50 mg oral tablet, extended release: 1 tab(s) orally once a day take every night at 7 pm  rosuvastatin 10 mg oral tablet: 1 tab(s) orally once a day (at bedtime)  senna leaf extract oral tablet: 2 tab(s) orally once a day (at bedtime) As needed Constipation  sertraline 25 mg oral tablet: 1 tab(s) orally once a day (at bedtime)  sertraline 25 mg oral tablet: 1 tab(s) orally once a day (at bedtime) MDD: 1  sodium chloride 1 g oral tablet: 1 tab(s) orally 3 times a day  traMADol 50 mg oral tablet: 1 tab(s) orally 4 times a day as needed for Moderate Pain (4 - 6) MDD: 4  Vitamin C: 1000 milligram(s) orally once a day  vitamin D-calcium (as carbonate) 5 mcg-500 mg oral tablet: 1 tab(s) orally once a day   acetaminophen 325 mg oral tablet: 2 tab(s) orally every 6 hours  aspirin 81 mg oral delayed release tablet: 1 tab(s) orally every 12 hours take one tablet with food every 12 hours until January 13. 2025  famotidine 20 mg oral tablet: 1 tab(s) orally 2 times a day  folic acid 1 mg oral tablet: 1 tab(s) orally 2 times a day  losartan 25 mg oral tablet: 1 tab(s) orally once a day (at bedtime)  magnesium oxide 400 mg oral tablet: 1 tab(s) orally 3 times a day (with meals)  metoprolol succinate 50 mg oral tablet, extended release: 1 tab(s) orally once a day take every night at 7 pm  rosuvastatin 10 mg oral tablet: 1 tab(s) orally once a day (at bedtime)  senna leaf extract oral tablet: 2 tab(s) orally once a day (at bedtime) As needed Constipation  sertraline 25 mg oral tablet: 1 tab(s) orally once a day (at bedtime) MDD: 1  sodium chloride 1 g oral tablet: 1 tab(s) orally 3 times a day  traMADol 50 mg oral tablet: 1 tab(s) orally 4 times a day as needed for Moderate Pain (4 - 6) MDD: 4  Vitamin C: 1000 milligram(s) orally once a day  vitamin D-calcium (as carbonate) 5 mcg-500 mg oral tablet: 1 tab(s) orally once a day   acetaminophen 325 mg oral tablet: 2 tab(s) orally every 6 hours  aspirin 81 mg oral delayed release tablet: 1 tab(s) orally every 12 hours take one tablet with food every 12 hours until January 13. 2025  famotidine 20 mg oral tablet: 1 tab(s) orally 2 times a day  folic acid 1 mg oral tablet: 1 tab(s) orally 2 times a day  losartan 25 mg oral tablet: 1 tab(s) orally once a day (at bedtime)  magnesium oxide 400 mg oral tablet: 1 tab(s) orally 3 times a day (with meals)  metoprolol succinate 50 mg oral tablet, extended release: 1 tab(s) orally once a day take every night at 7 pm  rosuvastatin 10 mg oral tablet: 1 tab(s) orally once a day (at bedtime)  senna leaf extract oral tablet: 2 tab(s) orally once a day (at bedtime) As needed Constipation  sertraline 25 mg oral tablet: 1 tab(s) orally once a day (at bedtime) MDD: 1  sodium chloride 1 g oral tablet: 1 tab(s) orally 3 times a day  traMADol 50 mg oral tablet: 1 tab(s) orally 4 times a day as needed for Moderate Pain (4 - 6) MDD: 4 tabs  Vitamin C: 1000 milligram(s) orally once a day  vitamin D-calcium (as carbonate) 5 mcg-500 mg oral tablet: 1 tab(s) orally once a day

## 2024-12-20 NOTE — DISCHARGE NOTE PROVIDER - CARE PROVIDERS DIRECT ADDRESSES
[email or web address]

## 2024-12-20 NOTE — DISCHARGE NOTE PROVIDER - CARE PROVIDER_API CALL
Nitin Stevens  Podiatric Medicine  [address] [place] [address]  Phone: [phone]  Fax: [phone]  Follow Up Time: 1 month    Thor Cruz  Internal Medicine  [address] [place] [address]  Phone: [phone]  Fax: [phone]  Follow Up Time: 1 month    Janes Dailey  Orthopaedic Surgery  [address] [place] [address]  Phone: [phone]  Fax: [phone]  Follow Up Time: 2 weeks

## 2024-12-20 NOTE — DISCHARGE NOTE PROVIDER - NSDCFUADDINST_GEN_ALL_CORE_FT
It is recommended that you have a DEXA scan outpatient for Bone density- please discuss this with your Primary care physician, and please bring this paper work with you to your follow up appointments.

## 2024-12-20 NOTE — DISCHARGE NOTE PROVIDER - NSDCCPCAREPLAN_GEN_ALL_CORE_FT
PRINCIPAL DISCHARGE DIAGNOSIS  Diagnosis: Other closed fracture of distal end of right femur  Assessment and Plan of Treatment: You had orthoapedic surgery for a fracture, Right femur open reduction internal fixation. You should continue physical theapy outpatient and we will be giving you Baby Aspirin for 6 weeks in total TWICE DAILY, until January 13, 2025.  This is to prevent any blood clots from developing. Please do not take Baby Aspiring on an empty stomach, and continue to take pepcid twice a day to protect your stomach lining from gastric ulcers   After January 13, 2025, you can go back to taking Baby Aspirin once daily as previously. Please make a follow up appointment with Dr. Dailey within 2 weeks of being discharged from the hospital.         SECONDARY DISCHARGE DIAGNOSES  Diagnosis: Hyponatremia  Assessment and Plan of Treatment: Your sodium levels during your hospital stay were low, they are now within normal range, and you have been receving sodium tablets daily. You were previously taking Hydrochlorithiazide prior to coming into the hospital, but that was stopped, since your sodium levels were below normal. Please follow up with your Primary care physician so that he can re-evaluate your need for Hydrochlorothiazide.    Diagnosis: Depression, unspecified  Assessment and Plan of Treatment: Your Zoloft has been resumed at 25 mg daily, it was held due to prolonged QT intervals on your EKG. Now that it has been restarted, please fu with your Doctor to discuss adjusting your dosing.    Diagnosis: Heel spur, left  Assessment and Plan of Treatment: Please follow up with Podiatry in outpatient clinic, for evaluation of left heel spur resection     PRINCIPAL DISCHARGE DIAGNOSIS  Diagnosis: Other closed fracture of distal end of right femur  Assessment and Plan of Treatment: You had orthoapedic surgery for a fracture, Right femur open reduction internal fixation. You should continue physical theapy outpatient and we will be giving you Baby Aspirin for 6 weeks in total TWICE DAILY, until January 13, 2025.  This is to prevent any blood clots from developing. Please do not take Baby Aspiring on an empty stomach, and continue to take pepcid twice a day to protect your stomach lining from gastric ulcers   After January 13, 2025, you can go back to taking Baby Aspirin once daily as previously. Please make a follow up appointment with Dr. Dailey within 2 weeks of being discharged from the hospital.         SECONDARY DISCHARGE DIAGNOSES  Diagnosis: Hyponatremia  Assessment and Plan of Treatment: Your sodium levels during your hospital stay were low, they are now within normal range, and you have been receving sodium tablets daily. You were previously taking Hydrochlorithiazide prior to coming into the hospital, but that was stopped, since your sodium levels were below normal. Please follow up with your Primary care physician so that he can re-evaluate your need for Hydrochlorothiazide. You were taking sodium tablets while in the hospital to maintain your sodium levels, upon Discahrge ypu can introduce some salty foods, such as salty broth.    Diagnosis: Depression, unspecified  Assessment and Plan of Treatment: Your Zoloft has been resumed at 25 mg daily, it was held due to prolonged QT intervals on your EKG. Now that it has been restarted, please fu with your Doctor to discuss adjusting your dosing.    Diagnosis: Heel spur, left  Assessment and Plan of Treatment: Please follow up with Podiatry in outpatient clinic, for evaluation of left heel spur resection     PRINCIPAL DISCHARGE DIAGNOSIS  Diagnosis: Other closed fracture of distal end of right femur  Assessment and Plan of Treatment: You had orthoapedic surgery for a fracture, Right femur open reduction internal fixation. You should continue physical theapy outpatient and we will be giving you Baby Aspirin for 6 weeks in total TWICE DAILY, until January 13, 2025.  This is to prevent any blood clots from developing. Please do not take Baby Aspiring on an empty stomach, and continue to take pepcid twice a day to protect your stomach lining from gastric ulcers   After January 13, 2025,  PLEASE RESUME taking Baby Aspirin 81 mg daily.previously. Please make a follow up appointment with Dr. Dailey within 2 weeks of being discharged from the hospital.         SECONDARY DISCHARGE DIAGNOSES  Diagnosis: Hyponatremia  Assessment and Plan of Treatment: Your sodium levels during your hospital stay were low, they are now within normal range, and you have been receving sodium tablets daily. You were previously taking Hydrochlorithiazide prior to coming into the hospital, but that was stopped, since your sodium levels were below normal. Please follow up with your Primary care physician so that he can re-evaluate your need for Hydrochlorothiazide. You were taking sodium tablets while in the hospital to maintain your sodium levels, upon Discahrge ypu can introduce some salty foods, such as salty broth.    Diagnosis: Depression, unspecified  Assessment and Plan of Treatment: Your Zoloft has been resumed at 25 mg daily, it was held due to prolonged QT intervals on your EKG. Now that it has been restarted, please fu with your Doctor to discuss adjusting your dosing.    Diagnosis: Heel spur, left  Assessment and Plan of Treatment: Please follow up with Podiatry in outpatient clinic, for evaluation of left heel spur resection

## 2024-12-20 NOTE — DISCHARGE NOTE PROVIDER - PROVIDER TOKENS
PROVIDER:[TOKEN:[[id number]],FOLLOWUP:[1 month]],PROVIDER:[TOKEN:[[id number]],FOLLOWUP:[1 month]],PROVIDER:[TOKEN:[[id number]],FOLLOWUP:[2 weeks]]

## 2024-12-20 NOTE — PROGRESS NOTE ADULT - SUBJECTIVE AND OBJECTIVE BOX
85 yo F with PMHx of GERD, HTN, OA s/p bilateral TKA and ROHIT, s/p L forefoot exostosis removal (11/2024) RA, and hx of breast cancer (in remission since 2015) who presents to the hospital after a fall. Around 1000, patient was seated in a chair and reaching for something on another chair when she lost her balance and fell onto her R side and hit her head. She was unable to get up from the ground and was lying on the floor for ~8 hours until her friend found her. +HS. -LOC. -AC. Subsequently, she had RLE pain. Of note, patient fell forwards two days prior and hit her face with subsequent bruising. Seen in ED at the time without any significant injuries found. No dizziness/lightheadedness associated with either fall. On admission, imaging showed acute comminuted right femoral distal metadiaphyseal fracture with volar apex angulation and 1.5 cm posterior displacement and small knee joint effusion. She underwent a R femur ORIF (12/2). Weightbearing status WBAT. During hospital course, patient had post-op blood loss anemia. Last H/H 7.5/23.1.    Imaging:  CT of R femur: Acute comminuted right femoral distal metadiaphyseal fracture with volar apex angulation and 1.5 cm posterior displacement. Small knee joint effusion.  CT head: No acute process seen    During their stay, the patient was evaluated by physical and occupational therapy. Prior to admission, patient was independent with ADLs, dependent on some iADLs (grocery, laundry), and ambulates with walker/cane occasionally The most recent evaluated functional status is max A with transfers, ambulates 1 side step by b/s secondary to decreased weight bearing tolerance on RLE and fear of falling, standby assist for UB dressing, dependent for LB dressing. Patient was deemed a good acute rehab candidate due to decline in functional status and ability to carry out activities of daily living. Patient is able to tolerate 3 hours of therapy 5-7 days a week and is motivated to participate.    The patient was evaluated by the PM&R team once medically stable. The patient was found to have functional limitations in terms of physical strength/mobility and ability to carry out ADLs (self care, transfers, ambulation). Patient admitted to   12/5 for rehabilitation of R femur fx, s/p ORIF with decline in ADLs.    Living Situation: resides in  with 1 FOS to bedroom (uses stair lift) and 3 steps to enter  PLOF:  independent with ADLs, dependent on some iADLs (grocery, laundry), and ambulates with walker/cane occasionally    Today’s Subjective & Review of Symptoms  No overnight events.  Patient seen this morning. States her R hip is bothering her more today because of the cold. Has taken an aromatase inhibitor for 5 years in the past and also prednisone. She has a rheumatologist she follows for her rheumatoid arthritis. States prior to hospitalization has been receiving injections for arthritis every 6 weeks  Participating/tolerating PT/OT.   Having regular BM and voiding spontaneously.   Vitals reviewed.     CLOF:  modA with transfers, modA bed mobility, ambulates 50ft with RW and modA, LB dressing SPV, toileting TA    Review of Systems:   Constitutional:    [x ] WNL           [   ] poor appetite   [ ] insomnia   [  ] tired   Cardio:           [x ] WNL           [   ] CP              [ ] MOJICA        [  ] palpitations               Resp:             [x ] WNL           [   ] SOB             [ ] cough      [  ] wheezing   GI:               [ x] WNL           [   ] constipation    [ ] diarrhea   [  ] abdominal pain       [ ] nausea   [  ] emesis                                :               [ x] WNL           [   ] MARTINS           [ ] dysuria    [  ] difficulty voiding   [ ] Other:    Endo:             [ x] WNL           [   ] polyuria        [ ] temperature intolerance                 Skin:             [ ] WNL           [   ] pain            [x ] incision-right LE with dressing applied   [ x ] rash-ecchymosis along bilateral facial cheeks   MSK:              [ ] WNL           [ x  ] muscle pain     [x ] joint pain [x ] muscle tenderness   [x ] swelling RLE   Neuro:            [ ] WNL           [   ] HA              [ ] change in vision   [   ] tremor   [x ] weakness [  ] dysphagia    Cognitive:        [x ] WNL           [   ] confusion      Psych:            [x ] WNL           [   ] hallucinations   [   ]agitation   [   ] delusion   [   ]depression    Physical Exam:  Vitals  Vital Signs Last 24 Hrs  T(C): 36.7 (20 Dec 2024 05:00), Max: 36.8 (19 Dec 2024 20:59)  T(F): 98.1 (20 Dec 2024 05:00), Max: 98.2 (19 Dec 2024 20:59)  HR: 67 (20 Dec 2024 05:00) (67 - 97)  BP: 170/78 (20 Dec 2024 05:00) (155/76 - 170/78)  BP(mean): 103 (19 Dec 2024 20:59) (103 - 103)  RR: 18 (20 Dec 2024 05:00) (18 - 18)  SpO2: 97% (20 Dec 2024 05:00) (97% - 100%)    Parameters below as of 20 Dec 2024 05:00  Patient On (Oxygen Delivery Method): room air      General: Resting in bed                     HEENT: [   ] NC/AT, EOMI,  Normal Conjunctivae,   [  x ] other: bilateral cheek facial abrasion, normal conjunctivae, EOMI  Cardio: [  x ] RRR, no murmur,   [   ] other:                              Pulm: [ x  ] No Respiratory Distress,  Lungs CTAB,   [   ] other:             Abdomen: [ x  ]ND/NT, Soft,   [   ] other:    : [ x  ] No martins catheter, [   ] Martins catheter present [  ] other:   MSK: [  ] No joint swelling,  [ x  ] other:  RLE/ knee joint swelling                      Ext: [ x  ]No C/C, No calf tenderness,   [  x ]other:  swelling of MCPs, no warmth  Skin: [   ]intact,   [ x  ] other: RLE incision site with post op dressing, C&D    Neurological Examination:  Cognitive: [  x  ] AAO x 3,   [  ]  other:       Attention:  [ x  ] intact,   [    ]  other:   Language: [ x ] intact  [  ]    Memory: [ x  ] intact,    [  ]  other:     Mood/Affect: [  x ] wnl  [    ]  other:                                                                             Communication: [x   ]Fluent, no dysarthria [    ] other:   CN II - XII:  [ x   ] intact,  [    ] other:                                                                                         Motor:   RUE: 5/5 shoulder abduction, 5/5 elbow flexion, 5/5 elbow extension, 5/5 finger flexion  LUE: 5/5 shoulder abduction, 5/5 elbow flexion, 5/5 elbow extension, 5/5 finger flexion  RLE: 2/5 hip flexion (within restricted ROM), 2+/5 knee ext/flex (within restricted ROM), 4+/5 plantarflexion/dorsiflexion   LLE: 5/5 hip flexion, 5/5 knee ext/flex, 4+/5 dorsiflexion/plantarflexion     Tone: [  x ] wnl,   [    ]  other:  DTRs: [ x ]symmetric, [   ] other:  Coordination:   [ x ] intact,   [  ] other:                                                                        Sensory: [ x   ] Intact to light touch  [  ] other:      RECENT LABS/IMAGING    12-20    136  |  99  |  16  ----------------------------<  95  4.9   |  27  |  0.8    Ca    9.1      20 Dec 2024 07:01    TPro  6.0  /  Alb  3.2[L]  /  TBili  0.6  /  DBili  x   /  AST  19  /  ALT  9   /  AlkPhos  150[H]  12-20      Urinalysis Basic - ( 20 Dec 2024 07:01 )    Color: x / Appearance: x / SG: x / pH: x  Gluc: 95 mg/dL / Ketone: x  / Bili: x / Urobili: x   Blood: x / Protein: x / Nitrite: x   Leuk Esterase: x / RBC: x / WBC x   Sq Epi: x / Non Sq Epi: x / Bacteria: x

## 2024-12-20 NOTE — PROGRESS NOTE ADULT - ASSESSMENT
Assessment:   85 yo F with PMHx of GERD, HTN, OA s/p bilateral TKA and ROHIT, s/p L forefoot exostosis removal (11/2024), RA (previously on methotrexate), and hx of breast cancer (in remission since 2015) who presented to the hospital after a fall and found to have comminuted right femoral distal metadiaphyseal fracture with volar apex angulation and 1.5 cm posterior displacement and small knee joint effusion. She underwent a R femur ORIF (12/2). Weightbearing status WBAT.    Plan:    #Rehab of Right femur fx s/p ORIF with impairment in mobility and ADLs and iADLs. The patient presented with co-morbidities requiring close physiatry follow-up at least 3 times a week to monitor his progress and monitor his comorbidities including HTN, OA s/p TKA and ROHIT, RA, and s/p L forefoot exostosis removal (11/2024).The patient's co-morbidities do not limit the patient's ability to participate in rehabilitative therapy. The patient was seen by PT/OT and  required max A with transfers, standby assist for UB dressing, dependent for LB dressing, and ambulates 1 side step by b/s secondary to decreased weight bearing tolerance on RLE and fear of falling, The patient was previously independent with all her ADLs and some iADLs (dependent on laundry and grocery) and ambulated with cane/walker occasionally and now presents with functional decline. The patient can benefit from and tolerate 3 hours of restorative therapy daily. The patient is an excellent acute inpatient rehabilitation candidate.     #S/P right distal femur fracture/ ORIF  -CTH shows no acute findings.  -CT Right femur shows acute comminuted right femoral distal metadiaphyseal fracture with volar apex angulation and 1.5 cm posterior displacement and small knee joint effusion.  -s/p subsequent R femur ORIF (12/2).   -Weight bearing status WBAT.  -Per Ortho, DVT ppx x6 weeks post-op  -c/w ASA 81mg QD  - Pain control: tramadol 50 q6 PRN, Tylenol q6  -Will adjust pain medications to optimize pain control  -c/w Calcium 600+D 600 mg-200 tablet: BID  -PT/ OT  - 12/6 BLE dopplers: no DVT in LLE. Unable to visualize venous system in RLE  The patient requires acute rehab with 3 hours of daily therapies at least 5 out of 7 days and close physiatry follow up.     #s/p recent fall last week with HT and bilateral facial contusions/ ecchymosis/ no LOC  - monitor    #Recent left heel spur resection  - WBAT  - limiting ambulation  - to wear cast-boot for longer distances as per patient  - followed by podiatry    #Post-op acute blood loss anemia (improving)  -Baseline hgb ~10.  -H/H 7.6/23.6 (7.5/23.1) 12/6  -c/w H/H monitoring and hemodynamic status monitoring  -Transfuse if hgb<7.    #HTN  -continue to monitor hemodynamic status   - 12/6 started home metoprolol ER 25mg nightly  -will adjust medications as needed    #HLD  -c/w rosuvastatin 10mg QD     #GERD  - c/w famotidine 20mg  BID    #OA  #RA  per patient, was taking aromatase inhibitor x5 years in the past and was on steroids previously.   - s/p bilat THR/ TKR  - Previously on methotrexate.   -Not on medication currently  - Follows up outpatient with rheumatology  - monitor for exacerbation now off Methotrexate    #MRSA precautions discontinued 12/6 per Infection Control.    #Anxiety/depression  - Previously on sertraline  - Psych consulted 12/17   -Per Psych recs, zoloft 25mg qhs started and will tritiate up to her home 50mg qhs dose.    #Asymptomatic bacteruria vs ?UTI  12/13 UA positive  -E. coli positive urine culture.   - Bactrim DS 1 dose daily (12/13-12/15)  - However culture sensitivities showed resistance to bactrim after completion of bactrim course.  -WBC stable. Asymptomatic.  -ID consulted and do not recommend abx at this time.     #Hyponatremia (resolved)  -Na 131 (12/13)  - on regular salt diet  -will continue to monitor   -Started on sodium chloride 1g TID 12/15  -12/20 sodium 136    #Hypomagnesemia (resolved)  -Mg 1.9 (12/9)  -c/w magnesium supplement as needed    #Azotemia (resolved)  -12/6 BUN 31->22-> 16 (12/20)    #Acute ADINA (resolved)  -NaCI bolus given 12/16    -Pain control: tramadol and Tylenol  -GI/Bowel Mgmt: Miralax prn, Senna prn, bisacodyl discontinued   -Skin: surgical incision along RLE  - Diet Regular with Ensure    Precautions / PROPHYLAXIS:    - Falls  - DVT prophylaxis: lovenox   Assessment:   83 yo F with PMHx of GERD, HTN, OA s/p bilateral TKA and ROHIT, s/p L forefoot exostosis removal (11/2024), RA (previously on methotrexate), and hx of breast cancer (in remission since 2015) who presented to the hospital after a fall and found to have comminuted right femoral distal metadiaphyseal fracture with volar apex angulation and 1.5 cm posterior displacement and small knee joint effusion. She underwent a R femur ORIF (12/2). Weightbearing status WBAT.    Plan:    #Rehab of Right femur fx s/p ORIF with impairment in mobility and ADLs and iADLs. The patient presented with co-morbidities requiring close physiatry follow-up at least 3 times a week to monitor his progress and monitor his comorbidities including HTN, OA s/p TKA and ROHIT, RA, and s/p L forefoot exostosis removal (11/2024).The patient's co-morbidities do not limit the patient's ability to participate in rehabilitative therapy. The patient was seen by PT/OT and  required max A with transfers, standby assist for UB dressing, dependent for LB dressing, and ambulates 1 side step by b/s secondary to decreased weight bearing tolerance on RLE and fear of falling, The patient was previously independent with all her ADLs and some iADLs (dependent on laundry and grocery) and ambulated with cane/walker occasionally and now presents with functional decline. The patient can benefit from and tolerate 3 hours of restorative therapy daily. The patient is an excellent acute inpatient rehabilitation candidate.     #S/P right distal femur fracture/ ORIF  -CTH shows no acute findings.  -CT Right femur shows acute comminuted right femoral distal metadiaphyseal fracture with volar apex angulation and 1.5 cm posterior displacement and small knee joint effusion.  -s/p subsequent R femur ORIF (12/2).   -Weight bearing status WBAT.  -Per Ortho, DVT ppx x6 weeks post-op  -c/w ASA 81mg QD  - Pain control: tramadol 50 q6 PRN, Tylenol q6  -Will adjust pain medications to optimize pain control  -c/w Calcium 600+D 600 mg-200 tablet: BID  -PT/ OT  - 12/6 BLE dopplers: no DVT in LLE. Unable to visualize venous system in RLE  The patient requires acute rehab with 3 hours of daily therapies at least 5 out of 7 days and close physiatry follow up.     #s/p recent fall last week with HT and bilateral facial contusions/ ecchymosis/ no LOC  - monitor    #Recent left heel spur resection  - WBAT  - limiting ambulation  - to wear cast-boot for longer distances as per patient  - followed by podiatry    #Post-op acute blood loss anemia (improving)  -Baseline hgb ~10.  -H/H 7.6/23.6 (7.5/23.1) 12/6  -c/w H/H monitoring and hemodynamic status monitoring  -Transfuse if hgb<7.    #HTN  -continue to monitor hemodynamic status   - 12/6 started home metoprolol ER 25mg nightly  -will adjust medications as needed    #HLD  -c/w rosuvastatin 10mg QD     #GERD  - c/w famotidine 20mg  BID    #OA  #RA  #Osteoporosis  -She doesn't recall having a bone mineral density test or treatment for osteoporosis.   -She reports taking an aromatase inhibitor for 5 years for breast cancer past and has been on corticosteroids at times which are risk factors for osteoporosis.   - s/p bilat THR/ TKR  - Previously on methotrexate; not on medication currently. Monitor.   - Follow up as outpatient with rheumatology for her RA and Osteoporosis is required. A Dexascan/ bone mineral density test is suggested.      #MRSA precautions discontinued 12/6 per Infection Control.    #Anxiety/depression  - Previously on sertraline  - Psych consulted 12/17   -Per Psych recs, zoloft 25mg qhs started and will tritiate up to her home 50mg qhs dose.    #Asymptomatic bacteruria vs ?UTI  12/13 UA positive  -E. coli positive urine culture.   - Bactrim DS 1 dose daily (12/13-12/15)  - However culture sensitivities showed resistance to bactrim after completion of bactrim course.  -WBC stable. Asymptomatic.  -ID consulted and do not recommend abx at this time.     #Hyponatremia (resolved)  -Na 131 (12/13)  - on regular salt diet  -will continue to monitor   -Started on sodium chloride 1g TID 12/15  -12/20 sodium 136    #Hypomagnesemia (resolved)  -Mg 1.9 (12/9)  -c/w magnesium supplement as needed    #Azotemia (resolved)  -12/6 BUN 31->22-> 16 (12/20)    #Acute ADINA (resolved)  -NaCI bolus given 12/16    -Pain control: tramadol and Tylenol  -GI/Bowel Mgmt: Miralax prn, Senna prn, bisacodyl discontinued   -Skin: surgical incision along RLE  - Diet Regular with Ensure    Precautions / PROPHYLAXIS:    - Falls  - DVT prophylaxis: lovenox

## 2024-12-21 RX ADMIN — ACETAMINOPHEN 650 MILLIGRAM(S): 80 SOLUTION/ DROPS ORAL at 17:55

## 2024-12-21 RX ADMIN — ACETAMINOPHEN 650 MILLIGRAM(S): 80 SOLUTION/ DROPS ORAL at 05:22

## 2024-12-21 RX ADMIN — Medication 400 MILLIGRAM(S): at 08:06

## 2024-12-21 RX ADMIN — Medication 400 MILLIGRAM(S): at 12:40

## 2024-12-21 RX ADMIN — SODIUM CHLORIDE 1 GRAM(S): 9 INJECTION, SOLUTION INTRAMUSCULAR; INTRAVENOUS; SUBCUTANEOUS at 16:11

## 2024-12-21 RX ADMIN — ROSUVASTATIN 10 MILLIGRAM(S): 40 TABLET, FILM COATED ORAL at 21:38

## 2024-12-21 RX ADMIN — Medication 81 MILLIGRAM(S): at 12:36

## 2024-12-21 RX ADMIN — ACETAMINOPHEN 650 MILLIGRAM(S): 80 SOLUTION/ DROPS ORAL at 12:39

## 2024-12-21 RX ADMIN — Medication 17 GRAM(S): at 12:40

## 2024-12-21 RX ADMIN — SERTRALINE HYDROCHLORIDE 25 MILLIGRAM(S): 25 TABLET ORAL at 21:38

## 2024-12-21 RX ADMIN — Medication 50 MILLIGRAM(S): at 18:02

## 2024-12-21 RX ADMIN — TRAMADOL HYDROCHLORIDE 50 MILLIGRAM(S): 50 TABLET ORAL at 18:02

## 2024-12-21 RX ADMIN — FAMOTIDINE 20 MILLIGRAM(S): 20 TABLET, FILM COATED ORAL at 05:22

## 2024-12-21 RX ADMIN — SODIUM CHLORIDE 1 GRAM(S): 9 INJECTION, SOLUTION INTRAMUSCULAR; INTRAVENOUS; SUBCUTANEOUS at 21:38

## 2024-12-21 RX ADMIN — ACETAMINOPHEN 650 MILLIGRAM(S): 80 SOLUTION/ DROPS ORAL at 21:37

## 2024-12-21 RX ADMIN — LOSARTAN POTASSIUM 25 MILLIGRAM(S): 100 TABLET, FILM COATED ORAL at 21:37

## 2024-12-21 RX ADMIN — FAMOTIDINE 20 MILLIGRAM(S): 20 TABLET, FILM COATED ORAL at 18:02

## 2024-12-21 RX ADMIN — Medication 400 MILLIGRAM(S): at 18:03

## 2024-12-21 RX ADMIN — TRAMADOL HYDROCHLORIDE 50 MILLIGRAM(S): 50 TABLET ORAL at 12:21

## 2024-12-21 RX ADMIN — CHLORHEXIDINE GLUCONATE 1 APPLICATION(S): 1.2 RINSE ORAL at 05:36

## 2024-12-21 RX ADMIN — SODIUM CHLORIDE 1 GRAM(S): 9 INJECTION, SOLUTION INTRAMUSCULAR; INTRAVENOUS; SUBCUTANEOUS at 05:22

## 2024-12-21 RX ADMIN — Medication 3 MILLIGRAM(S): at 21:44

## 2024-12-21 RX ADMIN — Medication 500 MILLIGRAM(S): at 12:40

## 2024-12-21 RX ADMIN — Medication 1 TABLET(S): at 12:39

## 2024-12-21 RX ADMIN — Medication 1 MILLIGRAM(S): at 12:36

## 2024-12-21 RX ADMIN — ENOXAPARIN SODIUM 40 MILLIGRAM(S): 60 INJECTION INTRAVENOUS; SUBCUTANEOUS at 05:22

## 2024-12-21 NOTE — PROGRESS NOTE ADULT - SUBJECTIVE AND OBJECTIVE BOX
85 yo F with PMHx of GERD, HTN, OA s/p bilateral TKA and ROHIT, s/p L forefoot exostosis removal (11/2024) RA, and hx of breast cancer (in remission since 2015) who presents to the hospital after a fall. Around 1000, patient was seated in a chair and reaching for something on another chair when she lost her balance and fell onto her R side and hit her head. She was unable to get up from the ground and was lying on the floor for ~8 hours until her friend found her. +HS. -LOC. -AC. Subsequently, she had RLE pain. Of note, patient fell forwards two days prior and hit her face with subsequent bruising. Seen in ED at the time without any significant injuries found. No dizziness/lightheadedness associated with either fall. On admission, imaging showed acute comminuted right femoral distal metadiaphyseal fracture with volar apex angulation and 1.5 cm posterior displacement and small knee joint effusion. She underwent a R femur ORIF (12/2). Weightbearing status WBAT. During hospital course, patient had post-op blood loss anemia. Last H/H 7.5/23.1.    Imaging:  CT of R femur: Acute comminuted right femoral distal metadiaphyseal fracture with volar apex angulation and 1.5 cm posterior displacement. Small knee joint effusion.  CT head: No acute process seen    During their stay, the patient was evaluated by physical and occupational therapy. Prior to admission, patient was independent with ADLs, dependent on some iADLs (grocery, laundry), and ambulates with walker/cane occasionally The most recent evaluated functional status is max A with transfers, ambulates 1 side step by b/s secondary to decreased weight bearing tolerance on RLE and fear of falling, standby assist for UB dressing, dependent for LB dressing. Patient was deemed a good acute rehab candidate due to decline in functional status and ability to carry out activities of daily living. Patient is able to tolerate 3 hours of therapy 5-7 days a week and is motivated to participate.    The patient was evaluated by the PM&R team once medically stable. The patient was found to have functional limitations in terms of physical strength/mobility and ability to carry out ADLs (self care, transfers, ambulation). Patient admitted to   12/5 for rehabilitation of R femur fx, s/p ORIF with decline in ADLs.    Living Situation: resides in  with 1 FOS to bedroom (uses stair lift) and 3 steps to enter  PLOF:  independent with ADLs, dependent on some iADLs (grocery, laundry), and ambulates with walker/cane occasionally    Today’s Subjective & Review of Symptoms  No overnight events.  Patient seen this morning.  ***  Participating/tolerating PT/OT.   Having regular BM and voiding spontaneously.   Vitals reviewed.     CLOF:  modA with transfers, modA bed mobility, ambulates 50ft with RW and modA, LB dressing SPV, toileting TA    Review of Systems:   Constitutional:    [x ] WNL           [   ] poor appetite   [ ] insomnia   [  ] tired   Cardio:           [x ] WNL           [   ] CP              [ ] MOJICA        [  ] palpitations               Resp:             [x ] WNL           [   ] SOB             [ ] cough      [  ] wheezing   GI:               [ x] WNL           [   ] constipation    [ ] diarrhea   [  ] abdominal pain       [ ] nausea   [  ] emesis                                :               [ x] WNL           [   ] MARTINS           [ ] dysuria    [  ] difficulty voiding   [ ] Other:    Endo:             [ x] WNL           [   ] polyuria        [ ] temperature intolerance                 Skin:             [ ] WNL           [   ] pain            [x ] incision-right LE with dressing applied   [ x ] rash-ecchymosis along bilateral facial cheeks   MSK:              [ ] WNL           [ x  ] muscle pain     [x ] joint pain [x ] muscle tenderness   [x ] swelling RLE   Neuro:            [ ] WNL           [   ] HA              [ ] change in vision   [   ] tremor   [x ] weakness [  ] dysphagia    Cognitive:        [x ] WNL           [   ] confusion      Psych:            [x ] WNL           [   ] hallucinations   [   ]agitation   [   ] delusion   [   ]depression    Physical Exam:  Vitals  Vital Signs Last 24 Hrs  T(C): 36.7 (21 Dec 2024 05:50), Max: 36.8 (20 Dec 2024 14:03)  T(F): 98 (21 Dec 2024 05:50), Max: 98.2 (20 Dec 2024 14:03)  HR: 75 (21 Dec 2024 05:50) (75 - 84)  BP: 160/70 (21 Dec 2024 05:50) (142/63 - 169/80)  BP(mean): --  RR: 18 (21 Dec 2024 05:50) (18 - 19)  SpO2: 99% (20 Dec 2024 14:03) (99% - 99%)    Parameters below as of 20 Dec 2024 14:03  Patient On (Oxygen Delivery Method): room air      General: Resting in bed                     HEENT: [   ] NC/AT, EOMI,  Normal Conjunctivae,   [  x ] other: bilateral cheek facial abrasion, normal conjunctivae, EOMI  Cardio: [  x ] RRR, no murmur,   [   ] other:                              Pulm: [ x  ] No Respiratory Distress,  Lungs CTAB,   [   ] other:             Abdomen: [ x  ]ND/NT, Soft,   [   ] other:    : [ x  ] No martins catheter, [   ] Martins catheter present [  ] other:   MSK: [  ] No joint swelling,  [ x  ] other:  RLE/ knee joint swelling                      Ext: [ x  ]No C/C, No calf tenderness,   [  x ]other:  swelling of MCPs, no warmth  Skin: [   ]intact,   [ x  ] other: RLE incision site with post op dressing, C&D    Neurological Examination:  Cognitive: [  x  ] AAO x 3,   [  ]  other:       Attention:  [ x  ] intact,   [    ]  other:   Language: [ x ] intact  [  ]    Memory: [ x  ] intact,    [  ]  other:     Mood/Affect: [  x ] wnl  [    ]  other:                                                                             Communication: [x   ]Fluent, no dysarthria [    ] other:   CN II - XII:  [ x   ] intact,  [    ] other:                                                                                         Motor:   RUE: 5/5 shoulder abduction, 5/5 elbow flexion, 5/5 elbow extension, 5/5 finger flexion  LUE: 5/5 shoulder abduction, 5/5 elbow flexion, 5/5 elbow extension, 5/5 finger flexion  RLE: 2/5 hip flexion (within restricted ROM), 2+/5 knee ext/flex (within restricted ROM), 4+/5 plantarflexion/dorsiflexion   LLE: 5/5 hip flexion, 5/5 knee ext/flex, 4+/5 dorsiflexion/plantarflexion     Tone: [  x ] wnl,   [    ]  other:  DTRs: [ x ]symmetric, [   ] other:  Coordination:   [ x ] intact,   [  ] other:                                                                        Sensory: [ x   ] Intact to light touch  [  ] other:        MEDICATION  MEDICATIONS  (STANDING):  acetaminophen     Tablet .. 650 milliGRAM(s) Oral every 6 hours  ascorbic acid 500 milliGRAM(s) Oral daily  aspirin enteric coated 81 milliGRAM(s) Oral daily  calcium carbonate 1250 mG  + Vitamin D (OsCal 500 + D) 1 Tablet(s) Oral daily  chlorhexidine 2% Cloths 1 Application(s) Topical <User Schedule>  enoxaparin Injectable 40 milliGRAM(s) SubCutaneous every 24 hours  famotidine    Tablet 20 milliGRAM(s) Oral two times a day  folic acid 1 milliGRAM(s) Oral daily  losartan 25 milliGRAM(s) Oral at bedtime  magnesium oxide 400 milliGRAM(s) Oral three times a day with meals  metoprolol succinate ER 50 milliGRAM(s) Oral <User Schedule>  polyethylene glycol 3350 17 Gram(s) Oral daily  rosuvastatin 10 milliGRAM(s) Oral at bedtime  sertraline 25 milliGRAM(s) Oral at bedtime  sodium chloride 1 Gram(s) Oral three times a day    MEDICATIONS  (PRN):  melatonin 3 milliGRAM(s) Oral at bedtime PRN Insomnia  prochlorperazine   Tablet 10 milliGRAM(s) Oral two times a day PRN nausea and vomiting  senna 2 Tablet(s) Oral at bedtime PRN Constipation  traMADol 50 milliGRAM(s) Oral four times a day PRN Moderate Pain (4 - 6)        RECENT LABS/IMAGING    12-20    136  |  99  |  16  ----------------------------<  95  4.9   |  27  |  0.8    Ca    9.1      20 Dec 2024 07:01    TPro  6.0  /  Alb  3.2[L]  /  TBili  0.6  /  DBili  x   /  AST  19  /  ALT  9   /  AlkPhos  150[H]  12-20      Urinalysis Basic - ( 20 Dec 2024 07:01 )    Color: x / Appearance: x / SG: x / pH: x  Gluc: 95 mg/dL / Ketone: x  / Bili: x / Urobili: x   Blood: x / Protein: x / Nitrite: x   Leuk Esterase: x / RBC: x / WBC x   Sq Epi: x / Non Sq Epi: x / Bacteria: x                 85 yo F with PMHx of GERD, HTN, OA s/p bilateral TKA and ROHIT, s/p L forefoot exostosis removal (11/2024) RA, and hx of breast cancer (in remission since 2015) who presents to the hospital after a fall. Around 1000, patient was seated in a chair and reaching for something on another chair when she lost her balance and fell onto her R side and hit her head. She was unable to get up from the ground and was lying on the floor for ~8 hours until her friend found her. +HS. -LOC. -AC. Subsequently, she had RLE pain. Of note, patient fell forwards two days prior and hit her face with subsequent bruising. Seen in ED at the time without any significant injuries found. No dizziness/lightheadedness associated with either fall. On admission, imaging showed acute comminuted right femoral distal metadiaphyseal fracture with volar apex angulation and 1.5 cm posterior displacement and small knee joint effusion. She underwent a R femur ORIF (12/2). Weightbearing status WBAT. During hospital course, patient had post-op blood loss anemia. Last H/H 7.5/23.1.    Imaging:  CT of R femur: Acute comminuted right femoral distal metadiaphyseal fracture with volar apex angulation and 1.5 cm posterior displacement. Small knee joint effusion.  CT head: No acute process seen    During their stay, the patient was evaluated by physical and occupational therapy. Prior to admission, patient was independent with ADLs, dependent on some iADLs (grocery, laundry), and ambulates with walker/cane occasionally The most recent evaluated functional status is max A with transfers, ambulates 1 side step by b/s secondary to decreased weight bearing tolerance on RLE and fear of falling, standby assist for UB dressing, dependent for LB dressing. Patient was deemed a good acute rehab candidate due to decline in functional status and ability to carry out activities of daily living. Patient is able to tolerate 3 hours of therapy 5-7 days a week and is motivated to participate.    The patient was evaluated by the PM&R team once medically stable. The patient was found to have functional limitations in terms of physical strength/mobility and ability to carry out ADLs (self care, transfers, ambulation). Patient admitted to   12/5 for rehabilitation of R femur fx, s/p ORIF with decline in ADLs.    Living Situation: resides in  with 1 FOS to bedroom (uses stair lift) and 3 steps to enter  PLOF:  independent with ADLs, dependent on some iADLs (grocery, laundry), and ambulates with walker/cane occasionally    Today’s Subjective & Review of Symptoms  No overnight events.  Patient seen this morning.  No acute complaints  Participating/tolerating PT/OT.   Having regular BM and voiding spontaneously.   Vitals reviewed.     CLOF:  modA with transfers, modA bed mobility, ambulates 50ft with RW and modA, LB dressing SPV, toileting TA    Review of Systems:   Constitutional:    [x ] WNL           [   ] poor appetite   [ ] insomnia   [  ] tired   Cardio:           [x ] WNL           [   ] CP              [ ] MOJICA        [  ] palpitations               Resp:             [x ] WNL           [   ] SOB             [ ] cough      [  ] wheezing   GI:               [ x] WNL           [   ] constipation    [ ] diarrhea   [  ] abdominal pain       [ ] nausea   [  ] emesis                                :               [ x] WNL           [   ] MARTINS           [ ] dysuria    [  ] difficulty voiding   [ ] Other:    Endo:             [ x] WNL           [   ] polyuria        [ ] temperature intolerance                 Skin:             [ ] WNL           [   ] pain            [x ] incision-right LE with dressing applied   [ x ] rash-ecchymosis along bilateral facial cheeks   MSK:              [ ] WNL           [ x  ] muscle pain     [x ] joint pain [x ] muscle tenderness   [x ] swelling RLE   Neuro:            [ ] WNL           [   ] HA              [ ] change in vision   [   ] tremor   [x ] weakness [  ] dysphagia    Cognitive:        [x ] WNL           [   ] confusion      Psych:            [x ] WNL           [   ] hallucinations   [   ]agitation   [   ] delusion   [   ]depression    Physical Exam:  Vitals  Vital Signs Last 24 Hrs  T(C): 36.7 (21 Dec 2024 05:50), Max: 36.8 (20 Dec 2024 14:03)  T(F): 98 (21 Dec 2024 05:50), Max: 98.2 (20 Dec 2024 14:03)  HR: 75 (21 Dec 2024 05:50) (75 - 84)  BP: 160/70 (21 Dec 2024 05:50) (142/63 - 169/80)  BP(mean): --  RR: 18 (21 Dec 2024 05:50) (18 - 19)  SpO2: 99% (20 Dec 2024 14:03) (99% - 99%)    Parameters below as of 20 Dec 2024 14:03  Patient On (Oxygen Delivery Method): room air      General: Resting in bed                     HEENT: [   ] NC/AT, EOMI,  Normal Conjunctivae,   [  x ] other: bilateral cheek facial abrasion, normal conjunctivae, EOMI  Cardio: [  x ] RRR, no murmur,   [   ] other:                              Pulm: [ x  ] No Respiratory Distress,  Lungs CTAB,   [   ] other:             Abdomen: [ x  ]ND/NT, Soft,   [   ] other:    : [ x  ] No martins catheter, [   ] Martins catheter present [  ] other:   MSK: [  ] No joint swelling,  [ x  ] other:  RLE/ knee joint swelling                      Ext: [ x  ]No C/C, No calf tenderness,   [  x ]other:  swelling of MCPs, no warmth  Skin: [   ]intact,   [ x  ] other: RLE incision site with post op dressing, C&D    Neurological Examination:  Cognitive: [  x  ] AAO x 3,   [  ]  other:       Attention:  [ x  ] intact,   [    ]  other:   Language: [ x ] intact  [  ]    Memory: [ x  ] intact,    [  ]  other:     Mood/Affect: [  x ] wnl  [    ]  other:                                                                             Communication: [x   ]Fluent, no dysarthria [    ] other:   CN II - XII:  [ x   ] intact,  [    ] other:                                                                                         Motor:   RUE: 5/5 shoulder abduction, 5/5 elbow flexion, 5/5 elbow extension, 5/5 finger flexion  LUE: 5/5 shoulder abduction, 5/5 elbow flexion, 5/5 elbow extension, 5/5 finger flexion  RLE: 2/5 hip flexion (within restricted ROM), 2+/5 knee ext/flex (within restricted ROM), 4+/5 plantarflexion/dorsiflexion   LLE: 5/5 hip flexion, 5/5 knee ext/flex, 4+/5 dorsiflexion/plantarflexion     Tone: [  x ] wnl,   [    ]  other:  DTRs: [ x ]symmetric, [   ] other:  Coordination:   [ x ] intact,   [  ] other:                                                                        Sensory: [ x   ] Intact to light touch  [  ] other:        MEDICATION  MEDICATIONS  (STANDING):  acetaminophen     Tablet .. 650 milliGRAM(s) Oral every 6 hours  ascorbic acid 500 milliGRAM(s) Oral daily  aspirin enteric coated 81 milliGRAM(s) Oral daily  calcium carbonate 1250 mG  + Vitamin D (OsCal 500 + D) 1 Tablet(s) Oral daily  chlorhexidine 2% Cloths 1 Application(s) Topical <User Schedule>  enoxaparin Injectable 40 milliGRAM(s) SubCutaneous every 24 hours  famotidine    Tablet 20 milliGRAM(s) Oral two times a day  folic acid 1 milliGRAM(s) Oral daily  losartan 25 milliGRAM(s) Oral at bedtime  magnesium oxide 400 milliGRAM(s) Oral three times a day with meals  metoprolol succinate ER 50 milliGRAM(s) Oral <User Schedule>  polyethylene glycol 3350 17 Gram(s) Oral daily  rosuvastatin 10 milliGRAM(s) Oral at bedtime  sertraline 25 milliGRAM(s) Oral at bedtime  sodium chloride 1 Gram(s) Oral three times a day    MEDICATIONS  (PRN):  melatonin 3 milliGRAM(s) Oral at bedtime PRN Insomnia  prochlorperazine   Tablet 10 milliGRAM(s) Oral two times a day PRN nausea and vomiting  senna 2 Tablet(s) Oral at bedtime PRN Constipation  traMADol 50 milliGRAM(s) Oral four times a day PRN Moderate Pain (4 - 6)        RECENT LABS/IMAGING    12-20    136  |  99  |  16  ----------------------------<  95  4.9   |  27  |  0.8    Ca    9.1      20 Dec 2024 07:01    TPro  6.0  /  Alb  3.2[L]  /  TBili  0.6  /  DBili  x   /  AST  19  /  ALT  9   /  AlkPhos  150[H]  12-20      Urinalysis Basic - ( 20 Dec 2024 07:01 )    Color: x / Appearance: x / SG: x / pH: x  Gluc: 95 mg/dL / Ketone: x  / Bili: x / Urobili: x   Blood: x / Protein: x / Nitrite: x   Leuk Esterase: x / RBC: x / WBC x   Sq Epi: x / Non Sq Epi: x / Bacteria: x

## 2024-12-21 NOTE — PROGRESS NOTE ADULT - ASSESSMENT
Assessment:   83 yo F with PMHx of GERD, HTN, OA s/p bilateral TKA and ROHIT, s/p L forefoot exostosis removal (11/2024), RA (previously on methotrexate), and hx of breast cancer (in remission since 2015) who presented to the hospital after a fall and found to have comminuted right femoral distal metadiaphyseal fracture with volar apex angulation and 1.5 cm posterior displacement and small knee joint effusion. She underwent a R femur ORIF (12/2). Weightbearing status WBAT.    Plan:    #Rehab of Right femur fx s/p ORIF with impairment in mobility and ADLs and iADLs. The patient presented with co-morbidities requiring close physiatry follow-up at least 3 times a week to monitor his progress and monitor his comorbidities including HTN, OA s/p TKA and ROHIT, RA, and s/p L forefoot exostosis removal (11/2024).The patient's co-morbidities do not limit the patient's ability to participate in rehabilitative therapy. The patient was seen by PT/OT and  required max A with transfers, standby assist for UB dressing, dependent for LB dressing, and ambulates 1 side step by b/s secondary to decreased weight bearing tolerance on RLE and fear of falling, The patient was previously independent with all her ADLs and some iADLs (dependent on laundry and grocery) and ambulated with cane/walker occasionally and now presents with functional decline. The patient can benefit from and tolerate 3 hours of restorative therapy daily. The patient is an excellent acute inpatient rehabilitation candidate.     #S/P right distal femur fracture/ ORIF  -CTH shows no acute findings.  -CT Right femur shows acute comminuted right femoral distal metadiaphyseal fracture with volar apex angulation and 1.5 cm posterior displacement and small knee joint effusion.  -s/p subsequent R femur ORIF (12/2).   -Weight bearing status WBAT.  -Per Ortho, DVT ppx x6 weeks post-op  -c/w ASA 81mg QD  - Pain control: tramadol 50 q6 PRN, Tylenol q6  -Will adjust pain medications to optimize pain control  -c/w Calcium 600+D 600 mg-200 tablet: BID  -PT/ OT  - 12/6 BLE dopplers: no DVT in LLE. Unable to visualize venous system in RLE  The patient requires acute rehab with 3 hours of daily therapies at least 5 out of 7 days and close physiatry follow up.     #s/p recent fall last week with HT and bilateral facial contusions/ ecchymosis/ no LOC  - monitor    #Recent left heel spur resection  - WBAT  - limiting ambulation  - to wear cast-boot for longer distances as per patient  - followed by podiatry    #Post-op acute blood loss anemia (improving)  -Baseline hgb ~10.  -H/H 7.6/23.6 (7.5/23.1) 12/6  -c/w H/H monitoring and hemodynamic status monitoring  -Transfuse if hgb<7.    #HTN  -continue to monitor hemodynamic status   - 12/6 started home metoprolol ER 25mg nightly  - 12/20 restarted home losartan 25mg qHS  -will adjust medications as needed    #HLD  -c/w rosuvastatin 10mg QD     #GERD  - c/w famotidine 20mg  BID    #OA  #RA  #Osteoporosis  -She doesn't recall having a bone mineral density test or treatment for osteoporosis.  She reports taking an aromatase inhibitor for 5 years for breast cancer in the past and has been on corticosteroids at times which are risk factors for osteoporosis.   - s/p bilat THR/ TKR  - Previously on methotrexate; not on medication currently. Monitor.   - Follow up as outpatient with rheumatology for her RA and Osteoporosis is required. A Dexascan/ bone mineral density test is suggested.        #Anxiety/depression  - Previously on sertraline 50mg at home. However was held on previous service due to prolonged QTc  - Psych consulted 12/17         -zoloft 25mg qhs started and will tritiate up to her home 50mg qhs dose        - weekly EKGs, most recent EKG 12/17     #Asymptomatic bacteruria vs ?UTI  12/13 UA positive  -E. coli positive urine culture.   - Bactrim DS 1 dose daily (12/13-12/15)  - However culture sensitivities showed resistance to bactrim after completion of bactrim course.  -WBC stable. Asymptomatic.  -ID consulted and do not recommend abx at this time.     #Hyponatremia (resolved)  -Na 131 (12/13) -> 136 (12/20)  - on regular salt diet  -will continue to monitor   -Started on sodium chloride 1g TID 12/15    #MRSA precautions discontinued 12/6 per Infection Control. (resolved)      -Pain control: tramadol and Tylenol  -GI/Bowel Mgmt: Miralax, Senna  -Skin: surgical incision along RLE  - Diet Regular    Precautions / PROPHYLAXIS:    - Falls  - DVT prophylaxis: lovenox

## 2024-12-22 RX ADMIN — SERTRALINE HYDROCHLORIDE 25 MILLIGRAM(S): 25 TABLET ORAL at 21:35

## 2024-12-22 RX ADMIN — SODIUM CHLORIDE 1 GRAM(S): 9 INJECTION, SOLUTION INTRAMUSCULAR; INTRAVENOUS; SUBCUTANEOUS at 05:24

## 2024-12-22 RX ADMIN — Medication 50 MILLIGRAM(S): at 21:41

## 2024-12-22 RX ADMIN — ACETAMINOPHEN 650 MILLIGRAM(S): 80 SOLUTION/ DROPS ORAL at 23:16

## 2024-12-22 RX ADMIN — FAMOTIDINE 20 MILLIGRAM(S): 20 TABLET, FILM COATED ORAL at 05:24

## 2024-12-22 RX ADMIN — ACETAMINOPHEN 650 MILLIGRAM(S): 80 SOLUTION/ DROPS ORAL at 05:22

## 2024-12-22 RX ADMIN — ACETAMINOPHEN 650 MILLIGRAM(S): 80 SOLUTION/ DROPS ORAL at 05:24

## 2024-12-22 RX ADMIN — Medication 3 MILLIGRAM(S): at 21:36

## 2024-12-22 RX ADMIN — Medication 500 MILLIGRAM(S): at 12:51

## 2024-12-22 RX ADMIN — ACETAMINOPHEN 650 MILLIGRAM(S): 80 SOLUTION/ DROPS ORAL at 17:50

## 2024-12-22 RX ADMIN — Medication 400 MILLIGRAM(S): at 08:04

## 2024-12-22 RX ADMIN — LOSARTAN POTASSIUM 25 MILLIGRAM(S): 100 TABLET, FILM COATED ORAL at 21:38

## 2024-12-22 RX ADMIN — Medication 81 MILLIGRAM(S): at 12:50

## 2024-12-22 RX ADMIN — SODIUM CHLORIDE 1 GRAM(S): 9 INJECTION, SOLUTION INTRAMUSCULAR; INTRAVENOUS; SUBCUTANEOUS at 15:38

## 2024-12-22 RX ADMIN — ACETAMINOPHEN 650 MILLIGRAM(S): 80 SOLUTION/ DROPS ORAL at 12:20

## 2024-12-22 RX ADMIN — Medication 400 MILLIGRAM(S): at 17:49

## 2024-12-22 RX ADMIN — ACETAMINOPHEN 650 MILLIGRAM(S): 80 SOLUTION/ DROPS ORAL at 12:50

## 2024-12-22 RX ADMIN — FAMOTIDINE 20 MILLIGRAM(S): 20 TABLET, FILM COATED ORAL at 17:50

## 2024-12-22 RX ADMIN — Medication 1 MILLIGRAM(S): at 12:50

## 2024-12-22 RX ADMIN — SODIUM CHLORIDE 1 GRAM(S): 9 INJECTION, SOLUTION INTRAMUSCULAR; INTRAVENOUS; SUBCUTANEOUS at 21:35

## 2024-12-22 RX ADMIN — Medication 1 TABLET(S): at 12:50

## 2024-12-22 RX ADMIN — ROSUVASTATIN 10 MILLIGRAM(S): 40 TABLET, FILM COATED ORAL at 21:36

## 2024-12-22 RX ADMIN — ENOXAPARIN SODIUM 40 MILLIGRAM(S): 60 INJECTION INTRAVENOUS; SUBCUTANEOUS at 05:23

## 2024-12-22 RX ADMIN — Medication 17 GRAM(S): at 12:51

## 2024-12-22 RX ADMIN — Medication 400 MILLIGRAM(S): at 12:51

## 2024-12-22 RX ADMIN — ACETAMINOPHEN 650 MILLIGRAM(S): 80 SOLUTION/ DROPS ORAL at 13:00

## 2024-12-22 NOTE — PROGRESS NOTE ADULT - ASSESSMENT
Assessment:   83 yo F with PMHx of GERD, HTN, OA s/p bilateral TKA and ROHIT, s/p L forefoot exostosis removal (11/2024), RA (previously on methotrexate), and hx of breast cancer (in remission since 2015) who presented to the hospital after a fall and found to have comminuted right femoral distal metadiaphyseal fracture with volar apex angulation and 1.5 cm posterior displacement and small knee joint effusion. She underwent a R femur ORIF (12/2). Weightbearing status WBAT.    Plan:    #Rehab of Right femur fx s/p ORIF with impairment in mobility and ADLs and iADLs. The patient presented with co-morbidities requiring close physiatry follow-up at least 3 times a week to monitor his progress and monitor his comorbidities including HTN, OA s/p TKA and ROHIT, RA, and s/p L forefoot exostosis removal (11/2024).The patient's co-morbidities do not limit the patient's ability to participate in rehabilitative therapy. The patient was seen by PT/OT and  required max A with transfers, standby assist for UB dressing, dependent for LB dressing, and ambulates 1 side step by b/s secondary to decreased weight bearing tolerance on RLE and fear of falling, The patient was previously independent with all her ADLs and some iADLs (dependent on laundry and grocery) and ambulated with cane/walker occasionally and now presents with functional decline. The patient can benefit from and tolerate 3 hours of restorative therapy daily. The patient is an excellent acute inpatient rehabilitation candidate.     #S/P right distal femur fracture/ ORIF  -CTH shows no acute findings.  -CT Right femur shows acute comminuted right femoral distal metadiaphyseal fracture with volar apex angulation and 1.5 cm posterior displacement and small knee joint effusion.  -s/p subsequent R femur ORIF (12/2).   -Weight bearing status WBAT.  -Per Ortho, DVT ppx x6 weeks post-op  -c/w ASA 81mg QD  - Pain control: tramadol 50 q6 PRN, Tylenol q6  -Will adjust pain medications to optimize pain control  -c/w Calcium 600+D 600 mg-200 tablet: BID  -PT/ OT  - 12/6 BLE dopplers: no DVT in LLE. Unable to visualize venous system in RLE  The patient requires acute rehab with 3 hours of daily therapies at least 5 out of 7 days and close physiatry follow up.     #s/p recent fall last week with HT and bilateral facial contusions/ ecchymosis/ no LOC  - monitor    #Recent left heel spur resection  - WBAT  - limiting ambulation  - to wear cast-boot for longer distances as per patient  - followed by podiatry    #Post-op acute blood loss anemia (improving)  -Baseline hgb ~10.  -H/H 7.6/23.6 (7.5/23.1) 12/6  -c/w H/H monitoring and hemodynamic status monitoring  -Transfuse if hgb<7.    #HTN  -continue to monitor hemodynamic status   - 12/6 started home metoprolol ER 25mg nightly-->increased to metoprolol ER 50mg qhs  - 12/20 restarted home losartan 25mg qHS  -will adjust medications as needed    #HLD  -c/w rosuvastatin 10mg QD     #GERD  - c/w famotidine 20mg  BID    #OA  #RA  #Osteoporosis  -She doesn't recall having a bone mineral density test or treatment for osteoporosis.  She reports taking an aromatase inhibitor for 5 years for breast cancer in the past and has been on corticosteroids at times which are risk factors for osteoporosis.   - s/p bilat THR/ TKR  - Previously on methotrexate; not on medication currently. Monitor.   - Follow up as outpatient with rheumatology for her RA and Osteoporosis is required. A Dexascan/ bone mineral density test is suggested.        #Anxiety/depression  - Previously on sertraline 50mg at home. However was held on previous service due to prolonged QTc  - Psych consulted 12/17         -zoloft 25mg qhs started and will tritiate up to her home 50mg qhs dose        - weekly EKGs, most recent EKG 12/17     #Asymptomatic bacteruria vs ?UTI  12/13 UA positive  -E. coli positive urine culture.   - Bactrim DS 1 dose daily (12/13-12/15)  - However culture sensitivities showed resistance to bactrim after completion of bactrim course.  -WBC stable. Asymptomatic.  -ID consulted and do not recommend abx at this time.     #Hyponatremia (resolved)  -Na 131 (12/13) -> 136 (12/20)  - on regular salt diet  -will continue to monitor   -Started on sodium chloride 1g TID 12/15    #MRSA precautions discontinued 12/6 per Infection Control. (resolved)      -Pain control: tramadol and Tylenol  -GI/Bowel Mgmt: Miralax, Senna  -Skin: surgical incision along RLE  - Diet Regular    Precautions / PROPHYLAXIS:    - Falls  - DVT prophylaxis: lovenox

## 2024-12-23 LAB
ALBUMIN SERPL ELPH-MCNC: 3.2 G/DL — LOW (ref 3.5–5.2)
ALP SERPL-CCNC: 160 U/L — HIGH (ref 30–115)
ALT FLD-CCNC: 10 U/L — SIGNIFICANT CHANGE UP (ref 0–41)
ANION GAP SERPL CALC-SCNC: 12 MMOL/L — SIGNIFICANT CHANGE UP (ref 7–14)
AST SERPL-CCNC: 18 U/L — SIGNIFICANT CHANGE UP (ref 0–41)
BASOPHILS # BLD AUTO: 0.02 K/UL — SIGNIFICANT CHANGE UP (ref 0–0.2)
BASOPHILS NFR BLD AUTO: 0.3 % — SIGNIFICANT CHANGE UP (ref 0–1)
BILIRUB SERPL-MCNC: 0.5 MG/DL — SIGNIFICANT CHANGE UP (ref 0.2–1.2)
BUN SERPL-MCNC: 15 MG/DL — SIGNIFICANT CHANGE UP (ref 10–20)
CALCIUM SERPL-MCNC: 8.6 MG/DL — SIGNIFICANT CHANGE UP (ref 8.4–10.5)
CHLORIDE SERPL-SCNC: 100 MMOL/L — SIGNIFICANT CHANGE UP (ref 98–110)
CO2 SERPL-SCNC: 23 MMOL/L — SIGNIFICANT CHANGE UP (ref 17–32)
CREAT SERPL-MCNC: 0.7 MG/DL — SIGNIFICANT CHANGE UP (ref 0.7–1.5)
EGFR: 85 ML/MIN/1.73M2 — SIGNIFICANT CHANGE UP
EOSINOPHIL # BLD AUTO: 0.09 K/UL — SIGNIFICANT CHANGE UP (ref 0–0.7)
EOSINOPHIL NFR BLD AUTO: 1.5 % — SIGNIFICANT CHANGE UP (ref 0–8)
GLUCOSE SERPL-MCNC: 85 MG/DL — SIGNIFICANT CHANGE UP (ref 70–99)
HCT VFR BLD CALC: 29.1 % — LOW (ref 37–47)
HGB BLD-MCNC: 9 G/DL — LOW (ref 12–16)
IMM GRANULOCYTES NFR BLD AUTO: 0.5 % — HIGH (ref 0.1–0.3)
LYMPHOCYTES # BLD AUTO: 1.9 K/UL — SIGNIFICANT CHANGE UP (ref 1.2–3.4)
LYMPHOCYTES # BLD AUTO: 31.3 % — SIGNIFICANT CHANGE UP (ref 20.5–51.1)
MAGNESIUM SERPL-MCNC: 1.7 MG/DL — LOW (ref 1.8–2.4)
MCHC RBC-ENTMCNC: 30.4 PG — SIGNIFICANT CHANGE UP (ref 27–31)
MCHC RBC-ENTMCNC: 30.9 G/DL — LOW (ref 32–37)
MCV RBC AUTO: 98.3 FL — SIGNIFICANT CHANGE UP (ref 81–99)
MONOCYTES # BLD AUTO: 0.74 K/UL — HIGH (ref 0.1–0.6)
MONOCYTES NFR BLD AUTO: 12.2 % — HIGH (ref 1.7–9.3)
NEUTROPHILS # BLD AUTO: 3.29 K/UL — SIGNIFICANT CHANGE UP (ref 1.4–6.5)
NEUTROPHILS NFR BLD AUTO: 54.2 % — SIGNIFICANT CHANGE UP (ref 42.2–75.2)
NRBC # BLD: 0 /100 WBCS — SIGNIFICANT CHANGE UP (ref 0–0)
PLATELET # BLD AUTO: 342 K/UL — SIGNIFICANT CHANGE UP (ref 130–400)
PMV BLD: 9.4 FL — SIGNIFICANT CHANGE UP (ref 7.4–10.4)
POTASSIUM SERPL-MCNC: 4.3 MMOL/L — SIGNIFICANT CHANGE UP (ref 3.5–5)
POTASSIUM SERPL-SCNC: 4.3 MMOL/L — SIGNIFICANT CHANGE UP (ref 3.5–5)
PROT SERPL-MCNC: 5.8 G/DL — LOW (ref 6–8)
RBC # BLD: 2.96 M/UL — LOW (ref 4.2–5.4)
RBC # FLD: 15.7 % — HIGH (ref 11.5–14.5)
SODIUM SERPL-SCNC: 135 MMOL/L — SIGNIFICANT CHANGE UP (ref 135–146)
WBC # BLD: 6.07 K/UL — SIGNIFICANT CHANGE UP (ref 4.8–10.8)
WBC # FLD AUTO: 6.07 K/UL — SIGNIFICANT CHANGE UP (ref 4.8–10.8)

## 2024-12-23 PROCEDURE — 73560 X-RAY EXAM OF KNEE 1 OR 2: CPT | Mod: 26,RT

## 2024-12-23 PROCEDURE — 73552 X-RAY EXAM OF FEMUR 2/>: CPT | Mod: 26,RT

## 2024-12-23 RX ORDER — LOSARTAN POTASSIUM 100 MG/1
50 TABLET, FILM COATED ORAL AT BEDTIME
Refills: 0 | Status: DISCONTINUED | OUTPATIENT
Start: 2024-12-23 | End: 2024-12-24

## 2024-12-23 RX ORDER — LIDOCAINE 50 MG/G
1 OINTMENT TOPICAL DAILY
Refills: 0 | Status: DISCONTINUED | OUTPATIENT
Start: 2024-12-23 | End: 2024-12-24

## 2024-12-23 RX ADMIN — Medication 3 MILLIGRAM(S): at 21:08

## 2024-12-23 RX ADMIN — ACETAMINOPHEN 650 MILLIGRAM(S): 80 SOLUTION/ DROPS ORAL at 05:52

## 2024-12-23 RX ADMIN — Medication 400 MILLIGRAM(S): at 08:33

## 2024-12-23 RX ADMIN — LIDOCAINE 1 PATCH: 50 OINTMENT TOPICAL at 23:29

## 2024-12-23 RX ADMIN — ACETAMINOPHEN 650 MILLIGRAM(S): 80 SOLUTION/ DROPS ORAL at 06:44

## 2024-12-23 RX ADMIN — SERTRALINE HYDROCHLORIDE 25 MILLIGRAM(S): 25 TABLET ORAL at 21:08

## 2024-12-23 RX ADMIN — SODIUM CHLORIDE 1 GRAM(S): 9 INJECTION, SOLUTION INTRAMUSCULAR; INTRAVENOUS; SUBCUTANEOUS at 05:52

## 2024-12-23 RX ADMIN — Medication 50 MILLIGRAM(S): at 18:12

## 2024-12-23 RX ADMIN — ACETAMINOPHEN 650 MILLIGRAM(S): 80 SOLUTION/ DROPS ORAL at 18:13

## 2024-12-23 RX ADMIN — ROSUVASTATIN 10 MILLIGRAM(S): 40 TABLET, FILM COATED ORAL at 21:08

## 2024-12-23 RX ADMIN — CHLORHEXIDINE GLUCONATE 1 APPLICATION(S): 1.2 RINSE ORAL at 05:52

## 2024-12-23 RX ADMIN — ACETAMINOPHEN 650 MILLIGRAM(S): 80 SOLUTION/ DROPS ORAL at 23:27

## 2024-12-23 RX ADMIN — LIDOCAINE 1 PATCH: 50 OINTMENT TOPICAL at 19:00

## 2024-12-23 RX ADMIN — FAMOTIDINE 20 MILLIGRAM(S): 20 TABLET, FILM COATED ORAL at 05:53

## 2024-12-23 RX ADMIN — Medication 1 TABLET(S): at 12:19

## 2024-12-23 RX ADMIN — Medication 400 MILLIGRAM(S): at 12:18

## 2024-12-23 RX ADMIN — LOSARTAN POTASSIUM 50 MILLIGRAM(S): 100 TABLET, FILM COATED ORAL at 21:08

## 2024-12-23 RX ADMIN — ACETAMINOPHEN 650 MILLIGRAM(S): 80 SOLUTION/ DROPS ORAL at 00:16

## 2024-12-23 RX ADMIN — Medication 500 MILLIGRAM(S): at 12:19

## 2024-12-23 RX ADMIN — SODIUM CHLORIDE 1 GRAM(S): 9 INJECTION, SOLUTION INTRAMUSCULAR; INTRAVENOUS; SUBCUTANEOUS at 17:58

## 2024-12-23 RX ADMIN — ACETAMINOPHEN 650 MILLIGRAM(S): 80 SOLUTION/ DROPS ORAL at 12:19

## 2024-12-23 RX ADMIN — SODIUM CHLORIDE 1 GRAM(S): 9 INJECTION, SOLUTION INTRAMUSCULAR; INTRAVENOUS; SUBCUTANEOUS at 21:08

## 2024-12-23 RX ADMIN — LIDOCAINE 1 PATCH: 50 OINTMENT TOPICAL at 12:17

## 2024-12-23 RX ADMIN — TRAMADOL HYDROCHLORIDE 50 MILLIGRAM(S): 50 TABLET ORAL at 22:31

## 2024-12-23 RX ADMIN — TRAMADOL HYDROCHLORIDE 50 MILLIGRAM(S): 50 TABLET ORAL at 21:12

## 2024-12-23 RX ADMIN — FAMOTIDINE 20 MILLIGRAM(S): 20 TABLET, FILM COATED ORAL at 18:10

## 2024-12-23 RX ADMIN — Medication 81 MILLIGRAM(S): at 12:18

## 2024-12-23 RX ADMIN — Medication 17 GRAM(S): at 12:18

## 2024-12-23 RX ADMIN — ENOXAPARIN SODIUM 40 MILLIGRAM(S): 60 INJECTION INTRAVENOUS; SUBCUTANEOUS at 05:53

## 2024-12-23 RX ADMIN — Medication 1 MILLIGRAM(S): at 12:21

## 2024-12-23 NOTE — PROGRESS NOTE ADULT - SUBJECTIVE AND OBJECTIVE BOX
85 yo F with PMHx of GERD, HTN, OA s/p bilateral TKA and ROHIT, s/p L forefoot exostosis removal (11/2024) RA, and hx of breast cancer (in remission since 2015) who presents to the hospital after a fall. Around 1000, patient was seated in a chair and reaching for something on another chair when she lost her balance and fell onto her R side and hit her head. She was unable to get up from the ground and was lying on the floor for ~8 hours until her friend found her. +HS. -LOC. -AC. Subsequently, she had RLE pain. Of note, patient fell forwards two days prior and hit her face with subsequent bruising. Seen in ED at the time without any significant injuries found. No dizziness/lightheadedness associated with either fall. On admission, imaging showed acute comminuted right femoral distal metadiaphyseal fracture with volar apex angulation and 1.5 cm posterior displacement and small knee joint effusion. She underwent a R femur ORIF (12/2). Weightbearing status WBAT. During hospital course, patient had post-op blood loss anemia. Last H/H 7.5/23.1.    Imaging:  CT of R femur: Acute comminuted right femoral distal metadiaphyseal fracture with volar apex angulation and 1.5 cm posterior displacement. Small knee joint effusion.  CT head: No acute process seen    During their stay, the patient was evaluated by physical and occupational therapy. Prior to admission, patient was independent with ADLs, dependent on some iADLs (grocery, laundry), and ambulates with walker/cane occasionally The most recent evaluated functional status is max A with transfers, ambulates 1 side step by b/s secondary to decreased weight bearing tolerance on RLE and fear of falling, standby assist for UB dressing, dependent for LB dressing. Patient was deemed a good acute rehab candidate due to decline in functional status and ability to carry out activities of daily living. Patient is able to tolerate 3 hours of therapy 5-7 days a week and is motivated to participate.    The patient was evaluated by the PM&R team once medically stable. The patient was found to have functional limitations in terms of physical strength/mobility and ability to carry out ADLs (self care, transfers, ambulation). Patient admitted to   12/5 for rehabilitation of R femur fx, s/p ORIF with decline in ADLs.    Living Situation: resides in  with 1 FOS to bedroom (uses stair lift) and 3 steps to enter  PLOF:  independent with ADLs, dependent on some iADLs (grocery, laundry), and ambulates with walker/cane occasionally    Today’s Subjective & Review of Symptoms:  No overnight events.  Patient seen this morning. Endorses R knee and thigh pain. Femur and knee xray ordered. Heat and lidocaine patch ordered for area.  Participating/tolerating PT/OT.   Having regular BM and voiding spontaneously.   Vitals reviewed. AM labs reviewed.      CLOF:  modA with transfers, modA bed mobility, ambulates 50ft with RW and modA, LB dressing SPV, toileting TA    Review of Systems:   Constitutional:    [x ] WNL           [   ] poor appetite   [ ] insomnia   [  ] tired   Cardio:           [x ] WNL           [   ] CP              [ ] MOJICA        [  ] palpitations               Resp:             [x ] WNL           [   ] SOB             [ ] cough      [  ] wheezing   GI:               [ x] WNL           [   ] constipation    [ ] diarrhea   [  ] abdominal pain       [ ] nausea   [  ] emesis                                :               [ x] WNL           [   ] MARTINS           [ ] dysuria    [  ] difficulty voiding   [ ] Other:    Endo:             [ x] WNL           [   ] polyuria        [ ] temperature intolerance                 Skin:             [ ] WNL           [   ] pain            [x ] incision-right LE with dressing applied   [ x ] rash-ecchymosis along bilateral facial cheeks MSK:              [ ] WNL           [ x  ] muscle pain at right knee/thigh    [x ] joint pain [x ] muscle tenderness   [x ] swelling RLE   Neuro:            [ ] WNL           [   ] HA              [ ] change in vision   [   ] tremor   [x ] weakness [  ] dysphagia    Cognitive:        [x ] WNL           [   ] confusion      Psych:            [x ] WNL           [   ] hallucinations   [   ]agitation   [   ] delusion   [   ]depression    Physical Exam:  Vital Signs Last 24 Hrs  T(C): 36.6 (23 Dec 2024 05:00), Max: 36.9 (22 Dec 2024 21:21)  T(F): 97.9 (23 Dec 2024 05:00), Max: 98.4 (22 Dec 2024 21:21)  HR: 84 (23 Dec 2024 05:00) (76 - 84)  BP: 152/72 (23 Dec 2024 05:00) (124/63 - 158/73)  BP(mean): --  RR: 20 (23 Dec 2024 05:00) (18 - 20)  SpO2: 98% (23 Dec 2024 05:00) (98% - 98%)    Parameters below as of 23 Dec 2024 05:00  Patient On (Oxygen Delivery Method): room air    General: Resting in bed                     HEENT: [   ] NC/AT, EOMI,  Normal Conjunctivae,   [  x ] other: bilateral cheek facial abrasion, normal conjunctivae, EOMI  Cardio: [  x ] RRR, no murmur,   [   ] other:                              Pulm: [ x  ] No Respiratory Distress,  Lungs CTAB,   [   ] other:             Abdomen: [ x  ]ND/NT, Soft,   [   ] other:    : [ x  ] No martins catheter, [   ] Martins catheter present [  ] other:   MSK: [  ] No joint swelling,  [ x  ] other:  RLE/ knee joint swelling                      Ext: [ x  ]No C/C, No calf tenderness,   [  x ]other:  swelling of MCPs, no warmth  Skin: [   ]intact,   [ x  ] other: RLE incision site with post op dressing, C&D    Neurological Examination:  Cognitive: [  x  ] AAO x 3,   [  ]  other:       Attention:  [ x  ] intact,   [    ]  other:   Language: [ x ] intact  [  ]    Memory: [ x  ] intact,    [  ]  other:     Mood/Affect: [  x ] wnl  [    ]  other:                                                                             Communication: [x   ]Fluent, no dysarthria [    ] other:   CN II - XII:  [ x   ] intact,  [    ] other:                                                                                         Motor:   RUE: 5/5 shoulder abduction, 5/5 elbow flexion, 5/5 elbow extension, 5/5 finger flexion  LUE: 5/5 shoulder abduction, 5/5 elbow flexion, 5/5 elbow extension, 5/5 finger flexion  RLE: 2/5 hip flexion (within restricted ROM), 2+/5 knee ext/flex (within restricted ROM), 4+/5 plantarflexion/dorsiflexion   LLE: 5/5 hip flexion, 5/5 knee ext/flex, 4+/5 dorsiflexion/plantarflexion     Tone: [  x ] wnl,   [    ]  other:  DTRs: [ x ]symmetric, [   ] other:  Coordination:   [ x ] intact,   [  ] other:                                                                        Sensory: [ x   ] Intact to light touch  [  ] other:    MEDICATIONS  (STANDING):  acetaminophen     Tablet .. 650 milliGRAM(s) Oral every 6 hours  ascorbic acid 500 milliGRAM(s) Oral daily  aspirin enteric coated 81 milliGRAM(s) Oral daily  calcium carbonate 1250 mG  + Vitamin D (OsCal 500 + D) 1 Tablet(s) Oral daily  chlorhexidine 2% Cloths 1 Application(s) Topical <User Schedule>  enoxaparin Injectable 40 milliGRAM(s) SubCutaneous every 24 hours  famotidine    Tablet 20 milliGRAM(s) Oral two times a day  folic acid 1 milliGRAM(s) Oral daily  lidocaine   4% Patch 1 Patch Transdermal daily  losartan 25 milliGRAM(s) Oral at bedtime  magnesium oxide 400 milliGRAM(s) Oral three times a day with meals  metoprolol succinate ER 50 milliGRAM(s) Oral <User Schedule>  polyethylene glycol 3350 17 Gram(s) Oral daily  rosuvastatin 10 milliGRAM(s) Oral at bedtime  sertraline 25 milliGRAM(s) Oral at bedtime  sodium chloride 1 Gram(s) Oral three times a day    MEDICATIONS  (PRN):  melatonin 3 milliGRAM(s) Oral at bedtime PRN Insomnia  prochlorperazine   Tablet 10 milliGRAM(s) Oral two times a day PRN nausea and vomiting  senna 2 Tablet(s) Oral at bedtime PRN Constipation  traMADol 50 milliGRAM(s) Oral four times a day PRN Moderate Pain (4 - 6)    RECENT LABS/IMAGING                        9.0    6.07  )-----------( 342      ( 23 Dec 2024 06:31 )             29.1     12-23    135  |  100  |  15  ----------------------------<  85  4.3   |  23  |  0.7    Ca    8.6      23 Dec 2024 06:31  Mg     1.7     12-23    TPro  5.8[L]  /  Alb  3.2[L]  /  TBili  0.5  /  DBili  x   /  AST  18  /  ALT  10  /  AlkPhos  160[H]  12-23 12-20    136  |  99  |  16  ----------------------------<  95  4.9   |  27  |  0.8    Ca    9.1      20 Dec 2024 07:01    TPro  6.0  /  Alb  3.2[L]  /  TBili  0.6  /  DBili  x   /  AST  19  /  ALT  9   /  AlkPhos  150[H]  12-20      Urinalysis Basic - ( 20 Dec 2024 07:01 )    Color: x / Appearance: x / SG: x / pH: x  Gluc: 95 mg/dL / Ketone: x  / Bili: x / Urobili: x   Blood: x / Protein: x / Nitrite: x   Leuk Esterase: x / RBC: x / WBC x   Sq Epi: x / Non Sq Epi: x / Bacteria: x

## 2024-12-23 NOTE — PROGRESS NOTE ADULT - ASSESSMENT
Assessment:   83 yo F with PMHx of GERD, HTN, OA s/p bilateral TKA and ROHIT, s/p L forefoot exostosis removal (11/2024), RA (previously on methotrexate), and hx of breast cancer (in remission since 2015) who presented to the hospital after a fall and found to have comminuted right femoral distal metadiaphyseal fracture with volar apex angulation and 1.5 cm posterior displacement and small knee joint effusion. She underwent a R femur ORIF (12/2). Weightbearing status WBAT.    Plan:    #Rehab of Right femur fx s/p ORIF with impairment in mobility and ADLs and iADLs. The patient presented with co-morbidities requiring close physiatry follow-up at least 3 times a week to monitor his progress and monitor his comorbidities including HTN, OA s/p TKA and ROHIT, RA, and s/p L forefoot exostosis removal (11/2024).The patient's co-morbidities do not limit the patient's ability to participate in rehabilitative therapy. The patient was seen by PT/OT and  required max A with transfers, standby assist for UB dressing, dependent for LB dressing, and ambulates 1 side step by b/s secondary to decreased weight bearing tolerance on RLE and fear of falling, The patient was previously independent with all her ADLs and some iADLs (dependent on laundry and grocery) and ambulated with cane/walker occasionally and now presents with functional decline. The patient can benefit from and tolerate 3 hours of restorative therapy daily. The patient is an excellent acute inpatient rehabilitation candidate.     #S/P right distal femur fracture/ ORIF  -CTH shows no acute findings.  -CT Right femur shows acute comminuted right femoral distal metadiaphyseal fracture with volar apex angulation and 1.5 cm posterior displacement and small knee joint effusion.  -s/p subsequent R femur ORIF (12/2).   -Weight bearing status WBAT.  -Per Ortho, DVT ppx x6 weeks post-op  -c/w ASA 81mg QD  - Pain control: tramadol 50 q6 PRN, Tylenol q6  -Will adjust pain medications to optimize pain control  -c/w Calcium 600+D 600 mg-200 tablet: BID  -PT/ OT  - 12/6 BLE dopplers: no DVT in LLE. Unable to visualize venous system in RLE  The patient requires acute rehab with 3 hours of daily therapies at least 5 out of 7 days and close physiatry follow up.     #s/p recent fall last week with HT and bilateral facial contusions/ ecchymosis/ no LOC  - monitor    #R knee and thigh pain 12/23  -Knee and femur xray ordered.   -start heat and lidocaine patch to area    #Recent left heel spur resection  - WBAT  - limiting ambulation  - to wear cast-boot for longer distances as per patient  - followed by podiatry    #Post-op acute blood loss anemia (improving)  -Baseline hgb ~10.  -H/H 7.6/23.6 (7.5/23.1) 12/6  -c/w H/H monitoring and hemodynamic status monitoring  -Transfuse if hgb<7.    #HTN  -continue to monitor hemodynamic status   - 12/6 started home metoprolol ER 25mg nightly-->increased to metoprolol ER 50mg qhs  - 12/20 restarted home losartan 25mg qHS  -will adjust medications as needed    #HLD  -c/w rosuvastatin 10mg QD     #GERD  - c/w famotidine 20mg  BID    #OA  #RA  #Osteoporosis  -She doesn't recall having a bone mineral density test or treatment for osteoporosis.  She reports taking an aromatase inhibitor for 5 years for breast cancer in the past and has been on corticosteroids at times which are risk factors for osteoporosis.   - s/p bilat THR/ TKR  - Previously on methotrexate; not on medication currently. Monitor.   - Follow up as outpatient with rheumatology for her RA and Osteoporosis is required. A Dexascan/ bone mineral density test is suggested.        #Anxiety/depression  - Previously on sertraline 50mg at home. However was held on previous service due to prolonged QTc  - Psych consulted 12/17         -zoloft 25mg qhs started and will tritiate up to her home 50mg qhs dose        - weekly EKGs, most recent EKG 12/17     #Asymptomatic bacteruria vs ?UTI  12/13 UA positive  -E. coli positive urine culture.   - Bactrim DS 1 dose daily (12/13-12/15)  - However culture sensitivities showed resistance to bactrim after completion of bactrim course.  -WBC stable. Asymptomatic.  -ID consulted and do not recommend abx at this time.     #Hyponatremia (resolved)  -Na 131 (12/13) -> 136 (12/20)  - on regular salt diet  -will continue to monitor   -Started on sodium chloride 1g TID 12/15    #MRSA precautions discontinued 12/6 per Infection Control. (resolved)      -Pain control: tramadol and Tylenol  -GI/Bowel Mgmt: Miralax, Senna  -Skin: surgical incision along RLE  - Diet Regular    Precautions / PROPHYLAXIS:    - Falls  - DVT prophylaxis: lovenox

## 2024-12-24 VITALS
DIASTOLIC BLOOD PRESSURE: 65 MMHG | SYSTOLIC BLOOD PRESSURE: 139 MMHG | RESPIRATION RATE: 18 BRPM | OXYGEN SATURATION: 98 % | TEMPERATURE: 97 F | HEART RATE: 76 BPM

## 2024-12-24 RX ORDER — METOPROLOL TARTRATE 50 MG
1 TABLET ORAL
Qty: 30 | Refills: 0
Start: 2024-12-24 | End: 2025-01-22

## 2024-12-24 RX ORDER — VITAMIN A 10000 UNIT
1 TABLET ORAL
Qty: 60 | Refills: 0
Start: 2024-12-24 | End: 2025-01-22

## 2024-12-24 RX ORDER — ASPIRIN 81 MG
1 TABLET, DELAYED RELEASE (ENTERIC COATED) ORAL
Qty: 60 | Refills: 0
Start: 2024-12-24 | End: 2025-01-22

## 2024-12-24 RX ORDER — TRAMADOL HYDROCHLORIDE 50 MG/1
1 TABLET ORAL
Qty: 28 | Refills: 0
Start: 2024-12-24 | End: 2024-12-30

## 2024-12-24 RX ORDER — ROSUVASTATIN 40 MG/1
1 TABLET, FILM COATED ORAL
Qty: 30 | Refills: 0
Start: 2024-12-24 | End: 2025-01-22

## 2024-12-24 RX ORDER — ASPIRIN 81 MG/1
1 TABLET, COATED ORAL
Qty: 60 | Refills: 0 | DISCHARGE
Start: 2024-12-24 | End: 2025-01-22

## 2024-12-24 RX ORDER — LOSARTAN POTASSIUM 100 MG/1
1 TABLET, FILM COATED ORAL
Qty: 30 | Refills: 0
Start: 2024-12-24 | End: 2025-01-22

## 2024-12-24 RX ORDER — SERTRALINE HYDROCHLORIDE 25 MG/1
1 TABLET ORAL
Qty: 30 | Refills: 0
Start: 2024-12-24 | End: 2025-01-22

## 2024-12-24 RX ORDER — FAMOTIDINE 20 MG/1
1 TABLET, FILM COATED ORAL
Qty: 60 | Refills: 0
Start: 2024-12-24 | End: 2025-01-22

## 2024-12-24 RX ADMIN — Medication 1 MILLIGRAM(S): at 11:10

## 2024-12-24 RX ADMIN — ACETAMINOPHEN 650 MILLIGRAM(S): 80 SOLUTION/ DROPS ORAL at 11:10

## 2024-12-24 RX ADMIN — TRAMADOL HYDROCHLORIDE 50 MILLIGRAM(S): 50 TABLET ORAL at 14:22

## 2024-12-24 RX ADMIN — Medication 400 MILLIGRAM(S): at 11:23

## 2024-12-24 RX ADMIN — Medication 1 TABLET(S): at 11:10

## 2024-12-24 RX ADMIN — CHLORHEXIDINE GLUCONATE 1 APPLICATION(S): 1.2 RINSE ORAL at 05:27

## 2024-12-24 RX ADMIN — Medication 500 MILLIGRAM(S): at 11:10

## 2024-12-24 RX ADMIN — ACETAMINOPHEN 650 MILLIGRAM(S): 80 SOLUTION/ DROPS ORAL at 00:00

## 2024-12-24 RX ADMIN — ACETAMINOPHEN 650 MILLIGRAM(S): 80 SOLUTION/ DROPS ORAL at 06:15

## 2024-12-24 RX ADMIN — Medication 400 MILLIGRAM(S): at 08:18

## 2024-12-24 RX ADMIN — Medication 81 MILLIGRAM(S): at 11:09

## 2024-12-24 RX ADMIN — ENOXAPARIN SODIUM 40 MILLIGRAM(S): 60 INJECTION INTRAVENOUS; SUBCUTANEOUS at 05:28

## 2024-12-24 RX ADMIN — ACETAMINOPHEN 650 MILLIGRAM(S): 80 SOLUTION/ DROPS ORAL at 05:28

## 2024-12-24 RX ADMIN — SODIUM CHLORIDE 1 GRAM(S): 9 INJECTION, SOLUTION INTRAMUSCULAR; INTRAVENOUS; SUBCUTANEOUS at 05:28

## 2024-12-24 RX ADMIN — SODIUM CHLORIDE 1 GRAM(S): 9 INJECTION, SOLUTION INTRAMUSCULAR; INTRAVENOUS; SUBCUTANEOUS at 11:10

## 2024-12-24 RX ADMIN — FAMOTIDINE 20 MILLIGRAM(S): 20 TABLET, FILM COATED ORAL at 05:28

## 2024-12-24 NOTE — PROGRESS NOTE ADULT - PROVIDER SPECIALTY LIST ADULT
Podiatry
Rehab Medicine

## 2024-12-24 NOTE — PROGRESS NOTE ADULT - REASON FOR ADMISSION
Rehab of Right femur fx s/p ORIF

## 2024-12-24 NOTE — PROGRESS NOTE ADULT - ASSESSMENT
Assessment:   85 yo F with PMHx of GERD, HTN, OA s/p bilateral TKA and ROHIT, s/p L forefoot exostosis removal (11/2024), RA (previously on methotrexate), and hx of breast cancer (in remission since 2015) who presented to the hospital after a fall and found to have comminuted right femoral distal metadiaphyseal fracture with volar apex angulation and 1.5 cm posterior displacement and small knee joint effusion. She underwent a R femur ORIF (12/2). Weightbearing status WBAT.    Plan:    #Rehab of Right femur fx s/p ORIF with impairment in mobility and ADLs and iADLs. The patient presented with co-morbidities requiring close physiatry follow-up at least 3 times a week to monitor his progress and monitor his comorbidities including HTN, OA s/p TKA and ROHIT, RA, and s/p L forefoot exostosis removal (11/2024).The patient's co-morbidities do not limit the patient's ability to participate in rehabilitative therapy. The patient was seen by PT/OT and  required max A with transfers, standby assist for UB dressing, dependent for LB dressing, and ambulates 1 side step by b/s secondary to decreased weight bearing tolerance on RLE and fear of falling, The patient was previously independent with all her ADLs and some iADLs (dependent on laundry and grocery) and ambulated with cane/walker occasionally and now presents with functional decline. The patient can benefit from and tolerate 3 hours of restorative therapy daily. The patient is an excellent acute inpatient rehabilitation candidate.     #S/P right distal femur fracture/ ORIF  -CTH shows no acute findings.  -CT Right femur shows acute comminuted right femoral distal metadiaphyseal fracture with volar apex angulation and 1.5 cm posterior displacement and small knee joint effusion.  -s/p subsequent R femur ORIF (12/2).   -Weight bearing status WBAT.  -Per Ortho, DVT ppx x6 weeks post-op  -c/w ASA 81mg QD  - Pain control: tramadol 50 q6 PRN, Tylenol q6  -Will adjust pain medications to optimize pain control  -c/w Calcium 600+D 600 mg-200 tablet: BID  -PT/ OT  - 12/6 BLE dopplers: no DVT in LLE. Unable to visualize venous system in RLE  The patient requires acute rehab with 3 hours of daily therapies at least 5 out of 7 days and close physiatry follow up.     #s/p recent fall last week with HT and bilateral facial contusions/ ecchymosis/ no LOC  - monitor    #R knee and thigh pain 12/23  -R Knee and femur xray 12/23 did not show acute findings.   -start heat and lidocaine patch to area    #Recent left heel spur resection  - WBAT  - limiting ambulation  - to wear cast-boot for longer distances as per patient  - followed by podiatry    #Post-op acute blood loss anemia (improving)  -Baseline hgb ~10.  -H/H 7.6/23.6 (7.5/23.1) 12/6  -c/w H/H monitoring and hemodynamic status monitoring  -Transfuse if hgb<7.    #HTN  -continue to monitor hemodynamic status   - 12/6 started home metoprolol ER 25mg nightly-->increased to metoprolol ER 50mg qhs  - 12/20 restarted home losartan 25mg qHS  -will adjust medications as needed    #HLD  -c/w rosuvastatin 10mg QD     #GERD  - c/w famotidine 20mg  BID    #OA  #RA  #Osteoporosis  -She doesn't recall having a bone mineral density test or treatment for osteoporosis.  She reports taking an aromatase inhibitor for 5 years for breast cancer in the past and has been on corticosteroids at times which are risk factors for osteoporosis.   - s/p bilat THR/ TKR  - Previously on methotrexate; not on medication currently. Monitor.   - Follow up as outpatient with rheumatology for her RA and Osteoporosis is required. A Dexascan/ bone mineral density test is suggested.        #Anxiety/depression  - Previously on sertraline 50mg at home. However was held on previous service due to prolonged QTc  - Psych consulted 12/17         -zoloft 25mg qhs started and will tritiate up to her home 50mg qhs dose        - weekly EKGs, most recent EKG 12/17     #Asymptomatic bacteruria vs ?UTI  12/13 UA positive  -E. coli positive urine culture.   - Bactrim DS 1 dose daily (12/13-12/15)  - However culture sensitivities showed resistance to bactrim after completion of bactrim course.  -WBC stable. Asymptomatic.  -ID consulted and do not recommend abx at this time.     #Hyponatremia (resolved)  -Na 131 (12/13) -> 136 (12/20)  - on regular salt diet  -will continue to monitor   -Started on sodium chloride 1g TID 12/15    #MRSA precautions discontinued 12/6 per Infection Control. (resolved)      -Pain control: tramadol and Tylenol  -GI/Bowel Mgmt: Miralax, Senna  -Skin: surgical incision along RLE  - Diet Regular    Precautions / PROPHYLAXIS:    - Falls  - DVT prophylaxis: lovenox

## 2024-12-24 NOTE — PROGRESS NOTE ADULT - SUBJECTIVE AND OBJECTIVE BOX
83 yo F with PMHx of GERD, HTN, OA s/p bilateral TKA and ROHIT, s/p L forefoot exostosis removal (11/2024) RA, and hx of breast cancer (in remission since 2015) who presents to the hospital after a fall. Around 1000, patient was seated in a chair and reaching for something on another chair when she lost her balance and fell onto her R side and hit her head. She was unable to get up from the ground and was lying on the floor for ~8 hours until her friend found her. +HS. -LOC. -AC. Subsequently, she had RLE pain. Of note, patient fell forwards two days prior and hit her face with subsequent bruising. Seen in ED at the time without any significant injuries found. No dizziness/lightheadedness associated with either fall. On admission, imaging showed acute comminuted right femoral distal metadiaphyseal fracture with volar apex angulation and 1.5 cm posterior displacement and small knee joint effusion. She underwent a R femur ORIF (12/2). Weightbearing status WBAT. During hospital course, patient had post-op blood loss anemia. Last H/H 7.5/23.1.    Imaging:  CT of R femur: Acute comminuted right femoral distal metadiaphyseal fracture with volar apex angulation and 1.5 cm posterior displacement. Small knee joint effusion.  CT head: No acute process seen    During their stay, the patient was evaluated by physical and occupational therapy. Prior to admission, patient was independent with ADLs, dependent on some iADLs (grocery, laundry), and ambulates with walker/cane occasionally The most recent evaluated functional status is max A with transfers, ambulates 1 side step by b/s secondary to decreased weight bearing tolerance on RLE and fear of falling, standby assist for UB dressing, dependent for LB dressing. Patient was deemed a good acute rehab candidate due to decline in functional status and ability to carry out activities of daily living. Patient is able to tolerate 3 hours of therapy 5-7 days a week and is motivated to participate.    The patient was evaluated by the PM&R team once medically stable. The patient was found to have functional limitations in terms of physical strength/mobility and ability to carry out ADLs (self care, transfers, ambulation). Patient admitted to   12/5 for rehabilitation of R femur fx, s/p ORIF with decline in ADLs.    Living Situation: resides in  with 1 FOS to bedroom (uses stair lift) and 3 steps to enter  PLOF:  independent with ADLs, dependent on some iADLs (grocery, laundry), and ambulates with walker/cane occasionally    Today’s Subjective & Review of Symptoms:  No overnight events.  Patient seen this morning. RLE pain has improved. R femur and knee xray showed no acute findings.  Participating/tolerating PT/OT.   Having regular BM and voiding spontaneously.   Vitals reviewed. Anticipate dc home today.     CLOF:  modA with transfers, modA bed mobility, ambulates 50ft with RW and modA, LB dressing SPV, toileting TA    Review of Systems:   Constitutional:    [x ] WNL           [   ] poor appetite   [ ] insomnia   [  ] tired   Cardio:           [x ] WNL           [   ] CP              [ ] MOJICA        [  ] palpitations               Resp:             [x ] WNL           [   ] SOB             [ ] cough      [  ] wheezing   GI:               [ x] WNL           [   ] constipation    [ ] diarrhea   [  ] abdominal pain       [ ] nausea   [  ] emesis                                :               [ x] WNL           [   ] MARTINS           [ ] dysuria    [  ] difficulty voiding   [ ] Other:    Endo:             [ x] WNL           [   ] polyuria        [ ] temperature intolerance                 Skin:             [ ] WNL           [   ] pain            [x ] incision-right LE with dressing applied   [ x ] rash-ecchymosis along bilateral facial cheeks MSK:              [ ] WNL           [ x  ] muscle pain at right knee/thigh    [x ] joint pain [x ] muscle tenderness   [x ] swelling RLE   Neuro:            [ ] WNL           [   ] HA              [ ] change in vision   [   ] tremor   [x ] weakness [  ] dysphagia    Cognitive:        [x ] WNL           [   ] confusion      Psych:            [x ] WNL           [   ] hallucinations   [   ]agitation   [   ] delusion   [   ]depression    Physical Exam:  Vital Signs Last 24 Hrs  T(C): 36.3 (24 Dec 2024 12:27), Max: 36.6 (23 Dec 2024 19:29)  T(F): 97.3 (24 Dec 2024 12:27), Max: 97.9 (23 Dec 2024 19:29)  HR: 76 (24 Dec 2024 12:27) (60 - 90)  BP: 139/65 (24 Dec 2024 12:27) (139/65 - 177/91)  BP(mean): --  RR: 18 (24 Dec 2024 12:27) (18 - 20)  SpO2: 98% (24 Dec 2024 12:27) (97% - 98%)    Parameters below as of 23 Dec 2024 19:29  Patient On (Oxygen Delivery Method): room air    General: Resting in bed                     HEENT: [   ] NC/AT, EOMI,  Normal Conjunctivae,   [  x ] other: bilateral cheek facial abrasion, normal conjunctivae, EOMI  Cardio: [  x ] RRR, no murmur,   [   ] other:                              Pulm: [ x  ] No Respiratory Distress,  Lungs CTAB,   [   ] other:             Abdomen: [ x  ]ND/NT, Soft,   [   ] other:    : [ x  ] No martins catheter, [   ] Martins catheter present [  ] other:   MSK: [  ] No joint swelling,  [ x  ] other:  RLE/ knee joint swelling                      Ext: [ x  ]No C/C, No calf tenderness,   [  x ]other:  swelling of MCPs, no warmth  Skin: [   ]intact,   [ x  ] other: RLE incision site with post op dressing, C&D    Neurological Examination:  Cognitive: [  x  ] AAO x 3,   [  ]  other:       Attention:  [ x  ] intact,   [    ]  other:   Language: [ x ] intact  [  ]    Memory: [ x  ] intact,    [  ]  other:     Mood/Affect: [  x ] wnl  [    ]  other:                                                                             Communication: [x   ]Fluent, no dysarthria [    ] other:   CN II - XII:  [ x   ] intact,  [    ] other:                                                                                         Motor:   RUE: 5/5 shoulder abduction, 5/5 elbow flexion, 5/5 elbow extension, 5/5 finger flexion  LUE: 5/5 shoulder abduction, 5/5 elbow flexion, 5/5 elbow extension, 5/5 finger flexion  RLE: 2/5 hip flexion (within restricted ROM), 2+/5 knee ext/flex (within restricted ROM), 4+/5 plantarflexion/dorsiflexion   LLE: 5/5 hip flexion, 5/5 knee ext/flex, 4+/5 dorsiflexion/plantarflexion     Tone: [  x ] wnl,   [    ]  other:  DTRs: [ x ]symmetric, [   ] other:  Coordination:   [ x ] intact,   [  ] other:                                                                        Sensory: [ x   ] Intact to light touch  [  ] other:    MEDICATIONS  (STANDING):  acetaminophen     Tablet .. 650 milliGRAM(s) Oral every 6 hours  ascorbic acid 500 milliGRAM(s) Oral daily  aspirin enteric coated 81 milliGRAM(s) Oral daily  calcium carbonate 1250 mG  + Vitamin D (OsCal 500 + D) 1 Tablet(s) Oral daily  chlorhexidine 2% Cloths 1 Application(s) Topical <User Schedule>  enoxaparin Injectable 40 milliGRAM(s) SubCutaneous every 24 hours  famotidine    Tablet 20 milliGRAM(s) Oral two times a day  folic acid 1 milliGRAM(s) Oral daily  lidocaine   4% Patch 1 Patch Transdermal daily  losartan 50 milliGRAM(s) Oral at bedtime  magnesium oxide 400 milliGRAM(s) Oral three times a day with meals  metoprolol succinate ER 50 milliGRAM(s) Oral <User Schedule>  polyethylene glycol 3350 17 Gram(s) Oral daily  rosuvastatin 10 milliGRAM(s) Oral at bedtime  sertraline 25 milliGRAM(s) Oral at bedtime  sodium chloride 1 Gram(s) Oral three times a day    MEDICATIONS  (PRN):  melatonin 3 milliGRAM(s) Oral at bedtime PRN Insomnia  prochlorperazine   Tablet 10 milliGRAM(s) Oral two times a day PRN nausea and vomiting  senna 2 Tablet(s) Oral at bedtime PRN Constipation  traMADol 50 milliGRAM(s) Oral four times a day PRN Moderate Pain (4 - 6)    RECENT LABS/IMAGING                        9.0    6.07  )-----------( 342      ( 23 Dec 2024 06:31 )             29.1     12-23    135  |  100  |  15  ----------------------------<  85  4.3   |  23  |  0.7    Ca    8.6      23 Dec 2024 06:31  Mg     1.7     12-23    TPro  5.8[L]  /  Alb  3.2[L]  /  TBili  0.5  /  DBili  x   /  AST  18  /  ALT  10  /  AlkPhos  160[H]  12-23 12-20    136  |  99  |  16  ----------------------------<  95  4.9   |  27  |  0.8    Ca    9.1      20 Dec 2024 07:01    TPro  6.0  /  Alb  3.2[L]  /  TBili  0.6  /  DBili  x   /  AST  19  /  ALT  9   /  AlkPhos  150[H]  12-20      Urinalysis Basic - ( 20 Dec 2024 07:01 )    Color: x / Appearance: x / SG: x / pH: x  Gluc: 95 mg/dL / Ketone: x  / Bili: x / Urobili: x   Blood: x / Protein: x / Nitrite: x   Leuk Esterase: x / RBC: x / WBC x   Sq Epi: x / Non Sq Epi: x / Bacteria: x

## 2024-12-24 NOTE — PROGRESS NOTE ADULT - ATTENDING COMMENTS
Rehab of Right femur fx s/p ORIF. Patient seen and examined with the resident. The treatment plan discussed. Agree with the above.
Patient seen and examined with the resident. We discussed the case. I have directed the care. I edited the note. The patient requires acute rehab with 3 hours of daily therapies at least 5 out of 7 days and close physiatry follow up.
Patient seen and examined with the resident. We discussed the case. I have directed the care. I edited the note. The patient requires acute rehab with 3 hours of daily therapies at least 5 out of 7 days and close physiatry follow up.  #S/P right distal femur fracture/ ORIF  -CTH shows no acute findings.  -CT Right femur shows acute comminuted right femoral distal metadiaphyseal fracture with volar apex angulation and 1.5 cm posterior displacement and small knee joint effusion.  -s/p subsequent R femur ORIF (12/2).   -Weight bearing status WBAT.  -Per Ortho, DVT ppx x6 weeks post-op  -c/w ASA 81mg QD  - Pain control: tramadol 50 q6 PRN, Tylenol q6  -Will adjust pain medications to optimize pain control  -c/w Calcium 600+D 600 mg-200 tablet: BID  -PT/ OT  - 12/6 BLE dopplers: no DVT in LLE. Unable to visualize venous system in RLE  The patient requires acute rehab with 3 hours of daily therapies at least 5 out of 7 days and close physiatry follow up.     #s/p recent fall last week with HT and bilateral facial contusions/ ecchymosis/ no LOC  - monitor    #R knee and thigh pain 12/23  -Knee and femur xray ordered.   -start heat and lidocaine patch to area    #Recent left heel spur resection  - WBAT  - limiting ambulation  - to wear cast-boot for longer distances as per patient  - followed by podiatry    #Post-op acute blood loss anemia (improving)  -Baseline hgb ~10.  -H/H 7.6/23.6 (7.5/23.1) 12/6  -c/w H/H monitoring and hemodynamic status monitoring  -Transfuse if hgb<7.    #HTN  -continue to monitor hemodynamic status   - 12/6 started home metoprolol ER 25mg nightly-->increased to metoprolol ER 50mg qhs  - 12/20 restarted home losartan 25mg qHS  -will adjust medications as needed    #HLD  -c/w rosuvastatin 10mg QD     #GERD  - c/w famotidine 20mg  BID    #OA  #RA  #Osteoporosis  -She doesn't recall having a bone mineral density test or treatment for osteoporosis.  She reports taking an aromatase inhibitor for 5 years for breast cancer in the past and has been on corticosteroids at times which are risk factors for osteoporosis.   - s/p bilat THR/ TKR  - Previously on methotrexate; not on medication currently. Monitor.   - Follow up as outpatient with rheumatology for her RA and Osteoporosis is required. A Dexascan/ bone mineral density test is suggested.        #Anxiety/depression  - Previously on sertraline 50mg at home. However was held on previous service due to prolonged QTc  - Psych consulted 12/17         -zoloft 25mg qhs started and will tritiate up to her home 50mg qhs dose        - weekly EKGs, most recent EKG 12/17     #Asymptomatic bacteruria vs ?UTI  12/13 UA positive  -E. coli positive urine culture.   - Bactrim DS 1 dose daily (12/13-12/15)  - However culture sensitivities showed resistance to bactrim after completion of bactrim course.  -WBC stable. Asymptomatic.  -ID consulted and do not recommend abx at this time.     #Hyponatremia (resolved)  -Na 131 (12/13) -> 136 (12/20)  - on regular salt diet  -will continue to monitor   -Started on sodium chloride 1g TID 12/15    #MRSA precautions discontinued 12/6 per Infection Control. (resolved)
Rehab of Right femur fx s/p ORIF Rehab of Right femur fx s/p ORIF. Patient seen and examined with the resident. The treatment plan discussed. Agree with the above.
Rehab of Right femur fx s/p ORIF. Patient seen and examined with the resident. The treatment plan discussed. Agree with the above.
I reviewed the chart and examined the patient with the resident and we discussed the findings and treatment plan.  The patient is tolerating the rehab program well. I agree with the findings and treatment plan above, which I modified as indicated. The patient requires 3 hrs a day of acute inpatient rehab. Vitals reviewed and stable. No new complaints. Alert. No distress. Notes pain is controlled. Participating well in therapies. Continue full rehab program.    I read, edited and agree with the physical exam above and assessment:  #Rehab of Right femur fx s/p ORIF with impairment in mobility and ADLs and iADLs. The patient presented with co-morbidities requiring close physiatry follow-up at least 3 times a week to monitor his progress and monitor his comorbidities including HTN, OA s/p TKA and ROHIT, RA, and s/p L forefoot exostosis removal (11/2024).The patient's co-morbidities do not limit the patient's ability to participate in rehabilitative therapy. The patient was seen by PT/OT and  required max A with transfers, standby assist for UB dressing, dependent for LB dressing, and ambulates 1 side step by b/s secondary to decreased weight bearing tolerance on RLE and fear of falling, The patient was previously independent with all her ADLs and some iADLs (dependent on laundry and grocery) and ambulated with cane/walker occasionally and now presents with functional decline. The patient can benefit from and tolerate 3 hours of restorative therapy daily. The patient is an excellent acute inpatient rehabilitation candidate.     #S/P right distal femur fracture/ ORIF  -Weight bearing status WBAT.  -Per Ortho, DVT ppx x6 weeks post-op  -c/w ASA 81mg QD  -c/w Calcium 600+D 600 mg-200 tablet: BID  -PT/ OT  - 12/6 BLE dopplers: no DVT in LLE. Unable to visualize venous system in RLE  The patient requires acute rehab with 3 hours of daily therapies at least 5 out of 7 days and close physiatry follow up.     #s/p recent fall last week with HT and bilateral facial contusions/ ecchymosis/ no LOC  - monitor    #UTI  12/13 UA positive  -follow up urine culture  - Bactrim DS 1 dose daily (12/13-12/15)    #Recent left heel spur resection  - WBAT  - limiting ambulation  - to wear cast-boot for longer distances as per patient  - followed by podiatry    #Post-op acute blood loss anemia  -Baseline hgb ~10.  -H/H 7.6/23.6 (7.5/23.1) 12/6  -c/w H/H monitoring and hemodynamic status monitoring  -Transfuse if hgb<7.    #Hypomagnesemia  -Mg 1.9 (12/9)  -c/w magnesium supplement as needed    #Azotemia  -12/6 BUN 31->22, improving    #Hyponatremia  -Na 131 (12/13)  - on regular salt diet  -will continue to monitor   - fluid restriction to 1500ml  -Started on sodium chloride 1g TID 12/15    #HTN  -continue to monitor hemodynamic status   - 12/6 started home metoprolol ER 25mg nightly  -will adjust medications as needed    #HLD  -c/w rosuvastatin 10mg QD     #GERD  - c/w famotidine 20mg  BID    #OA  #RA  - s/p bilat THR/ TKR  - Previously on methotrexate.   -Not on medication currently  - Follows up outpatient with rheumatology  - monitor for exacerbation now off Methotrexate    #MRSA precautions discontinued 12/6 per Infection Control.    #Anxiety/depression  - Previously on sertraline  - Will monitor
Patient seen and examined with the resident. We discussed the case. I have directed the care. I edited the note. The patient requires acute rehab with 3 hours of daily therapies at least 5 out of 7 days and close physiatry follow up.
Patient seen and examined with the resident. We discussed the case. I have directed the care. I edited the note. The patient requires acute rehab with 3 hours of daily therapies at least 5 out of 7 days and close physiatry follow up.  #S/P right distal femur fracture/ ORIF  -CTH shows no acute findings.  -CT Right femur shows acute comminuted right femoral distal metadiaphyseal fracture with volar apex angulation and 1.5 cm posterior displacement and small knee joint effusion.  -s/p subsequent R femur ORIF (12/2).   -Weight bearing status WBAT.  -Per Ortho, DVT ppx x6 weeks post-op  -c/w ASA 81mg QD  - Pain control: tramadol 50 q6 PRN, Tylenol q6  -Will adjust pain medications to optimize pain control  -c/w Calcium 600+D 600 mg-200 tablet: BID  -PT/ OT  - 12/6 BLE dopplers: no DVT in LLE. Unable to visualize venous system in RLE    #s/p recent fall last week with HT and bilateral facial contusions/ ecchymosis/ no LOC  - monitor    #Recent left heel spur resection  - WBAT  - limiting ambulation  - to wear cast-boot for longer distances as per patient  - followed by podiatry    #Post-op acute blood loss anemia  -Baseline hgb ~10.  -H/H 7.6/23.6 (7.5/23.1) 12/6  -c/w H/H monitoring and hemodynamic status monitoring  -Transfuse if hgb<7.    #Hypomagnesemia  -Mg 1.9 (12/9)  -c/w magnesium supplement as needed    #Azotemia  -12/6 BUN 31->22, improving    #Hyponatremia  -Na 131 (12/9)  - on regular salt diet  -will continue to monitor     #HTN  -continue to monitor hemodynamic status   - 12/6 started home metoprolol ER 25mg nightly  -will adjust medications as needed    #HLD  -c/w rosuvastatin 10mg QD     #GERD  - c/w famotidine 20mg  BID    #OA  #RA  - s/p bilat THR/ TKR  - Previously on methotrexate.   -Not on medication currently  - Follows up outpatient with rheumatology  - monitor for exacerbation now off Methotrexate    #MRSA precautions discontinued 12/6 per Infection Control.    #Anxiety/depression  - Previously on sertraline  - Will monitor     -Pain control: tramadol and Tylenol    -GI/Bowel Mgmt: Miralax, Senna, bisacodyl      -Skin: surgical incision along RLE
Patient seen and examined with the resident. We discussed the case. I have directed the care. I edited the note. The patient requires acute rehab with 3 hours of daily therapies at least 5 out of 7 days and close physiatry follow up.  #S/P right distal femur fracture/ ORIF  -CTH shows no acute findings.  -CT Right femur shows acute comminuted right femoral distal metadiaphyseal fracture with volar apex angulation and 1.5 cm posterior displacement and small knee joint effusion.  -s/p subsequent R femur ORIF (12/2).   -Weight bearing status WBAT.  -Per Ortho, DVT ppx x6 weeks post-op  -c/w ASA 81mg QD  - Pain control: tramadol 50 q6 PRN, Tylenol q6  -Will adjust pain medications to optimize pain control  -c/w Calcium 600+D 600 mg-200 tablet: BID  -PT/ OT  - 12/6 BLE dopplers: no DVT in LLE. Unable to visualize venous system in RLE  The patient requires acute rehab with 3 hours of daily therapies at least 5 out of 7 days and close physiatry follow up.     #s/p recent fall last week with HT and bilateral facial contusions/ ecchymosis/ no LOC  - monitor    #Recent left heel spur resection  - WBAT  - limiting ambulation  - to wear cast-boot for longer distances as per patient  - followed by podiatry    #Post-op acute blood loss anemia (improving)  -Baseline hgb ~10.  -H/H 7.6/23.6 (7.5/23.1) 12/6  -c/w H/H monitoring and hemodynamic status monitoring  -Transfuse if hgb<7.    #HTN  -continue to monitor hemodynamic status   - 12/6 started home metoprolol ER 25mg nightly  - 12/20 restarted home losartan 25mg qHS  -will adjust medications as needed    #HLD  -c/w rosuvastatin 10mg QD     #GERD  - c/w famotidine 20mg  BID    #OA  #RA  #Osteoporosis  -She doesn't recall having a bone mineral density test or treatment for osteoporosis.  She reports taking an aromatase inhibitor for 5 years for breast cancer in the past and has been on corticosteroids at times which are risk factors for osteoporosis.   - s/p bilat THR/ TKR  - Previously on methotrexate; not on medication currently. Monitor.   - Follow up as outpatient with rheumatology for her RA and Osteoporosis is required. A Dexascan/ bone mineral density test is suggested.
Patient seen and examined with the resident. We discussed the case. I have directed the care. I edited the note. The patient requires acute rehab with 3 hours of daily therapies at least 5 out of 7 days and close physiatry follow up. I participated in the rehab interdisciplinary team meeting. She has progressed wonderfully on acute rehab. She will be discharged home with home PT. A one week prescription for Tramadol 50 mg po QID prn was e-prescribed. She should follow up with Rheumatology with regards to osteoporosis and RA.  #S/P right distal femur fracture/ ORIF  -CTH shows no acute findings.  -CT Right femur shows acute comminuted right femoral distal metadiaphyseal fracture with volar apex angulation and 1.5 cm posterior displacement and small knee joint effusion.  -s/p subsequent R femur ORIF (12/2).   -Weight bearing status WBAT.  -Per Ortho, DVT ppx x6 weeks post-op  -c/w ASA 81mg QD  - Pain control: tramadol 50 q6 PRN, Tylenol q6  -Will adjust pain medications to optimize pain control  -c/w Calcium 600+D 600 mg-200 tablet: BID  -PT/ OT  - 12/6 BLE dopplers: no DVT in LLE. Unable to visualize venous system in RLE  The patient requires acute rehab with 3 hours of daily therapies at least 5 out of 7 days and close physiatry follow up.     #s/p recent fall last week with HT and bilateral facial contusions/ ecchymosis/ no LOC  - monitor    #R knee and thigh pain 12/23  -R Knee and femur xray 12/23 did not show acute findings.   -start heat and lidocaine patch to area    #Recent left heel spur resection  - WBAT  - limiting ambulation  - to wear cast-boot for longer distances as per patient  - followed by podiatry    #Post-op acute blood loss anemia (improving)  -Baseline hgb ~10.  -H/H 7.6/23.6 (7.5/23.1) 12/6  -c/w H/H monitoring and hemodynamic status monitoring  -Transfuse if hgb<7.    #HTN  -continue to monitor hemodynamic status   - 12/6 started home metoprolol ER 25mg nightly-->increased to metoprolol ER 50mg qhs  - 12/20 restarted home losartan 25mg qHS  -will adjust medications as needed    #HLD  -c/w rosuvastatin 10mg QD     #GERD  - c/w famotidine 20mg  BID    #OA  #RA  #Osteoporosis  -She doesn't recall having a bone mineral density test or treatment for osteoporosis.  She reports taking an aromatase inhibitor for 5 years for breast cancer in the past and has been on corticosteroids at times which are risk factors for osteoporosis.   - s/p bilat THR/ TKR  - Previously on methotrexate; not on medication currently. Monitor.   - Follow up as outpatient with rheumatology for her RA and Osteoporosis is required. A Dexascan/ bone mineral density test is suggested.        #Anxiety/depression  - Previously on sertraline 50mg at home. However was held on previous service due to prolonged QTc  - Psych consulted 12/17         -zoloft 25mg qhs started and will tritiate up to her home 50mg qhs dose        - weekly EKGs, most recent EKG 12/17     #Asymptomatic bacteruria vs ?UTI  12/13 UA positive  -E. coli positive urine culture.   - Bactrim DS 1 dose daily (12/13-12/15)  - However culture sensitivities showed resistance to bactrim after completion of bactrim course.  -WBC stable. Asymptomatic.  -ID consulted and do not recommend abx at this time.     #Hyponatremia (resolved)  -Na 131 (12/13) -> 136 (12/20)  - on regular salt diet  -will continue to monitor   -Started on sodium chloride 1g TID 12/15    #MRSA precautions discontinued 12/6 per Infection Control. (resolved)
Rehab of Right femur fx s/p ORIF. Patient seen and examined with the resident. The treatment plan discussed. Agree with the above.
Rehab of Right femur fx s/p ORIF. Patient seen and examined with the resident. The treatment plan discussed. Agree with the above.
Rehab of Right femur fx s/p ORIF. Patient seen and examined with the resident. The treatment plan discussed. Agree with the above. F/U official ID recommendations
Patient seen and examined with the resident. We discussed the case. I have directed the care. I edited the note. The patient requires acute rehab with 3 hours of daily therapies at least 5 out of 7 days and close physiatry follow up.  #S/P right distal femur fracture/ ORIF  -CTH shows no acute findings.  -CT Right femur shows acute comminuted right femoral distal metadiaphyseal fracture with volar apex angulation and 1.5 cm posterior displacement and small knee joint effusion.  -s/p subsequent R femur ORIF (12/2).   -Weight bearing status WBAT.  -Per Ortho, DVT ppx x6 weeks post-op  -c/w ASA 81mg QD  - Pain control: oxycodone  5 and 10mg prn, Tylenol q6  -Will adjust pain medications to optimize pain control  -c/w Calcium 600+D 600 mg-200 tablet: BID  -PT/ OT  - 12/6 BLE dopplers: no DVT in LLE. Unable to visualize venous system in RLE    #s/p recent fall last week with HT and bilateral facial contusions/ ecchymosis/ no LOC  - monitor    #Recent left heel spur resection  - WBAT  - limiting ambulation  - to wear cast-boot for longer distances as per patient    #Post-op acute blood loss anemia  -Baseline hgb ~10.  -H/H 7.6/23.6 (7.5/23.1) 12/6  -c/w H/H monitoring and hemodynamic status monitoring  -Transfuse if hgb<7.    #Hypomagnesemia  -Mg 1.7 (12/6)  -c/w magnesium supplement as needed    #Azotemia  -12/6 BUN 31->22, improving    #Hyponatremia  -Na 131 (12/6)  - on regular salt diet  -will continue to monitor     #HTN  -continue to monitor hemodynamic status   - 12/6 started home metoprolol ER 25mg nightly  -will adjust medications as needed
Patient seen and examined with the resident. We discussed the case. I have directed the care. I edited the note. The patient requires acute rehab with 3 hours of daily therapies at least 5 out of 7 days and close physiatry follow up.  #S/P right distal femur fracture/ ORIF  -CTH shows no acute findings.  -CT Right femur shows acute comminuted right femoral distal metadiaphyseal fracture with volar apex angulation and 1.5 cm posterior displacement and small knee joint effusion.  -s/p subsequent R femur ORIF (12/2).   -Weight bearing status WBAT.  -Per Ortho, DVT ppx x6 weeks post-op  -c/w ASA 81mg QD  - Pain control: tramadol 50 q6 PRN, Tylenol q6  -Will adjust pain medications to optimize pain control  -c/w Calcium 600+D 600 mg-200 tablet: BID  -PT/ OT  - 12/6 BLE dopplers: no DVT in LLE. Unable to visualize venous system in RLE  The patient requires acute rehab with 3 hours of daily therapies at least 5 out of 7 days and close physiatry follow up.     #s/p recent fall last week with HT and bilateral facial contusions/ ecchymosis/ no LOC  - monitor    #Recent left heel spur resection  - WBAT  - limiting ambulation  - to wear cast-boot for longer distances as per patient  - followed by podiatry    #Post-op acute blood loss anemia (improving)  -Baseline hgb ~10.  -H/H 7.6/23.6 (7.5/23.1) 12/6  -c/w H/H monitoring and hemodynamic status monitoring  -Transfuse if hgb<7.    #HTN  -continue to monitor hemodynamic status   - 12/6 started home metoprolol ER 25mg nightly  -will adjust medications as needed    #HLD  -c/w rosuvastatin 10mg QD     #GERD  - c/w famotidine 20mg  BID    #OA  #RA  #Osteoporosis  -She doesn't recall having a bone mineral density test or treatment for osteoporosis.   -She reports taking an aromatase inhibitor for 5 years for breast cancer past and has been on corticosteroids at times which are risk factors for osteoporosis.   - s/p bilat THR/ TKR  - Previously on methotrexate; not on medication currently. Monitor.   - Follow up as outpatient with rheumatology for her RA and Osteoporosis is required. A Dexascan/ bone mineral density test is suggested.      #MRSA precautions discontinued 12/6 per Infection Control.    #Anxiety/depression  - Previously on sertraline  - Psych consulted 12/17   -Per Psych recs, zoloft 25mg qhs started and will tritiate up to her home 50mg qhs dose.    #Asymptomatic bacteruria vs ?UTI  12/13 UA positive  -E. coli positive urine culture.   - Bactrim DS 1 dose daily (12/13-12/15)  - However culture sensitivities showed resistance to bactrim after completion of bactrim course.  -WBC stable. Asymptomatic.  -ID consulted and do not recommend abx at this time.     #Hyponatremia (resolved)  -Na 131 (12/13)  - on regular salt diet  -will continue to monitor   -Started on sodium chloride 1g TID 12/15  -12/20 sodium 136
Patient seen and examined with the resident. We discussed the case. I have directed the care. I edited the note. The patient requires acute rehab with 3 hours of daily therapies at least 5 out of 7 days and close physiatry follow up.  #S/P right distal femur fracture/ ORIF  -CTH shows no acute findings.  -CT Right femur shows acute comminuted right femoral distal metadiaphyseal fracture with volar apex angulation and 1.5 cm posterior displacement and small knee joint effusion.  -s/p subsequent R femur ORIF (12/2).   -Weight bearing status WBAT.  -Per Ortho, DVT ppx x6 weeks post-op  -c/w ASA 81mg QD  - Pain control: oxycodone  5 and 10mg prn, Tylenol q6  -Will adjust pain medications to optimize pain control  -c/w Calcium 600+D 600 mg-200 tablet: BID  -PT/ OT  - 12/6 BLE dopplers: no DVT in LLE. Unable to visualize venous system in RLE    #s/p recent fall last week with HT and bilateral facial contusions/ ecchymosis/ no LOC  - monitor    #Recent left heel spur resection  - WBAT  - limiting ambulation  - to wear cast-boot for longer distances as per patient    #Post-op acute blood loss anemia  -Baseline hgb ~10.  -H/H 7.6/23.6 (7.5/23.1) 12/6  -c/w H/H monitoring and hemodynamic status monitoring  -Transfuse if hgb<7.    #Hypomagnesemia  -Mg 1.9 (12/9)  -c/w magnesium supplement as needed    #Azotemia  -12/6 BUN 31->22, improving    #Hyponatremia  -Na 131 (12/9)  - on regular salt diet  -will continue to monitor     #HTN  -continue to monitor hemodynamic status   - 12/6 started home metoprolol ER 25mg nightly  -will adjust medications as needed    #HLD  -c/w rosuvastatin 10mg QD     #GERD  - c/w famotidine 20mg  BID    #OA  #RA  - s/p bilat THR/ TKR  - Previously on methotrexate.   -Not on medication currently  - Follows up outpatient with rheumatology  - monitor for exacerbation now off Methotrexate    #MRSA precautions discontinued 12/6 per Infection Control.    #Anxiety/depression  - Previously on sertraline  - Will monitor     -Pain control: oxycodone and Tylenol    -GI/Bowel Mgmt: Miralax, Senna     -Skin: surgical incision along RLE
Patient seen and examined with the resident. We discussed the case. I have directed the care. I edited the note. The patient requires acute rehab with 3 hours of daily therapies at least 5 out of 7 days and close physiatry follow up.  #S/P right distal femur fracture/ ORIF  -CTH shows no acute findings.  -CT Right femur shows acute comminuted right femoral distal metadiaphyseal fracture with volar apex angulation and 1.5 cm posterior displacement and small knee joint effusion.  -s/p subsequent R femur ORIF (12/2).   -Weight bearing status WBAT.  -Per Ortho, DVT ppx x6 weeks post-op  -c/w ASA 81mg QD  - Pain control: oxycodone 2.5, 5, and 10mg prn, Tylenol  -Will adjust pain medications to optimize pain control  -c/w Calcium 600+D 600 mg-200 tablet: BID  -PT/ OT    #s/p recent fall last week with HT and bilateral facial contusions/ ecchymosis/ no LOC  - monitor    #Recent left heel spur resection  - WBAT  - limiting ambulation  - to wear cast-boot for longer distances as per patient    #Post-op acute blood loss anemia  -Baseline hgb ~10.  -H/H 7.6/23.6 (7.5/23.1) 12/6  -c/w H/H monitoring and hemodynamic status monitoring  -Transfuse if hgb<7.    #Hypomagnesemia  -Mg 1.7 (12/6)  -c/w magnesium supplement as needed    #Azotemia  - recheck in am    #Hyponatremia  -Na 131 (12/6)  - on regular salt diet  - repeat with serum osms in am  -will continue to monitor     #HTN  -continue to monitor hemodynamic status   -will adjust medications as needed    #HLD  -c/w rosuvastatin 10mg QD

## 2024-12-26 DIAGNOSIS — B96.20 UNSPECIFIED ESCHERICHIA COLI [E. COLI] AS THE CAUSE OF DISEASES CLASSIFIED ELSEWHERE: ICD-10-CM

## 2024-12-26 DIAGNOSIS — R79.89 OTHER SPECIFIED ABNORMAL FINDINGS OF BLOOD CHEMISTRY: ICD-10-CM

## 2024-12-26 DIAGNOSIS — S09.90XD UNSPECIFIED INJURY OF HEAD, SUBSEQUENT ENCOUNTER: ICD-10-CM

## 2024-12-26 DIAGNOSIS — R94.31 ABNORMAL ELECTROCARDIOGRAM [ECG] [EKG]: ICD-10-CM

## 2024-12-26 DIAGNOSIS — Z90.710 ACQUIRED ABSENCE OF BOTH CERVIX AND UTERUS: ICD-10-CM

## 2024-12-26 DIAGNOSIS — Z96.652 PRESENCE OF LEFT ARTIFICIAL KNEE JOINT: ICD-10-CM

## 2024-12-26 DIAGNOSIS — Z98.890 OTHER SPECIFIED POSTPROCEDURAL STATES: ICD-10-CM

## 2024-12-26 DIAGNOSIS — E83.42 HYPOMAGNESEMIA: ICD-10-CM
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
## 2024-12-26 DIAGNOSIS — Z85.3 PERSONAL HISTORY OF MALIGNANT NEOPLASM OF BREAST: ICD-10-CM

## 2024-12-26 DIAGNOSIS — F32.A DEPRESSION, UNSPECIFIED: ICD-10-CM

## 2024-12-26 DIAGNOSIS — Z96.643 PRESENCE OF ARTIFICIAL HIP JOINT, BILATERAL: ICD-10-CM

## 2024-12-26 DIAGNOSIS — K21.9 GASTRO-ESOPHAGEAL REFLUX DISEASE WITHOUT ESOPHAGITIS: ICD-10-CM

## 2024-12-26 DIAGNOSIS — K59.00 CONSTIPATION, UNSPECIFIED: ICD-10-CM

## 2024-12-26 DIAGNOSIS — N39.0 URINARY TRACT INFECTION, SITE NOT SPECIFIED: ICD-10-CM

## 2024-12-26 DIAGNOSIS — M19.90 UNSPECIFIED OSTEOARTHRITIS, UNSPECIFIED SITE: ICD-10-CM

## 2024-12-26 DIAGNOSIS — M21.851 OTHER SPECIFIED ACQUIRED DEFORMITIES OF RIGHT THIGH: ICD-10-CM

## 2024-12-26 DIAGNOSIS — Z87.891 PERSONAL HISTORY OF NICOTINE DEPENDENCE: ICD-10-CM

## 2024-12-26 DIAGNOSIS — S00.83XD CONTUSION OF OTHER PART OF HEAD, SUBSEQUENT ENCOUNTER: ICD-10-CM

## 2024-12-26 DIAGNOSIS — Z16.12 EXTENDED SPECTRUM BETA LACTAMASE (ESBL) RESISTANCE: ICD-10-CM

## 2024-12-26 DIAGNOSIS — M06.9 RHEUMATOID ARTHRITIS, UNSPECIFIED: ICD-10-CM

## 2024-12-26 DIAGNOSIS — D62 ACUTE POSTHEMORRHAGIC ANEMIA: ICD-10-CM

## 2024-12-26 DIAGNOSIS — N17.9 ACUTE KIDNEY FAILURE, UNSPECIFIED: ICD-10-CM

## 2024-12-26 DIAGNOSIS — M79.651 PAIN IN RIGHT THIGH: ICD-10-CM

## 2024-12-26 DIAGNOSIS — I10 ESSENTIAL (PRIMARY) HYPERTENSION: ICD-10-CM

## 2024-12-26 DIAGNOSIS — M25.461 EFFUSION, RIGHT KNEE: ICD-10-CM

## 2024-12-26 DIAGNOSIS — M81.0 AGE-RELATED OSTEOPOROSIS WITHOUT CURRENT PATHOLOGICAL FRACTURE: ICD-10-CM

## 2024-12-26 DIAGNOSIS — Z79.52 LONG TERM (CURRENT) USE OF SYSTEMIC STEROIDS: ICD-10-CM

## 2024-12-26 DIAGNOSIS — Z92.3 PERSONAL HISTORY OF IRRADIATION: ICD-10-CM

## 2024-12-26 DIAGNOSIS — E87.1 HYPO-OSMOLALITY AND HYPONATREMIA: ICD-10-CM

## 2024-12-26 DIAGNOSIS — T43.205D: ICD-10-CM

## 2025-01-07 ENCOUNTER — APPOINTMENT (OUTPATIENT)
Dept: ORTHOPEDIC SURGERY | Facility: ASSISTED LIVING FACILITY | Age: 85
End: 2025-01-07
Payer: MEDICARE

## 2025-01-07 PROCEDURE — 99024 POSTOP FOLLOW-UP VISIT: CPT

## 2025-01-10 ENCOUNTER — APPOINTMENT (OUTPATIENT)
Dept: ORTHOPEDIC SURGERY | Facility: CLINIC | Age: 85
End: 2025-01-10

## 2025-02-03 ENCOUNTER — EMERGENCY (EMERGENCY)
Facility: HOSPITAL | Age: 85
LOS: 0 days | Discharge: HOME | End: 2025-02-03
Attending: EMERGENCY MEDICINE
Payer: MEDICARE

## 2025-02-03 VITALS
OXYGEN SATURATION: 94 % | RESPIRATION RATE: 18 BRPM | TEMPERATURE: 98 F | HEIGHT: 68 IN | WEIGHT: 179.9 LBS | SYSTOLIC BLOOD PRESSURE: 201 MMHG | DIASTOLIC BLOOD PRESSURE: 128 MMHG | HEART RATE: 98 BPM

## 2025-02-03 DIAGNOSIS — M54.50 LOW BACK PAIN, UNSPECIFIED: ICD-10-CM

## 2025-02-03 DIAGNOSIS — M79.601 PAIN IN RIGHT ARM: ICD-10-CM

## 2025-02-03 DIAGNOSIS — K21.9 GASTRO-ESOPHAGEAL REFLUX DISEASE WITHOUT ESOPHAGITIS: ICD-10-CM

## 2025-02-03 DIAGNOSIS — R11.2 NAUSEA WITH VOMITING, UNSPECIFIED: ICD-10-CM

## 2025-02-03 DIAGNOSIS — Z90.710 ACQUIRED ABSENCE OF BOTH CERVIX AND UTERUS: Chronic | ICD-10-CM
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
## 2025-02-03 DIAGNOSIS — Z96.642 PRESENCE OF LEFT ARTIFICIAL HIP JOINT: Chronic | ICD-10-CM

## 2025-02-03 DIAGNOSIS — Z90.49 ACQUIRED ABSENCE OF OTHER SPECIFIED PARTS OF DIGESTIVE TRACT: Chronic | ICD-10-CM

## 2025-02-03 DIAGNOSIS — M06.9 RHEUMATOID ARTHRITIS, UNSPECIFIED: ICD-10-CM
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
## 2025-02-03 DIAGNOSIS — Z98.890 OTHER SPECIFIED POSTPROCEDURAL STATES: Chronic | ICD-10-CM

## 2025-02-03 DIAGNOSIS — Z96.652 PRESENCE OF LEFT ARTIFICIAL KNEE JOINT: Chronic | ICD-10-CM

## 2025-02-03 DIAGNOSIS — Z96.641 PRESENCE OF RIGHT ARTIFICIAL HIP JOINT: Chronic | ICD-10-CM

## 2025-02-03 DIAGNOSIS — S32.019A UNSPECIFIED FRACTURE OF FIRST LUMBAR VERTEBRA, INITIAL ENCOUNTER FOR CLOSED FRACTURE: ICD-10-CM

## 2025-02-03 DIAGNOSIS — I10 ESSENTIAL (PRIMARY) HYPERTENSION: ICD-10-CM

## 2025-02-03 LAB
ALBUMIN SERPL ELPH-MCNC: 3.8 G/DL — SIGNIFICANT CHANGE UP (ref 3.5–5.2)
ALP SERPL-CCNC: 122 U/L — HIGH (ref 30–115)
ALT FLD-CCNC: 13 U/L — SIGNIFICANT CHANGE UP (ref 0–41)
ANION GAP SERPL CALC-SCNC: 16 MMOL/L — HIGH (ref 7–14)
APPEARANCE UR: CLEAR — SIGNIFICANT CHANGE UP
APTT BLD: 27.8 SEC — SIGNIFICANT CHANGE UP (ref 27–39.2)
AST SERPL-CCNC: 22 U/L — SIGNIFICANT CHANGE UP (ref 0–41)
BACTERIA # UR AUTO: NEGATIVE /HPF — SIGNIFICANT CHANGE UP
BASOPHILS # BLD AUTO: 0.01 K/UL — SIGNIFICANT CHANGE UP (ref 0–0.2)
BASOPHILS NFR BLD AUTO: 0.1 % — SIGNIFICANT CHANGE UP (ref 0–1)
BILIRUB SERPL-MCNC: 0.5 MG/DL — SIGNIFICANT CHANGE UP (ref 0.2–1.2)
BILIRUB UR-MCNC: NEGATIVE — SIGNIFICANT CHANGE UP
BUN SERPL-MCNC: 21 MG/DL — HIGH (ref 10–20)
CALCIUM SERPL-MCNC: 9.3 MG/DL — SIGNIFICANT CHANGE UP (ref 8.4–10.5)
CAST: 0 /LPF — SIGNIFICANT CHANGE UP (ref 0–4)
CHLORIDE SERPL-SCNC: 96 MMOL/L — LOW (ref 98–110)
CO2 SERPL-SCNC: 21 MMOL/L — SIGNIFICANT CHANGE UP (ref 17–32)
COLOR SPEC: YELLOW — SIGNIFICANT CHANGE UP
CREAT SERPL-MCNC: 0.7 MG/DL — SIGNIFICANT CHANGE UP (ref 0.7–1.5)
DIFF PNL FLD: ABNORMAL
EGFR: 85 ML/MIN/1.73M2 — SIGNIFICANT CHANGE UP
EOSINOPHIL # BLD AUTO: 0 K/UL — SIGNIFICANT CHANGE UP (ref 0–0.7)
EOSINOPHIL NFR BLD AUTO: 0 % — SIGNIFICANT CHANGE UP (ref 0–8)
ETHANOL SERPL-MCNC: <10 MG/DL — SIGNIFICANT CHANGE UP
GLUCOSE SERPL-MCNC: 171 MG/DL — HIGH (ref 70–99)
GLUCOSE UR QL: NEGATIVE MG/DL — SIGNIFICANT CHANGE UP
HCT VFR BLD CALC: 36.5 % — LOW (ref 37–47)
HGB BLD-MCNC: 12.8 G/DL — SIGNIFICANT CHANGE UP (ref 12–16)
IMM GRANULOCYTES NFR BLD AUTO: 0.6 % — HIGH (ref 0.1–0.3)
INR BLD: 1.03 RATIO — SIGNIFICANT CHANGE UP (ref 0.65–1.3)
KETONES UR-MCNC: NEGATIVE MG/DL — SIGNIFICANT CHANGE UP
LACTATE SERPL-SCNC: 1.9 MMOL/L — SIGNIFICANT CHANGE UP (ref 0.7–2)
LEUKOCYTE ESTERASE UR-ACNC: NEGATIVE — SIGNIFICANT CHANGE UP
LIDOCAIN IGE QN: 19 U/L — SIGNIFICANT CHANGE UP (ref 7–60)
LYMPHOCYTES # BLD AUTO: 0.65 K/UL — LOW (ref 1.2–3.4)
LYMPHOCYTES # BLD AUTO: 7.3 % — LOW (ref 20.5–51.1)
MCHC RBC-ENTMCNC: 30.5 PG — SIGNIFICANT CHANGE UP (ref 27–31)
MCHC RBC-ENTMCNC: 35.1 G/DL — SIGNIFICANT CHANGE UP (ref 32–37)
MCV RBC AUTO: 86.9 FL — SIGNIFICANT CHANGE UP (ref 81–99)
MONOCYTES # BLD AUTO: 0.53 K/UL — SIGNIFICANT CHANGE UP (ref 0.1–0.6)
MONOCYTES NFR BLD AUTO: 5.9 % — SIGNIFICANT CHANGE UP (ref 1.7–9.3)
NEUTROPHILS # BLD AUTO: 7.72 K/UL — HIGH (ref 1.4–6.5)
NEUTROPHILS NFR BLD AUTO: 86.1 % — HIGH (ref 42.2–75.2)
NITRITE UR-MCNC: NEGATIVE — SIGNIFICANT CHANGE UP
NRBC # BLD: 0 /100 WBCS — SIGNIFICANT CHANGE UP (ref 0–0)
NRBC BLD-RTO: 0 /100 WBCS — SIGNIFICANT CHANGE UP (ref 0–0)
PH UR: 7 — SIGNIFICANT CHANGE UP (ref 5–8)
PLATELET # BLD AUTO: 328 K/UL — SIGNIFICANT CHANGE UP (ref 130–400)
PMV BLD: 10.4 FL — SIGNIFICANT CHANGE UP (ref 7.4–10.4)
POTASSIUM SERPL-MCNC: 4.2 MMOL/L — SIGNIFICANT CHANGE UP (ref 3.5–5)
POTASSIUM SERPL-SCNC: 4.2 MMOL/L — SIGNIFICANT CHANGE UP (ref 3.5–5)
PROT SERPL-MCNC: 6.7 G/DL — SIGNIFICANT CHANGE UP (ref 6–8)
PROT UR-MCNC: 100 MG/DL
PROTHROM AB SERPL-ACNC: 12.2 SEC — SIGNIFICANT CHANGE UP (ref 9.95–12.87)
RBC # BLD: 4.2 M/UL — SIGNIFICANT CHANGE UP (ref 4.2–5.4)
RBC # FLD: 15.5 % — HIGH (ref 11.5–14.5)
RBC CASTS # UR COMP ASSIST: 15 /HPF — HIGH (ref 0–4)
SODIUM SERPL-SCNC: 133 MMOL/L — LOW (ref 135–146)
SP GR SPEC: >1.03 — HIGH (ref 1–1.03)
SQUAMOUS # UR AUTO: 2 /HPF — SIGNIFICANT CHANGE UP (ref 0–5)
UROBILINOGEN FLD QL: 0.2 MG/DL — SIGNIFICANT CHANGE UP (ref 0.2–1)
WBC # BLD: 8.96 K/UL — SIGNIFICANT CHANGE UP (ref 4.8–10.8)
WBC # FLD AUTO: 8.96 K/UL — SIGNIFICANT CHANGE UP (ref 4.8–10.8)
WBC UR QL: 3 /HPF — SIGNIFICANT CHANGE UP (ref 0–5)

## 2025-02-03 PROCEDURE — 87086 URINE CULTURE/COLONY COUNT: CPT

## 2025-02-03 PROCEDURE — 93970 EXTREMITY STUDY: CPT

## 2025-02-03 PROCEDURE — 80053 COMPREHEN METABOLIC PANEL: CPT

## 2025-02-03 PROCEDURE — 70450 CT HEAD/BRAIN W/O DYE: CPT | Mod: MC

## 2025-02-03 PROCEDURE — 72170 X-RAY EXAM OF PELVIS: CPT

## 2025-02-03 PROCEDURE — 93970 EXTREMITY STUDY: CPT | Mod: 26

## 2025-02-03 PROCEDURE — 83605 ASSAY OF LACTIC ACID: CPT

## 2025-02-03 PROCEDURE — 71260 CT THORAX DX C+: CPT | Mod: 26

## 2025-02-03 PROCEDURE — 85610 PROTHROMBIN TIME: CPT

## 2025-02-03 PROCEDURE — 71045 X-RAY EXAM CHEST 1 VIEW: CPT

## 2025-02-03 PROCEDURE — 80307 DRUG TEST PRSMV CHEM ANLYZR: CPT

## 2025-02-03 PROCEDURE — 70450 CT HEAD/BRAIN W/O DYE: CPT | Mod: 26

## 2025-02-03 PROCEDURE — 71260 CT THORAX DX C+: CPT | Mod: MC

## 2025-02-03 PROCEDURE — 72125 CT NECK SPINE W/O DYE: CPT | Mod: 26

## 2025-02-03 PROCEDURE — 93005 ELECTROCARDIOGRAM TRACING: CPT

## 2025-02-03 PROCEDURE — 85730 THROMBOPLASTIN TIME PARTIAL: CPT

## 2025-02-03 PROCEDURE — 36000 PLACE NEEDLE IN VEIN: CPT | Mod: XU

## 2025-02-03 PROCEDURE — 83690 ASSAY OF LIPASE: CPT

## 2025-02-03 PROCEDURE — 85025 COMPLETE CBC W/AUTO DIFF WBC: CPT

## 2025-02-03 PROCEDURE — 99285 EMERGENCY DEPT VISIT HI MDM: CPT | Mod: GC

## 2025-02-03 PROCEDURE — 82962 GLUCOSE BLOOD TEST: CPT

## 2025-02-03 PROCEDURE — 74177 CT ABD & PELVIS W/CONTRAST: CPT | Mod: 26

## 2025-02-03 PROCEDURE — 93010 ELECTROCARDIOGRAM REPORT: CPT

## 2025-02-03 PROCEDURE — 99285 EMERGENCY DEPT VISIT HI MDM: CPT | Mod: 25

## 2025-02-03 PROCEDURE — 74177 CT ABD & PELVIS W/CONTRAST: CPT | Mod: MC

## 2025-02-03 PROCEDURE — 72125 CT NECK SPINE W/O DYE: CPT | Mod: MC

## 2025-02-03 PROCEDURE — 36415 COLL VENOUS BLD VENIPUNCTURE: CPT

## 2025-02-03 PROCEDURE — 71045 X-RAY EXAM CHEST 1 VIEW: CPT | Mod: 26

## 2025-02-03 PROCEDURE — 72170 X-RAY EXAM OF PELVIS: CPT | Mod: 26

## 2025-02-03 PROCEDURE — 81001 URINALYSIS AUTO W/SCOPE: CPT

## 2025-02-03 NOTE — ED PROVIDER NOTE - CARE PLAN
Assessment and plan of treatment:	Plan - labs ct xr   Principal Discharge DX:	Compression fracture of L1 vertebra  Assessment and plan of treatment:	Plan - labs ct xr  Secondary Diagnosis:	Fall   1

## 2025-02-03 NOTE — ED PROVIDER NOTE - OBJECTIVE STATEMENT
84-year-old female with PMH of HTN, RA, GERD, breast cancer in remission, right femur ORIF in 12/2024 by Dr. Dailey presents to ED s/p fall.  Patient states she had some pain in her right leg that gave out on her around 8:30 AM causing her to trip and fall forward hitting her head on the dresser and then her back on the floor.  Now complaining of pain to her left lower back/flank region.  + Nausea and 2 episodes of NB/NB emesis after the fall.  States he thinks was on the floor for approximately 20 minutes before EMS arrived after she pushed her life alert button.  Denies any fever/chills, cough/congestion, CP, SOB, abdominal pain, diarrhea, black or bloody stools, urinary symptoms, extremity numbness is weakness/paresthesias.

## 2025-02-03 NOTE — ASSESSMENT
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 unlikely to heal with conservative care pt would benefit from partial resection of overlying bone prominences referred for xrays will schedule for surgery

## 2025-02-03 NOTE — ED PROVIDER NOTE - CLINICAL SUMMARY MEDICAL DECISION MAKING FREE TEXT BOX
Patient presented status post mechanical fall as documented, complaining of lower back and flank pain left.  Also had 2 episodes of nausea and vomiting since the fall.  Positive head trauma but no LOC.  On arrival, patient otherwise afebrile, hemodynamically stable, fully neurovascularly intact.  Obtained labs which were grossly unremarkable including no significant leukocytosis, anemia, signs of dehydration/ADINA, transaminitis or significant electrolyte abnormalities.  Pan scan obtained and revealed an L1 compression deformity but otherwise was negative for acute traumatic injury.  Patient has also been complaining of bilateral lower extremity swelling but venous duplex was negative as well.  Patient otherwise controlled in the ED, nontender in the spine, remained neurovascularly intact.  Neurosurgery was consulted for the L1 fracture and they did not recommend intervention at this time, recommending outpatient follow-up.  Patient and family agreeable with plan and patient able to ambulate. Agrees to return to ED for any new or worsening symptoms.

## 2025-02-03 NOTE — ED PROVIDER NOTE - NSFOLLOWUPINSTRUCTIONS_ED_ALL_ED_FT
Our Emergency Department Referral Coordinators will be reaching out to you in the next 24-48 hours from 9:00am to 5:00pm with a follow up appointment. Please expect a phone call from the hospital in that time frame. If you do not receive a call or if you have any questions or concerns, you can reach them at   [phone]    Follow up Neurosurgery with Dr. Janes Mesa MD in 2 weeks.     Lumbar Spine Fracture  Back view of a person showing the lumbar spine and pelvis with a close-up of a fracture in the lumbar spine.  A lumbar spine fracture is a break in one of the bones of the lower back. Lumbar spine fractures can vary from mild to severe. The most severe types are those that:  Cause the broken bones to move out of place (unstable).  Injure or press on the spinal cord.  Have multiple breaks in the bones and damage to nearby tissues (complex).  During recovery, it is normal to have pain and stiffness in the lower back for several weeks.    What are the causes?  This condition may be caused by:  A fall.  A car accident.  A gunshot wound.  A hard, direct hit to the back.  What increases the risk?  You are more likely to develop this condition if:  You are in a situation that could result in a fall or other violent injury.  You have a condition that causes weakness in the bones (osteoporosis).  What are the signs or symptoms?  The main symptom of this condition is severe pain in the lower back. If a fracture is complex or severe, there may also be:  An unusual shape or swollen area on the lower back.  Limited ability to move an area of the lower back.  Inability to empty the bladder (urinary retention).  Inability to control when you urinate or have bowel movements (incontinence).  Loss of strength or sensation in the legs, feet, and toes.  Inability to move (paralysis).  How is this diagnosed?  This condition is diagnosed based on:  A physical exam.  Symptoms and what happened just before they developed.  The results of imaging tests, such as an X-ray, CT scan, or MRI.  If your nerves have been damaged, you may also have other tests to find out the extent of the damage.    How is this treated?  Treatment for this condition depends on how severe the injury is. Most fractures can be treated with:  A back brace.  Bed rest and limits on your activity.  Pain medicine.  Physical therapy.  Fractures that are complex, involve multiple bones, or make the spine unstable may require surgery. Surgery is done:  To remove pressure from the nerves or spinal cord.  To stabilize the broken pieces of bone.  Follow these instructions at home:  Medicines    Take over-the-counter and prescription medicines only as told by your health care provider.  Ask your health care provider if the medicine prescribed to you:  Requires you to avoid driving or using machinery.  Can cause constipation. You may need to take these actions to prevent or treat constipation:  Drink enough fluid to keep your urine pale yellow.  Take over-the-counter or prescription medicines.  Eat foods that are high in fiber, such as beans, whole grains, and fresh fruits and vegetables.  Limit foods that are high in fat and processed sugars, such as fried or sweet foods.  If you have a back brace:    Wear the back brace as told by your health care provider. Remove it only as told by your health care provider.  Check the skin around the brace every day. Tell your health care provider about any concerns.  Keep the brace clean.  If the brace is not waterproof:  Do not let it get wet.  Cover it with a watertight covering when you take a bath or a shower.  Ask your health care provider when it is safe to drive.  Activity    Rest as told by your health care provider.  Do exercises as told by your health care provider.  Return to your normal activities as told by your health care provider. Ask your health care provider what activities are safe for you.  Managing pain, stiffness, and swelling    Bag of ice on a towel on the skin.  If directed, put ice on the injured area. To do this:  If you have a removable brace, remove it only as told by your health care provider.  Put ice in a plastic bag.  Place a towel between your skin and the bag.  Leave the ice on for 20 minutes, 2–3 times a day.  If your skin turns bright red, remove the ice right away to prevent skin damage. The risk of skin damage is higher if you cannot feel pain, heat, or cold.  General instructions    Do not use any products that contain nicotine or tobacco. These products include cigarettes, chewing tobacco, and vaping devices, such e-cigarettes. These can delay bone healing. If you need help quitting, ask your health care provider.  Do not drink alcohol.  Keep all follow-up visits. Failing to follow up as recommended could result in permanent injury, disability, or long-lasting (chronic) pain.  Where to find more information  [employer]: orthoinfo.aaos.org  Contact a health care provider if:  You have a fever.  Your pain medicine is not helping.  Your pain does not get better over time.  You cannot return to your normal activities as planned or expected.  Get help right away if:  You have difficulty breathing.  Your pain is very bad and it suddenly gets worse.  You have numbness, tingling, or weakness in any part of your body.  You are unable to empty your bladder.  You cannot control when you urinate or have bowel movements.  You are unable to move any body part that is below the level of your injury.  You have pain in your abdomen or uncontrolled vomiting.  You have a warm, tender swelling in your leg.  These symptoms may be an emergency. Get help right away. Call 911.  Do not wait to see if the symptoms will go away.  Do not drive yourself to the hospital.  Summary  A lumbar spine fracture is a break in one of the bones of the lower back.  The main symptom of this condition is severe pain in the lower back. If a fracture is complex or severe, there may also be numbness, tingling, or paralysis in the legs.  Most fractures can be treated with a back brace, pain medicine, bed rest and limits on activity, and physical therapy.  Fractures that are complex, involve multiple bones, or make the spine unstable may require surgery.

## 2025-02-03 NOTE — ED PROVIDER NOTE - ATTENDING CONTRIBUTION TO CARE
85 yo PMHx GERD, HTN arthritis, RA, and hx of breast cancer (in remission since 2015), R femur ORIF December 2024, presented to ED after fall. Patient reports that she has had balance issues with right femur since the surgery, fell in her bedroom today hitting her head on the dresser.  No LOC.  This happened 8:30 AM today.  Patient reporting pain to her back at this time. Endorses mild nausea.     CONSTITUTIONAL: NAD.   SKIN: warm, dry  HEAD: Normocephalic; atraumatic.  ENT: MMM.   NECK: Supple.  CARD: RRR.   RESP: No wheezes, rales or rhonchi.  ABD: soft ntnd.   BACK: no midline tenderness L thoracolumbar lateral tenderness   EXT: Normal ROM.  RLE mild edema with calf tenderness.   NEURO: Alert, oriented, grossly unremarkable.

## 2025-02-03 NOTE — ED PROVIDER NOTE - PATIENT PORTAL LINK FT
You can access the FollowMyHealth Patient Portal offered by [facility] by registering at the following website: [email or web address]. By joining [employer]’s FollowMyHealth portal, you will also be able to view your health information using other applications (apps) compatible with our system.

## 2025-02-03 NOTE — ED PROVIDER NOTE - PHYSICAL EXAMINATION
VITAL SIGNS: I have reviewed nursing notes and confirm.  CONSTITUTIONAL: Well-developed; well-nourished; in no acute distress.  SKIN: Skin exam is warm and dry, no acute rash. No lacerations/abrasions or ecchymosis noted  HEAD: Normocephalic; atraumatic.  EYES: PERRL, conjunctiva and sclera clear.  ENT: MMM.   NECK: Supple; non tender. No midline C spine ttp  CARD: S1, S2 normal; no murmurs, gallops, or rubs. Regular rate and rhythm. 2+ distal pulses  RESP: Normal respiratory effort, no tachypnea or distress. Lungs CTAB, no wheezes, rales or rhonchi.  chest wall non-tender  back with no midline c/t/l/s spinal TTP, (+) L flank TTP no bruising noted  pelvis stable  ABD: soft, NT/ND.  EXT: Normal ROM. (+)R calf TTP  Neuro: A&Ox3, moving all extremities, no focal deficits  PSYCH: Cooperative, appropriate.

## 2025-02-03 NOTE — ED PROVIDER NOTE - PROGRESS NOTE DETAILS
Authored by Christina Duque DO: Patient signed out to Dr. Martines pending dvt study, ua, ekg reassesses / dispo CLAIRE: Labs wnl, preliminary duplex (-) for DVT. XR shows hardware but no acute injury. Signed out to Dr. Shen pending CT reads and disposition. Discussed patients clinical presentation and imaging impression of L1 compression fracture with neurosurgery team. Per neurosurgery team patient is stable for outpatient f/u and management with abdominal binder for comfort and PT/OT referral. Follow up in 2 weeks with Dr. Janes Mesa MD. referrals and abdominal bider given.

## 2025-02-03 NOTE — ED PROVIDER NOTE - DIFFERENTIAL DIAGNOSIS
The differential diagnosis for patients clinical presentation includes but is not limited to: fx, dislocation, dvt, contusion, sprain strain Differential Diagnosis

## 2025-02-03 NOTE — ED PROVIDER NOTE - NSPTACCESSSVCSAPPTDETAILS_ED_ALL_ED_FT
Follow up Neurosurgery with Dr. Janes Mesa MD in 2 weeks for L1 compression fracture.    PT/OT referral for L1 compression fracture.

## 2025-02-04 LAB
CULTURE RESULTS: SIGNIFICANT CHANGE UP
SPECIMEN SOURCE: SIGNIFICANT CHANGE UP

## 2025-02-06 ENCOUNTER — INPATIENT (INPATIENT)
Facility: HOSPITAL | Age: 85
LOS: 5 days | Discharge: HOME | DRG: 552 | End: 2025-02-12
Attending: INTERNAL MEDICINE | Admitting: INTERNAL MEDICINE
Payer: MEDICARE

## 2025-02-06 VITALS
HEIGHT: 68 IN | DIASTOLIC BLOOD PRESSURE: 94 MMHG | OXYGEN SATURATION: 97 % | SYSTOLIC BLOOD PRESSURE: 151 MMHG | WEIGHT: 179.9 LBS | HEART RATE: 96 BPM | TEMPERATURE: 98 F | RESPIRATION RATE: 18 BRPM

## 2025-02-06 DIAGNOSIS — R26.2 DIFFICULTY IN WALKING, NOT ELSEWHERE CLASSIFIED: ICD-10-CM

## 2025-02-06 DIAGNOSIS — Z96.652 PRESENCE OF LEFT ARTIFICIAL KNEE JOINT: Chronic | ICD-10-CM

## 2025-02-06 DIAGNOSIS — Z96.642 PRESENCE OF LEFT ARTIFICIAL HIP JOINT: Chronic | ICD-10-CM

## 2025-02-06 DIAGNOSIS — E83.42 HYPOMAGNESEMIA: ICD-10-CM

## 2025-02-06 DIAGNOSIS — Z98.890 OTHER SPECIFIED POSTPROCEDURAL STATES: Chronic | ICD-10-CM

## 2025-02-06 DIAGNOSIS — Z85.3 PERSONAL HISTORY OF MALIGNANT NEOPLASM OF BREAST: ICD-10-CM

## 2025-02-06 DIAGNOSIS — K21.9 GASTRO-ESOPHAGEAL REFLUX DISEASE WITHOUT ESOPHAGITIS: ICD-10-CM

## 2025-02-06 DIAGNOSIS — Z96.641 PRESENCE OF RIGHT ARTIFICIAL HIP JOINT: Chronic | ICD-10-CM

## 2025-02-06 DIAGNOSIS — Z90.710 ACQUIRED ABSENCE OF BOTH CERVIX AND UTERUS: Chronic | ICD-10-CM

## 2025-02-06 DIAGNOSIS — F32.A DEPRESSION, UNSPECIFIED: ICD-10-CM

## 2025-02-06 DIAGNOSIS — S32.019A UNSPECIFIED FRACTURE OF FIRST LUMBAR VERTEBRA, INITIAL ENCOUNTER FOR CLOSED FRACTURE: ICD-10-CM

## 2025-02-06 DIAGNOSIS — N17.9 ACUTE KIDNEY FAILURE, UNSPECIFIED: ICD-10-CM

## 2025-02-06 DIAGNOSIS — M19.90 UNSPECIFIED OSTEOARTHRITIS, UNSPECIFIED SITE: ICD-10-CM

## 2025-02-06 DIAGNOSIS — M54.9 DORSALGIA, UNSPECIFIED: ICD-10-CM
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
## 2025-02-06 DIAGNOSIS — Z79.82 LONG TERM (CURRENT) USE OF ASPIRIN: ICD-10-CM

## 2025-02-06 DIAGNOSIS — E87.1 HYPO-OSMOLALITY AND HYPONATREMIA: ICD-10-CM

## 2025-02-06 DIAGNOSIS — M80.08XA AGE-RELATED OSTEOPOROSIS WITH CURRENT PATHOLOGICAL FRACTURE, VERTEBRA(E), INITIAL ENCOUNTER FOR FRACTURE: ICD-10-CM

## 2025-02-06 DIAGNOSIS — Z90.49 ACQUIRED ABSENCE OF OTHER SPECIFIED PARTS OF DIGESTIVE TRACT: Chronic | ICD-10-CM

## 2025-02-06 LAB
ALBUMIN SERPL ELPH-MCNC: 3.5 G/DL — SIGNIFICANT CHANGE UP (ref 3.5–5.2)
ALP SERPL-CCNC: 128 U/L — HIGH (ref 30–115)
ALT FLD-CCNC: 14 U/L — SIGNIFICANT CHANGE UP (ref 0–41)
ANION GAP SERPL CALC-SCNC: 16 MMOL/L — HIGH (ref 7–14)
AST SERPL-CCNC: 33 U/L — SIGNIFICANT CHANGE UP (ref 0–41)
BASOPHILS # BLD AUTO: 0.03 K/UL — SIGNIFICANT CHANGE UP (ref 0–0.2)
BASOPHILS NFR BLD AUTO: 0.3 % — SIGNIFICANT CHANGE UP (ref 0–1)
BILIRUB SERPL-MCNC: 0.5 MG/DL — SIGNIFICANT CHANGE UP (ref 0.2–1.2)
BUN SERPL-MCNC: 21 MG/DL — HIGH (ref 10–20)
CALCIUM SERPL-MCNC: 9.5 MG/DL — SIGNIFICANT CHANGE UP (ref 8.4–10.5)
CHLORIDE SERPL-SCNC: 92 MMOL/L — LOW (ref 98–110)
CO2 SERPL-SCNC: 23 MMOL/L — SIGNIFICANT CHANGE UP (ref 17–32)
CREAT SERPL-MCNC: 0.7 MG/DL — SIGNIFICANT CHANGE UP (ref 0.7–1.5)
EGFR: 85 ML/MIN/1.73M2 — SIGNIFICANT CHANGE UP
EOSINOPHIL # BLD AUTO: 0.03 K/UL — SIGNIFICANT CHANGE UP (ref 0–0.7)
EOSINOPHIL NFR BLD AUTO: 0.3 % — SIGNIFICANT CHANGE UP (ref 0–8)
GLUCOSE SERPL-MCNC: 116 MG/DL — HIGH (ref 70–99)
HCT VFR BLD CALC: 38.9 % — SIGNIFICANT CHANGE UP (ref 37–47)
HGB BLD-MCNC: 12.4 G/DL — SIGNIFICANT CHANGE UP (ref 12–16)
IMM GRANULOCYTES NFR BLD AUTO: 0.5 % — HIGH (ref 0.1–0.3)
LYMPHOCYTES # BLD AUTO: 1.56 K/UL — SIGNIFICANT CHANGE UP (ref 1.2–3.4)
LYMPHOCYTES # BLD AUTO: 16.4 % — LOW (ref 20.5–51.1)
MCHC RBC-ENTMCNC: 27.6 PG — SIGNIFICANT CHANGE UP (ref 27–31)
MCHC RBC-ENTMCNC: 31.9 G/DL — LOW (ref 32–37)
MCV RBC AUTO: 86.6 FL — SIGNIFICANT CHANGE UP (ref 81–99)
MONOCYTES # BLD AUTO: 0.88 K/UL — HIGH (ref 0.1–0.6)
MONOCYTES NFR BLD AUTO: 9.2 % — SIGNIFICANT CHANGE UP (ref 1.7–9.3)
NEUTROPHILS # BLD AUTO: 6.99 K/UL — HIGH (ref 1.4–6.5)
NEUTROPHILS NFR BLD AUTO: 73.3 % — SIGNIFICANT CHANGE UP (ref 42.2–75.2)
NRBC # BLD: 0 /100 WBCS — SIGNIFICANT CHANGE UP (ref 0–0)
NRBC BLD-RTO: 0 /100 WBCS — SIGNIFICANT CHANGE UP (ref 0–0)
PLATELET # BLD AUTO: 347 K/UL — SIGNIFICANT CHANGE UP (ref 130–400)
PMV BLD: 10.3 FL — SIGNIFICANT CHANGE UP (ref 7.4–10.4)
POTASSIUM SERPL-MCNC: 4.6 MMOL/L — SIGNIFICANT CHANGE UP (ref 3.5–5)
POTASSIUM SERPL-SCNC: 4.6 MMOL/L — SIGNIFICANT CHANGE UP (ref 3.5–5)
PROT SERPL-MCNC: 7 G/DL — SIGNIFICANT CHANGE UP (ref 6–8)
RBC # BLD: 4.49 M/UL — SIGNIFICANT CHANGE UP (ref 4.2–5.4)
RBC # FLD: 15.4 % — HIGH (ref 11.5–14.5)
SODIUM SERPL-SCNC: 131 MMOL/L — LOW (ref 135–146)
WBC # BLD: 9.54 K/UL — SIGNIFICANT CHANGE UP (ref 4.8–10.8)
WBC # FLD AUTO: 9.54 K/UL — SIGNIFICANT CHANGE UP (ref 4.8–10.8)

## 2025-02-06 PROCEDURE — 83735 ASSAY OF MAGNESIUM: CPT

## 2025-02-06 PROCEDURE — 72131 CT LUMBAR SPINE W/O DYE: CPT | Mod: MC

## 2025-02-06 PROCEDURE — 80053 COMPREHEN METABOLIC PANEL: CPT

## 2025-02-06 PROCEDURE — 97116 GAIT TRAINING THERAPY: CPT | Mod: GP

## 2025-02-06 PROCEDURE — 97167 OT EVAL HIGH COMPLEX 60 MIN: CPT | Mod: GO

## 2025-02-06 PROCEDURE — 73700 CT LOWER EXTREMITY W/O DYE: CPT | Mod: 26,RT

## 2025-02-06 PROCEDURE — 36415 COLL VENOUS BLD VENIPUNCTURE: CPT

## 2025-02-06 PROCEDURE — 85025 COMPLETE CBC W/AUTO DIFF WBC: CPT

## 2025-02-06 PROCEDURE — 81001 URINALYSIS AUTO W/SCOPE: CPT

## 2025-02-06 PROCEDURE — 93005 ELECTROCARDIOGRAM TRACING: CPT

## 2025-02-06 PROCEDURE — 73721 MRI JNT OF LWR EXTRE W/O DYE: CPT | Mod: MC,RT

## 2025-02-06 PROCEDURE — 99285 EMERGENCY DEPT VISIT HI MDM: CPT | Mod: FS

## 2025-02-06 PROCEDURE — 73562 X-RAY EXAM OF KNEE 3: CPT | Mod: 26,RT

## 2025-02-06 PROCEDURE — 93010 ELECTROCARDIOGRAM REPORT: CPT

## 2025-02-06 PROCEDURE — 73700 CT LOWER EXTREMITY W/O DYE: CPT | Mod: MC,RT

## 2025-02-06 PROCEDURE — 97110 THERAPEUTIC EXERCISES: CPT | Mod: GP

## 2025-02-06 PROCEDURE — 73552 X-RAY EXAM OF FEMUR 2/>: CPT | Mod: 26,RT

## 2025-02-06 PROCEDURE — 84443 ASSAY THYROID STIM HORMONE: CPT

## 2025-02-06 PROCEDURE — 97163 PT EVAL HIGH COMPLEX 45 MIN: CPT | Mod: GP

## 2025-02-06 PROCEDURE — 82550 ASSAY OF CK (CPK): CPT

## 2025-02-06 RX ORDER — CELECOXIB 200 MG
200 CAPSULE ORAL
Refills: 0 | Status: DISCONTINUED | OUTPATIENT
Start: 2025-02-06 | End: 2025-02-12

## 2025-02-06 RX ORDER — METOPROLOL SUCCINATE 25 MG
1 TABLET, EXTENDED RELEASE 24 HR ORAL
Refills: 0 | DISCHARGE

## 2025-02-06 RX ORDER — IBUPROFEN 200 MG
1 CAPSULE ORAL DAILY
Refills: 0 | Status: DISCONTINUED | OUTPATIENT
Start: 2025-02-06 | End: 2025-02-12

## 2025-02-06 RX ORDER — METOPROLOL SUCCINATE 25 MG
25 TABLET, EXTENDED RELEASE 24 HR ORAL DAILY
Refills: 0 | Status: DISCONTINUED | OUTPATIENT
Start: 2025-02-06 | End: 2025-02-12

## 2025-02-06 RX ORDER — SERTRALINE HCL 100 MG
50 TABLET ORAL DAILY
Refills: 0 | Status: DISCONTINUED | OUTPATIENT
Start: 2025-02-06 | End: 2025-02-12

## 2025-02-06 RX ORDER — FAMOTIDINE 10 MG/ML
20 INJECTION INTRAVENOUS
Refills: 0 | Status: DISCONTINUED | OUTPATIENT
Start: 2025-02-06 | End: 2025-02-12

## 2025-02-06 RX ORDER — POLYETHYLENE GLYCOL 3350 17 G/17G
17 POWDER, FOR SOLUTION ORAL
Refills: 0 | Status: DISCONTINUED | OUTPATIENT
Start: 2025-02-06 | End: 2025-02-12

## 2025-02-06 RX ORDER — CELECOXIB 200 MG
1 CAPSULE ORAL
Refills: 0 | DISCHARGE

## 2025-02-06 RX ORDER — ROSUVASTATIN CALCIUM 10 MG/1
10 TABLET, FILM COATED ORAL AT BEDTIME
Refills: 0 | Status: DISCONTINUED | OUTPATIENT
Start: 2025-02-06 | End: 2025-02-12

## 2025-02-06 RX ORDER — FOLIC ACID 1 MG
0 TABLET ORAL
Refills: 0 | DISCHARGE

## 2025-02-06 RX ORDER — CYANOCOBALAMIN (VITAMIN B-12) 1000MCG/ML
1 VIAL (ML) INJECTION
Refills: 0 | DISCHARGE

## 2025-02-06 RX ORDER — ACETAMINOPHEN, DIPHENHYDRAMINE HCL, PHENYLEPHRINE HCL 325; 25; 5 MG/1; MG/1; MG/1
3 TABLET ORAL AT BEDTIME
Refills: 0 | Status: DISCONTINUED | OUTPATIENT
Start: 2025-02-06 | End: 2025-02-12

## 2025-02-06 RX ORDER — METHOCARBAMOL 500 MG
500 TABLET ORAL EVERY 8 HOURS
Refills: 0 | Status: DISCONTINUED | OUTPATIENT
Start: 2025-02-06 | End: 2025-02-12

## 2025-02-06 RX ORDER — ASPIRIN 81 MG/1
81 TABLET, COATED ORAL DAILY
Refills: 0 | Status: DISCONTINUED | OUTPATIENT
Start: 2025-02-06 | End: 2025-02-12

## 2025-02-06 RX ORDER — ENOXAPARIN SODIUM 100 MG/ML
40 INJECTION SUBCUTANEOUS EVERY 24 HOURS
Refills: 0 | Status: DISCONTINUED | OUTPATIENT
Start: 2025-02-06 | End: 2025-02-12

## 2025-02-06 RX ORDER — MORPHINE SULFATE 60 MG/1
4 TABLET, FILM COATED, EXTENDED RELEASE ORAL ONCE
Refills: 0 | Status: DISCONTINUED | OUTPATIENT
Start: 2025-02-06 | End: 2025-02-06

## 2025-02-06 RX ORDER — CYANOCOBALAMIN (VITAMIN B-12) 1000MCG/ML
1000 VIAL (ML) INJECTION DAILY
Refills: 0 | Status: DISCONTINUED | OUTPATIENT
Start: 2025-02-06 | End: 2025-02-12

## 2025-02-06 RX ORDER — LOSARTAN POTASSIUM 100 MG
25 TABLET ORAL DAILY
Refills: 0 | Status: DISCONTINUED | OUTPATIENT
Start: 2025-02-06 | End: 2025-02-12

## 2025-02-06 RX ORDER — MECOBAL/LEVOMEFOLAT CA/B6 PHOS 2-3-35 MG
1 TABLET ORAL DAILY
Refills: 0 | Status: DISCONTINUED | OUTPATIENT
Start: 2025-02-06 | End: 2025-02-12

## 2025-02-06 RX ORDER — TRAMADOL HYDROCHLORIDE 100 MG/1
50 TABLET, EXTENDED RELEASE ORAL EVERY 6 HOURS
Refills: 0 | Status: DISCONTINUED | OUTPATIENT
Start: 2025-02-06 | End: 2025-02-09

## 2025-02-06 RX ORDER — SENNOSIDES 8.6 MG
2 TABLET ORAL AT BEDTIME
Refills: 0 | Status: DISCONTINUED | OUTPATIENT
Start: 2025-02-06 | End: 2025-02-12

## 2025-02-06 RX ORDER — MECOBAL/LEVOMEFOLAT CA/B6 PHOS 2-3-35 MG
1 TABLET ORAL
Refills: 0 | DISCHARGE

## 2025-02-06 RX ORDER — ACETAMINOPHEN 160 MG/5ML
1000 SUSPENSION ORAL EVERY 8 HOURS
Refills: 0 | Status: DISCONTINUED | OUTPATIENT
Start: 2025-02-06 | End: 2025-02-12

## 2025-02-06 RX ORDER — LIDOCAINE HYDROCHLORIDE 30 MG/G
1 CREAM TOPICAL DAILY
Refills: 0 | Status: DISCONTINUED | OUTPATIENT
Start: 2025-02-06 | End: 2025-02-12

## 2025-02-06 RX ORDER — SERTRALINE HCL 100 MG
1 TABLET ORAL
Refills: 0 | DISCHARGE

## 2025-02-06 RX ORDER — HYDROCHLOROTHIAZIDE 50 MG
1 TABLET ORAL
Refills: 0 | DISCHARGE

## 2025-02-06 RX ORDER — HYDROMORPHONE HYDROCHLORIDE 4 MG/ML
1 INJECTION, SOLUTION INTRAMUSCULAR; INTRAVENOUS; SUBCUTANEOUS EVERY 4 HOURS
Refills: 0 | Status: DISCONTINUED | OUTPATIENT
Start: 2025-02-06 | End: 2025-02-12

## 2025-02-06 RX ADMIN — Medication 25 MILLIGRAM(S): at 18:41

## 2025-02-06 RX ADMIN — Medication 2 TABLET(S): at 21:10

## 2025-02-06 RX ADMIN — ACETAMINOPHEN 1000 MILLIGRAM(S): 160 SUSPENSION ORAL at 21:51

## 2025-02-06 RX ADMIN — ROSUVASTATIN CALCIUM 10 MILLIGRAM(S): 10 TABLET, FILM COATED ORAL at 21:09

## 2025-02-06 RX ADMIN — TRAMADOL HYDROCHLORIDE 50 MILLIGRAM(S): 100 TABLET, EXTENDED RELEASE ORAL at 16:36

## 2025-02-06 RX ADMIN — Medication 200 MILLIGRAM(S): at 18:41

## 2025-02-06 RX ADMIN — FAMOTIDINE 20 MILLIGRAM(S): 10 INJECTION INTRAVENOUS at 18:40

## 2025-02-06 RX ADMIN — ASPIRIN 81 MILLIGRAM(S): 81 TABLET, COATED ORAL at 16:35

## 2025-02-06 RX ADMIN — MORPHINE SULFATE 4 MILLIGRAM(S): 60 TABLET, FILM COATED, EXTENDED RELEASE ORAL at 13:20

## 2025-02-06 RX ADMIN — LIDOCAINE HYDROCHLORIDE 1 PATCH: 30 CREAM TOPICAL at 19:00

## 2025-02-06 RX ADMIN — LIDOCAINE HYDROCHLORIDE 1 PATCH: 30 CREAM TOPICAL at 16:35

## 2025-02-06 RX ADMIN — HYDROMORPHONE HYDROCHLORIDE 1 MILLIGRAM(S): 4 INJECTION, SOLUTION INTRAMUSCULAR; INTRAVENOUS; SUBCUTANEOUS at 20:25

## 2025-02-06 RX ADMIN — Medication 50 MILLIGRAM(S): at 16:42

## 2025-02-06 RX ADMIN — ACETAMINOPHEN 1000 MILLIGRAM(S): 160 SUSPENSION ORAL at 21:09

## 2025-02-06 RX ADMIN — Medication 500 MILLIGRAM(S): at 21:17

## 2025-02-06 RX ADMIN — Medication 500 MILLIGRAM(S): at 16:36

## 2025-02-06 RX ADMIN — HYDROMORPHONE HYDROCHLORIDE 1 MILLIGRAM(S): 4 INJECTION, SOLUTION INTRAMUSCULAR; INTRAVENOUS; SUBCUTANEOUS at 19:37

## 2025-02-06 NOTE — ED ADULT TRIAGE NOTE - CHIEF COMPLAINT QUOTE
BIBEMS from home for s/p fall pn monday with L fx, d/c monday without pain meds, worsening pain and nausea.

## 2025-02-06 NOTE — ED PROVIDER NOTE - CLINICAL SUMMARY MEDICAL DECISION MAKING FREE TEXT BOX
Agree with above history exam.  Patient here status post fall on  February 3 where she was found to have a L1 compression fracture.  No neurosurgical intervention noted at that time, however patient went home having persistent pain difficulty ambulating.  Daughter brings patient back as she is on unable to take care of patient.  Daughter is concerned the patient right knee was locking causing her to fall.  Here x-rays of the right knee and right femur unremarkable.  Patient was seen by orthopedics at bedside and further workup (ct knee likely) to be done as inpatient.

## 2025-02-06 NOTE — CONSULT NOTE ADULT - ASSESSMENT
s/p right distal femur periprosthetic fracture December 2024     pain control   medical management   dvt proph while not ambulating   please obtain ct scan right knee and lumbar   pain control   will follow  s/p right distal femur periprosthetic fracture December 2024     pain control   medical management   dvt proph while not ambulating   please obtain ct scan right knee and lumbar   pain control   will follow   pt seen and examined films reviewed  no obvious fx   pain appears spinal

## 2025-02-06 NOTE — H&P ADULT - NSHPPHYSICALEXAM_GEN_ALL_CORE
GENERAL: NAD, lying in bed comfortably  CHEST/LUNG: Clear to auscultation bilaterally; No rales, rhonchi, wheezing, or rubs. Unlabored respirations  HEART: Regular rate and rhythm  ABDOMEN: Soft, Nontender, Nondistended  EXTREMITIES: No clubbing, cyanosis, or edema  NERVOUS SYSTEM:  Alert & Oriented X3, speech clear. No deficits GENERAL: NAD, lying in bed comfortably  CHEST/LUNG: Clear to auscultation bilaterally; No rales, rhonchi, wheezing, or rubs. Unlabored respirations  HEART: Regular rate and rhythm  ABDOMEN: Soft, Nontender, Nondistended  BACK: Lower back tenderness  EXTREMITIES: Rt knee pain limited ROM, healed scars on the knees  NERVOUS SYSTEM:  Alert & Oriented X3, speech clear. No deficits

## 2025-02-06 NOTE — CONSULT NOTE ADULT - SUBJECTIVE AND OBJECTIVE BOX
Orthopaedic Surgery Consult Note    For Surgeon:    HPI:  84yFemale  Patient is a 84y old  Female who presents with a chief complaint of   HPI:  84-year-old female PMHx of RA, OA s/p B/L ROHIT & TKA, Rt hip ORIF(2024), HTN, GERD, breast cancer(in remission since 2015,) presenting to the ED for persistent back pain and inability to ambulate. Patient was evaluated in ED on 2/3 after a mechanical fall at home and was found to have compression fracture at L1. She was evaluated by neurosurgery who recommended abdominal binder, PT, and outpatient follow-up with Dr. Mesa.  Since Monday patient has not been able to walk using her walker due to back pain, has been taking Tylenol for pain with minimal relief.  Denies any fall or trauma, headache, neck pain, fevers, cough, chest pain, shortness of breath, abdominal pain, nausea vomiting diarrhea constipation, unilateral numbness, weakness, urinary symptoms.   pt seen at bedside with dr marlon pinedo.   pt mostly has lower back pain.    reports some pain at knee incisions are well healed     In ED, vitals were stable  Labs unremarkable except for mild HypoNa 131  Imaging 2/3: New age-indeterminate minimal L1 vertebral body compression fracture without significant osseous retropulsion; possibly acute.  Otherwise Trauma w/u negative    Vital Signs Last 24 Hrs  T(F): 98.5 (06 Feb 2025 11:37), Max: 98.5 (06 Feb 2025 11:37)  HR: 96 (06 Feb 2025 11:37) (96 - 96)  BP: 151/94 (06 Feb 2025 11:37) (151/94 - 151/94)  RR: 18 (06 Feb 2025 11:37) (18 - 18)  SpO2: 97% (06 Feb 2025 11:37) (97% - 97%) on RA    Pt. is being admitted for pain management and physical therapy.   (06 Feb 2025 15:19)      Allergies    No Known Allergies    Intolerances      PAST MEDICAL & SURGICAL HISTORY:  Rheumatoid arthritis      HTN (hypertension)      Breast cancer  last radiation 2015      Depression      Chronic GERD      OA (osteoarthritis)      Rheumatoid arthritis      History of right hip replacement      Status post left hip replacement      S/P total knee replacement, left      H/O vaginal hysterectomy      Status post cholecystectomy      S/P lumpectomy of breast  2015        MEDICATIONS  (STANDING):  acetaminophen     Tablet .. 1000 milliGRAM(s) Oral every 8 hours  enoxaparin Injectable 40 milliGRAM(s) SubCutaneous every 24 hours  famotidine    Tablet 20 milliGRAM(s) Oral two times a day  lidocaine   4% Patch 1 Patch Transdermal daily  methocarbamol 500 milliGRAM(s) Oral every 8 hours  senna 2 Tablet(s) Oral at bedtime    MEDICATIONS  (PRN):  melatonin 3 milliGRAM(s) Oral at bedtime PRN Insomnia  polyethylene glycol 3350 17 Gram(s) Oral two times a day PRN Constipation  traMADol 50 milliGRAM(s) Oral every 6 hours PRN Severe Pain (7 - 10)      Vital Signs Last 24 Hrs  T(C): 36.9 (06 Feb 2025 11:37), Max: 36.9 (06 Feb 2025 11:37)  T(F): 98.5 (06 Feb 2025 11:37), Max: 98.5 (06 Feb 2025 11:37)  HR: 96 (06 Feb 2025 11:37) (96 - 96)  BP: 151/94 (06 Feb 2025 11:37) (151/94 - 151/94)  BP(mean): --  RR: 18 (06 Feb 2025 11:37) (18 - 18)  SpO2: 97% (06 Feb 2025 11:37) (97% - 97%)    Parameters below as of 06 Feb 2025 11:37  Patient On (Oxygen Delivery Method): room air        Physical Exam:  skin intact wounds healed, pt can straight leg raise with slight lag,   minor tenderness about the knee,    nvid,   no laxity noted on exam knee appears stablle                         12.4   9.54  )-----------( 347      ( 06 Feb 2025 13:09 )             38.9     02-06    131[L]  |  92[L]  |  21[H]  ----------------------------<  116[H]  4.6   |  23  |  0.7    Ca    9.5      06 Feb 2025 13:09    TPro  7.0  /  Alb  3.5  /  TBili  0.5  /  DBili  x   /  AST  33  /  ALT  14  /  AlkPhos  128[H]  02-06      Imaging:   < from: Xray Knee 3 Views, Right (02.06.25 @ 13:34) >    Findings/  impression:    Postoperative changes are seen with 2 separate intramedullary rods with a   lateral stabilization plate, 11 interlocking screws, and 3 cerclage   wires. The appearance of these structures are without difference.    Patient is status post right total hip placements.    Patient is status post right total knee replacement.    < end of copied text >

## 2025-02-06 NOTE — ED PROVIDER NOTE - PHYSICAL EXAMINATION
CONST: Well appearing in NAD  EYES: EOMI, Sclera and conjunctiva clear.   ENT: No nasal discharge. Oropharynx normal appearing, no erythema or exudates. Uvula midline.  NECK: Non-tender  CARD: Normal S1 S2; Normal rate and rhythm  RESP: Equal BS B/L, No wheezes, rhonchi or rales. No distress  GI: Soft, non-tender, non-distended.  MS: Normal ROM in all extremities. No thoracic midline spinal tenderness. TTP Lumbar spine  SKIN: Warm, dry, Good turgor  NEURO: A&Ox3, No focal deficits. Strength 5/5 with no sensory deficits

## 2025-02-06 NOTE — ED ADULT NURSE NOTE - NSFALLHARMRISKINTERV_ED_ALL_ED
Assistance OOB with selected safe patient handling equipment if applicable/Assistance with ambulation/Communicate risk of Fall with Harm to all staff, patient, and family/Monitor gait and stability/Provide patient with walking aids/Provide visual cue: red socks, yellow wristband, yellow gown, etc/Reinforce activity limits and safety measures with patient and family/Bed in lowest position, wheels locked, appropriate side rails in place/Call bell, personal items and telephone in reach/Instruct patient to call for assistance before getting out of bed/chair/stretcher/Non-slip footwear applied when patient is off stretcher/[place] to call system/Physically safe environment - no spills, clutter or unnecessary equipment/Purposeful Proactive Rounding/Room/bathroom lighting operational, light cord in reach moved to visible place/Assistance OOB with selected safe patient handling equipment if applicable/Assistance with ambulation/Communicate risk of Fall with Harm to all staff, patient, and family/Monitor gait and stability/Provide patient with walking aids/Provide visual cue: red socks, yellow wristband, yellow gown, etc/Reinforce activity limits and safety measures with patient and family/Bed in lowest position, wheels locked, appropriate side rails in place/Call bell, personal items and telephone in reach/Instruct patient to call for assistance before getting out of bed/chair/stretcher/Non-slip footwear applied when patient is off stretcher/[place] to call system/Physically safe environment - no spills, clutter or unnecessary equipment/Purposeful Proactive Rounding/Room/bathroom lighting operational, light cord in reach

## 2025-02-06 NOTE — ED PROVIDER NOTE - OBJECTIVE STATEMENT
Patient is a 84-year-old female PMH RA, hypertension, GERD, breast cancer coming to ED for persistent pain.  Patient reports fall on Monday, was evaluated in ED and told she has compression fracture at L1, was evaluated by neurosurgery who recommended abdominal binder/PT/outpatient follow-up.  Since Monday patient has not been able to walk using her walker due to back pain, has been taking Tylenol for pain with minimal relief.  Denies any fall/trauma, headache, neck pain, chest pain, shortness of breath, abdominal pain, nausea vomiting diarrhea constipation, unilateral numbness/weakness, urinary symptoms

## 2025-02-06 NOTE — ED ADULT NURSE NOTE - DO YOU HAVE ACCESS TO A FIREARM WITHIN OR OUTSIDE YOUR HOUSEHOLD?
Medical Week 1 Survey      Responses   Facility patient discharged from?  [facility]   Does the patient have one of the following disease processes/diagnoses(primary or secondary)?  Other   Is there a successful TCM telephone encounter documented?  No   Week 1 attempt successful?  Yes   Call start time  1608   Rescheduled  Rescheduled-pt requested [Family requested callback. ]   Call end time  1608          Елена Lagunas RN   No

## 2025-02-06 NOTE — H&P ADULT - ASSESSMENT
84-year-old female PMHx of RA, Osteoporosis, OA s/p B/L ROHIT & TKA, Rt hip ORIF(2024), HTN, GERD, breast cancer(in remission since 2015,) presenting to the ED for persistent back pain and inability to ambulate.     #Recent Mechanical fall   #Acute L1 compression fracture  #Back pain, inability to ambulate, Deconditioning  #Hx of Osteoporosis RA, OA s/p B/L ROHIT & TKA, Rt hip ORIF(2024)  - No weakness, paresthesia, urinary/bowel incontinence  - c/w Abdominal binder  - Pain control- Tylenol 1000mg q8, Methocarbamol 500mg q8, Lidocaine patch and Tramadol prn  - DVT ppx, bowel regimen  - PT/ OT  - OP NeuroSx f/u    #Mild Hyponatremia- chronic  - Na 131   - Monitor for now  - Encourage PO solute intake    #HTN  - c/w home BP meds    #HLD  -c/w rosuvastatin     #GERD  - c/w famotidine     #Anxiety/depression  - c/w home meds    #Misc  -DVT prophylaxis: Lovenox  -GI prophylaxis:   -Diet: Regular  -Code status: Full  -Activity: AAT  -Dispo: Med    -Med rec confirmed with the patient.     84-year-old female PMHx of RA, Osteoporosis, OA s/p B/L ROHIT & TKA, Rt hip ORIF(2024), HTN, GERD, breast cancer(in remission since 2015,) presenting to the ED for persistent back pain and inability to ambulate.     #Recent Mechanical fall   #L1 compression fracture  #Rt knee & Back pain, inability to ambulate, Deconditioning  #Hx of Osteoporosis RA, OA s/p B/L ROHIT & TKA, Rt hip ORIF(2024)  - No weakness, paresthesia, urinary/bowel incontinence  - Seen by ortho- F/u CT Rt knee, F/u ortho and spine Sx recs  - c/w Abdominal binder  - Pain control- Tylenol 1000mg q8, Celecoxib 200mg BID, Methocarbamol 500mg q8, Lidocaine patch and Tramadol 50mg q6 prn  - c/w Calcium, vit D  - DVT ppx, bowel regimen  - PT/ OT  - OP Rheum f/u(hasn't been taking Methotrexate and Prednisone)    #Mild Hyponatremia- seems chronic  - Na 131   - Hold home HCTZ  - Monitor for now  - Encourage PO solute intake    #HTN  - c/w Losartan 25mg, Toprol 25mg  - hold HCTZ    #HLD  -c/w rosuvastatin 10mg    #GERD  - c/w famotidine 20mg bID    #Anxiety/depression  - c/w sertraline 50mg    #Misc  -DVT prophylaxis: Lovenox  -GI prophylaxis: Famotidine  -Diet: Regular  -Code status: Full  -Activity: AAT  -Dispo: Med    -Med rec confirmed with the patient.

## 2025-02-06 NOTE — H&P ADULT - HISTORY OF PRESENT ILLNESS
84-year-old female PMHx of RA, OA s/p B/L ROHIT & TKA, Rt hip ORIF(2024), HTN, GERD, breast cancer(in remission since 2015,) presenting to the ED for persistent back pain and inability to ambulate. Patient was evaluated in ED on 2/3 after a mechanical fall at home and was found to have compression fracture at L1. She was evaluated by neurosurgery who recommended abdominal binder, PT, and outpatient follow-up with Dr. Mesa.  Since Monday patient has not been able to walk using her walker due to back pain, has been taking Tylenol for pain with minimal relief.  Denies any fall or trauma, headache, neck pain, fevers, cough, chest pain, shortness of breath, abdominal pain, nausea vomiting diarrhea constipation, unilateral numbness, weakness, urinary symptoms.    In ED, vitals were stable  Labs unremarkable except for mild HypoNa 131  Imaging 2/3: New age-indeterminate minimal L1 vertebral body compression fracture without significant osseous retropulsion; possibly acute.  Otherwise Trauma w/u negative    Vital Signs Last 24 Hrs  T(F): 98.5 (06 Feb 2025 11:37), Max: 98.5 (06 Feb 2025 11:37)  HR: 96 (06 Feb 2025 11:37) (96 - 96)  BP: 151/94 (06 Feb 2025 11:37) (151/94 - 151/94)  RR: 18 (06 Feb 2025 11:37) (18 - 18)  SpO2: 97% (06 Feb 2025 11:37) (97% - 97%) on RA    Pt. is being admitted for pain management and physical therapy.   84-year-old female PMHx of RA, OA s/p B/L ROHIT & TKA, Rt hip ORIF(2024), HTN, GERD, breast cancer(in remission since 2015,) presenting to the ED for persistent back pain and inability to ambulate. Patient was evaluated in ED on 2/3 after a mechanical fall at home(per daughter her Rt knee buckled leading to fall) and was found to have compression fracture at L1. She was evaluated by neurosurgery who recommended abdominal binder, PT, and outpatient follow-up with Dr. Mesa.  Since Monday patient has not been able to walk using her walker due to worsening back pain, also reports Rt knee pain, has been taking Tylenol and Celecoxib for pain with minimal relief. Denies any fall or trauma, headache, neck pain, fevers, cough, chest pain, shortness of breath, abdominal pain, nausea vomiting diarrhea constipation, unilateral numbness, weakness, urinary symptoms.    In ED, vitals were stable  Labs unremarkable except for mild HypoNa 131  Imaging 2/3: New age-indeterminate minimal L1 vertebral body compression fracture without significant osseous retropulsion; possibly acute.  Otherwise Trauma w/u negative    Vital Signs Last 24 Hrs  T(F): 98.5 (06 Feb 2025 11:37), Max: 98.5 (06 Feb 2025 11:37)  HR: 96 (06 Feb 2025 11:37) (96 - 96)  BP: 151/94 (06 Feb 2025 11:37) (151/94 - 151/94)  RR: 18 (06 Feb 2025 11:37) (18 - 18)  SpO2: 97% (06 Feb 2025 11:37) (97% - 97%) on RA    Pt. is being admitted for pain management and physical therapy.

## 2025-02-06 NOTE — ED ADULT NURSE NOTE - CHIEF COMPLAINT QUOTE
BIBEMS from home for s/p fall on monday with L fx, d/c monday without pain meds, worsening pain and nausea.

## 2025-02-06 NOTE — H&P ADULT - TIME BILLING
84-year-old female PMH of RA, OA s/p B/L ROHIT & TKA, Rt hip ORIF(2024), HTN, GERD, breast cancer(in remission since 2015,) presenting to the ED for persistent back pain and inability to ambulate. Patient was evaluated in ED on 2/3 after a mechanical fall at home(per daughter her Rt knee buckled leading to fall) and was found to have compression fracture at L1. She was evaluated by neurosurgery who recommended abdominal binder, PT, and outpatient follow-up with Dr. Mesa.  Since Monday patient has not been able to walk using her walker due to worsening back pain, also reports Rt knee pain, has been taking Tylenol and Celecoxib for pain with minimal relief. Denies any fall or trauma, headache, neck pain, fevers, cough, chest pain, shortness of breath, abdominal pain, nausea vomiting diarrhea constipation, unilateral numbness, weakness, urinary symptoms.    Today patient somewhat confused to the events and time line of the week.     VS noted    NAD  lungs clear   heart S1S2  abdomen BS+, soft and mild tenderness to the left flank  extremities trace edema      A/P  Mechanical Falls  Inability to walk  Back Pain, Compression Fracture  Knee pain  - pain rx  - ortho eval noted  - neurosurgery eval    GERD  - Famotidine 20 bid     Hypertension  - Losartan 25mg od  - HCTZ  50mg od     Osteoarthritis / Rheumatoid arthritis  - was on Prednisone 10mg od     Anxiety, Depression  - continue Sertraline  - resume      hx of Breast Cancer  -  in remission     GI Prophylaxis: Famotidine  Diet DASH, low fat, low salt

## 2025-02-07 LAB
ALBUMIN SERPL ELPH-MCNC: 3.3 G/DL — LOW (ref 3.5–5.2)
ALP SERPL-CCNC: 111 U/L — SIGNIFICANT CHANGE UP (ref 30–115)
ALT FLD-CCNC: 12 U/L — SIGNIFICANT CHANGE UP (ref 0–41)
ANION GAP SERPL CALC-SCNC: 16 MMOL/L — HIGH (ref 7–14)
APPEARANCE UR: CLEAR — SIGNIFICANT CHANGE UP
AST SERPL-CCNC: 18 U/L — SIGNIFICANT CHANGE UP (ref 0–41)
BACTERIA # UR AUTO: NEGATIVE /HPF — SIGNIFICANT CHANGE UP
BASOPHILS # BLD AUTO: 0.04 K/UL — SIGNIFICANT CHANGE UP (ref 0–0.2)
BASOPHILS NFR BLD AUTO: 0.5 % — SIGNIFICANT CHANGE UP (ref 0–1)
BILIRUB SERPL-MCNC: 0.5 MG/DL — SIGNIFICANT CHANGE UP (ref 0.2–1.2)
BILIRUB UR-MCNC: NEGATIVE — SIGNIFICANT CHANGE UP
BUN SERPL-MCNC: 33 MG/DL — HIGH (ref 10–20)
CALCIUM SERPL-MCNC: 9.5 MG/DL — SIGNIFICANT CHANGE UP (ref 8.4–10.5)
CAST: 18 /LPF — HIGH (ref 0–4)
CHLORIDE SERPL-SCNC: 96 MMOL/L — LOW (ref 98–110)
CK SERPL-CCNC: 28 U/L — SIGNIFICANT CHANGE UP (ref 0–225)
CO2 SERPL-SCNC: 23 MMOL/L — SIGNIFICANT CHANGE UP (ref 17–32)
COLOR SPEC: YELLOW — SIGNIFICANT CHANGE UP
CREAT SERPL-MCNC: 1.3 MG/DL — SIGNIFICANT CHANGE UP (ref 0.7–1.5)
DIFF PNL FLD: NEGATIVE — SIGNIFICANT CHANGE UP
EGFR: 40 ML/MIN/1.73M2 — LOW
EOSINOPHIL # BLD AUTO: 0.09 K/UL — SIGNIFICANT CHANGE UP (ref 0–0.7)
EOSINOPHIL NFR BLD AUTO: 1.2 % — SIGNIFICANT CHANGE UP (ref 0–8)
GLUCOSE SERPL-MCNC: 91 MG/DL — SIGNIFICANT CHANGE UP (ref 70–99)
GLUCOSE UR QL: NEGATIVE MG/DL — SIGNIFICANT CHANGE UP
HCT VFR BLD CALC: 34 % — LOW (ref 37–47)
HGB BLD-MCNC: 10.8 G/DL — LOW (ref 12–16)
IMM GRANULOCYTES NFR BLD AUTO: 0.4 % — HIGH (ref 0.1–0.3)
KETONES UR-MCNC: NEGATIVE MG/DL — SIGNIFICANT CHANGE UP
LEUKOCYTE ESTERASE UR-ACNC: NEGATIVE — SIGNIFICANT CHANGE UP
LYMPHOCYTES # BLD AUTO: 1.76 K/UL — SIGNIFICANT CHANGE UP (ref 1.2–3.4)
LYMPHOCYTES # BLD AUTO: 23.8 % — SIGNIFICANT CHANGE UP (ref 20.5–51.1)
MAGNESIUM SERPL-MCNC: 1.5 MG/DL — LOW (ref 1.8–2.4)
MCHC RBC-ENTMCNC: 27.6 PG — SIGNIFICANT CHANGE UP (ref 27–31)
MCHC RBC-ENTMCNC: 31.8 G/DL — LOW (ref 32–37)
MCV RBC AUTO: 86.7 FL — SIGNIFICANT CHANGE UP (ref 81–99)
MONOCYTES # BLD AUTO: 0.9 K/UL — HIGH (ref 0.1–0.6)
MONOCYTES NFR BLD AUTO: 12.2 % — HIGH (ref 1.7–9.3)
NEUTROPHILS # BLD AUTO: 4.56 K/UL — SIGNIFICANT CHANGE UP (ref 1.4–6.5)
NEUTROPHILS NFR BLD AUTO: 61.9 % — SIGNIFICANT CHANGE UP (ref 42.2–75.2)
NITRITE UR-MCNC: NEGATIVE — SIGNIFICANT CHANGE UP
NRBC # BLD: 0 /100 WBCS — SIGNIFICANT CHANGE UP (ref 0–0)
NRBC BLD-RTO: 0 /100 WBCS — SIGNIFICANT CHANGE UP (ref 0–0)
PH UR: 5.5 — SIGNIFICANT CHANGE UP (ref 5–8)
PLATELET # BLD AUTO: 336 K/UL — SIGNIFICANT CHANGE UP (ref 130–400)
PMV BLD: 9.7 FL — SIGNIFICANT CHANGE UP (ref 7.4–10.4)
POTASSIUM SERPL-MCNC: 3.9 MMOL/L — SIGNIFICANT CHANGE UP (ref 3.5–5)
POTASSIUM SERPL-SCNC: 3.9 MMOL/L — SIGNIFICANT CHANGE UP (ref 3.5–5)
PROT SERPL-MCNC: 6.2 G/DL — SIGNIFICANT CHANGE UP (ref 6–8)
PROT UR-MCNC: 300 MG/DL
RBC # BLD: 3.92 M/UL — LOW (ref 4.2–5.4)
RBC # FLD: 15.9 % — HIGH (ref 11.5–14.5)
RBC CASTS # UR COMP ASSIST: 17 /HPF — HIGH (ref 0–4)
SODIUM SERPL-SCNC: 135 MMOL/L — SIGNIFICANT CHANGE UP (ref 135–146)
SP GR SPEC: 1.03 — SIGNIFICANT CHANGE UP (ref 1–1.03)
SQUAMOUS # UR AUTO: 2 /HPF — SIGNIFICANT CHANGE UP (ref 0–5)
TSH SERPL-MCNC: 3.4 UIU/ML — SIGNIFICANT CHANGE UP (ref 0.27–4.2)
UROBILINOGEN FLD QL: 0.2 MG/DL — SIGNIFICANT CHANGE UP (ref 0.2–1)
WBC # BLD: 7.38 K/UL — SIGNIFICANT CHANGE UP (ref 4.8–10.8)
WBC # FLD AUTO: 7.38 K/UL — SIGNIFICANT CHANGE UP (ref 4.8–10.8)
WBC UR QL: 2 /HPF — SIGNIFICANT CHANGE UP (ref 0–5)

## 2025-02-07 RX ORDER — SODIUM CHLORIDE 9 G/ML
1000 INJECTION, SOLUTION INTRAVENOUS
Refills: 0 | Status: DISCONTINUED | OUTPATIENT
Start: 2025-02-07 | End: 2025-02-09

## 2025-02-07 RX ORDER — MAGNESIUM SULFATE 0.8 MEQ/ML
2 AMPUL (ML) INJECTION
Refills: 0 | Status: COMPLETED | OUTPATIENT
Start: 2025-02-07 | End: 2025-02-07

## 2025-02-07 RX ADMIN — LIDOCAINE HYDROCHLORIDE 1 PATCH: 30 CREAM TOPICAL at 05:00

## 2025-02-07 RX ADMIN — Medication 200 MILLIGRAM(S): at 05:42

## 2025-02-07 RX ADMIN — HYDROMORPHONE HYDROCHLORIDE 1 MILLIGRAM(S): 4 INJECTION, SOLUTION INTRAMUSCULAR; INTRAVENOUS; SUBCUTANEOUS at 05:42

## 2025-02-07 RX ADMIN — Medication 200 MILLIGRAM(S): at 17:23

## 2025-02-07 RX ADMIN — ROSUVASTATIN CALCIUM 10 MILLIGRAM(S): 10 TABLET, FILM COATED ORAL at 21:33

## 2025-02-07 RX ADMIN — ACETAMINOPHEN 1000 MILLIGRAM(S): 160 SUSPENSION ORAL at 21:33

## 2025-02-07 RX ADMIN — FAMOTIDINE 20 MILLIGRAM(S): 10 INJECTION INTRAVENOUS at 05:10

## 2025-02-07 RX ADMIN — Medication 25 MILLIGRAM(S): at 05:11

## 2025-02-07 RX ADMIN — Medication 2 TABLET(S): at 21:33

## 2025-02-07 RX ADMIN — Medication 25 MILLIGRAM(S): at 05:12

## 2025-02-07 RX ADMIN — LIDOCAINE HYDROCHLORIDE 1 PATCH: 30 CREAM TOPICAL at 11:04

## 2025-02-07 RX ADMIN — Medication 500 MILLIGRAM(S): at 21:33

## 2025-02-07 RX ADMIN — HYDROMORPHONE HYDROCHLORIDE 1 MILLIGRAM(S): 4 INJECTION, SOLUTION INTRAMUSCULAR; INTRAVENOUS; SUBCUTANEOUS at 06:11

## 2025-02-07 RX ADMIN — Medication 200 MILLIGRAM(S): at 05:11

## 2025-02-07 RX ADMIN — Medication 1 TABLET(S): at 11:03

## 2025-02-07 RX ADMIN — ASPIRIN 81 MILLIGRAM(S): 81 TABLET, COATED ORAL at 11:04

## 2025-02-07 RX ADMIN — Medication 25 GRAM(S): at 14:01

## 2025-02-07 RX ADMIN — HYDROMORPHONE HYDROCHLORIDE 1 MILLIGRAM(S): 4 INJECTION, SOLUTION INTRAMUSCULAR; INTRAVENOUS; SUBCUTANEOUS at 10:17

## 2025-02-07 RX ADMIN — Medication 25 GRAM(S): at 16:27

## 2025-02-07 RX ADMIN — SODIUM CHLORIDE 75 MILLILITER(S): 9 INJECTION, SOLUTION INTRAVENOUS at 13:58

## 2025-02-07 RX ADMIN — Medication 50 MILLIGRAM(S): at 11:03

## 2025-02-07 RX ADMIN — Medication 1 TABLET(S): at 11:04

## 2025-02-07 RX ADMIN — FAMOTIDINE 20 MILLIGRAM(S): 10 INJECTION INTRAVENOUS at 17:23

## 2025-02-07 RX ADMIN — Medication 1000 MICROGRAM(S): at 11:04

## 2025-02-07 RX ADMIN — ACETAMINOPHEN 1000 MILLIGRAM(S): 160 SUSPENSION ORAL at 05:42

## 2025-02-07 RX ADMIN — Medication 500 MILLIGRAM(S): at 14:02

## 2025-02-07 RX ADMIN — Medication 500 MILLIGRAM(S): at 05:10

## 2025-02-07 RX ADMIN — ACETAMINOPHEN 1000 MILLIGRAM(S): 160 SUSPENSION ORAL at 05:11

## 2025-02-07 RX ADMIN — ACETAMINOPHEN 1000 MILLIGRAM(S): 160 SUSPENSION ORAL at 14:02

## 2025-02-07 RX ADMIN — Medication 1000 UNIT(S): at 11:04

## 2025-02-07 NOTE — PHYSICAL THERAPY INITIAL EVALUATION ADULT - SPECIFY REASON(S)
Hold PT; pt for CT ( knee and lumbar spine). PT notified resident MD ( Dr. Montes) regarding weight bearing precautions, as pt

## 2025-02-07 NOTE — OCCUPATIONAL THERAPY INITIAL EVALUATION ADULT - SPECIFY REASON(S)
Pt is on hold of OT zully at this time. Pt is awaiting CT scan for right knee and lumbar. As per Teams message from IVONNE Wheeler, eval to be held until scan are completed/read. Will f/u when appropriate

## 2025-02-07 NOTE — OCCUPATIONAL THERAPY INITIAL EVALUATION ADULT - TRANSFER TRAINING, PT EVAL
By discharge, Pt will perform toilet transfers with moderate assistance using rolling walker and commode

## 2025-02-07 NOTE — PHYSICAL THERAPY INITIAL EVALUATION ADULT - GAIT DISTANCE, PT EVAL
3 lateral steps, then pt c/o nausea, assisted pt to return to bed. RN Ursula was notified. Pt negative for hypotension.

## 2025-02-07 NOTE — OCCUPATIONAL THERAPY INITIAL EVALUATION ADULT - PERTINENT HX OF CURRENT PROBLEM, REHAB EVAL
84-year-old female PMHx of RA, OA s/p B/L ROHIT & TKA, Rt hip ORIF(2024), HTN, GERD, breast cancer(in remission since 2015,) presenting to the ED for persistent back pain and inability to ambulate. Patient was evaluated in ED on 2/3 after a mechanical fall at home(per daughter her Rt knee buckled leading to fall) and was found to have compression fracture at L1. She was evaluated by neurosurgery who recommended abdominal binder, PT, and outpatient follow-up with Dr. Mesa.  Since Monday patient has not been able to walk using her walker due to worsening back pain, also reports Rt knee pain, has been taking Tylenol and Celecoxib for pain with minimal relief. Denies any fall or trauma, headache, neck pain, fevers, cough, chest pain, shortness of breath, abdominal pain, nausea vomiting diarrhea constipation, unilateral numbness, weakness, urinary symptoms.    #L1 compression fracture  #Rt knee & Back pain, inability to ambulate, Deconditioning  #Hx of Osteoporosis RA, OA s/p B/L ROHIT & TKA, Rt hip ORIF(2024)

## 2025-02-07 NOTE — PHYSICAL THERAPY INITIAL EVALUATION ADULT - ADDITIONAL COMMENTS
Pt was recently in Inpatient Rehabilitation Unit (4A) (December 2024), went to skilled nursing facility X 1 -2 weeks. Once d/c home, pt became progressively weak and has been using a wheelchair as main means of mobility.

## 2025-02-07 NOTE — OCCUPATIONAL THERAPY INITIAL EVALUATION ADULT - LEVEL OF INDEPENDENCE: TUB, REHAB EVAL
Refill Approved    Rx renewed per Medication Renewal Policy. Medication was last renewed on 8/6/19.    Eveline Herzog, [facility] Connection Triage/Med Refill 2/22/2020     Requested Prescriptions   Pending Prescriptions Disp Refills     sertraline (ZOLOFT) 100 MG tablet [Pharmacy Med Name: SERTRALINE  MG TABLET] 90 tablet 1     Sig: TAKE 1 TABLET (100 MG TOTAL) BY MOUTH DAILY.       SSRI Refill Protocol  Passed - 2/19/2020 10:23 AM        Passed - PCP or prescribing provider visit in last year     Last office visit with prescriber/PCP: 11/4/2019 Edin Dailey MD OR same dept: 1/21/2020 Laura Henderson DO OR same specialty: 1/21/2020 Laura Henderson DO  Last physical: Visit date not found Last MTM visit: Visit date not found   Next visit within 3 mo: Visit date not found  Next physical within 3 mo: Visit date not found  Prescriber OR PCP: Edin Dailey MD  Last diagnosis associated with med order: 1. Anxiety  - sertraline (ZOLOFT) 100 MG tablet [Pharmacy Med Name: SERTRALINE  MG TABLET]; TAKE 1 TABLET (100 MG TOTAL) BY MOUTH DAILY.  Dispense: 90 tablet; Refill: 1    If protocol passes may refill for 12 months if within 3 months of last provider visit (or a total of 15 months).                         
Not performed, Pt reported dizziness upon standing

## 2025-02-07 NOTE — OCCUPATIONAL THERAPY INITIAL EVALUATION ADULT - BALANCE TRAINING, PT EVAL
By discharge, Pt will increase standing balance by 1/3 grade to increase independence in self care tasks and transfers

## 2025-02-07 NOTE — PHYSICAL THERAPY INITIAL EVALUATION ADULT - DIAGNOSIS, PT EVAL
Rehab of Deconditioning , Fall, L1 compression fx  , recent right distal femur  periprosthetic fx  and ORIF (12/24)

## 2025-02-07 NOTE — PHYSICAL THERAPY INITIAL EVALUATION ADULT - GAIT DEVIATIONS NOTED, PT EVAL
decreased kalie/decreased velocity of limb motion/decreased step length/decreased stride length/decreased weight-shifting ability

## 2025-02-07 NOTE — PROGRESS NOTE ADULT - SUBJECTIVE AND OBJECTIVE BOX
24H events:    Patient is a 84y old Female who presents with a chief complaint of   Primary diagnosis of Back pain          Today is hospital day 1d.   This morning patient was seen and examined at bedside, resting comfortably in bed.    No acute or major events overnight.    Code Status: Full      PAST MEDICAL & SURGICAL HISTORY  Rheumatoid arthritis    HTN (hypertension)    Breast cancer  last radiation 2015    Depression    Chronic GERD    OA (osteoarthritis)    Rheumatoid arthritis    History of right hip replacement    Status post left hip replacement    S/P total knee replacement, left    H/O vaginal hysterectomy    Status post cholecystectomy    S/P lumpectomy of breast  2015      SOCIAL HISTORY:  Social History:      ALLERGIES:  No Known Allergies      MEDICATIONS:  STANDING MEDICATIONS  acetaminophen     Tablet .. 1000 milliGRAM(s) Oral every 8 hours  aspirin enteric coated 81 milliGRAM(s) Oral daily  calcium carbonate   1250 mG (OsCal) 1 Tablet(s) Oral daily  celecoxib 200 milliGRAM(s) Oral two times a day  cholecalciferol 1000 Unit(s) Oral daily  cyanocobalamin 1000 MICROGram(s) Oral daily  enoxaparin Injectable 40 milliGRAM(s) SubCutaneous every 24 hours  famotidine    Tablet 20 milliGRAM(s) Oral two times a day  lidocaine   4% Patch 1 Patch Transdermal daily  losartan 25 milliGRAM(s) Oral daily  methocarbamol 500 milliGRAM(s) Oral every 8 hours  metoprolol succinate ER 25 milliGRAM(s) Oral daily  multivitamin 1 Tablet(s) Oral daily  rosuvastatin 10 milliGRAM(s) Oral at bedtime  senna 2 Tablet(s) Oral at bedtime  sertraline 50 milliGRAM(s) Oral daily    PRN MEDICATIONS  HYDROmorphone  Injectable 1 milliGRAM(s) IV Push every 4 hours PRN  melatonin 3 milliGRAM(s) Oral at bedtime PRN  polyethylene glycol 3350 17 Gram(s) Oral two times a day PRN  traMADol 50 milliGRAM(s) Oral every 6 hours PRN      VITALS:   T(F): 98.3  HR: 71  BP: 107/68  RR: 18  SpO2: 95%      PHYSICAL EXAM:  GENERAL: NAD, lying in bed comfortably  HEAD:  Atraumatic, Normocephalic  EYES: EOMI, PERRLA, conjunctiva and sclera clear  CHEST/LUNG: Clear to auscultation bilaterally; No rales, rhonchi, wheezing, or rubs. Unlabored respirations  HEART: Regular rate and rhythm; No murmurs, rubs, or gallops  ABDOMEN: BSx4; Soft, nontender, nondistended  EXTREMITIES: Reduced ROM of RLE, mild pain on movement. No clubbing, cyanosis, or edema  NERVOUS SYSTEM:  A&Ox3, no focal deficits   SKIN: No rashes or lesions      LABS:                        10.8   7.38  )-----------( 336      ( 07 Feb 2025 06:15 )             34.0     02-07    135  |  96[L]  |  33[H]  ----------------------------<  91  3.9   |  23  |  1.3    Ca    9.5      07 Feb 2025 06:15  Mg     1.5     02-07    TPro  6.2  /  Alb  3.3[L]  /  TBili  0.5  /  DBili  x   /  AST  18  /  ALT  12  /  AlkPhos  111  02-07      Urinalysis Basic - ( 07 Feb 2025 06:15 )    Color: x / Appearance: x / SG: x / pH: x  Gluc: 91 mg/dL / Ketone: x  / Bili: x / Urobili: x   Blood: x / Protein: x / Nitrite: x   Leuk Esterase: x / RBC: x / WBC x   Sq Epi: x / Non Sq Epi: x / Bacteria: x

## 2025-02-07 NOTE — OCCUPATIONAL THERAPY INITIAL EVALUATION ADULT - GENERAL OBSERVATIONS, REHAB EVAL
Pt encountered and left semi-reclined in bed in NAD, +IVL, +prima fit, +call bell, +bed alarm. Pt agreeable to OT koby, Pt seen 1:10-1:45 pm. PT Karen in components of session to maximize Pt's safety and performance. Pt is A+O x4; reported dizziness upon standing. BP seated 115/72; seated (after standing 118/71. Pt could not tolerated standing BP assessment. Upon return to bed, Pt vomited. RN aware.

## 2025-02-07 NOTE — OCCUPATIONAL THERAPY INITIAL EVALUATION ADULT - LIVES WITH, PROFILE
Pt lives alone in a private home with 3-4 steps to enter and 1 FOS to bedroom/bathroom; chairlift present in house from spouse use, + bathtub with seat/alone

## 2025-02-07 NOTE — PHYSICAL THERAPY INITIAL EVALUATION ADULT - STRENGTHENING, PT EVAL
9/3/2019       RE: Katie Casper  [address] [address] [facility] [id number]     Dear Dr. Del Castillo:  We saw Ms. 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 We have been following her for the past 5 years with MRIs and by phone. This is the first time we have seen her since then. She has moved back to the area.  Currently, she is doing well. She reports migraines upon waking up. Her last formal eye exam with Dr. Luna in 04/2019 was normal, barring the macular degeneration. Her olfaction and gustation are intact. She denies polyuria or increased thirst. She is post-menopausal. She is on Estring but no other hormone replacement therapy.  On exam, her general appearance is very good. We did not check her vision today as she had an eye exam a few months ago. Her neurological examination is normal otherwise.  We reviewed her MRI from today and compared it to studies from the past few years. There remains a few millimeters of hypoenhancement in the left side of the sella, extending into the left parasellar space. This area appears rhomboid-shaped on the coronal images. This area appears unchanged for the past few years. The optic apparatus remains completely decompressed. The pituitary gland remains anatomically intact and is best seen on the sagittal images, displaced to the right. Overall, her imaging looks favorable.  We are pleased to see she is doing well clinically. Based on her clinical and radiographic stability, we will see her back in 2 years with a follow-up MRI. She will continue to follow with Dr. Herron as she determines.  Please do not hesitate to contact us with questions. We will keep you informed of her progress.   MD JOSE DAVID SANCHEZ, Ricci Chávez, am serving as a scribe to document services personally performed by Sergey  MD Elsa, based upon my observations and the provider's statements to me. All documentation has been reviewed by the aforementioned doctor prior to being entered into the official medical record.     Increase muscle strength of B  LE to 4+/5 by d/c.

## 2025-02-07 NOTE — OCCUPATIONAL THERAPY INITIAL EVALUATION ADULT - IMPAIRMENTS CONTRIBUTING IMPAIRED BED MOBILITY, REHAB EVAL
"Subjective     Chief Complaint   Patient presents with   • Abdominal Pain     c/o tuesday night felt like punching in lower abdominal  area, cramping last month and heavy bleeding after intercourse.       Annabel Stern is a 39 y.o.  whose LMP is Patient's last menstrual period was 2020. presents with pelvic pain and PCB  Had increase cramping/pelvic pain this past week  Pain was 8 of 10, did take some tylenol which helped  Happened a few times that night    She does have h/o infertility-thinks it is from her   She denies any menstrual issues, notes they are heavier but has been that way since she was 34 yoa    No change in bladder or bowels  No new med issues but dealing with anxiety r/t pandemic    Pt of Dr Elias  Has annual scheduled in October  We are both masked d/t pandemic    No Additional Complaints Reported    The following portions of the patient's history were reviewed and updated as appropriate:vital signs, allergies, current medications, past family history, past medical history, past social history, past surgical history and problem list      Review of Systems   Genitourinary:positive for pelvic pain     Objective      /72   Ht 162.6 cm (64\")   Wt 90.7 kg (200 lb)   LMP 2020   Breastfeeding No   BMI 34.33 kg/m²     Physical Exam    General:   alert, comfortable and no distress   Heart: Not performed today   Lungs: Not performed today.   Breast: Not performed today   Neck: na   Abdomen: {Not performed today   CVA: Not performed today   Pelvis: External genitalia: normal general appearance  Vaginal: normal mucosa without prolapse or lesions  Cervix: normal appearance  Adnexa: normal bimanual exam  Uterus: normal single, nontender   Extremities: Not performed today   Neurologic: negative   Psychiatric: Normal affect, judgement, and mood       Lab Review   Labs: Urinalysis - with micro neg and UPT neg    Imaging   No data reviewed    Assessment/Plan     ASSESSMENT  1. Pelvic " pain    2. Other fatigue    3. 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   Orders Placed This Encounter   Procedures   • TSH   • POC Urinalysis Dipstick   • POC Pregnancy, Urine   • CBC & Differential       2. Will plan pelvic u/s to further eval. No obvious cause noted today and recent paps wnl. Unable to w/i today so scheduled for tomorrow.     Pt left without doing labs, will do tomorrow at visit    Follow up: 1 day(s)    INDIA Godoy  6/22/2020            impaired balance/pain/decreased strength

## 2025-02-07 NOTE — PHYSICAL THERAPY INITIAL EVALUATION ADULT - DID THE PATIENT HAVE SURGERY?
sustained right distal femur periprosthetic fx.  Will f/u as soon as pt cleared for out of bed activity.
n/a

## 2025-02-07 NOTE — OCCUPATIONAL THERAPY INITIAL EVALUATION ADULT - ADDITIONAL COMMENTS
Pt reports has a HHA 5 days/week who assists with ADLs (set up upper body, moderate assist lower body, set up and use of AE for socks/shoes, assist with transfers to tub/wheelchair/bed.    Pt is a recent 4A rehab admission (d/c'd 12/24/24), followed by 1 week at a [facility]. Pt reports was walking with RW initially upon d/c but in the past few weeks transfers to a wheelchair due to knee pain.

## 2025-02-07 NOTE — PHYSICAL THERAPY INITIAL EVALUATION ADULT - PERTINENT HX OF CURRENT PROBLEM, REHAB EVAL
84-year-old female PMHx of RA, Osteoporosis, OA s/p B/L ROHIT & TKA, Rt hip ORIF(2024), HTN, GERD, breast cancer(in remission since 2015,) presenting to the ED for persistent back pain and inability to ambulate.     Pt. referred to PT for eval and tx.

## 2025-02-07 NOTE — PHYSICAL THERAPY INITIAL EVALUATION ADULT - GENERAL OBSERVATIONS, REHAB EVAL
[id number] pm Chart reviewed. Pt. seen semirecline in bed , in No apparent distress , + IV lock , Pt. alert and oriented X 4, Pt. agreed to activity/therapy. NERI Batres also present during therapy session.

## 2025-02-07 NOTE — PROGRESS NOTE ADULT - ASSESSMENT
84-year-old female PMHx of RA, Osteoporosis, OA s/p B/L ROHIT & TKA, Rt hip ORIF(2024), HTN, GERD, breast cancer(in remission since 2015,) presenting to the ED for persistent back pain and inability to ambulate.     #Recent Mechanical fall   #L1 compression fracture  #Rt knee & Back pain, inability to ambulate, Deconditioning  #Hx of Osteoporosis RA, OA s/p B/L ROHIT & TKA, Rt hip ORIF(2024)  - No weakness, paresthesia, urinary/bowel incontinence  - Seen by ortho- F/u CT Rt knee, F/u ortho and spine Sx recs  - c/w Abdominal binder  - Pain control- Tylenol 1000mg q8, Celecoxib 200mg BID, Methocarbamol 500mg q8, Lidocaine patch and Tramadol 50mg q6 prn  - c/w Calcium, vit D  - DVT ppx, bowel regimen  - OP Rheum f/u(hasn't been taking Methotrexate and Prednisone)  - PT/OT pending f/u with ortho about WB status  - CT spine shows no fracture, CT R knee performed, pending read  - F/u ortho about any planned procedures  - Will likely need rehab    #Mild Hyponatremia- seems chronic  - Na 131   - Hold home HCTZ  - Monitor for now  - Encourage PO solute intake    #HTN  - c/w Losartan 25mg, Toprol 25mg  - hold HCTZ    #HLD  -c/w rosuvastatin 10mg    #GERD  - c/w famotidine 20mg bID    #Anxiety/depression  - c/w sertraline 50mg    #Misc  -DVT prophylaxis: Lovenox  -GI prophylaxis: Famotidine  -Diet: Regular  -Code status: Full  -Activity: AAT  -Dispo: Med    To-do: Ortho f/u, PT/OT pending weight bearing status recs from ortho   84-year-old female PMHx of RA, Osteoporosis, OA s/p B/L ROHIT & TKA, Rt hip ORIF(2024), HTN, GERD, breast cancer(in remission since 2015,) presenting to the ED for persistent back pain and inability to ambulate.     #Recent Mechanical fall   #L1 compression fracture  #Rt knee & Back pain, inability to ambulate, Deconditioning  #Hx of Osteoporosis RA, OA s/p B/L ROHIT & TKA, Rt hip ORIF(2024)  - No weakness, paresthesia, urinary/bowel incontinence  - Seen by ortho- F/u CT Rt knee, F/u ortho and spine Sx recs  - c/w Abdominal binder  - Pain control- Tylenol 1000mg q8, Celecoxib 200mg BID, Methocarbamol 500mg q8, Lidocaine patch and Tramadol 50mg q6 prn  - c/w Calcium, vit D  - DVT ppx, bowel regimen  - OP Rheum f/u(hasn't been taking Methotrexate and Prednisone)  - PT/OT pending f/u with ortho about WB status  - CT spine shows no fracture, CT R knee performed, pending read  - F/u ortho about any planned procedures  - Will likely need rehab    #Mild Hyponatremia- seems chronic  #ADINA  - Hold home HCTZ  - Monitor for now  - Encourage PO solute intake  - Cr. up today from 0.7 to 1.3, BUN 21 to 33  - Starting LR 75cc for 24h for now, will trend in the AM  - Hyponatremia resolving    #HTN  - c/w Losartan 25mg, Toprol 25mg  - hold HCTZ    #HLD  -c/w rosuvastatin 10mg    #GERD  - c/w famotidine 20mg bID    #Anxiety/depression  - c/w sertraline 50mg    #Misc  -DVT prophylaxis: Lovenox  -GI prophylaxis: Famotidine  -Diet: Regular  -Code status: Full  -Activity: AAT  -Dispo: Med    To-do: Ortho f/u, PT/OT pending weight bearing status recs from ortho, ADINA on fluids 24hr.

## 2025-02-08 LAB
ALBUMIN SERPL ELPH-MCNC: 3.2 G/DL — LOW (ref 3.5–5.2)
ALP SERPL-CCNC: 123 U/L — HIGH (ref 30–115)
ALT FLD-CCNC: 11 U/L — SIGNIFICANT CHANGE UP (ref 0–41)
ANION GAP SERPL CALC-SCNC: 14 MMOL/L — SIGNIFICANT CHANGE UP (ref 7–14)
AST SERPL-CCNC: 19 U/L — SIGNIFICANT CHANGE UP (ref 0–41)
BASOPHILS # BLD AUTO: 0.03 K/UL — SIGNIFICANT CHANGE UP (ref 0–0.2)
BASOPHILS NFR BLD AUTO: 0.4 % — SIGNIFICANT CHANGE UP (ref 0–1)
BILIRUB SERPL-MCNC: 0.4 MG/DL — SIGNIFICANT CHANGE UP (ref 0.2–1.2)
BUN SERPL-MCNC: 37 MG/DL — HIGH (ref 10–20)
CALCIUM SERPL-MCNC: 9.5 MG/DL — SIGNIFICANT CHANGE UP (ref 8.4–10.5)
CHLORIDE SERPL-SCNC: 94 MMOL/L — LOW (ref 98–110)
CO2 SERPL-SCNC: 23 MMOL/L — SIGNIFICANT CHANGE UP (ref 17–32)
CREAT SERPL-MCNC: 1.2 MG/DL — SIGNIFICANT CHANGE UP (ref 0.7–1.5)
EGFR: 44 ML/MIN/1.73M2 — LOW
EOSINOPHIL # BLD AUTO: 0.28 K/UL — SIGNIFICANT CHANGE UP (ref 0–0.7)
EOSINOPHIL NFR BLD AUTO: 3.8 % — SIGNIFICANT CHANGE UP (ref 0–8)
GLUCOSE SERPL-MCNC: 86 MG/DL — SIGNIFICANT CHANGE UP (ref 70–99)
HCT VFR BLD CALC: 33.9 % — LOW (ref 37–47)
HGB BLD-MCNC: 10.7 G/DL — LOW (ref 12–16)
IMM GRANULOCYTES NFR BLD AUTO: 0.3 % — SIGNIFICANT CHANGE UP (ref 0.1–0.3)
LYMPHOCYTES # BLD AUTO: 2.07 K/UL — SIGNIFICANT CHANGE UP (ref 1.2–3.4)
LYMPHOCYTES # BLD AUTO: 28 % — SIGNIFICANT CHANGE UP (ref 20.5–51.1)
MAGNESIUM SERPL-MCNC: 2.3 MG/DL — SIGNIFICANT CHANGE UP (ref 1.8–2.4)
MCHC RBC-ENTMCNC: 27.5 PG — SIGNIFICANT CHANGE UP (ref 27–31)
MCHC RBC-ENTMCNC: 31.6 G/DL — LOW (ref 32–37)
MCV RBC AUTO: 87.1 FL — SIGNIFICANT CHANGE UP (ref 81–99)
MONOCYTES # BLD AUTO: 0.81 K/UL — HIGH (ref 0.1–0.6)
MONOCYTES NFR BLD AUTO: 11 % — HIGH (ref 1.7–9.3)
NEUTROPHILS # BLD AUTO: 4.18 K/UL — SIGNIFICANT CHANGE UP (ref 1.4–6.5)
NEUTROPHILS NFR BLD AUTO: 56.5 % — SIGNIFICANT CHANGE UP (ref 42.2–75.2)
NRBC # BLD: 0 /100 WBCS — SIGNIFICANT CHANGE UP (ref 0–0)
NRBC BLD-RTO: 0 /100 WBCS — SIGNIFICANT CHANGE UP (ref 0–0)
PLATELET # BLD AUTO: 291 K/UL — SIGNIFICANT CHANGE UP (ref 130–400)
PMV BLD: 10.6 FL — HIGH (ref 7.4–10.4)
POTASSIUM SERPL-MCNC: 4 MMOL/L — SIGNIFICANT CHANGE UP (ref 3.5–5)
POTASSIUM SERPL-SCNC: 4 MMOL/L — SIGNIFICANT CHANGE UP (ref 3.5–5)
PROT SERPL-MCNC: 6.3 G/DL — SIGNIFICANT CHANGE UP (ref 6–8)
RBC # BLD: 3.89 M/UL — LOW (ref 4.2–5.4)
RBC # FLD: 15.9 % — HIGH (ref 11.5–14.5)
SODIUM SERPL-SCNC: 131 MMOL/L — LOW (ref 135–146)
WBC # BLD: 7.39 K/UL — SIGNIFICANT CHANGE UP (ref 4.8–10.8)
WBC # FLD AUTO: 7.39 K/UL — SIGNIFICANT CHANGE UP (ref 4.8–10.8)

## 2025-02-08 RX ORDER — BACTERIOSTATIC SODIUM CHLORIDE 0.9 %
1000 VIAL (ML) INJECTION
Refills: 0 | Status: DISCONTINUED | OUTPATIENT
Start: 2025-02-08 | End: 2025-02-09

## 2025-02-08 RX ADMIN — Medication 200 MILLIGRAM(S): at 18:30

## 2025-02-08 RX ADMIN — Medication 2 TABLET(S): at 21:37

## 2025-02-08 RX ADMIN — ACETAMINOPHEN 1000 MILLIGRAM(S): 160 SUSPENSION ORAL at 21:37

## 2025-02-08 RX ADMIN — Medication 25 MILLIGRAM(S): at 05:45

## 2025-02-08 RX ADMIN — FAMOTIDINE 20 MILLIGRAM(S): 10 INJECTION INTRAVENOUS at 05:45

## 2025-02-08 RX ADMIN — Medication 200 MILLIGRAM(S): at 17:40

## 2025-02-08 RX ADMIN — Medication 1 TABLET(S): at 11:22

## 2025-02-08 RX ADMIN — TRAMADOL HYDROCHLORIDE 50 MILLIGRAM(S): 100 TABLET, EXTENDED RELEASE ORAL at 17:40

## 2025-02-08 RX ADMIN — Medication 75 MILLILITER(S): at 12:25

## 2025-02-08 RX ADMIN — LIDOCAINE HYDROCHLORIDE 1 PATCH: 30 CREAM TOPICAL at 11:12

## 2025-02-08 RX ADMIN — Medication 1000 MICROGRAM(S): at 11:22

## 2025-02-08 RX ADMIN — ENOXAPARIN SODIUM 40 MILLIGRAM(S): 100 INJECTION SUBCUTANEOUS at 05:44

## 2025-02-08 RX ADMIN — ACETAMINOPHEN 1000 MILLIGRAM(S): 160 SUSPENSION ORAL at 05:44

## 2025-02-08 RX ADMIN — LIDOCAINE HYDROCHLORIDE 1 PATCH: 30 CREAM TOPICAL at 19:08

## 2025-02-08 RX ADMIN — ROSUVASTATIN CALCIUM 10 MILLIGRAM(S): 10 TABLET, FILM COATED ORAL at 21:38

## 2025-02-08 RX ADMIN — ASPIRIN 81 MILLIGRAM(S): 81 TABLET, COATED ORAL at 11:23

## 2025-02-08 RX ADMIN — ACETAMINOPHEN 1000 MILLIGRAM(S): 160 SUSPENSION ORAL at 14:27

## 2025-02-08 RX ADMIN — Medication 500 MILLIGRAM(S): at 14:27

## 2025-02-08 RX ADMIN — Medication 1000 UNIT(S): at 11:22

## 2025-02-08 RX ADMIN — Medication 500 MILLIGRAM(S): at 21:38

## 2025-02-08 RX ADMIN — Medication 50 MILLIGRAM(S): at 11:23

## 2025-02-08 RX ADMIN — TRAMADOL HYDROCHLORIDE 50 MILLIGRAM(S): 100 TABLET, EXTENDED RELEASE ORAL at 18:30

## 2025-02-08 RX ADMIN — Medication 500 MILLIGRAM(S): at 05:45

## 2025-02-08 RX ADMIN — ACETAMINOPHEN 1000 MILLIGRAM(S): 160 SUSPENSION ORAL at 15:20

## 2025-02-08 RX ADMIN — FAMOTIDINE 20 MILLIGRAM(S): 10 INJECTION INTRAVENOUS at 17:40

## 2025-02-08 RX ADMIN — Medication 200 MILLIGRAM(S): at 05:45

## 2025-02-08 NOTE — PROGRESS NOTE ADULT - ASSESSMENT
84-year-old female PMHx of RA, Osteoporosis, OA s/p B/L ROHIT & TKA, Rt hip ORIF(2024), HTN, GERD, breast cancer(in remission since 2015,) presenting to the ED for persistent back pain and inability to ambulate.     #Recent Mechanical fall   #L1 compression fracture  #Rt knee & Back pain, inability to ambulate, Deconditioning  #Hx of Osteoporosis RA, OA s/p B/L ROHIT & TKA, Rt hip ORIF(2024)  - No weakness, paresthesia, urinary/bowel incontinence  - Seen by ortho- F/u CT Rt knee, F/u ortho and spine Sx recs  - c/w Abdominal binder  - Pain control- Tylenol 1000mg q8, Celecoxib 200mg BID, Methocarbamol 500mg q8, Lidocaine patch and Tramadol 50mg q6 prn  - c/w Calcium, vit D  - DVT ppx, bowel regimen  - OP Rheum f/u(hasn't been taking Methotrexate and Prednisone)  - PT/OT recommending rehab facility  - CT spine shows no fracture, CT R knee no fracture  - Neurosurgery consulted for C1 compression fracture  - Will work on placement    #Mild Hyponatremia- seems chronic  #ADINA  - Hold home HCTZ  - Monitor for now  - Encourage PO solute intake  - Cr. up today from 0.7 to 1.3, BUN 21 to 33  - 75cc/hr of normal saline for 24h for ADINA and hyponatremia this AM, follow up with BMP tomorrow.     #HTN  - c/w Losartan 25mg, Toprol 25mg  - hold HCTZ    #HLD  -c/w rosuvastatin 10mg    #GERD  - c/w famotidine 20mg bID    #Anxiety/depression  - c/w sertraline 50mg    #Misc  -DVT prophylaxis: Lovenox  -GI prophylaxis: Famotidine  -Diet: Regular  -Code status: Full  -Activity: AAT  -Dispo: Med    To-do: ADINA and hyponatremia on NS 24hrs. Neurosurgery consult for C1 compression fracture. Placement (to rehab)

## 2025-02-08 NOTE — PROGRESS NOTE ADULT - SUBJECTIVE AND OBJECTIVE BOX
24H events:    Patient is a 84y old Female who presents with a chief complaint of   Primary diagnosis of Back pain          Today is hospital day 2d.   This morning patient was seen and examined at bedside, resting comfortably in bed.    No acute or major events overnight.    Code Status: Full      PAST MEDICAL & SURGICAL HISTORY  Rheumatoid arthritis    HTN (hypertension)    Breast cancer  last radiation 2015    Depression    Chronic GERD    OA (osteoarthritis)    Rheumatoid arthritis    History of right hip replacement    Status post left hip replacement    S/P total knee replacement, left    H/O vaginal hysterectomy    Status post cholecystectomy    S/P lumpectomy of breast  2015      SOCIAL HISTORY:  Social History:      ALLERGIES:  No Known Allergies      MEDICATIONS:  STANDING MEDICATIONS  acetaminophen     Tablet .. 1000 milliGRAM(s) Oral every 8 hours  aspirin enteric coated 81 milliGRAM(s) Oral daily  calcium carbonate   1250 mG (OsCal) 1 Tablet(s) Oral daily  celecoxib 200 milliGRAM(s) Oral two times a day  cholecalciferol 1000 Unit(s) Oral daily  cyanocobalamin 1000 MICROGram(s) Oral daily  enoxaparin Injectable 40 milliGRAM(s) SubCutaneous every 24 hours  famotidine    Tablet 20 milliGRAM(s) Oral two times a day  lactated ringers. 1000 milliLiter(s) IV Continuous <Continuous>  lidocaine   4% Patch 1 Patch Transdermal daily  losartan 25 milliGRAM(s) Oral daily  methocarbamol 500 milliGRAM(s) Oral every 8 hours  metoprolol succinate ER 25 milliGRAM(s) Oral daily  multivitamin 1 Tablet(s) Oral daily  rosuvastatin 10 milliGRAM(s) Oral at bedtime  senna 2 Tablet(s) Oral at bedtime  sertraline 50 milliGRAM(s) Oral daily  sodium chloride 0.9%. 1000 milliLiter(s) IV Continuous <Continuous>    PRN MEDICATIONS  HYDROmorphone  Injectable 1 milliGRAM(s) IV Push every 4 hours PRN  melatonin 3 milliGRAM(s) Oral at bedtime PRN  polyethylene glycol 3350 17 Gram(s) Oral two times a day PRN  traMADol 50 milliGRAM(s) Oral every 6 hours PRN      VITALS:   T(F): 97.8  HR: 77  BP: 170/78  RR: 18  SpO2: 96%      PHYSICAL EXAM:  GENERAL: NAD, lying in bed comfortably  HEAD:  Atraumatic, Normocephalic  EYES: EOMI, PERRLA, conjunctiva and sclera clear  CHEST/LUNG: Clear to auscultation bilaterally; No rales, rhonchi, wheezing, or rubs. Unlabored respirations  HEART: Regular rate and rhythm; No murmurs, rubs, or gallops  ABDOMEN: BSx4; Soft, nontender, nondistended  EXTREMITIES: Reduced ROM of RLE, mild pain on movement. No clubbing, cyanosis, or edema  NERVOUS SYSTEM:  A&Ox3, no focal deficits   SKIN: No rashes or lesions      LABS:                        10.7   7.39  )-----------( 291      ( 08 Feb 2025 06:15 )             33.9     02-08    131[L]  |  94[L]  |  37[H]  ----------------------------<  86  4.0   |  23  |  1.2    Ca    9.5      08 Feb 2025 06:15  Mg     2.3     02-08    TPro  6.3  /  Alb  3.2[L]  /  TBili  0.4  /  DBili  x   /  AST  19  /  ALT  11  /  AlkPhos  123[H]  02-08      Urinalysis Basic - ( 08 Feb 2025 06:15 )    Color: x / Appearance: x / SG: x / pH: x  Gluc: 86 mg/dL / Ketone: x  / Bili: x / Urobili: x   Blood: x / Protein: x / Nitrite: x   Leuk Esterase: x / RBC: x / WBC x   Sq Epi: x / Non Sq Epi: x / Bacteria: x

## 2025-02-08 NOTE — PATIENT PROFILE ADULT - NSPROIMPLANTSMEDDEV_GEN_A_NUR
None PAST MEDICAL HISTORY:  Congenital heart disease     Dextrocardia     DVT, lower extremity "the right I think"-2005/2006    HPV in female     Uterus, unicornuate     Ventricular septal defect (VSD)

## 2025-02-08 NOTE — PATIENT PROFILE ADULT - FUNCTIONAL ASSESSMENT - BASIC MOBILITY 6.
1-calculated by average/Not able to assess (calculate score using [facility] averaging method)

## 2025-02-08 NOTE — PATIENT PROFILE ADULT - LIVING ENVIRONMENT
PCP: Dr. Leah Triplett  Chief Complaint: Abdominal pain and vomiting  History of Present Illness: Patient is an 81-year-old male with a past medical history significant for hypertension, coronary artery disease, BPH and colon cancer who presents with abdominal pain and vomiting.  Patient states yesterday evening patient started having sudden right upper quadrant and epigastric abdominal pain.  He then became severely nauseous and had multiple episodes of vomiting.  He states he thought he had food poisoning and tried to manage his symptoms at home.  However, the pain continued to worsen and he was unable to stop vomiting.  He then decided to come to the ER for further evaluation.  He denies any fevers, chills or sweats.  Denies any chest pain or palpitations.  Denies any cough, congestion or shortness of breath.  He states his last bowel movement was yesterday morning.  Denies any constipation or diarrhea.  Denies any hematemesis or hematochezia.  Denies any dysuria or hematuria.  He denies ever having pain like this before.  In the emergency room patient was noted to have acute small bowel obstruction and he was admitted for further evaluation and care.  Patient seen now and examined in room, lying in high Gallegos's position in bed.  No family at bedside.  Patient states he continues to have pain in his epigastrium.  However, it is slightly improved.  Continues to complain of some mild nausea as well.  Denies passing any flatus.  Past Medical History:     Myocardial infarction    Stented coronary artery    HTN (hypertension)    CVA (cerebral infarction)    Hepatitis    Hypercholesteremia    Enlarged prostate    Glaucoma    Cancer of colon    CAD (coronary artery disease)    Past Surgeries:       Colon resection     Cardiac stenting     Back surgery     Cystoscopy     EGD     Colonoscopy     Cataract surgery    Family History: Noncontributory  Social History: Lives at home with wife.  Denies any history  of smoking or alcohol use.  Allergies: NKDA    Medications: See EMR. Medication Reconciliation Form Reviewed  Review of Systems:  General: Denies fever, chills, or malaise.                                                     Skin: Denies pruritus or rashes.                                                       Head and Neck: Denies neck pain or stiffness.  No headaches.                                                                                             EENT: Denies visual changes, diplopia or blurred vision. Denies current congestion, runny nose or sneezing.                                               Chest and Lungs: Denies difficulty breathing or shortness of breath with or without exertion.     Cardiovascular: Denies chest pain or palpitations.  No edema or claudication.                                                          Gastrointestinal: Sudden severe RUQ and epigastric abdominal pain.  Had nausea and vomiting.  Denies diarrhea or constipation. No hematemesis or blood in stools.                                                                                                                                                                          Genitourinary: Denies flank pain or dysuria.  No hematuria.                                                                                                                               Musculoskeletal: Denies joint pain, redness or swelling.  Denies difficulty with range of motion.                                                                  Neurologic: Denies dizziness or syncope.    Physical Examination:   Mental Status: Alert and oriented.  Follows commands appropriately.  Vitals: Vital Signs (last 24 hrs)_____ Last Charted___________Minimum____________ Maximum____________  Temp    98  (JAN 26 07:46) L 97.4 (JAN 26 00:55) 98.2  (JAN 26 04:10)  Heart Rate   98  (JAN 26 07:46) 81  (JAN 26 00:55) H 105 (JAN 26 04:10)  Resp Rate       16  (JAN 26  07:46) L 13 (JAN 26 01:08) H 23 (JAN 26 03:43)  SBP    H 182 (JAN 26 07:46) H 161 (JAN 26 04:10) H 182 (JAN 26 07:46)  DBP    88  (JAN 26 07:46) 87  (JAN 26 01:55) H 102 (JAN 26 01:08)    ENT: PERRL. EOM intact. NGT in place.                                                                                                                                                           Neck: Supple, No JVD.                                                                                                                                                    Respiratory: Equal air entry.  Clear to auscultation bilaterally                                                                                                                           CV: S1, S2. RRR.                                                                                                                                                                          GI: Abdomen soft, epigastric tenderness.  Active bowel sounds in all four quadrants.                                                                                      Extremities: No peripheral edema.                                                                                                                                                    Skin: Pink, warm and dry.  Labs: Labs (Last four charted values)  WBC                  H 12.8 (JAN 26)   Hgb                  15.4 (JAN 26)   Hct                  46 (JAN 26)   Plt                  226 (JAN 26)   Na                   139 (JAN 26)   K                    3.8 (JAN 26)   CO2                  28 (JAN 26)   Cl                   102 (JAN 26)   Cr                   0.83 (JAN 26)   BUN                  17 (JAN 26)   Glucose             H 155 (JAN 26)   Ca                   9.9 (JAN 26)   Troponin             0.01 (JAN 26) 0.03 (JAN 26)   Assessment/ Plan:  1.  Acute small bowel obstruction.  NG tube in place.  Patient has been started on IV fluids.  Surgery  has been placed on consult.  2.  Intractable abdominal pain.  Will provide as needed morphine.  3.  Hypertension.  Blood pressure is rather elevated.  Will start patient on scheduled IV metoprolol until able to transition back to p.o. medication.  4.  History of coronary artery disease with cardiac stents.  Patient previously on Plavix.  Will resume when able to take p.o.  5.  BPH.  On Flomax at home.  6.  Hyperlipidemia.  Will continue statin when able to take p.o.  7.  History of colon cancer status post resection.  8.  GI\DVT prophylaxis.    Next Steps:  Continue present management.  Surgery to evaluate patient.  Labs in a.m.    Disposition pending course of treatment.    Pt. seen and evaluated and management undertaken in conjunction with Dr. Brush in person.             Electronically Signed On 01.26.2020 12:50  ___________________________________________________   Lindsey Kendrick NP     no

## 2025-02-08 NOTE — PROGRESS NOTE ADULT - SUBJECTIVE AND OBJECTIVE BOX
Chart reviewed, patient examined. Pertinent results reviewed.  Case discussed with HO; specialist f/u reviewed  HD#3; Patient well known to me- my 1st day on this case    Patient is a 84y old Female who presents with a chief complaint of a fall and slight confusion.  Primary diagnosis of LS- R Buttocks Back and R Knee pain after her fall.   She has been home a few weeks after a prior fall, Fx R Femur, ORIF(12/2/24-here), then IP and OP Rehab.  She fell when she got up while alone.      This morning patient was seen and examined at bedside, resting comfortably in bed.    No acute or major events overnight. more alert but slow to give the story of her fall.     Code Status: Full      PAST MEDICAL & SURGICAL HISTORY  Rheumatoid arthritis    HTN (hypertension)  w MOD LVH- HTN HD    Breast cancer  last radiation 2015    Depression    Chronic GERD    OA (osteoarthritis)    Rheumatoid arthritis    History of right hip replacement    Status post left hip replacement    S/P total knee replacement, left  R Femur Fx & ORIF 12/2/24    H/O vaginal hysterectomy    Status post cholecystectomy    S/P lumpectomy of breast  2015      SOCIAL HISTORY:  Social History: Lives alone; + ;   Has a friend who helps her some hours M-F;  Daughters come by on weekends.   smoked in the past      ALLERGIES:  No Known Allergies      MEDICATIONS:  STANDING MEDICATIONS  acetaminophen     Tablet .. 1000 milliGRAM(s) Oral every 8 hours  aspirin enteric coated 81 milliGRAM(s) Oral daily  calcium carbonate   1250 mG (OsCal) 1 Tablet(s) Oral daily  celecoxib 200 milliGRAM(s) Oral two times a day  cholecalciferol 1000 Unit(s) Oral daily  cyanocobalamin 1000 MICROGram(s) Oral daily  enoxaparin Injectable 40 milliGRAM(s) SubCutaneous every 24 hours  famotidine    Tablet 20 milliGRAM(s) Oral two times a day  lactated ringers. 1000 milliLiter(s) IV Continuous <Continuous>  lidocaine   4% Patch 1 Patch Transdermal daily  losartan 25 milliGRAM(s) Oral daily  methocarbamol 500 milliGRAM(s) Oral every 8 hours  metoprolol succinate ER 25 milliGRAM(s) Oral daily  multivitamin 1 Tablet(s) Oral daily  rosuvastatin 10 milliGRAM(s) Oral at bedtime  senna 2 Tablet(s) Oral at bedtime  sertraline 50 milliGRAM(s) Oral daily  sodium chloride 0.9%. 1000 milliLiter(s) IV Continuous <Continuous>    PRN MEDICATIONS  HYDROmorphone  Injectable 1 milliGRAM(s) IV Push every 4 hours PRN  melatonin 3 milliGRAM(s) Oral at bedtime PRN  polyethylene glycol 3350 17 Gram(s) Oral two times a day PRN  traMADol 50 milliGRAM(s) Oral every 6 hours PRN      VITALS:   T(F): 97.8  HR: 77  BP: 170/78  RR: 18  SpO2: 96%  Vital Signs Last 24 Hrs  T(C): 36.6 (08 Feb 2025 04:34), Max: 36.6 (08 Feb 2025 04:34)  T(F): 97.8 (08 Feb 2025 04:34), Max: 97.8 (08 Feb 2025 04:34)  HR: 77 (08 Feb 2025 04:34) (66 - 86)  BP: 170/78 (08 Feb 2025 04:34) (112/63 - 170/78)  BP(mean): 110 (08 Feb 2025 04:34) (80 - 110)  RR: 18 (08 Feb 2025 04:34) (18 - 18)  SpO2: 96% (08 Feb 2025 04:34) (93% - 96%)    Parameters below as of 07 Feb 2025 19:33  Patient On (Oxygen Delivery Method): room air          PHYSICAL EXAM:  GENERAL: NAD, lying in bed comfortably; recognizes me and converses easily. year- 1920, then 2024; Pres-OK  HEAD:  Atraumatic, Normocephalic  EYES: EOMI, PERRLA, conjunctiva and sclera clear  CHEST/LUNG: Clear  bilaterally; No rales, rhonchi, wheezing, or rubs. Unlabored respirations  HEART: RRR; No murmurs, rubs, or gallops  ABDOMEN: BSx4; Soft, nontender, nondistended  EXTREMITIES: Reduced ROM of RLE, mild pain on movement. Multiple scars over R Knee area and R hip.      TTP over LSSp also- no deformities.No clubbing, cyanosis, or edema  NERVOUS SYSTEM:  A&Ox3, no focal deficits   SKIN: No rashes or lesions      LABS:                        10.7   7.39  )-----------( 291      ( 08 Feb 2025 06:15 )             33.9     02-08    131[L]  |  94[L]  |  37[H]  ----------------------------<  86  4.0   |  23  |  1.2    Ca    9.5      08 Feb 2025 06:15  Mg     2.3     02-08    TPro  6.3  /  Alb  3.2[L]  /  TBili  0.4  /  DBili  x   /  AST  19  /  ALT  11  /  AlkPhos  123[H]  02-08      Urinalysis Basic - ( 08 Feb 2025 06:15 )    Color: x / Appearance: x / SG: x / pH: x  Gluc: 86 mg/dL / Ketone: x  / Bili: x / Urobili: x   Blood: x / Protein: x / Nitrite: x   Leuk Esterase: x / RBC: x / WBC x   Sq Epi: x / Non Sq Epi: x / Bacteria: x    < from: CT Knee No Cont, Right (02.06.25 @ 20:37) >  ACC: [id number] EXAM:  CT KNEE ONLY RT   ORDERED BY: SUSANNE SALGADO     PROCEDURE DATE:  02/06/2025          INTERPRETATION:  CT OF THE RIGHT KNEE WITHOUT CONTRAST    CLINICAL HISTORY: Right knee pain.    TECHNIQUE: Images were obtained of the right knee without contrast.   Coronal and sagittal reformatted images were also provided.    COMPARISON: CT femur dated 12/1/2024.    FINDINGS:    BONES/JOINTS: Total right knee arthroplasty status post ORIF of   periprosthetic fracture of the distal femur. Interval improvement of bony   fragment alignment with evidence of callus formation. No CT evidence of   hardware complications. Moderate knee joint effusion. No new acute   fracture.    SOFT TISSUES: Vascular calcifications. No encapsulated hematoma.    IMPRESSION:  Patient is status post ORIF involving distal femoral periprosthetic   fracture of the right knee, in improved and near anatomic alignment,   demonstrating evidence of callus formation. No evidence of hardware   complications.    Moderate knee joint effusion.    --- End of Report ---          TEZ SEGUNDO MD; Resident Radiologist  This document has been electronically signed.  NELA MONTANO MD; Attending Radiologist  This document has been electronically signed. Feb 7 2025 11:28AM    < end of copied text >  < from: CT Abdomen and Pelvis w/ IV Cont (02.03.25 @ 16:48) >  ACC: [id number] EXAM:  CT ABDOMEN AND PELVIS IC   ORDERED BY: MARLON EGAN     ACC: [id number] EXAM:  CT CHEST IC   ORDERED BY: MARLON EGAN     PROCEDURE DATE:  02/03/2025          INTERPRETATION:  CLINICAL HISTORY/REASON FOR EXAM: Trauma to chest,   abdomen and pelvis.. Left flank pain. Back pain. Vomiting. Breast cancer.    TECHNIQUE: Contiguous axial CT images were obtained from the thoracic   inlet to the pubic symphysis following administration of 100 cc Omnipaque   350 intravenous contrast.  Oral contrast was not administered.   Reformatted images in the coronal and sagittal planes were acquired. 3D   (MIP) images obtained.    COMPARISON: CT chest, abdomen and pelvis 11/23/2024.      FINDINGS:    CHEST:    LUNGS, PLEURA, AIRWAYS: No lobar consolidation, mass, effusion, or   pneumothorax. No evidence of central endobronchial obstruction. No   bronchiectasis or honeycombing. No suspicious pulmonary nodule. Biapical   scarring. Bilateral subsegmental atelectasis.    THORACIC NODES: No mediastinal, hilar, supraclavicular, or axillary   lymphadenopathy.    MEDIASTINUM/GREAT VESSELS: No pericardial effusion. Heart size is within   normal limits. The aorta and main pulmonary artery are of normal caliber.    ABDOMEN/PELVIS:    HEPATOBILIARY: Postcholecystectomy. No evidence of liver injury. No   biliary dilation.    SPLEEN: Unremarkable.    PANCREAS: Unremarkable.    ADRENAL GLANDS: Unremarkable.    KIDNEYS: Symmetric pattern of renal enhancement. No hydronephrosis   bilaterally. Bilateral renal cysts and subcentimeter hypodensities, too   small to characterize    ABDOMINOPELVIC NODES: No lymphadenopathy.    PELVIC ORGANS: Obscured by metallic streak artifact.    PERITONEUM/MESENTERY/BOWEL: No bowel obstruction. No ascites or   pneumoperitoneum. Small hiatal hernia.    BONES/SOFT TISSUES: Bilateral hip total arthroplasties. Degenerative   change of spine shoulders. Osteopenia. Chronic bilateral rib fractures.   New age-indeterminate minimal L1 vertebral body compressionfracture   without significant osseous retropulsion; possibly acute. Osteopenia.   Right breast 1.8 cm nodule, similar prior CT; correlation with breast   cancer history suggested. Scoliosis.    VASCULAR : Vascular calcifications      IMPRESSION:  1.New age-indeterminate minimal L1 vertebral body compression fracture   without significant osseous retropulsion; possibly acute.  2. Right breast 1.8 cm nodule, similar prior CT; correlation with breast   cancer history suggested.    --- End of Report ---      DARRYL ISLAS MD; Attending Radiologist  This document has been electronically signed. Feb  3 2025  8:20PM    < end of copied text >

## 2025-02-08 NOTE — PROGRESS NOTE ADULT - ASSESSMENT
84-year-old female PMHx of RA, Osteoporosis, OA s/p B/L ROHIT & TKA, Rt hip ORIF(2024), HTN, GERD, breast cancer(in remission since 2015,) presenting to the ED for persistent back pain and inability to ambulate.     #Recent Mechanical fall   #L1 compression fracture  #Rt knee & Back pain, inability to ambulate, Deconditioning  #Hx of Osteoporosis RA, OA s/p B/L ROHIT & TKA, Rt hip ORIF(2024)  - No weakness, paresthesia, urinary/bowel incontinence  - Seen by ortho- F/u CT Rt knee, F/u ortho and spine Sx recs  - c/w Abdominal binder  - Pain control- Tylenol 1000mg q8, Celecoxib 200mg BID, Methocarbamol 500mg q8, Lidocaine patch and Tramadol 50mg q6 prn  - c/w Calcium, vit D  - DVT ppx, bowel regimen  - OP Rheum f/u(hasn't been taking Methotrexate and Prednisone)  - PT/OT recommending rehab facility- patient in agreement- but NOT [facility]  - CT spine PENDING- see CTAP, CT R knee no fracture  - Neurosurgery consulted for L1 compression fracture  - Will work on placement    #Mild Hyponatremia- seems chronic  #ADINA  - Hold home HCTZ  - Monitor for now  - Encourage PO solute intake and loosen NA restriction for 48 hrs  - Cr. up today from 0.7 to 1.3, BUN 21 to 33  - 75cc/hr of normal saline for 24h for ADINA and hyponatremia this AM, follow up with BMP tomorrow.     #HTN  - c/w Losartan 25mg, Toprol 25mg  - hold HCTZ    #HLD  -c/w rosuvastatin 10mg    #GERD  - c/w famotidine 20mg bID    #Anxiety/depression  - c/w sertraline 50mg    #Misc  -DVT prophylaxis: Lovenox  -GI prophylaxis: Famotidine  -Diet: Regular  -Code status: Full  -Activity: AAT  -Dispo: Med    To-do: ADINA and hyponatremia on NS 24hrs. Neurosurgery consult for L1 compression fracture. Placement (to rehab)- short term Rehab @ SNF

## 2025-02-08 NOTE — PATIENT PROFILE ADULT - FALL HARM RISK - HARM RISK INTERVENTIONS

## 2025-02-09 LAB
ALBUMIN SERPL ELPH-MCNC: 2.9 G/DL — LOW (ref 3.5–5.2)
ALP SERPL-CCNC: 124 U/L — HIGH (ref 30–115)
ALT FLD-CCNC: 11 U/L — SIGNIFICANT CHANGE UP (ref 0–41)
ANION GAP SERPL CALC-SCNC: 12 MMOL/L — SIGNIFICANT CHANGE UP (ref 7–14)
AST SERPL-CCNC: 17 U/L — SIGNIFICANT CHANGE UP (ref 0–41)
BASOPHILS # BLD AUTO: 0.03 K/UL — SIGNIFICANT CHANGE UP (ref 0–0.2)
BASOPHILS NFR BLD AUTO: 0.4 % — SIGNIFICANT CHANGE UP (ref 0–1)
BILIRUB SERPL-MCNC: 0.4 MG/DL — SIGNIFICANT CHANGE UP (ref 0.2–1.2)
BUN SERPL-MCNC: 23 MG/DL — HIGH (ref 10–20)
CALCIUM SERPL-MCNC: 8.7 MG/DL — SIGNIFICANT CHANGE UP (ref 8.4–10.4)
CHLORIDE SERPL-SCNC: 99 MMOL/L — SIGNIFICANT CHANGE UP (ref 98–110)
CO2 SERPL-SCNC: 23 MMOL/L — SIGNIFICANT CHANGE UP (ref 17–32)
CREAT SERPL-MCNC: 0.7 MG/DL — SIGNIFICANT CHANGE UP (ref 0.7–1.5)
EGFR: 85 ML/MIN/1.73M2 — SIGNIFICANT CHANGE UP
EOSINOPHIL # BLD AUTO: 0.15 K/UL — SIGNIFICANT CHANGE UP (ref 0–0.7)
EOSINOPHIL NFR BLD AUTO: 2.2 % — SIGNIFICANT CHANGE UP (ref 0–8)
GLUCOSE SERPL-MCNC: 90 MG/DL — SIGNIFICANT CHANGE UP (ref 70–99)
HCT VFR BLD CALC: 31.1 % — LOW (ref 37–47)
HGB BLD-MCNC: 9.9 G/DL — LOW (ref 12–16)
IMM GRANULOCYTES NFR BLD AUTO: 0.4 % — HIGH (ref 0.1–0.3)
LYMPHOCYTES # BLD AUTO: 1.32 K/UL — SIGNIFICANT CHANGE UP (ref 1.2–3.4)
LYMPHOCYTES # BLD AUTO: 19.6 % — LOW (ref 20.5–51.1)
MAGNESIUM SERPL-MCNC: 1.4 MG/DL — LOW (ref 1.8–2.4)
MCHC RBC-ENTMCNC: 27.3 PG — SIGNIFICANT CHANGE UP (ref 27–31)
MCHC RBC-ENTMCNC: 31.8 G/DL — LOW (ref 32–37)
MCV RBC AUTO: 85.7 FL — SIGNIFICANT CHANGE UP (ref 81–99)
MONOCYTES # BLD AUTO: 0.72 K/UL — HIGH (ref 0.1–0.6)
MONOCYTES NFR BLD AUTO: 10.7 % — HIGH (ref 1.7–9.3)
NEUTROPHILS # BLD AUTO: 4.49 K/UL — SIGNIFICANT CHANGE UP (ref 1.4–6.5)
NEUTROPHILS NFR BLD AUTO: 66.7 % — SIGNIFICANT CHANGE UP (ref 42.2–75.2)
NRBC # BLD: 0 /100 WBCS — SIGNIFICANT CHANGE UP (ref 0–0)
NRBC BLD-RTO: 0 /100 WBCS — SIGNIFICANT CHANGE UP (ref 0–0)
PLATELET # BLD AUTO: 306 K/UL — SIGNIFICANT CHANGE UP (ref 130–400)
PMV BLD: 9.3 FL — SIGNIFICANT CHANGE UP (ref 7.4–10.4)
POTASSIUM SERPL-MCNC: 3.5 MMOL/L — SIGNIFICANT CHANGE UP (ref 3.5–5)
POTASSIUM SERPL-SCNC: 3.5 MMOL/L — SIGNIFICANT CHANGE UP (ref 3.5–5)
PROT SERPL-MCNC: 5.6 G/DL — LOW (ref 6–8)
RBC # BLD: 3.63 M/UL — LOW (ref 4.2–5.4)
RBC # FLD: 15.5 % — HIGH (ref 11.5–14.5)
SODIUM SERPL-SCNC: 134 MMOL/L — LOW (ref 135–146)
WBC # BLD: 6.74 K/UL — SIGNIFICANT CHANGE UP (ref 4.8–10.8)
WBC # FLD AUTO: 6.74 K/UL — SIGNIFICANT CHANGE UP (ref 4.8–10.8)

## 2025-02-09 PROCEDURE — 72131 CT LUMBAR SPINE W/O DYE: CPT | Mod: 26

## 2025-02-09 RX ORDER — TRAMADOL HYDROCHLORIDE 100 MG/1
25 TABLET, EXTENDED RELEASE ORAL EVERY 6 HOURS
Refills: 0 | Status: DISCONTINUED | OUTPATIENT
Start: 2025-02-09 | End: 2025-02-12

## 2025-02-09 RX ORDER — MAGNESIUM SULFATE 0.8 MEQ/ML
2 AMPUL (ML) INJECTION
Refills: 0 | Status: COMPLETED | OUTPATIENT
Start: 2025-02-09 | End: 2025-02-09

## 2025-02-09 RX ORDER — NIFEDIPINE 90 MG/1
30 TABLET, FILM COATED, EXTENDED RELEASE ORAL ONCE
Refills: 0 | Status: COMPLETED | OUTPATIENT
Start: 2025-02-09 | End: 2025-02-09

## 2025-02-09 RX ADMIN — ACETAMINOPHEN 1000 MILLIGRAM(S): 160 SUSPENSION ORAL at 21:31

## 2025-02-09 RX ADMIN — Medication 50 MILLIGRAM(S): at 11:43

## 2025-02-09 RX ADMIN — ACETAMINOPHEN 1000 MILLIGRAM(S): 160 SUSPENSION ORAL at 05:50

## 2025-02-09 RX ADMIN — Medication 500 MILLIGRAM(S): at 14:19

## 2025-02-09 RX ADMIN — Medication 200 MILLIGRAM(S): at 17:51

## 2025-02-09 RX ADMIN — FAMOTIDINE 20 MILLIGRAM(S): 10 INJECTION INTRAVENOUS at 05:49

## 2025-02-09 RX ADMIN — ASPIRIN 81 MILLIGRAM(S): 81 TABLET, COATED ORAL at 11:44

## 2025-02-09 RX ADMIN — ACETAMINOPHEN 1000 MILLIGRAM(S): 160 SUSPENSION ORAL at 15:00

## 2025-02-09 RX ADMIN — Medication 500 MILLIGRAM(S): at 05:50

## 2025-02-09 RX ADMIN — FAMOTIDINE 20 MILLIGRAM(S): 10 INJECTION INTRAVENOUS at 17:51

## 2025-02-09 RX ADMIN — Medication 1 TABLET(S): at 11:43

## 2025-02-09 RX ADMIN — Medication 25 GRAM(S): at 14:20

## 2025-02-09 RX ADMIN — NIFEDIPINE 30 MILLIGRAM(S): 90 TABLET, FILM COATED, EXTENDED RELEASE ORAL at 14:19

## 2025-02-09 RX ADMIN — ENOXAPARIN SODIUM 40 MILLIGRAM(S): 100 INJECTION SUBCUTANEOUS at 05:50

## 2025-02-09 RX ADMIN — ACETAMINOPHEN 1000 MILLIGRAM(S): 160 SUSPENSION ORAL at 07:00

## 2025-02-09 RX ADMIN — Medication 1000 UNIT(S): at 11:44

## 2025-02-09 RX ADMIN — Medication 1000 MICROGRAM(S): at 11:43

## 2025-02-09 RX ADMIN — Medication 200 MILLIGRAM(S): at 18:30

## 2025-02-09 RX ADMIN — Medication 500 MILLIGRAM(S): at 21:32

## 2025-02-09 RX ADMIN — Medication 200 MILLIGRAM(S): at 05:49

## 2025-02-09 RX ADMIN — Medication 25 MILLIGRAM(S): at 05:49

## 2025-02-09 RX ADMIN — Medication 200 MILLIGRAM(S): at 07:00

## 2025-02-09 RX ADMIN — LIDOCAINE HYDROCHLORIDE 1 PATCH: 30 CREAM TOPICAL at 11:39

## 2025-02-09 RX ADMIN — Medication 25 GRAM(S): at 10:09

## 2025-02-09 RX ADMIN — Medication 1 TABLET(S): at 11:44

## 2025-02-09 RX ADMIN — ACETAMINOPHEN 1000 MILLIGRAM(S): 160 SUSPENSION ORAL at 14:19

## 2025-02-09 RX ADMIN — ROSUVASTATIN CALCIUM 10 MILLIGRAM(S): 10 TABLET, FILM COATED ORAL at 21:32

## 2025-02-09 NOTE — PROGRESS NOTE ADULT - ASSESSMENT
84-year-old female PMHx of RA, Osteoporosis, OA s/p B/L ROHIT & TKA, Rt hip ORIF(2024), HTN, GERD, breast cancer(in remission since 2015,) presenting to the ED for persistent back pain and inability to ambulate.     #Recent Mechanical fall   #L1 compression fracture  #Rt knee & Back pain, inability to ambulate, Deconditioning  #Hx of Osteoporosis RA, OA s/p B/L ROHIT & TKA, Rt hip ORIF(2024)  - No weakness, paresthesia, urinary/bowel incontinence  - Seen by ortho- F/u CT Rt knee, F/u ortho and spine Sx recs  - c/w Abdominal binder  - Pain control- Tylenol 1000mg q8, Celecoxib 200mg BID, Methocarbamol 500mg q8, Lidocaine patch and Tramadol 50mg q6 prn  - c/w Calcium, vit D  - DVT ppx, bowel regimen  - OP Rheum f/u(hasn't been taking Methotrexate and Prednisone)  - PT/OT recommending rehab facility- patient in agreement- but NOT [facility]  - CT spine PENDING- see CTAP, CT R knee no fracture  - Neurosurgery consulted for L1 compression fracture  - Will work on placement    #Mild Hyponatremia- seems chronic  #ADINA  - Hold home HCTZ  - Monitor for now  - Encourage PO solute intake and loosen NA restriction for 48 hrs  - Cr. up today from 0.7 to 1.3, BUN 21 to 33  - 75cc/hr of normal saline for 24h for ADINA and hyponatremia this AM, follow up with BMP tomorrow.     #HTN  - c/w Losartan 25mg, Toprol 25mg  - hold HCTZ    #HLD  -c/w rosuvastatin 10mg    #GERD  - c/w famotidine 20mg bID    #Anxiety/depression  - c/w sertraline 50mg    #Misc  -DVT prophylaxis: Lovenox  -GI prophylaxis: Famotidine  -Diet: Regular  -Code status: Full  -Activity: AAT  -Dispo: Med    To-do: ADINA and hyponatremia on NS 24hrs. Neurosurgery consult for L1 compression fracture. Placement (to rehab)- short term Rehab @ SNF 84-year-old female PMHx of RA, Osteoporosis, OA s/p B/L ROHIT & TKA, Rt hip ORIF(2024), HTN, GERD, breast cancer(in remission since 2015,) presenting to the ED for persistent back pain and inability to ambulate.     #Recent Mechanical fall   #L1 compression fracture  #Rt knee & Back pain, inability to ambulate, Deconditioning  #Hx of Osteoporosis RA, OA s/p B/L ROHIT & TKA, Rt hip ORIF(2024)  - No weakness, paresthesia, urinary/bowel incontinence  - Seen by ortho- F/u CT Rt knee, F/u ortho and spine Sx recs  - c/w Abdominal binder  - Pain control- Tylenol 1000mg q8, Celecoxib 200mg BID, Methocarbamol 500mg q8, Lidocaine patch and Tramadol 50mg q6 prn  - c/w Calcium, vit D  - DVT ppx, bowel regimen  - OP Rheum f/u(hasn't been taking Methotrexate and Prednisone)  - PT/OT recommending rehab facility- patient in agreement- but NOT [facility]  - CT spine PENDING- see CTAP, CT R knee no fracture  - Neurosurgery consulted for L1 compression fracture  - Will work on placement    #Mild Hyponatremia- seems chronic;  - s/p 1 day NS @ 75ml/hr. hold. Loosen dietary NA restrictions.    HypoMagnesemia- replete  - Case discussed with HO  #ADINA  - Hold home HCTZ  - Monitor for now  - Encourage PO solute intake and loosen NA restriction for 48 hrs  - Cr. up today from 0.7 to 1.3, BUN 21 to 33  - 75cc/hr of normal saline for 24h for ADINA and hyponatremia this AM, today;  BMP- improved;  - continue to follow    #HTN  - c/w Losartan 25mg, Toprol 25mg  - hold HCTZ  - takes Celebrex also; +NS over 24h.    #HLD  -c/w rosuvastatin 10mg    #GERD  - c/w famotidine 20mg bID    #Anxiety/depression  - c/w sertraline 50mg    #Misc  -DVT prophylaxis: Lovenox  -GI prophylaxis: Famotidine  -Diet: Regular  -Code status: Full  -Activity: AAT- PT follow up  -Dispo: Med    To-do: ADINA and hyponatremia on NS 24hrs. Neurosurgery consult for L1 compression fracture- still pending; CT LS not yet done. Placement (to rehab)- short term Rehab @ SNF

## 2025-02-09 NOTE — PROGRESS NOTE ADULT - SUBJECTIVE AND OBJECTIVE BOX
Chart reviewed, patient examined. Pertinent results reviewed.  Case discussed with HO; specialist f/u reviewed  HD#4; Patient well known to me-     Patient is a 84y old Female who presents with a chief complaint of a fall and slight confusion.  Primary diagnosis of LS- R Buttocks Back and R Knee pain after her fall.   She has been home a few weeks after a prior fall, Fx R Femur, ORIF(12/2/24-here), then IP and OP Rehab.  She fell when she got up while alone.      This morning patient was seen and examined at bedside, resting comfortably in bed.    No acute or major events overnight. more alert but slow to give the story of her fall.     Code Status: Full      PAST MEDICAL & SURGICAL HISTORY  Rheumatoid arthritis    HTN (hypertension)  w MOD LVH- HTN HD    Breast cancer  last radiation 2015    Depression    Chronic GERD    OA (osteoarthritis)    Rheumatoid arthritis    History of right hip replacement    Status post left hip replacement    S/P total knee replacement, left  R Femur Fx & ORIF 12/2/24    H/O vaginal hysterectomy    Status post cholecystectomy    S/P lumpectomy of breast  2015      SOCIAL HISTORY:  Social History: Lives alone; + ;   Has a friend who helps her some hours M-F;  Daughters come by on weekends.   smoked in the past      ALLERGIES:  No Known Allergies      MEDICATIONS:  MEDICATIONS  (STANDING):  acetaminophen     Tablet .. 1000 milliGRAM(s) Oral every 8 hours  aspirin enteric coated 81 milliGRAM(s) Oral daily  calcium carbonate   1250 mG (OsCal) 1 Tablet(s) Oral daily  celecoxib 200 milliGRAM(s) Oral two times a day  cholecalciferol 1000 Unit(s) Oral daily  cyanocobalamin 1000 MICROGram(s) Oral daily  enoxaparin Injectable 40 milliGRAM(s) SubCutaneous every 24 hours  famotidine    Tablet 20 milliGRAM(s) Oral two times a day  lactated ringers. 1000 milliLiter(s) (75 mL/Hr) IV Continuous <Continuous>  lidocaine   4% Patch 1 Patch Transdermal daily  losartan 25 milliGRAM(s) Oral daily  methocarbamol 500 milliGRAM(s) Oral every 8 hours  metoprolol succinate ER 25 milliGRAM(s) Oral daily  multivitamin 1 Tablet(s) Oral daily  rosuvastatin 10 milliGRAM(s) Oral at bedtime  senna 2 Tablet(s) Oral at bedtime  sertraline 50 milliGRAM(s) Oral daily  sodium chloride 0.9%. 1000 milliLiter(s) (75 mL/Hr) IV Continuous <Continuous>    MEDICATIONS  (PRN):  HYDROmorphone  Injectable 1 milliGRAM(s) IV Push every 4 hours PRN Severe Pain (7 - 10)  melatonin 3 milliGRAM(s) Oral at bedtime PRN Insomnia  polyethylene glycol 3350 17 Gram(s) Oral two times a day PRN Constipation  traMADol 50 milliGRAM(s) Oral every 6 hours PRN Severe Pain (7 - 10)    VITALS:   Vital Signs Last 24 Hrs  T(C): 36.3 (09 Feb 2025 05:09), Max: 36.8 (08 Feb 2025 19:54)  T(F): 97.4 (09 Feb 2025 05:09), Max: 98.3 (08 Feb 2025 19:54)  HR: 84 (09 Feb 2025 05:09) (73 - 84)  BP: 174/92 (09 Feb 2025 05:09) (132/77 - 174/92)  BP(mean): --  RR: 18 (09 Feb 2025 05:09) (18 - 18)  SpO2: 96% (09 Feb 2025 05:09) (96% - 96%)    Parameters below as of 08 Feb 2025 19:54  Patient On (Oxygen Delivery Method): room air      PHYSICAL EXAM:  GENERAL: NAD, lying in bed comfortably; recognizes me and converses easily. year- 1920, then 2024; Pres-OK  HEAD:  Atraumatic, Normocephalic  EYES: EOMI, PERRLA, conjunctiva and sclera clear  CHEST/LUNG: Clear  bilaterally; No rales, rhonchi, wheezing, or rubs. Unlabored respirations  HEART: RRR; No murmurs, rubs, or gallops  ABDOMEN: BSx4; Soft, nontender, nondistended  EXTREMITIES: Reduced ROM of RLE, mild pain on movement. Multiple scars over R Knee area and R hip.      TTP over LSSp also- no deformities.No clubbing, cyanosis, or edema  NERVOUS SYSTEM:  A&Ox3, no focal deficits   SKIN: No rashes or lesions      LABS:                        10.7   7.39  )-----------( 291      ( 08 Feb 2025 06:15 )             33.9     02-08    131[L]  |  94[L]  |  37[H]  ----------------------------<  86  4.0   |  23  |  1.2    Ca    9.5      08 Feb 2025 06:15  Mg     2.3     02-08    TPro  6.3  /  Alb  3.2[L]  /  TBili  0.4  /  DBili  x   /  AST  19  /  ALT  11  /  AlkPhos  123[H]  02-08      Urinalysis Basic - ( 08 Feb 2025 06:15 )    Color: x / Appearance: x / SG: x / pH: x  Gluc: 86 mg/dL / Ketone: x  / Bili: x / Urobili: x   Blood: x / Protein: x / Nitrite: x   Leuk Esterase: x / RBC: x / WBC x   Sq Epi: x / Non Sq Epi: x / Bacteria: x    < from: CT Knee No Cont, Right (02.06.25 @ 20:37) >  ACC: [id number] EXAM:  CT KNEE ONLY RT   ORDERED BY: SUSANNE SALGADO     PROCEDURE DATE:  02/06/2025          INTERPRETATION:  CT OF THE RIGHT KNEE WITHOUT CONTRAST    CLINICAL HISTORY: Right knee pain.    TECHNIQUE: Images were obtained of the right knee without contrast.   Coronal and sagittal reformatted images were also provided.    COMPARISON: CT femur dated 12/1/2024.    FINDINGS:    BONES/JOINTS: Total right knee arthroplasty status post ORIF of   periprosthetic fracture of the distal femur. Interval improvement of bony   fragment alignment with evidence of callus formation. No CT evidence of   hardware complications. Moderate knee joint effusion. No new acute   fracture.    SOFT TISSUES: Vascular calcifications. No encapsulated hematoma.    IMPRESSION:  Patient is status post ORIF involving distal femoral periprosthetic   fracture of the right knee, in improved and near anatomic alignment,   demonstrating evidence of callus formation. No evidence of hardware   complications.    Moderate knee joint effusion.    --- End of Report ---          TEZ SEGUNDO MD; Resident Radiologist  This document has been electronically signed.  NELA MONTANO MD; Attending Radiologist  This document has been electronically signed. Feb 7 2025 11:28AM    < end of copied text >  < from: CT Abdomen and Pelvis w/ IV Cont (02.03.25 @ 16:48) >  ACC: [id number] EXAM:  CT ABDOMEN AND PELVIS IC   ORDERED BY: MARLON EGAN     ACC: [id number] EXAM:  CT CHEST IC   ORDERED BY: MARLON EGAN     PROCEDURE DATE:  02/03/2025          INTERPRETATION:  CLINICAL HISTORY/REASON FOR EXAM: Trauma to chest,   abdomen and pelvis.. Left flank pain. Back pain. Vomiting. Breast cancer.    TECHNIQUE: Contiguous axial CT images were obtained from the thoracic   inlet to the pubic symphysis following administration of 100 cc Omnipaque   350 intravenous contrast.  Oral contrast was not administered.   Reformatted images in the coronal and sagittal planes were acquired. 3D   (MIP) images obtained.    COMPARISON: CT chest, abdomen and pelvis 11/23/2024.      FINDINGS:    CHEST:    LUNGS, PLEURA, AIRWAYS: No lobar consolidation, mass, effusion, or   pneumothorax. No evidence of central endobronchial obstruction. No   bronchiectasis or honeycombing. No suspicious pulmonary nodule. Biapical   scarring. Bilateral subsegmental atelectasis.    THORACIC NODES: No mediastinal, hilar, supraclavicular, or axillary   lymphadenopathy.    MEDIASTINUM/GREAT VESSELS: No pericardial effusion. Heart size is within   normal limits. The aorta and main pulmonary artery are of normal caliber.    ABDOMEN/PELVIS:    HEPATOBILIARY: Postcholecystectomy. No evidence of liver injury. No   biliary dilation.    SPLEEN: Unremarkable.    PANCREAS: Unremarkable.    ADRENAL GLANDS: Unremarkable.    KIDNEYS: Symmetric pattern of renal enhancement. No hydronephrosis   bilaterally. Bilateral renal cysts and subcentimeter hypodensities, too   small to characterize    ABDOMINOPELVIC NODES: No lymphadenopathy.    PELVIC ORGANS: Obscured by metallic streak artifact.    PERITONEUM/MESENTERY/BOWEL: No bowel obstruction. No ascites or   pneumoperitoneum. Small hiatal hernia.    BONES/SOFT TISSUES: Bilateral hip total arthroplasties. Degenerative   change of spine shoulders. Osteopenia. Chronic bilateral rib fractures.   New age-indeterminate minimal L1 vertebral body compressionfracture   without significant osseous retropulsion; possibly acute. Osteopenia.   Right breast 1.8 cm nodule, similar prior CT; correlation with breast   cancer history suggested. Scoliosis.    VASCULAR : Vascular calcifications      IMPRESSION:  1.New age-indeterminate minimal L1 vertebral body compression fracture   without significant osseous retropulsion; possibly acute.  2. Right breast 1.8 cm nodule, similar prior CT; correlation with breast   cancer history suggested.    --- End of Report ---      DARRYL ISLAS MD; Attending Radiologist  This document has been electronically signed. Feb  3 2025  8:20PM    < end of copied text >       Chart reviewed, patient examined. Pertinent results reviewed.  Case discussed with HO; specialist f/u reviewed  HD#4; Patient well known to me-     Patient is a 84y old Female who presents with a chief complaint of a fall and slight confusion.  Primary diagnosis of LS- R Buttocks Back and R Knee pain after her fall.   She has been home a few weeks after a prior fall, Fx R Femur, ORIF(12/2/24-here), then IP and OP Rehab.  She fell when she got up while alone.      This morning patient was seen and examined at bedside, resting comfortably in bed.  Received NS 75ml/hr x 1 day.  No acute or major events overnight. more alert but slow to give the story of her fall.   RN reports that she was groggy after 50mg dose of Tramadol last PM.    Code Status: Full      PAST MEDICAL & SURGICAL HISTORY  Rheumatoid arthritis    HTN (hypertension)  w MOD LVH- HTN HD    Breast cancer  last radiation 2015    Depression    Chronic GERD    OA (osteoarthritis)    Rheumatoid arthritis    History of right hip replacement    Status post left hip replacement    S/P total knee replacement, left  R Femur Fx & ORIF 12/2/24    H/O vaginal hysterectomy    Status post cholecystectomy    S/P lumpectomy of breast  2015      SOCIAL HISTORY:  Social History: Lives alone; + ;   Has a friend who helps her some hours M-F;  Daughters come by on weekends.   smoked in the past      ALLERGIES:  No Known Allergies      MEDICATIONS:  MEDICATIONS  (STANDING):  acetaminophen     Tablet .. 1000 milliGRAM(s) Oral every 8 hours  aspirin enteric coated 81 milliGRAM(s) Oral daily  calcium carbonate   1250 mG (OsCal) 1 Tablet(s) Oral daily  celecoxib 200 milliGRAM(s) Oral two times a day  cholecalciferol 1000 Unit(s) Oral daily  cyanocobalamin 1000 MICROGram(s) Oral daily  enoxaparin Injectable 40 milliGRAM(s) SubCutaneous every 24 hours  famotidine    Tablet 20 milliGRAM(s) Oral two times a day  lactated ringers. 1000 milliLiter(s) (75 mL/Hr) IV Continuous <Continuous>  lidocaine   4% Patch 1 Patch Transdermal daily  losartan 25 milliGRAM(s) Oral daily  methocarbamol 500 milliGRAM(s) Oral every 8 hours  metoprolol succinate ER 25 milliGRAM(s) Oral daily  multivitamin 1 Tablet(s) Oral daily  rosuvastatin 10 milliGRAM(s) Oral at bedtime  senna 2 Tablet(s) Oral at bedtime  sertraline 50 milliGRAM(s) Oral daily  sodium chloride 0.9%. 1000 milliLiter(s) (75 mL/Hr) IV Continuous <Continuous>    MEDICATIONS  (PRN):  HYDROmorphone  Injectable 1 milliGRAM(s) IV Push every 4 hours PRN Severe Pain (7 - 10)  melatonin 3 milliGRAM(s) Oral at bedtime PRN Insomnia  polyethylene glycol 3350 17 Gram(s) Oral two times a day PRN Constipation  traMADol 50 milliGRAM(s) Oral every 6 hours PRN Severe Pain (7 - 10)    VITALS:   Vital Signs Last 24 Hrs  T(C): 36.3 (09 Feb 2025 05:09), Max: 36.8 (08 Feb 2025 19:54)  T(F): 97.4 (09 Feb 2025 05:09), Max: 98.3 (08 Feb 2025 19:54)  HR: 84 (09 Feb 2025 05:09) (73 - 84)  BP: 174/92 (09 Feb 2025 05:09) (132/77 - 174/92)  BP(mean): --  RR: 18 (09 Feb 2025 05:09) (18 - 18)  SpO2: 96% (09 Feb 2025 05:09) (96% - 96%)    Parameters below as of 08 Feb 2025 19:54  Patient On (Oxygen Delivery Method): room air      PHYSICAL EXAM:  GENERAL: NAD, lying in bed comfortably; sleeping at 1st-easily awakened. recognizes me and converses easily. year-  2024; Pres-OK; + super Bowl today  HEAD:  Atraumatic, Normocephalic  EYES: EOMI, PERRLA, conjunctiva and sclera clear  CHEST/LUNG: Clear  bilaterally; No rales, rhonchi, wheezing, or rubs. Unlabored respirations  HEART: RRR; No murmurs, rubs, or gallops  ABDOMEN: BSx4; Soft, nontender, nondistended  EXTREMITIES: Reduced ROM of RLE- lifting better; mild pain on movement. Multiple scars over R Knee area and R hip.      TTP over LSSp also- no deformities.No clubbing, cyanosis, or edema  NERVOUS SYSTEM:  A&Ox3, no focal deficits   SKIN: No rashes or lesions      LABS:                            9.9    6.74  )-----------( 306      ( 09 Feb 2025 07:20 )             31.1                     10.7   7.39  )-----------( 291      ( 08 Feb 2025 06:15 )             33.9     02-09    134[L]  |  99  |  23[H]  ----------------------------<  90  3.5   |  23  |  0.7    Ca    8.7      09 Feb 2025 07:20  Mg     1.4     02-09    TPro  5.6[L]  /  Alb  2.9[L]  /  TBili  0.4  /  DBili  x   /  AST  17  /  ALT  11  /  AlkPhos  124[H]  02-09    02-08    131[L]  |  94[L]  |  37[H]  ----------------------------<  86  4.0   |  23  |  1.2    Ca    9.5      08 Feb 2025 06:15  Mg     2.3     02-08    TPro  6.3  /  Alb  3.2[L]  /  TBili  0.4  /  DBili  x   /  AST  19  /  ALT  11  /  AlkPhos  123[H]  02-08      Urinalysis Basic - ( 08 Feb 2025 06:15 )    Color: x / Appearance: x / SG: x / pH: x  Gluc: 86 mg/dL / Ketone: x  / Bili: x / Urobili: x   Blood: x / Protein: x / Nitrite: x   Leuk Esterase: x / RBC: x / WBC x   Sq Epi: x / Non Sq Epi: x / Bacteria: x    < from: CT Knee No Cont, Right (02.06.25 @ 20:37) >  ACC: [id number] EXAM:  CT KNEE ONLY RT   ORDERED BY: SUSANNE SALGADO     PROCEDURE DATE:  02/06/2025          INTERPRETATION:  CT OF THE RIGHT KNEE WITHOUT CONTRAST    CLINICAL HISTORY: Right knee pain.    TECHNIQUE: Images were obtained of the right knee without contrast.   Coronal and sagittal reformatted images were also provided.    COMPARISON: CT femur dated 12/1/2024.    FINDINGS:    BONES/JOINTS: Total right knee arthroplasty status post ORIF of   periprosthetic fracture of the distal femur. Interval improvement of bony   fragment alignment with evidence of callus formation. No CT evidence of   hardware complications. Moderate knee joint effusion. No new acute   fracture.    SOFT TISSUES: Vascular calcifications. No encapsulated hematoma.    IMPRESSION:  Patient is status post ORIF involving distal femoral periprosthetic   fracture of the right knee, in improved and near anatomic alignment,   demonstrating evidence of callus formation. No evidence of hardware   complications.    Moderate knee joint effusion.    --- End of Report ---          TEZ SEGUNDO MD; Resident Radiologist  This document has been electronically signed.  NELA MONTANO MD; Attending Radiologist  This document has been electronically signed. Feb 7 2025 11:28AM    < end of copied text >  < from: CT Abdomen and Pelvis w/ IV Cont (02.03.25 @ 16:48) >  ACC: [id number] EXAM:  CT ABDOMEN AND PELVIS IC   ORDERED BY: MARLON EGAN     ACC: [id number] EXAM:  CT CHEST IC   ORDERED BY: MARLON EGAN     PROCEDURE DATE:  02/03/2025          INTERPRETATION:  CLINICAL HISTORY/REASON FOR EXAM: Trauma to chest,   abdomen and pelvis.. Left flank pain. Back pain. Vomiting. Breast cancer.    TECHNIQUE: Contiguous axial CT images were obtained from the thoracic   inlet to the pubic symphysis following administration of 100 cc Omnipaque   350 intravenous contrast.  Oral contrast was not administered.   Reformatted images in the coronal and sagittal planes were acquired. 3D   (MIP) images obtained.    COMPARISON: CT chest, abdomen and pelvis 11/23/2024.      FINDINGS:    CHEST:    LUNGS, PLEURA, AIRWAYS: No lobar consolidation, mass, effusion, or   pneumothorax. No evidence of central endobronchial obstruction. No   bronchiectasis or honeycombing. No suspicious pulmonary nodule. Biapical   scarring. Bilateral subsegmental atelectasis.    THORACIC NODES: No mediastinal, hilar, supraclavicular, or axillary   lymphadenopathy.    MEDIASTINUM/GREAT VESSELS: No pericardial effusion. Heart size is within   normal limits. The aorta and main pulmonary artery are of normal caliber.    ABDOMEN/PELVIS:    HEPATOBILIARY: Postcholecystectomy. No evidence of liver injury. No   biliary dilation.    SPLEEN: Unremarkable.    PANCREAS: Unremarkable.    ADRENAL GLANDS: Unremarkable.    KIDNEYS: Symmetric pattern of renal enhancement. No hydronephrosis   bilaterally. Bilateral renal cysts and subcentimeter hypodensities, too   small to characterize    ABDOMINOPELVIC NODES: No lymphadenopathy.    PELVIC ORGANS: Obscured by metallic streak artifact.    PERITONEUM/MESENTERY/BOWEL: No bowel obstruction. No ascites or   pneumoperitoneum. Small hiatal hernia.    BONES/SOFT TISSUES: Bilateral hip total arthroplasties. Degenerative   change of spine shoulders. Osteopenia. Chronic bilateral rib fractures.   New age-indeterminate minimal L1 vertebral body compressionfracture   without significant osseous retropulsion; possibly acute. Osteopenia.   Right breast 1.8 cm nodule, similar prior CT; correlation with breast   cancer history suggested. Scoliosis.    VASCULAR : Vascular calcifications      IMPRESSION:  1.New age-indeterminate minimal L1 vertebral body compression fracture   without significant osseous retropulsion; possibly acute.  2. Right breast 1.8 cm nodule, similar prior CT; correlation with breast   cancer history suggested.    --- End of Report ---      DARRYL ISLAS MD; Attending Radiologist  This document has been electronically signed. Feb  3 2025  8:20PM    < end of copied text >

## 2025-02-10 LAB
ALBUMIN SERPL ELPH-MCNC: 3.3 G/DL — LOW (ref 3.5–5.2)
ALP SERPL-CCNC: 135 U/L — HIGH (ref 30–115)
ALT FLD-CCNC: 11 U/L — SIGNIFICANT CHANGE UP (ref 0–41)
ANION GAP SERPL CALC-SCNC: 13 MMOL/L — SIGNIFICANT CHANGE UP (ref 7–14)
AST SERPL-CCNC: 19 U/L — SIGNIFICANT CHANGE UP (ref 0–41)
BASOPHILS # BLD AUTO: 0.03 K/UL — SIGNIFICANT CHANGE UP (ref 0–0.2)
BASOPHILS NFR BLD AUTO: 0.4 % — SIGNIFICANT CHANGE UP (ref 0–1)
BILIRUB SERPL-MCNC: 0.5 MG/DL — SIGNIFICANT CHANGE UP (ref 0.2–1.2)
BUN SERPL-MCNC: 15 MG/DL — SIGNIFICANT CHANGE UP (ref 10–20)
CALCIUM SERPL-MCNC: 9 MG/DL — SIGNIFICANT CHANGE UP (ref 8.4–10.5)
CHLORIDE SERPL-SCNC: 94 MMOL/L — LOW (ref 98–110)
CO2 SERPL-SCNC: 25 MMOL/L — SIGNIFICANT CHANGE UP (ref 17–32)
CREAT SERPL-MCNC: 0.5 MG/DL — LOW (ref 0.7–1.5)
EGFR: 92 ML/MIN/1.73M2 — SIGNIFICANT CHANGE UP
EOSINOPHIL # BLD AUTO: 0.14 K/UL — SIGNIFICANT CHANGE UP (ref 0–0.7)
EOSINOPHIL NFR BLD AUTO: 1.7 % — SIGNIFICANT CHANGE UP (ref 0–8)
GLUCOSE SERPL-MCNC: 98 MG/DL — SIGNIFICANT CHANGE UP (ref 70–99)
HCT VFR BLD CALC: 34.9 % — LOW (ref 37–47)
HGB BLD-MCNC: 11.4 G/DL — LOW (ref 12–16)
IMM GRANULOCYTES NFR BLD AUTO: 0.7 % — HIGH (ref 0.1–0.3)
LYMPHOCYTES # BLD AUTO: 1.36 K/UL — SIGNIFICANT CHANGE UP (ref 1.2–3.4)
LYMPHOCYTES # BLD AUTO: 16.1 % — LOW (ref 20.5–51.1)
MAGNESIUM SERPL-MCNC: 1.5 MG/DL — LOW (ref 1.8–2.4)
MCHC RBC-ENTMCNC: 27.5 PG — SIGNIFICANT CHANGE UP (ref 27–31)
MCHC RBC-ENTMCNC: 32.7 G/DL — SIGNIFICANT CHANGE UP (ref 32–37)
MCV RBC AUTO: 84.1 FL — SIGNIFICANT CHANGE UP (ref 81–99)
MONOCYTES # BLD AUTO: 0.82 K/UL — HIGH (ref 0.1–0.6)
MONOCYTES NFR BLD AUTO: 9.7 % — HIGH (ref 1.7–9.3)
NEUTROPHILS # BLD AUTO: 6.05 K/UL — SIGNIFICANT CHANGE UP (ref 1.4–6.5)
NEUTROPHILS NFR BLD AUTO: 71.4 % — SIGNIFICANT CHANGE UP (ref 42.2–75.2)
NRBC # BLD: 0 /100 WBCS — SIGNIFICANT CHANGE UP (ref 0–0)
NRBC BLD-RTO: 0 /100 WBCS — SIGNIFICANT CHANGE UP (ref 0–0)
PLATELET # BLD AUTO: 351 K/UL — SIGNIFICANT CHANGE UP (ref 130–400)
PMV BLD: 9.1 FL — SIGNIFICANT CHANGE UP (ref 7.4–10.4)
POTASSIUM SERPL-MCNC: 3.6 MMOL/L — SIGNIFICANT CHANGE UP (ref 3.5–5)
POTASSIUM SERPL-SCNC: 3.6 MMOL/L — SIGNIFICANT CHANGE UP (ref 3.5–5)
PROT SERPL-MCNC: 6.2 G/DL — SIGNIFICANT CHANGE UP (ref 6–8)
RBC # BLD: 4.15 M/UL — LOW (ref 4.2–5.4)
RBC # FLD: 15.4 % — HIGH (ref 11.5–14.5)
SODIUM SERPL-SCNC: 132 MMOL/L — LOW (ref 135–146)
WBC # BLD: 8.46 K/UL — SIGNIFICANT CHANGE UP (ref 4.8–10.8)
WBC # FLD AUTO: 8.46 K/UL — SIGNIFICANT CHANGE UP (ref 4.8–10.8)

## 2025-02-10 RX ORDER — MAGNESIUM SULFATE 0.8 MEQ/ML
2 AMPUL (ML) INJECTION
Refills: 0 | Status: COMPLETED | OUTPATIENT
Start: 2025-02-10 | End: 2025-02-10

## 2025-02-10 RX ORDER — ANTISEPTIC SURGICAL SCRUB 0.04 MG/ML
1 SOLUTION TOPICAL
Refills: 0 | Status: DISCONTINUED | OUTPATIENT
Start: 2025-02-10 | End: 2025-02-12

## 2025-02-10 RX ORDER — MAGNESIUM OXIDE 400 MG
400 TABLET ORAL
Refills: 0 | Status: DISCONTINUED | OUTPATIENT
Start: 2025-02-10 | End: 2025-02-12

## 2025-02-10 RX ADMIN — ANTISEPTIC SURGICAL SCRUB 1 APPLICATION(S): 0.04 SOLUTION TOPICAL at 14:26

## 2025-02-10 RX ADMIN — Medication 25 MILLIGRAM(S): at 05:51

## 2025-02-10 RX ADMIN — Medication 25 GRAM(S): at 12:29

## 2025-02-10 RX ADMIN — ACETAMINOPHEN 1000 MILLIGRAM(S): 160 SUSPENSION ORAL at 21:57

## 2025-02-10 RX ADMIN — Medication 1000 MICROGRAM(S): at 11:47

## 2025-02-10 RX ADMIN — ASPIRIN 81 MILLIGRAM(S): 81 TABLET, COATED ORAL at 11:46

## 2025-02-10 RX ADMIN — ROSUVASTATIN CALCIUM 10 MILLIGRAM(S): 10 TABLET, FILM COATED ORAL at 21:27

## 2025-02-10 RX ADMIN — ACETAMINOPHEN 1000 MILLIGRAM(S): 160 SUSPENSION ORAL at 05:52

## 2025-02-10 RX ADMIN — Medication 1 TABLET(S): at 11:47

## 2025-02-10 RX ADMIN — Medication 500 MILLIGRAM(S): at 05:52

## 2025-02-10 RX ADMIN — ACETAMINOPHEN 1000 MILLIGRAM(S): 160 SUSPENSION ORAL at 15:11

## 2025-02-10 RX ADMIN — Medication 400 MILLIGRAM(S): at 17:06

## 2025-02-10 RX ADMIN — Medication 500 MILLIGRAM(S): at 14:24

## 2025-02-10 RX ADMIN — Medication 500 MILLIGRAM(S): at 21:27

## 2025-02-10 RX ADMIN — Medication 200 MILLIGRAM(S): at 17:06

## 2025-02-10 RX ADMIN — Medication 200 MILLIGRAM(S): at 05:51

## 2025-02-10 RX ADMIN — FAMOTIDINE 20 MILLIGRAM(S): 10 INJECTION INTRAVENOUS at 05:52

## 2025-02-10 RX ADMIN — ENOXAPARIN SODIUM 40 MILLIGRAM(S): 100 INJECTION SUBCUTANEOUS at 05:51

## 2025-02-10 RX ADMIN — ACETAMINOPHEN 1000 MILLIGRAM(S): 160 SUSPENSION ORAL at 14:23

## 2025-02-10 RX ADMIN — Medication 1 TABLET(S): at 12:29

## 2025-02-10 RX ADMIN — FAMOTIDINE 20 MILLIGRAM(S): 10 INJECTION INTRAVENOUS at 17:06

## 2025-02-10 RX ADMIN — Medication 1000 UNIT(S): at 11:47

## 2025-02-10 RX ADMIN — ACETAMINOPHEN 1000 MILLIGRAM(S): 160 SUSPENSION ORAL at 21:27

## 2025-02-10 RX ADMIN — Medication 200 MILLIGRAM(S): at 18:18

## 2025-02-10 RX ADMIN — LIDOCAINE HYDROCHLORIDE 1 PATCH: 30 CREAM TOPICAL at 18:48

## 2025-02-10 RX ADMIN — Medication 2 TABLET(S): at 21:27

## 2025-02-10 RX ADMIN — LIDOCAINE HYDROCHLORIDE 1 PATCH: 30 CREAM TOPICAL at 23:24

## 2025-02-10 RX ADMIN — LIDOCAINE HYDROCHLORIDE 1 PATCH: 30 CREAM TOPICAL at 11:46

## 2025-02-10 RX ADMIN — Medication 50 MILLIGRAM(S): at 11:47

## 2025-02-10 RX ADMIN — Medication 25 GRAM(S): at 09:47

## 2025-02-10 RX ADMIN — Medication 400 MILLIGRAM(S): at 11:47

## 2025-02-10 NOTE — PROGRESS NOTE ADULT - SUBJECTIVE AND OBJECTIVE BOX
24H events:    Patient is a 84y old Female who presents with a chief complaint of   Primary diagnosis of Back pain          Today is hospital day 4d.   This morning patient was seen and examined at bedside, resting comfortably in bed. Slightly sleepy but no acute events overnight. Has not required pain meds. Bp has been elevated, will resume home HCTZ. Repleting magox daily PO. Pending STR placement.     Code Status: Full      PAST MEDICAL & SURGICAL HISTORY  Rheumatoid arthritis    HTN (hypertension)    Breast cancer  last radiation 2015    Depression    Chronic GERD    OA (osteoarthritis)    Rheumatoid arthritis    History of right hip replacement    Status post left hip replacement    S/P total knee replacement, left    H/O vaginal hysterectomy    Status post cholecystectomy    S/P lumpectomy of breast  2015      SOCIAL HISTORY:  Social History:      ALLERGIES:  No Known Allergies      MEDICATIONS:  STANDING MEDICATIONS  acetaminophen     Tablet .. 1000 milliGRAM(s) Oral every 8 hours  aspirin enteric coated 81 milliGRAM(s) Oral daily  calcium carbonate   1250 mG (OsCal) 1 Tablet(s) Oral daily  celecoxib 200 milliGRAM(s) Oral two times a day  cholecalciferol 1000 Unit(s) Oral daily  cyanocobalamin 1000 MICROGram(s) Oral daily  enoxaparin Injectable 40 milliGRAM(s) SubCutaneous every 24 hours  famotidine    Tablet 20 milliGRAM(s) Oral two times a day  hydrochlorothiazide 12.5 milliGRAM(s) Oral daily  lidocaine   4% Patch 1 Patch Transdermal daily  losartan 25 milliGRAM(s) Oral daily  magnesium oxide 400 milliGRAM(s) Oral three times a day with meals  methocarbamol 500 milliGRAM(s) Oral every 8 hours  metoprolol succinate ER 25 milliGRAM(s) Oral daily  multivitamin 1 Tablet(s) Oral daily  rosuvastatin 10 milliGRAM(s) Oral at bedtime  senna 2 Tablet(s) Oral at bedtime  sertraline 50 milliGRAM(s) Oral daily    PRN MEDICATIONS  HYDROmorphone  Injectable 1 milliGRAM(s) IV Push every 4 hours PRN  melatonin 3 milliGRAM(s) Oral at bedtime PRN  polyethylene glycol 3350 17 Gram(s) Oral two times a day PRN  traMADol 25 milliGRAM(s) Oral every 6 hours PRN      VITALS:   T(F): 98  HR: 78  BP: 157/81  RR: 18  SpO2: 95%      PHYSICAL EXAM:  GENERAL: NAD, lying in bed comfortably  HEAD:  Atraumatic, Normocephalic  EYES: EOMI, PERRLA, conjunctiva and sclera clear  ENT: Moist mucous membranes  NECK: Supple, No JVD  CHEST/LUNG: Clear to auscultation bilaterally; No rales, rhonchi, wheezing, or rubs. Unlabored respirations  HEART: Regular rate and rhythm; No murmurs, rubs, or gallops  ABDOMEN: BSx4; Soft, nontender, nondistended  EXTREMITIES:  2+ Peripheral Pulses, brisk capillary refill. No clubbing, cyanosis, or edema  NERVOUS SYSTEM:  A&Ox3, no focal deficits   SKIN: No rashes or lesions      LABS:                        11.4   8.46  )-----------( 351      ( 10 Feb 2025 07:05 )             34.9     02-10    132[L]  |  94[L]  |  15  ----------------------------<  98  3.6   |  25  |  0.5[L]    Ca    9.0      10 Feb 2025 07:05  Mg     1.5     02-10    TPro  6.2  /  Alb  3.3[L]  /  TBili  0.5  /  DBili  x   /  AST  19  /  ALT  11  /  AlkPhos  135[H]  02-10      Urinalysis Basic - ( 10 Feb 2025 07:05 )    Color: x / Appearance: x / SG: x / pH: x  Gluc: 98 mg/dL / Ketone: x  / Bili: x / Urobili: x   Blood: x / Protein: x / Nitrite: x   Leuk Esterase: x / RBC: x / WBC x   Sq Epi: x / Non Sq Epi: x / Bacteria: x

## 2025-02-10 NOTE — PROGRESS NOTE ADULT - ASSESSMENT
84-year-old female PMHx of RA, Osteoporosis, OA s/p B/L ROHIT & TKA, Rt hip ORIF(2024), HTN, GERD, breast cancer(in remission since 2015,) presenting to the ED for persistent back pain and inability to ambulate.     #Recent Mechanical fall   #L1 compression fracture  #Rt knee & Back pain, inability to ambulate, Deconditioning  #Hx of Osteoporosis RA, OA s/p B/L ROHIT & TKA, Rt hip ORIF(2024)  - No weakness, paresthesia, urinary/bowel incontinence  - Seen by ortho- F/u CT Rt knee, F/u ortho and spine Sx recs  - c/w Abdominal binder  - Pain control- Tylenol 1000mg q8, Celecoxib 200mg BID, Methocarbamol 500mg q8, Lidocaine patch and Tramadol 50mg q6 prn  - c/w Calcium, vit D  - DVT ppx, bowel regimen  - OP Rheum f/u(hasn't been taking Methotrexate and Prednisone)  - PT/OT recommending rehab facility- patient in agreement- but NOT [facility]  - CT spine PENDING- see CTAP, CT R knee no fracture  - Neurosurgery consulted for L1 compression fracture  - Will work on placement    #Mild Hyponatremia- seems chronic;  - s/p 1 day NS @ 75ml/hr. hold. Loosen dietary NA restrictions.    HypoMagnesemia- replete  - Case discussed with HO  #ADINA  - Hold home HCTZ  - Monitor for now  - Encourage PO solute intake and loosen NA restriction for 48 hrs  - Cr. up today from 0.7 to 1.3, BUN 21 to 33  - 75cc/hr of normal saline for 24h for ADINA and hyponatremia this AM, today;  BMP- improved;  - continue to follow    #HTN  - c/w Losartan 25mg, Toprol 25mg  - hold HCTZ  - takes Celebrex also; +NS over 24h.    #HLD  -c/w rosuvastatin 10mg    #GERD  - c/w famotidine 20mg bID    #Anxiety/depression  - c/w sertraline 50mg    #Misc  -DVT prophylaxis: Lovenox  -GI prophylaxis: Famotidine  -Diet: Regular  -Code status: Full  -Activity: AAT- PT follow up  -Dispo: Med    To-do: ADINA and hyponatremia on NS 24hrs. Neurosurgery consult for L1 compression fracture- still pending; CT LS not yet done. Placement (to rehab)- short term Rehab @ SNF 84-year-old female PMHx of RA, Osteoporosis, OA s/p B/L ROHIT & TKA, Rt hip ORIF(2024), HTN, GERD, breast cancer(in remission since 2015,) presenting to the ED for persistent back pain and inability to ambulate.     #Recent Mechanical fall   #L1 compression fracture  #Rt knee & Back pain, inability to ambulate, Deconditioning  #Hx of Osteoporosis RA, OA s/p B/L ROHIT & TKA, Rt hip ORIF(2024)  - No weakness, paresthesia, urinary/bowel incontinence  - Seen by ortho- F/u CT Rt knee, F/u ortho and spine Sx recs  - c/w Abdominal binder  - Pain control- Tylenol 1000mg q8, Celecoxib 200mg BID, Methocarbamol 500mg q8, Lidocaine patch and Tramadol 50mg q6 prn  - c/w Calcium, vit D  - DVT ppx, bowel regimen  - OP Rheum f/u(hasn't been taking Methotrexate and Prednisone)  - PT/OT recommending rehab facility- patient in agreement- but NOT [facility]  - CT spine PENDING- see CTAP, CT R knee no fracture  - Neurosurgery consulted for L1 compression fracture- I spoke to them This AM  - their evaluation is noted-  Please follow their recommendations  - Will work on placement- did D/W CM- their actions are noted    #Mild Hyponatremia- seems chronic;  - s/p 1 day NS @ 75ml/hr. hold. Loosen dietary NA restrictions.    HypoMagnesemia- replete  - Case discussed with HO  - low despite aggressive repletion  - please ask renal to see and advise: cause and rapidity of repletion  #ADINA  - Did Hold home HCTZ, but w BPs high- will restart  - Monitor for now  - Encourage PO solute intake and loosen NA restriction for 48 hrs  - Cr. up today from 0.7 to 1.3, BUN 21 to 33  - 75cc/hr of normal saline for 24h for ADINA and hyponatremia on 2/8, today;  BMP- improved;  - continue to follow    #HTN  - c/w Losartan 25mg, Toprol 25mg  - hold HCTZ  - takes Celebrex also; +NS over 24h.    #HLD  -c/w rosuvastatin 10mg    #GERD  - c/w famotidine 20mg bID    #Anxiety/depression  - c/w sertraline 50mg    # see breast lesion on CTAP-  - correlate w prior studies and breast exam    #Misc  -DVT prophylaxis: Lovenox  -GI prophylaxis: Famotidine  -Diet: Regular  -Code status: Full  -Activity: AAT- PT follow up  -Dispo: Med    To-do: ADINA and hyponatremia on NS 24hrs. Neurosurgery consult for L1 compression fracture- prelim noted by PA; CT LS noted. Placement (to rehab)- short term Rehab @ SNF

## 2025-02-10 NOTE — CONSULT NOTE ADULT - ASSESSMENT
84-year-old female PMHx of RA, Osteoporosis, OA s/p B/L ROHIT & TKA, Rt hip ORIF(2024), HTN, GERD, breast cancer(in remission since 2015,) presenting to the ED for persistent back pain and inability to ambulate with L1 compression fx     PLAN   - No acute neurosurgical intervention   - Abdominal Binder for comfort while in hospital  - TLSO Brace for comfort upon discharge  - Cleared for OOB with Physical Therapy now  - Follow up outpatient with Dr. Billings in 2 weeks postop   - Discussed case with attending

## 2025-02-10 NOTE — CONSULT NOTE ADULT - SUBJECTIVE AND OBJECTIVE BOX
NEUROSURGERY CONSULT  GIAN MELISSA   02-10-25 @ 18:22    HPI: 84-year-old female PMHx of RA, Osteoporosis, OA s/p B/L ROHIT & TKA, Rt hip ORIF(2024), HTN, GERD, breast cancer(in remission since 2015,) presenting to the ED for persistent back pain and inability to ambulate.     Neurosurgery was consulted for a patient with unchanged acute L1 vertebral body compression fx. Patient seen and examined at bedside. States that she has been feeling weak in her LE resulting in her falling multiple times. States that her most recent fall was 2 weeks ago. States that she had worsening back pain at the time, but now pain is improved. Patient states that she has pain to R hip and knee, otherwise denies numbness, tingling, urinary or bowel incontinence.     RADIOLOGY:   < from: CT Lumbar Spine No Cont (02.09.25 @ 11:06) >  IMPRESSION:  Unchanged acute-appearing L1 vertebral body minimal compression fracture   deformity without significant osseous retropulsion.    PAST MEDICAL & SURGICAL HISTORY:  Rheumatoid arthritis  HTN (hypertension)  Breast cancer  last radiation 2015  Depression  Chronic GERD  OA (osteoarthritis)  Rheumatoid arthritis  History of right hip replacement  Status post left hip replacement  S/P total knee replacement, left  H/O vaginal hysterectomy  Status post cholecystectomy  S/P lumpectomy of breast  2015    FAMILY HISTORY:  FH: cancer  Nonhodgkins : sister    FH: lymphoma (Sibling)        SOCIAL HISTORY:  Tobacco Use:  EtOH use:   Substance:    Allergies  No Known Allergies  Intolerances    [X] A ten-point review of systems is negative except as noted   [  ] Due to altered mental status/intubation, subjective information were not able to be obtained from the patient. History was obtained, to the extent possible, from review of the chart and collateral sources of information    MEDS:  acetaminophen     Tablet .. 1000 milliGRAM(s) Oral every 8 hours  aspirin enteric coated 81 milliGRAM(s) Oral daily  calcium carbonate   1250 mG (OsCal) 1 Tablet(s) Oral daily  celecoxib 200 milliGRAM(s) Oral two times a day  chlorhexidine 2% Cloths 1 Application(s) Topical <User Schedule>  cholecalciferol 1000 Unit(s) Oral daily  cyanocobalamin 1000 MICROGram(s) Oral daily  enoxaparin Injectable 40 milliGRAM(s) SubCutaneous every 24 hours  famotidine    Tablet 20 milliGRAM(s) Oral two times a day  hydrochlorothiazide 12.5 milliGRAM(s) Oral daily  HYDROmorphone  Injectable 1 milliGRAM(s) IV Push every 4 hours PRN  lidocaine   4% Patch 1 Patch Transdermal daily  losartan 25 milliGRAM(s) Oral daily  magnesium oxide 400 milliGRAM(s) Oral three times a day with meals  melatonin 3 milliGRAM(s) Oral at bedtime PRN  methocarbamol 500 milliGRAM(s) Oral every 8 hours  metoprolol succinate ER 25 milliGRAM(s) Oral daily  multivitamin 1 Tablet(s) Oral daily  polyethylene glycol 3350 17 Gram(s) Oral two times a day PRN  rosuvastatin 10 milliGRAM(s) Oral at bedtime  senna 2 Tablet(s) Oral at bedtime  sertraline 50 milliGRAM(s) Oral daily  traMADol 25 milliGRAM(s) Oral every 6 hours PRN      PHYSICAL EXAM:  Awake, alert, following commands,   B/L UE 5/5   B/L LE 5/5   SILT   No clonus     Vital Signs Last 24 Hrs  T(C): 36.7 (10 Feb 2025 12:30), Max: 36.7 (10 Feb 2025 12:30)  T(F): 98 (10 Feb 2025 12:30), Max: 98 (10 Feb 2025 12:30)  HR: 78 (10 Feb 2025 12:30) (78 - 94)  BP: 157/81 (10 Feb 2025 12:30) (152/82 - 168/75)  BP(mean): 105 (10 Feb 2025 04:46) (105 - 105)  RR: 18 (10 Feb 2025 12:30) (18 - 18)  SpO2: 95% (10 Feb 2025 12:30) (94% - 98%)    Parameters below as of 10 Feb 2025 07:58  Patient On (Oxygen Delivery Method): room air    LABS:                        11.4   8.46  )-----------( 351      ( 10 Feb 2025 07:05 )             34.9     02-10    132[L]  |  94[L]  |  15  ----------------------------<  98  3.6   |  25  |  0.5[L]    Ca    9.0      10 Feb 2025 07:05  Mg     1.5     02-10    TPro  6.2  /  Alb  3.3[L]  /  TBili  0.5  /  DBili  x   /  AST  19  /  ALT  11  /  AlkPhos  135[H]  02-10

## 2025-02-10 NOTE — PROGRESS NOTE ADULT - SUBJECTIVE AND OBJECTIVE BOX
Chart reviewed, patient examined. Pertinent results reviewed.  Case discussed with HO; specialist f/u reviewed  HD#5; Patient well known to me-     Patient is a 84y old Female who presents with a chief complaint of a fall and slight confusion.  Primary diagnosis of LS- R Buttocks Back and R Knee pain after her fall.   She has been home a few weeks after a prior fall, Fx R Femur, ORIF(12/2/24-here), then IP and OP Rehab.  She fell when she got up while alone.      This morning patient was seen and examined at bedside, resting comfortably in bed.  Received NS 75ml/hr x 1 day.  No acute or major events overnight. more alert but slow to give the story of her fall.   RN reports that she was groggy after 50mg dose of Tramadol on 2/8, dose lowered to 25mg.    Code Status: Full      PAST MEDICAL & SURGICAL HISTORY  Rheumatoid arthritis    HTN (hypertension)  w MOD LVH- HTN HD    Breast cancer  last radiation 2015    Depression    Chronic GERD    OA (osteoarthritis)    Rheumatoid arthritis    History of right hip replacement    Status post left hip replacement    S/P total knee replacement, left  R Femur Fx & ORIF 12/2/24    H/O vaginal hysterectomy    Status post cholecystectomy    S/P lumpectomy of breast  2015      SOCIAL HISTORY:  Social History: Lives alone; + ;   Has a friend who helps her some hours M-F;  Daughters come by on weekends.   smoked in the past      ALLERGIES:  No Known Allergies      MEDICATIONS:  MEDICATIONS  (STANDING):  acetaminophen     Tablet .. 1000 milliGRAM(s) Oral every 8 hours  aspirin enteric coated 81 milliGRAM(s) Oral daily  calcium carbonate   1250 mG (OsCal) 1 Tablet(s) Oral daily  celecoxib 200 milliGRAM(s) Oral two times a day  cholecalciferol 1000 Unit(s) Oral daily  cyanocobalamin 1000 MICROGram(s) Oral daily  enoxaparin Injectable 40 milliGRAM(s) SubCutaneous every 24 hours  famotidine    Tablet 20 milliGRAM(s) Oral two times a day  lidocaine   4% Patch 1 Patch Transdermal daily  losartan 25 milliGRAM(s) Oral daily  methocarbamol 500 milliGRAM(s) Oral every 8 hours  metoprolol succinate ER 25 milliGRAM(s) Oral daily  multivitamin 1 Tablet(s) Oral daily  rosuvastatin 10 milliGRAM(s) Oral at bedtime  senna 2 Tablet(s) Oral at bedtime  sertraline 50 milliGRAM(s) Oral daily    MEDICATIONS  (PRN):  HYDROmorphone  Injectable 1 milliGRAM(s) IV Push every 4 hours PRN Severe Pain (7 - 10)  melatonin 3 milliGRAM(s) Oral at bedtime PRN Insomnia  polyethylene glycol 3350 17 Gram(s) Oral two times a day PRN Constipation  traMADol 25 milliGRAM(s) Oral every 6 hours PRN Severe Pain (7 - 10)      VITALS:   Vital Signs Last 24 Hrs  T(C): 36.3 (10 Feb 2025 04:46), Max: 36.6 (09 Feb 2025 13:29)  T(F): 97.3 (10 Feb 2025 04:46), Max: 97.9 (09 Feb 2025 19:42)  HR: 84 (10 Feb 2025 07:58) (58 - 94)  BP: 167/95 (10 Feb 2025 07:58) (143/55 - 176/94)  BP(mean): 105 (10 Feb 2025 04:46) (105 - 105)  RR: 18 (10 Feb 2025 04:46) (18 - 18)  SpO2: 98% (10 Feb 2025 07:58) (94% - 98%)    Parameters below as of 10 Feb 2025 07:58  Patient On (Oxygen Delivery Method): room air      PHYSICAL EXAM:  GENERAL: NAD, lying in bed comfortably; sleeping at 1st-easily awakened. recognizes me and converses easily. year-  2024; Pres-OK;   HEAD:  Atraumatic, Normocephalic  EYES: EOMI, PERRLA, conjunctiva and sclera clear  CHEST/LUNG: Clear  bilaterally; No rales, rhonchi, wheezing, or rubs. Unlabored respirations  HEART: RRR; No murmurs, rubs, or gallops  ABDOMEN: BSx4; Soft, nontender, nondistended  EXTREMITIES: Reduced ROM of RLE- lifting better; mild pain on movement. Multiple scars over R Knee area and R hip.      TTP over LSSp also- no deformities.No clubbing, cyanosis, or edema  NERVOUS SYSTEM:  A&Ox3, no focal deficits   SKIN: No rashes or lesions      LABS:                            9.9    6.74  )-----------( 306      ( 09 Feb 2025 07:20 )             31.1                     10.7   7.39  )-----------( 291      ( 08 Feb 2025 06:15 )             33.9     02-09    134[L]  |  99  |  23[H]  ----------------------------<  90  3.5   |  23  |  0.7    Ca    8.7      09 Feb 2025 07:20  Mg     1.4     02-09    TPro  5.6[L]  /  Alb  2.9[L]  /  TBili  0.4  /  DBili  x   /  AST  17  /  ALT  11  /  AlkPhos  124[H]  02-09    02-08    131[L]  |  94[L]  |  37[H]  ----------------------------<  86  4.0   |  23  |  1.2    Ca    9.5      08 Feb 2025 06:15  Mg     2.3     02-08    TPro  6.3  /  Alb  3.2[L]  /  TBili  0.4  /  DBili  x   /  AST  19  /  ALT  11  /  AlkPhos  123[H]  02-08      Urinalysis Basic - ( 08 Feb 2025 06:15 )    Color: x / Appearance: x / SG: x / pH: x  Gluc: 86 mg/dL / Ketone: x  / Bili: x / Urobili: x   Blood: x / Protein: x / Nitrite: x   Leuk Esterase: x / RBC: x / WBC x   Sq Epi: x / Non Sq Epi: x / Bacteria: x    < from: CT Knee No Cont, Right (02.06.25 @ 20:37) >  ACC: [id number] EXAM:  CT KNEE ONLY RT   ORDERED BY: SUSANNE SALGADO     PROCEDURE DATE:  02/06/2025          INTERPRETATION:  CT OF THE RIGHT KNEE WITHOUT CONTRAST    CLINICAL HISTORY: Right knee pain.    TECHNIQUE: Images were obtained of the right knee without contrast.   Coronal and sagittal reformatted images were also provided.    COMPARISON: CT femur dated 12/1/2024.    FINDINGS:    BONES/JOINTS: Total right knee arthroplasty status post ORIF of   periprosthetic fracture of the distal femur. Interval improvement of bony   fragment alignment with evidence of callus formation. No CT evidence of   hardware complications. Moderate knee joint effusion. No new acute   fracture.    SOFT TISSUES: Vascular calcifications. No encapsulated hematoma.    IMPRESSION:  Patient is status post ORIF involving distal femoral periprosthetic   fracture of the right knee, in improved and near anatomic alignment,   demonstrating evidence of callus formation. No evidence of hardware   complications.    Moderate knee joint effusion.    --- End of Report ---          TEZ SEGUNDO MD; Resident Radiologist  This document has been electronically signed.  NELA MONTANO MD; Attending Radiologist  This document has been electronically signed. Feb 7 2025 11:28AM    < end of copied text >  < from: CT Abdomen and Pelvis w/ IV Cont (02.03.25 @ 16:48) >  ACC: [id number] EXAM:  CT ABDOMEN AND PELVIS IC   ORDERED BY: MARLON EGAN     ACC: [id number] EXAM:  CT CHEST IC   ORDERED BY: MARLON EGAN     PROCEDURE DATE:  02/03/2025          INTERPRETATION:  CLINICAL HISTORY/REASON FOR EXAM: Trauma to chest,   abdomen and pelvis.. Left flank pain. Back pain. Vomiting. Breast cancer.    TECHNIQUE: Contiguous axial CT images were obtained from the thoracic   inlet to the pubic symphysis following administration of 100 cc Omnipaque   350 intravenous contrast.  Oral contrast was not administered.   Reformatted images in the coronal and sagittal planes were acquired. 3D   (MIP) images obtained.    COMPARISON: CT chest, abdomen and pelvis 11/23/2024.      FINDINGS:    CHEST:    LUNGS, PLEURA, AIRWAYS: No lobar consolidation, mass, effusion, or   pneumothorax. No evidence of central endobronchial obstruction. No   bronchiectasis or honeycombing. No suspicious pulmonary nodule. Biapical   scarring. Bilateral subsegmental atelectasis.    THORACIC NODES: No mediastinal, hilar, supraclavicular, or axillary   lymphadenopathy.    MEDIASTINUM/GREAT VESSELS: No pericardial effusion. Heart size is within   normal limits. The aorta and main pulmonary artery are of normal caliber.    ABDOMEN/PELVIS:    HEPATOBILIARY: Postcholecystectomy. No evidence of liver injury. No   biliary dilation.    SPLEEN: Unremarkable.    PANCREAS: Unremarkable.    ADRENAL GLANDS: Unremarkable.    KIDNEYS: Symmetric pattern of renal enhancement. No hydronephrosis   bilaterally. Bilateral renal cysts and subcentimeter hypodensities, too   small to characterize    ABDOMINOPELVIC NODES: No lymphadenopathy.    PELVIC ORGANS: Obscured by metallic streak artifact.    PERITONEUM/MESENTERY/BOWEL: No bowel obstruction. No ascites or   pneumoperitoneum. Small hiatal hernia.    BONES/SOFT TISSUES: Bilateral hip total arthroplasties. Degenerative   change of spine shoulders. Osteopenia. Chronic bilateral rib fractures.   New age-indeterminate minimal L1 vertebral body compressionfracture   without significant osseous retropulsion; possibly acute. Osteopenia.   Right breast 1.8 cm nodule, similar prior CT; correlation with breast   cancer history suggested. Scoliosis.    VASCULAR : Vascular calcifications      IMPRESSION:  1.New age-indeterminate minimal L1 vertebral body compression fracture   without significant osseous retropulsion; possibly acute.  2. Right breast 1.8 cm nodule, similar prior CT; correlation with breast   cancer history suggested.    --- End of Report ---      DARRYL ISLAS MD; Attending Radiologist  This document has been electronically signed. Feb  3 2025  8:20PM    < end of copied text >       Chart reviewed, patient examined. Pertinent results reviewed.  Case discussed with HO; specialist f/u reviewed  HD#5; Patient well known to me-    Patient seen and examined at bedside earlier this morning, note delayed due to clinical duties.      Patient is a 84y old Female who presents with a chief complaint of a fall and slight confusion.  Primary diagnosis of LS- R Buttocks Back and R Knee pain after her fall.   She has been home a few weeks after a prior fall, Fx R Femur, ORIF(12/2/24-here), then IP and OP Rehab.  She fell when she got up while alone.      This morning patient was seen and examined at bedside, resting comfortably in bed.  Received NS 75ml/hr x 1 day.  No acute or major events overnight. more alert but slow to give the story of her fall.   RN reports that she was groggy after 50mg dose of Tramadol on 2/8, dose lowered to 25mg.  Still c/o pain in LS area to R Thigh and knee area. INCreases w WT bearing.    Code Status: Full      PAST MEDICAL & SURGICAL HISTORY  Rheumatoid arthritis    HTN (hypertension)  w MOD LVH- HTN HD    Breast cancer  last radiation 2015    Depression    Chronic GERD    OA (osteoarthritis)    Rheumatoid arthritis    History of right hip replacement    Status post left hip replacement    S/P total knee replacement, left  R Femur Fx & ORIF 12/2/24    H/O vaginal hysterectomy    Status post cholecystectomy    S/P lumpectomy of breast  2015      SOCIAL HISTORY:  Social History: Lives alone; + ;   Has a friend who helps her some hours M-F;  Daughters come by on weekends.   smoked in the past      ALLERGIES:  No Known Allergies      MEDICATIONS:  MEDICATIONS  (STANDING):  acetaminophen     Tablet .. 1000 milliGRAM(s) Oral every 8 hours  aspirin enteric coated 81 milliGRAM(s) Oral daily  calcium carbonate   1250 mG (OsCal) 1 Tablet(s) Oral daily  celecoxib 200 milliGRAM(s) Oral two times a day  cholecalciferol 1000 Unit(s) Oral daily  cyanocobalamin 1000 MICROGram(s) Oral daily  enoxaparin Injectable 40 milliGRAM(s) SubCutaneous every 24 hours  famotidine    Tablet 20 milliGRAM(s) Oral two times a day  lidocaine   4% Patch 1 Patch Transdermal daily  losartan 25 milliGRAM(s) Oral daily  methocarbamol 500 milliGRAM(s) Oral every 8 hours  metoprolol succinate ER 25 milliGRAM(s) Oral daily  multivitamin 1 Tablet(s) Oral daily  rosuvastatin 10 milliGRAM(s) Oral at bedtime  senna 2 Tablet(s) Oral at bedtime  sertraline 50 milliGRAM(s) Oral daily    MEDICATIONS  (PRN):  HYDROmorphone  Injectable 1 milliGRAM(s) IV Push every 4 hours PRN Severe Pain (7 - 10)  melatonin 3 milliGRAM(s) Oral at bedtime PRN Insomnia  polyethylene glycol 3350 17 Gram(s) Oral two times a day PRN Constipation  traMADol 25 milliGRAM(s) Oral every 6 hours PRN Severe Pain (7 - 10)      VITALS:   Vital Signs Last 24 Hrs  T(C): 36.3 (10 Feb 2025 04:46), Max: 36.6 (09 Feb 2025 13:29)  T(F): 97.3 (10 Feb 2025 04:46), Max: 97.9 (09 Feb 2025 19:42)  HR: 84 (10 Feb 2025 07:58) (58 - 94)  BP: 167/95 (10 Feb 2025 07:58) (143/55 - 176/94)  BP(mean): 105 (10 Feb 2025 04:46) (105 - 105)  RR: 18 (10 Feb 2025 04:46) (18 - 18)  SpO2: 98% (10 Feb 2025 07:58) (94% - 98%)    Parameters below as of 10 Feb 2025 07:58  Patient On (Oxygen Delivery Method): room air      PHYSICAL EXAM:  GENERAL: NAD, lying in bed comfortably; sleeping at 1st-easily awakened. recognizes me and converses easily. year-  2024;    Pres-OK;  + watched the 'Super bowl last night'  HEAD:  Atraumatic, Normocephalic  EYES: EOMI, PERRLA, conjunctiva and sclera clear  CHEST/LUNG: Clear  bilaterally; No rales, rhonchi, wheezing, or rubs. Unlabored respirations  HEART: RRR; No murmurs, rubs, or gallops  ABDOMEN: BSx4; Soft, nontender, nondistended  EXTREMITIES: Reduced ROM of RLE- lifting better; mild pain on movement. Multiple scars over R Knee area and R hip.      TTP over LSSp also- no deformities.No clubbing, cyanosis, or edema  NERVOUS SYSTEM:  A&Ox3, no focal deficits   SKIN: No rashes or lesions      LABS:                               11.4   8.46  )-----------( 351      ( 10 Feb 2025 07:05 )             34.9                      9.9    6.74  )-----------( 306      ( 09 Feb 2025 07:20 )             31.1                     10.7   7.39  )-----------( 291      ( 08 Feb 2025 06:15 )             33.9     02-10    132[L]  |  94[L]  |  15  ----------------------------<  98  3.6   |  25  |  0.5[L]    Ca    9.0      10 Feb 2025 07:05  Mg     1.5     02-10    TPro  6.2  /  Alb  3.3[L]  /  TBili  0.5  /  DBili  x   /  AST  19  /  ALT  11  /  AlkPhos  135[H]  02-10    02-09    134[L]  |  99  |  23[H]  ----------------------------<  90  3.5   |  23  |  0.7    Ca    8.7      09 Feb 2025 07:20  Mg     1.4     02-09    TPro  5.6[L]  /  Alb  2.9[L]  /  TBili  0.4  /  DBili  x   /  AST  17  /  ALT  11  /  AlkPhos  124[H]  02-09    02-08    131[L]  |  94[L]  |  37[H]  ----------------------------<  86  4.0   |  23  |  1.2    Ca    9.5      08 Feb 2025 06:15  Mg     2.3     02-08    TPro  6.3  /  Alb  3.2[L]  /  TBili  0.4  /  DBili  x   /  AST  19  /  ALT  11  /  AlkPhos  123[H]  02-08      Urinalysis Basic - ( 08 Feb 2025 06:15 )    Color: x / Appearance: x / SG: x / pH: x  Gluc: 86 mg/dL / Ketone: x  / Bili: x / Urobili: x   Blood: x / Protein: x / Nitrite: x   Leuk Esterase: x / RBC: x / WBC x   Sq Epi: x / Non Sq Epi: x / Bacteria: x    < from: CT Knee No Cont, Right (02.06.25 @ 20:37) >  ACC: [id number] EXAM:  CT KNEE ONLY RT   ORDERED BY: SUSANNE SALGADO     PROCEDURE DATE:  02/06/2025          INTERPRETATION:  CT OF THE RIGHT KNEE WITHOUT CONTRAST    CLINICAL HISTORY: Right knee pain.    TECHNIQUE: Images were obtained of the right knee without contrast.   Coronal and sagittal reformatted images were also provided.    COMPARISON: CT femur dated 12/1/2024.    FINDINGS:    BONES/JOINTS: Total right knee arthroplasty status post ORIF of   periprosthetic fracture of the distal femur. Interval improvement of bony   fragment alignment with evidence of callus formation. No CT evidence of   hardware complications. Moderate knee joint effusion. No new acute   fracture.    SOFT TISSUES: Vascular calcifications. No encapsulated hematoma.    IMPRESSION:  Patient is status post ORIF involving distal femoral periprosthetic   fracture of the right knee, in improved and near anatomic alignment,   demonstrating evidence of callus formation. No evidence of hardware   complications.    Moderate knee joint effusion.    --- End of Report ---          TEZ SEGUNDO MD; Resident Radiologist  This document has been electronically signed.  NELA MONTANO MD; Attending Radiologist  This document has been electronically signed. Feb 7 2025 11:28AM    < end of copied text >  < from: CT Abdomen and Pelvis w/ IV Cont (02.03.25 @ 16:48) >  ACC: [id number] EXAM:  CT ABDOMEN AND PELVIS IC   ORDERED BY: MARLON EGAN     ACC: [id number] EXAM:  CT CHEST IC   ORDERED BY: MARLON EGAN     PROCEDURE DATE:  02/03/2025          INTERPRETATION:  CLINICAL HISTORY/REASON FOR EXAM: Trauma to chest,   abdomen and pelvis.. Left flank pain. Back pain. Vomiting. Breast cancer.    TECHNIQUE: Contiguous axial CT images were obtained from the thoracic   inlet to the pubic symphysis following administration of 100 cc Omnipaque   350 intravenous contrast.  Oral contrast was not administered.   Reformatted images in the coronal and sagittal planes were acquired. 3D   (MIP) images obtained.    COMPARISON: CT chest, abdomen and pelvis 11/23/2024.      FINDINGS:    CHEST:    LUNGS, PLEURA, AIRWAYS: No lobar consolidation, mass, effusion, or   pneumothorax. No evidence of central endobronchial obstruction. No   bronchiectasis or honeycombing. No suspicious pulmonary nodule. Biapical   scarring. Bilateral subsegmental atelectasis.    THORACIC NODES: No mediastinal, hilar, supraclavicular, or axillary   lymphadenopathy.    MEDIASTINUM/GREAT VESSELS: No pericardial effusion. Heart size is within   normal limits. The aorta and main pulmonary artery are of normal caliber.    ABDOMEN/PELVIS:    HEPATOBILIARY: Postcholecystectomy. No evidence of liver injury. No   biliary dilation.    SPLEEN: Unremarkable.    PANCREAS: Unremarkable.    ADRENAL GLANDS: Unremarkable.    KIDNEYS: Symmetric pattern of renal enhancement. No hydronephrosis   bilaterally. Bilateral renal cysts and subcentimeter hypodensities, too   small to characterize    ABDOMINOPELVIC NODES: No lymphadenopathy.    PELVIC ORGANS: Obscured by metallic streak artifact.    PERITONEUM/MESENTERY/BOWEL: No bowel obstruction. No ascites or   pneumoperitoneum. Small hiatal hernia.    BONES/SOFT TISSUES: Bilateral hip total arthroplasties. Degenerative   change of spine shoulders. Osteopenia. Chronic bilateral rib fractures.   New age-indeterminate minimal L1 vertebral body compressionfracture   without significant osseous retropulsion; possibly acute. Osteopenia.   Right breast 1.8 cm nodule, similar prior CT; correlation with breast   cancer history suggested. Scoliosis.    VASCULAR : Vascular calcifications      IMPRESSION:  1.New age-indeterminate minimal L1 vertebral body compression fracture   without significant osseous retropulsion; possibly acute.  2. Right breast 1.8 cm nodule, similar prior CT; correlation with breast   cancer history suggested.    --- End of Report ---      DARRYL ISLAS MD; Attending Radiologist  This document has been electronically signed. Feb  3 2025  8:20PM    < end of copied text >

## 2025-02-10 NOTE — PROGRESS NOTE ADULT - ASSESSMENT
84-year-old female PMHx of RA, Osteoporosis, OA s/p B/L ROHIT & TKA, Rt hip ORIF(2024), HTN, GERD, breast cancer(in remission since 2015,) presenting to the ED for persistent back pain and inability to ambulate.     #Recent Mechanical fall   #L1 compression fracture  #Rt knee & Back pain, inability to ambulate, Deconditioning  #Hx of Osteoporosis RA, OA s/p B/L ROHIT & TKA, Rt hip ORIF(2024)  - No weakness, paresthesia, urinary/bowel incontinence  - Seen by ortho- F/u CT Rt knee, F/u ortho and spine Sx recs  - c/w Abdominal binder  - Pain control- Tylenol 1000mg q8, Celecoxib 200mg BID, Methocarbamol 500mg q8, Lidocaine patch and Tramadol 50mg q6 prn  - c/w Calcium, vit D  - DVT ppx, bowel regimen  - OP Rheum f/u(hasn't been taking Methotrexate and Prednisone)  - PT/OT recommending rehab facility- patient in agreement- but NOT [facility]  - CT spine PENDING- see CTAP, CT R knee no fracture  - Neurosurgery consulted for L1 compression fracture  - Will work on placement    #Mild Hyponatremia- seems chronic;  - s/p 1 day NS @ 75ml/hr. hold. Loosen dietary NA restrictions.    #HypoMagnesemia- replete  - Now on magox PO TID     #ADINA  - Resume home HCTZ 12.5qD  - Encourage PO solute intake and loosen NA restriction for 48 hrs  - ADINA improving    #HTN  - c/w Losartan 25mg, Toprol 25mg  - Resume home HCTZ 12.5  - takes Celebrex also; +NS over 24h.    #HLD  -c/w rosuvastatin 10mg    #GERD  - c/w famotidine 20mg bID    #Anxiety/depression  - c/w sertraline 50mg    #Misc  -DVT prophylaxis: Lovenox  -GI prophylaxis: Famotidine  -Diet: Regular  -Code status: Full  -Activity: AAT- PT follow up  -Dispo: Med    To-do: Neurosurgery consult for L1 compression fracture- still pending. Placement (to rehab)- short term Rehab @ SNF

## 2025-02-11 LAB
ALBUMIN SERPL ELPH-MCNC: 3.2 G/DL — LOW (ref 3.5–5.2)
ALP SERPL-CCNC: 144 U/L — HIGH (ref 30–115)
ALT FLD-CCNC: 11 U/L — SIGNIFICANT CHANGE UP (ref 0–41)
ANION GAP SERPL CALC-SCNC: 16 MMOL/L — HIGH (ref 7–14)
AST SERPL-CCNC: 18 U/L — SIGNIFICANT CHANGE UP (ref 0–41)
BASOPHILS # BLD AUTO: 0.02 K/UL — SIGNIFICANT CHANGE UP (ref 0–0.2)
BASOPHILS NFR BLD AUTO: 0.3 % — SIGNIFICANT CHANGE UP (ref 0–1)
BILIRUB SERPL-MCNC: 0.4 MG/DL — SIGNIFICANT CHANGE UP (ref 0.2–1.2)
BUN SERPL-MCNC: 19 MG/DL — SIGNIFICANT CHANGE UP (ref 10–20)
CALCIUM SERPL-MCNC: 9.2 MG/DL — SIGNIFICANT CHANGE UP (ref 8.4–10.5)
CHLORIDE SERPL-SCNC: 92 MMOL/L — LOW (ref 98–110)
CO2 SERPL-SCNC: 23 MMOL/L — SIGNIFICANT CHANGE UP (ref 17–32)
CREAT SERPL-MCNC: 0.6 MG/DL — LOW (ref 0.7–1.5)
EGFR: 88 ML/MIN/1.73M2 — SIGNIFICANT CHANGE UP
EOSINOPHIL # BLD AUTO: 0.15 K/UL — SIGNIFICANT CHANGE UP (ref 0–0.7)
EOSINOPHIL NFR BLD AUTO: 1.9 % — SIGNIFICANT CHANGE UP (ref 0–8)
GLUCOSE SERPL-MCNC: 101 MG/DL — HIGH (ref 70–99)
HCT VFR BLD CALC: 35.5 % — LOW (ref 37–47)
HGB BLD-MCNC: 11.4 G/DL — LOW (ref 12–16)
IMM GRANULOCYTES NFR BLD AUTO: 0.5 % — HIGH (ref 0.1–0.3)
LYMPHOCYTES # BLD AUTO: 1.39 K/UL — SIGNIFICANT CHANGE UP (ref 1.2–3.4)
LYMPHOCYTES # BLD AUTO: 17.9 % — LOW (ref 20.5–51.1)
MAGNESIUM SERPL-MCNC: 1.9 MG/DL — SIGNIFICANT CHANGE UP (ref 1.8–2.4)
MCHC RBC-ENTMCNC: 27.3 PG — SIGNIFICANT CHANGE UP (ref 27–31)
MCHC RBC-ENTMCNC: 32.1 G/DL — SIGNIFICANT CHANGE UP (ref 32–37)
MCV RBC AUTO: 85.1 FL — SIGNIFICANT CHANGE UP (ref 81–99)
MONOCYTES # BLD AUTO: 0.81 K/UL — HIGH (ref 0.1–0.6)
MONOCYTES NFR BLD AUTO: 10.5 % — HIGH (ref 1.7–9.3)
NEUTROPHILS # BLD AUTO: 5.34 K/UL — SIGNIFICANT CHANGE UP (ref 1.4–6.5)
NEUTROPHILS NFR BLD AUTO: 68.9 % — SIGNIFICANT CHANGE UP (ref 42.2–75.2)
NRBC BLD AUTO-RTO: 0 /100 WBCS — SIGNIFICANT CHANGE UP (ref 0–0)
PLATELET # BLD AUTO: 348 K/UL — SIGNIFICANT CHANGE UP (ref 130–400)
PMV BLD: 9.2 FL — SIGNIFICANT CHANGE UP (ref 7.4–10.4)
POTASSIUM SERPL-MCNC: 3.8 MMOL/L — SIGNIFICANT CHANGE UP (ref 3.5–5)
POTASSIUM SERPL-SCNC: 3.8 MMOL/L — SIGNIFICANT CHANGE UP (ref 3.5–5)
PROT SERPL-MCNC: 6.2 G/DL — SIGNIFICANT CHANGE UP (ref 6–8)
RBC # BLD: 4.17 M/UL — LOW (ref 4.2–5.4)
RBC # FLD: 15.5 % — HIGH (ref 11.5–14.5)
SODIUM SERPL-SCNC: 131 MMOL/L — LOW (ref 135–146)
WBC # BLD: 7.75 K/UL — SIGNIFICANT CHANGE UP (ref 4.8–10.8)
WBC # FLD AUTO: 7.75 K/UL — SIGNIFICANT CHANGE UP (ref 4.8–10.8)

## 2025-02-11 PROCEDURE — 73721 MRI JNT OF LWR EXTRE W/O DYE: CPT | Mod: 26,RT

## 2025-02-11 RX ORDER — SENNOSIDES 8.6 MG
2 TABLET ORAL
Qty: 0 | Refills: 0 | DISCHARGE
Start: 2025-02-11

## 2025-02-11 RX ORDER — IBUPROFEN 200 MG
1 CAPSULE ORAL
Qty: 0 | Refills: 0 | DISCHARGE
Start: 2025-02-11

## 2025-02-11 RX ORDER — ACETAMINOPHEN 160 MG/5ML
2 SUSPENSION ORAL
Qty: 0 | Refills: 0 | DISCHARGE
Start: 2025-02-11

## 2025-02-11 RX ORDER — POTASSIUM CHLORIDE 750 MG/1
10 TABLET, EXTENDED RELEASE ORAL
Refills: 0 | DISCHARGE

## 2025-02-11 RX ORDER — ACETAMINOPHEN, DIPHENHYDRAMINE HCL, PHENYLEPHRINE HCL 325; 25; 5 MG/1; MG/1; MG/1
1 TABLET ORAL
Qty: 0 | Refills: 0 | DISCHARGE
Start: 2025-02-11

## 2025-02-11 RX ORDER — LIDOCAINE HYDROCHLORIDE 30 MG/G
1 CREAM TOPICAL
Qty: 0 | Refills: 0 | DISCHARGE
Start: 2025-02-11

## 2025-02-11 RX ORDER — MAGNESIUM OXIDE 400 MG
1 TABLET ORAL
Qty: 0 | Refills: 0 | DISCHARGE
Start: 2025-02-11

## 2025-02-11 RX ORDER — MAGNESIUM OXIDE 400 MG
200 TABLET ORAL
Refills: 0 | DISCHARGE

## 2025-02-11 RX ORDER — TRAMADOL HYDROCHLORIDE 100 MG/1
0.5 TABLET, EXTENDED RELEASE ORAL
Qty: 0 | Refills: 0 | DISCHARGE
Start: 2025-02-11

## 2025-02-11 RX ADMIN — TRAMADOL HYDROCHLORIDE 25 MILLIGRAM(S): 100 TABLET, EXTENDED RELEASE ORAL at 02:43

## 2025-02-11 RX ADMIN — Medication 500 MILLIGRAM(S): at 05:38

## 2025-02-11 RX ADMIN — Medication 1 TABLET(S): at 12:07

## 2025-02-11 RX ADMIN — Medication 200 MILLIGRAM(S): at 05:38

## 2025-02-11 RX ADMIN — ACETAMINOPHEN 1000 MILLIGRAM(S): 160 SUSPENSION ORAL at 05:38

## 2025-02-11 RX ADMIN — Medication 400 MILLIGRAM(S): at 17:12

## 2025-02-11 RX ADMIN — Medication 25 MILLIGRAM(S): at 05:39

## 2025-02-11 RX ADMIN — Medication 400 MILLIGRAM(S): at 12:06

## 2025-02-11 RX ADMIN — Medication 200 MILLIGRAM(S): at 17:12

## 2025-02-11 RX ADMIN — ROSUVASTATIN CALCIUM 10 MILLIGRAM(S): 10 TABLET, FILM COATED ORAL at 21:28

## 2025-02-11 RX ADMIN — Medication 400 MILLIGRAM(S): at 08:27

## 2025-02-11 RX ADMIN — Medication 1000 UNIT(S): at 12:06

## 2025-02-11 RX ADMIN — ANTISEPTIC SURGICAL SCRUB 1 APPLICATION(S): 0.04 SOLUTION TOPICAL at 05:40

## 2025-02-11 RX ADMIN — ENOXAPARIN SODIUM 40 MILLIGRAM(S): 100 INJECTION SUBCUTANEOUS at 05:43

## 2025-02-11 RX ADMIN — Medication 50 MILLIGRAM(S): at 12:06

## 2025-02-11 RX ADMIN — ACETAMINOPHEN 1000 MILLIGRAM(S): 160 SUSPENSION ORAL at 21:28

## 2025-02-11 RX ADMIN — Medication 1000 MICROGRAM(S): at 12:06

## 2025-02-11 RX ADMIN — ASPIRIN 81 MILLIGRAM(S): 81 TABLET, COATED ORAL at 12:06

## 2025-02-11 RX ADMIN — ACETAMINOPHEN 1000 MILLIGRAM(S): 160 SUSPENSION ORAL at 13:15

## 2025-02-11 RX ADMIN — TRAMADOL HYDROCHLORIDE 25 MILLIGRAM(S): 100 TABLET, EXTENDED RELEASE ORAL at 02:13

## 2025-02-11 RX ADMIN — Medication 500 MILLIGRAM(S): at 13:16

## 2025-02-11 RX ADMIN — Medication 2 TABLET(S): at 21:28

## 2025-02-11 RX ADMIN — Medication 200 MILLIGRAM(S): at 06:08

## 2025-02-11 RX ADMIN — FAMOTIDINE 20 MILLIGRAM(S): 10 INJECTION INTRAVENOUS at 05:39

## 2025-02-11 RX ADMIN — ACETAMINOPHEN 1000 MILLIGRAM(S): 160 SUSPENSION ORAL at 06:08

## 2025-02-11 RX ADMIN — Medication 500 MILLIGRAM(S): at 21:28

## 2025-02-11 RX ADMIN — ACETAMINOPHEN, DIPHENHYDRAMINE HCL, PHENYLEPHRINE HCL 3 MILLIGRAM(S): 325; 25; 5 TABLET ORAL at 21:27

## 2025-02-11 RX ADMIN — FAMOTIDINE 20 MILLIGRAM(S): 10 INJECTION INTRAVENOUS at 17:12

## 2025-02-11 RX ADMIN — LIDOCAINE HYDROCHLORIDE 1 PATCH: 30 CREAM TOPICAL at 12:06

## 2025-02-11 RX ADMIN — Medication 1 TABLET(S): at 12:06

## 2025-02-11 RX ADMIN — Medication 0.5 MILLIGRAM(S): at 21:53

## 2025-02-11 RX ADMIN — Medication 25 MILLIGRAM(S): at 05:38

## 2025-02-11 NOTE — PROGRESS NOTE ADULT - ASSESSMENT
84-year-old female PMHx of RA, Osteoporosis, OA s/p B/L ROHIT & TKA, Rt hip ORIF(2024), HTN, GERD, breast cancer(in remission since 2015,) presenting to the ED for persistent back pain and inability to ambulate.     Recent Mechanical fall   L1 compression fracture  Rt knee & Back pain, inability to ambulate, Deconditioning  Hx of Osteoporosis, RA, OA s/p B/L ROHIT & TKA, Rt hip fracture months ago post ORIF(2024)  - No weakness, paresthesia, urinary/bowel incontinence  - Seen by ortho and neurosurg  - continue Abdominal binder in hospital, TLSO post discharge  - Pain control- Tylenol 1000mg q8, Celecoxib 200mg BID, Methocarbamol 500mg q8, Lidocaine patch and Tramadol 50mg q6 prn  - supplement Calcium, vit D  - DVT ppx, and bowel regimen  - OP Rheum f/u(hasn't been taking Methotrexate and Prednisone)  - PT/OT recommending rehab facility- patient in agreement- but NOT [facility]    Chronic Mild Hyponatremia  - s/p 1 day NS @ 75ml/hr  - dietary NA restrictions held    HypoMagnesemia  - replete    ADINA on adm  - Did Hold home HCTZ, but w BPs high- will restart  - Monitor for now  - Encourage PO solute intake and loosen NA restriction for 48 hrs  - Cr. up today from 0.7 to 1.3, BUN 21 to 33  - 75cc/hr of normal saline for 24h for ADINA and hyponatremia on 2/8, today;  BMP- improved;  - continue to follow    HTN  - continue Losartan 25mg, Toprol 25mg  - HCTZ resumed    HLD  -contnue Rosuvastatin 10mg    GERD  - contiue Famotidine    Anxiety/depression  - on Sertraline 50mg    -DVT prophylaxis: Lovenox  -GI prophylaxis: Famotidine  -Diet: Regular  -Code status: Full  -Activity: AAT- PT follow up  -Discharge planning to SNF, [facility] preferred              To-do: ADINA and hyponatremia on NS 24hrs. Neurosurgery consult for L1 compression fracture- prelim noted by PA; CT LS noted. Placement (to rehab)- short term Rehab @ SNF

## 2025-02-11 NOTE — PROGRESS NOTE ADULT - SUBJECTIVE AND OBJECTIVE BOX
GIAN MELISSA  84y  Female  HPI:  84-year-old female PMHx of RA, OA s/p B/L ROHIT & TKA, Rt hip ORIF(2024), HTN, GERD, breast cancer(in remission since 2015,) presenting to the ED for persistent back pain and inability to ambulate. Patient was evaluated in ED on 2/3 after a mechanical fall at home(per daughter her Rt knee buckled leading to fall) and was found to have compression fracture at L1. She was evaluated by neurosurgery who recommended abdominal binder, PT, and outpatient follow-up with Dr. Mesa.  Since Monday patient has not been able to walk using her walker due to worsening back pain, also reports Rt knee pain, has been taking Tylenol and Celecoxib for pain with minimal relief. Denies any fall or trauma, headache, neck pain, fevers, cough, chest pain, shortness of breath, abdominal pain, nausea vomiting diarrhea constipation, unilateral numbness, weakness, urinary symptoms.    In ED, vitals were stable  Labs unremarkable except for mild HypoNa 131  Imaging 2/3: New age-indeterminate minimal L1 vertebral body compression fracture without significant osseous retropulsion; possibly acute.  Otherwise Trauma w/u negative    Vital Signs Last 24 Hrs  T(F): 98.5 (06 Feb 2025 11:37), Max: 98.5 (06 Feb 2025 11:37)  HR: 96 (06 Feb 2025 11:37) (96 - 96)  BP: 151/94 (06 Feb 2025 11:37) (151/94 - 151/94)  RR: 18 (06 Feb 2025 11:37) (18 - 18)  SpO2: 97% (06 Feb 2025 11:37) (97% - 97%) on RA    Pt. is being admitted for pain management and physical therapy.   (06 Feb 2025 15:19)    MEDICATIONS  (STANDING):  acetaminophen     Tablet .. 1000 milliGRAM(s) Oral every 8 hours  aspirin enteric coated 81 milliGRAM(s) Oral daily  calcium carbonate   1250 mG (OsCal) 1 Tablet(s) Oral daily  celecoxib 200 milliGRAM(s) Oral two times a day  chlorhexidine 2% Cloths 1 Application(s) Topical <User Schedule>  cholecalciferol 1000 Unit(s) Oral daily  cyanocobalamin 1000 MICROGram(s) Oral daily  enoxaparin Injectable 40 milliGRAM(s) SubCutaneous every 24 hours  famotidine    Tablet 20 milliGRAM(s) Oral two times a day  hydrochlorothiazide 12.5 milliGRAM(s) Oral daily  lidocaine   4% Patch 1 Patch Transdermal daily  losartan 25 milliGRAM(s) Oral daily  magnesium oxide 400 milliGRAM(s) Oral three times a day with meals  methocarbamol 500 milliGRAM(s) Oral every 8 hours  metoprolol succinate ER 25 milliGRAM(s) Oral daily  multivitamin 1 Tablet(s) Oral daily  rosuvastatin 10 milliGRAM(s) Oral at bedtime  senna 2 Tablet(s) Oral at bedtime  sertraline 50 milliGRAM(s) Oral daily    MEDICATIONS  (PRN):  HYDROmorphone  Injectable 1 milliGRAM(s) IV Push every 4 hours PRN Severe Pain (7 - 10)  melatonin 3 milliGRAM(s) Oral at bedtime PRN Insomnia  polyethylene glycol 3350 17 Gram(s) Oral two times a day PRN Constipation  traMADol 25 milliGRAM(s) Oral every 6 hours PRN Severe Pain (7 - 10)    INTERVAL EVENTS:    T(C): 36.4 (02-11-25 @ 04:49), Max: 37.1 (02-10-25 @ 19:24)  HR: 90 (02-11-25 @ 04:49) (78 - 90)  BP: 155/81 (02-11-25 @ 04:49) (155/81 - 157/81)  RR: 18 (02-11-25 @ 04:49) (18 - 18)  SpO2: 97% (02-11-25 @ 04:49) (95% - 97%)  Wt(kg): --Vital Signs Last 24 Hrs  T(C): 36.4 (11 Feb 2025 04:49), Max: 37.1 (10 Feb 2025 19:24)  T(F): 97.6 (11 Feb 2025 04:49), Max: 98.8 (10 Feb 2025 19:24)  HR: 90 (11 Feb 2025 04:49) (78 - 90)  BP: 155/81 (11 Feb 2025 04:49) (155/81 - 157/81)  BP(mean): --  RR: 18 (11 Feb 2025 04:49) (18 - 18)  SpO2: 97% (11 Feb 2025 04:49) (95% - 97%)    Parameters below as of 10 Feb 2025 19:25  Patient On (Oxygen Delivery Method): room air        PHYSICAL EXAM:  GENERAL:   NECK: Supple, No JVD  CHEST/LUNG: Clear  HEART: S1, S2, Regular   ABDOMEN: Soft, Nontender, Nondistended; Bowel sounds present  EXTREMITIES: No clubbing, cyanosis, or edema  SKIN: No rashes or lesions    LABS:                        11.4   7.75  )-----------( 348      ( 11 Feb 2025 09:07 )             35.5             02-11    131[L]  |  92[L]  |  19  ----------------------------<  101[H]  3.8   |  23  |  0.6[L]    Ca    9.2      11 Feb 2025 09:07  Mg     1.9     02-11    TPro  6.2  /  Alb  3.2[L]  /  TBili  0.4  /  DBili  x   /  AST  18  /  ALT  11  /  AlkPhos  144[H]  02-11    LIVER FUNCTIONS - ( 11 Feb 2025 09:07 )  Alb: 3.2 g/dL / Pro: 6.2 g/dL / ALK PHOS: 144 U/L / ALT: 11 U/L / AST: 18 U/L / GGT: x                     Urinalysis Basic - ( 11 Feb 2025 09:07 )    Color: x / Appearance: x / SG: x / pH: x  Gluc: 101 mg/dL / Ketone: x  / Bili: x / Urobili: x   Blood: x / Protein: x / Nitrite: x   Leuk Esterase: x / RBC: x / WBC x   Sq Epi: x / Non Sq Epi: x / Bacteria: x        RADIOLOGY & ADDITIONAL TESTS:  < from: CT Lumbar Spine No Cont (02.09.25 @ 11:06) >  PROCEDURE DATE:  02/09/2025          INTERPRETATION:  Indication: Back pain. L1 vertebral body fracture.    TECHNIQUE: CT lumbar spine without IV contrast performed. [place]   coronal reformatted images obtained.    Comparison made with CT abdomen and pelvis 2/3/2025, CT lumbar spine   9/18/2023    FINDINGS:    Unchanged acute-appearing L1 vertebral body minimal compression fracture   deformity without significant osseous retropulsion.  Unchanged mild anterolisthesis of L3 on L4 L4, on L5.  Osteopenia. Multilevel degenerative change. Lumbar levoscoliosis.   Degenerative change of sacroiliac joints. Paraspinal musculature   unremarkable.  Trace left pleural effusion. Vascular calcifications.    IMPRESSION:  Unchanged acute-appearing L1 vertebral body minimal compression fracture   deformity without significant osseous retropulsion.    --- End of Report ---      < end of copied text >  < from: CT Knee No Cont, Right (02.06.25 @ 20:37) >  PROCEDURE DATE:  02/06/2025          INTERPRETATION:  CT OF THE RIGHT KNEE WITHOUT CONTRAST    CLINICAL HISTORY: Right knee pain.    TECHNIQUE: Images were obtained of the right knee without contrast.   Coronal and sagittal reformatted images were also provided.    COMPARISON: CT femur dated 12/1/2024.    FINDINGS:    BONES/JOINTS: Total right knee arthroplasty status post ORIF of   periprosthetic fracture of the distal femur. Interval improvement of bony   fragment alignment with evidence of callus formation. No CT evidence of   hardware complications. Moderate knee joint effusion. No new acute   fracture.    SOFT TISSUES: Vascular calcifications. No encapsulated hematoma.    IMPRESSION:  Patient is status post ORIF involving distal femoral periprosthetic   fracture of the right knee, in improved and near anatomic alignment,   demonstrating evidence of callus formation. No evidence of hardware   complications.    Moderate knee joint effusion.    --- End of Report ---          < end of copied text >  < from: Xray Femur 2 Views, Right (02.06.25 @ 13:33) >    PROCEDURE DATE:  02/06/2025          INTERPRETATION:  Clinical information: Pain.    Multiple views of the right femur and knee. Comparison is made with a   study dated December 23, 2024.    Findings/  impression:    Postoperative changes are seen with 2 separate intramedullary rods with a   lateral stabilization plate, 11 interlocking screws, and 3 cerclage   wires. The appearance of these structures are without difference.    Patient is status post right total hip placements.    Patient is status post right total knee replacement.    --- End of Report ---        < end of copied text >     GIAN MELISSA  84y  Female  HPI:  84-year-old female PMHx of RA, OA s/p B/L ROHIT & TKA, Rt hip ORIF(2024), HTN, GERD, breast cancer(in remission since 2015,) presenting to the ED for persistent back pain and inability to ambulate. Patient was evaluated in ED on 2/3 after a mechanical fall at home(per daughter her Rt knee buckled leading to fall) and was found to have compression fracture at L1. She was evaluated by neurosurgery who recommended abdominal binder, PT, and outpatient follow-up with Dr. Mesa.  Since Monday patient has not been able to walk using her walker due to worsening back pain, also reports Rt knee pain, has been taking Tylenol and Celecoxib for pain with minimal relief. Denies any fall or trauma, headache, neck pain, fevers, cough, chest pain, shortness of breath, abdominal pain, nausea vomiting diarrhea constipation, unilateral numbness, weakness, urinary symptoms.    In ED, vitals were stable  Labs unremarkable except for mild HypoNa 131  Imaging 2/3: New age-indeterminate minimal L1 vertebral body compression fracture without significant osseous retropulsion; possibly acute.  Otherwise Trauma w/u negative    Vital Signs Last 24 Hrs  T(F): 98.5 (06 Feb 2025 11:37), Max: 98.5 (06 Feb 2025 11:37)  HR: 96 (06 Feb 2025 11:37) (96 - 96)  BP: 151/94 (06 Feb 2025 11:37) (151/94 - 151/94)  RR: 18 (06 Feb 2025 11:37) (18 - 18)  SpO2: 97% (06 Feb 2025 11:37) (97% - 97%) on RA    Pt. is being admitted for pain management and physical therapy.   (06 Feb 2025 15:19)    MEDICATIONS  (STANDING):  acetaminophen     Tablet .. 1000 milliGRAM(s) Oral every 8 hours  aspirin enteric coated 81 milliGRAM(s) Oral daily  calcium carbonate   1250 mG (OsCal) 1 Tablet(s) Oral daily  celecoxib 200 milliGRAM(s) Oral two times a day  chlorhexidine 2% Cloths 1 Application(s) Topical <User Schedule>  cholecalciferol 1000 Unit(s) Oral daily  cyanocobalamin 1000 MICROGram(s) Oral daily  enoxaparin Injectable 40 milliGRAM(s) SubCutaneous every 24 hours  famotidine    Tablet 20 milliGRAM(s) Oral two times a day  hydrochlorothiazide 12.5 milliGRAM(s) Oral daily  lidocaine   4% Patch 1 Patch Transdermal daily  losartan 25 milliGRAM(s) Oral daily  magnesium oxide 400 milliGRAM(s) Oral three times a day with meals  methocarbamol 500 milliGRAM(s) Oral every 8 hours  metoprolol succinate ER 25 milliGRAM(s) Oral daily  multivitamin 1 Tablet(s) Oral daily  rosuvastatin 10 milliGRAM(s) Oral at bedtime  senna 2 Tablet(s) Oral at bedtime  sertraline 50 milliGRAM(s) Oral daily    MEDICATIONS  (PRN):  HYDROmorphone  Injectable 1 milliGRAM(s) IV Push every 4 hours PRN Severe Pain (7 - 10)  melatonin 3 milliGRAM(s) Oral at bedtime PRN Insomnia  polyethylene glycol 3350 17 Gram(s) Oral two times a day PRN Constipation  traMADol 25 milliGRAM(s) Oral every 6 hours PRN Severe Pain (7 - 10)    INTERVAL EVENTS: Patient seen today without distress, appears comfortable    T(C): 36.4 (02-11-25 @ 04:49), Max: 37.1 (02-10-25 @ 19:24)  HR: 90 (02-11-25 @ 04:49) (78 - 90)  BP: 155/81 (02-11-25 @ 04:49) (155/81 - 157/81)  RR: 18 (02-11-25 @ 04:49) (18 - 18)  SpO2: 97% (02-11-25 @ 04:49) (95% - 97%)  Wt(kg): --Vital Signs Last 24 Hrs  T(C): 36.4 (11 Feb 2025 04:49), Max: 37.1 (10 Feb 2025 19:24)  T(F): 97.6 (11 Feb 2025 04:49), Max: 98.8 (10 Feb 2025 19:24)  HR: 90 (11 Feb 2025 04:49) (78 - 90)  BP: 155/81 (11 Feb 2025 04:49) (155/81 - 157/81)  BP(mean): --  RR: 18 (11 Feb 2025 04:49) (18 - 18)  SpO2: 97% (11 Feb 2025 04:49) (95% - 97%)    Parameters below as of 10 Feb 2025 19:25  Patient On (Oxygen Delivery Method): room air        PHYSICAL EXAM:  GENERAL: NAD  NECK: Supple, No JVD  CHEST/LUNG: Clear  HEART: S1, S2, Regular   ABDOMEN: Soft, Nontender, Nondistended; Bowel sounds present  EXTREMITIES: Trace edema  SKIN: No rashes or lesions    LABS:                        11.4   7.75  )-----------( 348      ( 11 Feb 2025 09:07 )             35.5             02-11    131[L]  |  92[L]  |  19  ----------------------------<  101[H]  3.8   |  23  |  0.6[L]    Ca    9.2      11 Feb 2025 09:07  Mg     1.9     02-11    TPro  6.2  /  Alb  3.2[L]  /  TBili  0.4  /  DBili  x   /  AST  18  /  ALT  11  /  AlkPhos  144[H]  02-11    LIVER FUNCTIONS - ( 11 Feb 2025 09:07 )  Alb: 3.2 g/dL / Pro: 6.2 g/dL / ALK PHOS: 144 U/L / ALT: 11 U/L / AST: 18 U/L / GGT: x                     Urinalysis Basic - ( 11 Feb 2025 09:07 )    Color: x / Appearance: x / SG: x / pH: x  Gluc: 101 mg/dL / Ketone: x  / Bili: x / Urobili: x   Blood: x / Protein: x / Nitrite: x   Leuk Esterase: x / RBC: x / WBC x   Sq Epi: x / Non Sq Epi: x / Bacteria: x        RADIOLOGY & ADDITIONAL TESTS:  < from: CT Lumbar Spine No Cont (02.09.25 @ 11:06) >  PROCEDURE DATE:  02/09/2025          INTERPRETATION:  Indication: Back pain. L1 vertebral body fracture.    TECHNIQUE: CT lumbar spine without IV contrast performed. [place]   coronal reformatted images obtained.    Comparison made with CT abdomen and pelvis 2/3/2025, CT lumbar spine   9/18/2023    FINDINGS:    Unchanged acute-appearing L1 vertebral body minimal compression fracture   deformity without significant osseous retropulsion.  Unchanged mild anterolisthesis of L3 on L4 L4, on L5.  Osteopenia. Multilevel degenerative change. Lumbar levoscoliosis.   Degenerative change of sacroiliac joints. Paraspinal musculature   unremarkable.  Trace left pleural effusion. Vascular calcifications.    IMPRESSION:  Unchanged acute-appearing L1 vertebral body minimal compression fracture   deformity without significant osseous retropulsion.    --- End of Report ---      < end of copied text >  < from: CT Knee No Cont, Right (02.06.25 @ 20:37) >  PROCEDURE DATE:  02/06/2025          INTERPRETATION:  CT OF THE RIGHT KNEE WITHOUT CONTRAST    CLINICAL HISTORY: Right knee pain.    TECHNIQUE: Images were obtained of the right knee without contrast.   Coronal and sagittal reformatted images were also provided.    COMPARISON: CT femur dated 12/1/2024.    FINDINGS:    BONES/JOINTS: Total right knee arthroplasty status post ORIF of   periprosthetic fracture of the distal femur. Interval improvement of bony   fragment alignment with evidence of callus formation. No CT evidence of   hardware complications. Moderate knee joint effusion. No new acute   fracture.    SOFT TISSUES: Vascular calcifications. No encapsulated hematoma.    IMPRESSION:  Patient is status post ORIF involving distal femoral periprosthetic   fracture of the right knee, in improved and near anatomic alignment,   demonstrating evidence of callus formation. No evidence of hardware   complications.    Moderate knee joint effusion.    --- End of Report ---          < end of copied text >  < from: Xray Femur 2 Views, Right (02.06.25 @ 13:33) >    PROCEDURE DATE:  02/06/2025          INTERPRETATION:  Clinical information: Pain.    Multiple views of the right femur and knee. Comparison is made with a   study dated December 23, 2024.    Findings/  impression:    Postoperative changes are seen with 2 separate intramedullary rods with a   lateral stabilization plate, 11 interlocking screws, and 3 cerclage   wires. The appearance of these structures are without difference.    Patient is status post right total hip placements.    Patient is status post right total knee replacement.    --- End of Report ---        < end of copied text >

## 2025-02-11 NOTE — PROGRESS NOTE ADULT - ASSESSMENT
84-year-old female PMHx of RA, Osteoporosis, OA s/p B/L ROHIT & TKA, Rt hip ORIF(2024), HTN, GERD, breast cancer(in remission since 2015,) presenting to the ED for persistent back pain and inability to ambulate.     #Recent Mechanical fall   #L1 compression fracture  #Rt knee & Back pain, inability to ambulate, Deconditioning  #Hx of Osteoporosis RA, OA s/p B/L ROHIT & TKA, Rt hip ORIF(2024)  - No weakness, paresthesia, urinary/bowel incontinence  - Seen by ortho- F/u CT Rt knee, F/u ortho and spine Sx recs  - c/w Abdominal binder  - Pain control- Tylenol 1000mg q8, Celecoxib 200mg BID, Methocarbamol 500mg q8, Lidocaine patch and Tramadol 50mg q6 prn  - c/w Calcium, vit D  - DVT ppx, bowel regimen  - OP Rheum f/u(hasn't been taking Methotrexate and Prednisone)  - PT/OT recommending rehab facility- patient in agreement- but NOT [facility]  - CT spine compression fracture of L1 - see CTAP, CT R knee no fracture  - Neurosurgery consulted for L1 compression fracture, no operation recommended.   - Will work on placement    #Mild Hyponatremia- seems chronic;  - s/p 1 day NS @ 75ml/hr. hold. Loosen dietary NA restrictions.    #HypoMagnesemia- replete  - Now on magox PO TID     #ADINA  - Resume home HCTZ 12.5qD  - Encourage PO solute intake and loosen NA restriction for 48 hrs  - ADINA improving    #HTN  - c/w Losartan 25mg, Toprol 25mg  - Resume home HCTZ 12.5  - takes Celebrex also; +NS over 24h.    #HLD  -c/w rosuvastatin 10mg    #GERD  - c/w famotidine 20mg bID    #Anxiety/depression  - c/w sertraline 50mg    #Misc  -DVT prophylaxis: Lovenox  -GI prophylaxis: Famotidine  -Diet: Regular  -Code status: Full  -Activity: AAT- PT follow up  -Dispo: Med    To-do: Placement (to rehab)- [facility] or [facility]

## 2025-02-11 NOTE — PROGRESS NOTE ADULT - SUBJECTIVE AND OBJECTIVE BOX
24H events:    Patient is a 84y old Female who presents with a chief complaint of   Primary diagnosis of Back pain          Today is hospital day 5d.   This morning patient was seen and examined at bedside, resting comfortably in bed. No acute events overnight. Patient's family would like to be placed in [place] if possible. Awaiting bed. Also open to [facility] if [facility] is not available.     Code Status: Full      PAST MEDICAL & SURGICAL HISTORY  Rheumatoid arthritis    HTN (hypertension)    Breast cancer  last radiation 2015    Depression    Chronic GERD    OA (osteoarthritis)    Rheumatoid arthritis    History of right hip replacement    Status post left hip replacement    S/P total knee replacement, left    H/O vaginal hysterectomy    Status post cholecystectomy    S/P lumpectomy of breast  2015      SOCIAL HISTORY:  Social History:      ALLERGIES:  No Known Allergies      MEDICATIONS:  STANDING MEDICATIONS  acetaminophen     Tablet .. 1000 milliGRAM(s) Oral every 8 hours  aspirin enteric coated 81 milliGRAM(s) Oral daily  calcium carbonate   1250 mG (OsCal) 1 Tablet(s) Oral daily  celecoxib 200 milliGRAM(s) Oral two times a day  chlorhexidine 2% Cloths 1 Application(s) Topical <User Schedule>  cholecalciferol 1000 Unit(s) Oral daily  cyanocobalamin 1000 MICROGram(s) Oral daily  enoxaparin Injectable 40 milliGRAM(s) SubCutaneous every 24 hours  famotidine    Tablet 20 milliGRAM(s) Oral two times a day  hydrochlorothiazide 12.5 milliGRAM(s) Oral daily  lidocaine   4% Patch 1 Patch Transdermal daily  losartan 25 milliGRAM(s) Oral daily  magnesium oxide 400 milliGRAM(s) Oral three times a day with meals  methocarbamol 500 milliGRAM(s) Oral every 8 hours  metoprolol succinate ER 25 milliGRAM(s) Oral daily  multivitamin 1 Tablet(s) Oral daily  rosuvastatin 10 milliGRAM(s) Oral at bedtime  senna 2 Tablet(s) Oral at bedtime  sertraline 50 milliGRAM(s) Oral daily    PRN MEDICATIONS  HYDROmorphone  Injectable 1 milliGRAM(s) IV Push every 4 hours PRN  melatonin 3 milliGRAM(s) Oral at bedtime PRN  polyethylene glycol 3350 17 Gram(s) Oral two times a day PRN  traMADol 25 milliGRAM(s) Oral every 6 hours PRN      VITALS:   T(F): 97.6  HR: 84  BP: 177/86  RR: 18  SpO2: 95%      PHYSICAL EXAM:  GENERAL: NAD, lying in bed comfortably  HEAD:  Atraumatic, Normocephalic  EYES: EOMI, PERRLA, conjunctiva and sclera clear  ENT: Moist mucous membranes  NECK: Supple, No JVD  CHEST/LUNG: Clear to auscultation bilaterally; No rales, rhonchi, wheezing, or rubs. Unlabored respirations  HEART: Regular rate and rhythm; No murmurs, rubs, or gallops  ABDOMEN: BSx4; Soft, nontender, nondistended  EXTREMITIES:  2+ Peripheral Pulses, brisk capillary refill. No clubbing, cyanosis, or edema  NERVOUS SYSTEM:  A&Ox3, no focal deficits   SKIN: No rashes or lesions      LABS:                        11.4   7.75  )-----------( 348      ( 11 Feb 2025 09:07 )             35.5     02-11    131[L]  |  92[L]  |  19  ----------------------------<  101[H]  3.8   |  23  |  0.6[L]    Ca    9.2      11 Feb 2025 09:07  Mg     1.9     02-11    TPro  6.2  /  Alb  3.2[L]  /  TBili  0.4  /  DBili  x   /  AST  18  /  ALT  11  /  AlkPhos  144[H]  02-11      Urinalysis Basic - ( 11 Feb 2025 09:07 )    Color: x / Appearance: x / SG: x / pH: x  Gluc: 101 mg/dL / Ketone: x  / Bili: x / Urobili: x   Blood: x / Protein: x / Nitrite: x   Leuk Esterase: x / RBC: x / WBC x   Sq Epi: x / Non Sq Epi: x / Bacteria: x

## 2025-02-12 ENCOUNTER — TRANSCRIPTION ENCOUNTER (OUTPATIENT)
Age: 85
End: 2025-02-12

## 2025-02-12 VITALS
SYSTOLIC BLOOD PRESSURE: 115 MMHG | HEART RATE: 66 BPM | OXYGEN SATURATION: 97 % | RESPIRATION RATE: 18 BRPM | DIASTOLIC BLOOD PRESSURE: 58 MMHG | TEMPERATURE: 98 F

## 2025-02-12 RX ADMIN — Medication 200 MILLIGRAM(S): at 17:01

## 2025-02-12 RX ADMIN — ACETAMINOPHEN 1000 MILLIGRAM(S): 160 SUSPENSION ORAL at 13:28

## 2025-02-12 RX ADMIN — Medication 50 MILLIGRAM(S): at 12:00

## 2025-02-12 RX ADMIN — FAMOTIDINE 20 MILLIGRAM(S): 10 INJECTION INTRAVENOUS at 05:44

## 2025-02-12 RX ADMIN — Medication 1 TABLET(S): at 12:01

## 2025-02-12 RX ADMIN — Medication 400 MILLIGRAM(S): at 12:00

## 2025-02-12 RX ADMIN — Medication 400 MILLIGRAM(S): at 08:37

## 2025-02-12 RX ADMIN — Medication 25 MILLIGRAM(S): at 05:44

## 2025-02-12 RX ADMIN — Medication 400 MILLIGRAM(S): at 17:01

## 2025-02-12 RX ADMIN — FAMOTIDINE 20 MILLIGRAM(S): 10 INJECTION INTRAVENOUS at 17:02

## 2025-02-12 RX ADMIN — ACETAMINOPHEN 1000 MILLIGRAM(S): 160 SUSPENSION ORAL at 13:58

## 2025-02-12 RX ADMIN — Medication 200 MILLIGRAM(S): at 05:43

## 2025-02-12 RX ADMIN — Medication 500 MILLIGRAM(S): at 05:43

## 2025-02-12 RX ADMIN — ANTISEPTIC SURGICAL SCRUB 1 APPLICATION(S): 0.04 SOLUTION TOPICAL at 05:44

## 2025-02-12 RX ADMIN — Medication 1000 MICROGRAM(S): at 12:00

## 2025-02-12 RX ADMIN — ASPIRIN 81 MILLIGRAM(S): 81 TABLET, COATED ORAL at 12:01

## 2025-02-12 RX ADMIN — Medication 1000 UNIT(S): at 12:00

## 2025-02-12 RX ADMIN — ENOXAPARIN SODIUM 40 MILLIGRAM(S): 100 INJECTION SUBCUTANEOUS at 05:50

## 2025-02-12 RX ADMIN — ACETAMINOPHEN 1000 MILLIGRAM(S): 160 SUSPENSION ORAL at 05:43

## 2025-02-12 RX ADMIN — Medication 1 TABLET(S): at 12:00

## 2025-02-12 NOTE — PROGRESS NOTE ADULT - SUBJECTIVE AND OBJECTIVE BOX
Chart reviewed, patient examined. Pertinent results reviewed.  Case discussed with HO; specialist f/u reviewed  HD#7; Patient well known to me-         Patient is a 84y old Female who presents with a chief complaint of a fall and slight confusion.  Primary diagnosis of LS- R Buttocks Back and R Knee pain after her fall.   She has been home a few weeks after a prior fall, Fx R Femur, ORIF(12/2/24-here), then IP and OP Rehab.  She fell when she got up while alone.      This morning patient was seen and examined at bedside, resting comfortably in bed.  Received NS 75ml/hr x 1 day.  No acute or major events overnight. more alert but slow to give the story of her fall.   RN reports that she was groggy after 50mg dose of Tramadol on 2/8, dose lowered to 25mg.  Last dose 2/10 PM. Getting Methocarbamol- q8h- just DC.    Still c/o pain in LS area to R Thigh and knee area. INCreases w WT bearing.  Ortho f/u's noted. MR done and read. No Fx. + edema. WBAT OK'd.  DTR-Marina- and patient c/o being too tired and groggy, especially in the AM.  Getting Methocarbamol- q8h- just DC.    Code Status: Full      PAST MEDICAL & SURGICAL HISTORY  Rheumatoid arthritis    HTN (hypertension)  w MOD LVH- HTN HD    Breast cancer  last radiation 2015    Depression    Chronic GERD    OA (osteoarthritis)    Rheumatoid arthritis    History of right hip replacement    Status post left hip replacement    S/P total knee replacement, left  R Femur Fx & ORIF 12/2/24    H/O vaginal hysterectomy    Status post cholecystectomy    S/P lumpectomy of breast  2015      SOCIAL HISTORY:  Social History: Lives alone; + ;   Has a friend who helps her some hours M-F;  Daughters come by on weekends.   smoked in the past      ALLERGIES:  No Known Allergies      MEDICATIONS:  MEDICATIONS  (STANDING):  acetaminophen     Tablet .. 1000 milliGRAM(s) Oral every 8 hours  aspirin enteric coated 81 milliGRAM(s) Oral daily  calcium carbonate   1250 mG (OsCal) 1 Tablet(s) Oral daily  celecoxib 200 milliGRAM(s) Oral two times a day  cholecalciferol 1000 Unit(s) Oral daily  cyanocobalamin 1000 MICROGram(s) Oral daily  enoxaparin Injectable 40 milliGRAM(s) SubCutaneous every 24 hours  famotidine    Tablet 20 milliGRAM(s) Oral two times a day  lidocaine   4% Patch 1 Patch Transdermal daily  losartan 25 milliGRAM(s) Oral daily  methocarbamol 500 milliGRAM(s) Oral every 8 hours  metoprolol succinate ER 25 milliGRAM(s) Oral daily  multivitamin 1 Tablet(s) Oral daily  rosuvastatin 10 milliGRAM(s) Oral at bedtime  senna 2 Tablet(s) Oral at bedtime  sertraline 50 milliGRAM(s) Oral daily    MEDICATIONS  (PRN):  HYDROmorphone  Injectable 1 milliGRAM(s) IV Push every 4 hours PRN Severe Pain (7 - 10)  melatonin 3 milliGRAM(s) Oral at bedtime PRN Insomnia  polyethylene glycol 3350 17 Gram(s) Oral two times a day PRN Constipation  traMADol 25 milliGRAM(s) Oral every 6 hours PRN Severe Pain (7 - 10)      VITALS:   Vital Signs Last 24 Hrs  T(C): 36.8 (12 Feb 2025 05:16), Max: 37.1 (11 Feb 2025 20:08)  T(F): 98.2 (12 Feb 2025 05:16), Max: 98.8 (11 Feb 2025 20:08)  HR: 105 (12 Feb 2025 05:16) (84 - 105)  BP: 171/89 (12 Feb 2025 05:16) (150/81 - 177/86)  BP(mean): --  RR: 18 (12 Feb 2025 05:16) (18 - 18)  SpO2: 95% (11 Feb 2025 20:08) (95% - 95%)      PHYSICAL EXAM:  GENERAL: NAD, lying in bed comfortably; sleeping at 1st-easily awakened. recognizes me and converses easily. year-  2024;    Pres-OK;   HEAD:  Atraumatic, Normocephalic  EYES: EOMI, PERRLA, conjunctiva and sclera clear  CHEST/LUNG: Clear  bilaterally; No rales, rhonchi, wheezing, or rubs. Unlabored respirations  HEART: RRR; No murmurs, rubs, or gallops  ABDOMEN: BSx4; Soft, nontender, nondistended  EXTREMITIES: Reduced ROM of RLE- lifting better; mild pain on movement. Multiple scars over R Knee area and R hip.Min TTP over distal Lat L Knee.       MIN TTP over LSSp also- no deformities.No CC.   NERVOUS SYSTEM:  A&Ox3, no focal deficits   SKIN: No rashes or lesions      LABS:                                11.4   7.75  )-----------( 348      ( 11 Feb 2025 09:07 )             35.5                        11.4   8.46  )-----------( 351      ( 10 Feb 2025 07:05 )             34.9                      9.9    6.74  )-----------( 306      ( 09 Feb 2025 07:20 )             31.1                     10.7   7.39  )-----------( 291      ( 08 Feb 2025 06:15 )             33.9     02-11    131[L]  |  92[L]  |  19  ----------------------------<  101[H]  3.8   |  23  |  0.6[L]    Ca    9.2      11 Feb 2025 09:07  Mg     1.9     02-11    TPro  6.2  /  Alb  3.2[L]  /  TBili  0.4  /  DBili  x   /  AST  18  /  ALT  11  /  AlkPhos  144[H]  02-11    02-10    132[L]  |  94[L]  |  15  ----------------------------<  98  3.6   |  25  |  0.5[L]    Ca    9.0      10 Feb 2025 07:05  Mg     1.5     02-10    TPro  6.2  /  Alb  3.3[L]  /  TBili  0.5  /  DBili  x   /  AST  19  /  ALT  11  /  AlkPhos  135[H]  02-10    02-09    134[L]  |  99  |  23[H]  ----------------------------<  90  3.5   |  23  |  0.7    Ca    8.7      09 Feb 2025 07:20  Mg     1.4     02-09    TPro  5.6[L]  /  Alb  2.9[L]  /  TBili  0.4  /  DBili  x   /  AST  17  /  ALT  11  /  AlkPhos  124[H]  02-09 02-08    131[L]  |  94[L]  |  37[H]  ----------------------------<  86  4.0   |  23  |  1.2    Ca    9.5      08 Feb 2025 06:15  Mg     2.3     02-08    TPro  6.3  /  Alb  3.2[L]  /  TBili  0.4  /  DBili  x   /  AST  19  /  ALT  11  /  AlkPhos  123[H]  02-08      Urinalysis Basic - ( 08 Feb 2025 06:15 )    Color: x / Appearance: x / SG: x / pH: x  Gluc: 86 mg/dL / Ketone: x  / Bili: x / Urobili: x   Blood: x / Protein: x / Nitrite: x   Leuk Esterase: x / RBC: x / WBC x   Sq Epi: x / Non Sq Epi: x / Bacteria: x    < from: CT Knee No Cont, Right (02.06.25 @ 20:37) >  ACC: [id number] EXAM:  CT KNEE ONLY RT   ORDERED BY: SUSANNE SALGADO     PROCEDURE DATE:  02/06/2025          INTERPRETATION:  CT OF THE RIGHT KNEE WITHOUT CONTRAST    CLINICAL HISTORY: Right knee pain.    TECHNIQUE: Images were obtained of the right knee without contrast.   Coronal and sagittal reformatted images were also provided.    COMPARISON: CT femur dated 12/1/2024.    FINDINGS:    BONES/JOINTS: Total right knee arthroplasty status post ORIF of   periprosthetic fracture of the distal femur. Interval improvement of bony   fragment alignment with evidence of callus formation. No CT evidence of   hardware complications. Moderate knee joint effusion. No new acute   fracture.    SOFT TISSUES: Vascular calcifications. No encapsulated hematoma.    IMPRESSION:  Patient is status post ORIF involving distal femoral periprosthetic   fracture of the right knee, in improved and near anatomic alignment,   demonstrating evidence of callus formation. No evidence of hardware   complications.    Moderate knee joint effusion.    --- End of Report ---          TEZ SEGUNDO MD; Resident Radiologist  This document has been electronically signed.  NELA MONTANO MD; Attending Radiologist  This document has been electronically signed. Feb 7 2025 11:28AM    < end of copied text >  < from: CT Abdomen and Pelvis w/ IV Cont (02.03.25 @ 16:48) >  ACC: [id number] EXAM:  CT ABDOMEN AND PELVIS IC   ORDERED BY: MARLON EGAN     ACC: [id number] EXAM:  CT CHEST IC   ORDERED BY: MARLON EGAN     PROCEDURE DATE:  02/03/2025          INTERPRETATION:  CLINICAL HISTORY/REASON FOR EXAM: Trauma to chest,   abdomen and pelvis.. Left flank pain. Back pain. Vomiting. Breast cancer.    TECHNIQUE: Contiguous axial CT images were obtained from the thoracic   inlet to the pubic symphysis following administration of 100 cc Omnipaque   350 intravenous contrast.  Oral contrast was not administered.   Reformatted images in the coronal and sagittal planes were acquired. 3D   (MIP) images obtained.    COMPARISON: CT chest, abdomen and pelvis 11/23/2024.      FINDINGS:    CHEST:    LUNGS, PLEURA, AIRWAYS: No lobar consolidation, mass, effusion, or   pneumothorax. No evidence of central endobronchial obstruction. No   bronchiectasis or honeycombing. No suspicious pulmonary nodule. Biapical   scarring. Bilateral subsegmental atelectasis.    THORACIC NODES: No mediastinal, hilar, supraclavicular, or axillary   lymphadenopathy.    MEDIASTINUM/GREAT VESSELS: No pericardial effusion. Heart size is within   normal limits. The aorta and main pulmonary artery are of normal caliber.    ABDOMEN/PELVIS:    HEPATOBILIARY: Postcholecystectomy. No evidence of liver injury. No   biliary dilation.    SPLEEN: Unremarkable.    PANCREAS: Unremarkable.    ADRENAL GLANDS: Unremarkable.    KIDNEYS: Symmetric pattern of renal enhancement. No hydronephrosis   bilaterally. Bilateral renal cysts and subcentimeter hypodensities, too   small to characterize    ABDOMINOPELVIC NODES: No lymphadenopathy.    PELVIC ORGANS: Obscured by metallic streak artifact.    PERITONEUM/MESENTERY/BOWEL: No bowel obstruction. No ascites or   pneumoperitoneum. Small hiatal hernia.    BONES/SOFT TISSUES: Bilateral hip total arthroplasties. Degenerative   change of spine shoulders. Osteopenia. Chronic bilateral rib fractures.   New age-indeterminate minimal L1 vertebral body compressionfracture   without significant osseous retropulsion; possibly acute. Osteopenia.   Right breast 1.8 cm nodule, similar prior CT; correlation with breast   cancer history suggested. Scoliosis.    VASCULAR : Vascular calcifications      IMPRESSION:  1.New age-indeterminate minimal L1 vertebral body compression fracture   without significant osseous retropulsion; possibly acute.  2. Right breast 1.8 cm nodule, similar prior CT; correlation with breast   cancer history suggested.    --- End of Report ---      DARRYL ISLAS MD; Attending Radiologist  This document has been electronically signed. Feb  3 2025  8:20PM    < end of copied text >

## 2025-02-12 NOTE — DISCHARGE NOTE NURSING/CASE MANAGEMENT/SOCIAL WORK - NSDCVIVACCINE_GEN_ALL_CORE_FT
COVID-19, mRNA, LNP-S, PF, 100 mcg/ 0.5 mL dose (Moderna); 2021/1/18 ; Annabella Santana (); [employer] [id number] (Exp. Date: 31-Dec-2069);   COVID-19, mRNA, LNP-S, PF, 100 mcg/ 0.5 mL dose (Moderna); 2021/2/21 ; Annabella Santana (); [employer] [id number] (Exp. Date: 31-Dec-2069);   COVID-19, mRNA, LNP-S, PF, 100 mcg/ 0.5 mL dose (Moderna); 2022/1/4 ; Annabella Santana (); [employer] [id number] (Exp. Date: 28-Apr-2022); 0.25 ML;   Influenza, high-dose, trivalent, preservative free; 2013/9/21 ; Annabella Santana (); [employer]; [id number] (Exp. Date: 05-May-2014); 0.5;   Influenza, seasonal, injectable; 2014/10/30 ; Annabella Santana (); [employer]; [id number] (Exp. Date: 25-May-2015); 0.5 ML;   Influenza, high-dose, trivalent, preservative free; 2015/10/12 ; Annabella Santana (); [employer]; [id number] (Exp. Date: 06-Apr-2016);   Influenza, high-dose, trivalent, preservative free; 2016/11/14 ; Annabella Santana (); Unknown ; [id number] (Exp. Date: 25-May-2017);   Influenza, high-dose, trivalent, preservative free; 2017/10/3 ; Annabella Santana ();   influenza, injectable, quadrivalent, preservative free; 2017/10/9 ; Annabella Santana (); [employer]; [id number] (Exp. Date: 30-Jun-2018);   Influenza, high-dose, trivalent, preservative free; 2018/10/8 ; Annabella Santana (); Unknown ; [id number] (Exp. Date: 02-May-2019);   Influenza, high-dose, trivalent, preservative free; 2019/9/23 ; Annabella Santana (); [employer]; [id number] (Exp. Date: 04-May-2020);   Influenza, high-dose, trivalent, preservative free; 2020/9/14 ; Annabella Santana ();   influenza, high-dose, quadrivalent; 2022/10/27 ; Annabella Santana (); [employer]; [id number] (Exp. Date: 30-Jun-2023); 0.7 ML;   influenza, high-dose, quadrivalent; 2023/10/30 ; Annabella Santana (); [employer]; [id number] (Exp. Date: 30-Jun-2024); 0.7 ML;   Pneumococcal conjugate PCV 13; 2018/12/27 ; Annabella Santana (); Unknown ; [id number] (Exp. Date: 30-Sep-2020);   Tdap; 23-Nov-2024 12:47; Raisa Galdamez (RN); [employer]; [id number] (Exp. Date: 31-Aug-2026); IntraMuscular; Deltoid Right.; 0.5 milliLiter(s); VIS (VIS Published: 09-May-2013, VIS Presented: 23-Nov-2024);

## 2025-02-12 NOTE — DISCHARGE NOTE PROVIDER - CARE PROVIDERS DIRECT ADDRESSES
,DirectAddress_Unknown,[email or web address]

## 2025-02-12 NOTE — DISCHARGE NOTE PROVIDER - NSDCMRMEDTOKEN_GEN_ALL_CORE_FT
acetaminophen 500 mg oral tablet: 2 tab(s) orally every 8 hours  aspirin 81 mg oral delayed release tablet: 1 tab(s) orally once a day  calcium carbonate 1250 mg (500 mg elemental calcium) oral tablet: 1 tab(s) orally once a day  celecoxib 200 mg oral capsule: 1 cap(s) orally 2 times a day  cholecalciferol 25 mcg (1000 intl units) oral tablet: 1 tab(s) orally once a day  cyanocobalamin 1000 mcg oral tablet: 1 tab(s) orally once a day  famotidine 20 mg oral tablet: 1 tab(s) orally 2 times a day  hydroCHLOROthiazide 12.5 mg oral tablet: 1 tab(s) orally once a day  lidocaine 4% topical film: Apply topically to affected area once a day  losartan 25 mg oral tablet: 1 tab(s) orally once a day (at bedtime)  magnesium oxide 400 mg oral tablet: 1 tab(s) orally 3 times a day (with meals)  melatonin 3 mg oral tablet: 1 tab(s) orally once a day (at bedtime) As needed Insomnia  metoprolol succinate 25 mg oral capsule, extended release: 1 cap(s) orally once a day  multivitamin: 1 tab(s) orally once a day  PT Treat and Release: Back pain secondary to L1 fracture: PT Treat and Release: Back pain secondary to L1 fracture  rosuvastatin 10 mg oral tablet: 1 tab(s) orally once a day (at bedtime)  senna leaf extract oral tablet: 2 tab(s) orally once a day (at bedtime)  sertraline 50 mg oral tablet: 1 tab(s) orally once a day  traMADol 50 mg oral tablet: 0.5 tab(s) orally every 6 hours As needed Severe Pain (7 - 10)  Vitamin C: 1000 milligram(s) orally once a day

## 2025-02-12 NOTE — PROGRESS NOTE ADULT - ASSESSMENT
84-year-old female PMHx of RA, Osteoporosis, OA s/p B/L ROHIT & TKA, Rt hip ORIF(2024), HTN, GERD, breast cancer(in remission since 2015,) presenting to the ED for persistent back pain and inability to ambulate.     #Recent Mechanical fall   #L1 compression fracture  #Rt knee & Back pain, inability to ambulate, Deconditioning  #Hx of Osteoporosis RA, OA s/p B/L ROHIT & TKA, Rt hip ORIF(2024)  - No weakness, paresthesia, urinary/bowel incontinence  - Seen by ortho- F/u CT Rt knee, F/u ortho and spine Sx recs  - c/w Abdominal binder  - Pain control- Tylenol 1000mg q8, Celecoxib 200mg BID, Methocarbamol 500mg q8, Lidocaine patch and Tramadol 50mg q6 prn  - c/w Calcium, vit D  - DVT ppx, bowel regimen  - OP Rheum f/u(hasn't been taking Methotrexate and Prednisone)  - PT/OT recommending rehab facility- patient in agreement- but NOT [facility]  - CT - see CTAP, CT R knee no fracture  - Neurosurgery consulted for L1 compression fracture- I spoke to them   - their evaluation is noted-  Please follow their recommendations- TLSO Brace; PT  - Will work on placement- did D/W CM- their actions are noted    For [facility]- hopefully today.    #Mild Hyponatremia- seems chronic;  - s/p 1 day NS @ 75ml/hr. hold. Loosen dietary NA restrictions.    HypoMagnesemia- replete  - Case discussed with HO  - low despite aggressive repletion  - please ask renal to see and advise: cause and rapidity of repletion  #ADINA  - Did Hold home HCTZ, but w BPs high- restarted; BP remains high  - Monitor for now  - Encourage PO solute intake and loosen NA restriction for 48 hrs  - Cr. up today from 0.7 to 1.3, BUN 21 to 33  - 75cc/hr of normal saline for 24h for ADINA and hyponatremia on 2/8, today;  BMP-noted, stable  - continue to follow- would get BIW @ STR/SNF    #HTN  - c/w Losartan 25mg, Toprol 25mg  -  HCTZ restarted 2/10; may need more anti HTN meds w F/U  - takes Celebrex also;     #HLD  -c/w rosuvastatin 10mg    #GERD  - c/w famotidine 20mg bID    #Anxiety/depression  - c/w sertraline 50mg    # see breast lesion on CTAP-  - correlate w prior studies and breast exam    #Misc  -DVT prophylaxis: Lovenox  -GI prophylaxis: Famotidine  -Diet: Regular  -Code status: Full  -Activity: AAT- PT follow up  -Dispo: Med    To-do: ADINA and hyponatremia on NS 24hrs. Neurosurgery consult for L1 compression fracture- prelim noted by PA; CT LS noted. Placement (to rehab)- short term Rehab @ SNF  Orthos saw, MR done, and they cleared. WBAT+ ; OK for DC to short term Rehab @ SNF; to get TLSO Brace PT DC.

## 2025-02-12 NOTE — DISCHARGE NOTE NURSING/CASE MANAGEMENT/SOCIAL WORK - FINANCIAL ASSISTANCE
[facility] provides services at a reduced cost to those who are determined to be eligible through [facility]’s financial assistance program. Information regarding [facility]’s financial assistance program can be found by going to [email or web address] or by calling [phone].

## 2025-02-12 NOTE — DISCHARGE NOTE PROVIDER - CARE PROVIDER_API CALL
Janes Dailey  Orthopaedic Surgery  [address] [place] [address]  Phone: [phone]  Fax: [phone]  Follow Up Time: 2 weeks    Thor Cruz  Internal Medicine  [address] [place] [address]  Phone: [phone]  Fax: [phone]  Follow Up Time: 1 month

## 2025-02-12 NOTE — DISCHARGE NOTE NURSING/CASE MANAGEMENT/SOCIAL WORK - PATIENT PORTAL LINK FT
You can access the FollowMyHealth Patient Portal offered by [facility] by registering at the following website: [email or web address]. By joining [employer]’s FollowMyHealth portal, you will also be able to view your health information using other applications (apps) compatible with our system.

## 2025-02-12 NOTE — DISCHARGE NOTE NURSING/CASE MANAGEMENT/SOCIAL WORK - NSDCFUADDAPPT_GEN_ALL_CORE_FT
APPTS ARE READY TO BE MADE: [x] YES    Best Family or Patient Contact (if needed): [phone]    Additional Information about above appointments (if needed):    1: Dr. Janes Dailey [phone] Ortho  2: Dr. Thor Cruz [phone] PCP    Other comments or requests:

## 2025-02-12 NOTE — DISCHARGE NOTE PROVIDER - NSDCCPCAREPLAN_GEN_ALL_CORE_FT
PRINCIPAL DISCHARGE DIAGNOSIS  Diagnosis: Back pain  Assessment and Plan of Treatment: Back pain is very common in adults. The cause of back pain is rarely dangerous and the pain often gets better over time. The cause of your back pain may not be known and may include strain of muscles or ligaments, degeneration of the spinal disks, or arthritis. Occasionally the pain may radiate down your leg(s). Over-the-counter medicines to reduce pain and inflammation are often the most helpful. Stretching and remaining active frequently helps the healing process.   SEEK IMMEDIATE MEDICAL CARE IF YOU HAVE ANY OF THE FOLLOWING SYMPTOMS: bowel or bladder control problems, unusual weakness or numbness in your arms or legs, nausea or vomiting, abdominal pain, fever, dizziness/lightheadedness.        SECONDARY DISCHARGE DIAGNOSES  Diagnosis: Cannot walk  Assessment and Plan of Treatment:

## 2025-02-12 NOTE — DISCHARGE NOTE PROVIDER - HOSPITAL COURSE
This 84-year-old female with a history of RA, OA, bilateral ROHIT & TKA, right hip ORIF, HTN, GERD, and breast cancer (in remission) presented to the ED with persistent back pain and inability to ambulate after a mechanical fall at home on 2/3. Initial ED workup revealed a new L1 compression fracture. Neurosurgery recommended conservative management with an abdominal binder, physical therapy, and outpatient follow-up.    She was admitted for pain management and physical therapy. Conservative pain management with Tylenol, Celecoxib, Methocarbamol, Lidocaine patch, and Tramadol provided relief, and pain resolved. Workup revealed mild hyponatremia, hypomagnesemia, and ADINA, all of which were addressed medically. An MRI of her right knee showed no acute findings. Orthopedics recommended weight bearing as tolerated and follow-up with Dr. Dailey outpatient. The patient is stable for discharge to SNF and will continue physical and occupational therapy.

## 2025-02-12 NOTE — DISCHARGE NOTE PROVIDER - NSDCFUADDAPPT_GEN_ALL_CORE_FT
APPTS ARE READY TO BE MADE: [x] YES    Best Family or Patient Contact (if needed): [phone]    Additional Information about above appointments (if needed):    1: Dr. Janes Dailey [phone] Ortho  2: Dr. Thor Cruz [phone] PCP    Other comments or requests:

## 2025-02-12 NOTE — DISCHARGE NOTE PROVIDER - PROVIDER TOKENS
PROVIDER:[TOKEN:[[id number]],FOLLOWUP:[2 weeks]],PROVIDER:[TOKEN:[[id number]],FOLLOWUP:[1 month]]

## 2025-02-12 NOTE — PROGRESS NOTE ADULT - TIME BILLING
spending 42  minutes on this patient's case:  16  minutes w patient and spoke to her dtr on the phone at bedside; 13 minutes reviewing the chart,    and  13  minutes speaking to the HO, RN,& CM; also coordinating care and documenting all.
spending 51 minutes on this patient's case:  18  minutes w patient, 16  minutes reviewing the chart,    and 17  minutes speaking to the HO multiple times, RN, ortho, hospitalist and family; also coordinating care and documenting all.  This is my 1st day on this case.
spending 55  minutes on this patient's case:  24 minutes w patient and spoke to her dtr(Red) on the phone at bedside; 13 minutes reviewing the chart,  and  18  minutes speaking to the HO, RN,& CM, Ortho, & Radiol.; also coordinating care and documenting all.- for possible DC
spending 42  minutes on this patient's case:  16  minutes w patient and spoke to her dtr on the phone at bedside; 13 minutes reviewing the chart,    and  13  minutes speaking to the HO, RN,& CM; also coordinating care and documenting all.
spending  40  minutes on this patient's case:  14  minutes w patient, 12  minutes reviewing the chart,    and   14 minutes speaking to the HO, RN and family(dtr on phone), also coordinating care and documenting all.

## 2025-02-13 NOTE — CDI QUERY NOTE - NSCDI_DOCCLARIFY2_GEN_ALL_CORE_FT_PREVIEWDISPLAY
In responding to this request, please exercise your independent professional judgment. The fact that a question is asked does not imply that any particular answer is desired or expected. Documentation clarification is required for compliance.   This form is NOT a part of the permanent Medical Record.
ED MD

## 2025-02-14 NOTE — CHART NOTE - NSCHARTNOTEFT_GEN_A_CORE
ordered mri right knee   continued pain   will follow   discussed with medical team
1X - [facility] 2/13 / left vm 2/14 - DK (Ortho Referral)
Orthopedic Surgery Update Note     MRI Right knee:  IMPRESSION:  1. Intact quadriceps and patellar tendons.  2. Focal edema in the proximal tibia/fibula at the proximal tibiofibular   joint, nonspecific and may represent stress-related/degenerative changes.        MRI reviewed  no acute findings  no orthopedic intervention necessary  cont pain control  dvt ppx  WBAT  can follow up with  upon discharge

## 2025-02-26 ENCOUNTER — APPOINTMENT (OUTPATIENT)
Dept: NEUROSURGERY | Facility: CLINIC | Age: 85
End: 2025-02-26
Payer: MEDICARE

## 2025-02-26 ENCOUNTER — NON-APPOINTMENT (OUTPATIENT)
Age: 85
End: 2025-02-26

## 2025-02-26 VITALS — BODY MASS INDEX: 26.52 KG/M2 | HEIGHT: 68 IN | WEIGHT: 175 LBS

## 2025-02-26 DIAGNOSIS — S32.010S WEDGE COMPRESSION FRACTURE OF FIRST LUMBAR VERTEBRA, SEQUELA: ICD-10-CM

## 2025-02-26 PROCEDURE — 99203 OFFICE O/P NEW LOW 30 MIN: CPT

## 2025-03-06 NOTE — H&P PST ADULT - NS PRO FEM  PAP SMEARS 3YRS
Received notification that pt checked his INR yesterday 3/6/25, which pt.'s INR was 2.7, and goal is 2.5 to 3.5    Pt denies any bruising/bleeding, changes in medication, or diet.  Pt stated that he changed his Coumadin dosing to the following; 10 mg Tuesday, Thursday, and Saturday, and 15 mg all the other days.  Told pt to continue with the above regimen and recheck his INR in two weeks.  Pt verbally understands and has no further questions at this time.   yes

## 2025-03-11 ENCOUNTER — OUTPATIENT (OUTPATIENT)
Dept: OUTPATIENT SERVICES | Facility: HOSPITAL | Age: 85
LOS: 1 days | Discharge: HOME | End: 2025-03-11
Payer: MEDICARE

## 2025-03-11 ENCOUNTER — APPOINTMENT (OUTPATIENT)
Dept: NEUROSURGERY | Facility: HOSPITAL | Age: 85
End: 2025-03-11

## 2025-03-11 ENCOUNTER — TRANSCRIPTION ENCOUNTER (OUTPATIENT)
Age: 85
End: 2025-03-11

## 2025-03-11 ENCOUNTER — RESULT REVIEW (OUTPATIENT)
Age: 85
End: 2025-03-11

## 2025-03-11 VITALS
DIASTOLIC BLOOD PRESSURE: 70 MMHG | HEART RATE: 97 BPM | SYSTOLIC BLOOD PRESSURE: 155 MMHG | OXYGEN SATURATION: 96 % | RESPIRATION RATE: 15 BRPM

## 2025-03-11 VITALS
DIASTOLIC BLOOD PRESSURE: 79 MMHG | HEART RATE: 99 BPM | SYSTOLIC BLOOD PRESSURE: 143 MMHG | RESPIRATION RATE: 16 BRPM | HEIGHT: 68 IN | WEIGHT: 175.05 LBS | OXYGEN SATURATION: 95 % | TEMPERATURE: 98 F

## 2025-03-11 DIAGNOSIS — Z96.642 PRESENCE OF LEFT ARTIFICIAL HIP JOINT: Chronic | ICD-10-CM

## 2025-03-11 DIAGNOSIS — S22.000A WEDGE COMPRESSION FRACTURE OF UNSPECIFIED THORACIC VERTEBRA, INITIAL ENCOUNTER FOR CLOSED FRACTURE: ICD-10-CM

## 2025-03-11 DIAGNOSIS — Z90.710 ACQUIRED ABSENCE OF BOTH CERVIX AND UTERUS: Chronic | ICD-10-CM

## 2025-03-11 DIAGNOSIS — Z96.652 PRESENCE OF LEFT ARTIFICIAL KNEE JOINT: Chronic | ICD-10-CM

## 2025-03-11 DIAGNOSIS — Z90.49 ACQUIRED ABSENCE OF OTHER SPECIFIED PARTS OF DIGESTIVE TRACT: Chronic | ICD-10-CM

## 2025-03-11 DIAGNOSIS — Z96.641 PRESENCE OF RIGHT ARTIFICIAL HIP JOINT: Chronic | ICD-10-CM

## 2025-03-11 DIAGNOSIS — Z98.890 OTHER SPECIFIED POSTPROCEDURAL STATES: Chronic | ICD-10-CM

## 2025-03-11 PROCEDURE — 88311 DECALCIFY TISSUE: CPT | Mod: 26

## 2025-03-11 PROCEDURE — 88311 DECALCIFY TISSUE: CPT

## 2025-03-11 PROCEDURE — C9399: CPT

## 2025-03-11 PROCEDURE — C1713: CPT

## 2025-03-11 PROCEDURE — 22514 PERQ VERTEBRAL AUGMENTATION: CPT

## 2025-03-11 PROCEDURE — 88307 TISSUE EXAM BY PATHOLOGIST: CPT

## 2025-03-11 PROCEDURE — ZZZZZ: CPT

## 2025-03-11 PROCEDURE — 72020 X-RAY EXAM OF SPINE 1 VIEW: CPT

## 2025-03-11 PROCEDURE — 88307 TISSUE EXAM BY PATHOLOGIST: CPT | Mod: 26

## 2025-03-11 PROCEDURE — C1726: CPT

## 2025-03-11 RX ORDER — SODIUM CHLORIDE 9 G/1000ML
1000 INJECTION, SOLUTION INTRAVENOUS
Refills: 0 | Status: DISCONTINUED | OUTPATIENT
Start: 2025-03-11 | End: 2025-03-11

## 2025-03-11 RX ORDER — CELECOXIB 50 MG/1
200 CAPSULE ORAL ONCE
Refills: 0 | Status: COMPLETED | OUTPATIENT
Start: 2025-03-11 | End: 2025-03-11

## 2025-03-11 RX ORDER — ACETAMINOPHEN 500 MG/5ML
1000 LIQUID (ML) ORAL ONCE
Refills: 0 | Status: COMPLETED | OUTPATIENT
Start: 2025-03-11 | End: 2025-03-11

## 2025-03-11 RX ORDER — HYDROMORPHONE/SOD CHLOR,ISO/PF 2 MG/10 ML
0.5 SYRINGE (ML) INJECTION
Refills: 0 | Status: DISCONTINUED | OUTPATIENT
Start: 2025-03-11 | End: 2025-03-11

## 2025-03-11 RX ORDER — ACETAMINOPHEN 500 MG/5ML
650 LIQUID (ML) ORAL ONCE
Refills: 0 | Status: COMPLETED | OUTPATIENT
Start: 2025-03-11 | End: 2025-03-11

## 2025-03-11 RX ORDER — APREPITANT 40 MG/1
40 CAPSULE ORAL ONCE
Refills: 0 | Status: COMPLETED | OUTPATIENT
Start: 2025-03-11 | End: 2025-03-11

## 2025-03-11 RX ORDER — IBUPROFEN 200 MG
600 TABLET ORAL ONCE
Refills: 0 | Status: COMPLETED | OUTPATIENT
Start: 2025-03-11 | End: 2025-03-11

## 2025-03-11 RX ORDER — ONDANSETRON HCL/PF 4 MG/2 ML
4 VIAL (ML) INJECTION ONCE
Refills: 0 | Status: COMPLETED | OUTPATIENT
Start: 2025-03-11 | End: 2025-03-11

## 2025-03-11 RX ORDER — OXYCODONE HYDROCHLORIDE 30 MG/1
5 TABLET ORAL ONCE
Refills: 0 | Status: DISCONTINUED | OUTPATIENT
Start: 2025-03-11 | End: 2025-03-11

## 2025-03-11 RX ADMIN — Medication 650 MILLIGRAM(S): at 11:52

## 2025-03-11 RX ADMIN — Medication 600 MILLIGRAM(S): at 14:15

## 2025-03-11 RX ADMIN — Medication 4 MILLIGRAM(S): at 09:38

## 2025-03-11 RX ADMIN — Medication 650 MILLIGRAM(S): at 12:50

## 2025-03-11 NOTE — ASU DISCHARGE PLAN (ADULT/PEDIATRIC) - FINANCIAL ASSISTANCE
[facility] provides services at a reduced cost to those who are determined to be eligible through [facility]’s financial assistance program. Information regarding [facility]’s financial assistance program can be found by going to [email or web address] or by calling [phone].

## 2025-03-11 NOTE — ASU PATIENT PROFILE, ADULT - FALL HARM RISK - RISK INTERVENTIONS

## 2025-03-11 NOTE — CHART NOTE - NSCHARTNOTEFT_GEN_A_CORE
PACU ANESTHESIA ADMISSION NOTE      Procedure: Lumbar 1 kyphoplasty    Post op diagnosis:  Lumbar compression fracture     ____  Intubated  TV:______       Rate: ______      FiO2: ______    _x___  Patent Airway    _x___  Full return of protective reflexes    _x___  Full recovery from anesthesia / back to baseline status    Vitals:  T(F): 97.7   HR: 107  BP: 164/70  RR: 17  SpO2: 99%    Mental Status:  _x___ Awake   ___x__ Alert   _____ Drowsy   _____ Sedated    Nausea/Vomiting:  _x___  NO       ______Yes,   See Post - Op Orders         Pain Scale (0-10):  __0___    Treatment: _x___ None    ____ See Post - Op/PCA Orders    Post - Operative Fluids:   __x__ Oral   ____ See Post - Op Orders    Plan: Discharge:   _x___Home       _____Floor     _____Critical Care    _____  Other:_________________    Comments:  No anesthesia issues or complications noted.  Discharge when criteria met.

## 2025-03-11 NOTE — ASU PATIENT PROFILE, ADULT - FALL HARM RISK - HARM RISK INTERVENTIONS

## 2025-03-11 NOTE — ASU DISCHARGE PLAN (ADULT/PEDIATRIC) - ASU DC SPECIAL INSTRUCTIONSFT
- Upon discharge,  please call to schedule a follow up with Dr. Billings in 1-2 weeks.  - Upon discharge, please call to make a follow up appointment with your primary care provider to discuss your recent hospitalization/operation.  - Keep dressings dry for 48 hours after which you may remove dressing and cleanse with soap and water in the shower (no scrubbing). Run water and soap over incision sites and pat dry (no scrubbing). No submerging your incision sites in water (i.e. no swimming or baths) for 2-3 weeks and avoid exposing the area to jets/streams of water.   - You can resume your normal activities as tolerated, but avoid heavy (>15lb.) lifting and strenuous exercise for 4-6 weeks.    - *** Your pain should subside over the next few days.  While taking narcotic pain medications, you should not drive or operate heavy machinery.  If you were prescribed Percocet (oxycodone-acetaminophen) and are taking it with other medications containing acetaminophen (Tylenol), reduce your dose of percocet so that you  do not exceed 3,000 mg of acetaminophen per day.  - Ok to resume pain medications from nursing home.   - Narcotic pain medicine tends to cause constipation, you have can take an over-the-counter stool softener 3 times a day to help prevent that. Hold the stool softener if you start to have loose stools.  - If you experience fevers, chills, increasing abdominal pain, nausea, vomiting, inability to pass stool or gas, bleeding, or any other acute symptoms, please call your doctor and report to the emergency room immediately for further management.

## 2025-03-11 NOTE — H&P ADULT - HISTORY OF PRESENT ILLNESS
HPI    84 FMH RA, OA, bilateral ROHIT & TKA, right hip ORIF, HTN, GERD, and breast cancer (in remission), with L1 vertebral body compression, anticipated for L1 Kyphoplasty           Subjective: 84yFemale with a pmhx of Wedge compression fracture of unspecified thoracic vertebra, initial encounter for closed fracture  Cervicalgia    Corns and callosities    Encounter for other general examination    Long term (current) use of aromatase inhibitors    Malignant neoplasm of upper-outer quadrant of right female breast    Metatarsalgia, right foot    Nocturia    Other bursitis of hip, right hip    Other enthesopathy of right foot and ankle    Other specified disorders of the skin and subcutaneous tissue    Pain due to internal orthopedic prosthetic devices, implants and grafts, initial encounter    Pain in right foot    Radiculopathy, cervical region    Toxic effect of chromium and its compounds, accidental (unintentional), initial encounter    Toxic effect of other metals, accidental (unintentional), initial encounter    Unspecified open wound, left foot, initial encounter    FH: cancer    FH: lymphoma (Sibling)    Rheumatoid arthritis    HTN (hypertension)    Breast cancer    Depression    Chronic GERD    OA (osteoarthritis)    Rheumatoid arthritis    History of right hip replacement    Status post left hip replacement    S/P total knee replacement, left    H/O vaginal hysterectomy    Status post cholecystectomy    S/P lumpectomy of breast    [id number]    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 1000 milliGRAM(s) Oral once  aprepitant 40 milliGRAM(s) Oral once  celecoxib 200 milliGRAM(s) Oral once          REVIEW OF SYSTEMS    [ ] A ten-point review of systems was otherwise negative except as noted.  [ ] Due to altered mental status/intubation, subjective information were not able to be obtained from the patient. History was obtained, to the extent possible, from review of the chart and collateral sources of information.      Exam:                        Wound:    Imaging:    Assessment/Plan:   84 FMH RA, OA, bilateral ROHIT & TKA, right hip ORIF, HTN, GERD, and breast cancer (in remission), with L1 vertebral body compression    L1 Kyphoplasty

## 2025-03-11 NOTE — ASU PATIENT PROFILE, ADULT - PAIN, FACTORS THAT RELIEVE, PROFILE
medications Skyrizi Counseling: I discussed with the patient the risks of risankizumab-rzaa including but not limited to immunosuppression, and serious infections.  The patient understands that monitoring is required including a PPD at baseline and must alert us or the primary physician if symptoms of infection or other concerning signs are noted.

## 2025-03-11 NOTE — ASU DISCHARGE PLAN (ADULT/PEDIATRIC) - CARE PROVIDER_API CALL
Lyn Billings  Neurosurgery  [address] [place] [address], [address]  [place] [address]  Phone: [phone]  Fax: [phone]  Follow Up Time: 2 weeks

## 2025-03-11 NOTE — ASU DISCHARGE PLAN (ADULT/PEDIATRIC) - NS MD DC FALL RISK RISK
For information on Fall & Injury Prevention, visit: [email or web address] OR  [email or web address] OR  https://www.cdc.gov/steadi/patient.html

## 2025-03-17 LAB — SURGICAL PATHOLOGY STUDY: SIGNIFICANT CHANGE UP

## 2025-03-18 DIAGNOSIS — S32.010A WEDGE COMPRESSION FRACTURE OF FIRST LUMBAR VERTEBRA, INITIAL ENCOUNTER FOR CLOSED FRACTURE: ICD-10-CM
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
## 2025-03-21 NOTE — ED ADULT TRIAGE NOTE - CHIEF COMPLAINT QUOTE
Patient's (Body mass index is 58.14 kg/m².) indicates that they are morbidly/severely obese (BMI > 40 or > 35 with obesity - related health condition) with health conditions that include hypertension and GERD . Weight is improving with treatment. BMI  is above average; BMI management plan is completed. We discussed low calorie, low carb based diet program, portion control, increasing exercise, and pharmacologic options including naltrexone .    I have instructed the patient to continue with pursuit of medical weight loss as a part of this program. Patient does meet criteria for use of anorectics at this time as BMI >30 .     The current plan for this month includes:   - It is appropriate to continue anorectic medications as prescribed at this time  - Increase water to recommended daily amount  - Weight loss goal 4-6lbs this month  - Adjust macronutrients as discussed. Bring food journal to next visit for review  >>Encouraged to resume journaling. Increase protein.        pt fell and hit her head and her back. pt only on aspirin. no loc

## 2025-04-28 ENCOUNTER — APPOINTMENT (OUTPATIENT)
Dept: NEUROSURGERY | Facility: CLINIC | Age: 85
End: 2025-04-28

## 2025-05-15 ENCOUNTER — APPOINTMENT (OUTPATIENT)
Facility: CLINIC | Age: 85
End: 2025-05-15
Payer: MEDICARE

## 2025-05-15 DIAGNOSIS — Z96.651 PRESENCE OF RIGHT ARTIFICIAL KNEE JOINT: ICD-10-CM

## 2025-05-15 PROCEDURE — 99213 OFFICE O/P EST LOW 20 MIN: CPT

## 2025-05-15 PROCEDURE — 73502 X-RAY EXAM HIP UNI 2-3 VIEWS: CPT

## 2025-05-15 PROCEDURE — 73560 X-RAY EXAM OF KNEE 1 OR 2: CPT | Mod: 50

## 2025-05-27 ENCOUNTER — NON-APPOINTMENT (OUTPATIENT)
Age: 85
End: 2025-05-27

## 2025-05-27 ENCOUNTER — INPATIENT (INPATIENT)
Facility: HOSPITAL | Age: 85
LOS: 6 days | Discharge: NURSING, REHAB OR LONG-TERM CARE | DRG: 464 | End: 2025-06-03
Attending: INTERNAL MEDICINE | Admitting: INTERNAL MEDICINE
Payer: MEDICARE

## 2025-05-27 VITALS
OXYGEN SATURATION: 98 % | DIASTOLIC BLOOD PRESSURE: 96 MMHG | SYSTOLIC BLOOD PRESSURE: 198 MMHG | RESPIRATION RATE: 18 BRPM | HEART RATE: 81 BPM | TEMPERATURE: 99 F

## 2025-05-27 DIAGNOSIS — Z96.652 PRESENCE OF LEFT ARTIFICIAL KNEE JOINT: Chronic | ICD-10-CM

## 2025-05-27 DIAGNOSIS — Z90.49 ACQUIRED ABSENCE OF OTHER SPECIFIED PARTS OF DIGESTIVE TRACT: ICD-10-CM

## 2025-05-27 DIAGNOSIS — Z96.642 PRESENCE OF LEFT ARTIFICIAL HIP JOINT: Chronic | ICD-10-CM

## 2025-05-27 DIAGNOSIS — Z92.3 PERSONAL HISTORY OF IRRADIATION: ICD-10-CM

## 2025-05-27 DIAGNOSIS — F32.A DEPRESSION, UNSPECIFIED: ICD-10-CM
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
## 2025-05-27 DIAGNOSIS — Z98.890 OTHER SPECIFIED POSTPROCEDURAL STATES: Chronic | ICD-10-CM

## 2025-05-27 DIAGNOSIS — T84.84XA PAIN DUE TO INTERNAL ORTHOPEDIC PROSTHETIC DEVICES, IMPLANTS AND GRAFTS, INITIAL ENCOUNTER: ICD-10-CM

## 2025-05-27 DIAGNOSIS — Z79.82 LONG TERM (CURRENT) USE OF ASPIRIN: ICD-10-CM

## 2025-05-27 DIAGNOSIS — Z96.652 PRESENCE OF LEFT ARTIFICIAL KNEE JOINT: ICD-10-CM

## 2025-05-27 DIAGNOSIS — E87.1 HYPO-OSMOLALITY AND HYPONATREMIA: ICD-10-CM

## 2025-05-27 DIAGNOSIS — Z96.643 PRESENCE OF ARTIFICIAL HIP JOINT, BILATERAL: ICD-10-CM

## 2025-05-27 DIAGNOSIS — Z90.710 ACQUIRED ABSENCE OF BOTH CERVIX AND UTERUS: Chronic | ICD-10-CM
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
## 2025-05-27 DIAGNOSIS — K21.9 GASTRO-ESOPHAGEAL REFLUX DISEASE WITHOUT ESOPHAGITIS: ICD-10-CM

## 2025-05-27 DIAGNOSIS — M79.606 PAIN IN LEG, UNSPECIFIED: ICD-10-CM

## 2025-05-27 DIAGNOSIS — M81.0 AGE-RELATED OSTEOPOROSIS WITHOUT CURRENT PATHOLOGICAL FRACTURE: ICD-10-CM

## 2025-05-27 DIAGNOSIS — Z90.49 ACQUIRED ABSENCE OF OTHER SPECIFIED PARTS OF DIGESTIVE TRACT: Chronic | ICD-10-CM

## 2025-05-27 DIAGNOSIS — Z96.641 PRESENCE OF RIGHT ARTIFICIAL HIP JOINT: Chronic | ICD-10-CM
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
## 2025-05-27 DIAGNOSIS — I10 ESSENTIAL (PRIMARY) HYPERTENSION: ICD-10-CM

## 2025-05-27 DIAGNOSIS — Z85.3 PERSONAL HISTORY OF MALIGNANT NEOPLASM OF BREAST: ICD-10-CM

## 2025-05-27 DIAGNOSIS — M48.56XA COLLAPSED VERTEBRA, NOT ELSEWHERE CLASSIFIED, LUMBAR REGION, INITIAL ENCOUNTER FOR FRACTURE: ICD-10-CM

## 2025-05-27 LAB
ALBUMIN SERPL ELPH-MCNC: 3.7 G/DL — SIGNIFICANT CHANGE UP (ref 3.5–5.2)
ALP SERPL-CCNC: 120 U/L — HIGH (ref 30–115)
ALT FLD-CCNC: 15 U/L — SIGNIFICANT CHANGE UP (ref 0–41)
ANION GAP SERPL CALC-SCNC: 14 MMOL/L — SIGNIFICANT CHANGE UP (ref 7–14)
AST SERPL-CCNC: 32 U/L — SIGNIFICANT CHANGE UP (ref 0–41)
BASOPHILS # BLD AUTO: 0.07 K/UL — SIGNIFICANT CHANGE UP (ref 0–0.2)
BASOPHILS NFR BLD AUTO: 0.9 % — SIGNIFICANT CHANGE UP (ref 0–1)
BILIRUB SERPL-MCNC: 0.3 MG/DL — SIGNIFICANT CHANGE UP (ref 0.2–1.2)
BUN SERPL-MCNC: 29 MG/DL — HIGH (ref 10–20)
CALCIUM SERPL-MCNC: 10.4 MG/DL — SIGNIFICANT CHANGE UP (ref 8.4–10.5)
CHLORIDE SERPL-SCNC: 100 MMOL/L — SIGNIFICANT CHANGE UP (ref 98–110)
CO2 SERPL-SCNC: 24 MMOL/L — SIGNIFICANT CHANGE UP (ref 17–32)
CREAT SERPL-MCNC: 1 MG/DL — SIGNIFICANT CHANGE UP (ref 0.7–1.5)
EGFR: 55 ML/MIN/1.73M2 — LOW
EGFR: 55 ML/MIN/1.73M2 — LOW
EOSINOPHIL # BLD AUTO: 0.07 K/UL — SIGNIFICANT CHANGE UP (ref 0–0.7)
EOSINOPHIL NFR BLD AUTO: 0.9 % — SIGNIFICANT CHANGE UP (ref 0–8)
GLUCOSE SERPL-MCNC: 92 MG/DL — SIGNIFICANT CHANGE UP (ref 70–99)
HCT VFR BLD CALC: 37.8 % — SIGNIFICANT CHANGE UP (ref 37–47)
HGB BLD-MCNC: 11.9 G/DL — LOW (ref 12–16)
LYMPHOCYTES # BLD AUTO: 1.4 K/UL — SIGNIFICANT CHANGE UP (ref 1.2–3.4)
LYMPHOCYTES # BLD AUTO: 18.4 % — LOW (ref 20.5–51.1)
MCHC RBC-ENTMCNC: 27.9 PG — SIGNIFICANT CHANGE UP (ref 27–31)
MCHC RBC-ENTMCNC: 31.5 G/DL — LOW (ref 32–37)
MCV RBC AUTO: 88.7 FL — SIGNIFICANT CHANGE UP (ref 81–99)
MONOCYTES # BLD AUTO: 0.73 K/UL — HIGH (ref 0.1–0.6)
MONOCYTES NFR BLD AUTO: 9.6 % — HIGH (ref 1.7–9.3)
NEUTROPHILS # BLD AUTO: 4.99 K/UL — SIGNIFICANT CHANGE UP (ref 1.4–6.5)
NEUTROPHILS NFR BLD AUTO: 65.8 % — SIGNIFICANT CHANGE UP (ref 42.2–75.2)
PLATELET # BLD AUTO: 240 K/UL — SIGNIFICANT CHANGE UP (ref 130–400)
PMV BLD: 9.8 FL — SIGNIFICANT CHANGE UP (ref 7.4–10.4)
POTASSIUM SERPL-MCNC: 5.1 MMOL/L — HIGH (ref 3.5–5)
POTASSIUM SERPL-SCNC: 5.1 MMOL/L — HIGH (ref 3.5–5)
PROT SERPL-MCNC: 7.1 G/DL — SIGNIFICANT CHANGE UP (ref 6–8)
RBC # BLD: 4.26 M/UL — SIGNIFICANT CHANGE UP (ref 4.2–5.4)
RBC # FLD: 18 % — HIGH (ref 11.5–14.5)
SODIUM SERPL-SCNC: 138 MMOL/L — SIGNIFICANT CHANGE UP (ref 135–146)
WBC # BLD: 7.59 K/UL — SIGNIFICANT CHANGE UP (ref 4.8–10.8)
WBC # FLD AUTO: 7.59 K/UL — SIGNIFICANT CHANGE UP (ref 4.8–10.8)

## 2025-05-27 PROCEDURE — C1889: CPT

## 2025-05-27 PROCEDURE — 94010 BREATHING CAPACITY TEST: CPT

## 2025-05-27 PROCEDURE — 86850 RBC ANTIBODY SCREEN: CPT

## 2025-05-27 PROCEDURE — 97535 SELF CARE MNGMENT TRAINING: CPT | Mod: GO

## 2025-05-27 PROCEDURE — 73552 X-RAY EXAM OF FEMUR 2/>: CPT | Mod: 26,RT

## 2025-05-27 PROCEDURE — 83735 ASSAY OF MAGNESIUM: CPT

## 2025-05-27 PROCEDURE — 97530 THERAPEUTIC ACTIVITIES: CPT | Mod: GP

## 2025-05-27 PROCEDURE — 86900 BLOOD TYPING SEROLOGIC ABO: CPT

## 2025-05-27 PROCEDURE — 85025 COMPLETE CBC W/AUTO DIFF WBC: CPT

## 2025-05-27 PROCEDURE — 85610 PROTHROMBIN TIME: CPT

## 2025-05-27 PROCEDURE — 85730 THROMBOPLASTIN TIME PARTIAL: CPT

## 2025-05-27 PROCEDURE — 72170 X-RAY EXAM OF PELVIS: CPT | Mod: 26

## 2025-05-27 PROCEDURE — 97162 PT EVAL MOD COMPLEX 30 MIN: CPT | Mod: GP

## 2025-05-27 PROCEDURE — 80048 BASIC METABOLIC PNL TOTAL CA: CPT

## 2025-05-27 PROCEDURE — 70450 CT HEAD/BRAIN W/O DYE: CPT | Mod: 26

## 2025-05-27 PROCEDURE — 81001 URINALYSIS AUTO W/SCOPE: CPT

## 2025-05-27 PROCEDURE — 36415 COLL VENOUS BLD VENIPUNCTURE: CPT

## 2025-05-27 PROCEDURE — 73560 X-RAY EXAM OF KNEE 1 OR 2: CPT | Mod: RT

## 2025-05-27 PROCEDURE — 73564 X-RAY EXAM KNEE 4 OR MORE: CPT | Mod: 26,RT

## 2025-05-27 PROCEDURE — C1713: CPT

## 2025-05-27 PROCEDURE — C1776: CPT

## 2025-05-27 PROCEDURE — 86901 BLOOD TYPING SEROLOGIC RH(D): CPT

## 2025-05-27 PROCEDURE — 97110 THERAPEUTIC EXERCISES: CPT | Mod: GP

## 2025-05-27 PROCEDURE — 84100 ASSAY OF PHOSPHORUS: CPT

## 2025-05-27 PROCEDURE — 71045 X-RAY EXAM CHEST 1 VIEW: CPT | Mod: 26

## 2025-05-27 PROCEDURE — 97167 OT EVAL HIGH COMPLEX 60 MIN: CPT | Mod: GO

## 2025-05-27 PROCEDURE — 99285 EMERGENCY DEPT VISIT HI MDM: CPT | Mod: FS

## 2025-05-27 PROCEDURE — 87086 URINE CULTURE/COLONY COUNT: CPT

## 2025-05-27 PROCEDURE — 97116 GAIT TRAINING THERAPY: CPT | Mod: GP

## 2025-05-27 PROCEDURE — 85027 COMPLETE CBC AUTOMATED: CPT

## 2025-05-27 NOTE — ED PROVIDER NOTE - OBJECTIVE STATEMENT
84-year-old female with past medical history of rheumatoid arthritis, osteoarthritis, hypertension, GERD, breast CA in remission, bilateral knee replacement, B/L hip replacement presents for evaluation status post fall.  Patient states at 8 AM this morning she bent over to pick something up and fell onto her right knee and hit her forehead on the ground since then has been unable to ambulate leading her to call 911 this afternoon.  Now presents with right proximal lower extremity pain, aggravated with weightbearing, no relieving factors.  Denies LOC, AC, headache, neck pain, chest pain, shortness of breath, weakness, numbness, dysuria, hematuria, vomiting, diarrhea.

## 2025-05-27 NOTE — H&P ADULT - HISTORY OF PRESENT ILLNESS
84-year-old female with past medical history of rheumatoid arthritis, osteoarthritis, hypertension, GERD, breast CA in remission, bilateral knee replacement, B/L hip replacement presents for evaluation status post fall.  Patient states at 8 AM this morning she bent over to pick something up and fell onto her right knee and hit her forehead on the ground since then has been unable to ambulate leading her to call 911 this afternoon.  Now presents with right proximal lower extremity pain, aggravated with weightbearing, no relieving factors.  Denies LOC, AC, headache, neck pain, chest pain, shortness of breath, weakness, numbness, dysuria, hematuria, vomiting, diarrhea.    ED vitals:   · BP Systolic   198 mm Hg  · BP Diastolic   96 mm Hg  · Heart Rate  81 /min  · Respiration Rate (breaths/min)  18 /min  · Temp (F)  98.9 Degrees F  · Temp (C)  37.2 Degrees C  · Temp site  oral  · SpO2 (%)  98 %  · O2 Delivery/Oxygen Delivery Method  room air  · Temp at ED Arrival (C)  37.2 Degrees C    Radiology:  Xray chest: Left lower lung zone opacity.    CT head: negative     Xray knee/ pelvis:   PELVIS: Status post bilateral total hip arthroplasties. No acute   displaced fracture. Osteopenia. Lower lumbar spine degenerative changes.    RIGHT FEMUR/KNEE: Status post femur ORIF with cerclage wires,   intramedullary nail with plate and screws. Status post right total knee   arthroplasty. No acute displaced fracture. Mild lucency at the greater   lesser trochanter, stable. Displacement of the lesser trochanter, also   stable.

## 2025-05-27 NOTE — H&P ADULT - TIME BILLING
spending 55 minutes on this patient's case:   20  minutes w patient,  18  minutes reviewing the chart,    and  17  minutes speaking to the HO, RN and family, also coordinating care and documenting all.

## 2025-05-27 NOTE — H&P ADULT - NSHPLABSRESULTS_GEN_ALL_CORE
LABS:                        11.9   7.59  )-----------( 240      ( 27 May 2025 20:17 )             37.8     05-27    138  |  100  |  29[H]  ----------------------------<  92  5.1[H]   |  24  |  1.0    Ca    10.4      27 May 2025 20:17    TPro  7.1  /  Alb  3.7  /  TBili  0.3  /  DBili  x   /  AST  32  /  ALT  15  /  AlkPhos  120[H]  05-27

## 2025-05-27 NOTE — H&P ADULT - ASSESSMENT
84-year-old female with past medical history of rheumatoid arthritis, osteoarthritis, hypertension, GERD, breast CA in remission, bilateral knee replacement, B/L hip replacement presents for evaluation status post fall.  Patient states at 8 AM this morning she bent over to pick something up and fell onto her right knee and hit her forehead on the ground since then has been unable to ambulate leading her to call 911 this afternoon.  Now presents with right proximal lower extremity pain, aggravated with weightbearing, no relieving factors.  Denies LOC, AC, headache, neck pain, chest pain, shortness of breath, weakness, numbness, dysuria, hematuria, vomiting, diarrhea.    #Fall w/o LOC  #L1 compression fracture  #Rt knee & Back pain, inability to ambulate, Deconditioning  #Hx of Osteoporosis RA, OA s/p B/L ROHIT & TKA, Rt hip ORIF(2024)  - pain with ambulating since fall, gave 1x dose of morphine for pain   - trauma workup was all negative; Cxray showed L lower opacity likely chronic; no WBC count   - Follows with Dr. Cruz   - Seen by ortho on last admission who recommended TLSO brace while ambulating   - Pain control on last admission Tylenol 1000mg q8, Celecoxib 200mg BID, Lidocaine patch and Tramadol 50mg q6 prn  - c/w MTX, prednisone for RA, folic acid   - PT/ OT    #HTN  - c/w Losartan 25mg, Toprol 25mg, HCTZ 12.5mg     #HLD  -c/w rosuvastatin 10mg    #GERD  - c/w famotidine 20mg bID    #Anxiety/depression  - c/w sertraline 50mg at night     Diet: DASH  DVT: lovenox   GI: PPI  Dispo:  84-year-old female with past medical history of rheumatoid arthritis, osteoarthritis, hypertension, GERD, breast CA in remission, bilateral knee replacement, B/L hip replacement presents for evaluation status post fall.  Patient states at 8 AM this morning she bent over to pick something up and fell onto her right knee and hit her forehead on the ground since then has been unable to ambulate leading her to call 911 this afternoon.  Now presents with right proximal lower extremity pain, aggravated with weightbearing, no relieving factors.  Denies LOC, AC, headache, neck pain, chest pain, shortness of breath, weakness, numbness, dysuria, hematuria, vomiting, diarrhea.    #Fall w/o LOC  #L1 compression fracture  #Rt knee & Back pain, inability to ambulate, Deconditioning  #Hx of Osteoporosis RA, OA s/p B/L ROHIT & TKA, Rt hip ORIF(2024)  - pain with ambulating since fall, gave 1x dose of morphine for pain   - trauma workup was all negative; Cxray showed L lower opacity likely chronic; no WBC count   - Follows with Dr. Cruz   - Seen by ortho on last admission who recommended TLSO brace while ambulating   - Pain control on last admission Tylenol 1000mg q8, Celecoxib 200mg BID, Lidocaine patch and Tramadol 50mg q6 prn  - On MTX and  prednisone for RA,  - c/w folic acid    - PT/ OT    #HTN  - c/w Losartan 25mg, Toprol 25mg, HCTZ 12.5mg     #HLD  -c/w rosuvastatin 10mg    #GERD  - c/w famotidine 20mg bID    #Anxiety/depression  - c/w sertraline 50mg at night     Diet: DASH  DVT: lovenox   GI: PPI  Dispo:

## 2025-05-27 NOTE — ED ADULT NURSE NOTE - NSFALLHARMRISKINTERV_ED_ALL_ED
Assistance OOB with selected safe patient handling equipment if applicable/Assistance with ambulation/Communicate risk of Fall with Harm to all staff, patient, and family/Encourage patient to sit up slowly, dangle for a short time, stand at bedside before walking/Orthostatic vital signs/Provide visual cue: red socks, yellow wristband, yellow gown, etc/Reinforce activity limits and safety measures with patient and family/Review medications for side effects contributing to fall risk/Toileting schedule using arm’s reach rule for commode and bathroom/Bed in lowest position, wheels locked, appropriate side rails in place/Call bell, personal items and telephone in reach/Instruct patient to call for assistance before getting out of bed/chair/stretcher/Non-slip footwear applied when patient is off stretcher/[place] to call system/Physically safe environment - no spills, clutter or unnecessary equipment/Purposeful Proactive Rounding/Room/bathroom lighting operational, light cord in reach

## 2025-05-27 NOTE — ED PROVIDER NOTE - CLINICAL SUMMARY MEDICAL DECISION MAKING FREE TEXT BOX
pt s/p fall, hx of RA, will admit for frequent falls    XR interpretation: No cardiopulmonary disease no pelvic fracture, independently interpretted by Fahad Petit DO    Appropriate medications for patient's presenting complaints were ordered and effects were reassessed.  Patient's external records were reviewed.     Escalation to admission/observation was considered.  At this time, patient requires inpatient hospitalization .

## 2025-05-27 NOTE — H&P ADULT - NSHPPHYSICALEXAM_GEN_ALL_CORE
GENERAL: NAD, lying in bed comfortably  HEAD:  Atraumatic, Normocephalic  CHEST/LUNG: Clear to auscultation bilaterally; No wheezing, or rubs. Unlabored respirations  HEART: Regular rate and rhythm; No murmurs, rubs, or gallops  ABDOMEN: BSx4; Soft, nontender, nondistended  EXTREMITIES:  No clubbing, cyanosis, or edema  NERVOUS SYSTEM:  A&Ox3, no focal deficits   SKIN: No rashes or lesions GENERAL: NAD, lying in bed comfortably  HEAD:  Atraumatic, Normocephalic  CHEST/LUNG: Clear to auscultation bilaterally; No wheezing, or rubs. Unlabored respirations  HEART: Regular rate and rhythm; No murmurs, rubs, or gallops  ABDOMEN: BSx4; Soft, nontender, nondistended  EXTREMITIES:  No clubbing, cyanosis, or edema, R. LE tenderness   NERVOUS SYSTEM:  A&Ox3, no focal deficits   SKIN: No rashes or lesions

## 2025-05-27 NOTE — ED ADULT NURSE NOTE - OBJECTIVE STATEMENT
pt BIBEMS due to a fall. Pt states she was bending over to pick something up off the floor and then she fell. PT states she may have hit the side of her head. Pt denies LOC, and AC use. Pt states she only uses baby aspirin. Pt c/o of rt knee pain. pt BIBEMS due to a fall. Pt states she was bending over to pick something up off the floor and then she fell. Pt states she may have hit the side of her head. Pt denies LOC, and AC use. Pt states she only uses baby aspirin. Pt c/o of rt knee pain.

## 2025-05-27 NOTE — H&P ADULT - ATTENDING COMMENTS
Chart reviewed, patient examined. Pertinent results reviewed.  Case discussed with NIELS;   JEET#2; admitted thru ED last Pm  Patient well known to me for many years  Last admission reviewed also    Agree w above A&P. Fell over easily. Some pain RLE-thigh area-much hardware from multiple Ortho surgeries in that leg. was home alone- and couldn't get up. In Ed couldn't stand and walk either.  Patient reports that The Ortho team, with Dr Fallon- was planning a R knee surgery in June.     Min pain here. A&Ox4; speaks clearly. lungs clear; Ht RRR, no RG, ? 1/6 BLANCA.  ABD: benign. DEC ROM B/L LE.     I/P: Fall and poor ability to get back up- while living alone.  Out of rehab ? 2 mos- Home w [facility] part of the day.   Agree w PT/OT.   Get Ortho to see and review.  Will probably need short term Rehab @ SNF  Ask ortho to evaluate RLE, and consider doing their planned procedure while she is here.

## 2025-05-27 NOTE — ED PROVIDER NOTE - PHYSICAL EXAMINATION
CONST: NAD  EYES: Sclera and conjunctiva clear.   ENT: No nasal discharge. Oropharynx normal appearing.   NECK: Non-tender, no meningeal signs. normal ROM. supple   CARD: S1 S2; No jvd  RESP: Equal BS B/L, No wheezes, rhonchi or rales. No distress  GI: Soft, non-tender, non-distended. no cva tenderness. normal BS  MS: R proximal LE tenderness. Normal ROM in all extremities. pulses 2 +. no calf tenderness or swelling  SKIN: Warm, dry, no acute rashes. Good turgor  NEURO: A&Ox3, No focal deficits. Strength 5/5 with no sensory deficits.

## 2025-05-28 LAB
ANION GAP SERPL CALC-SCNC: 11 MMOL/L — SIGNIFICANT CHANGE UP (ref 7–14)
BUN SERPL-MCNC: 24 MG/DL — HIGH (ref 10–20)
CALCIUM SERPL-MCNC: 10.1 MG/DL — SIGNIFICANT CHANGE UP (ref 8.4–10.5)
CHLORIDE SERPL-SCNC: 97 MMOL/L — LOW (ref 98–110)
CO2 SERPL-SCNC: 26 MMOL/L — SIGNIFICANT CHANGE UP (ref 17–32)
CREAT SERPL-MCNC: 1 MG/DL — SIGNIFICANT CHANGE UP (ref 0.7–1.5)
EGFR: 55 ML/MIN/1.73M2 — LOW
EGFR: 55 ML/MIN/1.73M2 — LOW
GLUCOSE SERPL-MCNC: 74 MG/DL — SIGNIFICANT CHANGE UP (ref 70–99)
HCT VFR BLD CALC: 35 % — LOW (ref 37–47)
HGB BLD-MCNC: 11 G/DL — LOW (ref 12–16)
MCHC RBC-ENTMCNC: 28 PG — SIGNIFICANT CHANGE UP (ref 27–31)
MCHC RBC-ENTMCNC: 31.4 G/DL — LOW (ref 32–37)
MCV RBC AUTO: 89.1 FL — SIGNIFICANT CHANGE UP (ref 81–99)
NRBC BLD AUTO-RTO: 0 /100 WBCS — SIGNIFICANT CHANGE UP (ref 0–0)
PLATELET # BLD AUTO: 250 K/UL — SIGNIFICANT CHANGE UP (ref 130–400)
PMV BLD: 9.9 FL — SIGNIFICANT CHANGE UP (ref 7.4–10.4)
POTASSIUM SERPL-MCNC: 4.3 MMOL/L — SIGNIFICANT CHANGE UP (ref 3.5–5)
POTASSIUM SERPL-SCNC: 4.3 MMOL/L — SIGNIFICANT CHANGE UP (ref 3.5–5)
RBC # BLD: 3.93 M/UL — LOW (ref 4.2–5.4)
RBC # FLD: 17.6 % — HIGH (ref 11.5–14.5)
SODIUM SERPL-SCNC: 134 MMOL/L — LOW (ref 135–146)
WBC # BLD: 7.81 K/UL — SIGNIFICANT CHANGE UP (ref 4.8–10.8)
WBC # FLD AUTO: 7.81 K/UL — SIGNIFICANT CHANGE UP (ref 4.8–10.8)

## 2025-05-28 RX ORDER — LOSARTAN POTASSIUM 100 MG/1
50 TABLET, FILM COATED ORAL DAILY
Refills: 0 | Status: DISCONTINUED | OUTPATIENT
Start: 2025-05-28 | End: 2025-06-02

## 2025-05-28 RX ORDER — MAGNESIUM OXIDE 400 MG
200 TABLET ORAL
Refills: 0 | Status: DISCONTINUED | OUTPATIENT
Start: 2025-05-28 | End: 2025-06-02

## 2025-05-28 RX ORDER — PREDNISONE 20 MG/1
5 TABLET ORAL DAILY
Refills: 0 | Status: DISCONTINUED | OUTPATIENT
Start: 2025-05-28 | End: 2025-05-29

## 2025-05-28 RX ORDER — METOPROLOL SUCCINATE 50 MG/1
12.5 TABLET, EXTENDED RELEASE ORAL ONCE
Refills: 0 | Status: COMPLETED | OUTPATIENT
Start: 2025-05-28 | End: 2025-05-28

## 2025-05-28 RX ORDER — ACETAMINOPHEN 500 MG/5ML
650 LIQUID (ML) ORAL EVERY 6 HOURS
Refills: 0 | Status: DISCONTINUED | OUTPATIENT
Start: 2025-05-28 | End: 2025-05-28

## 2025-05-28 RX ORDER — LOSARTAN POTASSIUM 100 MG/1
25 TABLET, FILM COATED ORAL DAILY
Refills: 0 | Status: DISCONTINUED | OUTPATIENT
Start: 2025-05-28 | End: 2025-05-28

## 2025-05-28 RX ORDER — ROSUVASTATIN CALCIUM 20 MG/1
10 TABLET, FILM COATED ORAL AT BEDTIME
Refills: 0 | Status: DISCONTINUED | OUTPATIENT
Start: 2025-05-28 | End: 2025-06-02

## 2025-05-28 RX ORDER — ASPIRIN 325 MG
81 TABLET ORAL DAILY
Refills: 0 | Status: DISCONTINUED | OUTPATIENT
Start: 2025-05-28 | End: 2025-06-02

## 2025-05-28 RX ORDER — METOPROLOL SUCCINATE 50 MG/1
25 TABLET, EXTENDED RELEASE ORAL ONCE
Refills: 0 | Status: DISCONTINUED | OUTPATIENT
Start: 2025-05-28 | End: 2025-05-28

## 2025-05-28 RX ORDER — LIDOCAINE HYDROCHLORIDE 20 MG/ML
1 JELLY TOPICAL EVERY 24 HOURS
Refills: 0 | Status: DISCONTINUED | OUTPATIENT
Start: 2025-05-28 | End: 2025-06-02

## 2025-05-28 RX ORDER — ACETAMINOPHEN 500 MG/5ML
1000 LIQUID (ML) ORAL EVERY 8 HOURS
Refills: 0 | Status: DISCONTINUED | OUTPATIENT
Start: 2025-05-28 | End: 2025-06-02

## 2025-05-28 RX ORDER — ONDANSETRON HCL/PF 4 MG/2 ML
4 VIAL (ML) INJECTION EVERY 8 HOURS
Refills: 0 | Status: DISCONTINUED | OUTPATIENT
Start: 2025-05-28 | End: 2025-06-02

## 2025-05-28 RX ORDER — METOPROLOL SUCCINATE 50 MG/1
25 TABLET, EXTENDED RELEASE ORAL DAILY
Refills: 0 | Status: DISCONTINUED | OUTPATIENT
Start: 2025-05-28 | End: 2025-06-02

## 2025-05-28 RX ORDER — MAGNESIUM, ALUMINUM HYDROXIDE 200-200 MG
30 TABLET,CHEWABLE ORAL EVERY 4 HOURS
Refills: 0 | Status: DISCONTINUED | OUTPATIENT
Start: 2025-05-28 | End: 2025-06-02

## 2025-05-28 RX ORDER — CALCIUM CARBONATE 750 MG/1
1 TABLET ORAL DAILY
Refills: 0 | Status: DISCONTINUED | OUTPATIENT
Start: 2025-05-28 | End: 2025-06-02

## 2025-05-28 RX ORDER — TRAMADOL HYDROCHLORIDE 50 MG/1
50 TABLET, FILM COATED ORAL EVERY 6 HOURS
Refills: 0 | Status: DISCONTINUED | OUTPATIENT
Start: 2025-05-28 | End: 2025-06-02

## 2025-05-28 RX ORDER — CELECOXIB 50 MG/1
200 CAPSULE ORAL
Refills: 0 | Status: DISCONTINUED | OUTPATIENT
Start: 2025-05-28 | End: 2025-06-02

## 2025-05-28 RX ORDER — CYANOCOBALAMIN 1000 UG/ML
1000 INJECTION INTRAMUSCULAR; SUBCUTANEOUS DAILY
Refills: 0 | Status: DISCONTINUED | OUTPATIENT
Start: 2025-05-28 | End: 2025-06-02

## 2025-05-28 RX ORDER — ENOXAPARIN SODIUM 100 MG/ML
40 INJECTION SUBCUTANEOUS EVERY 24 HOURS
Refills: 0 | Status: DISCONTINUED | OUTPATIENT
Start: 2025-05-28 | End: 2025-06-02

## 2025-05-28 RX ORDER — MELATONIN 5 MG
3 TABLET ORAL AT BEDTIME
Refills: 0 | Status: DISCONTINUED | OUTPATIENT
Start: 2025-05-28 | End: 2025-06-02

## 2025-05-28 RX ORDER — SERTRALINE 100 MG/1
50 TABLET, FILM COATED ORAL AT BEDTIME
Refills: 0 | Status: DISCONTINUED | OUTPATIENT
Start: 2025-05-28 | End: 2025-06-02

## 2025-05-28 RX ADMIN — Medication 1000 MILLIGRAM(S): at 06:07

## 2025-05-28 RX ADMIN — Medication 1000 UNIT(S): at 12:09

## 2025-05-28 RX ADMIN — Medication 200 MILLIGRAM(S): at 09:30

## 2025-05-28 RX ADMIN — CALCIUM CARBONATE 1 TABLET(S): 750 TABLET ORAL at 12:09

## 2025-05-28 RX ADMIN — Medication 20 MILLIGRAM(S): at 06:09

## 2025-05-28 RX ADMIN — TRAMADOL HYDROCHLORIDE 50 MILLIGRAM(S): 50 TABLET, FILM COATED ORAL at 02:30

## 2025-05-28 RX ADMIN — LOSARTAN POTASSIUM 50 MILLIGRAM(S): 100 TABLET, FILM COATED ORAL at 18:24

## 2025-05-28 RX ADMIN — METOPROLOL SUCCINATE 12.5 MILLIGRAM(S): 50 TABLET, EXTENDED RELEASE ORAL at 01:59

## 2025-05-28 RX ADMIN — ROSUVASTATIN CALCIUM 10 MILLIGRAM(S): 20 TABLET, FILM COATED ORAL at 21:25

## 2025-05-28 RX ADMIN — Medication 1 APPLICATION(S): at 12:10

## 2025-05-28 RX ADMIN — Medication 40 MILLIGRAM(S): at 06:07

## 2025-05-28 RX ADMIN — LOSARTAN POTASSIUM 25 MILLIGRAM(S): 100 TABLET, FILM COATED ORAL at 00:20

## 2025-05-28 RX ADMIN — Medication 200 MILLIGRAM(S): at 18:24

## 2025-05-28 RX ADMIN — Medication 1000 MILLIGRAM(S): at 21:25

## 2025-05-28 RX ADMIN — CELECOXIB 200 MILLIGRAM(S): 50 CAPSULE ORAL at 18:23

## 2025-05-28 RX ADMIN — METOPROLOL SUCCINATE 25 MILLIGRAM(S): 50 TABLET, EXTENDED RELEASE ORAL at 00:20

## 2025-05-28 RX ADMIN — PREDNISONE 5 MILLIGRAM(S): 20 TABLET ORAL at 06:07

## 2025-05-28 RX ADMIN — CYANOCOBALAMIN 1000 MICROGRAM(S): 1000 INJECTION INTRAMUSCULAR; SUBCUTANEOUS at 12:09

## 2025-05-28 RX ADMIN — ENOXAPARIN SODIUM 40 MILLIGRAM(S): 100 INJECTION SUBCUTANEOUS at 06:07

## 2025-05-28 RX ADMIN — CELECOXIB 200 MILLIGRAM(S): 50 CAPSULE ORAL at 06:07

## 2025-05-28 RX ADMIN — TRAMADOL HYDROCHLORIDE 50 MILLIGRAM(S): 50 TABLET, FILM COATED ORAL at 01:59

## 2025-05-28 RX ADMIN — Medication 81 MILLIGRAM(S): at 12:09

## 2025-05-28 RX ADMIN — SERTRALINE 50 MILLIGRAM(S): 100 TABLET, FILM COATED ORAL at 21:25

## 2025-05-28 RX ADMIN — Medication 20 MILLIGRAM(S): at 18:23

## 2025-05-28 NOTE — OCCUPATIONAL THERAPY INITIAL EVALUATION ADULT - SPECIFY REASON(S)
Chart reviewed. Pt. currently without activity orders. OT evaluation on hold pending activity orders as appropriate. Will follow

## 2025-05-28 NOTE — PHYSICAL THERAPY INITIAL EVALUATION ADULT - ADDITIONAL COMMENTS
Patient lives alone in house with 3 steps to enter and a flight of steps to bedroom but with chair lift. Was independent in ADLs and ambulation using RW.

## 2025-05-28 NOTE — PROGRESS NOTE ADULT - SUBJECTIVE AND OBJECTIVE BOX
SUBJECTIVE/OVERNIGHT EVENTS  Today is hospital day 1d. This morning patient was seen and examined at bedside, resting  in bed. .    MEDICATIONS  STANDING MEDICATIONS  acetaminophen     Tablet .. 1000 milliGRAM(s) Oral every 8 hours  aspirin enteric coated 81 milliGRAM(s) Oral daily  calcium carbonate   1250 mG (OsCal) 1 Tablet(s) Oral daily  celecoxib 200 milliGRAM(s) Oral two times a day  chlorhexidine 2% Cloths 1 Application(s) Topical <User Schedule>  cholecalciferol 1000 Unit(s) Oral daily  cyanocobalamin 1000 MICROGram(s) Oral daily  enoxaparin Injectable 40 milliGRAM(s) SubCutaneous every 24 hours  famotidine    Tablet 20 milliGRAM(s) Oral two times a day  hydrochlorothiazide 12.5 milliGRAM(s) Oral daily  losartan 25 milliGRAM(s) Oral daily  magnesium oxide 200 milliGRAM(s) Oral two times a day with meals  metoprolol succinate ER 25 milliGRAM(s) Oral daily  pantoprazole    Tablet 40 milliGRAM(s) Oral before breakfast  predniSONE   Tablet 5 milliGRAM(s) Oral daily  rosuvastatin 10 milliGRAM(s) Oral at bedtime  sertraline 50 milliGRAM(s) Oral at bedtime    PRN MEDICATIONS  aluminum hydroxide/magnesium hydroxide/simethicone Suspension 30 milliLiter(s) Oral every 4 hours PRN  lidocaine   4% Patch 1 Patch Transdermal every 24 hours PRN  melatonin 3 milliGRAM(s) Oral at bedtime PRN  ondansetron Injectable 4 milliGRAM(s) IV Push every 8 hours PRN  traMADol 50 milliGRAM(s) Oral every 6 hours PRN    VITALS  T(F): 98.5 (05-28-25 @ 08:33), Max: 98.9 (05-27-25 @ 18:18)  HR: 68 (05-28-25 @ 08:33) (59 - 81)  BP: 167/92 (05-28-25 @ 08:33) (155/80 - 198/96)  RR: 18 (05-28-25 @ 08:33) (18 - 18)  SpO2: 98% (05-28-25 @ 08:33) (98% - 98%)    PHYSICAL EXAM    GENERAL: NAD, lying in bed   HEAD:  Atraumatic, normocephalic  EYES: EOMI,   HEART: Regular rate and rhythm, no murmurs, rubs, or gallops  LUNGS: Unlabored respirations.  Clear to auscultation bilaterally, no crackles, wheezing, or rhonchi  ABDOMEN: Soft, nontender, nondistended  EXTREMITIES: No clubbing, cyanosis, or edema  NERVOUS SYSTEM:  A&Ox3, no focal deficits   SKIN: No rashes or lesions    LABS             11.0   7.81  )-----------( 250      ( 05-28-25 @ 05:30 )             35.0     134  |  97  |  24  -------------------------<  74   05-28-25 @ 05:30  4.3  |  26  |  1.0    Ca      10.1     05-28-25 @ 05:30    TPro  7.1  /  Alb  3.7  /  TBili  0.3  /  DBili  x   /  AST  32  /  ALT  15  /  AlkPhos  120  /  GGT  x     05-27-25 @ 20:17        Urinalysis Basic - ( 28 May 2025 05:30 )    Color: x / Appearance: x / SG: x / pH: x  Gluc: 74 mg/dL / Ketone: x  / Bili: x / Urobili: x   Blood: x / Protein: x / Nitrite: x   Leuk Esterase: x / RBC: x / WBC x   Sq Epi: x / Non Sq Epi: x / Bacteria: x          IMAGING

## 2025-05-29 ENCOUNTER — TRANSCRIPTION ENCOUNTER (OUTPATIENT)
Age: 85
End: 2025-05-29

## 2025-05-29 LAB
ANION GAP SERPL CALC-SCNC: 17 MMOL/L — HIGH (ref 7–14)
APPEARANCE UR: CLEAR — SIGNIFICANT CHANGE UP
BASOPHILS # BLD AUTO: 0.04 K/UL — SIGNIFICANT CHANGE UP (ref 0–0.2)
BASOPHILS NFR BLD AUTO: 0.6 % — SIGNIFICANT CHANGE UP (ref 0–1)
BILIRUB UR-MCNC: NEGATIVE — SIGNIFICANT CHANGE UP
BUN SERPL-MCNC: 33 MG/DL — HIGH (ref 10–20)
CALCIUM SERPL-MCNC: 9.9 MG/DL — SIGNIFICANT CHANGE UP (ref 8.4–10.5)
CHLORIDE SERPL-SCNC: 97 MMOL/L — LOW (ref 98–110)
CO2 SERPL-SCNC: 21 MMOL/L — SIGNIFICANT CHANGE UP (ref 17–32)
COLOR SPEC: YELLOW — SIGNIFICANT CHANGE UP
CREAT SERPL-MCNC: 1.3 MG/DL — SIGNIFICANT CHANGE UP (ref 0.7–1.5)
DIFF PNL FLD: NEGATIVE — SIGNIFICANT CHANGE UP
EGFR: 40 ML/MIN/1.73M2 — LOW
EGFR: 40 ML/MIN/1.73M2 — LOW
EOSINOPHIL # BLD AUTO: 0.24 K/UL — SIGNIFICANT CHANGE UP (ref 0–0.7)
EOSINOPHIL NFR BLD AUTO: 3.3 % — SIGNIFICANT CHANGE UP (ref 0–8)
GLUCOSE SERPL-MCNC: 90 MG/DL — SIGNIFICANT CHANGE UP (ref 70–99)
GLUCOSE UR QL: NEGATIVE MG/DL — SIGNIFICANT CHANGE UP
HCT VFR BLD CALC: 33.4 % — LOW (ref 37–47)
HGB BLD-MCNC: 10.7 G/DL — LOW (ref 12–16)
IMM GRANULOCYTES NFR BLD AUTO: 0.4 % — HIGH (ref 0.1–0.3)
KETONES UR QL: NEGATIVE MG/DL — SIGNIFICANT CHANGE UP
LEUKOCYTE ESTERASE UR-ACNC: ABNORMAL
LYMPHOCYTES # BLD AUTO: 2.17 K/UL — SIGNIFICANT CHANGE UP (ref 1.2–3.4)
LYMPHOCYTES # BLD AUTO: 30 % — SIGNIFICANT CHANGE UP (ref 20.5–51.1)
MCHC RBC-ENTMCNC: 28.4 PG — SIGNIFICANT CHANGE UP (ref 27–31)
MCHC RBC-ENTMCNC: 32 G/DL — SIGNIFICANT CHANGE UP (ref 32–37)
MCV RBC AUTO: 88.6 FL — SIGNIFICANT CHANGE UP (ref 81–99)
MONOCYTES # BLD AUTO: 0.76 K/UL — HIGH (ref 0.1–0.6)
MONOCYTES NFR BLD AUTO: 10.5 % — HIGH (ref 1.7–9.3)
NEUTROPHILS # BLD AUTO: 3.99 K/UL — SIGNIFICANT CHANGE UP (ref 1.4–6.5)
NEUTROPHILS NFR BLD AUTO: 55.2 % — SIGNIFICANT CHANGE UP (ref 42.2–75.2)
NITRITE UR-MCNC: NEGATIVE — SIGNIFICANT CHANGE UP
NRBC BLD AUTO-RTO: 0 /100 WBCS — SIGNIFICANT CHANGE UP (ref 0–0)
PH UR: 6.5 — SIGNIFICANT CHANGE UP (ref 5–8)
PLATELET # BLD AUTO: 233 K/UL — SIGNIFICANT CHANGE UP (ref 130–400)
PMV BLD: 10 FL — SIGNIFICANT CHANGE UP (ref 7.4–10.4)
POTASSIUM SERPL-MCNC: 4.6 MMOL/L — SIGNIFICANT CHANGE UP (ref 3.5–5)
POTASSIUM SERPL-SCNC: 4.6 MMOL/L — SIGNIFICANT CHANGE UP (ref 3.5–5)
PROT UR-MCNC: NEGATIVE MG/DL — SIGNIFICANT CHANGE UP
RBC # BLD: 3.77 M/UL — LOW (ref 4.2–5.4)
RBC # FLD: 17.8 % — HIGH (ref 11.5–14.5)
SODIUM SERPL-SCNC: 135 MMOL/L — SIGNIFICANT CHANGE UP (ref 135–146)
SP GR SPEC: 1.02 — SIGNIFICANT CHANGE UP (ref 1–1.03)
UROBILINOGEN FLD QL: 0.2 MG/DL — SIGNIFICANT CHANGE UP (ref 0.2–1)
WBC # BLD: 7.23 K/UL — SIGNIFICANT CHANGE UP (ref 4.8–10.8)
WBC # FLD AUTO: 7.23 K/UL — SIGNIFICANT CHANGE UP (ref 4.8–10.8)

## 2025-05-29 RX ORDER — PREDNISONE 20 MG/1
2.5 TABLET ORAL DAILY
Refills: 0 | Status: DISCONTINUED | OUTPATIENT
Start: 2025-05-29 | End: 2025-06-02

## 2025-05-29 RX ADMIN — METOPROLOL SUCCINATE 25 MILLIGRAM(S): 50 TABLET, EXTENDED RELEASE ORAL at 05:12

## 2025-05-29 RX ADMIN — Medication 20 MILLIGRAM(S): at 05:11

## 2025-05-29 RX ADMIN — Medication 200 MILLIGRAM(S): at 17:30

## 2025-05-29 RX ADMIN — Medication 200 MILLIGRAM(S): at 09:26

## 2025-05-29 RX ADMIN — CALCIUM CARBONATE 1 TABLET(S): 750 TABLET ORAL at 12:11

## 2025-05-29 RX ADMIN — Medication 1000 UNIT(S): at 12:11

## 2025-05-29 RX ADMIN — Medication 20 MILLIGRAM(S): at 17:31

## 2025-05-29 RX ADMIN — Medication 81 MILLIGRAM(S): at 12:11

## 2025-05-29 RX ADMIN — CELECOXIB 200 MILLIGRAM(S): 50 CAPSULE ORAL at 05:12

## 2025-05-29 RX ADMIN — SERTRALINE 50 MILLIGRAM(S): 100 TABLET, FILM COATED ORAL at 21:31

## 2025-05-29 RX ADMIN — TRAMADOL HYDROCHLORIDE 50 MILLIGRAM(S): 50 TABLET, FILM COATED ORAL at 17:31

## 2025-05-29 RX ADMIN — ENOXAPARIN SODIUM 40 MILLIGRAM(S): 100 INJECTION SUBCUTANEOUS at 05:11

## 2025-05-29 RX ADMIN — LOSARTAN POTASSIUM 50 MILLIGRAM(S): 100 TABLET, FILM COATED ORAL at 05:13

## 2025-05-29 RX ADMIN — CYANOCOBALAMIN 1000 MICROGRAM(S): 1000 INJECTION INTRAMUSCULAR; SUBCUTANEOUS at 12:11

## 2025-05-29 RX ADMIN — Medication 1 APPLICATION(S): at 05:11

## 2025-05-29 RX ADMIN — Medication 40 MILLIGRAM(S): at 05:13

## 2025-05-29 RX ADMIN — CELECOXIB 200 MILLIGRAM(S): 50 CAPSULE ORAL at 17:31

## 2025-05-29 RX ADMIN — ROSUVASTATIN CALCIUM 10 MILLIGRAM(S): 20 TABLET, FILM COATED ORAL at 21:31

## 2025-05-29 RX ADMIN — Medication 1000 MILLIGRAM(S): at 05:12

## 2025-05-29 RX ADMIN — Medication 1000 MILLIGRAM(S): at 21:31

## 2025-05-29 NOTE — DISCHARGE NOTE PROVIDER - NSDCMRMEDTOKEN_GEN_ALL_CORE_FT
acetaminophen 500 mg oral tablet: 2 tab(s) orally every 8 hours  aspirin 81 mg oral delayed release tablet: 1 tab(s) orally once a day  calcium carbonate 1250 mg (500 mg elemental calcium) oral tablet: 1 tab(s) orally once a day  celecoxib 200 mg oral capsule: 1 cap(s) orally 2 times a day  cholecalciferol 25 mcg (1000 intl units) oral tablet: 1 tab(s) orally once a day  cyanocobalamin 1000 mcg oral tablet: 1 tab(s) orally once a day  famotidine 20 mg oral tablet: 1 tab(s) orally 2 times a day  hydroCHLOROthiazide 12.5 mg oral tablet: 1 tab(s) orally once a day  lidocaine 4% topical film: Apply topically to affected area once a day  losartan 25 mg oral tablet: 1 tab(s) orally once a day (at bedtime)  magnesium oxide 400 mg oral tablet: 1 tab(s) orally once a day  melatonin 3 mg oral tablet: 1 tab(s) orally once a day (at bedtime) As needed Insomnia  methotrexate 2.5 mg oral tablet: 6 tab(s) orally once a week  metoprolol succinate 25 mg oral capsule, extended release: 1 cap(s) orally once a day  multivitamin: 1 tab(s) orally once a day  potassium chloride: 10 mEq/kg orally 2 times a day  predniSONE 5 mg oral tablet: 1 tab(s) orally once a day  PT Treat and Release: Back pain secondary to L1 fracture: PT Treat and Release: Back pain secondary to L1 fracture  rosuvastatin 10 mg oral tablet: 1 tab(s) orally once a day (at bedtime)  senna leaf extract oral tablet: 2 tab(s) orally once a day (at bedtime)  sertraline 50 mg oral tablet: 1 tab(s) orally once a day  traMADol 50 mg oral tablet: 0.5 tab(s) orally every 6 hours As needed Severe Pain (7 - 10)  Vitamin C: 1000 milligram(s) orally once a day   acetaminophen 500 mg oral tablet: 2 tab(s) orally every 8 hours  amLODIPine 5 mg oral tablet: 1 tab(s) orally once a day  aspirin 81 mg oral delayed release tablet: 1 tab(s) orally once a day  calcium carbonate 1250 mg (500 mg elemental calcium) oral tablet: 1 tab(s) orally once a day  cholecalciferol 25 mcg (1000 intl units) oral tablet: 1 tab(s) orally once a day  cyanocobalamin 1000 mcg oral tablet: 1 tab(s) orally once a day  famotidine 20 mg oral tablet: 1 tab(s) orally 2 times a day  losartan 25 mg oral tablet: 1 tab(s) orally once a day (at bedtime)  magnesium oxide 400 mg oral tablet: 1 tab(s) orally once a day  melatonin 3 mg oral tablet: 1 tab(s) orally once a day (at bedtime) As needed Insomnia  methotrexate 2.5 mg oral tablet: 6 tab(s) orally once a week  metoprolol succinate 25 mg oral tablet, extended release: 1 tab(s) orally once a day  multivitamin: 1 tab(s) orally once a day  predniSONE 2.5 mg oral tablet: 1 tab(s) orally once a day  rosuvastatin 10 mg oral tablet: 1 tab(s) orally once a day (at bedtime)  senna leaf extract oral tablet: 2 tab(s) orally once a day (at bedtime)  sertraline 50 mg oral tablet: 1 tab(s) orally once a day  traMADol 50 mg oral tablet: 0.5 tab(s) orally every 6 hours As needed Severe Pain (7 - 10)  Vitamin C: 1000 milligram(s) orally once a day

## 2025-05-29 NOTE — OCCUPATIONAL THERAPY INITIAL EVALUATION ADULT - GENERAL OBSERVATIONS, REHAB EVAL
Pt found semireclined in bed in NAD, +prima fit. No c/o pain. A+Ox4. Agreeable to OT koby, seen 755-820 am Pt found semi-reclined in bed in NAD, +prima fit. No c/o pain. A+Ox4. TLSO when ambulating. Agreeable to NERI whalen, seen 755-820 am

## 2025-05-29 NOTE — OCCUPATIONAL THERAPY INITIAL EVALUATION ADULT - LEVEL OF INDEPENDENCE: GROOMING, OT EVAL
Telephoned dad and scheduled np appt   Dad accept appt time and date  The pt will need refills until appt   minimum assist (75% patients effort)

## 2025-05-29 NOTE — DISCHARGE NOTE PROVIDER - NSDCFUADDINST_GEN_ALL_CORE_FT
Any worsening of pain, redness or discharge of incision, fever/chills call MD or go to ER ASAP for eval.

## 2025-05-29 NOTE — OCCUPATIONAL THERAPY INITIAL EVALUATION ADULT - LIVES WITH, PROFILE
Pt lives in a private home with steps to enter, chair left to second floor. HHA M-F 9-4, 4-9; Sat/Sun "a couple of hours a day"/alone

## 2025-05-29 NOTE — OCCUPATIONAL THERAPY INITIAL EVALUATION ADULT - BALANCE TRAINING, PT EVAL
By discharge, Pt will increase standing balance by 1/3 grade to increase independence and safety performing self care tasks in home

## 2025-05-29 NOTE — OCCUPATIONAL THERAPY INITIAL EVALUATION ADULT - PRECAUTIONS/LIMITATIONS, REHAB EVAL
fall precautions As per chart: "Seen by ortho on last admission who recommended TLSO brace while ambulating"/fall precautions

## 2025-05-29 NOTE — PROGRESS NOTE ADULT - SUBJECTIVE AND OBJECTIVE BOX
GIAN MELISSA  84y  Female  HPI:  84-year-old female with past medical history of rheumatoid arthritis, osteoarthritis, hypertension, GERD, breast CA in remission, bilateral knee replacement, B/L hip replacement presents for evaluation status post fall.  Patient states at 8 AM this morning she bent over to pick something up and fell onto her right knee and hit her forehead on the ground since then has been unable to ambulate leading her to call 911 this afternoon.  Now presents with right proximal lower extremity pain, aggravated with weightbearing, no relieving factors.  Denies LOC, AC, headache, neck pain, chest pain, shortness of breath, weakness, numbness, dysuria, hematuria, vomiting, diarrhea.    ED vitals:   · BP Systolic   198 mm Hg  · BP Diastolic   96 mm Hg  · Heart Rate  81 /min  · Respiration Rate (breaths/min)  18 /min  · Temp (F)  98.9 Degrees F  · Temp (C)  37.2 Degrees C  · Temp site  oral  · SpO2 (%)  98 %  · O2 Delivery/Oxygen Delivery Method  room air  · Temp at ED Arrival (C)  37.2 Degrees C    Radiology:  Xray chest: Left lower lung zone opacity.    CT head: negative     Xray knee/ pelvis:   PELVIS: Status post bilateral total hip arthroplasties. No acute   displaced fracture. Osteopenia. Lower lumbar spine degenerative changes.    RIGHT FEMUR/KNEE: Status post femur ORIF with cerclage wires,   intramedullary nail with plate and screws. Status post right total knee   arthroplasty. No acute displaced fracture. Mild lucency at the greater   lesser trochanter, stable. Displacement of the lesser trochanter, also   stable.   (27 May 2025 23:55)    MEDICATIONS  (STANDING):  acetaminophen     Tablet .. 1000 milliGRAM(s) Oral every 8 hours  aspirin enteric coated 81 milliGRAM(s) Oral daily  calcium carbonate   1250 mG (OsCal) 1 Tablet(s) Oral daily  celecoxib 200 milliGRAM(s) Oral two times a day  chlorhexidine 2% Cloths 1 Application(s) Topical <User Schedule>  cholecalciferol 1000 Unit(s) Oral daily  cyanocobalamin 1000 MICROGram(s) Oral daily  enoxaparin Injectable 40 milliGRAM(s) SubCutaneous every 24 hours  famotidine    Tablet 20 milliGRAM(s) Oral two times a day  hydrochlorothiazide 12.5 milliGRAM(s) Oral daily  losartan 50 milliGRAM(s) Oral daily  magnesium oxide 200 milliGRAM(s) Oral two times a day with meals  metoprolol succinate ER 25 milliGRAM(s) Oral daily  pantoprazole    Tablet 40 milliGRAM(s) Oral before breakfast  predniSONE   Tablet 2.5 milliGRAM(s) Oral daily  rosuvastatin 10 milliGRAM(s) Oral at bedtime  sertraline 50 milliGRAM(s) Oral at bedtime    MEDICATIONS  (PRN):  aluminum hydroxide/magnesium hydroxide/simethicone Suspension 30 milliLiter(s) Oral every 4 hours PRN Dyspepsia  lidocaine   4% Patch 1 Patch Transdermal every 24 hours PRN pain  melatonin 3 milliGRAM(s) Oral at bedtime PRN Insomnia  ondansetron Injectable 4 milliGRAM(s) IV Push every 8 hours PRN Nausea and/or Vomiting  traMADol 50 milliGRAM(s) Oral every 6 hours PRN Severe Pain (7 - 10)    INTERVAL EVENTS: Patient seen today without distress, OOB in chair. Patient does not lie the way she feels on current medications. Patient feels a brain fog??                               Patient wants to decrease her steroid intake.    T(C): 36.7 (25 @ 15:00), Max: 37 (25 @ 00:04)  HR: 80 (25 @ 15:00) (73 - 80)  BP: 120/69 (25 @ 15:00) (120/69 - 184/86)  RR: 18 (25 @ 15:00) (18 - 18)  SpO2: 93% (25 @ 15:00) (93% - 98%)  Wt(kg): --Vital Signs Last 24 Hrs  T(C): 36.7 (29 May 2025 15:00), Max: 37 (29 May 2025 00:04)  T(F): 98 (29 May 2025 15:00), Max: 98.6 (29 May 2025 00:04)  HR: 80 (29 May 2025 15:00) (73 - 80)  BP: 120/69 (29 May 2025 15:00) (120/69 - 184/86)  BP(mean): --  RR: 18 (29 May 2025 15:00) (18 - 18)  SpO2: 93% (29 May 2025 15:00) (93% - 98%)    Parameters below as of 29 May 2025 15:00  Patient On (Oxygen Delivery Method): room air        PHYSICAL EXAM:  GENERAL: NAD  NECK: Supple, No JVD  CHEST/LUNG: Clear  HEART: S1, S2,   ABDOMEN: Soft, Nontender,  Bowel sounds present  EXTREMITIES: + edema, Right knee brace in place  SKIN: No rashes or lesions    LABS:                        10.7   7.23  )-----------( 233      ( 29 May 2025 05:36 )             33.4                 135  |  97[L]  |  33[H]  ----------------------------<  90  4.6   |  21  |  1.3    Ca    9.9      29 May 2025 05:36                Urinalysis Basic - ( 29 May 2025 18:23 )    Color: Yellow / Appearance: Clear / S.020 / pH: x  Gluc: x / Ketone: x  / Bili: Negative / Urobili: 0.2 mg/dL   Blood: x / Protein: Negative mg/dL / Nitrite: Negative   Leuk Esterase: Small / RBC: 1 /HPF / WBC 7 /HPF   Sq Epi: x / Non Sq Epi: 2 /HPF / Bacteria: Many /HPF        RADIOLOGY & ADDITIONAL TESTS:  < from: Xray Knee 4 Views, Right (25 @ 21:40) >    PROCEDUREDATE:  2025          INTERPRETATION:  CLINICAL HISTORY / REASON FOR EXAM: Pain status post fall    COMPARISON: Femur radiographs from February 3, 2025    TECHNIQUE: Multiple total views, AP pelvis, right femur and knee   radiographs    FINDINGS/  IMPRESSION:    PELVIS: Status post bilateral total hip arthroplasties. No acute   displaced fracture. Osteopenia. Lower lumbar spine degenerative changes.    RIGHT FEMUR/KNEE: Status post femur ORIF with cerclage wires,   intramedullary nail with plate and screws. Status post right total knee   arthroplasty. No acute displaced fracture. Mild lucency at the greater   lesser trochanter, stable. Displacement of the lesser trochanter, also   stable.    --- End of Report ---          < end of copied text >  < from: Xray Knee 4 Views, Right (25 @ 21:40) >    PROCEDUREDATE:  2025          INTERPRETATION:  CLINICAL HISTORY / REASON FOR EXAM: Pain status post fall    COMPARISON: Femur radiographs from February 3, 2025    TECHNIQUE: Multiple total views, AP pelvis, right femur and knee   radiographs    FINDINGS/  IMPRESSION:    PELVIS: Status post bilateral total hip arthroplasties. No acute   displaced fracture. Osteopenia. Lower lumbar spine degenerative changes.    RIGHT FEMUR/KNEE: Status post femur ORIF with cerclage wires,   intramedullary nail with plate and screws. Status post right total knee   arthroplasty. No acute displaced fracture. Mild lucency at the greater   lesser trochanter, stable. Displacement of the lesser trochanter, also   stable.    --- End of Report ---          < end of copied text >

## 2025-05-29 NOTE — OCCUPATIONAL THERAPY INITIAL EVALUATION ADULT - PERTINENT HX OF CURRENT PROBLEM, REHAB EVAL
84-year-old female with past medical history of rheumatoid arthritis, osteoarthritis, hypertension, GERD, breast CA in remission, bilateral knee replacement, B/L hip replacement presents for evaluation status post fall.  Patient states at 8 AM this morning she bent over to pick something up and fell onto her right knee and hit her forehead on the ground since then has been unable to ambulate leading her to call 911 this afternoon.  Now presents with right proximal lower extremity pain, aggravated with weightbearing, no relieving factors.  Denies LOC, AC, headache, neck pain, chest pain, shortness of breath, weakness, numbness, dysuria, hematuria, vomiting, diarrhea.  #Fall w/o LOC  #L1 compression fracture  #Rt knee & Back pain, inability to ambulate, Deconditioning  #Hx of Osteoporosis RA, OA s/p B/L ROHIT & TKA, Rt hip ORIF(2024)

## 2025-05-29 NOTE — OCCUPATIONAL THERAPY INITIAL EVALUATION ADULT - ADL RETRAINING, OT EVAL
By discharge, Pt will perform lower body dressing with minimal assistance using RW and adaptive equipment as necessary

## 2025-05-29 NOTE — DISCHARGE NOTE PROVIDER - CARE PROVIDER_API CALL
Thor Cruz  Internal Medicine  [address] [place] [address]  Phone: [phone]  Fax: [phone]  Follow Up Time: 1 week

## 2025-05-29 NOTE — DISCHARGE NOTE PROVIDER - HOSPITAL COURSE
84-year-old female with past medical history of rheumatoid arthritis, osteoarthritis, hypertension, GERD, breast CA in remission, bilateral knee replacement, B/L hip replacement presents for evaluation status post fall.  Patient states at 8 AM this morning she bent over to pick something up and fell onto her right knee and hit her forehead on the ground since then has been unable to ambulate leading her to call 911 this afternoon.  Now presents with right proximal lower extremity pain, aggravated with weightbearing, no relieving factors.  Denies LOC, AC, headache, neck pain, chest pain, shortness of breath, weakness, numbness, dysuria, hematuria, vomiting, diarrhea.    #Fall w/o LOC  #L1 compression fracture  #Rt knee & Back pain, inability to ambulate, Deconditioning  #Hx of Osteoporosis RA, OA s/p B/L ROHIT & TKA, Rt hip ORIF(2024)  - pain with ambulating since fall, gave 1x dose of morphine for pain   - trauma workup was all negative; Cxray showed L lower opacity likely chronic; no WBC count   - Follows with Dr. Cruz   - Seen by ortho on last admission who recommended TLSO brace while ambulating   - Pain control on last admission Tylenol 1000mg q8, Celecoxib 200mg BID, Lidocaine patch and Tramadol 50mg q6 prn  - On MTX and  prednisone for RA,  - c/w folic acid    - PT/ OT    #HTN  - c/w Losartan 25mg, Toprol 25mg, HCTZ 12.5mg   - BP currently elevated. will assess meds.     #HLD  -c/w rosuvastatin 10mg    #GERD  - c/w famotidine 20mg bID    #Anxiety/depression  - c/w sertraline 50mg at night     Diet: DASH  DVT: lovenox   GI: PPI  Dispo:    84-year-old female with past medical history of rheumatoid arthritis, osteoarthritis, hypertension, GERD, breast CA in remission, bilateral knee replacement, B/L hip replacement presents for evaluation status post fall.  Patient states at 8 AM this morning she bent over to pick something up and fell onto her right knee and hit her forehead on the ground since then has been unable to ambulate leading her to call 911 this afternoon.  Now presents with right proximal lower extremity pain, aggravated with weightbearing, no relieving factors.  Denies LOC, AC, headache, neck pain, chest pain, shortness of breath, weakness, numbness, dysuria, hematuria, vomiting, diarrhea.    #Fall w/o LOC  #L1 compression fracture  #Rt knee & Back pain, inability to ambulate, Deconditioning  #Hx of Osteoporosis RA, OA s/p B/L ROHIT & TKA, Rt hip ORIF(2024)  - pain with ambulating since fall, gave 1x dose of morphine for pain   - trauma workup was all negative; Cxray showed L lower opacity likely chronic; no WBC count   - Follows with Dr. Cruz   - Seen by ortho on last admission who recommended TLSO brace while ambulating   - Pain control on last admission Tylenol 1000mg q8, Celecoxib 200mg BID, Lidocaine patch and Tramadol 50mg q6 prn  - On MTX and  prednisone for RA,  - c/w folic acid    - PT/ OT    #HTN,. elevated  - c/w  Toprol 25mg, HCTZ 12.5mg   - BP currently elevated. Lorsartan increased to 50.   #HLD  -c/w rosuvastatin 10mg    #GERD  - c/w famotidine 20mg bID    #Anxiety/depression  - c/w sertraline 50mg at night     Diet: DASH  DVT: lovenox   GI: PPI  Dispo:    84-year-old female with past medical history of rheumatoid arthritis, osteoarthritis, hypertension, GERD, breast CA in remission, bilateral knee replacement, B/L hip replacement presents for evaluation status post fall.  Patient states at 8 AM this morning she bent over to pick something up and fell onto her right knee and hit her forehead on the ground since then has been unable to ambulate leading her to call 911 this afternoon.  Now presents with right proximal lower extremity pain, aggravated with weightbearing, no relieving factors.  Denies LOC, AC, headache, neck pain, chest pain, shortness of breath, weakness, numbness, dysuria, hematuria, vomiting, diarrhea.    #Fall w/o LOC  #L1 compression fracture  #Rt knee & Back pain, inability to ambulate, Deconditioning  s/p OR for revision of R TKR  Cleared by ortho for WBAT  #Hx of Osteoporosis RA, OA s/p B/L ROHIT & TKA, Rt hip ORIF(2024)  - pain with ambulating since fall, gave 1x dose of morphine for pain   - trauma workup was all negative; Cxray showed L lower opacity likely chronic; no WBC count   - Follows with Dr. Cruz   - Seen by ortho on last admission who recommended TLSO brace while ambulating   - Pain control on last admission Tylenol 1000mg q8, Celecoxib 200mg BID, Lidocaine patch and Tramadol 50mg q6 prn  - On MTX and  prednisone for RA,  - c/w folic acid    - PT/ OT - STR    #HTN,. elevated  - c/w  Toprol 25mg, DC HCTZ 12.5mg   - BP currently elevated. Lorsartan increased to 50.   #HLD  -c/w rosuvastatin 10mg    #GERD  - c/w famotidine 20mg bID    #Anxiety/depression  - c/w sertraline 50mg at night     Diet: DASH  DVT: lovenox   GI: PPI  Dispo: STR

## 2025-05-29 NOTE — DISCHARGE NOTE PROVIDER - NSDCCPCAREPLAN_GEN_ALL_CORE_FT
PRINCIPAL DISCHARGE DIAGNOSIS  Diagnosis: Leg pain  Assessment and Plan of Treatment: You came into the hospital for leg pain, imaging did not show any acute changes. You worked with PT and OT. Ortho saw you, no inerventions.      SECONDARY DISCHARGE DIAGNOSES  Diagnosis: Falls  Assessment and Plan of Treatment:      PRINCIPAL DISCHARGE DIAGNOSIS  Diagnosis: Leg pain  Assessment and Plan of Treatment: You came into the hospital for leg pain, imaging did not show any acute changes. You worked with PT and OT. Ortho took you for a revision of the R TKR. You did well postoperatiovely and are going to short term rehab.      SECONDARY DISCHARGE DIAGNOSES  Diagnosis: Falls  Assessment and Plan of Treatment: You have a compression fracture in ur spine. Ortho recommends a TLSO brace and follow up with ortho as OP

## 2025-05-30 RX ORDER — AMLODIPINE BESYLATE 10 MG/1
5 TABLET ORAL DAILY
Refills: 0 | Status: DISCONTINUED | OUTPATIENT
Start: 2025-05-30 | End: 2025-06-02

## 2025-05-30 RX ORDER — CEFTRIAXONE 500 MG/1
1000 INJECTION, POWDER, FOR SOLUTION INTRAMUSCULAR; INTRAVENOUS EVERY 24 HOURS
Refills: 0 | Status: COMPLETED | OUTPATIENT
Start: 2025-05-30 | End: 2025-06-01

## 2025-05-30 RX ORDER — AMLODIPINE BESYLATE 10 MG/1
5 TABLET ORAL DAILY
Refills: 0 | Status: DISCONTINUED | OUTPATIENT
Start: 2025-05-30 | End: 2025-05-30

## 2025-05-30 RX ADMIN — Medication 20 MILLIGRAM(S): at 05:12

## 2025-05-30 RX ADMIN — Medication 1 APPLICATION(S): at 05:10

## 2025-05-30 RX ADMIN — Medication 200 MILLIGRAM(S): at 09:24

## 2025-05-30 RX ADMIN — Medication 1000 UNIT(S): at 11:31

## 2025-05-30 RX ADMIN — PREDNISONE 2.5 MILLIGRAM(S): 20 TABLET ORAL at 05:12

## 2025-05-30 RX ADMIN — CALCIUM CARBONATE 1 TABLET(S): 750 TABLET ORAL at 11:33

## 2025-05-30 RX ADMIN — Medication 40 MILLIGRAM(S): at 05:13

## 2025-05-30 RX ADMIN — CYANOCOBALAMIN 1000 MICROGRAM(S): 1000 INJECTION INTRAMUSCULAR; SUBCUTANEOUS at 11:31

## 2025-05-30 RX ADMIN — Medication 20 MILLIGRAM(S): at 17:09

## 2025-05-30 RX ADMIN — METOPROLOL SUCCINATE 25 MILLIGRAM(S): 50 TABLET, EXTENDED RELEASE ORAL at 05:11

## 2025-05-30 RX ADMIN — ENOXAPARIN SODIUM 40 MILLIGRAM(S): 100 INJECTION SUBCUTANEOUS at 05:10

## 2025-05-30 RX ADMIN — Medication 200 MILLIGRAM(S): at 17:09

## 2025-05-30 RX ADMIN — LOSARTAN POTASSIUM 50 MILLIGRAM(S): 100 TABLET, FILM COATED ORAL at 05:11

## 2025-05-30 RX ADMIN — AMLODIPINE BESYLATE 5 MILLIGRAM(S): 10 TABLET ORAL at 11:30

## 2025-05-30 RX ADMIN — CELECOXIB 200 MILLIGRAM(S): 50 CAPSULE ORAL at 05:11

## 2025-05-30 RX ADMIN — ROSUVASTATIN CALCIUM 10 MILLIGRAM(S): 20 TABLET, FILM COATED ORAL at 21:37

## 2025-05-30 RX ADMIN — CELECOXIB 200 MILLIGRAM(S): 50 CAPSULE ORAL at 17:09

## 2025-05-30 RX ADMIN — Medication 1000 MILLIGRAM(S): at 21:37

## 2025-05-30 RX ADMIN — SERTRALINE 50 MILLIGRAM(S): 100 TABLET, FILM COATED ORAL at 21:36

## 2025-05-30 RX ADMIN — CEFTRIAXONE 100 MILLIGRAM(S): 500 INJECTION, POWDER, FOR SOLUTION INTRAMUSCULAR; INTRAVENOUS at 11:31

## 2025-05-30 RX ADMIN — Medication 1000 MILLIGRAM(S): at 05:11

## 2025-05-30 RX ADMIN — Medication 81 MILLIGRAM(S): at 11:30

## 2025-05-30 NOTE — PROGRESS NOTE ADULT - ASSESSMENT
84-year-old female with past medical history of rheumatoid arthritis, osteoarthritis, hypertension, GERD, breast CA in remission, bilateral knee replacement, B/L hip replacement presents for evaluation status post fall.  Patient states at 8 AM this morning she bent over to pick something up and fell onto her right knee and hit her forehead on the ground since then has been unable to ambulate leading her to call 911 this afternoon.  Now presents with right proximal lower extremity pain, aggravated with weightbearing, no relieving factors.  Denies LOC, AC, headache, neck pain, chest pain, shortness of breath, weakness, numbness, dysuria, hematuria, vomiting, diarrhea.    #Fall w/o LOC  #L1 compression fracture  #Rt knee & Back pain, inability to ambulate, Deconditioning  #Hx of Osteoporosis RA, OA s/p B/L ROHIT & TKA, Rt hip ORIF(2024)  - pain with ambulating since fall, gave 1x dose of morphine for pain   - trauma workup was all negative; Cxray showed L lower opacity likely chronic; no WBC count   - Follows with Dr. Cruz   - Seen by ortho on last admission who recommended TLSO brace while ambulating   - Pain control on last admission Tylenol 1000mg q8, Celecoxib 200mg BID, Lidocaine patch and Tramadol 50mg q6 prn  - On MTX and  prednisone for RA,  - c/w folic acid    - PT/ OT    #HTN  - c/w Losartan 25mg, Toprol 25mg, HCTZ 12.5mg   - BP currently elevated. will assess meds.     #HLD  -c/w rosuvastatin 10mg    #GERD  - c/w famotidine 20mg bID    #Anxiety/depression  - c/w sertraline 50mg at night     Diet: DASH  DVT: lovenox   GI: PPI  Dispo:   
84-year-old female with past medical history of rheumatoid arthritis, osteoarthritis, hypertension, GERD, breast CA in remission, bilateral knee replacement, B/L hip replacement presents for evaluation status post fall.  Patient states at 8 AM this morning she bent over to pick something up and fell onto her right knee and hit her forehead on the ground since then has been unable to ambulate leading her to call 911 this afternoon.  Now presents with right proximal lower extremity pain, aggravated with weightbearing, no relieving factors.  Denies LOC, AC, headache, neck pain, chest pain, shortness of breath, weakness, numbness, dysuria, hematuria, vomiting, diarrhea.    Fall w/o LOC  L1 compression fracture  Rt knee & Back pain, inability to ambulate, Deconditioning  Hx of Osteoporosis, RA, OA s/p B/L ROHIT & TKA, Rt hip ORIF(2024)  - pain with ambulating since fall  - trauma workup was all negative  - Seen by ortho on last admission who recommended TLSO brace while ambulating   - Pain control on last admission Tylenol 1000mg q8, Celecoxib 200mg BID, Lidocaine patch and Tramadol 50mg q6 prn  - On Infusion, MTX and  Prednisone for RA, wishes to wean down Prednisone dose  - wean Prednisone by 1 mg to 4 mg daily  - continue folic acid    - PT/ OT    HTN  - on Losartan 25mg, Toprol 25mg, HCTZ 12.5mg   - BP noted  - consider addition of Amlodipine 2.5 mg    HLD  -continue Rosuvastatin 10mg    GERD  - continue Famotidine 20mg BID    Anxiety/Depression  - on Sertraline 50mg at night     Diet: DASH  DVT: Lovenox   GI: PPI  Dispo: D/C planning to SNF or [facility] when authorization obtained/ 3 midnights  
84-year-old female with past medical history of rheumatoid arthritis, osteoarthritis, hypertension, GERD, breast CA in remission, bilateral knee replacement, B/L hip replacement presents for evaluation status post fall.  Patient states at 8 AM this morning she bent over to pick something up and fell onto her right knee and hit her forehead on the ground since then has been unable to ambulate leading her to call 911 this afternoon.  Now presents with right proximal lower extremity pain, aggravated with weightbearing, no relieving factors.  Denies LOC, AC, headache, neck pain, chest pain, shortness of breath, weakness, numbness, dysuria, hematuria, vomiting, diarrhea.    Fall w/o LOC  L1 compression fracture  Rt knee & Back pain, inability to ambulate, Deconditioning  Hx of Osteoporosis, RA, OA s/p B/L ROHIT & TKA, Rt hip ORIF(2024)  - On Infusion, MTX and  Prednisone for RA, wishes to wean down Prednisone dose  - pain with ambulating since fall  - trauma workup was all negative  - Seen by ortho, Dr Yolanda Sherman plans for revision of R TKA  - wean Prednisone 2.5 mg as per Dr Wiley rheumatology  - PT/ OT    HTN  - on Losartan 25mg, Toprol 25mg, HCTZ 12.5mg   - BP noted  - consider addition of Amlodipine 2.5 mg    HLD  -continue Rosuvastatin 10mg    GERD  - continue Famotidine 20mg BID    Anxiety/Depression  - on Sertraline 50mg at night     Diet: DASH  DVT: Lovenox   GI: PPI  Dispo: D/C planning to SNF post OP. Patient to be transferred to the [facility] site for the procedure on Monday.

## 2025-05-30 NOTE — PROGRESS NOTE ADULT - SUBJECTIVE AND OBJECTIVE BOX
GIAN MELISSA  84y  Female  HPI:  84-year-old female with past medical history of rheumatoid arthritis, osteoarthritis, hypertension, GERD, breast CA in remission, bilateral knee replacement, B/L hip replacement presents for evaluation status post fall.  Patient states at 8 AM this morning she bent over to pick something up and fell onto her right knee and hit her forehead on the ground since then has been unable to ambulate leading her to call 911 this afternoon.  Now presents with right proximal lower extremity pain, aggravated with weightbearing, no relieving factors.  Denies LOC, AC, headache, neck pain, chest pain, shortness of breath, weakness, numbness, dysuria, hematuria, vomiting, diarrhea.    ED vitals:   · BP Systolic   198 mm Hg  · BP Diastolic   96 mm Hg  · Heart Rate  81 /min  · Respiration Rate (breaths/min)  18 /min  · Temp (F)  98.9 Degrees F  · Temp (C)  37.2 Degrees C  · Temp site  oral  · SpO2 (%)  98 %  · O2 Delivery/Oxygen Delivery Method  room air  · Temp at ED Arrival (C)  37.2 Degrees C    Radiology:  Xray chest: Left lower lung zone opacity.    CT head: negative     Xray knee/ pelvis:   PELVIS: Status post bilateral total hip arthroplasties. No acute   displaced fracture. Osteopenia. Lower lumbar spine degenerative changes.    RIGHT FEMUR/KNEE: Status post femur ORIF with cerclage wires,   intramedullary nail with plate and screws. Status post right total knee   arthroplasty. No acute displaced fracture. Mild lucency at the greater   lesser trochanter, stable. Displacement of the lesser trochanter, also   stable.   (27 May 2025 23:55)    MEDICATIONS  (STANDING):  acetaminophen     Tablet .. 1000 milliGRAM(s) Oral every 8 hours  aspirin enteric coated 81 milliGRAM(s) Oral daily  calcium carbonate   1250 mG (OsCal) 1 Tablet(s) Oral daily  celecoxib 200 milliGRAM(s) Oral two times a day  chlorhexidine 2% Cloths 1 Application(s) Topical <User Schedule>  cholecalciferol 1000 Unit(s) Oral daily  cyanocobalamin 1000 MICROGram(s) Oral daily  enoxaparin Injectable 40 milliGRAM(s) SubCutaneous every 24 hours  famotidine    Tablet 20 milliGRAM(s) Oral two times a day  hydrochlorothiazide 12.5 milliGRAM(s) Oral daily  losartan 50 milliGRAM(s) Oral daily  magnesium oxide 200 milliGRAM(s) Oral two times a day with meals  metoprolol succinate ER 25 milliGRAM(s) Oral daily  pantoprazole    Tablet 40 milliGRAM(s) Oral before breakfast  predniSONE   Tablet 2.5 milliGRAM(s) Oral daily  rosuvastatin 10 milliGRAM(s) Oral at bedtime  sertraline 50 milliGRAM(s) Oral at bedtime    MEDICATIONS  (PRN):  aluminum hydroxide/magnesium hydroxide/simethicone Suspension 30 milliLiter(s) Oral every 4 hours PRN Dyspepsia  lidocaine   4% Patch 1 Patch Transdermal every 24 hours PRN pain  melatonin 3 milliGRAM(s) Oral at bedtime PRN Insomnia  ondansetron Injectable 4 milliGRAM(s) IV Push every 8 hours PRN Nausea and/or Vomiting  traMADol 50 milliGRAM(s) Oral every 6 hours PRN Severe Pain (7 - 10)    INTERVAL EVENTS:    T(C): 36.7 (25 @ 07:00), Max: 36.7 (25 @ 15:00)  HR: 72 (25 @ 07:00) (72 - 80)  BP: 180/74 (25 @ 07:00) (120/69 - 180/77)  RR: 18 (25 @ 00:15) (18 - 18)  SpO2: 95% (25 @ 07:00) (93% - 95%)  Wt(kg): --Vital Signs Last 24 Hrs  T(C): 36.7 (30 May 2025 07:00), Max: 36.7 (29 May 2025 15:00)  T(F): 98 (30 May 2025 07:00), Max: 98 (29 May 2025 15:00)  HR: 72 (30 May 2025 07:00) (72 - 80)  BP: 180/74 (30 May 2025 07:00) (120/69 - 180/77)  BP(mean): --  RR: 18 (30 May 2025 00:15) (18 - 18)  SpO2: 95% (30 May 2025 07:00) (93% - 95%)    Parameters below as of 30 May 2025 07:00  Patient On (Oxygen Delivery Method): room air        PHYSICAL EXAM:  GENERAL:   NECK: Supple, No JVD, Normal thyroid  CHEST/LUNG: Clear; No crackles or wheezing  HEART: S1, S2, Regular rate and rhythm;   ABDOMEN: Soft, Nontender, Nondistended; Bowel sounds present  EXTREMITIES: No clubbing, cyanosis, or edema  SKIN: No rashes or lesions    LABS:                        10.7   7.23  )-----------( 233      ( 29 May 2025 05:36 )             33.4                 135  |  97[L]  |  33[H]  ----------------------------<  90  4.6   |  21  |  1.3    Ca    9.9      29 May 2025 05:36      Urinalysis Basic - ( 29 May 2025 18:23 )    Color: Yellow / Appearance: Clear / S.020 / pH: x  Gluc: x / Ketone: x  / Bili: Negative / Urobili: 0.2 mg/dL   Blood: x / Protein: Negative mg/dL / Nitrite: Negative   Leuk Esterase: Small / RBC: 1 /HPF / WBC 7 /HPF   Sq Epi: x / Non Sq Epi: 2 /HPF / Bacteria: Many /HPF              RADIOLOGY & ADDITIONAL TESTS:     GIAN MELISSA  84y  Female  HPI:  84-year-old female with past medical history of rheumatoid arthritis, osteoarthritis, hypertension, GERD, breast CA in remission, bilateral knee replacement, B/L hip replacement presents for evaluation status post fall.  Patient states at 8 AM this morning she bent over to pick something up and fell onto her right knee and hit her forehead on the ground since then has been unable to ambulate leading her to call 911 this afternoon.  Now presents with right proximal lower extremity pain, aggravated with weightbearing, no relieving factors.  Denies LOC, AC, headache, neck pain, chest pain, shortness of breath, weakness, numbness, dysuria, hematuria, vomiting, diarrhea.    ED vitals:   · BP Systolic   198 mm Hg  · BP Diastolic   96 mm Hg  · Heart Rate  81 /min  · Respiration Rate (breaths/min)  18 /min  · Temp (F)  98.9 Degrees F  · Temp (C)  37.2 Degrees C  · Temp site  oral  · SpO2 (%)  98 %  · O2 Delivery/Oxygen Delivery Method  room air  · Temp at ED Arrival (C)  37.2 Degrees C    Radiology:  Xray chest: Left lower lung zone opacity.    CT head: negative     Xray knee/ pelvis:   PELVIS: Status post bilateral total hip arthroplasties. No acute   displaced fracture. Osteopenia. Lower lumbar spine degenerative changes.    RIGHT FEMUR/KNEE: Status post femur ORIF with cerclage wires,   intramedullary nail with plate and screws. Status post right total knee   arthroplasty. No acute displaced fracture. Mild lucency at the greater   lesser trochanter, stable. Displacement of the lesser trochanter, also   stable.   (27 May 2025 23:55)    MEDICATIONS  (STANDING):  acetaminophen     Tablet .. 1000 milliGRAM(s) Oral every 8 hours  aspirin enteric coated 81 milliGRAM(s) Oral daily  calcium carbonate   1250 mG (OsCal) 1 Tablet(s) Oral daily  celecoxib 200 milliGRAM(s) Oral two times a day  chlorhexidine 2% Cloths 1 Application(s) Topical <User Schedule>  cholecalciferol 1000 Unit(s) Oral daily  cyanocobalamin 1000 MICROGram(s) Oral daily  enoxaparin Injectable 40 milliGRAM(s) SubCutaneous every 24 hours  famotidine    Tablet 20 milliGRAM(s) Oral two times a day  hydrochlorothiazide 12.5 milliGRAM(s) Oral daily  losartan 50 milliGRAM(s) Oral daily  magnesium oxide 200 milliGRAM(s) Oral two times a day with meals  metoprolol succinate ER 25 milliGRAM(s) Oral daily  pantoprazole    Tablet 40 milliGRAM(s) Oral before breakfast  predniSONE   Tablet 2.5 milliGRAM(s) Oral daily  rosuvastatin 10 milliGRAM(s) Oral at bedtime  sertraline 50 milliGRAM(s) Oral at bedtime    MEDICATIONS  (PRN):  aluminum hydroxide/magnesium hydroxide/simethicone Suspension 30 milliLiter(s) Oral every 4 hours PRN Dyspepsia  lidocaine   4% Patch 1 Patch Transdermal every 24 hours PRN pain  melatonin 3 milliGRAM(s) Oral at bedtime PRN Insomnia  ondansetron Injectable 4 milliGRAM(s) IV Push every 8 hours PRN Nausea and/or Vomiting  traMADol 50 milliGRAM(s) Oral every 6 hours PRN Severe Pain (7 - 10)    INTERVAL EVENTS: PAtient seen in bed, concerned with near fall yesterday trying to get to the commode    T(C): 36.7 (25 @ 07:00), Max: 36.7 (25 @ 15:00)  HR: 72 (25 @ 07:00) (72 - 80)  BP: 180/74 (25 @ 07:00) (120/69 - 180/77)  RR: 18 (25 @ 00:15) (18 - 18)  SpO2: 95% (25 @ 07:00) (93% - 95%)  Wt(kg): --Vital Signs Last 24 Hrs  T(C): 36.7 (30 May 2025 07:00), Max: 36.7 (29 May 2025 15:00)  T(F): 98 (30 May 2025 07:00), Max: 98 (29 May 2025 15:00)  HR: 72 (30 May 2025 07:00) (72 - 80)  BP: 180/74 (30 May 2025 07:00) (120/69 - 180/77)  BP(mean): --  RR: 18 (30 May 2025 00:15) (18 - 18)  SpO2: 95% (30 May 2025 07:00) (93% - 95%)    Parameters below as of 30 May 2025 07:00  Patient On (Oxygen Delivery Method): room air        PHYSICAL EXAM:  GENERAL: NAD  NECK: Supple, No JVD  CHEST/LUNG: Clear  HEART: S1, S2, Regular   ABDOMEN: Soft, Nontender,  Bowel sounds present  EXTREMITIES: edema  SKIN: No rashes or lesions    LABS:                        10.7   7.23  )-----------( 233      ( 29 May 2025 05:36 )             33.4                 135  |  97[L]  |  33[H]  ----------------------------<  90  4.6   |  21  |  1.3    Ca    9.9      29 May 2025 05:36      Urinalysis Basic - ( 29 May 2025 18:23 )    Color: Yellow / Appearance: Clear / S.020 / pH: x  Gluc: x / Ketone: x  / Bili: Negative / Urobili: 0.2 mg/dL   Blood: x / Protein: Negative mg/dL / Nitrite: Negative   Leuk Esterase: Small / RBC: 1 /HPF / WBC 7 /HPF   Sq Epi: x / Non Sq Epi: 2 /HPF / Bacteria: Many /HPF          RADIOLOGY & ADDITIONAL TESTS:  < from: Xray Knee 4 Views, Right (25 @ 21:40) >    PROCEDUREDATE:  2025          INTERPRETATION:  CLINICAL HISTORY / REASON FOR EXAM: Pain status post fall    COMPARISON: Femur radiographs from February 3, 2025    TECHNIQUE: Multiple total views, AP pelvis, right femur and knee   radiographs    FINDINGS/  IMPRESSION:    PELVIS: Status post bilateral total hip arthroplasties. No acute   displaced fracture. Osteopenia. Lower lumbar spine degenerative changes.    RIGHT FEMUR/KNEE: Status post femur ORIF with cerclage wires,   intramedullary nail with plate and screws. Status post right total knee   arthroplasty. No acute displaced fracture. Mild lucency at the greater   lesser trochanter, stable. Displacement of the lesser trochanter, also   stable.    --- End of Report ---            < end of copied text >

## 2025-05-31 LAB
ANION GAP SERPL CALC-SCNC: 12 MMOL/L — SIGNIFICANT CHANGE UP (ref 7–14)
BASOPHILS # BLD AUTO: 0.04 K/UL — SIGNIFICANT CHANGE UP (ref 0–0.2)
BASOPHILS NFR BLD AUTO: 0.5 % — SIGNIFICANT CHANGE UP (ref 0–1)
BUN SERPL-MCNC: 26 MG/DL — HIGH (ref 10–20)
CALCIUM SERPL-MCNC: 9.3 MG/DL — SIGNIFICANT CHANGE UP (ref 8.4–10.5)
CHLORIDE SERPL-SCNC: 94 MMOL/L — LOW (ref 98–110)
CO2 SERPL-SCNC: 23 MMOL/L — SIGNIFICANT CHANGE UP (ref 17–32)
CREAT SERPL-MCNC: 1 MG/DL — SIGNIFICANT CHANGE UP (ref 0.7–1.5)
CULTURE RESULTS: NO GROWTH — SIGNIFICANT CHANGE UP
EGFR: 55 ML/MIN/1.73M2 — LOW
EGFR: 55 ML/MIN/1.73M2 — LOW
EOSINOPHIL # BLD AUTO: 0.22 K/UL — SIGNIFICANT CHANGE UP (ref 0–0.7)
EOSINOPHIL NFR BLD AUTO: 3 % — SIGNIFICANT CHANGE UP (ref 0–8)
GLUCOSE SERPL-MCNC: 106 MG/DL — HIGH (ref 70–99)
HCT VFR BLD CALC: 31.4 % — LOW (ref 37–47)
HGB BLD-MCNC: 9.9 G/DL — LOW (ref 12–16)
IMM GRANULOCYTES NFR BLD AUTO: 0.5 % — HIGH (ref 0.1–0.3)
LYMPHOCYTES # BLD AUTO: 2.35 K/UL — SIGNIFICANT CHANGE UP (ref 1.2–3.4)
LYMPHOCYTES # BLD AUTO: 31.6 % — SIGNIFICANT CHANGE UP (ref 20.5–51.1)
MCHC RBC-ENTMCNC: 27.9 PG — SIGNIFICANT CHANGE UP (ref 27–31)
MCHC RBC-ENTMCNC: 31.5 G/DL — LOW (ref 32–37)
MCV RBC AUTO: 88.5 FL — SIGNIFICANT CHANGE UP (ref 81–99)
MONOCYTES # BLD AUTO: 0.84 K/UL — HIGH (ref 0.1–0.6)
MONOCYTES NFR BLD AUTO: 11.3 % — HIGH (ref 1.7–9.3)
NEUTROPHILS # BLD AUTO: 3.94 K/UL — SIGNIFICANT CHANGE UP (ref 1.4–6.5)
NEUTROPHILS NFR BLD AUTO: 53.1 % — SIGNIFICANT CHANGE UP (ref 42.2–75.2)
NRBC BLD AUTO-RTO: 0 /100 WBCS — SIGNIFICANT CHANGE UP (ref 0–0)
PLATELET # BLD AUTO: 236 K/UL — SIGNIFICANT CHANGE UP (ref 130–400)
PMV BLD: 10.3 FL — SIGNIFICANT CHANGE UP (ref 7.4–10.4)
POTASSIUM SERPL-MCNC: 4 MMOL/L — SIGNIFICANT CHANGE UP (ref 3.5–5)
POTASSIUM SERPL-SCNC: 4 MMOL/L — SIGNIFICANT CHANGE UP (ref 3.5–5)
RBC # BLD: 3.55 M/UL — LOW (ref 4.2–5.4)
RBC # FLD: 17.4 % — HIGH (ref 11.5–14.5)
SODIUM SERPL-SCNC: 129 MMOL/L — LOW (ref 135–146)
SPECIMEN SOURCE: SIGNIFICANT CHANGE UP
WBC # BLD: 7.43 K/UL — SIGNIFICANT CHANGE UP (ref 4.8–10.8)
WBC # FLD AUTO: 7.43 K/UL — SIGNIFICANT CHANGE UP (ref 4.8–10.8)

## 2025-05-31 RX ORDER — POLYETHYLENE GLYCOL 3350 17 G/17G
17 POWDER, FOR SOLUTION ORAL DAILY
Refills: 0 | Status: DISCONTINUED | OUTPATIENT
Start: 2025-05-31 | End: 2025-06-02

## 2025-05-31 RX ORDER — SENNA 187 MG
2 TABLET ORAL AT BEDTIME
Refills: 0 | Status: DISCONTINUED | OUTPATIENT
Start: 2025-05-31 | End: 2025-06-02

## 2025-05-31 RX ADMIN — Medication 1000 MILLIGRAM(S): at 15:10

## 2025-05-31 RX ADMIN — PREDNISONE 2.5 MILLIGRAM(S): 20 TABLET ORAL at 05:01

## 2025-05-31 RX ADMIN — Medication 1000 MILLIGRAM(S): at 05:01

## 2025-05-31 RX ADMIN — METOPROLOL SUCCINATE 25 MILLIGRAM(S): 50 TABLET, EXTENDED RELEASE ORAL at 05:01

## 2025-05-31 RX ADMIN — POLYETHYLENE GLYCOL 3350 17 GRAM(S): 17 POWDER, FOR SOLUTION ORAL at 11:38

## 2025-05-31 RX ADMIN — CALCIUM CARBONATE 1 TABLET(S): 750 TABLET ORAL at 11:37

## 2025-05-31 RX ADMIN — LOSARTAN POTASSIUM 50 MILLIGRAM(S): 100 TABLET, FILM COATED ORAL at 05:02

## 2025-05-31 RX ADMIN — Medication 20 MILLIGRAM(S): at 05:02

## 2025-05-31 RX ADMIN — SERTRALINE 50 MILLIGRAM(S): 100 TABLET, FILM COATED ORAL at 21:03

## 2025-05-31 RX ADMIN — Medication 81 MILLIGRAM(S): at 11:37

## 2025-05-31 RX ADMIN — Medication 1 APPLICATION(S): at 05:02

## 2025-05-31 RX ADMIN — Medication 1000 MILLIGRAM(S): at 13:47

## 2025-05-31 RX ADMIN — CELECOXIB 200 MILLIGRAM(S): 50 CAPSULE ORAL at 17:08

## 2025-05-31 RX ADMIN — AMLODIPINE BESYLATE 5 MILLIGRAM(S): 10 TABLET ORAL at 05:02

## 2025-05-31 RX ADMIN — Medication 2 TABLET(S): at 21:04

## 2025-05-31 RX ADMIN — CELECOXIB 200 MILLIGRAM(S): 50 CAPSULE ORAL at 17:51

## 2025-05-31 RX ADMIN — CELECOXIB 200 MILLIGRAM(S): 50 CAPSULE ORAL at 05:01

## 2025-05-31 RX ADMIN — ROSUVASTATIN CALCIUM 10 MILLIGRAM(S): 20 TABLET, FILM COATED ORAL at 21:03

## 2025-05-31 RX ADMIN — Medication 1000 UNIT(S): at 11:37

## 2025-05-31 RX ADMIN — Medication 1000 MILLIGRAM(S): at 21:35

## 2025-05-31 RX ADMIN — CYANOCOBALAMIN 1000 MICROGRAM(S): 1000 INJECTION INTRAMUSCULAR; SUBCUTANEOUS at 11:37

## 2025-05-31 RX ADMIN — Medication 200 MILLIGRAM(S): at 17:08

## 2025-05-31 RX ADMIN — Medication 1000 MILLIGRAM(S): at 21:03

## 2025-05-31 RX ADMIN — Medication 20 MILLIGRAM(S): at 17:07

## 2025-05-31 RX ADMIN — CEFTRIAXONE 100 MILLIGRAM(S): 500 INJECTION, POWDER, FOR SOLUTION INTRAMUSCULAR; INTRAVENOUS at 11:37

## 2025-05-31 RX ADMIN — Medication 40 MILLIGRAM(S): at 05:17

## 2025-05-31 RX ADMIN — ENOXAPARIN SODIUM 40 MILLIGRAM(S): 100 INJECTION SUBCUTANEOUS at 05:17

## 2025-05-31 NOTE — PROGRESS NOTE ADULT - SUBJECTIVE AND OBJECTIVE BOX
BELÉNGIAN  84y  Female      Patient is a 84y old  Female who presents with a chief complaint of Fall (30 May 2025 10:28)        REVIEW OF SYSTEMS:  CONSTITUTIONAL: No fever, weight loss, or fatigue  RESPIRATORY: No cough, wheezing, chills or hemoptysis; No shortness of breath  CARDIOVASCULAR: No chest pain, palpitations, dizziness, or leg swelling  GASTROINTESTINAL: No abdominal or epigastric pain. No nausea, vomiting, or hematemesis; No diarrhea or constipation. No melena or hematochezia.  GENITOURINARY: No dysuria, frequency, hematuria, or incontinence  MUSCULOSKELETAL: No joint pain or swelling; No muscle, back, or extremity pain  PSYCHIATRIC: No depression, anxiety, mood swings, or difficulty sleeping  HEME/LYMPH: No easy bruising, or bleeding gums  ALLERY AND IMMUNOLOGIC: No hives or eczema  FAMILY HISTORY:  FH: cancer  Nonhodgkins : sister    FH: lymphoma (Sibling)      T(C): 36.6 (05-31-25 @ 07:00), Max: 36.7 (05-30-25 @ 16:04)  HR: 74 (05-31-25 @ 07:00) (73 - 80)  BP: 135/85 (05-31-25 @ 07:00) (135/85 - 164/69)  RR: 18 (05-31-25 @ 00:00) (18 - 18)  SpO2: 97% (05-31-25 @ 07:00) (93% - 97%)  Wt(kg): --Vital Signs Last 24 Hrs  T(C): 36.6 (31 May 2025 07:00), Max: 36.7 (30 May 2025 16:04)  T(F): 97.8 (31 May 2025 07:00), Max: 98 (30 May 2025 16:04)  HR: 74 (31 May 2025 07:00) (73 - 80)  BP: 135/85 (31 May 2025 07:00) (135/85 - 164/69)  BP(mean): --  RR: 18 (31 May 2025 00:00) (18 - 18)  SpO2: 97% (31 May 2025 07:00) (93% - 97%)    Parameters below as of 31 May 2025 07:00  Patient On (Oxygen Delivery Method): room air      No Known Allergies      PHYSICAL EXAM:  GENERAL: NAD,   HEAD:  Atraumatic, Normocephalic  EYES: EOMI, PERRLA, conjunctiva and sclera clear  ENMT: No tonsillar erythema, exudates, or enlargement;   NECK: Supple, No JVD, Normal thyroid  NERVOUS SYSTEM:  Alert & Oriented X3  CHEST/LUNG: Clear to percussion bilaterally; No rales, rhonchi, wheezing, or rubs  HEART: Regular rate and rhythm; No murmurs, rubs, or gallops  ABDOMEN: Soft, Nontender, Nondistended; Bowel sounds present  EXTREMITIES:  , No clubbing, cyanosis, or edema  LYMPH: No lymphadenopathy noted  SKIN: No rashes or lesions      LABS:  05-31    129[L]  |  94[L]  |  26[H]  ----------------------------<  106[H]  4.0   |  23  |  1.0    Ca    9.3      31 May 2025 05:49                          9.9    7.43  )-----------( 236      ( 31 May 2025 05:49 )             31.4       < from: CT Head No Cont (05.27.25 @ 21:22) >  No acute intracranial pathology. No evidence of midline shift, mass   effect or intracranial hemorrhage.    < end of copied text >      RADIOLOGY & ADDITIONAL TESTS:    MEDICATION:  acetaminophen     Tablet .. 1000 milliGRAM(s) Oral every 8 hours  aluminum hydroxide/magnesium hydroxide/simethicone Suspension 30 milliLiter(s) Oral every 4 hours PRN  amLODIPine   Tablet 5 milliGRAM(s) Oral daily  aspirin enteric coated 81 milliGRAM(s) Oral daily  calcium carbonate   1250 mG (OsCal) 1 Tablet(s) Oral daily  cefTRIAXone   IVPB 1000 milliGRAM(s) IV Intermittent every 24 hours  celecoxib 200 milliGRAM(s) Oral two times a day  chlorhexidine 2% Cloths 1 Application(s) Topical <User Schedule>  cholecalciferol 1000 Unit(s) Oral daily  cyanocobalamin 1000 MICROGram(s) Oral daily  enoxaparin Injectable 40 milliGRAM(s) SubCutaneous every 24 hours  famotidine    Tablet 20 milliGRAM(s) Oral two times a day  lidocaine   4% Patch 1 Patch Transdermal every 24 hours PRN  losartan 50 milliGRAM(s) Oral daily  magnesium oxide 200 milliGRAM(s) Oral two times a day with meals  melatonin 3 milliGRAM(s) Oral at bedtime PRN  metoprolol succinate ER 25 milliGRAM(s) Oral daily  ondansetron Injectable 4 milliGRAM(s) IV Push every 8 hours PRN  pantoprazole    Tablet 40 milliGRAM(s) Oral before breakfast  predniSONE   Tablet 2.5 milliGRAM(s) Oral daily  rosuvastatin 10 milliGRAM(s) Oral at bedtime  sertraline 50 milliGRAM(s) Oral at bedtime  traMADol 50 milliGRAM(s) Oral every 6 hours PRN      HEALTH ISSUES - PROBLEM Dx:    84-year-old female with past medical history of rheumatoid arthritis, osteoarthritis, hypertension, GERD, breast CA in remission, bilateral knee replacement, B/L hip replacement presents for evaluation status post fall.  Patient states at 8 AM this morning she bent over to pick something up and fell onto her right knee and hit her forehead on the ground since then has been unable to ambulate leading her to call 911 this afternoon.  Now presents with right proximal lower extremity pain, aggravated with weightbearing, no relieving factors.  Denies LOC, AC, headache, neck pain, chest pain, shortness of breath, weakness, numbness, dysuria, hematuria, vomiting, diarrhea.    Fall w/o LOC  L1 compression fracture  Rt knee & Back pain, inability to ambulate, Deconditioning  Hx of Osteoporosis, RA, OA s/p B/L ROHIT & TKA, Rt hip ORIF(2024)  - On Infusion, MTX and  Prednisone for RA, wishes to wean down Prednisone dose  - pain with ambulating since fall  - trauma workup was all negative  - Seen by ortho, Dr Yolanda Sherman plans for revision of R TKA  - wean Prednisone 2.5 mg as per Dr Wiley rheumatology  - PT/ OT    HTN  - on Losartan 50mg, Toprol 25mg,    - BP noted  - consider addition of Amlodipine 2.5 mg    HLD  -continue Rosuvastatin 10mg    GERD  - continue Famotidine 20mg BID    Hyponatremia d/c hctz  Anxiety/Depression  - on Sertraline 50mg at night     Diet: DASH  DVT: Lovenox   GI: PPI  Dispo: D/C planning to SNF post OP. Patient to be transferred to the [facility] for the procedure on Monday.

## 2025-06-01 LAB
ANION GAP SERPL CALC-SCNC: 14 MMOL/L — SIGNIFICANT CHANGE UP (ref 7–14)
ANION GAP SERPL CALC-SCNC: 14 MMOL/L — SIGNIFICANT CHANGE UP (ref 7–14)
APTT BLD: 29.4 SEC — SIGNIFICANT CHANGE UP (ref 27–39.2)
BASOPHILS # BLD AUTO: 0.03 K/UL — SIGNIFICANT CHANGE UP (ref 0–0.2)
BASOPHILS # BLD AUTO: 0.04 K/UL — SIGNIFICANT CHANGE UP (ref 0–0.2)
BASOPHILS NFR BLD AUTO: 0.5 % — SIGNIFICANT CHANGE UP (ref 0–1)
BASOPHILS NFR BLD AUTO: 0.5 % — SIGNIFICANT CHANGE UP (ref 0–1)
BUN SERPL-MCNC: 26 MG/DL — HIGH (ref 10–20)
BUN SERPL-MCNC: 26 MG/DL — HIGH (ref 10–20)
CALCIUM SERPL-MCNC: 8.9 MG/DL — SIGNIFICANT CHANGE UP (ref 8.4–10.5)
CALCIUM SERPL-MCNC: 9.2 MG/DL — SIGNIFICANT CHANGE UP (ref 8.4–10.5)
CHLORIDE SERPL-SCNC: 95 MMOL/L — LOW (ref 98–110)
CHLORIDE SERPL-SCNC: 95 MMOL/L — LOW (ref 98–110)
CO2 SERPL-SCNC: 22 MMOL/L — SIGNIFICANT CHANGE UP (ref 17–32)
CO2 SERPL-SCNC: 23 MMOL/L — SIGNIFICANT CHANGE UP (ref 17–32)
CREAT SERPL-MCNC: 0.9 MG/DL — SIGNIFICANT CHANGE UP (ref 0.7–1.5)
CREAT SERPL-MCNC: 1 MG/DL — SIGNIFICANT CHANGE UP (ref 0.7–1.5)
EGFR: 55 ML/MIN/1.73M2 — LOW
EGFR: 55 ML/MIN/1.73M2 — LOW
EGFR: 63 ML/MIN/1.73M2 — SIGNIFICANT CHANGE UP
EGFR: 63 ML/MIN/1.73M2 — SIGNIFICANT CHANGE UP
EOSINOPHIL # BLD AUTO: 0.22 K/UL — SIGNIFICANT CHANGE UP (ref 0–0.7)
EOSINOPHIL # BLD AUTO: 0.28 K/UL — SIGNIFICANT CHANGE UP (ref 0–0.7)
EOSINOPHIL NFR BLD AUTO: 3.3 % — SIGNIFICANT CHANGE UP (ref 0–8)
EOSINOPHIL NFR BLD AUTO: 3.6 % — SIGNIFICANT CHANGE UP (ref 0–8)
GLUCOSE SERPL-MCNC: 106 MG/DL — HIGH (ref 70–99)
GLUCOSE SERPL-MCNC: 109 MG/DL — HIGH (ref 70–99)
HCT VFR BLD CALC: 32.6 % — LOW (ref 37–47)
HCT VFR BLD CALC: 34.6 % — LOW (ref 37–47)
HGB BLD-MCNC: 10.3 G/DL — LOW (ref 12–16)
HGB BLD-MCNC: 10.8 G/DL — LOW (ref 12–16)
IMM GRANULOCYTES NFR BLD AUTO: 0.3 % — SIGNIFICANT CHANGE UP (ref 0.1–0.3)
IMM GRANULOCYTES NFR BLD AUTO: 0.4 % — HIGH (ref 0.1–0.3)
INR BLD: 1.02 RATIO — SIGNIFICANT CHANGE UP (ref 0.65–1.3)
LYMPHOCYTES # BLD AUTO: 1.98 K/UL — SIGNIFICANT CHANGE UP (ref 1.2–3.4)
LYMPHOCYTES # BLD AUTO: 2.13 K/UL — SIGNIFICANT CHANGE UP (ref 1.2–3.4)
LYMPHOCYTES # BLD AUTO: 25.4 % — SIGNIFICANT CHANGE UP (ref 20.5–51.1)
LYMPHOCYTES # BLD AUTO: 32.6 % — SIGNIFICANT CHANGE UP (ref 20.5–51.1)
MAGNESIUM SERPL-MCNC: 1.7 MG/DL — LOW (ref 1.8–2.4)
MCHC RBC-ENTMCNC: 27.5 PG — SIGNIFICANT CHANGE UP (ref 27–31)
MCHC RBC-ENTMCNC: 27.9 PG — SIGNIFICANT CHANGE UP (ref 27–31)
MCHC RBC-ENTMCNC: 31.2 G/DL — LOW (ref 32–37)
MCHC RBC-ENTMCNC: 31.6 G/DL — LOW (ref 32–37)
MCV RBC AUTO: 88 FL — SIGNIFICANT CHANGE UP (ref 81–99)
MCV RBC AUTO: 88.3 FL — SIGNIFICANT CHANGE UP (ref 81–99)
MONOCYTES # BLD AUTO: 0.77 K/UL — HIGH (ref 0.1–0.6)
MONOCYTES # BLD AUTO: 1.01 K/UL — HIGH (ref 0.1–0.6)
MONOCYTES NFR BLD AUTO: 12.1 % — HIGH (ref 1.7–9.3)
MONOCYTES NFR BLD AUTO: 12.7 % — HIGH (ref 1.7–9.3)
NEUTROPHILS # BLD AUTO: 3.05 K/UL — SIGNIFICANT CHANGE UP (ref 1.4–6.5)
NEUTROPHILS # BLD AUTO: 4.88 K/UL — SIGNIFICANT CHANGE UP (ref 1.4–6.5)
NEUTROPHILS NFR BLD AUTO: 50.3 % — SIGNIFICANT CHANGE UP (ref 42.2–75.2)
NEUTROPHILS NFR BLD AUTO: 58.3 % — SIGNIFICANT CHANGE UP (ref 42.2–75.2)
NRBC BLD AUTO-RTO: 0 /100 WBCS — SIGNIFICANT CHANGE UP (ref 0–0)
NRBC BLD AUTO-RTO: 0 /100 WBCS — SIGNIFICANT CHANGE UP (ref 0–0)
PHOSPHATE SERPL-MCNC: 3 MG/DL — SIGNIFICANT CHANGE UP (ref 2.1–4.9)
PLATELET # BLD AUTO: 254 K/UL — SIGNIFICANT CHANGE UP (ref 130–400)
PLATELET # BLD AUTO: 264 K/UL — SIGNIFICANT CHANGE UP (ref 130–400)
PMV BLD: 10 FL — SIGNIFICANT CHANGE UP (ref 7.4–10.4)
PMV BLD: 9.6 FL — SIGNIFICANT CHANGE UP (ref 7.4–10.4)
POTASSIUM SERPL-MCNC: 4.1 MMOL/L — SIGNIFICANT CHANGE UP (ref 3.5–5)
POTASSIUM SERPL-MCNC: 4.2 MMOL/L — SIGNIFICANT CHANGE UP (ref 3.5–5)
POTASSIUM SERPL-SCNC: 4.1 MMOL/L — SIGNIFICANT CHANGE UP (ref 3.5–5)
POTASSIUM SERPL-SCNC: 4.2 MMOL/L — SIGNIFICANT CHANGE UP (ref 3.5–5)
PROTHROM AB SERPL-ACNC: 12 SEC — SIGNIFICANT CHANGE UP (ref 9.95–12.87)
RBC # BLD: 3.69 M/UL — LOW (ref 4.2–5.4)
RBC # BLD: 3.93 M/UL — LOW (ref 4.2–5.4)
RBC # FLD: 17.3 % — HIGH (ref 11.5–14.5)
RBC # FLD: 17.4 % — HIGH (ref 11.5–14.5)
SODIUM SERPL-SCNC: 131 MMOL/L — LOW (ref 135–146)
SODIUM SERPL-SCNC: 132 MMOL/L — LOW (ref 135–146)
WBC # BLD: 6.07 K/UL — SIGNIFICANT CHANGE UP (ref 4.8–10.8)
WBC # BLD: 8.37 K/UL — SIGNIFICANT CHANGE UP (ref 4.8–10.8)
WBC # FLD AUTO: 6.07 K/UL — SIGNIFICANT CHANGE UP (ref 4.8–10.8)
WBC # FLD AUTO: 8.37 K/UL — SIGNIFICANT CHANGE UP (ref 4.8–10.8)

## 2025-06-01 RX ORDER — CEFTRIAXONE 500 MG/1
1000 INJECTION, POWDER, FOR SOLUTION INTRAMUSCULAR; INTRAVENOUS ONCE
Refills: 0 | Status: COMPLETED | OUTPATIENT
Start: 2025-06-01 | End: 2025-06-01

## 2025-06-01 RX ADMIN — Medication 1000 UNIT(S): at 11:08

## 2025-06-01 RX ADMIN — CALCIUM CARBONATE 1 TABLET(S): 750 TABLET ORAL at 11:08

## 2025-06-01 RX ADMIN — Medication 200 MILLIGRAM(S): at 17:52

## 2025-06-01 RX ADMIN — CELECOXIB 200 MILLIGRAM(S): 50 CAPSULE ORAL at 05:35

## 2025-06-01 RX ADMIN — PREDNISONE 2.5 MILLIGRAM(S): 20 TABLET ORAL at 05:05

## 2025-06-01 RX ADMIN — SERTRALINE 50 MILLIGRAM(S): 100 TABLET, FILM COATED ORAL at 22:10

## 2025-06-01 RX ADMIN — Medication 81 MILLIGRAM(S): at 11:08

## 2025-06-01 RX ADMIN — Medication 1000 MILLIGRAM(S): at 22:30

## 2025-06-01 RX ADMIN — Medication 2 TABLET(S): at 22:10

## 2025-06-01 RX ADMIN — Medication 1 APPLICATION(S): at 05:08

## 2025-06-01 RX ADMIN — Medication 1000 MILLIGRAM(S): at 05:05

## 2025-06-01 RX ADMIN — Medication 20 MILLIGRAM(S): at 05:04

## 2025-06-01 RX ADMIN — ROSUVASTATIN CALCIUM 10 MILLIGRAM(S): 20 TABLET, FILM COATED ORAL at 22:10

## 2025-06-01 RX ADMIN — ENOXAPARIN SODIUM 40 MILLIGRAM(S): 100 INJECTION SUBCUTANEOUS at 06:54

## 2025-06-01 RX ADMIN — AMLODIPINE BESYLATE 5 MILLIGRAM(S): 10 TABLET ORAL at 05:06

## 2025-06-01 RX ADMIN — LOSARTAN POTASSIUM 50 MILLIGRAM(S): 100 TABLET, FILM COATED ORAL at 05:07

## 2025-06-01 RX ADMIN — Medication 1000 MILLIGRAM(S): at 22:10

## 2025-06-01 RX ADMIN — CELECOXIB 200 MILLIGRAM(S): 50 CAPSULE ORAL at 18:25

## 2025-06-01 RX ADMIN — Medication 20 MILLIGRAM(S): at 17:52

## 2025-06-01 RX ADMIN — Medication 40 MILLIGRAM(S): at 05:07

## 2025-06-01 RX ADMIN — Medication 1000 MILLIGRAM(S): at 05:35

## 2025-06-01 RX ADMIN — CEFTRIAXONE 100 MILLIGRAM(S): 500 INJECTION, POWDER, FOR SOLUTION INTRAMUSCULAR; INTRAVENOUS at 17:52

## 2025-06-01 RX ADMIN — METOPROLOL SUCCINATE 25 MILLIGRAM(S): 50 TABLET, EXTENDED RELEASE ORAL at 05:07

## 2025-06-01 RX ADMIN — POLYETHYLENE GLYCOL 3350 17 GRAM(S): 17 POWDER, FOR SOLUTION ORAL at 11:07

## 2025-06-01 RX ADMIN — CYANOCOBALAMIN 1000 MICROGRAM(S): 1000 INJECTION INTRAMUSCULAR; SUBCUTANEOUS at 11:08

## 2025-06-01 RX ADMIN — Medication 200 MILLIGRAM(S): at 07:51

## 2025-06-01 RX ADMIN — CELECOXIB 200 MILLIGRAM(S): 50 CAPSULE ORAL at 05:05

## 2025-06-01 RX ADMIN — CELECOXIB 200 MILLIGRAM(S): 50 CAPSULE ORAL at 17:52

## 2025-06-01 NOTE — PROGRESS NOTE ADULT - SUBJECTIVE AND OBJECTIVE BOX
GIAN MELISSA  84y  Female      Patient is a 84y old  Female who presents with a chief complaint of Fall (31 May 2025 10:02)        REVIEW OF SYSTEMS:  CONSTITUTIONAL: No fever, weight loss, or fatigue  RESPIRATORY: No cough, wheezing, chills or hemoptysis; No shortness of breath  CARDIOVASCULAR: No chest pain, palpitations, dizziness, or leg swelling  GASTROINTESTINAL: No abdominal or epigastric pain. No nausea, vomiting, or hematemesis; No diarrhea or constipation. No melena or hematochezia.  GENITOURINARY: No dysuria, frequency, hematuria, or incontinence  MUSCULOSKELETAL: No joint pain or swelling; No muscle, back,and extremity pain+  PSYCHIATRIC: No depression, anxiety, mood swings, or difficulty sleeping  HEME/LYMPH: No easy bruising, or bleeding gums  ALLERY AND IMMUNOLOGIC: No hives or eczema  FAMILY HISTORY:  FH: cancer  Nonhodgkins : sister    FH: lymphoma (Sibling)      T(C): 36.6 (06-01-25 @ 07:00), Max: 36.6 (06-01-25 @ 00:28)  HR: 67 (06-01-25 @ 07:00) (63 - 72)  BP: 164/70 (06-01-25 @ 07:00) (152/67 - 165/78)  RR: 18 (06-01-25 @ 00:28) (18 - 18)  SpO2: 96% (06-01-25 @ 07:00) (96% - 97%)  Wt(kg): --Vital Signs Last 24 Hrs  T(C): 36.6 (01 Jun 2025 07:00), Max: 36.6 (01 Jun 2025 00:28)  T(F): 97.9 (01 Jun 2025 07:00), Max: 97.9 (01 Jun 2025 00:28)  HR: 67 (01 Jun 2025 07:00) (63 - 72)  BP: 164/70 (01 Jun 2025 07:00) (152/67 - 165/78)  BP(mean): --  RR: 18 (01 Jun 2025 00:28) (18 - 18)  SpO2: 96% (01 Jun 2025 07:00) (96% - 97%)    Parameters below as of 01 Jun 2025 07:00  Patient On (Oxygen Delivery Method): room air      No Known Allergies      PHYSICAL EXAM:  GENERAL: NAD,   HEAD:  Atraumatic, Normocephalic  EYES: EOMI, PERRLA, conjunctiva and sclera clear  ENMT: No tonsillar erythema, exudates, or enlargement;   NECK: Supple, No JVD, Normal thyroid  NERVOUS SYSTEM:  Alert & Oriented X3,   CHEST/LUNG: Clear to percussion bilaterally; No rales, rhonchi, wheezing, or rubs  HEART: Regular rate and rhythm; No murmurs, rubs, or gallops  ABDOMEN: Soft, Nontender, Nondistended; Bowel sounds present  EXTREMITIES:  No clubbing, cyanosis, or edema  LYMPH: No lymphadenopathy noted  SKIN: No rashes or lesions      LABS:  06-01    132[L]  |  95[L]  |  26[H]  ----------------------------<  106[H]  4.2   |  23  |  1.0    Ca    9.2      01 Jun 2025 05:49                            10.8   6.07  )-----------( 254      ( 01 Jun 2025 05:49 )             34.6     < from: TTE Echo Complete w/o Contrast w/ Doppler (02.14.24 @ 08:43) >   1. Normal global left ventricular systolic function.   2. LV Ejection Fraction by Brothers's Method with a biplane EF of 54 %.   3. Moderate concentric left ventricular hypertrophy.   4. Spectral Doppler shows pseudonormal pattern of left ventricular   myocardial filling (Grade II diastolic dysfunction).   5. Elevated mean left atrial pressure.   6. Normal right ventricular size and function.   7. Moderately enlarged left atrium.   8. Mild mitral valve regurgitation.   9. Mild tricuspid regurgitation.  10. Mild pulmonic valve regurgitation.  11. Estimated pulmonary artery systolic pressure is 35.5 mmHg assuming a   right atrial pressure of 3 mmHg, which is consistent with borderline   pulmonary hypertension.  12. Dilatation of the ascending aorta (measuring 3.9 cm, indexed 2.02   cm/m2).      < end of copied text >      RADIOLOGY & ADDITIONAL TESTS:    MEDICATION:  acetaminophen     Tablet .. 1000 milliGRAM(s) Oral every 8 hours  aluminum hydroxide/magnesium hydroxide/simethicone Suspension 30 milliLiter(s) Oral every 4 hours PRN  amLODIPine   Tablet 5 milliGRAM(s) Oral daily  aspirin enteric coated 81 milliGRAM(s) Oral daily  calcium carbonate   1250 mG (OsCal) 1 Tablet(s) Oral daily  cefTRIAXone   IVPB 1000 milliGRAM(s) IV Intermittent every 24 hours  celecoxib 200 milliGRAM(s) Oral two times a day  chlorhexidine 2% Cloths 1 Application(s) Topical <User Schedule>  cholecalciferol 1000 Unit(s) Oral daily  cyanocobalamin 1000 MICROGram(s) Oral daily  enoxaparin Injectable 40 milliGRAM(s) SubCutaneous every 24 hours  famotidine    Tablet 20 milliGRAM(s) Oral two times a day  lidocaine   4% Patch 1 Patch Transdermal every 24 hours PRN  losartan 50 milliGRAM(s) Oral daily  magnesium oxide 200 milliGRAM(s) Oral two times a day with meals  melatonin 3 milliGRAM(s) Oral at bedtime PRN  metoprolol succinate ER 25 milliGRAM(s) Oral daily  ondansetron Injectable 4 milliGRAM(s) IV Push every 8 hours PRN  pantoprazole    Tablet 40 milliGRAM(s) Oral before breakfast  polyethylene glycol 3350 17 Gram(s) Oral daily  predniSONE   Tablet 2.5 milliGRAM(s) Oral daily  rosuvastatin 10 milliGRAM(s) Oral at bedtime  senna 2 Tablet(s) Oral at bedtime  sertraline 50 milliGRAM(s) Oral at bedtime  traMADol 50 milliGRAM(s) Oral every 6 hours PRN      HEALTH ISSUES - PROBLEM Dx:      84-year-old female with past medical history of rheumatoid arthritis, osteoarthritis, hypertension, GERD, breast CA in remission, bilateral knee replacement, B/L hip replacement presents for evaluation status post fall.  Patient states at 8 AM this morning she bent over to pick something up and fell onto her right knee and hit her forehead on the ground since then has been unable to ambulate leading her to call 911 this afternoon.  Now presents with right proximal lower extremity pain, aggravated with weightbearing, no relieving factors.  Denies LOC, AC, headache, neck pain, chest pain, shortness of breath, weakness, numbness, dysuria, hematuria, vomiting, diarrhea.    Fall w/o LOC  L1 compression fracture  Rt knee & Back pain, inability to ambulate, Deconditioning  Hx of Osteoporosis, RA, OA s/p B/L ROHIT & TKA, Rt hip ORIF(2024)  - On Infusion, MTX and  Prednisone for RA, wishes to wean down Prednisone dose  - pain with ambulating since fall  - trauma workup was all negative  - Seen by ortho, Dr Yolanda Sherman plans for revision of R TKA  - wean Prednisone 2.5 mg as per Dr Wiley rheumatology  - PT/ OT    Hyponatremia better off hctz  HTN  - on Losartan 50mg, Toprol 25mg,    - BP noted  - consider addition of Amlodipine 5mg ,medically stable will do cardiology consult    HLD  -continue Rosuvastatin 10mg    GERD  - continue Famotidine 20mg BID    Hyponatremia d/c hctz  Anxiety/Depression  - on Sertraline 50mg at night     Diet: DASH  DVT: Lovenox   GI: PPI  Dispo: D/C planning to SNF post OP. Patient to be transferred to the [facility] site for the procedure on Monday.

## 2025-06-02 LAB
ANION GAP SERPL CALC-SCNC: 12 MMOL/L — SIGNIFICANT CHANGE UP (ref 7–14)
BASOPHILS # BLD AUTO: 0.04 K/UL — SIGNIFICANT CHANGE UP (ref 0–0.2)
BASOPHILS NFR BLD AUTO: 0.5 % — SIGNIFICANT CHANGE UP (ref 0–1)
BUN SERPL-MCNC: 24 MG/DL — HIGH (ref 10–20)
CALCIUM SERPL-MCNC: 9.5 MG/DL — SIGNIFICANT CHANGE UP (ref 8.4–10.5)
CHLORIDE SERPL-SCNC: 96 MMOL/L — LOW (ref 98–110)
CO2 SERPL-SCNC: 24 MMOL/L — SIGNIFICANT CHANGE UP (ref 17–32)
CREAT SERPL-MCNC: 1 MG/DL — SIGNIFICANT CHANGE UP (ref 0.7–1.5)
EGFR: 55 ML/MIN/1.73M2 — LOW
EGFR: 55 ML/MIN/1.73M2 — LOW
EOSINOPHIL # BLD AUTO: 0.13 K/UL — SIGNIFICANT CHANGE UP (ref 0–0.7)
EOSINOPHIL NFR BLD AUTO: 1.6 % — SIGNIFICANT CHANGE UP (ref 0–8)
GLUCOSE SERPL-MCNC: 94 MG/DL — SIGNIFICANT CHANGE UP (ref 70–99)
HCT VFR BLD CALC: 36.5 % — LOW (ref 37–47)
HGB BLD-MCNC: 11.3 G/DL — LOW (ref 12–16)
IMM GRANULOCYTES NFR BLD AUTO: 0.2 % — SIGNIFICANT CHANGE UP (ref 0.1–0.3)
LYMPHOCYTES # BLD AUTO: 1.33 K/UL — SIGNIFICANT CHANGE UP (ref 1.2–3.4)
LYMPHOCYTES # BLD AUTO: 16.3 % — LOW (ref 20.5–51.1)
MCHC RBC-ENTMCNC: 27.4 PG — SIGNIFICANT CHANGE UP (ref 27–31)
MCHC RBC-ENTMCNC: 31 G/DL — LOW (ref 32–37)
MCV RBC AUTO: 88.6 FL — SIGNIFICANT CHANGE UP (ref 81–99)
MONOCYTES # BLD AUTO: 0.65 K/UL — HIGH (ref 0.1–0.6)
MONOCYTES NFR BLD AUTO: 7.9 % — SIGNIFICANT CHANGE UP (ref 1.7–9.3)
NEUTROPHILS # BLD AUTO: 6.01 K/UL — SIGNIFICANT CHANGE UP (ref 1.4–6.5)
NEUTROPHILS NFR BLD AUTO: 73.5 % — SIGNIFICANT CHANGE UP (ref 42.2–75.2)
NRBC BLD AUTO-RTO: 0 /100 WBCS — SIGNIFICANT CHANGE UP (ref 0–0)
PLATELET # BLD AUTO: 260 K/UL — SIGNIFICANT CHANGE UP (ref 130–400)
PMV BLD: 9.6 FL — SIGNIFICANT CHANGE UP (ref 7.4–10.4)
POTASSIUM SERPL-MCNC: 4.5 MMOL/L — SIGNIFICANT CHANGE UP (ref 3.5–5)
POTASSIUM SERPL-SCNC: 4.5 MMOL/L — SIGNIFICANT CHANGE UP (ref 3.5–5)
RBC # BLD: 4.12 M/UL — LOW (ref 4.2–5.4)
RBC # FLD: 17.2 % — HIGH (ref 11.5–14.5)
SODIUM SERPL-SCNC: 132 MMOL/L — LOW (ref 135–146)
WBC # BLD: 8.18 K/UL — SIGNIFICANT CHANGE UP (ref 4.8–10.8)
WBC # FLD AUTO: 8.18 K/UL — SIGNIFICANT CHANGE UP (ref 4.8–10.8)

## 2025-06-02 PROCEDURE — 27487 REVISE/REPLACE KNEE JOINT: CPT | Mod: RT

## 2025-06-02 PROCEDURE — 73560 X-RAY EXAM OF KNEE 1 OR 2: CPT | Mod: 26,RT

## 2025-06-02 RX ORDER — POLYETHYLENE GLYCOL 3350 17 G/17G
17 POWDER, FOR SOLUTION ORAL DAILY
Refills: 0 | Status: DISCONTINUED | OUTPATIENT
Start: 2025-06-02 | End: 2025-06-03

## 2025-06-02 RX ORDER — ENOXAPARIN SODIUM 100 MG/ML
40 INJECTION SUBCUTANEOUS EVERY 24 HOURS
Refills: 0 | Status: DISCONTINUED | OUTPATIENT
Start: 2025-06-02 | End: 2025-06-03

## 2025-06-02 RX ORDER — ASPIRIN 325 MG
81 TABLET ORAL DAILY
Refills: 0 | Status: DISCONTINUED | OUTPATIENT
Start: 2025-06-02 | End: 2025-06-03

## 2025-06-02 RX ORDER — SERTRALINE 100 MG/1
50 TABLET, FILM COATED ORAL AT BEDTIME
Refills: 0 | Status: DISCONTINUED | OUTPATIENT
Start: 2025-06-02 | End: 2025-06-03

## 2025-06-02 RX ORDER — AMLODIPINE BESYLATE 10 MG/1
5 TABLET ORAL DAILY
Refills: 0 | Status: DISCONTINUED | OUTPATIENT
Start: 2025-06-02 | End: 2025-06-03

## 2025-06-02 RX ORDER — ROSUVASTATIN CALCIUM 20 MG/1
10 TABLET, FILM COATED ORAL AT BEDTIME
Refills: 0 | Status: DISCONTINUED | OUTPATIENT
Start: 2025-06-02 | End: 2025-06-03

## 2025-06-02 RX ORDER — HYDROMORPHONE/SOD CHLOR,ISO/PF 2 MG/10 ML
0.2 SYRINGE (ML) INJECTION
Refills: 0 | Status: DISCONTINUED | OUTPATIENT
Start: 2025-06-02 | End: 2025-06-02

## 2025-06-02 RX ORDER — PREDNISONE 20 MG/1
2.5 TABLET ORAL DAILY
Refills: 0 | Status: DISCONTINUED | OUTPATIENT
Start: 2025-06-02 | End: 2025-06-03

## 2025-06-02 RX ORDER — ACETAMINOPHEN 500 MG/5ML
650 LIQUID (ML) ORAL EVERY 6 HOURS
Refills: 0 | Status: DISCONTINUED | OUTPATIENT
Start: 2025-06-02 | End: 2025-06-03

## 2025-06-02 RX ORDER — ONDANSETRON HCL/PF 4 MG/2 ML
4 VIAL (ML) INJECTION ONCE
Refills: 0 | Status: DISCONTINUED | OUTPATIENT
Start: 2025-06-02 | End: 2025-06-02

## 2025-06-02 RX ORDER — ONDANSETRON HCL/PF 4 MG/2 ML
4 VIAL (ML) INJECTION EVERY 8 HOURS
Refills: 0 | Status: DISCONTINUED | OUTPATIENT
Start: 2025-06-02 | End: 2025-06-03

## 2025-06-02 RX ORDER — KETOROLAC TROMETHAMINE 30 MG/ML
15 INJECTION, SOLUTION INTRAMUSCULAR; INTRAVENOUS EVERY 6 HOURS
Refills: 0 | Status: DISCONTINUED | OUTPATIENT
Start: 2025-06-02 | End: 2025-06-02

## 2025-06-02 RX ORDER — CALCIUM CARBONATE 750 MG/1
1 TABLET ORAL DAILY
Refills: 0 | Status: DISCONTINUED | OUTPATIENT
Start: 2025-06-02 | End: 2025-06-03

## 2025-06-02 RX ORDER — MAGNESIUM, ALUMINUM HYDROXIDE 200-200 MG
30 TABLET,CHEWABLE ORAL EVERY 4 HOURS
Refills: 0 | Status: DISCONTINUED | OUTPATIENT
Start: 2025-06-02 | End: 2025-06-03

## 2025-06-02 RX ORDER — MELATONIN 5 MG
3 TABLET ORAL AT BEDTIME
Refills: 0 | Status: DISCONTINUED | OUTPATIENT
Start: 2025-06-02 | End: 2025-06-03

## 2025-06-02 RX ORDER — LOSARTAN POTASSIUM 100 MG/1
50 TABLET, FILM COATED ORAL DAILY
Refills: 0 | Status: DISCONTINUED | OUTPATIENT
Start: 2025-06-02 | End: 2025-06-03

## 2025-06-02 RX ORDER — METOPROLOL SUCCINATE 50 MG/1
25 TABLET, EXTENDED RELEASE ORAL DAILY
Refills: 0 | Status: DISCONTINUED | OUTPATIENT
Start: 2025-06-02 | End: 2025-06-03

## 2025-06-02 RX ORDER — ONDANSETRON HCL/PF 4 MG/2 ML
4 VIAL (ML) INJECTION EVERY 6 HOURS
Refills: 0 | Status: DISCONTINUED | OUTPATIENT
Start: 2025-06-02 | End: 2025-06-03

## 2025-06-02 RX ORDER — SODIUM CHLORIDE 9 G/1000ML
1000 INJECTION, SOLUTION INTRAVENOUS
Refills: 0 | Status: DISCONTINUED | OUTPATIENT
Start: 2025-06-02 | End: 2025-06-02

## 2025-06-02 RX ORDER — CYANOCOBALAMIN 1000 UG/ML
1000 INJECTION INTRAMUSCULAR; SUBCUTANEOUS DAILY
Refills: 0 | Status: DISCONTINUED | OUTPATIENT
Start: 2025-06-02 | End: 2025-06-03

## 2025-06-02 RX ORDER — SENNA 187 MG
2 TABLET ORAL AT BEDTIME
Refills: 0 | Status: DISCONTINUED | OUTPATIENT
Start: 2025-06-02 | End: 2025-06-03

## 2025-06-02 RX ORDER — MAGNESIUM OXIDE 400 MG
200 TABLET ORAL
Refills: 0 | Status: DISCONTINUED | OUTPATIENT
Start: 2025-06-02 | End: 2025-06-03

## 2025-06-02 RX ORDER — CEFAZOLIN SODIUM IN 0.9 % NACL 3 G/100 ML
2000 INTRAVENOUS SOLUTION, PIGGYBACK (ML) INTRAVENOUS EVERY 8 HOURS
Refills: 0 | Status: COMPLETED | OUTPATIENT
Start: 2025-06-03 | End: 2025-06-03

## 2025-06-02 RX ORDER — ACETAMINOPHEN 500 MG/5ML
1000 LIQUID (ML) ORAL EVERY 8 HOURS
Refills: 0 | Status: DISCONTINUED | OUTPATIENT
Start: 2025-06-02 | End: 2025-06-02

## 2025-06-02 RX ORDER — TRAMADOL HYDROCHLORIDE 50 MG/1
50 TABLET, FILM COATED ORAL EVERY 6 HOURS
Refills: 0 | Status: DISCONTINUED | OUTPATIENT
Start: 2025-06-02 | End: 2025-06-03

## 2025-06-02 RX ADMIN — METOPROLOL SUCCINATE 25 MILLIGRAM(S): 50 TABLET, EXTENDED RELEASE ORAL at 08:04

## 2025-06-02 RX ADMIN — PREDNISONE 2.5 MILLIGRAM(S): 20 TABLET ORAL at 05:51

## 2025-06-02 RX ADMIN — TRAMADOL HYDROCHLORIDE 50 MILLIGRAM(S): 50 TABLET, FILM COATED ORAL at 21:30

## 2025-06-02 RX ADMIN — CELECOXIB 200 MILLIGRAM(S): 50 CAPSULE ORAL at 05:52

## 2025-06-02 RX ADMIN — Medication 40 MILLIGRAM(S): at 05:53

## 2025-06-02 RX ADMIN — ENOXAPARIN SODIUM 40 MILLIGRAM(S): 100 INJECTION SUBCUTANEOUS at 22:29

## 2025-06-02 RX ADMIN — Medication 20 MILLIGRAM(S): at 08:03

## 2025-06-02 RX ADMIN — Medication 2 TABLET(S): at 20:49

## 2025-06-02 RX ADMIN — LOSARTAN POTASSIUM 50 MILLIGRAM(S): 100 TABLET, FILM COATED ORAL at 08:03

## 2025-06-02 RX ADMIN — CELECOXIB 200 MILLIGRAM(S): 50 CAPSULE ORAL at 06:26

## 2025-06-02 RX ADMIN — AMLODIPINE BESYLATE 5 MILLIGRAM(S): 10 TABLET ORAL at 05:51

## 2025-06-02 RX ADMIN — Medication 1000 MILLIGRAM(S): at 06:26

## 2025-06-02 RX ADMIN — POLYETHYLENE GLYCOL 3350 17 GRAM(S): 17 POWDER, FOR SOLUTION ORAL at 20:50

## 2025-06-02 RX ADMIN — SERTRALINE 50 MILLIGRAM(S): 100 TABLET, FILM COATED ORAL at 22:29

## 2025-06-02 RX ADMIN — TRAMADOL HYDROCHLORIDE 50 MILLIGRAM(S): 50 TABLET, FILM COATED ORAL at 20:49

## 2025-06-02 RX ADMIN — Medication 1000 MILLIGRAM(S): at 05:50

## 2025-06-02 RX ADMIN — Medication 200 MILLIGRAM(S): at 08:04

## 2025-06-02 RX ADMIN — Medication 1 APPLICATION(S): at 06:00

## 2025-06-02 RX ADMIN — ROSUVASTATIN CALCIUM 10 MILLIGRAM(S): 20 TABLET, FILM COATED ORAL at 20:49

## 2025-06-02 NOTE — PROGRESS NOTE ADULT - SUBJECTIVE AND OBJECTIVE BOX
GIAN MELISSA  84y  Female      Patient is a 84y old  Female who presents with a chief complaint of Fall (01 Jun 2025 09:23)        REVIEW OF SYSTEMS:  CONSTITUTIONAL: No fever, weight loss, or fatigue  RESPIRATORY: No cough, wheezing, chills or hemoptysis; No shortness of breath  CARDIOVASCULAR: No chest pain, palpitations, dizziness, or leg swelling  GASTROINTESTINAL: No abdominal or epigastric pain. No nausea, vomiting, or hematemesis; No diarrhea or constipation. No melena or hematochezia.  GENITOURINARY: No dysuria, frequency, hematuria, or incontinence  MUSCULOSKELETAL: No joint pain or swelling; No muscle, back, or extremity pain  PSYCHIATRIC: No depression, anxiety, mood swings, or difficulty sleeping  HEME/LYMPH: No easy bruising, or bleeding gums  ALLERY AND IMMUNOLOGIC: No hives or eczema  FAMILY HISTORY:  FH: cancer  Nonhodgkins : sister    FH: lymphoma (Sibling)      T(C): 36.7 (06-02-25 @ 04:55), Max: 36.7 (06-02-25 @ 04:55)  HR: 77 (06-02-25 @ 04:55) (72 - 77)  BP: 168/89 (06-02-25 @ 04:55) (146/68 - 168/89)  RR: 18 (06-02-25 @ 04:55) (18 - 18)  SpO2: 96% (06-02-25 @ 04:55) (95% - 96%)  Wt(kg): --Vital Signs Last 24 Hrs  T(C): 36.7 (02 Jun 2025 04:55), Max: 36.7 (02 Jun 2025 04:55)  T(F): 98.1 (02 Jun 2025 04:55), Max: 98.1 (02 Jun 2025 04:55)  HR: 77 (02 Jun 2025 04:55) (72 - 77)  BP: 168/89 (02 Jun 2025 04:55) (146/68 - 168/89)  BP(mean): 116 (02 Jun 2025 04:55) (116 - 116)  RR: 18 (02 Jun 2025 04:55) (18 - 18)  SpO2: 96% (02 Jun 2025 04:55) (95% - 96%)    Parameters below as of 01 Jun 2025 22:11  Patient On (Oxygen Delivery Method): room air      No Known Allergies      PHYSICAL EXAM:  GENERAL: NAD,  HEAD:  Atraumatic, Normocephalic  EYES: EOMI, PERRLA, conjunctiva and sclera clear  ENMT: No tonsillar erythema, exudates,   NECK: Supple, No JVD, Normal thyroid  NERVOUS SYSTEM:  Alert & Oriented X3,  CHEST/LUNG: Clear to percussion bilaterally; No rales, rhonchi, wheezing, or rubs  HEART: Regular rate and rhythm; No murmurs, rubs, or gallops  ABDOMEN: Soft, Nontender, Nondistended; Bowel sounds present  EXTREMITIES:   No clubbing, cyanosis, or edema  LYMPH: No lymphadenopathy noted  SKIN: No rashes or lesions      LABS:  06-01    131[L]  |  95[L]  |  26[H]  ----------------------------<  109[H]  4.1   |  22  |  0.9    Ca    8.9      01 Jun 2025 21:59  Phos  3.0     06-01  Mg     1.7     06-01                            10.3   8.37  )-----------( 264      ( 01 Jun 2025 21:59 )             32.6   < from: Xray Knee 4 Views, Right (05.27.25 @ 21:40) >  PELVIS: Status post bilateral total hip arthroplasties. No acute   displaced fracture. Osteopenia. Lower lumbar spine degenerative changes.    RIGHT FEMUR/KNEE: Status post femur ORIF with cerclage wires,   intramedullary nail with plate and screws. Status post right total knee   arthroplasty. No acute displaced fracture. Mild lucency at the greater   lesser trochanter, stable. Displacement of the lesser trochanter, also   stable.    < end of copied text >    < from: TTE Echo Complete w/o Contrast w/ Doppler (02.14.24 @ 08:43) >   1. Normal global left ventricular systolic function.   2. LV Ejection Fraction by Brothers's Method with a biplane EF of 54 %.   3. Moderate concentric left ventricular hypertrophy.   4. Spectral Doppler shows pseudonormal pattern of left ventricular   myocardial filling (Grade II diastolic dysfunction).   5. Elevated mean left atrial pressure.   6. Normal right ventricular size and function.   7. Moderately enlarged left atrium.   8. Mild mitral valve regurgitation.   9. Mild tricuspid regurgitation.  10. Mild pulmonic valve regurgitation.  11. Estimated pulmonary artery systolic pressure is 35.5 mmHg assuming a   right atrial pressure of 3 mmHg, which is consistent with borderline   pulmonary hypertension.  12. Dilatation of the ascending aorta (measuring 3.9 cm, indexed 2.02   cm/m2).    < end of copied text >      RADIOLOGY & ADDITIONAL TESTS:    MEDICATION:  acetaminophen     Tablet .. 1000 milliGRAM(s) Oral every 8 hours  aluminum hydroxide/magnesium hydroxide/simethicone Suspension 30 milliLiter(s) Oral every 4 hours PRN  amLODIPine   Tablet 5 milliGRAM(s) Oral daily  aspirin enteric coated 81 milliGRAM(s) Oral daily  calcium carbonate   1250 mG (OsCal) 1 Tablet(s) Oral daily  celecoxib 200 milliGRAM(s) Oral two times a day  chlorhexidine 2% Cloths 1 Application(s) Topical <User Schedule>  cholecalciferol 1000 Unit(s) Oral daily  cyanocobalamin 1000 MICROGram(s) Oral daily  enoxaparin Injectable 40 milliGRAM(s) SubCutaneous every 24 hours  famotidine    Tablet 20 milliGRAM(s) Oral two times a day  lidocaine   4% Patch 1 Patch Transdermal every 24 hours PRN  losartan 50 milliGRAM(s) Oral daily  magnesium oxide 200 milliGRAM(s) Oral two times a day with meals  melatonin 3 milliGRAM(s) Oral at bedtime PRN  metoprolol succinate ER 25 milliGRAM(s) Oral daily  ondansetron Injectable 4 milliGRAM(s) IV Push every 8 hours PRN  pantoprazole    Tablet 40 milliGRAM(s) Oral before breakfast  polyethylene glycol 3350 17 Gram(s) Oral daily  predniSONE   Tablet 2.5 milliGRAM(s) Oral daily  rosuvastatin 10 milliGRAM(s) Oral at bedtime  senna 2 Tablet(s) Oral at bedtime  sertraline 50 milliGRAM(s) Oral at bedtime  traMADol 50 milliGRAM(s) Oral every 6 hours PRN      HEALTH ISSUES - PROBLEM Dx:  84-year-old female with past medical history of rheumatoid arthritis, osteoarthritis, hypertension, GERD, breast CA in remission, bilateral knee replacement, B/L hip replacement presents for evaluation status post fall.  Patient states at 8 AM this morning she bent over to pick something up and fell onto her right knee and hit her forehead on the ground since then has been unable to ambulate leading her to call 911 this afternoon.  Now presents with right proximal lower extremity pain, aggravated with weightbearing, no relieving factors.  Denies LOC, AC, headache, neck pain, chest pain, shortness of breath, weakness, numbness, dysuria, hematuria, vomiting, diarrhea.    Fall w/o LOC  L1 compression fracture  Rt knee & Back pain, inability to ambulate, Deconditioning  Hx of Osteoporosis, RA, OA s/p B/L ROHIT & TKA, Rt hip ORIF(2024)  - On Infusion, MTX and  Prednisone for RA, wishes to wean down Prednisone dose  - pain with ambulating since fall  - trauma workup was all negative  - Seen by ortho, Dr Yolanda Sherman plans for revision of R TKA  - wean Prednisone 2.5 mg as per Dr Wiley rheumatology  - PT/ OT    Hyponatremia better off hctz  HTN  - on Losartan 50mg, Toprol 25mg,    - BP noted  - consider addition of Amlodipine 5mg ,medically stable,moderate risk to go for surgery, will do cardiology consult    HLD  -continue Rosuvastatin 10mg    GERD  - continue Famotidine 20mg BID    Hyponatremia d/c hctz  Anxiety/Depression  - on Sertraline 50mg at night     Diet: DASH  DVT: Lovenox   GI: PPI  Dispo: D/C planning to SNF post OP.

## 2025-06-02 NOTE — CONSULT NOTE ADULT - ASSESSMENT
pt w/ hx of htn. pt w/o cp,sob w/ limited activity. pt with at least moderate risk. if procedure can be electively deferred would consider pharm nuc stress, otherwise no further cardiac intervention.

## 2025-06-02 NOTE — CONSULT NOTE ADULT - SUBJECTIVE AND OBJECTIVE BOX
Patient is a 84y old  Female who presents with a chief complaint of Fall (02 Jun 2025 07:56)      HPI:  84-year-old female with past medical history of rheumatoid arthritis, osteoarthritis, hypertension, GERD, breast CA in remission, bilateral knee replacement, B/L hip replacement presents for evaluation status post fall.  Patient states at 8 AM this morning she bent over to pick something up and fell onto her right knee and hit her forehead on the ground since then has been unable to ambulate leading her to call 911 this afternoon.  Now presents with right proximal lower extremity pain, aggravated with weightbearing, no relieving factors.  Denies LOC, AC, headache, neck pain, chest pain, shortness of breath, weakness, numbness, dysuria, hematuria, vomiting, diarrhea.    ED vitals:   · BP Systolic   198 mm Hg  · BP Diastolic   96 mm Hg  · Heart Rate  81 /min  · Respiration Rate (breaths/min)  18 /min  · Temp (F)  98.9 Degrees F  · Temp (C)  37.2 Degrees C  · Temp site  oral  · SpO2 (%)  98 %  · O2 Delivery/Oxygen Delivery Method  room air  · Temp at ED Arrival (C)  37.2 Degrees C    Radiology:  Xray chest: Left lower lung zone opacity.    CT head: negative     Xray knee/ pelvis:   PELVIS: Status post bilateral total hip arthroplasties. No acute   displaced fracture. Osteopenia. Lower lumbar spine degenerative changes.    RIGHT FEMUR/KNEE: Status post femur ORIF with cerclage wires,   intramedullary nail with plate and screws. Status post right total knee   arthroplasty. No acute displaced fracture. Mild lucency at the greater   lesser trochanter, stable. Displacement of the lesser trochanter, also   stable.   (27 May 2025 23:55)      PAST MEDICAL & SURGICAL HISTORY:  Rheumatoid arthritis      HTN (hypertension)      Breast cancer  last radiation 2015      Depression      Chronic GERD      OA (osteoarthritis)      Rheumatoid arthritis      History of right hip replacement      Status post left hip replacement      S/P total knee replacement, left      H/O vaginal hysterectomy      Status post cholecystectomy      S/P lumpectomy of breast  2015                          PREVIOUS DIAGNOSTIC TESTING:      ECHO  FINDINGS:    STRESS  FINDINGS:    CATHETERIZATION  FINDINGS:    MEDICATIONS  (STANDING):  acetaminophen     Tablet .. 650 milliGRAM(s) Oral every 6 hours  amLODIPine   Tablet 5 milliGRAM(s) Oral daily  aspirin enteric coated 81 milliGRAM(s) Oral daily  calcium carbonate   1250 mG (OsCal) 1 Tablet(s) Oral daily  chlorhexidine 2% Cloths 1 Application(s) Topical <User Schedule>  cholecalciferol 1000 Unit(s) Oral daily  cyanocobalamin 1000 MICROGram(s) Oral daily  enoxaparin Injectable 40 milliGRAM(s) SubCutaneous every 24 hours  famotidine    Tablet 20 milliGRAM(s) Oral two times a day  ketorolac   Injectable 15 milliGRAM(s) IV Push every 6 hours  lactated ringers. 1000 milliLiter(s) (75 mL/Hr) IV Continuous <Continuous>  losartan 50 milliGRAM(s) Oral daily  magnesium oxide 200 milliGRAM(s) Oral two times a day with meals  metoprolol succinate ER 25 milliGRAM(s) Oral daily  polyethylene glycol 3350 17 Gram(s) Oral daily  predniSONE   Tablet 2.5 milliGRAM(s) Oral daily  rosuvastatin 10 milliGRAM(s) Oral at bedtime  senna 2 Tablet(s) Oral at bedtime  sertraline 50 milliGRAM(s) Oral at bedtime  sodium chloride 0.9%. 1000 milliLiter(s) (75 mL/Hr) IV Continuous <Continuous>    MEDICATIONS  (PRN):  aluminum hydroxide/magnesium hydroxide/simethicone Suspension 30 milliLiter(s) Oral every 4 hours PRN Dyspepsia  HYDROmorphone  Injectable 0.2 milliGRAM(s) IV Push every 10 minutes PRN Moderate Pain (4 - 6)  melatonin 3 milliGRAM(s) Oral at bedtime PRN Insomnia  ondansetron Injectable 4 milliGRAM(s) IV Push every 6 hours PRN Nausea and/or Vomiting  ondansetron Injectable 4 milliGRAM(s) IV Push every 8 hours PRN Nausea and/or Vomiting  ondansetron Injectable 4 milliGRAM(s) IV Push once PRN Nausea and/or Vomiting  traMADol 50 milliGRAM(s) Oral every 6 hours PRN Severe Pain (7 - 10)      FAMILY HISTORY:  FH: cancer  Nonhodgkins : sister    FH: lymphoma (Sibling)        SOCIAL HISTORY:    CIGARETTES:    ALCOHOL:        Vital Signs Last 24 Hrs  T(C): 36.3 (02 Jun 2025 19:15), Max: 36.9 (02 Jun 2025 12:26)  T(F): 97.4 (02 Jun 2025 19:15), Max: 98.4 (02 Jun 2025 12:26)  HR: 72 (02 Jun 2025 19:45) (61 - 77)  BP: 180/75 (02 Jun 2025 19:45) (117/56 - 180/75)  BP(mean): 116 (02 Jun 2025 17:54) (116 - 116)  RR: 17 (02 Jun 2025 19:45) (16 - 22)  SpO2: 97% (02 Jun 2025 19:45) (95% - 100%)    Parameters below as of 02 Jun 2025 19:45  Patient On (Oxygen Delivery Method): room air                INTERPRETATION OF TELEMETRY:    ECG:    I&O's Detail    01 Jun 2025 07:01  -  02 Jun 2025 07:00  --------------------------------------------------------  IN:  Total IN: 0 mL    OUT:    Intermittent Catheterization - Urethral (mL): 400 mL    Voided (mL): 240 mL  Total OUT: 640 mL    Total NET: -640 mL          LABS:                        11.3   8.18  )-----------( 260      ( 02 Jun 2025 09:16 )             36.5     06-02    132[L]  |  96[L]  |  24[H]  ----------------------------<  94  4.5   |  24  |  1.0    Ca    9.5      02 Jun 2025 09:16  Phos  3.0     06-01  Mg     1.7     06-01          PT/INR - ( 01 Jun 2025 21:59 )   PT: 12.00 sec;   INR: 1.02 ratio         PTT - ( 01 Jun 2025 21:59 )  PTT:29.4 sec  Urinalysis Basic - ( 02 Jun 2025 09:16 )    Color: x / Appearance: x / SG: x / pH: x  Gluc: 94 mg/dL / Ketone: x  / Bili: x / Urobili: x   Blood: x / Protein: x / Nitrite: x   Leuk Esterase: x / RBC: x / WBC x   Sq Epi: x / Non Sq Epi: x / Bacteria: x      I&O's Summary    01 Jun 2025 07:01 - 02 Jun 2025 07:00  --------------------------------------------------------  IN: 0 mL / OUT: 640 mL / NET: -640 mL      BNP  RADIOLOGY & ADDITIONAL STUDIES:

## 2025-06-02 NOTE — PROVIDER CONTACT NOTE (OTHER) - ACTION/TREATMENT ORDERED:
as per davian moved lovenox, famotidine,losartan, metoporol to 8:30 pending ortho
MD Vera aware, safety and comfort maintained.

## 2025-06-02 NOTE — BRIEF OPERATIVE NOTE - NSICDXBRIEFPROCEDURE_GEN_ALL_CORE_FT
PROCEDURES:  Revision, total arthroplasty, knee, open, with adhesion lysis, polyethylene liner replacement, and synovectomy 02-Jun-2025 19:51:40  Emely Baird

## 2025-06-03 ENCOUNTER — TRANSCRIPTION ENCOUNTER (OUTPATIENT)
Age: 85
End: 2025-06-03

## 2025-06-03 VITALS
SYSTOLIC BLOOD PRESSURE: 128 MMHG | RESPIRATION RATE: 18 BRPM | DIASTOLIC BLOOD PRESSURE: 67 MMHG | OXYGEN SATURATION: 98 % | TEMPERATURE: 98 F | HEART RATE: 68 BPM

## 2025-06-03 DIAGNOSIS — I10 ESSENTIAL (PRIMARY) HYPERTENSION: ICD-10-CM

## 2025-06-03 LAB
ANION GAP SERPL CALC-SCNC: 14 MMOL/L — SIGNIFICANT CHANGE UP (ref 7–14)
BASOPHILS # BLD AUTO: 0.01 K/UL — SIGNIFICANT CHANGE UP (ref 0–0.2)
BASOPHILS NFR BLD AUTO: 0.1 % — SIGNIFICANT CHANGE UP (ref 0–1)
BUN SERPL-MCNC: 24 MG/DL — HIGH (ref 10–20)
CALCIUM SERPL-MCNC: 9.5 MG/DL — SIGNIFICANT CHANGE UP (ref 8.4–10.5)
CHLORIDE SERPL-SCNC: 95 MMOL/L — LOW (ref 98–110)
CO2 SERPL-SCNC: 21 MMOL/L — SIGNIFICANT CHANGE UP (ref 17–32)
CREAT SERPL-MCNC: 1 MG/DL — SIGNIFICANT CHANGE UP (ref 0.7–1.5)
EGFR: 55 ML/MIN/1.73M2 — LOW
EGFR: 55 ML/MIN/1.73M2 — LOW
EOSINOPHIL # BLD AUTO: 0 K/UL — SIGNIFICANT CHANGE UP (ref 0–0.7)
EOSINOPHIL NFR BLD AUTO: 0 % — SIGNIFICANT CHANGE UP (ref 0–8)
GLUCOSE SERPL-MCNC: 114 MG/DL — HIGH (ref 70–99)
HCT VFR BLD CALC: 32.9 % — LOW (ref 37–47)
HGB BLD-MCNC: 10.4 G/DL — LOW (ref 12–16)
IMM GRANULOCYTES NFR BLD AUTO: 0.3 % — SIGNIFICANT CHANGE UP (ref 0.1–0.3)
LYMPHOCYTES # BLD AUTO: 1.01 K/UL — LOW (ref 1.2–3.4)
LYMPHOCYTES # BLD AUTO: 14 % — LOW (ref 20.5–51.1)
MCHC RBC-ENTMCNC: 27.9 PG — SIGNIFICANT CHANGE UP (ref 27–31)
MCHC RBC-ENTMCNC: 31.6 G/DL — LOW (ref 32–37)
MCV RBC AUTO: 88.2 FL — SIGNIFICANT CHANGE UP (ref 81–99)
MONOCYTES # BLD AUTO: 0.45 K/UL — SIGNIFICANT CHANGE UP (ref 0.1–0.6)
MONOCYTES NFR BLD AUTO: 6.2 % — SIGNIFICANT CHANGE UP (ref 1.7–9.3)
NEUTROPHILS # BLD AUTO: 5.72 K/UL — SIGNIFICANT CHANGE UP (ref 1.4–6.5)
NEUTROPHILS NFR BLD AUTO: 79.4 % — HIGH (ref 42.2–75.2)
NRBC BLD AUTO-RTO: 0 /100 WBCS — SIGNIFICANT CHANGE UP (ref 0–0)
PLATELET # BLD AUTO: 246 K/UL — SIGNIFICANT CHANGE UP (ref 130–400)
PMV BLD: 9.5 FL — SIGNIFICANT CHANGE UP (ref 7.4–10.4)
POTASSIUM SERPL-MCNC: 5.1 MMOL/L — HIGH (ref 3.5–5)
POTASSIUM SERPL-SCNC: 5.1 MMOL/L — HIGH (ref 3.5–5)
RBC # BLD: 3.73 M/UL — LOW (ref 4.2–5.4)
RBC # FLD: 17 % — HIGH (ref 11.5–14.5)
SODIUM SERPL-SCNC: 130 MMOL/L — LOW (ref 135–146)
WBC # BLD: 7.21 K/UL — SIGNIFICANT CHANGE UP (ref 4.8–10.8)
WBC # FLD AUTO: 7.21 K/UL — SIGNIFICANT CHANGE UP (ref 4.8–10.8)

## 2025-06-03 RX ORDER — METOPROLOL SUCCINATE 50 MG/1
1 TABLET, EXTENDED RELEASE ORAL
Qty: 0 | Refills: 0 | DISCHARGE
Start: 2025-06-03

## 2025-06-03 RX ORDER — AMLODIPINE BESYLATE 10 MG/1
1 TABLET ORAL
Qty: 0 | Refills: 0 | DISCHARGE
Start: 2025-06-03

## 2025-06-03 RX ORDER — PREDNISONE 20 MG/1
1 TABLET ORAL
Refills: 0 | DISCHARGE

## 2025-06-03 RX ORDER — SODIUM CHLORIDE 9 G/1000ML
1000 INJECTION, SOLUTION INTRAVENOUS
Refills: 0 | Status: DISCONTINUED | OUTPATIENT
Start: 2025-06-03 | End: 2025-06-03

## 2025-06-03 RX ORDER — PREDNISONE 20 MG/1
1 TABLET ORAL
Qty: 0 | Refills: 0 | DISCHARGE
Start: 2025-06-03

## 2025-06-03 RX ADMIN — Medication 20 MILLIGRAM(S): at 05:28

## 2025-06-03 RX ADMIN — Medication 3 MILLIGRAM(S): at 00:39

## 2025-06-03 RX ADMIN — CALCIUM CARBONATE 1 TABLET(S): 750 TABLET ORAL at 13:01

## 2025-06-03 RX ADMIN — Medication 81 MILLIGRAM(S): at 13:01

## 2025-06-03 RX ADMIN — Medication 650 MILLIGRAM(S): at 13:02

## 2025-06-03 RX ADMIN — KETOROLAC TROMETHAMINE 15 MILLIGRAM(S): 30 INJECTION, SOLUTION INTRAMUSCULAR; INTRAVENOUS at 01:30

## 2025-06-03 RX ADMIN — Medication 650 MILLIGRAM(S): at 05:29

## 2025-06-03 RX ADMIN — Medication 650 MILLIGRAM(S): at 00:02

## 2025-06-03 RX ADMIN — Medication 100 MILLIGRAM(S): at 02:50

## 2025-06-03 RX ADMIN — AMLODIPINE BESYLATE 5 MILLIGRAM(S): 10 TABLET ORAL at 05:29

## 2025-06-03 RX ADMIN — Medication 650 MILLIGRAM(S): at 06:50

## 2025-06-03 RX ADMIN — LOSARTAN POTASSIUM 50 MILLIGRAM(S): 100 TABLET, FILM COATED ORAL at 05:29

## 2025-06-03 RX ADMIN — Medication 650 MILLIGRAM(S): at 13:50

## 2025-06-03 RX ADMIN — Medication 1000 UNIT(S): at 13:03

## 2025-06-03 RX ADMIN — POLYETHYLENE GLYCOL 3350 17 GRAM(S): 17 POWDER, FOR SOLUTION ORAL at 13:00

## 2025-06-03 RX ADMIN — TRAMADOL HYDROCHLORIDE 50 MILLIGRAM(S): 50 TABLET, FILM COATED ORAL at 10:26

## 2025-06-03 RX ADMIN — METOPROLOL SUCCINATE 25 MILLIGRAM(S): 50 TABLET, EXTENDED RELEASE ORAL at 05:28

## 2025-06-03 RX ADMIN — Medication 200 MILLIGRAM(S): at 08:11

## 2025-06-03 RX ADMIN — TRAMADOL HYDROCHLORIDE 50 MILLIGRAM(S): 50 TABLET, FILM COATED ORAL at 11:22

## 2025-06-03 RX ADMIN — KETOROLAC TROMETHAMINE 15 MILLIGRAM(S): 30 INJECTION, SOLUTION INTRAMUSCULAR; INTRAVENOUS at 00:02

## 2025-06-03 RX ADMIN — Medication 650 MILLIGRAM(S): at 01:30

## 2025-06-03 RX ADMIN — PREDNISONE 2.5 MILLIGRAM(S): 20 TABLET ORAL at 05:28

## 2025-06-03 RX ADMIN — CYANOCOBALAMIN 1000 MICROGRAM(S): 1000 INJECTION INTRAMUSCULAR; SUBCUTANEOUS at 13:03

## 2025-06-03 RX ADMIN — Medication 100 MILLIGRAM(S): at 08:11

## 2025-06-03 NOTE — PROGRESS NOTE ADULT - PROVIDER SPECIALTY LIST ADULT
Internal Medicine
Orthopedics
Internal Medicine

## 2025-06-03 NOTE — PROGRESS NOTE ADULT - SUBJECTIVE AND OBJECTIVE BOX
GIAN MELISSA  84y  Female      Patient is a 84y old  Female who presents with a chief complaint of Fall (02 Jun 2025 20:27)        REVIEW OF SYSTEMS:  CONSTITUTIONAL: No fever, weight loss, or fatigue  RESPIRATORY: No cough, wheezing, chills or hemoptysis; No shortness of breath  CARDIOVASCULAR: No chest pain, palpitations, dizziness, or leg swelling  GASTROINTESTINAL: No abdominal or epigastric pain. No nausea, vomiting, or hematemesis; No diarrhea or constipation. No melena or hematochezia.  GENITOURINARY: No dysuria, frequency, hematuria, or incontinence  MUSCULOSKELETAL: No joint pain or swelling; No muscle, back, or extremity pain  PSYCHIATRIC: No depression, anxiety, mood swings, or difficulty sleeping  HEME/LYMPH: No easy bruising, or bleeding gums  ALLERY AND IMMUNOLOGIC: No hives or eczema  FAMILY HISTORY:  FH: cancer  Nonhodgkins : sister    FH: lymphoma (Sibling)      T(C): 36.3 (06-03-25 @ 04:07), Max: 36.9 (06-02-25 @ 12:26)  HR: 77 (06-03-25 @ 04:07) (61 - 94)  BP: 126/78 (06-03-25 @ 04:07) (117/56 - 180/75)  RR: 18 (06-03-25 @ 04:07) (16 - 22)  SpO2: 95% (06-03-25 @ 04:07) (94% - 100%)  Wt(kg): --Vital Signs Last 24 Hrs  T(C): 36.3 (03 Jun 2025 04:07), Max: 36.9 (02 Jun 2025 12:26)  T(F): 97.3 (03 Jun 2025 04:07), Max: 98.4 (02 Jun 2025 12:26)  HR: 77 (03 Jun 2025 04:07) (61 - 94)  BP: 126/78 (03 Jun 2025 04:07) (117/56 - 180/75)  BP(mean): 94 (03 Jun 2025 04:07) (82 - 116)  RR: 18 (03 Jun 2025 04:07) (16 - 22)  SpO2: 95% (03 Jun 2025 04:07) (94% - 100%)    Parameters below as of 02 Jun 2025 19:45  Patient On (Oxygen Delivery Method): room air      No Known Allergies      PHYSICAL EXAM:  GENERAL: NAD,   HEAD:  Atraumatic, Normocephalic  EYES: EOMI, PERRLA, conjunctiva and sclera clear  ENMT: No tonsillar erythema, exudates, or enlargement;   NECK: Supple, No JVD, Normal thyroid  NERVOUS SYSTEM:  Alert & Oriented X3,  CHEST/LUNG: Clear to percussion bilaterally; No rales, rhonchi, wheezing, or rubs  HEART: Regular rate and rhythm; No murmurs, rubs, or gallops  ABDOMEN: Soft, Nontender, Nondistended; Bowel sounds present  EXTREMITIES:  , No clubbing, cyanosis, or edema  LYMPH: No lymphadenopathy noted  SKIN: No rashes or lesions      LABS:  06-02    132[L]  |  96[L]  |  24[H]  ----------------------------<  94  4.5   |  24  |  1.0    Ca    9.5      02 Jun 2025 09:16  Phos  3.0     06-01  Mg     1.7     06-01                          11.3   8.18  )-----------( 260      ( 02 Jun 2025 09:16 )             36.5       < from: Xray Knee 4 Views, Right (05.27.25 @ 21:40) >    PELVIS: Status post bilateral total hip arthroplasties. No acute   displaced fracture. Osteopenia. Lower lumbar spine degenerative changes.    RIGHT FEMUR/KNEE: Status post femur ORIF with cerclage wires,   intramedullary nail with plate and screws. Status post right total knee   arthroplasty. No acute displaced fracture. Mild lucency at the greater   lesser trochanter, stable. Displacement of the lesser trochanter, also   stable.      < end of copied text >      RADIOLOGY & ADDITIONAL TESTS:    MEDICATION:  acetaminophen     Tablet .. 650 milliGRAM(s) Oral every 6 hours  aluminum hydroxide/magnesium hydroxide/simethicone Suspension 30 milliLiter(s) Oral every 4 hours PRN  amLODIPine   Tablet 5 milliGRAM(s) Oral daily  aspirin enteric coated 81 milliGRAM(s) Oral daily  calcium carbonate   1250 mG (OsCal) 1 Tablet(s) Oral daily  ceFAZolin   IVPB 2000 milliGRAM(s) IV Intermittent every 8 hours  chlorhexidine 2% Cloths 1 Application(s) Topical <User Schedule>  cholecalciferol 1000 Unit(s) Oral daily  cyanocobalamin 1000 MICROGram(s) Oral daily  enoxaparin Injectable 40 milliGRAM(s) SubCutaneous every 24 hours  famotidine    Tablet 20 milliGRAM(s) Oral two times a day  lactated ringers. 1000 milliLiter(s) IV Continuous <Continuous>  losartan 50 milliGRAM(s) Oral daily  magnesium oxide 200 milliGRAM(s) Oral two times a day with meals  melatonin 3 milliGRAM(s) Oral at bedtime PRN  metoprolol succinate ER 25 milliGRAM(s) Oral daily  ondansetron Injectable 4 milliGRAM(s) IV Push every 8 hours PRN  ondansetron Injectable 4 milliGRAM(s) IV Push every 6 hours PRN  polyethylene glycol 3350 17 Gram(s) Oral daily  predniSONE   Tablet 2.5 milliGRAM(s) Oral daily  rosuvastatin 10 milliGRAM(s) Oral at bedtime  senna 2 Tablet(s) Oral at bedtime  sertraline 50 milliGRAM(s) Oral at bedtime  sodium chloride 0.9%. 1000 milliLiter(s) IV Continuous <Continuous>  traMADol 50 milliGRAM(s) Oral every 6 hours PRN      HEALTH ISSUES - PROBLEM Dx:    84-year-old female with past medical history of rheumatoid arthritis, osteoarthritis, hypertension, GERD, breast CA in remission, bilateral knee replacement, B/L hip replacement presents for evaluation status post fall.  Patient states at 8 AM this morning she bent over to pick something up and fell onto her right knee and hit her forehead on the ground since then has been unable to ambulate leading her to call 911 this afternoon.  Now presents with right proximal lower extremity pain, aggravated with weightbearing, no relieving factors.  Denies LOC, AC, headache, neck pain, chest pain, shortness of breath, weakness, numbness, dysuria, hematuria, vomiting, diarrhea.    Fall w/o LOC  L1 compression fracture,Rt knee contusion ,unable to walk  Rt knee & Back pain, inability to ambulate, Deconditioning  Hx of Osteoporosis, RA, OA s/p B/L ROHIT & TKA, Rt hip ORIF(2024)  - On Infusion, MTX and  Prednisone for RA, wishes to wean down Prednisone dose  - pain with ambulating since fall  - trauma workup was all negative  - Seen by ortho, Dr Yolanda Sherman s/p Rt TKR revision on 6/2/25     - wean Prednisone 2.5 mg as per Dr Wiley rheumatology  - PT/ OT    Hyponatremia better off hctz  HTN  - on Losartan 50mg, Toprol 25mg,  amlodipine 5mg  HLD  -continue Rosuvastatin 10mg    GERD  - continue Famotidine 20mg BID  Anxiety/Depression  - on Sertraline 50mg at night     Diet: DASH,will cut down ivf to 50ml/hr  pt/ot evaluation  DVT: Lovenox   GI: PPI  Dispo: D/C planning to SNF post OP. [facility] as per pt

## 2025-06-03 NOTE — DISCHARGE NOTE NURSING/CASE MANAGEMENT/SOCIAL WORK - PATIENT PORTAL LINK FT
You can access the FollowMyHealth Patient Portal offered by [facility] by registering at the following website: [email or web address]. By joining [employer]’s FollowMyHealth portal, you will also be able to view your health information using other applications (apps) compatible with our system.

## 2025-06-03 NOTE — DISCHARGE NOTE NURSING/CASE MANAGEMENT/SOCIAL WORK - NSDCPEFALRISK_GEN_ALL_CORE
For information on Fall & Injury Prevention, visit: [email or web address] OR  [email or web address] OR  https://www.cdc.gov/steadi/patient.html

## 2025-06-03 NOTE — PROGRESS NOTE ADULT - SUBJECTIVE AND OBJECTIVE BOX
84y Female POD #  1    S/P right Total Knee Arthroplasty revision     Patient seen and examined at bedside . The patient is awake and alert in NAD. No complaints of chest pain, SOB, N/V.  Pain is controlled, the patient has ambulated and is voiding.     PAST MEDICAL & SURGICAL HISTORY:  Rheumatoid arthritis    HTN (hypertension)    Breast cancer  last radiation 2015    Depression    Chronic GERD    OA (osteoarthritis)    Rheumatoid arthritis    History of right hip replacement    Status post left hip replacement    S/P total knee replacement, left    H/O vaginal hysterectomy    Status post cholecystectomy    S/P lumpectomy of breast  2015          MEDICATIONS  (STANDING):  acetaminophen     Tablet .. 650 milliGRAM(s) Oral every 6 hours  amLODIPine   Tablet 5 milliGRAM(s) Oral daily  aspirin enteric coated 81 milliGRAM(s) Oral daily  calcium carbonate   1250 mG (OsCal) 1 Tablet(s) Oral daily  chlorhexidine 2% Cloths 1 Application(s) Topical <User Schedule>  cholecalciferol 1000 Unit(s) Oral daily  cyanocobalamin 1000 MICROGram(s) Oral daily  enoxaparin Injectable 40 milliGRAM(s) SubCutaneous every 24 hours  famotidine    Tablet 20 milliGRAM(s) Oral two times a day  lactated ringers. 1000 milliLiter(s) (50 mL/Hr) IV Continuous <Continuous>  losartan 50 milliGRAM(s) Oral daily  magnesium oxide 200 milliGRAM(s) Oral two times a day with meals  metoprolol succinate ER 25 milliGRAM(s) Oral daily  polyethylene glycol 3350 17 Gram(s) Oral daily  predniSONE   Tablet 2.5 milliGRAM(s) Oral daily  rosuvastatin 10 milliGRAM(s) Oral at bedtime  senna 2 Tablet(s) Oral at bedtime  sertraline 50 milliGRAM(s) Oral at bedtime  sodium chloride 0.9%. 1000 milliLiter(s) (75 mL/Hr) IV Continuous <Continuous>    MEDICATIONS  (PRN):  aluminum hydroxide/magnesium hydroxide/simethicone Suspension 30 milliLiter(s) Oral every 4 hours PRN Dyspepsia  melatonin 3 milliGRAM(s) Oral at bedtime PRN Insomnia  ondansetron Injectable 4 milliGRAM(s) IV Push every 8 hours PRN Nausea and/or Vomiting  ondansetron Injectable 4 milliGRAM(s) IV Push every 6 hours PRN Nausea and/or Vomiting  traMADol 50 milliGRAM(s) Oral every 6 hours PRN Severe Pain (7 - 10)        Vital Signs Last 24 Hrs  T(C): 36.6 (03 Jun 2025 08:00), Max: 36.9 (02 Jun 2025 12:26)  T(F): 97.9 (03 Jun 2025 08:00), Max: 98.4 (02 Jun 2025 12:26)  HR: 66 (03 Jun 2025 08:00) (61 - 94)  BP: 143/74 (03 Jun 2025 08:00) (117/56 - 180/75)  BP(mean): 94 (03 Jun 2025 04:07) (82 - 116)  RR: 18 (03 Jun 2025 08:00) (16 - 22)  SpO2: 97% (03 Jun 2025 08:00) (94% - 100%)                          11.3   8.18  )-----------( 260      ( 02 Jun 2025 09:16 )             36.5     06-02    132[L]  |  96[L]  |  24[H]  ----------------------------<  94  4.5   |  24  |  1.0    Ca    9.5      02 Jun 2025 09:16  Phos  3.0     06-01  Mg     1.7     06-01      PT/INR - ( 01 Jun 2025 21:59 )   PT: 12.00 sec;   INR: 1.02 ratio         PTT - ( 01 Jun 2025 21:59 )  PTT:29.4 sec  Urinalysis Basic - ( 02 Jun 2025 09:16 )    Color: x / Appearance: x / SG: x / pH: x  Gluc: 94 mg/dL / Ketone: x  / Bili: x / Urobili: x   Blood: x / Protein: x / Nitrite: x   Leuk Esterase: x / RBC: x / WBC x   Sq Epi: x / Non Sq Epi: x / Bacteria: x            PE:  The patient was seen and examined at bedside          A&OX3, NAD         right knee dressing/KI C/D/I          Compartments soft, BLE Mehdi stockings and SCD in place          NVI, SILT           A/P:     # POD #  1     s/p right Total Knee Arthroplasty revision                 - OOB to Chair   -PT/OT - wbat in KI  -post op abx   -Pain control - per pain protocol   -Incentive Spirometry   -DVT Prophylaxis -   -GI ppx- continue Protonix   -f/u am labs   -discharge planning

## 2025-06-03 NOTE — CHART NOTE - NSCHARTNOTEFT_GEN_A_CORE
From: [facility]  To: [facility]    HPI  84-year-old female with past medical history of rheumatoid arthritis, osteoarthritis, hypertension, GERD, breast CA in remission, bilateral knee replacement, B/L hip replacement presents for evaluation status post fall.  Patient states at 8 AM this morning she bent over to pick something up and fell onto her right knee and hit her forehead on the ground since then has been unable to ambulate leading her to call 911 this afternoon.  Now presents with right proximal lower extremity pain, aggravated with weightbearing, no relieving factors.  Denies LOC, AC, headache, neck pain, chest pain, shortness of breath, weakness, numbness, dysuria, hematuria, vomiting, diarrhea.    ED vitals:   · BP Systolic   198 mm Hg  · BP Diastolic   96 mm Hg  · Heart Rate  81 /min  · Respiration Rate (breaths/min)  18 /min  · Temp (F)  98.9 Degrees F  · Temp (C)  37.2 Degrees C  · Temp site  oral  · SpO2 (%)  98 %  · O2 Delivery/Oxygen Delivery Method  room air  · Temp at ED Arrival (C)  37.2 Degrees C    Radiology:  Xray chest: Left lower lung zone opacity.    CT head: negative     Xray knee/ pelvis:   PELVIS: Status post bilateral total hip arthroplasties. No acute   displaced fracture. Osteopenia. Lower lumbar spine degenerative changes.    RIGHT FEMUR/KNEE: Status post femur ORIF with cerclage wires,   intramedullary nail with plate and screws. Status post right total knee   arthroplasty. No acute displaced fracture. Mild lucency at the greater   lesser trochanter, stable. Displacement of the lesser trochanter, also   stable.    [facility]  Trauma workup negative. At home on Losartan 25mg, Toprol 25mg, HCTZ 12.5mg ; HCTZ discontinued given BP wnl 05/31. Seen by Dr. Sherman, plan to revise R TKA at [facility]. NPO order placed for 06/02 placed. 8PM pre op labs for Sunday night ordered.    Follow up:   -Preop labs 06/01  -R TKA 06/02  -----------------------------------------------------------------------------------------------------------------------------------------  84-year-old female with past medical history of rheumatoid arthritis, osteoarthritis, hypertension, GERD, breast CA in remission, bilateral knee replacement, B/L hip replacement presents for evaluation status post fall.  Patient states at 8 AM this morning she bent over to pick something up and fell onto her right knee and hit her forehead on the ground since then has been unable to ambulate leading her to call 911 this afternoon.  Now presents with right proximal lower extremity pain, aggravated with weightbearing, no relieving factors.  Denies LOC, AC, headache, neck pain, chest pain, shortness of breath, weakness, numbness, dysuria, hematuria, vomiting, diarrhea.    Fall w/o LOC  L1 compression fracture  Rt knee & Back pain, inability to ambulate, Deconditioning  Hx of Osteoporosis, RA, OA s/p B/L ROHIT & TKA, Rt hip ORIF(2024)  - On Infusion, MTX and  Prednisone for RA, wishes to wean down Prednisone dose  - pain with ambulating since fall  - trauma workup was all negative  - Seen by ortho, Dr Yolanda Sherman plans for revision of R TKA  - wean Prednisone 2.5 mg as per Dr Wiley rheumatology  - PT/ OT    HTN  - on Losartan 50mg, Toprol 25mg,    - BP noted  - consider addition of Amlodipine 2.5 mg    HLD  -continue Rosuvastatin 10mg    GERD  - continue Famotidine 20mg BID    Hyponatremia d/c hctz  Anxiety/Depression  - on Sertraline 50mg at night     Diet: DASH  DVT: Lovenox   GI: PPI  Dispo: D/C planning to SNF post OP. Patient to be transferred to the [facility] for the procedure on Monday.
PACU ANESTHESIA ADMISSION NOTE    revision of right knee arthroplasty    ____  Intubated  TV:______       Rate: ______      FiO2: ______    ___x_  Patent Airway    __x__  Full return of protective reflexes    __x__  Full recovery from anesthesia / back to baseline     Vitals:   T:     97.5      R:    20            BP:   117/56               Sat:        96      P: 70      Mental Status:  ___x_ Awake   _____ Alert   _____ Drowsy   _____ Sedated    Nausea/Vomiting:  __x__ NO  ______Yes,   See Post - Op Orders          Pain Scale (0-10):  _0____    Treatment: ____ None    ____ See Post - Op/PCA Orders    Post - Operative Fluids:   ____ Oral   ___x_ See Post - Op Orders    Plan: Discharge:   __Home       __x___Floor     _____Critical Care    _____  Other:_________________    Comments:
Pt cleared from ortho standpoint for DC    D/W Dr Zendejas - pt medically ready for DC    [facility] has bed - will complete DC paperwork. Reviewed all meds with Dr Zendejas.
Spoke to Dr. Gregory from Ortho - pt is planning for R knee revision surgery on Monday. NPO order placed. 8PM pre op labs for Sunday night ordered. Attending Dr. Foote informed.
Spoke to patient's rhuematologist, Dr. Wiley, on the phone this afternoon per patient request. Dr. Wiley recommended we wean the prednisone from the home 5mg dose to 2.5mg po qd and monitor the patient.
DR VAL DIEHL HAS SCHEDULED Elenita Kumar for revision Total Knee Arthroplasty Monday 6/2 at the [facility]   if we could facilitate her transfer to the [facility] over the weekend that would be very helpful.  [facility]  orthopedics

## 2025-06-03 NOTE — DISCHARGE NOTE NURSING/CASE MANAGEMENT/SOCIAL WORK - NSDCVIVACCINE_GEN_ALL_CORE_FT
COVID-19, mRNA, LNP-S, PF, 100 mcg/ 0.5 mL dose (Moderna); 2021/1/18 ; Annabella Santana (); [employer] [id number] (Exp. Date: 31-Dec-2069);   COVID-19, mRNA, LNP-S, PF, 100 mcg/ 0.5 mL dose (Moderna); 2021/2/21 ; Annabella Santana (); [employer] [id number] (Exp. Date: 31-Dec-2069);   COVID-19, mRNA, LNP-S, PF, 100 mcg/ 0.5 mL dose (Moderna); 2022/1/4 ; Annabella Santana (); [employer] [id number] (Exp. Date: 28-Apr-2022); 0.25 ML;   Influenza, high-dose, trivalent, preservative free; 2013/9/21 ; Annabella Santana (); [employer]; [id number] (Exp. Date: 05-May-2014); 0.5;   Influenza, seasonal, injectable; 2014/10/30 ; Annabella Santana (); [employer]; [id number] (Exp. Date: 25-May-2015); 0.5 ML;   Influenza, high-dose, trivalent, preservative free; 2015/10/12 ; Annabella Santana (); [employer]; [id number] (Exp. Date: 06-Apr-2016);   Influenza, high-dose, trivalent, preservative free; 2016/11/14 ; Annabella Santana (); Unknown ; [id number] (Exp. Date: 25-May-2017);   Influenza, high-dose, trivalent, preservative free; 2017/10/3 ; Annabella Santana ();   influenza, injectable, quadrivalent, preservative free; 2017/10/9 ; Annabella Santana (); [employer]; [id number] (Exp. Date: 30-Jun-2018);   Influenza, high-dose, trivalent, preservative free; 2018/10/8 ; Annabella Santana (); Unknown ; [id number] (Exp. Date: 02-May-2019);   Influenza, high-dose, trivalent, preservative free; 2019/9/23 ; Annabella Santana (); [employer]; [id number] (Exp. Date: 04-May-2020);   Influenza, high-dose, trivalent, preservative free; 2020/9/14 ; Annabella Santana ();   influenza, high-dose, quadrivalent; 2022/10/27 ; Annabella Santana (); [employer]; [id number] (Exp. Date: 30-Jun-2023); 0.7 ML;   influenza, high-dose, quadrivalent; 2023/10/30 ; Annabella Santana (); [employer]; [id number] (Exp. Date: 30-Jun-2024); 0.7 ML;   Pneumococcal conjugate PCV 13; 2018/12/27 ; Annabella Santana (); Unknown ; [id number] (Exp. Date: 30-Sep-2020);   Tdap; 23-Nov-2024 12:47; Raisa Galdamez (RN); [employer]; [id number] (Exp. Date: 31-Aug-2026); IntraMuscular; Deltoid Right.; 0.5 milliLiter(s); VIS (VIS Published: 09-May-2013, VIS Presented: 23-Nov-2024);

## 2025-06-03 NOTE — DISCHARGE NOTE NURSING/CASE MANAGEMENT/SOCIAL WORK - FINANCIAL ASSISTANCE
[facility] provides services at a reduced cost to those who are determined to be eligible through [facility]’s financial assistance program. Information regarding [facility]’s financial assistance program can be found by going to [email or web address] or by calling [phone].

## 2025-06-10 RX ORDER — METHOTREXATE 25 MG/ML
6 INJECTION, SOLUTION INTRA-ARTERIAL; INTRAMUSCULAR; INTRATHECAL; INTRAVENOUS
Qty: 0 | Refills: 0 | DISCHARGE
Start: 2025-06-10

## 2025-06-12 ENCOUNTER — APPOINTMENT (OUTPATIENT)
Dept: ORTHOPEDIC SURGERY | Facility: ASSISTED LIVING FACILITY | Age: 85
End: 2025-06-12

## 2025-06-12 PROCEDURE — 99024 POSTOP FOLLOW-UP VISIT: CPT

## 2025-06-23 NOTE — PATIENT PROFILE ADULT - NSPRESCRALCFREQ_GEN_A_NUR
Copied from CRM #[id number]. Topic: General Inquiry - Patient Advice  >> Jun 23, 2025 10:09 AM Escobar wrote:  .Name of Who is Calling:Pt wife Leeanne                What is the request in detail: Pt wife calling to see if he can get catheter for her .Please call back to further assist.                  Can the clinic reply by [facility]: No                What Number to [facility] Back [facility]: [phone]   Never

## 2025-06-26 ENCOUNTER — APPOINTMENT (OUTPATIENT)
Dept: ORTHOPEDIC SURGERY | Facility: ASSISTED LIVING FACILITY | Age: 85
End: 2025-06-26

## 2025-06-26 PROCEDURE — 99024 POSTOP FOLLOW-UP VISIT: CPT

## 2025-06-27 ENCOUNTER — APPOINTMENT (OUTPATIENT)
Dept: ORTHOPEDIC SURGERY | Facility: HOSPITAL | Age: 85
End: 2025-06-27

## 2025-06-30 NOTE — ED PROVIDER NOTE - CCCP TRG CHIEF CMPLNT
The arrhythmia indication for ablation is atrial flutter.   Catheter inserted under navigational guidance.   Radio frequency was used to perform the ablation.    The ablation procedure was successful. foot pain/injury

## 2025-07-18 ENCOUNTER — NON-APPOINTMENT (OUTPATIENT)
Age: 85
End: 2025-07-18

## 2025-08-17 ENCOUNTER — EMERGENCY (EMERGENCY)
Facility: HOSPITAL | Age: 85
LOS: 0 days | Discharge: HOME | End: 2025-08-18
Attending: EMERGENCY MEDICINE
Payer: MEDICARE

## 2025-08-17 VITALS
HEIGHT: 62 IN | HEART RATE: 74 BPM | WEIGHT: 169.98 LBS | RESPIRATION RATE: 16 BRPM | OXYGEN SATURATION: 96 % | TEMPERATURE: 98 F | DIASTOLIC BLOOD PRESSURE: 50 MMHG | SYSTOLIC BLOOD PRESSURE: 112 MMHG

## 2025-08-17 DIAGNOSIS — Z96.641 PRESENCE OF RIGHT ARTIFICIAL HIP JOINT: Chronic | ICD-10-CM

## 2025-08-17 DIAGNOSIS — R53.1 WEAKNESS: ICD-10-CM

## 2025-08-17 DIAGNOSIS — Z90.49 ACQUIRED ABSENCE OF OTHER SPECIFIED PARTS OF DIGESTIVE TRACT: Chronic | ICD-10-CM

## 2025-08-17 DIAGNOSIS — R53.83 OTHER FATIGUE: ICD-10-CM

## 2025-08-17 DIAGNOSIS — Z98.890 OTHER SPECIFIED POSTPROCEDURAL STATES: Chronic | ICD-10-CM

## 2025-08-17 DIAGNOSIS — Z90.710 ACQUIRED ABSENCE OF BOTH CERVIX AND UTERUS: Chronic | ICD-10-CM

## 2025-08-17 DIAGNOSIS — Z96.642 PRESENCE OF LEFT ARTIFICIAL HIP JOINT: Chronic | ICD-10-CM

## 2025-08-17 DIAGNOSIS — Z87.440 PERSONAL HISTORY OF URINARY (TRACT) INFECTIONS: ICD-10-CM

## 2025-08-17 DIAGNOSIS — Z96.652 PRESENCE OF LEFT ARTIFICIAL KNEE JOINT: Chronic | ICD-10-CM

## 2025-08-17 DIAGNOSIS — I95.9 HYPOTENSION, UNSPECIFIED: ICD-10-CM

## 2025-08-17 DIAGNOSIS — R40.4 TRANSIENT ALTERATION OF AWARENESS: ICD-10-CM

## 2025-08-17 LAB
ALBUMIN SERPL ELPH-MCNC: 3.1 G/DL — LOW (ref 3.5–5.2)
ALP SERPL-CCNC: 158 U/L — HIGH (ref 30–115)
ALT FLD-CCNC: 27 U/L — SIGNIFICANT CHANGE UP (ref 0–41)
ANION GAP SERPL CALC-SCNC: 12 MMOL/L — SIGNIFICANT CHANGE UP (ref 7–14)
AST SERPL-CCNC: 39 U/L — SIGNIFICANT CHANGE UP (ref 0–41)
BASOPHILS # BLD AUTO: 0.04 K/UL — SIGNIFICANT CHANGE UP (ref 0–0.2)
BASOPHILS NFR BLD AUTO: 0.5 % — SIGNIFICANT CHANGE UP (ref 0–1)
BILIRUB SERPL-MCNC: 0.4 MG/DL — SIGNIFICANT CHANGE UP (ref 0.2–1.2)
BUN SERPL-MCNC: 25 MG/DL — HIGH (ref 10–20)
CALCIUM SERPL-MCNC: 8.8 MG/DL — SIGNIFICANT CHANGE UP (ref 8.4–10.5)
CHLORIDE SERPL-SCNC: 94 MMOL/L — LOW (ref 98–110)
CO2 SERPL-SCNC: 23 MMOL/L — SIGNIFICANT CHANGE UP (ref 17–32)
CREAT SERPL-MCNC: 1 MG/DL — SIGNIFICANT CHANGE UP (ref 0.7–1.5)
EGFR: 55 ML/MIN/1.73M2 — LOW
EGFR: 55 ML/MIN/1.73M2 — LOW
EOSINOPHIL # BLD AUTO: 0.06 K/UL — SIGNIFICANT CHANGE UP (ref 0–0.7)
EOSINOPHIL NFR BLD AUTO: 0.7 % — SIGNIFICANT CHANGE UP (ref 0–8)
GLUCOSE SERPL-MCNC: 102 MG/DL — HIGH (ref 70–99)
HCT VFR BLD CALC: 28.5 % — LOW (ref 37–47)
HGB BLD-MCNC: 8.9 G/DL — LOW (ref 12–16)
IMM GRANULOCYTES NFR BLD AUTO: 1.4 % — HIGH (ref 0.1–0.3)
LYMPHOCYTES # BLD AUTO: 0.63 K/UL — LOW (ref 1.2–3.4)
LYMPHOCYTES # BLD AUTO: 7.4 % — LOW (ref 20.5–51.1)
MAGNESIUM SERPL-MCNC: 1.6 MG/DL — LOW (ref 1.8–2.4)
MCHC RBC-ENTMCNC: 28.1 PG — SIGNIFICANT CHANGE UP (ref 27–31)
MCHC RBC-ENTMCNC: 31.2 G/DL — LOW (ref 32–37)
MCV RBC AUTO: 89.9 FL — SIGNIFICANT CHANGE UP (ref 81–99)
MONOCYTES # BLD AUTO: 0.52 K/UL — SIGNIFICANT CHANGE UP (ref 0.1–0.6)
MONOCYTES NFR BLD AUTO: 6.1 % — SIGNIFICANT CHANGE UP (ref 1.7–9.3)
NEUTROPHILS # BLD AUTO: 7.09 K/UL — HIGH (ref 1.4–6.5)
NEUTROPHILS NFR BLD AUTO: 83.9 % — HIGH (ref 42.2–75.2)
NRBC BLD AUTO-RTO: 0 /100 WBCS — SIGNIFICANT CHANGE UP (ref 0–0)
PLATELET # BLD AUTO: 396 K/UL — SIGNIFICANT CHANGE UP (ref 130–400)
PMV BLD: 9.4 FL — SIGNIFICANT CHANGE UP (ref 7.4–10.4)
POTASSIUM SERPL-MCNC: 4.3 MMOL/L — SIGNIFICANT CHANGE UP (ref 3.5–5)
POTASSIUM SERPL-SCNC: 4.3 MMOL/L — SIGNIFICANT CHANGE UP (ref 3.5–5)
PROT SERPL-MCNC: 6.7 G/DL — SIGNIFICANT CHANGE UP (ref 6–8)
RBC # BLD: 3.17 M/UL — LOW (ref 4.2–5.4)
RBC # FLD: 17 % — HIGH (ref 11.5–14.5)
SODIUM SERPL-SCNC: 129 MMOL/L — LOW (ref 135–146)
TROPONIN T, HIGH SENSITIVITY RESULT: 26 NG/L — HIGH (ref 6–13)
TROPONIN T, HIGH SENSITIVITY RESULT: 29 NG/L — HIGH (ref 6–13)
WBC # BLD: 8.46 K/UL — SIGNIFICANT CHANGE UP (ref 4.8–10.8)
WBC # FLD AUTO: 8.46 K/UL — SIGNIFICANT CHANGE UP (ref 4.8–10.8)

## 2025-08-17 PROCEDURE — 83735 ASSAY OF MAGNESIUM: CPT

## 2025-08-17 PROCEDURE — 93010 ELECTROCARDIOGRAM REPORT: CPT

## 2025-08-17 PROCEDURE — 99285 EMERGENCY DEPT VISIT HI MDM: CPT | Mod: FS

## 2025-08-17 PROCEDURE — 99285 EMERGENCY DEPT VISIT HI MDM: CPT | Mod: 25

## 2025-08-17 PROCEDURE — 71045 X-RAY EXAM CHEST 1 VIEW: CPT

## 2025-08-17 PROCEDURE — 36415 COLL VENOUS BLD VENIPUNCTURE: CPT

## 2025-08-17 PROCEDURE — 84484 ASSAY OF TROPONIN QUANT: CPT

## 2025-08-17 PROCEDURE — 93005 ELECTROCARDIOGRAM TRACING: CPT

## 2025-08-17 PROCEDURE — 81003 URINALYSIS AUTO W/O SCOPE: CPT

## 2025-08-17 PROCEDURE — 96374 THER/PROPH/DIAG INJ IV PUSH: CPT

## 2025-08-17 PROCEDURE — 85025 COMPLETE CBC W/AUTO DIFF WBC: CPT

## 2025-08-17 PROCEDURE — 80053 COMPREHEN METABOLIC PANEL: CPT

## 2025-08-17 PROCEDURE — 71045 X-RAY EXAM CHEST 1 VIEW: CPT | Mod: 26

## 2025-08-17 RX ORDER — MAGNESIUM SULFATE 500 MG/ML
2 SYRINGE (ML) INJECTION ONCE
Refills: 0 | Status: COMPLETED | OUTPATIENT
Start: 2025-08-17 | End: 2025-08-17

## 2025-08-17 RX ORDER — ACETAMINOPHEN 500 MG/5ML
650 LIQUID (ML) ORAL ONCE
Refills: 0 | Status: COMPLETED | OUTPATIENT
Start: 2025-08-17 | End: 2025-08-17

## 2025-08-17 RX ADMIN — Medication 650 MILLIGRAM(S): at 21:35

## 2025-08-17 RX ADMIN — Medication 25 GRAM(S): at 21:35

## 2025-08-17 RX ADMIN — Medication 1000 MILLILITER(S): at 20:35

## 2025-08-18 LAB
APPEARANCE UR: CLEAR — SIGNIFICANT CHANGE UP
BILIRUB UR-MCNC: NEGATIVE — SIGNIFICANT CHANGE UP
COLOR SPEC: YELLOW — SIGNIFICANT CHANGE UP
DIFF PNL FLD: NEGATIVE — SIGNIFICANT CHANGE UP
GLUCOSE UR QL: NEGATIVE MG/DL — SIGNIFICANT CHANGE UP
KETONES UR QL: NEGATIVE MG/DL — SIGNIFICANT CHANGE UP
LEUKOCYTE ESTERASE UR-ACNC: NEGATIVE — SIGNIFICANT CHANGE UP
NITRITE UR-MCNC: NEGATIVE — SIGNIFICANT CHANGE UP
PH UR: 6.5 — SIGNIFICANT CHANGE UP (ref 5–8)
PROT UR-MCNC: SIGNIFICANT CHANGE UP MG/DL
SP GR SPEC: 1.02 — SIGNIFICANT CHANGE UP (ref 1–1.03)
UROBILINOGEN FLD QL: 0.2 MG/DL — SIGNIFICANT CHANGE UP (ref 0.2–1)

## 2025-08-21 ENCOUNTER — APPOINTMENT (OUTPATIENT)
Dept: ORTHOPEDIC SURGERY | Facility: CLINIC | Age: 85
End: 2025-08-21

## 2025-08-21 ENCOUNTER — INPATIENT (INPATIENT)
Facility: HOSPITAL | Age: 85
LOS: 1 days | Discharge: HOME | DRG: 312 | End: 2025-08-23
Attending: INTERNAL MEDICINE | Admitting: STUDENT IN AN ORGANIZED HEALTH CARE EDUCATION/TRAINING PROGRAM
Payer: MEDICARE

## 2025-08-21 VITALS
DIASTOLIC BLOOD PRESSURE: 54 MMHG | OXYGEN SATURATION: 97 % | HEART RATE: 120 BPM | WEIGHT: 169.98 LBS | SYSTOLIC BLOOD PRESSURE: 126 MMHG | HEIGHT: 68 IN | TEMPERATURE: 98 F | RESPIRATION RATE: 20 BRPM

## 2025-08-21 DIAGNOSIS — I48.0 PAROXYSMAL ATRIAL FIBRILLATION: ICD-10-CM

## 2025-08-21 DIAGNOSIS — Z79.82 LONG TERM (CURRENT) USE OF ASPIRIN: ICD-10-CM

## 2025-08-21 DIAGNOSIS — Z90.49 ACQUIRED ABSENCE OF OTHER SPECIFIED PARTS OF DIGESTIVE TRACT: Chronic | ICD-10-CM

## 2025-08-21 DIAGNOSIS — T45.1X5A ADVERSE EFFECT OF ANTINEOPLASTIC AND IMMUNOSUPPRESSIVE DRUGS, INITIAL ENCOUNTER: ICD-10-CM

## 2025-08-21 DIAGNOSIS — I10 ESSENTIAL (PRIMARY) HYPERTENSION: ICD-10-CM

## 2025-08-21 DIAGNOSIS — Z90.710 ACQUIRED ABSENCE OF BOTH CERVIX AND UTERUS: Chronic | ICD-10-CM

## 2025-08-21 DIAGNOSIS — Z96.652 PRESENCE OF LEFT ARTIFICIAL KNEE JOINT: Chronic | ICD-10-CM

## 2025-08-21 DIAGNOSIS — E87.5 HYPERKALEMIA: ICD-10-CM

## 2025-08-21 DIAGNOSIS — Z96.641 PRESENCE OF RIGHT ARTIFICIAL HIP JOINT: Chronic | ICD-10-CM

## 2025-08-21 DIAGNOSIS — R55 SYNCOPE AND COLLAPSE: ICD-10-CM

## 2025-08-21 DIAGNOSIS — E83.42 HYPOMAGNESEMIA: ICD-10-CM

## 2025-08-21 DIAGNOSIS — Z96.653 PRESENCE OF ARTIFICIAL KNEE JOINT, BILATERAL: ICD-10-CM

## 2025-08-21 DIAGNOSIS — F32.A DEPRESSION, UNSPECIFIED: ICD-10-CM

## 2025-08-21 DIAGNOSIS — M81.0 AGE-RELATED OSTEOPOROSIS WITHOUT CURRENT PATHOLOGICAL FRACTURE: ICD-10-CM

## 2025-08-21 DIAGNOSIS — Z96.642 PRESENCE OF LEFT ARTIFICIAL HIP JOINT: Chronic | ICD-10-CM

## 2025-08-21 DIAGNOSIS — Z98.890 OTHER SPECIFIED POSTPROCEDURAL STATES: Chronic | ICD-10-CM

## 2025-08-21 DIAGNOSIS — Z85.3 PERSONAL HISTORY OF MALIGNANT NEOPLASM OF BREAST: ICD-10-CM

## 2025-08-21 DIAGNOSIS — F41.9 ANXIETY DISORDER, UNSPECIFIED: ICD-10-CM

## 2025-08-21 DIAGNOSIS — T38.0X5A ADVERSE EFFECT OF GLUCOCORTICOIDS AND SYNTHETIC ANALOGUES, INITIAL ENCOUNTER: ICD-10-CM

## 2025-08-21 DIAGNOSIS — Z79.631 LONG TERM (CURRENT) USE OF ANTIMETABOLITE AGENT: ICD-10-CM

## 2025-08-21 DIAGNOSIS — E78.5 HYPERLIPIDEMIA, UNSPECIFIED: ICD-10-CM

## 2025-08-21 DIAGNOSIS — Z96.643 PRESENCE OF ARTIFICIAL HIP JOINT, BILATERAL: ICD-10-CM

## 2025-08-21 DIAGNOSIS — D84.821 IMMUNODEFICIENCY DUE TO DRUGS: ICD-10-CM

## 2025-08-21 DIAGNOSIS — K21.9 GASTRO-ESOPHAGEAL REFLUX DISEASE WITHOUT ESOPHAGITIS: ICD-10-CM

## 2025-08-21 DIAGNOSIS — Z91.81 HISTORY OF FALLING: ICD-10-CM

## 2025-08-21 DIAGNOSIS — Z79.52 LONG TERM (CURRENT) USE OF SYSTEMIC STEROIDS: ICD-10-CM

## 2025-08-21 LAB
ALBUMIN SERPL ELPH-MCNC: 2.9 G/DL — LOW (ref 3.5–5.2)
ALP SERPL-CCNC: 155 U/L — HIGH (ref 30–115)
ALT FLD-CCNC: 20 U/L — SIGNIFICANT CHANGE UP (ref 0–41)
ANION GAP SERPL CALC-SCNC: 16 MMOL/L — HIGH (ref 7–14)
AST SERPL-CCNC: 54 U/L — HIGH (ref 0–41)
BASOPHILS # BLD AUTO: 0.04 K/UL — SIGNIFICANT CHANGE UP (ref 0–0.2)
BASOPHILS NFR BLD AUTO: 0.4 % — SIGNIFICANT CHANGE UP (ref 0–1)
BILIRUB SERPL-MCNC: 0.4 MG/DL — SIGNIFICANT CHANGE UP (ref 0.2–1.2)
BUN SERPL-MCNC: 26 MG/DL — HIGH (ref 10–20)
CALCIUM SERPL-MCNC: 9.3 MG/DL — SIGNIFICANT CHANGE UP (ref 8.4–10.5)
CHLORIDE SERPL-SCNC: 95 MMOL/L — LOW (ref 98–110)
CO2 SERPL-SCNC: 19 MMOL/L — SIGNIFICANT CHANGE UP (ref 17–32)
CREAT SERPL-MCNC: 1 MG/DL — SIGNIFICANT CHANGE UP (ref 0.7–1.5)
D DIMER BLD IA.RAPID-MCNC: 548 NG/ML DDU — HIGH
EGFR: 55 ML/MIN/1.73M2 — LOW
EGFR: 55 ML/MIN/1.73M2 — LOW
EOSINOPHIL # BLD AUTO: 0.1 K/UL — SIGNIFICANT CHANGE UP (ref 0–0.7)
EOSINOPHIL NFR BLD AUTO: 1 % — SIGNIFICANT CHANGE UP (ref 0–8)
GLUCOSE SERPL-MCNC: 109 MG/DL — HIGH (ref 70–99)
HCT VFR BLD CALC: 30.4 % — LOW (ref 37–47)
HGB BLD-MCNC: 9.7 G/DL — LOW (ref 12–16)
IMM GRANULOCYTES NFR BLD AUTO: 1.1 % — HIGH (ref 0.1–0.3)
LYMPHOCYTES # BLD AUTO: 0.76 K/UL — LOW (ref 1.2–3.4)
LYMPHOCYTES # BLD AUTO: 7.4 % — LOW (ref 20.5–51.1)
MAGNESIUM SERPL-MCNC: 1.7 MG/DL — LOW (ref 1.8–2.4)
MCHC RBC-ENTMCNC: 28.3 PG — SIGNIFICANT CHANGE UP (ref 27–31)
MCHC RBC-ENTMCNC: 31.9 G/DL — LOW (ref 32–37)
MCV RBC AUTO: 88.6 FL — SIGNIFICANT CHANGE UP (ref 81–99)
MONOCYTES # BLD AUTO: 0.72 K/UL — HIGH (ref 0.1–0.6)
MONOCYTES NFR BLD AUTO: 7 % — SIGNIFICANT CHANGE UP (ref 1.7–9.3)
NEUTROPHILS # BLD AUTO: 8.58 K/UL — HIGH (ref 1.4–6.5)
NEUTROPHILS NFR BLD AUTO: 83.1 % — HIGH (ref 42.2–75.2)
NRBC BLD AUTO-RTO: 0 /100 WBCS — SIGNIFICANT CHANGE UP (ref 0–0)
PLATELET # BLD AUTO: 344 K/UL — SIGNIFICANT CHANGE UP (ref 130–400)
PMV BLD: 10.3 FL — SIGNIFICANT CHANGE UP (ref 7.4–10.4)
POTASSIUM SERPL-MCNC: 5.5 MMOL/L — HIGH (ref 3.5–5)
POTASSIUM SERPL-SCNC: 5.5 MMOL/L — HIGH (ref 3.5–5)
PROT SERPL-MCNC: 6.8 G/DL — SIGNIFICANT CHANGE UP (ref 6–8)
RBC # BLD: 3.43 M/UL — LOW (ref 4.2–5.4)
RBC # FLD: 17.5 % — HIGH (ref 11.5–14.5)
SODIUM SERPL-SCNC: 130 MMOL/L — LOW (ref 135–146)
TROPONIN T, HIGH SENSITIVITY RESULT: 48 NG/L — HIGH
TROPONIN T, HIGH SENSITIVITY RESULT: 77 NG/L — CRITICAL HIGH
WBC # BLD: 10.31 K/UL — SIGNIFICANT CHANGE UP (ref 4.8–10.8)
WBC # FLD AUTO: 10.31 K/UL — SIGNIFICANT CHANGE UP (ref 4.8–10.8)

## 2025-08-21 PROCEDURE — 85025 COMPLETE CBC W/AUTO DIFF WBC: CPT

## 2025-08-21 PROCEDURE — 93970 EXTREMITY STUDY: CPT | Mod: 26

## 2025-08-21 PROCEDURE — 36415 COLL VENOUS BLD VENIPUNCTURE: CPT

## 2025-08-21 PROCEDURE — 99285 EMERGENCY DEPT VISIT HI MDM: CPT | Mod: FS

## 2025-08-21 PROCEDURE — 84443 ASSAY THYROID STIM HORMONE: CPT

## 2025-08-21 PROCEDURE — 93010 ELECTROCARDIOGRAM REPORT: CPT | Mod: 77

## 2025-08-21 PROCEDURE — 71275 CT ANGIOGRAPHY CHEST: CPT | Mod: 26

## 2025-08-21 PROCEDURE — 73552 X-RAY EXAM OF FEMUR 2/>: CPT | Mod: 26,RT

## 2025-08-21 PROCEDURE — 93306 TTE W/DOPPLER COMPLETE: CPT

## 2025-08-21 PROCEDURE — 73564 X-RAY EXAM KNEE 4 OR MORE: CPT | Mod: 26,RT

## 2025-08-21 PROCEDURE — 83735 ASSAY OF MAGNESIUM: CPT

## 2025-08-21 PROCEDURE — 71045 X-RAY EXAM CHEST 1 VIEW: CPT | Mod: 26

## 2025-08-21 PROCEDURE — 84484 ASSAY OF TROPONIN QUANT: CPT

## 2025-08-21 PROCEDURE — 80053 COMPREHEN METABOLIC PANEL: CPT

## 2025-08-21 RX ORDER — MAGNESIUM SULFATE 500 MG/ML
2 SYRINGE (ML) INJECTION ONCE
Refills: 0 | Status: COMPLETED | OUTPATIENT
Start: 2025-08-21 | End: 2025-08-21

## 2025-08-21 RX ORDER — ACETAMINOPHEN 500 MG/5ML
975 LIQUID (ML) ORAL ONCE
Refills: 0 | Status: COMPLETED | OUTPATIENT
Start: 2025-08-21 | End: 2025-08-21

## 2025-08-21 RX ADMIN — Medication 975 MILLIGRAM(S): at 15:58

## 2025-08-21 RX ADMIN — Medication 1000 MILLILITER(S): at 15:58

## 2025-08-21 RX ADMIN — Medication 25 GRAM(S): at 15:58

## 2025-08-21 RX ADMIN — Medication 975 MILLIGRAM(S): at 23:54

## 2025-08-22 ENCOUNTER — RESULT REVIEW (OUTPATIENT)
Age: 85
End: 2025-08-22

## 2025-08-22 LAB
TROPONIN T, HIGH SENSITIVITY RESULT: 79 NG/L — CRITICAL HIGH
TROPONIN T, HIGH SENSITIVITY RESULT: 81 NG/L — CRITICAL HIGH

## 2025-08-22 PROCEDURE — 93306 TTE W/DOPPLER COMPLETE: CPT | Mod: 26

## 2025-08-22 RX ORDER — AMLODIPINE BESYLATE 10 MG/1
5 TABLET ORAL DAILY
Refills: 0 | Status: DISCONTINUED | OUTPATIENT
Start: 2025-08-22 | End: 2025-08-23

## 2025-08-22 RX ORDER — SERTRALINE 100 MG/1
50 TABLET, FILM COATED ORAL DAILY
Refills: 0 | Status: DISCONTINUED | OUTPATIENT
Start: 2025-08-22 | End: 2025-08-23

## 2025-08-22 RX ORDER — ASPIRIN 325 MG
81 TABLET ORAL DAILY
Refills: 0 | Status: DISCONTINUED | OUTPATIENT
Start: 2025-08-22 | End: 2025-08-23

## 2025-08-22 RX ORDER — SODIUM ZIRCONIUM CYCLOSILICATE 5 G/5G
10 POWDER, FOR SUSPENSION ORAL ONCE
Refills: 0 | Status: COMPLETED | OUTPATIENT
Start: 2025-08-22 | End: 2025-08-22

## 2025-08-22 RX ORDER — PREDNISONE 20 MG/1
2.5 TABLET ORAL DAILY
Refills: 0 | Status: DISCONTINUED | OUTPATIENT
Start: 2025-08-22 | End: 2025-08-23

## 2025-08-22 RX ORDER — LOSARTAN POTASSIUM 100 MG/1
50 TABLET, FILM COATED ORAL DAILY
Refills: 0 | Status: DISCONTINUED | OUTPATIENT
Start: 2025-08-22 | End: 2025-08-23

## 2025-08-22 RX ORDER — METOPROLOL SUCCINATE 50 MG/1
25 TABLET, EXTENDED RELEASE ORAL DAILY
Refills: 0 | Status: DISCONTINUED | OUTPATIENT
Start: 2025-08-22 | End: 2025-08-23

## 2025-08-22 RX ORDER — LOSARTAN POTASSIUM 100 MG/1
1 TABLET, FILM COATED ORAL
Refills: 0 | DISCHARGE

## 2025-08-22 RX ORDER — ENOXAPARIN SODIUM 100 MG/ML
80 INJECTION SUBCUTANEOUS EVERY 12 HOURS
Refills: 0 | Status: DISCONTINUED | OUTPATIENT
Start: 2025-08-22 | End: 2025-08-23

## 2025-08-22 RX ORDER — ACETAMINOPHEN 500 MG/5ML
650 LIQUID (ML) ORAL EVERY 6 HOURS
Refills: 0 | Status: DISCONTINUED | OUTPATIENT
Start: 2025-08-22 | End: 2025-08-23

## 2025-08-22 RX ORDER — HEPARIN SODIUM 1000 [USP'U]/ML
5000 INJECTION INTRAVENOUS; SUBCUTANEOUS EVERY 12 HOURS
Refills: 0 | Status: DISCONTINUED | OUTPATIENT
Start: 2025-08-22 | End: 2025-08-22

## 2025-08-22 RX ORDER — ROSUVASTATIN CALCIUM 20 MG/1
10 TABLET, FILM COATED ORAL AT BEDTIME
Refills: 0 | Status: DISCONTINUED | OUTPATIENT
Start: 2025-08-22 | End: 2025-08-23

## 2025-08-22 RX ADMIN — ENOXAPARIN SODIUM 80 MILLIGRAM(S): 100 INJECTION SUBCUTANEOUS at 11:00

## 2025-08-22 RX ADMIN — ENOXAPARIN SODIUM 80 MILLIGRAM(S): 100 INJECTION SUBCUTANEOUS at 22:50

## 2025-08-22 RX ADMIN — SERTRALINE 50 MILLIGRAM(S): 100 TABLET, FILM COATED ORAL at 12:59

## 2025-08-22 RX ADMIN — Medication 975 MILLIGRAM(S): at 02:07

## 2025-08-22 RX ADMIN — AMLODIPINE BESYLATE 5 MILLIGRAM(S): 10 TABLET ORAL at 05:53

## 2025-08-22 RX ADMIN — PREDNISONE 2.5 MILLIGRAM(S): 20 TABLET ORAL at 05:53

## 2025-08-22 RX ADMIN — Medication 81 MILLIGRAM(S): at 12:58

## 2025-08-22 RX ADMIN — SODIUM ZIRCONIUM CYCLOSILICATE 10 GRAM(S): 5 POWDER, FOR SUSPENSION ORAL at 05:52

## 2025-08-22 RX ADMIN — ROSUVASTATIN CALCIUM 10 MILLIGRAM(S): 20 TABLET, FILM COATED ORAL at 22:51

## 2025-08-22 RX ADMIN — METOPROLOL SUCCINATE 25 MILLIGRAM(S): 50 TABLET, EXTENDED RELEASE ORAL at 05:53

## 2025-08-22 RX ADMIN — Medication 20 MILLIGRAM(S): at 22:51

## 2025-08-22 RX ADMIN — Medication 20 MILLIGRAM(S): at 05:53

## 2025-08-23 ENCOUNTER — TRANSCRIPTION ENCOUNTER (OUTPATIENT)
Age: 85
End: 2025-08-23

## 2025-08-23 VITALS
HEART RATE: 88 BPM | OXYGEN SATURATION: 96 % | TEMPERATURE: 98 F | RESPIRATION RATE: 18 BRPM | SYSTOLIC BLOOD PRESSURE: 110 MMHG | DIASTOLIC BLOOD PRESSURE: 72 MMHG

## 2025-08-23 LAB
ALBUMIN SERPL ELPH-MCNC: 2.9 G/DL — LOW (ref 3.5–5.2)
ALP SERPL-CCNC: 149 U/L — HIGH (ref 30–115)
ALT FLD-CCNC: 17 U/L — SIGNIFICANT CHANGE UP (ref 0–41)
ANION GAP SERPL CALC-SCNC: 10 MMOL/L — SIGNIFICANT CHANGE UP (ref 7–14)
AST SERPL-CCNC: 28 U/L — SIGNIFICANT CHANGE UP (ref 0–41)
BASOPHILS # BLD AUTO: 0.04 K/UL — SIGNIFICANT CHANGE UP (ref 0–0.2)
BASOPHILS NFR BLD AUTO: 0.6 % — SIGNIFICANT CHANGE UP (ref 0–1)
BILIRUB SERPL-MCNC: 0.6 MG/DL — SIGNIFICANT CHANGE UP (ref 0.2–1.2)
BUN SERPL-MCNC: 14 MG/DL — SIGNIFICANT CHANGE UP (ref 10–20)
CALCIUM SERPL-MCNC: 8.9 MG/DL — SIGNIFICANT CHANGE UP (ref 8.4–10.5)
CHLORIDE SERPL-SCNC: 98 MMOL/L — SIGNIFICANT CHANGE UP (ref 98–110)
CO2 SERPL-SCNC: 26 MMOL/L — SIGNIFICANT CHANGE UP (ref 17–32)
CREAT SERPL-MCNC: 0.7 MG/DL — SIGNIFICANT CHANGE UP (ref 0.7–1.5)
EGFR: 85 ML/MIN/1.73M2 — SIGNIFICANT CHANGE UP
EGFR: 85 ML/MIN/1.73M2 — SIGNIFICANT CHANGE UP
EOSINOPHIL # BLD AUTO: 0.04 K/UL — SIGNIFICANT CHANGE UP (ref 0–0.7)
EOSINOPHIL NFR BLD AUTO: 0.6 % — SIGNIFICANT CHANGE UP (ref 0–8)
GLUCOSE SERPL-MCNC: 88 MG/DL — SIGNIFICANT CHANGE UP (ref 70–99)
HCT VFR BLD CALC: 29.5 % — LOW (ref 37–47)
HGB BLD-MCNC: 9.3 G/DL — LOW (ref 12–16)
IMM GRANULOCYTES NFR BLD AUTO: 0.7 % — HIGH (ref 0.1–0.3)
LYMPHOCYTES # BLD AUTO: 0.96 K/UL — LOW (ref 1.2–3.4)
LYMPHOCYTES # BLD AUTO: 13.5 % — LOW (ref 20.5–51.1)
MAGNESIUM SERPL-MCNC: 1.5 MG/DL — LOW (ref 1.8–2.4)
MCHC RBC-ENTMCNC: 27.7 PG — SIGNIFICANT CHANGE UP (ref 27–31)
MCHC RBC-ENTMCNC: 31.5 G/DL — LOW (ref 32–37)
MCV RBC AUTO: 87.8 FL — SIGNIFICANT CHANGE UP (ref 81–99)
MONOCYTES # BLD AUTO: 0.27 K/UL — SIGNIFICANT CHANGE UP (ref 0.1–0.6)
MONOCYTES NFR BLD AUTO: 3.8 % — SIGNIFICANT CHANGE UP (ref 1.7–9.3)
NEUTROPHILS # BLD AUTO: 5.77 K/UL — SIGNIFICANT CHANGE UP (ref 1.4–6.5)
NEUTROPHILS NFR BLD AUTO: 80.8 % — HIGH (ref 42.2–75.2)
NRBC BLD AUTO-RTO: 0 /100 WBCS — SIGNIFICANT CHANGE UP (ref 0–0)
PLATELET # BLD AUTO: 380 K/UL — SIGNIFICANT CHANGE UP (ref 130–400)
PMV BLD: 9.6 FL — SIGNIFICANT CHANGE UP (ref 7.4–10.4)
POTASSIUM SERPL-MCNC: 4.4 MMOL/L — SIGNIFICANT CHANGE UP (ref 3.5–5)
POTASSIUM SERPL-SCNC: 4.4 MMOL/L — SIGNIFICANT CHANGE UP (ref 3.5–5)
PROT SERPL-MCNC: 6.6 G/DL — SIGNIFICANT CHANGE UP (ref 6–8)
RBC # BLD: 3.36 M/UL — LOW (ref 4.2–5.4)
RBC # FLD: 17.5 % — HIGH (ref 11.5–14.5)
SODIUM SERPL-SCNC: 134 MMOL/L — LOW (ref 135–146)
TSH SERPL-MCNC: 2.04 UIU/ML — SIGNIFICANT CHANGE UP (ref 0.27–4.2)
WBC # BLD: 7.13 K/UL — SIGNIFICANT CHANGE UP (ref 4.8–10.8)
WBC # FLD AUTO: 7.13 K/UL — SIGNIFICANT CHANGE UP (ref 4.8–10.8)

## 2025-08-23 RX ORDER — MAGNESIUM SULFATE 500 MG/ML
2 SYRINGE (ML) INJECTION ONCE
Refills: 0 | Status: COMPLETED | OUTPATIENT
Start: 2025-08-23 | End: 2025-08-23

## 2025-08-23 RX ORDER — APIXABAN 2.5 MG/1
1 TABLET, FILM COATED ORAL
Qty: 60 | Refills: 0
Start: 2025-08-23 | End: 2025-09-21

## 2025-08-23 RX ADMIN — AMLODIPINE BESYLATE 5 MILLIGRAM(S): 10 TABLET ORAL at 05:14

## 2025-08-23 RX ADMIN — LOSARTAN POTASSIUM 50 MILLIGRAM(S): 100 TABLET, FILM COATED ORAL at 05:15

## 2025-08-23 RX ADMIN — Medication 20 MILLIGRAM(S): at 05:14

## 2025-08-23 RX ADMIN — Medication 25 GRAM(S): at 11:15

## 2025-08-23 RX ADMIN — ENOXAPARIN SODIUM 80 MILLIGRAM(S): 100 INJECTION SUBCUTANEOUS at 11:15

## 2025-08-23 RX ADMIN — PREDNISONE 2.5 MILLIGRAM(S): 20 TABLET ORAL at 05:16

## 2025-08-23 RX ADMIN — Medication 650 MILLIGRAM(S): at 14:33

## 2025-08-23 RX ADMIN — METOPROLOL SUCCINATE 25 MILLIGRAM(S): 50 TABLET, EXTENDED RELEASE ORAL at 05:14

## 2025-08-23 RX ADMIN — Medication 81 MILLIGRAM(S): at 11:14

## 2025-08-23 RX ADMIN — SERTRALINE 50 MILLIGRAM(S): 100 TABLET, FILM COATED ORAL at 11:14

## 2025-09-02 ENCOUNTER — APPOINTMENT (OUTPATIENT)
Facility: CLINIC | Age: 85
End: 2025-09-02

## 2025-09-08 ENCOUNTER — RESULT REVIEW (OUTPATIENT)
Age: 85
End: 2025-09-08

## 2025-09-17 ENCOUNTER — APPOINTMENT (OUTPATIENT)
Age: 85
End: 2025-09-17
Payer: MEDICARE

## 2025-09-17 VITALS
HEIGHT: 68 IN | WEIGHT: 165 LBS | TEMPERATURE: 98 F | BODY MASS INDEX: 25.01 KG/M2 | HEART RATE: 74 BPM | OXYGEN SATURATION: 99 % | SYSTOLIC BLOOD PRESSURE: 121 MMHG | DIASTOLIC BLOOD PRESSURE: 65 MMHG | RESPIRATION RATE: 19 BRPM

## 2025-09-17 DIAGNOSIS — D64.9 ANEMIA, UNSPECIFIED: ICD-10-CM

## 2025-09-17 DIAGNOSIS — Z17.0 MALIGNANT NEOPLASM OF UPPER-OUTER QUADRANT OF RIGHT FEMALE BREAST: ICD-10-CM

## 2025-09-17 DIAGNOSIS — C50.411 MALIGNANT NEOPLASM OF UPPER-OUTER QUADRANT OF RIGHT FEMALE BREAST: ICD-10-CM

## 2025-09-17 PROCEDURE — 99214 OFFICE O/P EST MOD 30 MIN: CPT
